# Patient Record
Sex: MALE | Race: BLACK OR AFRICAN AMERICAN | NOT HISPANIC OR LATINO | Employment: OTHER | URBAN - METROPOLITAN AREA
[De-identification: names, ages, dates, MRNs, and addresses within clinical notes are randomized per-mention and may not be internally consistent; named-entity substitution may affect disease eponyms.]

---

## 2017-01-04 ENCOUNTER — HOSPITAL ENCOUNTER (EMERGENCY)
Facility: OTHER | Age: 67
Discharge: HOME | End: 2017-01-04
Attending: EMERGENCY MEDICINE
Payer: MEDICARE

## 2017-01-04 VITALS
SYSTOLIC BLOOD PRESSURE: 127 MMHG | HEART RATE: 72 BPM | WEIGHT: 245 LBS | RESPIRATION RATE: 16 BRPM | HEIGHT: 66 IN | TEMPERATURE: 98 F | OXYGEN SATURATION: 96 % | DIASTOLIC BLOOD PRESSURE: 76 MMHG | BODY MASS INDEX: 39.37 KG/M2

## 2017-01-04 DIAGNOSIS — R42 VERTIGO: Primary | ICD-10-CM

## 2017-01-04 PROCEDURE — 99284 EMERGENCY DEPT VISIT MOD MDM: CPT

## 2017-01-04 RX ORDER — MECLIZINE HYDROCHLORIDE 25 MG/1
25 TABLET ORAL 3 TIMES DAILY PRN
Qty: 20 TABLET | Refills: 0 | Status: SHIPPED | OUTPATIENT
Start: 2017-01-04 | End: 2017-03-10

## 2017-01-04 NOTE — ED AVS SNAPSHOT
[facility]  [address]  [facility] [address]               Hoang J Tom Weaver   2017 10:10 PM   ED    Description:  Male : 1950   Department:  [facility]           Your Care was Coordinated By:     Provider Role From To    Macarena Laughlin MD Attending Provider 17 [phone] --      Reason for Visit     Dizziness           Diagnoses this Visit        Comments    Vertigo    -  Primary       ED Disposition     None           To Do List           Follow-up Information     Schedule an appointment as soon as possible for a visit with [facility].    Specialty:  Neurology    Why:  As needed if symptoms continue despite medication    Contact information:    [address]  [facility] [address] [phone]    Additional information:    [facility], [address]   Please park in [facility].       These Medications        Disp Refills Start End    meclizine (ANTIVERT) 25 mg tablet 20 tablet 0 2017     Take 1 tablet (25 mg total) by mouth 3 (three) times daily as needed for Dizziness. - Oral    Pharmacy: [facility] [address] [place] [address] AT [facility] Ph #: [phone]         [facility]     [facility] Care [facility] -  Assistance  Registered nurses in the [facility] provide clinical advisement, health education, appointment booking, and other advisory services.  Call for this free service at [phone].             Medications           Message regarding Medications     Verify the changes and/or additions to your medication regime listed below are the same as discussed with your clinician today.  If any of these changes or additions are incorrect, please notify your healthcare provider.        START taking these NEW medications        Refills    meclizine (ANTIVERT) 25 mg tablet 0    Sig: Take 1 tablet (25 mg  "total) by mouth 3 (three) times daily as needed for Dizziness.    Class: Print    Route: Oral           Verify that the below list of medications is an accurate representation of the medications you are currently taking.  If none reported, the list may be blank. If incorrect, please contact your healthcare provider. Carry this list with you in case of emergency.           Current Medications     atorvastatin (LIPITOR) 40 MG tablet Take 1 tablet (40 mg total) by mouth once daily.    esomeprazole (NEXIUM) 20 mg GrPS Take 20 mg by mouth before breakfast.    meclizine (ANTIVERT) 25 mg tablet Take 1 tablet (25 mg total) by mouth 3 (three) times daily as needed for Dizziness.    sildenafil (VIAGRA) 100 MG tablet Take 1 tablet (100 mg total) by mouth once daily.           Clinical Reference Information           Your Vitals Were     BP Pulse Temp Resp Height Weight    129/72 (BP Location: Left arm, Patient Position: Sitting) 85 98.7 °F (37.1 °C) (Oral) 17 5' 6" (1.676 m) 111.1 kg (245 lb)    SpO2 BMI             97% 39.54 kg/m2         Allergies as of 1/4/2017     No Known Allergies      Immunizations Administered on Date of Encounter - 1/4/2017     None      ED Micro, Lab, POCT     None      ED Imaging Orders     Start Ordered       Status Ordering Provider    01/04/17 2219 01/04/17 2218  CT Head Without Contrast  1 time imaging      Final result         Discharge Instructions         Managing Dizziness (Vertigo) with Medications    Although medications can't cure your problem, they can help control symptoms. Your doctor may prescribe medications for a few weeks and then taper them off.  If you have side effects from your medications, contact your healthcare provider right away.   How medications can help  · Treat infection or inflammation. If you have a bacterial infection, your doctor can prescribe antibiotics.  · Limit conflicting balance signals. These medications are often in pill form.  · Ease nausea. Suppositories, " pills, or shots may be used to reduce vomiting.  · Reduce pressure in the canals. Diuretics can be used to treat Meniere's disease. These medications help rid the body of excess fluid.  · Ease other symptoms. Other medications can help ease depression and anxiety caused by living with dizziness or fainting.  © 2686-5771 [employer]. [address] [place] [address], [place] [address]. All rights reserved. This information is not intended as a substitute for professional medical care. Always follow your healthcare professional's instructions.          Vertigo (Unknown Cause)    In addition to helping with hearing, the inner ear is part of the balance center of your body. Problems with the inner ear can a false feeling of motion. This is called vertigo. Often, it feels as if you or the room is spinning. A vertigo attack may cause sudden nausea, vomiting and heavy sweating. Severe vertigo causes a loss of balance and can cause you to fall. During vertigo, small head movements and changes in body position will often make the symptoms worse. You may also have ringing in the ears called tinnitus.  An episode of vertigo may last seconds, minutes or hours. Once you are over the first episode, it may never come back. However, symptoms may return off and on.  The cause of your vertigo is not yet known. Possible causes of vertigo include:  · Inflammation of the inner ear  · Disease of the nerves to the inner ear  · Movement of calcium particles in the inner ear  · Poor blood flow to the balance centers of the brain  · Migraine headaches  Home care  · If symptoms are severe, rest quietly in bed. Change positions very slowly. There is usually one position that will feel best, such as lying on one side or lying on your back with your head slightly raised on pillows.  · Do not drive a car or work with dangerous machinery until symptoms have been gone for at least one week.  · Take medicine as prescribed to relieve your  symptoms. Unless another medicine was prescribed for symptoms of nausea, vomiting, and dizziness, you may use over-the-counter motion sickness pills. Ask your pharmacist for suggestions.  Follow-up care  Follow up with your healthcare provider or as directed. If you are referred to a specialist or for testing, make the appointment promptly.  When to seek medical advice  Call your healthcare provider if any of the following occur:  · Fever of 100.4°F (38ºC) or higher, or as directed by your healthcare provider  · Vertigo worsens or is not controlled by prescribed medicine   · Repeated vomiting not relieved by prescribed medicine   · Severe headache  · Confusion  · Weakness of an arm or leg or one side of the face  · Difficulty with speech or vision  · Loss of consciousness   · Seizure  © 5561-7465 [employer]. [address] [place] [address], [place] [address]. All rights reserved. This information is not intended as a substitute for professional medical care. Always follow your healthcare professional's instructions.          Your Scheduled Appointments     Feb 07, 2017  9:00 AM CST   EKG with EKG, APPT   [facility] ([facility] )    [address]  [place] [address]   [phone]            Feb 07, 2017  9:30 AM CST   Non-Fasting Lab with LAB, APPOINTMENT [place] [facility] ([facility])    [address] [facility] [address]   [phone]            Feb 20, 2017  9:45 AM CST   Post OP with [place] JAN Araujo III, MD   [facility] - Otorhinolaryngology ([facility]    [address]  [place] [address]   [phone]              Your Future Surgeries/Procedures     Feb 14, 2017   Surgery with [place] JAN Araujo III, MD   [facility] ([facility])    [address] [facility] [address]   [phone]               [facility] complies with applicable Federal civil rights laws and  does not discriminate on the basis of race, color, national origin, age, disability, or sex.        Language Assistance Services     ATTENTION: Language assistance services are available, free of charge. Please call [phone].      ATENCIÓN: Si clark castro, tiene a graham disposición servicios gratuitos de asistencia lingüística. Llame al [phone].     CHÚ Ý: N?u b?n nói Ti?ng Vi?t, có các d?ch v? h? tr? ngôn ng? mi?n phí dành cho b?n. G?i s? [phone].

## 2017-01-05 NOTE — ED PROVIDER NOTES
"Encounter Date: 1/4/2017    SCRIBE #1 NOTE: I, Karissa Paniagua , am scribing for, and in the presence of, Dr. Laughlin.       History     Chief Complaint   Patient presents with    Dizziness     pt c/o episode of dizziness for the past 2 days. states they last about 3 minutes, "the room starts spinning, then it goes away".      Review of patient's allergies indicates:  No Known Allergies  HPI Comments: Time seen by provider: 10:12 PM    This is a 66 y.o. male, with history of HLD, who presents with complaint of dizziness. Symptoms began two days ago. Abrupt onset of symptoms occurred at rest without head movement. Dizziness is described as intermittent and "spinning", each episode lasting minutes in duration. He has no other complaints, and denies fever, chills, congestion, ear pain, nausea, vomiting, speech difficulty, numbness, or weakness. Pt is compliant with Lipitor, notes symptoms resolve without intervention, and denies head trauma or previously experiencing symptoms.      The history is provided by the patient.     Past Medical History   Diagnosis Date    Hyperlipidemia     Sleep apnea      Past Medical History Pertinent Negatives   Diagnosis Date Noted    Elevated PSA 12/4/2015    Kidney stone 12/4/2015    STD (sexually transmitted disease) 12/4/2015    Urinary tract infection 12/4/2015     Past Surgical History   Procedure Laterality Date    Tonsillectomy       Family History   Problem Relation Age of Onset    Heart disease Mother     Diabetes Mother     Stroke Father     Cancer Sister      pancreatitic    COPD Sister     Prostate cancer Neg Hx     Kidney disease Neg Hx      Social History   Substance Use Topics    Smoking status: Never Smoker    Smokeless tobacco: None    Alcohol use No     Review of Systems   Constitutional: Negative for chills and fever.   HENT: Negative for congestion, ear pain and sore throat.    Eyes: Negative for redness and visual disturbance.   Respiratory: Negative " for cough and shortness of breath.    Cardiovascular: Negative for chest pain and palpitations.   Gastrointestinal: Negative for abdominal pain, diarrhea, nausea and vomiting.   Genitourinary: Negative for dysuria.   Musculoskeletal: Negative for back pain.   Skin: Negative for rash.   Neurological: Positive for dizziness. Negative for speech difficulty, weakness, numbness and headaches.   Psychiatric/Behavioral: Negative for confusion.       Physical Exam   Initial Vitals   BP Pulse Resp Temp SpO2   01/04/17 2044 01/04/17 2044 01/04/17 2044 01/04/17 2044 01/04/17 2044   129/72 85 17 98.7 °F (37.1 °C) 97 %     Physical Exam    Nursing note and vitals reviewed.  Constitutional: He appears well-developed and well-nourished. He is not diaphoretic. No distress.   HENT:   Head: Normocephalic and atraumatic.   Right Ear: External ear normal.   Left Ear: External ear normal.   Eyes: EOM are normal. Pupils are equal, round, and reactive to light. Right eye exhibits no discharge. Left eye exhibits no discharge.   Neck: Normal range of motion.   Cardiovascular: Normal rate, regular rhythm and normal heart sounds. Exam reveals no gallop and no friction rub.    No murmur heard.  Pulmonary/Chest: Breath sounds normal. No respiratory distress. He has no wheezes. He has no rhonchi. He has no rales.   Abdominal: Soft. There is no tenderness. There is no rebound and no guarding.   Musculoskeletal: Normal range of motion. He exhibits no edema or tenderness.   Lymphadenopathy:     He has no cervical adenopathy.   Neurological: He is alert and oriented to person, place, and time.   Cranial nerves II through XII grossly intact.  5/5 motor strength all 4 extremities.  Sensation is normal.  Finger to nose normal. Heel to shin normal. Gait normal.  Speech and cognition is normal.  No focal neurologic deficit.     Skin: Skin is warm and dry. No rash and no abscess noted. No erythema. No pallor.   Psychiatric: He has a normal mood and  affect. His behavior is normal. Judgment and thought content normal.         ED Course   Procedures  Labs Reviewed - No data to display      Imaging Results         CT Head Without Contrast (Final result) Result time:  01/04/17 22:38:48    Final result by Donnell Hamm MD (01/04/17 22:38:48)    Impression:        No acute intracranial abnormalities.     .      Electronically signed by: DONNELL HAMM MD  Date:     01/04/17  Time:    22:38     Narrative:    History: vertigo    Comparison: None    Technique:    Axial images of the brain were obtained at 5-mm intervals from the skull base to the vertex without the administration of contrast.    Findings:    The brain parenchyma appears normal for age with good corticomedullary differentiation.  There is no evidence of acute infarct, hemorrhage, or mass.  The ventricular system is normal in size.  No mass-effect or midline shift.  There are no abnormal extra-axial fluid collections.      The paranasal sinuses and mastoid air cells are clear.      The calvarium appears intact.  .                   Medical Decision Making:   History:   Old Medical Records: I decided to obtain old medical records.  Initial Assessment:   66-year-old healthy male presents complaining of intermittent room spinning which lasts only minutes at a time and was not present during my exam.  I can also not elicited during the exam.  Vital signs were within normal limits and his neuro exam was nonfocal.  Clinical Tests:   Radiological Study: Reviewed and Ordered  ED Management:  CT of the head was obtained to evaluate for central cause for his vertigo.  CT was unremarkable.  Patient was given a prescription for Antivert as well as information to follow-up with neurology if the vertigo persists.  Patient voiced understanding and agreement with this plan and was discharged home in stable condition.            Scribe Attestation:   Scribe #1: I performed the above scribed service and the  documentation accurately describes the services I performed. I attest to the accuracy of the note.    Attending Attestation:           Physician Attestation for Scribe:  Physician Attestation Statement for Scribe #1: I, Dr. Laughlin, reviewed documentation, as scribed by Karissa Paniagua  in my presence, and it is both accurate and complete.                 ED Course     Clinical Impression:     1. Vertigo                Macarena Laughlin MD  01/05/17 0140

## 2017-01-05 NOTE — ED NOTES
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 Denies N/V/D  LOC: Pt is awake alert and aware of environment, oriented X3 and speaking appropriately  Appearance: Pt is in no acute distress, Pt is well groomed and clean  Skin: skin is warm and dry with normal turgor, mucus membranes are moist and pink, skin is intact with no bruising or breakdown  Muskuloskeletal: Pt moves all extremities well, there is no obvious swelling or deformities noted, pulses are intact.  Respiratory: Airway is open and patent, respirations are spontaneous and even.  Cardiac: no edema and cap refill is <3sec  Abdomen: soft, non-tender and non-distended  Neuro: Pt follows commands easily and has no obvious deficits

## 2017-01-05 NOTE — DISCHARGE INSTRUCTIONS
Managing Dizziness (Vertigo) with Medications    Although medications can't cure your problem, they can help control symptoms. Your doctor may prescribe medications for a few weeks and then taper them off.  If you have side effects from your medications, contact your healthcare provider right away.   How medications can help  · Treat infection or inflammation. If you have a bacterial infection, your doctor can prescribe antibiotics.  · Limit conflicting balance signals. These medications are often in pill form.  · Ease nausea. Suppositories, pills, or shots may be used to reduce vomiting.  · Reduce pressure in the canals. Diuretics can be used to treat Meniere's disease. These medications help rid the body of excess fluid.  · Ease other symptoms. Other medications can help ease depression and anxiety caused by living with dizziness or fainting.  © 7320-4768 [employer]. [address] [place] [address], [place] [address]. All rights reserved. This information is not intended as a substitute for professional medical care. Always follow your healthcare professional's instructions.          Vertigo (Unknown Cause)    In addition to helping with hearing, the inner ear is part of the balance center of your body. Problems with the inner ear can a false feeling of motion. This is called vertigo. Often, it feels as if you or the room is spinning. A vertigo attack may cause sudden nausea, vomiting and heavy sweating. Severe vertigo causes a loss of balance and can cause you to fall. During vertigo, small head movements and changes in body position will often make the symptoms worse. You may also have ringing in the ears called tinnitus.  An episode of vertigo may last seconds, minutes or hours. Once you are over the first episode, it may never come back. However, symptoms may return off and on.  The cause of your vertigo is not yet known. Possible causes of vertigo include:  · Inflammation of the inner ear  · Disease  of the nerves to the inner ear  · Movement of calcium particles in the inner ear  · Poor blood flow to the balance centers of the brain  · Migraine headaches  Home care  · If symptoms are severe, rest quietly in bed. Change positions very slowly. There is usually one position that will feel best, such as lying on one side or lying on your back with your head slightly raised on pillows.  · Do not drive a car or work with dangerous machinery until symptoms have been gone for at least one week.  · Take medicine as prescribed to relieve your symptoms. Unless another medicine was prescribed for symptoms of nausea, vomiting, and dizziness, you may use over-the-counter motion sickness pills. Ask your pharmacist for suggestions.  Follow-up care  Follow up with your healthcare provider or as directed. If you are referred to a specialist or for testing, make the appointment promptly.  When to seek medical advice  Call your healthcare provider if any of the following occur:  · Fever of 100.4°F (38ºC) or higher, or as directed by your healthcare provider  · Vertigo worsens or is not controlled by prescribed medicine   · Repeated vomiting not relieved by prescribed medicine   · Severe headache  · Confusion  · Weakness of an arm or leg or one side of the face  · Difficulty with speech or vision  · Loss of consciousness   · Seizure  © 2253-4400 [employer]. [address] [place] [address], [place] [address]. All rights reserved. This information is not intended as a substitute for professional medical care. Always follow your healthcare professional's instructions.

## 2017-02-07 ENCOUNTER — HOSPITAL ENCOUNTER (OUTPATIENT)
Dept: CARDIOLOGY | Facility: CLINIC | Age: 67
Discharge: HOME | End: 2017-02-07
Payer: MEDICARE

## 2017-02-07 DIAGNOSIS — G47.33 OBSTRUCTIVE SLEEP APNEA: ICD-10-CM

## 2017-02-07 DIAGNOSIS — Z01.818 PREOP TESTING: ICD-10-CM

## 2017-02-07 PROCEDURE — 93005 ELECTROCARDIOGRAM TRACING: CPT | Mod: PBBFAC | Performed by: INTERNAL MEDICINE

## 2017-02-07 PROCEDURE — 93010 ELECTROCARDIOGRAM REPORT: CPT | Mod: S$PBB,,, | Performed by: INTERNAL MEDICINE

## 2017-02-13 ENCOUNTER — TELEPHONE (OUTPATIENT)
Dept: OTOLARYNGOLOGY | Facility: CLINIC | Age: 67
End: 2017-02-13

## 2017-02-14 ENCOUNTER — SURGERY (OUTPATIENT)
Age: 67
End: 2017-02-14

## 2017-02-14 ENCOUNTER — ANESTHESIA EVENT (OUTPATIENT)
Dept: SURGERY | Facility: HOSPITAL | Age: 67
End: 2017-02-14
Payer: MEDICARE

## 2017-02-14 ENCOUNTER — ANESTHESIA (OUTPATIENT)
Dept: SURGERY | Facility: HOSPITAL | Age: 67
End: 2017-02-14
Payer: MEDICARE

## 2017-02-14 PROBLEM — G47.33 OSA (OBSTRUCTIVE SLEEP APNEA): Status: ACTIVE | Noted: 2017-02-14

## 2017-02-14 PROCEDURE — 63600175 PHARM REV CODE 636 W HCPCS: Performed by: NURSE ANESTHETIST, CERTIFIED REGISTERED

## 2017-02-14 PROCEDURE — 25000003 PHARM REV CODE 250: Performed by: ANESTHESIOLOGY

## 2017-02-14 PROCEDURE — 25000003 PHARM REV CODE 250: Performed by: NURSE ANESTHETIST, CERTIFIED REGISTERED

## 2017-02-14 PROCEDURE — D9220A PRA ANESTHESIA: Mod: CRNA,,, | Performed by: NURSE ANESTHETIST, CERTIFIED REGISTERED

## 2017-02-14 PROCEDURE — D9220A PRA ANESTHESIA: Mod: ANES,,, | Performed by: ANESTHESIOLOGY

## 2017-02-14 RX ORDER — DEXAMETHASONE SODIUM PHOSPHATE 4 MG/ML
INJECTION, SOLUTION INTRA-ARTICULAR; INTRALESIONAL; INTRAMUSCULAR; INTRAVENOUS; SOFT TISSUE
Status: DISCONTINUED | OUTPATIENT
Start: 2017-02-14 | End: 2017-02-14

## 2017-02-14 RX ORDER — PHENYLEPHRINE HYDROCHLORIDE 10 MG/ML
INJECTION INTRAVENOUS
Status: DISCONTINUED | OUTPATIENT
Start: 2017-02-14 | End: 2017-02-14

## 2017-02-14 RX ORDER — PROPOFOL 10 MG/ML
VIAL (ML) INTRAVENOUS
Status: DISCONTINUED | OUTPATIENT
Start: 2017-02-14 | End: 2017-02-14

## 2017-02-14 RX ORDER — DEXMEDETOMIDINE HYDROCHLORIDE 100 UG/ML
INJECTION, SOLUTION INTRAVENOUS
Status: DISCONTINUED | OUTPATIENT
Start: 2017-02-14 | End: 2017-02-14

## 2017-02-14 RX ORDER — CEFAZOLIN SODIUM 1 G/3ML
INJECTION, POWDER, FOR SOLUTION INTRAMUSCULAR; INTRAVENOUS
Status: DISCONTINUED | OUTPATIENT
Start: 2017-02-14 | End: 2017-02-14

## 2017-02-14 RX ORDER — MIDAZOLAM HYDROCHLORIDE 1 MG/ML
INJECTION, SOLUTION INTRAMUSCULAR; INTRAVENOUS
Status: DISCONTINUED | OUTPATIENT
Start: 2017-02-14 | End: 2017-02-14

## 2017-02-14 RX ORDER — LIDOCAINE HYDROCHLORIDE 20 MG/ML
SOLUTION OROPHARYNGEAL
Status: DISCONTINUED | OUTPATIENT
Start: 2017-02-14 | End: 2017-02-14

## 2017-02-14 RX ORDER — NEOSTIGMINE METHYLSULFATE 1 MG/ML
INJECTION, SOLUTION INTRAVENOUS
Status: DISCONTINUED | OUTPATIENT
Start: 2017-02-14 | End: 2017-02-14

## 2017-02-14 RX ORDER — ONDANSETRON 2 MG/ML
INJECTION INTRAMUSCULAR; INTRAVENOUS
Status: DISCONTINUED | OUTPATIENT
Start: 2017-02-14 | End: 2017-02-14

## 2017-02-14 RX ORDER — ROCURONIUM BROMIDE 10 MG/ML
INJECTION, SOLUTION INTRAVENOUS
Status: DISCONTINUED | OUTPATIENT
Start: 2017-02-14 | End: 2017-02-14

## 2017-02-14 RX ADMIN — PHENYLEPHRINE HYDROCHLORIDE 200 MCG: 10 INJECTION INTRAVENOUS at 08:02

## 2017-02-14 RX ADMIN — SODIUM CHLORIDE, SODIUM GLUCONATE, SODIUM ACETATE, POTASSIUM CHLORIDE, MAGNESIUM CHLORIDE, SODIUM PHOSPHATE, DIBASIC, AND POTASSIUM PHOSPHATE: .53; .5; .37; .037; .03; .012; .00082 INJECTION, SOLUTION INTRAVENOUS at 08:02

## 2017-02-14 RX ADMIN — MIDAZOLAM HYDROCHLORIDE 1 MG: 1 INJECTION, SOLUTION INTRAMUSCULAR; INTRAVENOUS at 07:02

## 2017-02-14 RX ADMIN — GLYCOPYRROLATE 0.2 MG: 0.2 INJECTION, SOLUTION INTRAMUSCULAR; INTRAVENOUS at 07:02

## 2017-02-14 RX ADMIN — ONDANSETRON 4 MG: 2 INJECTION INTRAMUSCULAR; INTRAVENOUS at 08:02

## 2017-02-14 RX ADMIN — LIDOCAINE HYDROCHLORIDE 5 ML: 20 SOLUTION OROPHARYNGEAL at 07:02

## 2017-02-14 RX ADMIN — SODIUM CHLORIDE, SODIUM GLUCONATE, SODIUM ACETATE, POTASSIUM CHLORIDE, MAGNESIUM CHLORIDE, SODIUM PHOSPHATE, DIBASIC, AND POTASSIUM PHOSPHATE: .53; .5; .37; .037; .03; .012; .00082 INJECTION, SOLUTION INTRAVENOUS at 09:02

## 2017-02-14 RX ADMIN — BUPIVACAINE HYDROCHLORIDE 4 ML: 2.5 INJECTION, SOLUTION EPIDURAL; INFILTRATION; INTRACAUDAL; PERINEURAL at 08:02

## 2017-02-14 RX ADMIN — TOPICAL ANESTHETIC 1 EACH: 200 SPRAY DENTAL; PERIODONTAL at 07:02

## 2017-02-14 RX ADMIN — DEXMEDETOMIDINE HYDROCHLORIDE 1 MCG/KG/HR: 100 INJECTION, SOLUTION, CONCENTRATE INTRAVENOUS at 07:02

## 2017-02-14 RX ADMIN — GLYCOPYRROLATE 0.6 MG: 0.2 INJECTION, SOLUTION INTRAMUSCULAR; INTRAVENOUS at 08:02

## 2017-02-14 RX ADMIN — PROPOFOL 80 MG: 10 INJECTION, EMULSION INTRAVENOUS at 08:02

## 2017-02-14 RX ADMIN — ROCURONIUM BROMIDE 10 MG: 10 INJECTION, SOLUTION INTRAVENOUS at 08:02

## 2017-02-14 RX ADMIN — LIDOCAINE 0.2 G: 50 OINTMENT TOPICAL at 07:02

## 2017-02-14 RX ADMIN — CEFAZOLIN 2 G: 1 INJECTION, POWDER, FOR SOLUTION INTRAVENOUS at 08:02

## 2017-02-14 RX ADMIN — DEXMEDETOMIDINE HYDROCHLORIDE 53 MCG: 100 INJECTION, SOLUTION, CONCENTRATE INTRAVENOUS at 07:02

## 2017-02-14 RX ADMIN — DEXAMETHASONE SODIUM PHOSPHATE 8 MG: 4 INJECTION, SOLUTION INTRAMUSCULAR; INTRAVENOUS at 08:02

## 2017-02-14 RX ADMIN — ROCURONIUM BROMIDE 20 MG: 10 INJECTION, SOLUTION INTRAVENOUS at 08:02

## 2017-02-14 RX ADMIN — NEOSTIGMINE METHYLSULFATE 5 MG: 1 INJECTION INTRAVENOUS at 08:02

## 2017-02-14 NOTE — ANESTHESIA PREPROCEDURE EVALUATION
02/14/2017  Hoang Santos Jr. is a 66 y.o., male.    [facility] Anesthesia Evaluation         Review of Systems  Anesthesia Hx:  No problems with previous Anesthesia   Social:  Non-Smoker    Cardiovascular:   Exercise tolerance: good Denies Hypertension.  Denies CAD.     Denies Angina.  Functional Capacity Can you climb two flights of stairs? ==> Yes    Pulmonary:   Denies Asthma.  Denies Recent URI. Sleep Apnea, CPAP    Renal/:  Renal/ Normal     Hepatic/GI:   Denies PUD. Denies Hiatal Hernia.  Denies GERD. Denies Liver Disease.  Denies Hepatitis.    Neurological:   Denies CVA. Denies Seizures.    Endocrine:   Denies Diabetes. Denies Hypothyroidism.        Physical Exam  General:  Well nourished, Obesity    Airway/Jaw/Neck:  Airway Findings: Mouth Opening: Small, but > 3cm Tongue: Normal, Large  General Airway Assessment: Adult  Mallampati: IV  Improves to III with phonation.  TM Distance: 4 - 6 cm  Jaw/Neck Findings:  Neck ROM: Normal ROM  Neck Findings:  Girth Increased     Eyes/Ears/Nose:  EYES/EARS/NOSE FINDINGS: Normal   Dental:  Dental Findings: In tact, upper front caps, upper partial dentures   Chest/Lungs:  Chest/Lungs Findings: Clear to auscultation     Heart/Vascular:  Heart Findings: Rate: Normal  Rhythm: Regular Rhythm  Sounds: Normal        Mental Status:  Mental Status Findings:  Alert and Oriented         Anesthesia Plan  Type of Anesthesia, risks & benefits discussed:  Anesthesia Type:  general  Patient's Preference: Proceed with anesthesia understanding that the risks are very small but could be serious or life threatening.  Intra-op Monitoring Plan: standard ASA monitors  Intra-op Monitoring Plan Comments:   Post Op Pain Control Plan:   Post Op Pain Control Plan Comments:   Induction:   IV  Beta Blocker:  Patient is not currently on a Beta-Blocker (No further documentation required).    "    Informed Consent: Patient understands risks and agrees with Anesthesia plan.  Questions answered. Anesthesia consent signed with patient.  ASA Score: 2     Day of Surgery Review of History & Physical: I have interviewed and examined the patient. I have reviewed the patient's H&P dated:        Anesthesia Plan Notes: Neck girth increased, decreased TMD, Malampatti 4-3 ===> Patient looks to be a potentially very difficult airway. His voice is slightly altered from obstructive pharyngeal tissue. He has not had an anesthetic since age 6. And he gives a family history of father dying "from anesthesia at [facility] in 1950's" from unknown cause. This whole picture leads me to advise him to have an awake FOB intubation. We will use a little midazolam and Dexmedetomidine as well as pharyngeal blocks with hurricane spray, lidocaine 5% ointment. LMA's and glidescope as standby equipment.        Ready For Surgery From Anesthesia Perspective.       "

## 2017-02-14 NOTE — ANESTHESIA RELEASE NOTE
Anesthesia Release from PACU Note    Patient: Hoang Santos JrScott    Procedure(s) Performed: Procedure(s) (LRB):  PALATOPLASTY-(UPPP) (N/A)    Anesthesia type: General    Post pain: Adequate analgesia    Post assessment: no apparent anesthetic complications    Last Vitals:   Vitals:    02/14/17 1030   BP: (!) 150/94   Pulse: 66   Resp: 14   Temp:    SpO2: (!) 93%       Post vital signs: stable    Level of consciousness: awake    Complications: none    Airway Patency: patent    Respiratory: spontaneous    Cardiovascular: stable    Hydration: euvolemic

## 2017-02-14 NOTE — ANESTHESIA POSTPROCEDURE EVALUATION
"Anesthesia Post Evaluation    Patient: Hoang Santos Jr.    Procedure(s) Performed: Procedure(s) (LRB):  PALATOPLASTY-(UPPP) (N/A)    Final Anesthesia Type: general  Patient location during evaluation: PACU  Patient participation: Yes- Able to Participate  Level of consciousness: awake and alert  Post-procedure vital signs: reviewed and stable  Pain management: adequate  Airway patency: patent  PONV status at discharge: No PONV  Anesthetic complications: no      Cardiovascular status: blood pressure returned to baseline  Respiratory status: unassisted  Hydration status: euvolemic  Follow-up not needed.        Visit Vitals    BP (!) 150/94    Pulse 66    Temp 36.7 °C (98.1 °F) (Skin)    Resp 14    Ht 5' 6" (1.676 m)    Wt 106.6 kg (235 lb)    SpO2 (!) 93%    BMI 37.93 kg/m2       Pain/Perla Score: Pain Assessment Performed: Yes (2/14/2017 10:23 AM)  Presence of Pain: denies (2/14/2017 10:23 AM)  Pain Rating Prior to Med Admin: 0 (2/14/2017  9:41 AM)  Pain Rating Post Med Admin: 0 (2/14/2017 10:23 AM)  Perla Score: 10 (2/14/2017 10:23 AM)      "

## 2017-02-14 NOTE — TRANSFER OF CARE
"Anesthesia Transfer of Care Note    Patient: Hoang Santos Jr.    Procedure(s) Performed: Procedure(s) (LRB):  PALATOPLASTY-(UPPP) (N/A)    Patient location: PACU    Anesthesia Type: general    Transport from OR: Transported from OR on 6-10 L/min O2 by face mask with adequate spontaneous ventilation    Post pain: adequate analgesia    Post assessment: no apparent anesthetic complications and tolerated procedure well    Post vital signs: stable    Level of consciousness: awake    Nausea/Vomiting: no nausea/vomiting    Complications: none          Last vitals:   Visit Vitals    /77 (BP Location: Left arm, Patient Position: Lying, BP Method: Automatic)    Pulse 71    Temp 36.5 °C (97.7 °F) (Axillary)    Resp 20    Ht 5' 6" (1.676 m)    Wt 106.6 kg (235 lb)    SpO2 100%    BMI 37.93 kg/m2     "

## 2017-02-15 RX ORDER — CEPHALEXIN 250 MG/5ML
250 POWDER, FOR SUSPENSION ORAL 4 TIMES DAILY
Qty: 200 ML | Refills: 0 | Status: SHIPPED | OUTPATIENT
Start: 2017-02-15 | End: 2017-02-25

## 2017-02-20 ENCOUNTER — OFFICE VISIT (OUTPATIENT)
Dept: OTOLARYNGOLOGY | Facility: CLINIC | Age: 67
End: 2017-02-20
Payer: MEDICARE

## 2017-02-20 VITALS — SYSTOLIC BLOOD PRESSURE: 125 MMHG | HEART RATE: 90 BPM | DIASTOLIC BLOOD PRESSURE: 83 MMHG

## 2017-02-20 DIAGNOSIS — Z98.890 S/P UPPP (UVULOPALATOPHARYNGOPLASTY): Primary | ICD-10-CM

## 2017-02-20 PROCEDURE — 99212 OFFICE O/P EST SF 10 MIN: CPT | Mod: PBBFAC | Performed by: OTOLARYNGOLOGY

## 2017-02-20 PROCEDURE — 99024 POSTOP FOLLOW-UP VISIT: CPT | Mod: ,,, | Performed by: OTOLARYNGOLOGY

## 2017-02-20 PROCEDURE — 99999 PR PBB SHADOW E&M-EST. PATIENT-LVL II: CPT | Mod: PBBFAC,,, | Performed by: OTOLARYNGOLOGY

## 2017-02-20 NOTE — PROGRESS NOTES
One week S/P UPPP.  C/O pain in throat and ears.  Exam: Palate healing well.  Plan: Cont soft diet  Finish Ab's  RTC 3 weeks

## 2017-03-10 ENCOUNTER — HOSPITAL ENCOUNTER (EMERGENCY)
Facility: OTHER | Age: 67
Discharge: HOME | End: 2017-03-10
Attending: EMERGENCY MEDICINE
Payer: MEDICARE

## 2017-03-10 VITALS
TEMPERATURE: 100 F | DIASTOLIC BLOOD PRESSURE: 80 MMHG | RESPIRATION RATE: 21 BRPM | SYSTOLIC BLOOD PRESSURE: 161 MMHG | HEART RATE: 98 BPM | WEIGHT: 245 LBS | OXYGEN SATURATION: 95 % | HEIGHT: 67 IN | BODY MASS INDEX: 38.45 KG/M2

## 2017-03-10 DIAGNOSIS — R05.9 COUGH: ICD-10-CM

## 2017-03-10 DIAGNOSIS — D72.829 LEUKOCYTOSIS, UNSPECIFIED TYPE: ICD-10-CM

## 2017-03-10 DIAGNOSIS — A41.9 SEPSIS, DUE TO UNSPECIFIED ORGANISM: ICD-10-CM

## 2017-03-10 DIAGNOSIS — N12 PYELONEPHRITIS: Primary | ICD-10-CM

## 2017-03-10 DIAGNOSIS — R50.9 FEVER, UNSPECIFIED FEVER CAUSE: ICD-10-CM

## 2017-03-10 LAB
ALBUMIN SERPL BCP-MCNC: 3.4 G/DL
ALP SERPL-CCNC: 92 U/L
ALT SERPL W/O P-5'-P-CCNC: 48 U/L
ANION GAP SERPL CALC-SCNC: 11 MMOL/L
AST SERPL-CCNC: 43 U/L
BACTERIA #/AREA URNS HPF: ABNORMAL /HPF
BASOPHILS # BLD AUTO: 0.01 K/UL
BASOPHILS NFR BLD: 0.1 %
BILIRUB SERPL-MCNC: 1.1 MG/DL
BILIRUB UR QL STRIP: NEGATIVE
BNP SERPL-MCNC: <10 PG/ML
BUN SERPL-MCNC: 9 MG/DL
CALCIUM SERPL-MCNC: 9.7 MG/DL
CHLORIDE SERPL-SCNC: 105 MMOL/L
CLARITY UR: CLEAR
CO2 SERPL-SCNC: 23 MMOL/L
COLOR UR: YELLOW
CREAT SERPL-MCNC: 1.2 MG/DL
DIFFERENTIAL METHOD: ABNORMAL
EOSINOPHIL # BLD AUTO: 0 K/UL
EOSINOPHIL NFR BLD: 0.1 %
ERYTHROCYTE [DISTWIDTH] IN BLOOD BY AUTOMATED COUNT: 13.7 %
EST. GFR  (AFRICAN AMERICAN): >60 ML/MIN/1.73 M^2
EST. GFR  (NON AFRICAN AMERICAN): >60 ML/MIN/1.73 M^2
FLUAV AG SPEC QL IA: NEGATIVE
FLUBV AG SPEC QL IA: NEGATIVE
GLUCOSE SERPL-MCNC: 112 MG/DL
GLUCOSE UR QL STRIP: NEGATIVE
GRAN CASTS #/AREA URNS LPF: 1 /LPF
HCT VFR BLD AUTO: 46.5 %
HGB BLD-MCNC: 15.3 G/DL
HGB UR QL STRIP: ABNORMAL
HYALINE CASTS #/AREA URNS LPF: 8 /LPF
KETONES UR QL STRIP: NEGATIVE
LACTATE SERPL-SCNC: 1 MMOL/L
LACTATE SERPL-SCNC: 2.9 MMOL/L
LEUKOCYTE ESTERASE UR QL STRIP: ABNORMAL
LYMPHOCYTES # BLD AUTO: 1.1 K/UL
LYMPHOCYTES NFR BLD: 8.2 %
MCH RBC QN AUTO: 29.1 PG
MCHC RBC AUTO-ENTMCNC: 32.9 %
MCV RBC AUTO: 89 FL
MICROSCOPIC COMMENT: ABNORMAL
MONOCYTES # BLD AUTO: 0.2 K/UL
MONOCYTES NFR BLD: 1.2 %
NEUTROPHILS # BLD AUTO: 12.4 K/UL
NEUTROPHILS NFR BLD: 90.1 %
NITRITE UR QL STRIP: NEGATIVE
PH UR STRIP: 6 [PH] (ref 5–8)
PLATELET # BLD AUTO: 240 K/UL
PMV BLD AUTO: 9.9 FL
POCT GLUCOSE: 104 MG/DL (ref 70–110)
POTASSIUM SERPL-SCNC: 4 MMOL/L
PROT SERPL-MCNC: 8.2 G/DL
PROT UR QL STRIP: ABNORMAL
RBC # BLD AUTO: 5.25 M/UL
RBC #/AREA URNS HPF: 25 /HPF (ref 0–4)
SODIUM SERPL-SCNC: 139 MMOL/L
SP GR UR STRIP: 1.02 (ref 1–1.03)
SPECIMEN SOURCE: NORMAL
SQUAMOUS #/AREA URNS HPF: 20 /HPF
TSH SERPL DL<=0.005 MIU/L-ACNC: 0.59 UIU/ML
URN SPEC COLLECT METH UR: ABNORMAL
UROBILINOGEN UR STRIP-ACNC: NEGATIVE EU/DL
WBC # BLD AUTO: 13.79 K/UL
WBC #/AREA URNS HPF: 36 /HPF (ref 0–5)
WBC CLUMPS URNS QL MICRO: ABNORMAL

## 2017-03-10 PROCEDURE — 25000003 PHARM REV CODE 250: Performed by: EMERGENCY MEDICINE

## 2017-03-10 PROCEDURE — 96365 THER/PROPH/DIAG IV INF INIT: CPT

## 2017-03-10 PROCEDURE — 80053 COMPREHEN METABOLIC PANEL: CPT

## 2017-03-10 PROCEDURE — 87400 INFLUENZA A/B EACH AG IA: CPT

## 2017-03-10 PROCEDURE — 99284 EMERGENCY DEPT VISIT MOD MDM: CPT | Mod: 25

## 2017-03-10 PROCEDURE — 87086 URINE CULTURE/COLONY COUNT: CPT

## 2017-03-10 PROCEDURE — 81000 URINALYSIS NONAUTO W/SCOPE: CPT

## 2017-03-10 PROCEDURE — 87040 BLOOD CULTURE FOR BACTERIA: CPT | Mod: 59

## 2017-03-10 PROCEDURE — 85025 COMPLETE CBC W/AUTO DIFF WBC: CPT

## 2017-03-10 PROCEDURE — 84443 ASSAY THYROID STIM HORMONE: CPT

## 2017-03-10 PROCEDURE — 83605 ASSAY OF LACTIC ACID: CPT

## 2017-03-10 PROCEDURE — 96361 HYDRATE IV INFUSION ADD-ON: CPT

## 2017-03-10 PROCEDURE — 87088 URINE BACTERIA CULTURE: CPT

## 2017-03-10 PROCEDURE — 93005 ELECTROCARDIOGRAM TRACING: CPT

## 2017-03-10 PROCEDURE — 87186 SC STD MICRODIL/AGAR DIL: CPT | Mod: 59

## 2017-03-10 PROCEDURE — 82962 GLUCOSE BLOOD TEST: CPT

## 2017-03-10 PROCEDURE — 93010 ELECTROCARDIOGRAM REPORT: CPT | Mod: ,,, | Performed by: INTERNAL MEDICINE

## 2017-03-10 PROCEDURE — 87077 CULTURE AEROBIC IDENTIFY: CPT

## 2017-03-10 PROCEDURE — 63600175 PHARM REV CODE 636 W HCPCS: Performed by: EMERGENCY MEDICINE

## 2017-03-10 PROCEDURE — 83880 ASSAY OF NATRIURETIC PEPTIDE: CPT

## 2017-03-10 RX ORDER — ACETAMINOPHEN 325 MG/1
650 TABLET ORAL
Status: COMPLETED | OUTPATIENT
Start: 2017-03-10 | End: 2017-03-10

## 2017-03-10 RX ORDER — CIPROFLOXACIN 500 MG/1
500 TABLET ORAL 2 TIMES DAILY
Qty: 28 TABLET | Refills: 0 | Status: SHIPPED | OUTPATIENT
Start: 2017-03-10 | End: 2017-03-24

## 2017-03-10 RX ADMIN — CEFTRIAXONE 1 G: 1 INJECTION, SOLUTION INTRAVENOUS at 01:03

## 2017-03-10 RX ADMIN — ACETAMINOPHEN 650 MG: 325 TABLET ORAL at 12:03

## 2017-03-10 RX ADMIN — SODIUM CHLORIDE 3333 ML: 0.9 INJECTION, SOLUTION INTRAVENOUS at 12:03

## 2017-03-10 NOTE — ED PROVIDER NOTES
Encounter Date: 3/10/2017    SCRIBE #1 NOTE: I, Nieves Nava, am scribing for, and in the presence of,  Dr. Redman. I have scribed the entire note.       History     Chief Complaint   Patient presents with    Fever     pt with chills, fever and frequent urination x 2 days.     Review of patient's allergies indicates:  No Known Allergies  HPI Comments: Time seen by provider: 12:04 PM    This is a 66 y.o. male who presents with complaint of fever. He reports onset of symptoms was 1 day ago. The patient notes he has been feeling hot and cold. The patient denies checking his temperature. He notes associated cough and urinary frequency but denies any sore throat, headache, body ache, abdominal pain, nausea or vomiting. The patient states he has been eating and drinking as normal. He states he has been using antibiotics he had from recent Palatoplasty surgery about 3 weeks ago.     The history is provided by the patient.     Past Medical History:   Diagnosis Date    Hyperlipidemia     Sleep apnea      Past Surgical History:   Procedure Laterality Date    TONSILLECTOMY      UVULOPALATOPHARYNGOPLASTY       Family History   Problem Relation Age of Onset    Heart disease Mother     Diabetes Mother     Stroke Father     Cancer Sister      pancreatitic    COPD Sister     Prostate cancer Neg Hx     Kidney disease Neg Hx      Social History   Substance Use Topics    Smoking status: Never Smoker    Smokeless tobacco: None    Alcohol use No     Review of Systems   Constitutional: Positive for chills and fever.   HENT: Negative for congestion and sore throat.    Eyes: Negative for redness and visual disturbance.   Respiratory: Positive for cough. Negative for shortness of breath.    Cardiovascular: Negative for chest pain and palpitations.   Gastrointestinal: Negative for abdominal pain, diarrhea, nausea and vomiting.   Genitourinary: Positive for frequency. Negative for dysuria.   Musculoskeletal: Negative  for back pain.   Skin: Negative for rash.   Neurological: Negative for weakness and headaches.   Psychiatric/Behavioral: Negative for confusion.       Physical Exam   Initial Vitals   BP Pulse Resp Temp SpO2   03/10/17 1140 03/10/17 1140 03/10/17 1140 03/10/17 1140 03/10/17 1140   123/56 140 18 98 °F (36.7 °C) 94 %     Physical Exam    Nursing note and vitals reviewed.  Constitutional: He appears well-developed and well-nourished. He is diaphoretic. He is Obese . He has a sickly appearance. No distress.   HENT:   Head: Normocephalic and atraumatic.   Right Ear: External ear normal.   Eyes: Conjunctivae and EOM are normal.   Neck: Normal range of motion. Neck supple. No JVD present.   Cardiovascular: Regular rhythm and normal heart sounds. Tachycardia present.  Exam reveals no gallop and no friction rub.    No murmur heard.  Tachycardia present   Pulmonary/Chest: Breath sounds normal. He has no wheezes. He has no rhonchi. He has no rales.   Abdominal: Soft. There is no tenderness. There is no guarding.   Musculoskeletal: Normal range of motion. He exhibits no edema or tenderness.   Lymphadenopathy:     He has no cervical adenopathy.   Neurological: He is alert and oriented to person, place, and time. He has normal strength.   Skin: Skin is warm. No rash and no abscess noted. There is pallor.   Mild pallor   Psychiatric: He has a normal mood and affect. His behavior is normal. Judgment and thought content normal.         ED Course   Critical Care  Date/Time: 3/10/2017 11:17 AM  Performed by: RACHELL MACEDO  Authorized by: RACHELL MACEDO   Direct patient critical care time: 40 minutes  Ordering / reviewing critical care time: 5 minutes  Documentation critical care time: 5 minutes  Total critical care time (exclusive of procedural time) : 50 minutes  Critical care was necessary to treat or prevent imminent or life-threatening deterioration of the following conditions: sepsis.  Critical care was time spent personally  by me on the following activities: evaluation of patient's response to treatment, examination of patient, obtaining history from patient or surrogate, ordering and review of laboratory studies, pulse oximetry and re-evaluation of patient's condition.        Labs Reviewed   CBC W/ AUTO DIFFERENTIAL - Abnormal; Notable for the following:        Result Value    WBC 13.79 (*)     Gran # 12.4 (*)     Mono # 0.2 (*)     Gran% 90.1 (*)     Lymph% 8.2 (*)     Mono% 1.2 (*)     All other components within normal limits   COMPREHENSIVE METABOLIC PANEL - Abnormal; Notable for the following:     Glucose 112 (*)     Albumin 3.4 (*)     Total Bilirubin 1.1 (*)     AST 43 (*)     ALT 48 (*)     All other components within normal limits   URINALYSIS - Abnormal; Notable for the following:     Protein, UA 1+ (*)     Occult Blood UA 1+ (*)     Leukocytes, UA Trace (*)     All other components within normal limits   LACTIC ACID, PLASMA - Abnormal; Notable for the following:     Lactate (Lactic Acid) 2.9 (*)     All other components within normal limits   URINALYSIS MICROSCOPIC - Abnormal; Notable for the following:     RBC, UA 25 (*)     WBC, UA 36 (*)     WBC Clumps, UA Occasional (*)     Bacteria, UA Moderate (*)     Hyaline Casts, UA 8 (*)     Granular Casts, UA 1 (*)     All other components within normal limits   CULTURE, BLOOD    Narrative:     Aerobic and anaerobic   CULTURE, BLOOD    Narrative:     Aerobic and anaerobic   CULTURE, URINE   CULTURE, URINE   INFLUENZA A AND B ANTIGEN   B-TYPE NATRIURETIC PEPTIDE   TSH   LACTIC ACID, PLASMA   POCT GLUCOSE     EKG Readings: (Independently Interpreted)   EKG Reading (12:09 PM): Sinus tachycardia with a rate of 139. Normal axis. No STEMI     Imaging Results         X-Ray Chest 1 View (Final result) Result time:  03/10/17 12:19:23    Final result by Jose Martines MD (03/10/17 12:19:23)    Impression:        No acute cardiothoracic disease.      Electronically signed by: JOSE  JB JARQUIN  Date:     03/10/17  Time:    12:19     Narrative:    PORTABLE AP CHEST:      Comparison: 9/13/2010    Findings:     The lungs are clear. There is no pleural effusion or pneumothorax. The cardiac silhouette isnormal in size.  There are no acute bony abnormalities.              X-Rays:   Independently Interpreted Readings:   Chest X-Ray: Portable Chest X-ray Reading (12:26 PM): No cardiomegaly. No infiltrate. No pneumothorax.      Medical Decision Making:   Initial Assessment:   Urgent evaluation of 66-year-old gentleman with history of hyperlipidemia presenting with second day of generalized malaise, subjective fevers, chills, dry cough, frequent urination.  Patient denies sick contacts, did not receive the influenza vaccine this year, denies sputum production, no dysuria, no polydipsia and no recent travel.  On exam patient is warm to touch, though appears better than vital signs which are notable for tachycardia 140s normotension, 02 sat 94%, no respiratory distress, no JVD, no rhonchi, abdominal tenderness.  Suspect patient is febrile, meeting Sirs criteria- we'll explore source including pneumonia, UTI, influenza, bacteremia, hyperthyroidism, metabolic disturbances, dehydration.  Patient is currently finishing course of Keflex status post Palatoplasty 2/14/17.  Independently Interpreted Test(s):   I have ordered and independently interpreted X-rays - see prior notes.  I have ordered and independently interpreted EKG Reading(s) - see prior notes  Clinical Tests:   Lab Tests: Reviewed and Ordered  Radiological Study: Ordered and Reviewed  Medical Tests: Ordered and Reviewed  ED Management:  Initial labs notable for negative influenza, urinalysis positive for trace leukocytes, 36 WBCs, 25 RBCs, moderate bacteria, urine culture sent, and patient started on antibiotics for UTI/pyelonephritis.  Lactate 2.9, normal TSH, leukocytosis 13.8, and chest x-ray without obvious pneumonia.    1:24 PM Discussed  diagnosis of UTI with the patient. Patient seated upright and comfortably in bed, expressing strong desire to go home instead of admission. HR 83 in bed.    Patient fully resuscitated with 4 L normal saline, >30cc/kg, with resolution and tachycardia and lactate cleared.  I did recommend admission, but given that patient displayed appropriate insight, and understanding of this infection, and the need for strict antibiotic compliance with quick return to the health care setting if unimproved, worsening discomfort, dizziness, vomiting or any other medical concerns, I do think the patient is capable for trial of outpatient management for urosepsis.   Pt had noWe'll discharge the patient home with ciprofloxacin, after initial treatment with Rocephin in the ED. Blood and Urine cx pending. Pt able to ambulate without orthostasis, no hypotension, and improvement in fever prior to dc home.         Additional MDM:   EKG: I have independently interpreted EKG(s) - see notes.   X-Rays: I have independently interpreted X-Ray(s) - see notes.          Scribe Attestation:   Scribe #1: I performed the above scribed service and the documentation accurately describes the services I performed. I attest to the accuracy of the note.    Attending Attestation:           Physician Attestation for Scribe:  Physician Attestation Statement for Scribe #1: I, Dr. Redman, reviewed documentation, as scribed by Nieves Nava in my presence, and it is both accurate and complete.                 ED Course     Clinical Impression:     1. Pyelonephritis    2. Cough    3. Leukocytosis, unspecified type    4. Fever, unspecified fever cause    5. Sepsis, due to unspecified organism          Disposition:   Disposition: Discharged  Condition: Mar Redman MD  03/11/17 1122

## 2017-03-10 NOTE — ED AVS SNAPSHOT
[facility]  [address] [facility] [address]               Hoang Santos    3/10/2017 11:49 AM   ED    Description:  Male : 1950   Department:  [facility]           Your Care was Coordinated By:     Provider Role From To    Marie Redman MD Attending Provider 03/10/17 1155 --      Reason for Visit     Fever           Diagnoses this Visit        Comments    Pyelonephritis    -  Primary     Cough         Leukocytosis, unspecified type         Fever, unspecified fever cause           ED Disposition     ED Disposition Condition Comment    Discharge             To Do List           Follow-up Information     Follow up with Meliton Alegre MD. Schedule an appointment as soon as possible for a visit in 2 days.    Specialty:  Internal Medicine    Contact information:    [address]  [facility] [id number] [phone]          Go to EMERGENCY [facility].    Why:  If symptoms worsen- fever not improved, dizziness, weakness, vomitting     Contact information:    [address] [facility] [address]         These Medications        Disp Refills Start End    ciprofloxacin HCl (CIPRO) 500 MG tablet 28 tablet 0 3/10/2017 3/24/2017    Take 1 tablet (500 mg total) by mouth 2 (two) times daily. - Oral    Pharmacy: [facility] - [address] AT [facility] Ph #: [phone]         [facility]     [facility] Nurse Care Line -  Assistance  Registered nurses in the [facility] Center provide clinical advisement, health education, appointment booking, and other advisory services.  Call for this free service at [phone].             Medications           Message regarding Medications     Verify the changes and/or additions to your medication regime listed below are the same as discussed with your clinician today.  If any of these changes or additions  are incorrect, please notify your healthcare provider.        START taking these NEW medications        Refills    ciprofloxacin HCl (CIPRO) 500 MG tablet 0    Sig: Take 1 tablet (500 mg total) by mouth 2 (two) times daily.    Class: Print    Route: Oral      These medications were administered today        Dose Freq    sodium chloride 0.9% bolus 3,333 mL 30 mL/kg × 111.1 kg ED 1 Time    Sig: Inject 3,333 mLs into the vein ED 1 Time.    Class: Normal    Route: Intravenous    acetaminophen tablet 650 mg 650 mg ED 1 Time    Sig: Take 2 tablets (650 mg total) by mouth ED 1 Time.    Class: Normal    Route: Oral    cefTRIAXone (ROCEPHIN) 1 g in dextrose 5 % 50 mL IVPB 1 g ED 1 Time    Sig: Inject 50 mLs (1 g total) into the vein ED 1 Time.    Class: Normal    Route: Intravenous      STOP taking these medications     esomeprazole (NEXIUM) 20 mg GrPS Take 20 mg by mouth before breakfast.    meclizine (ANTIVERT) 25 mg tablet Take 1 tablet (25 mg total) by mouth 3 (three) times daily as needed for Dizziness.           Verify that the below list of medications is an accurate representation of the medications you are currently taking.  If none reported, the list may be blank. If incorrect, please contact your healthcare provider. Carry this list with you in case of emergency.           Current Medications     atorvastatin (LIPITOR) 40 MG tablet Take 1 tablet (40 mg total) by mouth once daily.    esomeprazole magnesium (NEXIUM 24HR) 22.3 mg CpDR Take 1 capsule by mouth daily as needed (for acid reflux).    sildenafil (VIAGRA) 100 MG tablet Take 1 tablet (100 mg total) by mouth once daily.    ciprofloxacin HCl (CIPRO) 500 MG tablet Take 1 tablet (500 mg total) by mouth 2 (two) times daily.    hydrocodone-acetaminophen (HYCET) solution 7.5-325 mg/15mL Take 15 mLs by mouth every 6 (six) hours as needed.    ondansetron (ZOFRAN-ODT) 8 MG TbDL Take 1 tablet (8 mg total) by mouth every 12 (twelve) hours as needed (nausea/vomiting).     "       Clinical Reference Information           Your Vitals Were     BP Pulse Temp Resp Height Weight    111/55 104 99.9 °F (37.7 °C) (Oral) 21 5' 6.5" (1.689 m) 111.1 kg (245 lb)    SpO2 BMI             93% 38.95 kg/m2         Allergies as of 3/10/2017     No Known Allergies      Immunizations Administered on Date of Encounter - 3/10/2017     None      ED Micro, Lab, POCT     Start Ordered       Status Ordering Provider    03/10/17 1600 03/10/17 1206    Every 4 hours,   Status:  Canceled     Start Status   03/10/17 1600 Final result       Canceled     03/10/17 1600 03/10/17 1326  Lactic acid, plasma  Every 4 hours     Start Status   03/10/17 1600 Final result   03/10/17 2000 Scheduled       Acknowledged     03/10/17 1600 03/10/17 1356  Lactic acid, plasma  STAT      Acknowledged     03/10/17 1301 03/10/17 1300  Urine culture **CANNOT BE ORDERED STAT**  Add-on      Completed     03/10/17 1213 03/10/17 1213  POCT glucose  Once      Final result     03/10/17 1206 03/10/17 1206  Blood culture x two cultures. Draw prior to antibiotics.  Every 15 min     Comments:  Aerobic and anaerobic   Start Status   03/10/17 1206 In process   03/10/17 1221 In process       Acknowledged     03/10/17 1206 03/10/17 1206  Brain natriuretic peptide  STAT      Final result     03/10/17 1206 03/10/17 1206  TSH  STAT      Final result     03/10/17 1157 03/10/17 1156  CBC auto differential  STAT      Final result     03/10/17 1157 03/10/17 1156  Comprehensive metabolic panel  STAT      Final result     03/10/17 1157 03/10/17 1156    STAT,   Status:  Canceled      Canceled     03/10/17 1157 03/10/17 1156  Urinalysis  STAT      Final result     03/10/17 1156 03/10/17 1155  POCT glucose  Once      Acknowledged     03/10/17 1156 03/10/17 1155  Influenza antigen Nasopharyngeal Swab  Once      Final result     03/10/17 1156 03/10/17 1156  Urinalysis Microscopic  Once      Final result     03/10/17 1156 03/10/17 1156  Urine culture  Once      In " process       ED Imaging Orders     Start Ordered       Status Ordering Provider    03/10/17 1156 03/10/17 1155  X-Ray Chest 1 View  1 time imaging      Final result       Discharge References/Attachments     PYELONEPHRITIS, MALE (ADULT) (ENGLISH)      Your Scheduled Appointments     Mar 20, 2017  3:15 PM CDT   Post OP with [place] MD [facility] - Otorhinolaryngology ([address] )    [address]  [facility] [address]   [phone]               [facility] complies with applicable Federal civil rights laws and does not discriminate on the basis of race, color, national origin, age, disability, or sex.        Language Assistance Services     ATTENTION: Language assistance services are available, free of charge. Please call [phone].      ATENCIÓN: Si habla español, tiene a graham disposición servicios gratuitos de asistencia lingüística. Llame al [phone].     CHÚ Ý: N?u b?n nói Ti?ng Vi?t, có các d?ch v? h? tr? ngôn ng? mi?n phí dành cho b?n. G?i s? [phone].

## 2017-03-10 NOTE — ED NOTES
LOC: The patient is awake and alert; oriented x 3 and speaking appropriately.   APPEARANCE: Patient resting comfortably, patient is clean and well groomed   SKIN: Patients skin is hot and diaphoretic.   MUSCULOSKELETAL: Patient moving all extremities well, no obvious swelling or deformities noted.   RESPIRATORY: Airway is open and patent, breath sounds clear throughout all lung fields; respirations are spontaneous, normal effort and rate   CARDIAC: Patient is tachycardic, no peripheral edema noted, capillary refill < 3 seconds; No complaints of chest pain   ABDOMEN: Soft and non tender to palpation, no distention noted. Bowel sounds present x 4.  Patient is obsese.

## 2017-03-10 NOTE — ED NOTES
Discharge papers and prescriptions reviewed with patient.  Patient verbalized understanding.  Patient is ambulatory and independent from the room to triage.  Patient directed to discharge window.  Paperwork given to registration.

## 2017-03-11 NOTE — PROVIDER PROGRESS NOTES - EMERGENCY DEPT.
Encounter Date: 3/10/2017    ED Physician Progress Notes        Physician Note:   Pt seen yesterday for fever and diagnosed with UTI. Blood cultures (+) for gram (-) rods. Pt given Ceftriaxone in ED and discharged on cipro. I called him at 920am and he states he feels much better, had a mild fever last night but none since, is tolerating PO, and reports improvement in urinary frequency. He does not feel he needs to return to the ED today, but I advised him to do so if he develops any recurrent fevers or other concerns. Will await culture ID and susceptibilities to cipro.

## 2017-03-13 LAB
BACTERIA BLD CULT: NORMAL
BACTERIA UR CULT: NORMAL

## 2017-03-16 ENCOUNTER — OFFICE VISIT (OUTPATIENT)
Dept: INTERNAL MEDICINE | Facility: CLINIC | Age: 67
End: 2017-03-16
Attending: INTERNAL MEDICINE
Payer: MEDICARE

## 2017-03-16 VITALS
HEIGHT: 66 IN | BODY MASS INDEX: 36.74 KG/M2 | DIASTOLIC BLOOD PRESSURE: 78 MMHG | SYSTOLIC BLOOD PRESSURE: 134 MMHG | HEART RATE: 102 BPM | OXYGEN SATURATION: 98 % | WEIGHT: 228.63 LBS

## 2017-03-16 DIAGNOSIS — N40.0 ENLARGED PROSTATE ON RECTAL EXAMINATION: ICD-10-CM

## 2017-03-16 DIAGNOSIS — G47.33 OSA (OBSTRUCTIVE SLEEP APNEA): Primary | ICD-10-CM

## 2017-03-16 DIAGNOSIS — R42 VERTIGO: ICD-10-CM

## 2017-03-16 DIAGNOSIS — E78.5 HYPERLIPIDEMIA, UNSPECIFIED HYPERLIPIDEMIA TYPE: ICD-10-CM

## 2017-03-16 DIAGNOSIS — R05.3 CHRONIC COUGH: ICD-10-CM

## 2017-03-16 DIAGNOSIS — N52.9 ERECTILE DYSFUNCTION, UNSPECIFIED ERECTILE DYSFUNCTION TYPE: ICD-10-CM

## 2017-03-16 PROCEDURE — 99214 OFFICE O/P EST MOD 30 MIN: CPT | Mod: S$PBB,,, | Performed by: INTERNAL MEDICINE

## 2017-03-16 PROCEDURE — 99213 OFFICE O/P EST LOW 20 MIN: CPT | Mod: PBBFAC | Performed by: INTERNAL MEDICINE

## 2017-03-16 PROCEDURE — 99999 PR PBB SHADOW E&M-EST. PATIENT-LVL III: CPT | Mod: PBBFAC,,, | Performed by: INTERNAL MEDICINE

## 2017-03-16 RX ORDER — MECLIZINE HYDROCHLORIDE 25 MG/1
25 TABLET ORAL 3 TIMES DAILY PRN
COMMUNITY
End: 2018-10-15

## 2017-03-16 RX ORDER — TAMSULOSIN HYDROCHLORIDE 0.4 MG/1
0.4 CAPSULE ORAL DAILY
Qty: 90 CAPSULE | Refills: 1 | Status: SHIPPED | OUTPATIENT
Start: 2017-03-16 | End: 2017-06-20

## 2017-03-16 RX ORDER — SILDENAFIL 100 MG/1
100 TABLET, FILM COATED ORAL DAILY
Qty: 10 TABLET | Refills: 11 | Status: SHIPPED | OUTPATIENT
Start: 2017-03-16 | End: 2018-01-16 | Stop reason: SDUPTHER

## 2017-03-16 RX ORDER — ESOMEPRAZOLE MAGNESIUM 40 MG/1
40 CAPSULE, DELAYED RELEASE ORAL
Qty: 90 CAPSULE | Refills: 1 | Status: SHIPPED | OUTPATIENT
Start: 2017-03-16 | End: 2018-01-16

## 2017-03-16 NOTE — MR AVS SNAPSHOT
[facility]  [address]  [facility] [address]  Phone: [phone]  Fax: [phone]                  Hoang Santos Jr.   3/16/2017 11:20 AM   Office Visit    Description:  Male : 1950   Provider:  Lee Kay MD   Department:  [facility]           Reason for Visit     Establish Care           Diagnoses this Visit        Comments    Erectile dysfunction, unspecified erectile dysfunction type                To Do List           Future Appointments        Provider Department Dept Phone    3/20/2017 3:15 PM [place] MD fJ [facility] - Otorhinolaryngology [phone]      Goals (5 Years of Data)     None       These Medications        Disp Refills Start End    sildenafil (VIAGRA) 100 MG tablet 10 tablet 11 3/16/2017     Take 1 tablet (100 mg total) by mouth once daily. - Oral    Pharmacy: [facility] [address] AT [facility] Ph #: [phone]       esomeprazole (NEXIUM) 40 MG capsule 90 capsule 1 3/16/2017 4/15/2017    Take 1 capsule (40 mg total) by mouth before breakfast. - Oral    Pharmacy: [facility] [address] AT [facility] Ph #: [phone]       tamsulosin (FLOMAX) 0.4 mg Cp24 90 capsule 1 3/16/2017     Take 1 capsule (0.4 mg total) by mouth once daily. - Oral    Pharmacy: [facility] [address] AT [facility] Ph #: [phone]         [facility]     [facility] On Call Nurse Care Line -  Assistance  Registered nurses in the [facility] Center provide clinical advisement, health education, appointment booking, and other advisory services.  Call for this free service at [phone].             Medications           Message regarding Medications     Verify the changes and/or additions to your medication regime listed below are the same as discussed  "with your clinician today.  If any of these changes or additions are incorrect, please notify your healthcare provider.        START taking these NEW medications        Refills    esomeprazole (NEXIUM) 40 MG capsule 1    Sig: Take 1 capsule (40 mg total) by mouth before breakfast.    Class: Normal    Route: Oral    tamsulosin (FLOMAX) 0.4 mg Cp24 1    Sig: Take 1 capsule (0.4 mg total) by mouth once daily.    Class: Normal    Route: Oral      STOP taking these medications     ondansetron (ZOFRAN-ODT) 8 MG TbDL Take 1 tablet (8 mg total) by mouth every 12 (twelve) hours as needed (nausea/vomiting).    hydrocodone-acetaminophen (HYCET) solution 7.5-325 mg/15mL Take 15 mLs by mouth every 6 (six) hours as needed.           Verify that the below list of medications is an accurate representation of the medications you are currently taking.  If none reported, the list may be blank. If incorrect, please contact your healthcare provider. Carry this list with you in case of emergency.           Current Medications     atorvastatin (LIPITOR) 40 MG tablet Take 1 tablet (40 mg total) by mouth once daily.    ciprofloxacin HCl (CIPRO) 500 MG tablet Take 1 tablet (500 mg total) by mouth 2 (two) times daily.    meclizine (ANTIVERT) 25 mg tablet Take 25 mg by mouth 3 (three) times daily as needed.    sildenafil (VIAGRA) 100 MG tablet Take 1 tablet (100 mg total) by mouth once daily.    esomeprazole (NEXIUM) 40 MG capsule Take 1 capsule (40 mg total) by mouth before breakfast.    tamsulosin (FLOMAX) 0.4 mg Cp24 Take 1 capsule (0.4 mg total) by mouth once daily.           Clinical Reference Information           Your Vitals Were     BP Pulse Height Weight SpO2 BMI    134/78 102 5' 6" (1.676 m) 103.7 kg (228 lb 9.9 oz) 98% 36.9 kg/m2      Blood Pressure          Most Recent Value    BP  134/78      Allergies as of 3/16/2017     No Known Allergies      Immunizations Administered on Date of Encounter - 3/16/2017     None      Instructions "    Probiotics- yogurt with live cultures or in pill form with Culturelle or Align       Language Assistance Services     ATTENTION: Language assistance services are available, free of charge. Please call [phone].      ATENCIÓN: Si clark castro, tiene a graham disposición servicios gratuitos de asistencia lingüística. Llame al [phone].     CHÚ Ý: N?u b?n nói Ti?ng Vi?t, có các d?ch v? h? tr? ngôn ng? mi?n phí dành cho b?n. G?i s? [phone].         [facility] complies with applicable Federal civil rights laws and does not discriminate on the basis of race, color, national origin, age, disability, or sex.

## 2017-03-16 NOTE — PROGRESS NOTES
Subjective:       Patient ID: Hoang Santos Jr. is a 66 y.o. male.    Chief Complaint: Establish Care    HPI Comments: Here to establish care    Recently in ED for fatigue and chills and found to have urosepsis. Patient did not want to be admitted and was discharged on 2 week course of cipro. Cx grew pan-sensitive citrobacter. He reports feeling much better with resolution of fatigue and urinary symptoms. He denies continued abd pain or fevers.      Vertigo  -occurred one month ago. Has occasional mild symptoms that last about 15 min and takes meclizine and symptoms jared. He denies allergies or regular nasal congestion or facial pressure.     Dry cough  -present for several years. Worsens when he weast something salty. He has tried ppi but only prn. He denies chest tightness, wheezing, SOB, Hx of smoking, night sweats, weight loss, hemoptysis.      GEMMA  -had palatoplasty done 02/2017 due to inability to tolerate CPAP despite trying several different masks and settings. He reports improvement in sleep and fatigue.    He reports nocturia x2. He does hydrate throughout the day. He denies urinary hesitancy, decreased force of stream, incomplete emptying, urgency, or incontinence.           Review of Systems   Constitutional: Negative for appetite change, chills, fever and unexpected weight change.   HENT: Negative for hearing loss, sore throat and trouble swallowing.    Eyes: Negative for visual disturbance.   Respiratory: Positive for cough. Negative for chest tightness, shortness of breath and wheezing.    Cardiovascular: Negative for chest pain, palpitations and leg swelling.   Gastrointestinal: Negative for abdominal pain, blood in stool, constipation, diarrhea, nausea and vomiting.   Endocrine: Negative for polydipsia and polyuria.   Genitourinary: Negative for decreased urine volume, difficulty urinating, dysuria, frequency and urgency.   Musculoskeletal: Negative for gait problem.   Skin: Negative for rash.  "  Neurological: Positive for dizziness. Negative for numbness.   Psychiatric/Behavioral: The patient is not nervous/anxious.        Past Medical History:   Diagnosis Date    Hyperlipidemia     Sleep apnea      Past Surgical History:   Procedure Laterality Date    TONSILLECTOMY      UVULOPALATOPHARYNGOPLASTY       Family History   Problem Relation Age of Onset    Heart disease Mother     Diabetes Mother     Stroke Father     Cancer Sister      pancreatitic    COPD Sister     Prostate cancer Neg Hx     Kidney disease Neg Hx      Social History     Social History    Marital status:      Spouse name: N/A    Number of children: 3    Years of education: N/A     Occupational History    Not on file.     Social History Main Topics    Smoking status: Never Smoker    Smokeless tobacco: Not on file    Alcohol use No    Drug use: No    Sexual activity: Not Currently     Other Topics Concern    Not on file     Social History Narrative         Objective:      Vitals:    03/16/17 1122   BP: 134/78   Pulse: 102   SpO2: 98%   Weight: 103.7 kg (228 lb 9.9 oz)   Height: 5' 6" (1.676 m)      Physical Exam   Constitutional: He is oriented to person, place, and time. He appears well-developed and well-nourished. No distress.   HENT:   Head: Normocephalic and atraumatic.   Mouth/Throat: Oropharynx is clear and moist. No oropharyngeal exudate.   Eyes: Conjunctivae and EOM are normal. Pupils are equal, round, and reactive to light. No scleral icterus.   Neck: No thyromegaly present.   Cardiovascular: Normal rate, regular rhythm and normal heart sounds.    No murmur heard.  Pulmonary/Chest: Effort normal and breath sounds normal. He has no wheezes. He has no rales.   Abdominal: Soft. He exhibits no distension. There is no tenderness.   Genitourinary: Rectal exam shows no external hemorrhoid and no mass. Prostate is enlarged. Prostate is not tender.   Musculoskeletal: He exhibits no edema or tenderness. "   Lymphadenopathy:     He has no cervical adenopathy.   Neurological: He is alert and oriented to person, place, and time.   Skin: Skin is warm and dry.   Psychiatric: He has a normal mood and affect. His behavior is normal.       Assessment:       1. GEMMA (obstructive sleep apnea)    2. Erectile dysfunction, unspecified erectile dysfunction type    3. Hyperlipidemia, unspecified hyperlipidemia type    4. Vertigo    5. Enlarged prostate on rectal examination    6. Chronic cough        Plan:       Hoang was seen today for establish care.    Diagnoses and all orders for this visit:    GEMMA (obstructive sleep apnea)  -f/u with ENT and then sleep for reevaluation    Erectile dysfunction, unspecified erectile dysfunction type  -     sildenafil (VIAGRA) 100 MG tablet; Take 1 tablet (100 mg total) by mouth once daily.    Hyperlipidemia, unspecified hyperlipidemia type  -continue statin    Vertigo    Enlarged prostate on rectal examination  -trial of alpha blocker. Will get PSA at next lab draw at our f/u in near future.  -     tamsulosin (FLOMAX) 0.4 mg Cp24; Take 1 capsule (0.4 mg total) by mouth once daily.    Chronic cough  -no red flags at this time and has been present for several years. Trial of ppi daily till f/u  -     esomeprazole (NEXIUM) 40 MG capsule; Take 1 capsule (40 mg total) by mouth before breakfast.           Health Maintenance and Vaccinations:  Will get vaccines at next visit to due to recent infection.  Colonoscopy: Up to date. Due in 1 year       Notification of Lab Results: [facility]  Side effects of medication(s) were discussed in detail and patient voiced understanding.  Patient will call back for any issues or complications.

## 2017-03-20 ENCOUNTER — OFFICE VISIT (OUTPATIENT)
Dept: OTOLARYNGOLOGY | Facility: CLINIC | Age: 67
End: 2017-03-20
Payer: MEDICARE

## 2017-03-20 DIAGNOSIS — Z98.890 S/P UPPP (UVULOPALATOPHARYNGOPLASTY): Primary | ICD-10-CM

## 2017-03-20 PROCEDURE — 99212 OFFICE O/P EST SF 10 MIN: CPT | Mod: PBBFAC | Performed by: OTOLARYNGOLOGY

## 2017-03-20 PROCEDURE — 99999 PR PBB SHADOW E&M-EST. PATIENT-LVL II: CPT | Mod: PBBFAC,,, | Performed by: OTOLARYNGOLOGY

## 2017-03-20 PROCEDURE — 99024 POSTOP FOLLOW-UP VISIT: CPT | Mod: ,,, | Performed by: OTOLARYNGOLOGY

## 2017-03-20 NOTE — PROGRESS NOTES
One month S/P UPPP.  Doing well.  Swallowing good, sleeping very well.  Wife says no further snoring.  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  Exam: Palate healing well.  Plan: Consult with Dr.Marino VIZCAINO 2 months

## 2017-05-22 ENCOUNTER — OFFICE VISIT (OUTPATIENT)
Dept: OTOLARYNGOLOGY | Facility: CLINIC | Age: 67
End: 2017-05-22
Payer: MEDICARE

## 2017-05-22 VITALS
TEMPERATURE: 97 F | DIASTOLIC BLOOD PRESSURE: 76 MMHG | SYSTOLIC BLOOD PRESSURE: 111 MMHG | HEART RATE: 81 BPM | BODY MASS INDEX: 37.35 KG/M2 | WEIGHT: 232.38 LBS | HEIGHT: 66 IN

## 2017-05-22 DIAGNOSIS — G47.33 OSA (OBSTRUCTIVE SLEEP APNEA): Primary | ICD-10-CM

## 2017-05-22 PROCEDURE — 99999 PR PBB SHADOW E&M-EST. PATIENT-LVL III: CPT | Mod: PBBFAC,,, | Performed by: OTOLARYNGOLOGY

## 2017-05-22 PROCEDURE — 99213 OFFICE O/P EST LOW 20 MIN: CPT | Mod: PBBFAC | Performed by: OTOLARYNGOLOGY

## 2017-05-22 PROCEDURE — 99024 POSTOP FOLLOW-UP VISIT: CPT | Mod: ,,, | Performed by: OTOLARYNGOLOGY

## 2017-05-22 NOTE — PROGRESS NOTES
3 months S/P UPPP.  Patient doing well, denies snoring, daytime somnolence  . Endorses restful sleep  Swallowing good, sleeping very well.    Exam: Palate healing well.  Plan:   Patient with appointment with Dr. Casas next week    RTC prn

## 2017-05-24 ENCOUNTER — OFFICE VISIT (OUTPATIENT)
Dept: OPTOMETRY | Facility: CLINIC | Age: 67
End: 2017-05-24
Payer: MEDICARE

## 2017-05-24 DIAGNOSIS — H52.4 PRESBYOPIA: ICD-10-CM

## 2017-05-24 DIAGNOSIS — H43.811 PVD (POSTERIOR VITREOUS DETACHMENT), RIGHT EYE: Primary | ICD-10-CM

## 2017-05-24 DIAGNOSIS — H26.9 CORTICAL CATARACT OF BOTH EYES: ICD-10-CM

## 2017-05-24 PROCEDURE — 99211 OFF/OP EST MAY X REQ PHY/QHP: CPT | Mod: PBBFAC | Performed by: OPTOMETRIST

## 2017-05-24 PROCEDURE — 92015 DETERMINE REFRACTIVE STATE: CPT | Mod: ,,, | Performed by: OPTOMETRIST

## 2017-05-24 PROCEDURE — 92004 COMPRE OPH EXAM NEW PT 1/>: CPT | Mod: S$PBB,,, | Performed by: OPTOMETRIST

## 2017-05-24 PROCEDURE — 99999 PR PBB SHADOW E&M-EST. PATIENT-LVL I: CPT | Mod: PBBFAC,,, | Performed by: OPTOMETRIST

## 2017-05-24 NOTE — PROGRESS NOTES
HPI     Hoang Santos is a/an 66 y.o. Male who comes in  to establish eye care He   comes in because last Saturday he started to notice a floater in his right   eye and also at night he noticed short light-flashes which occurred about   the same time and also only in his right eye. One floater is moving around   and is very little but the other on is bigger and moves with his visionSo   far he was wearing no correction for his distance vision and OTC readers   for near vision which was working well for him      (--)blurred vision  (--)Headaches  (--)diplopia  (+)flashes  (+)floaters  (--)pain  (--)Itching  (--)tearing  (--)burning  (--)Dryness  (--) OTC Drops  (--)Photophobia    Last edited by Josiane Temple on 5/24/2017  3:03 PM.   (History)            Assessment /Plan     For exam results, see Encounter Report.    PVD (posterior vitreous detachment), right eye   No holes, tears or RD today   Discussed RD precautions   RTC 1 month for DFE, sooner if experience changes in vision    Cortical cataract of both eyes   Pt happy with current uncorrected distance vision     Presbyopia   Rx specs

## 2017-06-02 ENCOUNTER — PATIENT OUTREACH (OUTPATIENT)
Dept: ADMINISTRATIVE | Facility: HOSPITAL | Age: 67
End: 2017-06-02

## 2017-06-16 ENCOUNTER — OFFICE VISIT (OUTPATIENT)
Dept: INTERNAL MEDICINE | Facility: CLINIC | Age: 67
End: 2017-06-16
Attending: INTERNAL MEDICINE
Payer: MEDICARE

## 2017-06-16 VITALS
HEART RATE: 102 BPM | DIASTOLIC BLOOD PRESSURE: 76 MMHG | OXYGEN SATURATION: 97 % | TEMPERATURE: 98 F | HEIGHT: 66 IN | BODY MASS INDEX: 36.95 KG/M2 | WEIGHT: 229.94 LBS | SYSTOLIC BLOOD PRESSURE: 118 MMHG

## 2017-06-16 DIAGNOSIS — R74.01 TRANSAMINITIS: ICD-10-CM

## 2017-06-16 DIAGNOSIS — E55.9 VITAMIN D DEFICIENCY: Primary | ICD-10-CM

## 2017-06-16 DIAGNOSIS — F52.32 ANORGASMIA OF MALE: ICD-10-CM

## 2017-06-16 DIAGNOSIS — Z12.5 ENCOUNTER FOR SCREENING FOR MALIGNANT NEOPLASM OF PROSTATE: ICD-10-CM

## 2017-06-16 DIAGNOSIS — E78.5 HYPERLIPIDEMIA, UNSPECIFIED HYPERLIPIDEMIA TYPE: ICD-10-CM

## 2017-06-16 DIAGNOSIS — R73.01 IMPAIRED FASTING GLUCOSE: ICD-10-CM

## 2017-06-16 PROCEDURE — 99213 OFFICE O/P EST LOW 20 MIN: CPT | Mod: PBBFAC | Performed by: INTERNAL MEDICINE

## 2017-06-16 PROCEDURE — 99214 OFFICE O/P EST MOD 30 MIN: CPT | Mod: S$PBB,,, | Performed by: INTERNAL MEDICINE

## 2017-06-16 PROCEDURE — 90732 PPSV23 VACC 2 YRS+ SUBQ/IM: CPT | Mod: PBBFAC

## 2017-06-16 PROCEDURE — 90471 IMMUNIZATION ADMIN: CPT | Mod: PBBFAC

## 2017-06-16 PROCEDURE — 90715 TDAP VACCINE 7 YRS/> IM: CPT | Mod: PBBFAC

## 2017-06-16 PROCEDURE — 99999 PR PBB SHADOW E&M-EST. PATIENT-LVL III: CPT | Mod: PBBFAC,,, | Performed by: INTERNAL MEDICINE

## 2017-06-16 NOTE — PROGRESS NOTES
"Subjective:       Patient ID: Hoang Santos Jr. is a 66 y.o. male.    Chief Complaint: Cough    Here for f/u    He is experiencing anorgasmia since starting flomax. He reports no change in his nocturia symptoms which have remained at twice a night.         Review of Systems    Objective:      Vitals:    06/16/17 1338   BP: 118/76   Pulse: 102   Temp: 97.9 °F (36.6 °C)   TempSrc: Oral   SpO2: 97%   Weight: 104.3 kg (229 lb 15 oz)   Height: 5' 6" (1.676 m)      Physical Exam   Constitutional: He is oriented to person, place, and time. He appears well-developed and well-nourished. No distress.   HENT:   Head: Normocephalic and atraumatic.   Mouth/Throat: Oropharynx is clear and moist. No oropharyngeal exudate.   Eyes: Conjunctivae and EOM are normal. Pupils are equal, round, and reactive to light. No scleral icterus.   Neck: No thyromegaly present.   Cardiovascular: Normal rate, regular rhythm and normal heart sounds.    No murmur heard.  Pulmonary/Chest: Effort normal and breath sounds normal. He has no wheezes. He has no rales.   Abdominal: Soft. He exhibits no distension. There is no tenderness.   Musculoskeletal: He exhibits no edema or tenderness.   Lymphadenopathy:     He has no cervical adenopathy.   Neurological: He is alert and oriented to person, place, and time.   Skin: Skin is warm and dry.   Psychiatric: He has a normal mood and affect. His behavior is normal.       Assessment:       1. Vitamin D deficiency    2. Anorgasmia of male    3. Hyperlipidemia, unspecified hyperlipidemia type    4. Transaminitis    5. Encounter for screening for malignant neoplasm of prostate    6. Impaired fasting glucose        Plan:       Hoang was seen today for cough.    Diagnoses and all orders for this visit:    Vitamin D deficiency  -     Vitamin D; Future  -     Vitamin D; Future    Anorgasmia of male  -trial of switch from flomax to cardura    Hyperlipidemia, unspecified hyperlipidemia type  -     Lipid panel; " Future  -     Lipid panel; Future    Transaminitis  -     Comprehensive metabolic panel; Future  -     Comprehensive metabolic panel; Future    Encounter for screening for malignant neoplasm of prostate  -     PSA, Screening; Future  -     PSA, Screening; Future    Impaired fasting glucose  -     Hemoglobin A1c; Future    Other orders  -     (In Office Administered) Pneumococcal Polysaccharide Vaccine (23 Valent) (SQ/IM)  -     (In Office Administered) Tdap Vaccine               Side effects of medication(s) were discussed in detail and patient voiced understanding.  Patient will call back for any issues or complications.

## 2017-06-16 NOTE — PROGRESS NOTES
Patient given TDAP IM in the LD. Patient tolerated well and Band-Aid was applied. Lot#[id number] Exp:04/05/2019. Patient advised to wait in the lobby for 15 min to make sure no adverse reactions occur. Patient given VIS information sheet.Patient states verbal understanding and has no further questions.Patient given Pnuemovax 23 Im in the RD. Patient tolerated well and Band-Aid was applied. Lot#[id number] Exp:10/22/2018. Patient advised to wait in the lobby for 15 min to make sure no adverse reactions occur. Patient states verbal understanding and has no further questions.

## 2017-06-19 ENCOUNTER — LAB VISIT (OUTPATIENT)
Dept: LAB | Facility: OTHER | Age: 67
End: 2017-06-19
Attending: INTERNAL MEDICINE
Payer: MEDICARE

## 2017-06-19 DIAGNOSIS — R73.01 IMPAIRED FASTING GLUCOSE: ICD-10-CM

## 2017-06-19 DIAGNOSIS — E55.9 VITAMIN D DEFICIENCY: ICD-10-CM

## 2017-06-19 DIAGNOSIS — Z12.5 ENCOUNTER FOR SCREENING FOR MALIGNANT NEOPLASM OF PROSTATE: ICD-10-CM

## 2017-06-19 DIAGNOSIS — E78.5 HYPERLIPIDEMIA, UNSPECIFIED HYPERLIPIDEMIA TYPE: ICD-10-CM

## 2017-06-19 DIAGNOSIS — R74.01 TRANSAMINITIS: ICD-10-CM

## 2017-06-19 LAB
25(OH)D3+25(OH)D2 SERPL-MCNC: 14 NG/ML
ALBUMIN SERPL BCP-MCNC: 3.4 G/DL
ALP SERPL-CCNC: 63 U/L
ALT SERPL W/O P-5'-P-CCNC: 34 U/L
ANION GAP SERPL CALC-SCNC: 7 MMOL/L
AST SERPL-CCNC: 36 U/L
BILIRUB SERPL-MCNC: 0.6 MG/DL
BUN SERPL-MCNC: 14 MG/DL
CALCIUM SERPL-MCNC: 9.2 MG/DL
CHLORIDE SERPL-SCNC: 106 MMOL/L
CHOLEST/HDLC SERPL: 4.1 {RATIO}
CO2 SERPL-SCNC: 28 MMOL/L
COMPLEXED PSA SERPL-MCNC: 1.2 NG/ML
CREAT SERPL-MCNC: 0.9 MG/DL
EST. GFR  (AFRICAN AMERICAN): >60 ML/MIN/1.73 M^2
EST. GFR  (NON AFRICAN AMERICAN): >60 ML/MIN/1.73 M^2
ESTIMATED AVG GLUCOSE: 111 MG/DL
GLUCOSE SERPL-MCNC: 98 MG/DL
HBA1C MFR BLD HPLC: 5.5 %
HDL/CHOLESTEROL RATIO: 24.7 %
HDLC SERPL-MCNC: 235 MG/DL
HDLC SERPL-MCNC: 58 MG/DL
LDLC SERPL CALC-MCNC: 156.6 MG/DL
NONHDLC SERPL-MCNC: 177 MG/DL
POTASSIUM SERPL-SCNC: 4.2 MMOL/L
PROT SERPL-MCNC: 7.7 G/DL
SODIUM SERPL-SCNC: 141 MMOL/L
TRIGL SERPL-MCNC: 102 MG/DL

## 2017-06-19 PROCEDURE — 84153 ASSAY OF PSA TOTAL: CPT

## 2017-06-19 PROCEDURE — 80061 LIPID PANEL: CPT

## 2017-06-19 PROCEDURE — 83036 HEMOGLOBIN GLYCOSYLATED A1C: CPT

## 2017-06-19 PROCEDURE — 36415 COLL VENOUS BLD VENIPUNCTURE: CPT

## 2017-06-19 PROCEDURE — 80053 COMPREHEN METABOLIC PANEL: CPT

## 2017-06-19 PROCEDURE — 82306 VITAMIN D 25 HYDROXY: CPT

## 2017-06-20 RX ORDER — DOXAZOSIN 2 MG/1
2 TABLET ORAL NIGHTLY
Qty: 90 TABLET | Refills: 1 | Status: SHIPPED | OUTPATIENT
Start: 2017-06-20 | End: 2018-01-16

## 2017-06-21 ENCOUNTER — PATIENT MESSAGE (OUTPATIENT)
Dept: INTERNAL MEDICINE | Facility: CLINIC | Age: 67
End: 2017-06-21

## 2017-06-21 RX ORDER — ASPIRIN 325 MG
50000 TABLET, DELAYED RELEASE (ENTERIC COATED) ORAL WEEKLY
Qty: 12 CAPSULE | Refills: 3 | Status: SHIPPED | OUTPATIENT
Start: 2017-06-21 | End: 2019-07-30

## 2017-06-23 ENCOUNTER — HOSPITAL ENCOUNTER (OUTPATIENT)
Dept: RADIOLOGY | Facility: HOSPITAL | Age: 67
Discharge: HOME | End: 2017-06-23
Attending: OTOLARYNGOLOGY
Payer: MEDICARE

## 2017-06-23 ENCOUNTER — OFFICE VISIT (OUTPATIENT)
Dept: OTOLARYNGOLOGY | Facility: CLINIC | Age: 67
End: 2017-06-23
Payer: MEDICARE

## 2017-06-23 VITALS
HEART RATE: 78 BPM | BODY MASS INDEX: 37.36 KG/M2 | WEIGHT: 231.5 LBS | SYSTOLIC BLOOD PRESSURE: 122 MMHG | TEMPERATURE: 98 F | DIASTOLIC BLOOD PRESSURE: 80 MMHG

## 2017-06-23 DIAGNOSIS — J38.3 VOCAL FOLD ATROPHY: ICD-10-CM

## 2017-06-23 DIAGNOSIS — F44.4 HYPERFUNCTIONAL DYSPHONIA: ICD-10-CM

## 2017-06-23 DIAGNOSIS — R49.9 VOICE DISTURBANCE: Primary | ICD-10-CM

## 2017-06-23 DIAGNOSIS — J39.2 PHARYNGEAL MASS: ICD-10-CM

## 2017-06-23 DIAGNOSIS — R05.9 COUGH: ICD-10-CM

## 2017-06-23 DIAGNOSIS — J38.3 VOCAL FOLD SCAR: ICD-10-CM

## 2017-06-23 PROCEDURE — 72040 X-RAY EXAM NECK SPINE 2-3 VW: CPT | Mod: 26,,, | Performed by: RADIOLOGY

## 2017-06-23 PROCEDURE — 99204 OFFICE O/P NEW MOD 45 MIN: CPT | Mod: 25,S$PBB,, | Performed by: OTOLARYNGOLOGY

## 2017-06-23 PROCEDURE — 31579 LARYNGOSCOPY TELESCOPIC: CPT | Mod: S$PBB,,, | Performed by: OTOLARYNGOLOGY

## 2017-06-23 PROCEDURE — 72040 X-RAY EXAM NECK SPINE 2-3 VW: CPT | Mod: TC

## 2017-06-23 PROCEDURE — 99999 PR PBB SHADOW E&M-EST. PATIENT-LVL III: CPT | Mod: PBBFAC,,, | Performed by: OTOLARYNGOLOGY

## 2017-06-23 NOTE — PATIENT INSTRUCTIONS
WHAT TO EXPECT FROM VOICE THERAPY    Purpose  The purpose of voice therapy is to help you find a better, more efficient way to use your voice or to reduce symptoms such as coughing, throat clearing, or difficulty breathing.  Depending on your symptoms, you may learn how to produce clearer voice quality, how to reduce fatigue or pain associated with speaking, how to take care of your voice, and how to eliminate chronic coughing or throat clearing.      Process: Evaluation  First, you may go through some initial testing.  In most cases, a videostroboscopy will be performed in order to allow your speech pathologist and your physician to look at your vocal cords to aid in deciding if you would benefit from voice therapy.  Next, you may work with the speech pathologist to assess the current capabilities of your voice.  Following evaluation, your speech pathologist will design a therapeutic plan to improve your voice as well as other symptoms that may bother you.  At the time of evaluation, your speech pathologist may provide you with exercises to try at home.      Process: Therapy  Most patients benefit from 2-8 sessions over 1-3 months.  Voice therapy involves changing the behavior of your vocal cords and speaking habits, so it is very important that you attend your appointments and do home practices as instructed by your speech pathologist.  Home practice and participation in therapy are critical to meeting your desired voice goals, so of course, we are able to work with you to schedule appointments that are convenient for you.          ACID REFLUX   What is acid reflux?    When we eat, food passes from the throat and into the stomach through a tube called the esophagus. At the bottom of the esophagus is a ring of muscles that acts as a valve between the esophagus and stomach, called the lower esophageal sphincter. Smoking, alcohol, and certain types of food may weaken the sphincter, so it may stop  closing properly. The contents in the stomach then may leak back, or reflux, into the esophagus. This problem is called gastroesophageal reflux disease (GERD). Symptoms of GERD include heartburn, belching, regurgitation of stomach contents, and swallowing difficulties.    Sometimes, the stomach acid travels up through the esophagus and spills into the larynx or pharynx (voice box). This is called laryngopharyngeal reflux (LPR) and is irritating to the vocal folds and surrounding tissues. Often, patients with LPR do not experience heartburn as a symptom. More commonly, symptoms of LPR include hoarseness, excessive mucous resulting in frequent throat clearing, post-nasal drip, coughing, throat soreness or burning, choking episodes, difficulty swallowing, and sensation of a lump in the throat.     How is acid reflux treated?   Treatment for acid reflux can involve any combination of medication, lifestyle modifications, and surgery.   · Medications. Your doctor may prescribe a proton pump inhibitor (PPI) or an H2 blocker. If you are prescribed a PPI, take in on an empty stomach in the morning 30 minutes prior to eating breakfast. Keep in mind that it may take 4-6 weeks before symptoms begin to resolve, so do not stop medications without consulting your doctor.   · Lifestyle and dietary modifications. Eat smaller meals at a slower pace. Avoid over-eating. If you are overweight, try to lose weight. Do not lie down or exercise directly after eating; eat your last meal of the day at least 2-3 hours prior to going to sleep. Avoid tight-fitting clothes. If you are a smoker, reduce or quit smoking. Elevate your head of bed 4-6 inches by putting phone books under the legs at the head of your bed or buy a wedge pillow, but do not use more than two regular pillows as this causes the body to curl and compresses your stomach.     Food group Foods to avoid to reduce reflux   Beverages  Whole milk, 2% milk, chocolate milk/hot  chocolate, alcohol, coffee (regular and decaf), caffeinated tea, mint tea, carbonated beverages, citrus juice    Breads/grains Commercial sweet rolls, doughnuts, croissants, and other high-fat pastries    Fruits and vegetables Fried or cream-style vegetables, tomatoes, tomato-based products, citrus fruits, hot peppers    Soups and seasonings Cream, cheese, tomato-based soups, vinegar    Meats and proteins Fatty or fried meat/fish, cody, sausage, pepperoni, lunch meat, fried eggs    Fats and oil Lard, cody drippings, salt pork, meat drippings, gravies, highly seasoned salad dressings, nuts    Sweets/desserts Anything made with or from chocolate, peppermint, spearmint, whole milk, or cream; high-fat pastries, gum, hard candy

## 2017-06-23 NOTE — PROGRESS NOTES
Reviewed report and images. Consistent with pharyngeal bulge noted on office laryngoscopy. Plan as in office note. Left voicemail with patient.

## 2017-06-23 NOTE — PROGRESS NOTES
"[facility] [place]  Department of Otorhinolaryngology and Communication Sciences    Hoang Santos Jr. is a 66 y.o. male who [facility] for consultation at the kind request of Dr. Bob Araujo III for further management of hoarseness.     He complains of getting hoarse with preaching. He reports a rough and strained quality to his voice. Onset was sudden. Duration is about 1 year. Time course is intermittent. He is symptomatic only with preaching and denies dysphonia in day-to-day life. Symptoms are worsening. Exacerbating factors include preaching. He denies any alleviating factors. 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 He has a cough and has some mild obstructive dysphagia. He also coughs if he eats something salty. He takes Nexium once a day, at Dr. Kay's recommendations. He denies "typical" GE reflux symptoms.    He underwent UPPP recently with Dr. Araujo and has done well afterwards.    CXR 3/10/2017: normal     Voice Handicap Index-10 (VHI-10) score is 3.   Reflux Symptom Index (RSI) score is 21.   Eating Assessment Tool-10 (EAT-10) score is 8.   Dyspnea Index (DI) score is 0.  Cough Severity Index (CSI) score is 5.    Past Medical History  He has a past medical history of Hyperlipidemia and Sleep apnea.    Past Surgical History  He has a past surgical history that includes Tonsillectomy and Uvulopalatopharyngoplasty.    Family History  His family history includes COPD in his sister; Cancer in his sister; Diabetes in his mother; Heart disease in his mother; Stroke in his father.    Social History  He reports that he has never smoked. He does not have any smokeless tobacco history on file. He reports that he does not drink alcohol or use drugs.    Allergies  He has No Known Allergies.    Medications  He has a current medication list which includes the following prescription(s): atorvastatin, cholecalciferol (vitamin d3), doxazosin, meclizine, " sildenafil, zostavax (pf), and esomeprazole.    Review of Systems   Constitutional: Negative for fever.   HENT: Negative for sore throat.    Eyes: Negative for visual disturbance.   Respiratory: Negative for wheezing.    Cardiovascular: Negative for chest pain.   Gastrointestinal: Negative for nausea.   Musculoskeletal: Negative for arthralgias.   Skin: Negative for rash.   Neurological: Negative for tremors.   Hematological: Does not bruise/bleed easily.   Psychiatric/Behavioral: The patient is not nervous/anxious.           Objective:     /80   Pulse 78   Temp 98.1 °F (36.7 °C)   Wt 105 kg (231 lb 7.7 oz)   BMI 37.36 kg/m²    Physical Exam    Constitutional: comfortable, well dressed  Psychiatric: appropriate affect  Respiratory: comfortably breathing, symmetric chest rise, no stridor  Voice: mild, inconsistent roughness  Cardiovascular: upper extremities non-edematous  Lymphatic: no cervical lymphadenopathy  Neurologic: alert and oriented to time, place, person, and situation; cranial nerves 3-12 grossly intact  Head: normocephalic  Eyes: conjunctivae and sclerae clear  Ears: normal pinnae, normal external auditory canals, tympanic membranes intact  Nose: mucosa pink and noncongested, no masses, no mucopurulence, no polyps  Oral cavity / oropharynx: no mucosal lesions  Neck: soft, full range of motion, laryngotracheal complex palpable with appropriate landmarks, larynx elevates on swallowing  Indirect laryngoscopy: limited due to gag    Procedure  Flexible Laryngeal Videostroboscopy (85519): Laryngeal videostroboscopy is indicated to assess laryngeal vibratory biomechanics and vocal fold oscillation, which cannot be assessed with a plain light examination. This was carried out transnasally with a distal chip videoendoscope. After verbal consent was obtained, the patient was positioned and the nose was topically decongested with 1% phenylephrine and topically anesthetized with 4% lidocaine. The endoscope  was passed through the most patent nasal cavity and positioned to image the nasopharynx, larynx, and hypopharynx in detail. The following featured were examined: nasopharyngeal, laryngeal, hypopharyngeal masses; velopharyngeal strength, closure, and symmetry of motion; vocal fold range and symmetry of motion; laryngeal mucosal edema, erythema, inflammation, and hydration; salivary pooling; and gross laryngeal sensation. During phonation, the vocal folds were assesses for glottal closure; mucosal wave; vocal fold lesions; vibratory periodicity, amplitude, and phase symmetry; and vertical height match. The equipment was removed. The patient tolerated the procedure well without complication. 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 is a 66 y.o. 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 In addition, he has other upper aerodigestive symptoms which may be related to laryngopharyngeal reflux. There may be an impact from the contact between his epiglottis and his pharynx, which has a submucosal fullness due to what I suspect is a cervical osteophyte.       Plan:        I had a discussion with the patient regarding his condition and the further workup and management options.      I educated the patient on the impact of reflux on laryngopharyngeal disorders and provided suggestions on diet and lifestyle modifications that may help improve control of ongoing reflux. For the time being, I recommended that he increase his acid suppression by adding ranitidine 300 mg QHS.    SLP voice evaluation and subsequent therapy sessions are medically  necessary for restoration of laryngeal function. We will arrange for this to occur here at the [facility] in the coming weeks.    I recommended obtaining plain films of the neck to confirm the presence of suspected anterior cervical osteophytes.    He will follow up with me in 3 month(s), or sooner if needed.    All questions were answered, and the patient is in agreement with the above.     Juaquin Casas M.D.  [facility]  Department of Otorhinolaryngology and Communication Sciences

## 2017-06-27 ENCOUNTER — TELEPHONE (OUTPATIENT)
Dept: SPEECH THERAPY | Facility: HOSPITAL | Age: 67
End: 2017-06-27

## 2017-06-30 ENCOUNTER — PATIENT MESSAGE (OUTPATIENT)
Dept: OTOLARYNGOLOGY | Facility: CLINIC | Age: 67
End: 2017-06-30

## 2017-06-30 ENCOUNTER — CLINICAL SUPPORT (OUTPATIENT)
Dept: SPEECH THERAPY | Facility: HOSPITAL | Age: 67
End: 2017-06-30
Attending: OTOLARYNGOLOGY
Payer: MEDICARE

## 2017-06-30 DIAGNOSIS — R49.9 VOICE DISTURBANCE: ICD-10-CM

## 2017-06-30 DIAGNOSIS — J38.3 VOCAL FOLD SCAR: ICD-10-CM

## 2017-06-30 DIAGNOSIS — J38.3 VOCAL FOLD ATROPHY: ICD-10-CM

## 2017-06-30 DIAGNOSIS — F44.4 HYPERFUNCTIONAL DYSPHONIA: ICD-10-CM

## 2017-06-30 PROCEDURE — G9172 VOICE GOAL STATUS: HCPCS | Mod: GN,CI | Performed by: SPEECH-LANGUAGE PATHOLOGIST

## 2017-06-30 PROCEDURE — 92524 BEHAVRAL QUALIT ANALYS VOICE: CPT | Mod: GN | Performed by: SPEECH-LANGUAGE PATHOLOGIST

## 2017-06-30 PROCEDURE — G9171 VOICE CURRENT STATUS: HCPCS | Mod: GN,CK | Performed by: SPEECH-LANGUAGE PATHOLOGIST

## 2017-06-30 NOTE — PROGRESS NOTES
"Referring provider: Dr. Juaquin Casas  Reason for visit:  Behavioral and qualitative analysis of voice and resonance (CPT 15704)    Subjective / History    Hoang Santos Jr. is a 66 y.o. male referred for voice evaluation (CPT 81569) by Dr. Casas.  He presents with complaints of hoarseness which began 1 year ago.  The patient also endorses: voice worse in afternoon/evening, fatigue with use and reduced volume.  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  He is taking nexium.  He cut down on preaching and did find this improved.  Finds that cold air makes his hoarseness worse.  He does not endorse any difficulty in his normal conversational voice, but does continue to be hoarse after preaching.     Swallowing: no c/o   Breathing: throat clearing    Smokin packs/day   Caffeine: 1 cups/day   Reflux: yes  Water: "not enough"    Stroboscopy findings (per Dr. Casas on 17)  - posterior pharyngeal wall with submucosal fullness  - omega-shaped epiglottis which rests against posterior pharyngeal wall during respiration  - mild bilateral symmetric vocal fold atrophy with decreased pliability  - trace glottal insufficiency at uppermost register  - supraglottic laryngeal hyperfunction      Past Medical History:   Diagnosis Date    Hyperlipidemia     Sleep apnea      Current Outpatient Prescriptions on File Prior to Visit   Medication Sig Dispense Refill    atorvastatin (LIPITOR) 40 MG tablet Take 1 tablet (40 mg total) by mouth once daily. 90 tablet 3    cholecalciferol, vitamin D3, 50,000 unit capsule Take 1 capsule (50,000 Units total) by mouth once a week. 12 capsule 3    doxazosin (CARDURA) 2 MG tablet Take 1 tablet (2 mg total) by mouth every evening. 90 tablet 1    esomeprazole (NEXIUM) 40 MG capsule Take 1 capsule (40 mg total) by mouth before breakfast. 90 capsule 1    meclizine (ANTIVERT) 25 mg tablet Take 25 mg by mouth 3 " "(three) times daily as needed.      ranitidine (ZANTAC) 300 MG tablet Take 1 tablet (300 mg total) by mouth every evening. 90 tablet 0    sildenafil (VIAGRA) 100 MG tablet Take 1 tablet (100 mg total) by mouth once daily. 10 tablet 11    ZOSTAVAX, PF, 19,400 unit/0.65 mL injection        No current facility-administered medications on file prior to visit.        Objective    Perceptual/behavioral assessment  -CAPE-V Overall Score: 54  -Quality: rough, intermittently cabrera  -Volume: decreased projection - significant improvement with prompts to project  -Pitch: appropriate for age and gender  -Flexibility: appropriate for age and gender  -Habitual respiratory pattern: diaphragmatic    Acoustic assessment  Multi-Dimensional Voice Program (MDVP) Analysis (sustained "ah")   Baseline Post-trial tx Gender-matched norms (M)   Average fundamental frequency (Fo) 134.907 Hz 140.415 Hz Norm/SD: 145.2 / 23.41     Relative average perturbation 0.228% 0.228% Norm/SD: 0.345 / 0.33     Shimmer percent 3.534% 4.057% Norm/SD: 2.52 / 0.997     Noise to harmonic ratio 0.138 0.15 Norm/SD: 0.12 / 0.014     Voice turbulence index 0.07 0.048 Norm/SD: 0.05 / 0.016       Patient self-perception scales  -Voice Handicap Index-10 on 6/23 (completed to assess self-perceived handicap associated with dysphonia; >11 considered abnormal): 3  -Reflux Symptom Index on 6/23 (completed to assess the possible presence and/or severity of LPR symptoms and any relationship between this condition and the pt's dysphagia; score of ~15 may indicate LPR): 21    Education / Stimulability Trials   Discussed importance of vocal hygiene including: hydration. Patient was stimulable for improved voice using SOVT exercises, as well as PhoRTE based exercises.  He was instructed on straw phonation as well as projected "ah"s.  Overall, patient had a normal voice during session, particularly when encouraged to project. Straw phonation exercises were noted to reduce back " "resonance.  He was encouraged to use straw phonation as well as a loud "ah" to practice more efficient voice several times a day as well as to address vocal fold atrophy.  He was encouraged to focus on these exercises especially prior to sermons.  Encouraged continued use of amplification.      Functional goals  Length Status Goal   Long term Initiated Patient will implement and adhere to vocal hygiene protocols on a daily basis, including the elimination of phonotraumatic behaviors.    Long term Initiated Patient will re-establish phonation with adequate balance of airflow and resonance with decreased muscle tension.    Long term Initiated Patient will improve coordination of respiration and phonation for efficient vocal production at a conversational level.    Short term Initiated Patient will coordinate vocal subsystems in hierarchical speech tasks by producing sound in an efficient manner yielding improved or normal voice quality and vocal endurance in the presence of existing laryngeal disorder with 90% accuracy independently.   Short term Initiated Patient will complete SOVT exercises and/or resonant-focused exercises 3-5x daily to strengthen and balance the intrinsic laryngeal musculature and maximize glottic closure without medial hyperfunction.   Short term Initiated Patient will improve the quality, efficiency, and ease of phonation through resonant and/or airflow exercises from the syllable to conversation level with 80% accuracy.   Short term Initiated Patient will discriminate between easy and strained phonation with 80% accuracy.        Assessment     Hoang Santos JrScott presents with mild-moderate dysphonia secondary to vocal fold atrophy as diagnosed by Dr. Casas.  Prognosis for continued improvement is good.     G-Codes for Voice  Current status:   - CK   Projected status:  - CI     Recommendations / POC    Recommend 2-4 sessions of voice/speech therapy over 4-6 weeks with a speech-language " pathologist to optimize glottal postures for improved vocal function, vocal efficiency, and ease of phonation.  He should continue the exercises as discussed in session and should contact me with any further questions.

## 2017-07-06 ENCOUNTER — PATIENT MESSAGE (OUTPATIENT)
Dept: INTERNAL MEDICINE | Facility: CLINIC | Age: 67
End: 2017-07-06

## 2017-07-06 ENCOUNTER — OFFICE VISIT (OUTPATIENT)
Dept: OPTOMETRY | Facility: CLINIC | Age: 67
End: 2017-07-06
Payer: MEDICARE

## 2017-07-06 DIAGNOSIS — H43.811 PVD (POSTERIOR VITREOUS DETACHMENT), RIGHT EYE: Primary | ICD-10-CM

## 2017-07-06 PROCEDURE — 99999 PR PBB SHADOW E&M-EST. PATIENT-LVL II: CPT | Mod: PBBFAC,,, | Performed by: OPTOMETRIST

## 2017-07-06 PROCEDURE — 92014 COMPRE OPH EXAM EST PT 1/>: CPT | Mod: S$PBB,,, | Performed by: OPTOMETRIST

## 2017-07-06 PROCEDURE — 99212 OFFICE O/P EST SF 10 MIN: CPT | Mod: PBBFAC | Performed by: OPTOMETRIST

## 2017-07-06 NOTE — PROGRESS NOTES
HPI     Patient's age: 66 y.o.    Approximate date of last eye examination:  5/24/17  Name of last eye doctor seen: Dr. Hicks    Pt states that he is here for follow up exam, still have the floaters    Wears glasses? yes      Wears CLs?:  no            Headaches?  no  Eye pain/discomfort?  no                                                                                     Flashes?  no  Floaters?  yes  Diplopia/Double vision?  no    Patient's Ocular History:         Any eye surgeries? no        Family history of eye disease?  no    Significant patient medical history:         1. Diabetes?  no       If yes, IDDM or NIDDM? no   2. HBP?  no                 ! OTC eyedrops currently using:  none   ! Prescription eye meds currently using:  none        Last edited by Dari Leach MA on 7/6/2017 10:31 AM. (History)            Assessment /Plan     For exam results, see Encounter Report.    PVD (posterior vitreous detachment), right eye      No holes, tears or detachments  Pt notices floater in OD is smaller than before  Reviewed RD precautions, monitor 1 year

## 2017-07-18 ENCOUNTER — CLINICAL SUPPORT (OUTPATIENT)
Dept: SPEECH THERAPY | Facility: HOSPITAL | Age: 67
End: 2017-07-18
Attending: OTOLARYNGOLOGY
Payer: MEDICARE

## 2017-07-18 DIAGNOSIS — F44.4 HYPERFUNCTIONAL DYSPHONIA: ICD-10-CM

## 2017-07-18 DIAGNOSIS — J38.3 VOCAL FOLD ATROPHY: ICD-10-CM

## 2017-07-18 DIAGNOSIS — R49.9 VOICE DISTURBANCE: ICD-10-CM

## 2017-07-18 DIAGNOSIS — J38.3 VOCAL FOLD SCAR: ICD-10-CM

## 2017-07-18 PROCEDURE — G9171 VOICE CURRENT STATUS: HCPCS | Mod: GN,CJ | Performed by: SPEECH-LANGUAGE PATHOLOGIST

## 2017-07-18 PROCEDURE — 92507 TX SP LANG VOICE COMM INDIV: CPT | Mod: GN,52 | Performed by: SPEECH-LANGUAGE PATHOLOGIST

## 2017-07-18 PROCEDURE — G9172 VOICE GOAL STATUS: HCPCS | Mod: GN,CH | Performed by: SPEECH-LANGUAGE PATHOLOGIST

## 2017-07-18 NOTE — PROGRESS NOTES
"Referring provider: Dr. Juaquin Casas  Reason for visit:  Voice treatment (CPT 53878-62)  Session #1    History / Subjective   I had the pleasure of seeing Mr. Santos for his first treatment session following complete voice evaluation on 6/30/17.  During that time, improvements were noted on SOVT and phorte based voice exercises.  Since evaluation, he denies any vocal complaints.  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  Abbreviated session as pt is w/o complaints, questions, or concerns.      Objective   The primary goal of todays session was to practice HEP.  This was targeted using SOVT/PhoRTE treatment modalities.    Perceptual/behavioral assessment  -CAPE-V Overall Score: 16  -Quality: appropriate for age and gender; intermittent back resonance  -Volume: appropriate for age and gender  -Pitch: appropriate for age and gender  -Flexibility: appropriate for age and gender  -Habitual respiratory pattern: chest/clavicular    Data  Patient completed the following voice exercises.     Straw phonation with 80% accuracy for improved frontal resonance; needed cues to improve airflow   PhoRTE based exercises with 90% accuracy for " "   Patient also provided further education re: home exercises, vocal rest, hydration.  For home practice, clinician reviewed home exercises as practiced during the session with the patient.  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  He is in agreement.     Functional goals  Length Status Goal   Long term Ongoing Patient will implement and adhere to vocal hygiene protocols on a daily basis, including the elimination of phonotraumatic behaviors.    Long term Ongoing Patient will re-establish phonation with adequate balance of airflow and resonance " with decreased muscle tension.    Long term Ongoing Patient will improve coordination of respiration and phonation for efficient vocal production at a conversational level.    Short term Ongoing Patient will coordinate vocal subsystems in hierarchical speech tasks by producing sound in an efficient manner yielding improved or normal voice quality and vocal endurance in the presence of existing laryngeal disorder with 90% accuracy independently.   Short term Ongoing Patient will complete SOVT exercises and/or resonant-focused exercises 3-5x daily to strengthen and balance the intrinsic laryngeal musculature and maximize glottic closure without medial hyperfunction.   Short term Ongoing Patient will improve the quality, efficiency, and ease of phonation through resonant and/or airflow exercises from the syllable to conversation level with 80% accuracy.   Short term Ongoing Patient will discriminate between easy and strained phonation with 80% accuracy.        Assessment     Mr. Hoang Santos Jr. presents with mild dysphonia secondary to vocal fold atrophy as diagnosed by Dr. Casas.  Prognosis for continued improvement is good.     G-Codes for Voice  Current status:   - CJ   Projected status:  - CH     Recommendations / POC    Recommend 2-3 sessions of voice/speech therapy with a speech-language pathologist to optimize glottal postures for improved vocal function, vocal efficiency, and ease of phonation.  He should continue the exercises as discussed in session and should contact me with any further questions.  He will f/u in 3 mo at the same time as his appt with Dr. Casas in order to more directly target complaints associated w/ increased voice use.

## 2017-09-17 NOTE — TELEPHONE ENCOUNTER
We had previously switched pt from tamsulosin to doxazosin. Received request for tamsulosin. Please check with pt to make sure he is taking doxazosin. Thanks.

## 2017-09-19 NOTE — TELEPHONE ENCOUNTER
Sent pt a message on [facility] on 9/8 clarifying medication. Called pt today and left a very detailed message of what medication the Pt IS suppose to take so that there is no confusion

## 2017-09-26 RX ORDER — TAMSULOSIN HYDROCHLORIDE 0.4 MG/1
CAPSULE ORAL
Qty: 90 CAPSULE | Refills: 0 | OUTPATIENT
Start: 2017-09-26

## 2017-10-11 ENCOUNTER — OFFICE VISIT (OUTPATIENT)
Dept: OTOLARYNGOLOGY | Facility: CLINIC | Age: 67
End: 2017-10-11
Payer: MEDICARE

## 2017-10-11 VITALS
WEIGHT: 234.38 LBS | BODY MASS INDEX: 37.82 KG/M2 | DIASTOLIC BLOOD PRESSURE: 75 MMHG | TEMPERATURE: 98 F | HEART RATE: 77 BPM | SYSTOLIC BLOOD PRESSURE: 125 MMHG

## 2017-10-11 DIAGNOSIS — R49.9 VOICE DISTURBANCE: ICD-10-CM

## 2017-10-11 DIAGNOSIS — J38.3 VOCAL FOLD SCAR: Primary | ICD-10-CM

## 2017-10-11 DIAGNOSIS — F44.4 HYPERFUNCTIONAL DYSPHONIA: ICD-10-CM

## 2017-10-11 DIAGNOSIS — J38.3 VOCAL FOLD ATROPHY: ICD-10-CM

## 2017-10-11 PROCEDURE — 99213 OFFICE O/P EST LOW 20 MIN: CPT | Mod: PBBFAC | Performed by: OTOLARYNGOLOGY

## 2017-10-11 PROCEDURE — 99999 PR PBB SHADOW E&M-EST. PATIENT-LVL III: CPT | Mod: PBBFAC,,, | Performed by: OTOLARYNGOLOGY

## 2017-10-11 PROCEDURE — 99213 OFFICE O/P EST LOW 20 MIN: CPT | Mod: S$PBB,,, | Performed by: OTOLARYNGOLOGY

## 2017-10-11 NOTE — PROGRESS NOTES
[facility]  Department of Otorhinolaryngology and Communication Sciences    Subjective:      Hoang Santos Jr. is a 67 y.o. male who presents for follow-up. He has vocal fold atrophy/scar and hyperfunctional dysphonia. In addition, he has other upper aerodigestive symptoms which may be related to laryngopharyngeal reflux. Further, there may be an impact from the contact between his epiglottis and his pharynx, which has a submucosal fullness due to a cervical osteophyte.    He has undergone voice therapy. He remains adherent to his home exercises. He reports no dysphonia in day-to-day life. 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 He is pleased with ihs progress.    He is on ranitidine QHS plus daily PPI. It is hard for him to determine if the ranitidine has provided him with benefit.    Due to findings on endoscopy at his last visit, I obtained a c-spine xray 6/2017: anterior osteophytes throughout cervical spine particularly at C2-3 and C4-5.    Voice Handicap Index - 10 (VHI-10) score is 2 (prior = 3)  Reflux symptom Index (RSI) score is 17 (prior = 21)    The patient's medications, allergies, past medical, surgical, social and family histories were reviewed and updated as appropriate.    A detailed review of systems was obtained with pertinent positives as per the above HPI, and otherwise negative.      Objective:     /75   Pulse 77   Temp 98.2 °F (36.8 °C)   Wt 106.3 kg (234 lb 5.6 oz)   BMI 37.82 kg/m²      Constitutional: comfortable, well dressed, obese  Psychiatric: appropriate affect  Respiratory: comfortably breathing, symmetric chest rise, no stridor  Voice: mild, inconsistent roughness  Head: normocephalic  Eyes: conjunctivae and sclerae clear  Indirect laryngoscopy is limited due to gag    Data Reviewed    See HPI      Assessment:     Hoang Santos Jr. is a 67 y.o. male with vocal fold atrophy/scar and hyperfunctional dysphonia. In addition, he has other  upper aerodigestive symptoms which may be related to laryngopharyngeal reflux. Further, there may be an impact from the contact between his epiglottis and his pharynx, which has a submucosal fullness from a cervical osteophyte.    He has made progress with voice therapy.    Plan:     Reassurance was provided. I recommended adherence to his home exercises and voice therapy strategies. Further treatment to consider would include a vocal fold injection augmentation.    I suggested a gradual taper off the ranitidine over the next 2 weeks. Should his symptoms worsen, he may wish to consider comprehensive GI assessment regarding his reflux issue. Finally, depending on his progress, he also may wish to undergo spine surgery consultation regarding the cervical osteophytes.    He defers on additional treatment or workup at this time. He will follow up with me in the future on an as-needed basis.    All questions were answered, and the patient is in agreement with the plan.    Juaquin Casas M.D.  [facility]  Department of Otorhinolaryngology and Communication Sciences

## 2018-01-16 ENCOUNTER — LAB VISIT (OUTPATIENT)
Dept: LAB | Facility: OTHER | Age: 68
End: 2018-01-16
Attending: INTERNAL MEDICINE
Payer: MEDICARE

## 2018-01-16 ENCOUNTER — OFFICE VISIT (OUTPATIENT)
Dept: INTERNAL MEDICINE | Facility: CLINIC | Age: 68
End: 2018-01-16
Attending: INTERNAL MEDICINE
Payer: MEDICARE

## 2018-01-16 VITALS
HEART RATE: 83 BPM | HEIGHT: 66 IN | DIASTOLIC BLOOD PRESSURE: 84 MMHG | BODY MASS INDEX: 38.79 KG/M2 | WEIGHT: 241.38 LBS | SYSTOLIC BLOOD PRESSURE: 138 MMHG

## 2018-01-16 DIAGNOSIS — Z91.89 AT RISK FOR SIDE EFFECT OF MEDICATION: ICD-10-CM

## 2018-01-16 DIAGNOSIS — E78.5 HYPERLIPIDEMIA, UNSPECIFIED HYPERLIPIDEMIA TYPE: ICD-10-CM

## 2018-01-16 DIAGNOSIS — N52.9 ERECTILE DYSFUNCTION, UNSPECIFIED ERECTILE DYSFUNCTION TYPE: ICD-10-CM

## 2018-01-16 DIAGNOSIS — G47.33 OSA (OBSTRUCTIVE SLEEP APNEA): Primary | ICD-10-CM

## 2018-01-16 DIAGNOSIS — R74.01 TRANSAMINITIS: ICD-10-CM

## 2018-01-16 DIAGNOSIS — R35.1 NOCTURIA: ICD-10-CM

## 2018-01-16 DIAGNOSIS — E55.9 VITAMIN D DEFICIENCY: ICD-10-CM

## 2018-01-16 LAB
ALBUMIN SERPL BCP-MCNC: 3.9 G/DL
ALBUMIN SERPL BCP-MCNC: 3.9 G/DL
ALP SERPL-CCNC: 52 U/L
ALP SERPL-CCNC: 52 U/L
ALT SERPL W/O P-5'-P-CCNC: 26 U/L
ALT SERPL W/O P-5'-P-CCNC: 26 U/L
ANION GAP SERPL CALC-SCNC: 8 MMOL/L
ANION GAP SERPL CALC-SCNC: 8 MMOL/L
AST SERPL-CCNC: 33 U/L
AST SERPL-CCNC: 33 U/L
BILIRUB SERPL-MCNC: 0.5 MG/DL
BILIRUB SERPL-MCNC: 0.5 MG/DL
BUN SERPL-MCNC: 12 MG/DL
BUN SERPL-MCNC: 12 MG/DL
CALCIUM SERPL-MCNC: 9.7 MG/DL
CALCIUM SERPL-MCNC: 9.7 MG/DL
CHLORIDE SERPL-SCNC: 103 MMOL/L
CHLORIDE SERPL-SCNC: 103 MMOL/L
CHOLEST SERPL-MCNC: 273 MG/DL
CHOLEST SERPL-MCNC: 273 MG/DL
CHOLEST/HDLC SERPL: 4.3 {RATIO}
CHOLEST/HDLC SERPL: 4.3 {RATIO}
CO2 SERPL-SCNC: 29 MMOL/L
CO2 SERPL-SCNC: 29 MMOL/L
CREAT SERPL-MCNC: 1 MG/DL
CREAT SERPL-MCNC: 1 MG/DL
EST. GFR  (AFRICAN AMERICAN): >60 ML/MIN/1.73 M^2
EST. GFR  (AFRICAN AMERICAN): >60 ML/MIN/1.73 M^2
EST. GFR  (NON AFRICAN AMERICAN): >60 ML/MIN/1.73 M^2
EST. GFR  (NON AFRICAN AMERICAN): >60 ML/MIN/1.73 M^2
GLUCOSE SERPL-MCNC: 85 MG/DL
GLUCOSE SERPL-MCNC: 85 MG/DL
HDLC SERPL-MCNC: 64 MG/DL
HDLC SERPL-MCNC: 64 MG/DL
HDLC SERPL: 23.4 %
HDLC SERPL: 23.4 %
LDLC SERPL CALC-MCNC: 184.8 MG/DL
LDLC SERPL CALC-MCNC: 184.8 MG/DL
NONHDLC SERPL-MCNC: 209 MG/DL
NONHDLC SERPL-MCNC: 209 MG/DL
POTASSIUM SERPL-SCNC: 4.5 MMOL/L
POTASSIUM SERPL-SCNC: 4.5 MMOL/L
PROT SERPL-MCNC: 8.1 G/DL
PROT SERPL-MCNC: 8.1 G/DL
SODIUM SERPL-SCNC: 140 MMOL/L
SODIUM SERPL-SCNC: 140 MMOL/L
TRIGL SERPL-MCNC: 121 MG/DL
TRIGL SERPL-MCNC: 121 MG/DL

## 2018-01-16 PROCEDURE — 99999 PR PBB SHADOW E&M-EST. PATIENT-LVL III: CPT | Mod: PBBFAC,,, | Performed by: INTERNAL MEDICINE

## 2018-01-16 PROCEDURE — 80053 COMPREHEN METABOLIC PANEL: CPT

## 2018-01-16 PROCEDURE — 36415 COLL VENOUS BLD VENIPUNCTURE: CPT

## 2018-01-16 PROCEDURE — 99214 OFFICE O/P EST MOD 30 MIN: CPT | Mod: S$PBB,,, | Performed by: INTERNAL MEDICINE

## 2018-01-16 PROCEDURE — 99213 OFFICE O/P EST LOW 20 MIN: CPT | Mod: PBBFAC | Performed by: INTERNAL MEDICINE

## 2018-01-16 PROCEDURE — 80061 LIPID PANEL: CPT

## 2018-01-16 RX ORDER — SILDENAFIL 100 MG/1
100 TABLET, FILM COATED ORAL DAILY
Qty: 10 TABLET | Refills: 11 | Status: SHIPPED | OUTPATIENT
Start: 2018-01-16 | End: 2018-04-16 | Stop reason: SDUPTHER

## 2018-01-16 RX ORDER — OXYBUTYNIN CHLORIDE 5 MG/1
5 TABLET, EXTENDED RELEASE ORAL DAILY
Qty: 90 TABLET | Refills: 1 | Status: SHIPPED | OUTPATIENT
Start: 2018-01-16 | End: 2018-01-22

## 2018-01-16 RX ORDER — ATORVASTATIN CALCIUM 40 MG/1
40 TABLET, FILM COATED ORAL DAILY
Qty: 90 TABLET | Refills: 3 | Status: ON HOLD | OUTPATIENT
Start: 2018-01-16 | End: 2019-01-16

## 2018-01-16 NOTE — PROGRESS NOTES
"Subjective:       Patient ID: Hoang Santos Jr. is a 67 y.o. male.    Chief Complaint: Follow-up    Here for f/u    Just flew in from [place] over the weekend and after getting off the plane he developed an acutre onset of vertigo. Hx of BPH. He taking meclizine with reduction in frequency and duration of episodes.      Did not have improvement in urgency or nocturia with alpha blocker and only had SE of anorgasmia on both cardura and flomax. He denies hesitancy, decreased force of stream. He drinks OJ and cranberry juice daily.             Review of Systems   Constitutional: Negative for appetite change, chills, fever and unexpected weight change.   HENT: Negative for hearing loss, sore throat and trouble swallowing.    Eyes: Negative for visual disturbance.   Respiratory: Negative for cough, chest tightness and shortness of breath.    Cardiovascular: Negative for chest pain and leg swelling.   Gastrointestinal: Negative for abdominal pain, blood in stool, constipation, diarrhea, nausea and vomiting.   Endocrine: Negative for polydipsia and polyuria.   Genitourinary: Positive for frequency and urgency. Negative for decreased urine volume, difficulty urinating, dysuria and hematuria.   Musculoskeletal: Negative for gait problem.   Skin: Negative for rash.   Neurological: Positive for dizziness. Negative for numbness.   Psychiatric/Behavioral: The patient is not nervous/anxious.        Objective:      Vitals:    01/16/18 1328   BP: 138/84   Pulse: 83   Weight: 109.5 kg (241 lb 6.5 oz)   Height: 5' 6" (1.676 m)      Physical Exam   Constitutional: He is oriented to person, place, and time. He appears well-developed and well-nourished. He does not have a sickly appearance. No distress.   HENT:   Head: Normocephalic and atraumatic.   Eyes: Conjunctivae and EOM are normal. Right eye exhibits no discharge. Left eye exhibits no discharge. No scleral icterus.   Pulmonary/Chest: Effort normal. No respiratory distress. "   Abdominal: Normal appearance. He exhibits no distension.   Neurological: He is alert and oriented to person, place, and time.   Skin: Skin is warm and dry. He is not diaphoretic.   Psychiatric: He has a normal mood and affect. His speech is normal.       Assessment:       1. GEMMA (obstructive sleep apnea)    2. Vitamin D deficiency    3. Nocturia    4. Hyperlipidemia, unspecified hyperlipidemia type    5. Erectile dysfunction, unspecified erectile dysfunction type    6. At risk for side effect of medication        Plan:       Hoang was seen today for follow-up.    Diagnoses and all orders for this visit:    GEMMA (obstructive sleep apnea)    Vitamin D deficiency    Nocturia  -     Ambulatory referral to Urology    Hyperlipidemia, unspecified hyperlipidemia type  -     atorvastatin (LIPITOR) 40 MG tablet; Take 1 tablet (40 mg total) by mouth once daily.  -     Lipid panel; Future    Erectile dysfunction, unspecified erectile dysfunction type  -lack and response in conjunction with symptoms suggestive of OAB. Discussed reduction of citrus. He consumes minimal spicy food or EtOH. Trial of anticholinergic. F/U with urology   -     oxybutynin (DITROPAN-XL) 5 MG TR24; Take 1 tablet (5 mg total) by mouth once daily.  -     sildenafil (VIAGRA) 100 MG tablet; Take 1 tablet (100 mg total) by mouth once daily.  -     Ambulatory referral to Urology    At risk for side effect of medication  -     Comprehensive metabolic panel; Future      -     ranitidine (ZANTAC) 300 MG tablet; Take 1 tablet (300 mg total) by mouth every evening.       RTC in 6 months or sooner prn         Side effects of medication(s) were discussed in detail and patient voiced understanding.  Patient will call back for any issues or complications.

## 2018-01-17 NOTE — PROGRESS NOTES
Patient, Hoang Santos Jr. (MRN #[id number]), presented with a recorded BMI of 38.96 kg/m^2 and a documented comorbidity(s):  - Obstructive Sleep Apnea  - Hyperlipidemia  to which the severe obesity is a contributing factor. This is consistent with the definition of severe obesity (BMI 35.0-35.9) with comorbidity (ICD-10 E66.01, Z68.35). The patient's severe obesity was monitored, evaluated, addressed and/or treated. This addendum to the medical record is made on 01/17/2018.

## 2018-01-22 ENCOUNTER — OFFICE VISIT (OUTPATIENT)
Dept: UROLOGY | Facility: CLINIC | Age: 68
End: 2018-01-22
Attending: INTERNAL MEDICINE
Payer: MEDICARE

## 2018-01-22 VITALS
HEIGHT: 66 IN | BODY MASS INDEX: 37.54 KG/M2 | SYSTOLIC BLOOD PRESSURE: 121 MMHG | HEART RATE: 76 BPM | WEIGHT: 233.56 LBS | DIASTOLIC BLOOD PRESSURE: 80 MMHG

## 2018-01-22 DIAGNOSIS — R35.1 NOCTURIA: ICD-10-CM

## 2018-01-22 DIAGNOSIS — N40.1 BENIGN NON-NODULAR PROSTATIC HYPERPLASIA WITH LOWER URINARY TRACT SYMPTOMS: Primary | ICD-10-CM

## 2018-01-22 LAB
BILIRUB SERPL-MCNC: NORMAL MG/DL
BLOOD URINE, POC: NORMAL
COLOR, POC UA: YELLOW
GLUCOSE UR QL STRIP: NORMAL
KETONES UR QL STRIP: NORMAL
LEUKOCYTE ESTERASE URINE, POC: NORMAL
NITRITE, POC UA: NORMAL
PH, POC UA: 5
POC RESIDUAL URINE VOLUME: 32 ML (ref 0–100)
PROTEIN, POC: NORMAL
SPECIFIC GRAVITY, POC UA: 1.02
UROBILINOGEN, POC UA: NORMAL

## 2018-01-22 PROCEDURE — 51798 US URINE CAPACITY MEASURE: CPT | Mod: S$GLB,,, | Performed by: UROLOGY

## 2018-01-22 PROCEDURE — 81002 URINALYSIS NONAUTO W/O SCOPE: CPT | Mod: S$GLB,,, | Performed by: UROLOGY

## 2018-01-22 PROCEDURE — 99214 OFFICE O/P EST MOD 30 MIN: CPT | Mod: 25,S$GLB,, | Performed by: UROLOGY

## 2018-01-22 NOTE — PROGRESS NOTES
"Subjective:      Hoang Snatos Jr. is a 67 y.o. male who was referred by Lee Kay MD for evaluation of BPH.      Benign Prostatic Hypertrophy  Patient complains of lower urinary tract symptoms. He reports intermittency, nocturia two times a night, urgency and weak stream. He denies frequency, incomplete emptying and straining. Nocturia is most bothersome. Patient states symptoms are of moderate severity. Onset of symptoms was a few years ago and was gradual in onset. His AUA Symptom Score is 5/3. He has no personal history of prostate cancer. He reports a history of no complicating symptoms. He denies flank pain, gross hematuria, kidney stones and recurrent UTI.    He has tried multiple alpha-blockers in the past with reduction in nocturia, however he stopped due to retrograde ejaculation, which was more bothersome. He has recent rx from Dr. Kay for ditropan xl 5mg but has not started.    The following portions of the patient's history were reviewed and updated as appropriate: allergies, current medications, past family history, past medical history, past social history, past surgical history and problem list.    Review of Systems  Constitutional: no fever or chills  ENT: no nasal congestion or sore throat  Respiratory: no cough or shortness of breath  Cardiovascular: no chest pain or palpitations  Gastrointestinal: no nausea or vomiting, tolerating diet  Genitourinary: as per HPI  Hematologic/Lymphatic: no easy bruising or lymphadenopathy  Musculoskeletal: no arthralgias or myalgias  Neurological: no seizures or tremors  Behavioral/Psych: no auditory or visual hallucinations     Objective:   Vitals: /80 (BP Location: Left arm, Patient Position: Sitting, BP Method: Large (Automatic))   Pulse 76   Ht 5' 6" (1.676 m)   Wt 106 kg (233 lb 9.2 oz)   BMI 37.70 kg/m²     Physical Exam   General: alert and oriented, no acute distress  Head: normocephalic, atraumatic  Neck: supple, no " lymphadenopathy, normal ROM, no masses  Respiratory: Symmetric expansion, non-labored breathing  Cardiovascular: regular rate and rhythm, nomal pulses, no peripheral edema  Abdomen: soft, non tender, non distended, no palpable masses, no hernias, no hepatomegaly or splenomegaly  Genitourinary:   Prostate: exam declined by patient (most recent exam per PCP reviewed)  Skin: normal coloration and turgor, no rashes, no suspicious skin lesions noted  Neuro: alert and oriented x3, no gross deficits  Psych: normal judgment and insight, normal mood/affect and non-anxious    Bladder Scan PVR: 32cc      Lab Review   Urinalysis demonstrates negative for all components  Lab Results   Component Value Date    WBC 13.79 (H) 03/10/2017    HGB 15.3 03/10/2017    HCT 46.5 03/10/2017    MCV 89 03/10/2017     03/10/2017     Lab Results   Component Value Date    CREATININE 1.0 01/16/2018    CREATININE 1.0 01/16/2018    BUN 12 01/16/2018    BUN 12 01/16/2018     Lab Results   Component Value Date    PSA 1.2 06/19/2017       Assessment:     1. Benign non-nodular prostatic hyperplasia with lower urinary tract symptoms    2. Nocturia        Plan:   1. Discussed additional treatment options for his BPH, including procedures such as Urolift and trial of ACH  2. He will start ditropan, which may reduce nocturia some  3. FU 6 weeks

## 2018-03-05 ENCOUNTER — PATIENT MESSAGE (OUTPATIENT)
Dept: INTERNAL MEDICINE | Facility: CLINIC | Age: 68
End: 2018-03-05

## 2018-04-16 DIAGNOSIS — N52.9 ERECTILE DYSFUNCTION, UNSPECIFIED ERECTILE DYSFUNCTION TYPE: ICD-10-CM

## 2018-04-17 RX ORDER — SILDENAFIL CITRATE 100 MG/1
100 TABLET, FILM COATED ORAL DAILY
Qty: 10 TABLET | Refills: 11 | Status: SHIPPED | OUTPATIENT
Start: 2018-04-17 | End: 2019-07-30

## 2018-05-08 PROBLEM — F44.4 HYPERFUNCTIONAL DYSPHONIA: Status: RESOLVED | Noted: 2017-06-23 | Resolved: 2018-05-08

## 2018-05-08 NOTE — PROGRESS NOTES
[facility]  Department of Otorhinolaryngology and Communication Sciences    Subjective:      Hoang Santos Jr. is a 67 y.o. male who presents for follow-up.  We were following him primarily for  vocal fold atrophy/scar and hyperfunctional dysphonia. This responded well to voice therapy.    His voice remains good since his last visit. He is pleased wth his progress.     Today he reports evolution of dysphagia symptoms. He reports obstructive dysphagia to items such as pills, lettuce, popcorn, large piece of meat, bread. Onset of symptoms was about 6 months ago. Onset was gradual. Symptoms are stable. No heimlich maneuvers. No pneumonias recently. No coughing when swallowing liquids. He is now off all acid reflux medication. He denies any typical GE reflux symptoms. However, he does report a progressive supine nocturnal cough.    A contributing factor may be contact between his epiglottis and his pharynx, which has a submucosal fullness due to a cervical osteophyte, identified on prior office endoscopy and xray c-spine 6/23/2017. He had UPPP with Dr. Araujo in 2/2017.    VHI-10 = 0   RSI = 18   EAT-10 = 17    The patient's medications, allergies, past medical, surgical, social and family histories were reviewed and updated as appropriate.    A detailed review of systems was obtained with pertinent positives as per the above HPI, and otherwise negative.      Objective:     /79 (Patient Position: Sitting)   Pulse 88   Temp 98.1 °F (36.7 °C) (Tympanic)   Wt 106 kg (233 lb 11 oz)   BMI 37.72 kg/m²      Constitutional: comfortable, well dressed, obese  Psychiatric: appropriate affect  Respiratory: comfortably breathing, symmetric chest rise, no stridor  Voice: mild, inconsistent roughness  Head: normocephalic  Eyes: conjunctivae and sclerae clear  OC/OP: post-surgical UPPP changes; no masses  Indirect laryngoscopy is limited due to gag    Data Reviewed    See HPI      Assessment:     Hoang Santos   is a 67 y.o. male with pharyngoesophageal dysphagia. Symptoms may be related to his relatively recent history of a UPPP, as well as the dynamic contact between his epiglottis and pharynx, which has a submucosal fullness from a cervical osteophyte.    He also has a supine nocturnal cough, which may be indicative of ongoing reflux.    Plan:     Modified barium swallow study and barium esophagram are necessary for evaluation of oral, pharyngeal, and esophageal swallowing function. We will arrange these studies in tandem in the coming weeks and will contact the patient with the results.     The patient requested definitive gastroenterology evaluation for reflux assessment/management.     All questions were answered, and the patient is in agreement with the plan.    Juaquin Casas M.D.  [facility]  Department of Otorhinolaryngology and Communication Sciences

## 2018-05-09 ENCOUNTER — OFFICE VISIT (OUTPATIENT)
Dept: OTOLARYNGOLOGY | Facility: CLINIC | Age: 68
End: 2018-05-09
Payer: MEDICARE

## 2018-05-09 VITALS
TEMPERATURE: 98 F | SYSTOLIC BLOOD PRESSURE: 115 MMHG | BODY MASS INDEX: 37.72 KG/M2 | WEIGHT: 233.69 LBS | HEART RATE: 88 BPM | DIASTOLIC BLOOD PRESSURE: 79 MMHG

## 2018-05-09 DIAGNOSIS — R05.9 COUGH: ICD-10-CM

## 2018-05-09 DIAGNOSIS — M25.78 CERVICAL OSTEOPHYTE: ICD-10-CM

## 2018-05-09 DIAGNOSIS — Z98.890 S/P UPPP (UVULOPALATOPHARYNGOPLASTY): ICD-10-CM

## 2018-05-09 DIAGNOSIS — R13.14 DYSPHAGIA, PHARYNGOESOPHAGEAL: Primary | ICD-10-CM

## 2018-05-09 PROCEDURE — 99999 PR PBB SHADOW E&M-EST. PATIENT-LVL IV: CPT | Mod: PBBFAC,,, | Performed by: OTOLARYNGOLOGY

## 2018-05-09 PROCEDURE — 99213 OFFICE O/P EST LOW 20 MIN: CPT | Mod: S$PBB,,, | Performed by: OTOLARYNGOLOGY

## 2018-05-09 PROCEDURE — 99214 OFFICE O/P EST MOD 30 MIN: CPT | Mod: PBBFAC | Performed by: OTOLARYNGOLOGY

## 2018-05-09 NOTE — PATIENT INSTRUCTIONS
Follow up for modifed barium swallow study and esophagram; I will contact you afterwards with the results  Follow up with gastroenterology after the studies are done to talk about possible reflux  Call with questions

## 2018-05-16 ENCOUNTER — OFFICE VISIT (OUTPATIENT)
Dept: UROLOGY | Facility: CLINIC | Age: 68
End: 2018-05-16
Attending: UROLOGY
Payer: MEDICARE

## 2018-05-16 VITALS
HEIGHT: 66 IN | WEIGHT: 233 LBS | SYSTOLIC BLOOD PRESSURE: 124 MMHG | HEART RATE: 86 BPM | BODY MASS INDEX: 37.45 KG/M2 | DIASTOLIC BLOOD PRESSURE: 76 MMHG

## 2018-05-16 DIAGNOSIS — N40.1 BENIGN NON-NODULAR PROSTATIC HYPERPLASIA WITH LOWER URINARY TRACT SYMPTOMS: Primary | ICD-10-CM

## 2018-05-16 DIAGNOSIS — R35.1 NOCTURIA: ICD-10-CM

## 2018-05-16 PROCEDURE — 81002 URINALYSIS NONAUTO W/O SCOPE: CPT | Mod: S$GLB,,, | Performed by: UROLOGY

## 2018-05-16 PROCEDURE — 99213 OFFICE O/P EST LOW 20 MIN: CPT | Mod: 25,S$GLB,, | Performed by: UROLOGY

## 2018-05-16 NOTE — PROGRESS NOTES
"Subjective:      Hoang Santos Jr. is a 67 y.o. male who returns today regarding his LUTS and nocturia.    He has been taking ditropan XL 5 since last visit and reports mild improvement in symptoms. His AUASS is 5, which is unchanged from last visit. Previously stopped flomax due to RG ejaculation.    Uses viagra for ED with good results.    The following portions of the patient's history were reviewed and updated as appropriate: allergies, current medications, past family history, past medical history, past social history, past surgical history and problem list.    Review of Systems  A comprehensive multipoint review of systems was negative except as otherwise stated in the HPI.     Objective:   Vitals: /76 (BP Location: Left arm, Patient Position: Sitting, BP Method: Large (Automatic))   Pulse 86   Ht 5' 6" (1.676 m)   Wt 105.7 kg (233 lb)   BMI 37.61 kg/m²     Physical Exam   General: alert and oriented, no acute distress  Respiratory: Symmetric expansion, non-labored breathing  Neuro: no gross deficits  Psych: normal judgment and insight, normal mood/affect and non-anxious    Lab Review   Urinalysis demonstrates negative for all components  Lab Results   Component Value Date    WBC 13.79 (H) 03/10/2017    HGB 15.3 03/10/2017    HCT 46.5 03/10/2017    MCV 89 03/10/2017     03/10/2017     Lab Results   Component Value Date    CREATININE 1.0 01/16/2018    CREATININE 1.0 01/16/2018    BUN 12 01/16/2018    BUN 12 01/16/2018     Lab Results   Component Value Date    PSA 1.2 06/19/2017       Assessment and Plan:   1. Benign non-nodular prostatic hyperplasia with lower urinary tract symptoms  2. Nocturia  -- Discussed options, including continued ditropan, additional trial of alpha blocker, or consideration of procedure to treat BPH such as Urolift  -- Though his symptoms are mild (per AUASS), his bother is quite high, therefore additional treatment is appropriate  -- Will schedule cysto NA to eval " for Urolift

## 2018-05-22 LAB
BILIRUB SERPL-MCNC: NORMAL MG/DL
BLOOD URINE, POC: NORMAL
COLOR, POC UA: YELLOW
GLUCOSE UR QL STRIP: NORMAL
KETONES UR QL STRIP: NORMAL
LEUKOCYTE ESTERASE URINE, POC: NORMAL
NITRITE, POC UA: NORMAL
PH, POC UA: 5
POC RESIDUAL URINE VOLUME: 42 ML (ref 0–100)
PROTEIN, POC: NORMAL
SPECIFIC GRAVITY, POC UA: 1.02
UROBILINOGEN, POC UA: NORMAL

## 2018-05-31 ENCOUNTER — PROCEDURE VISIT (OUTPATIENT)
Dept: UROLOGY | Facility: CLINIC | Age: 68
End: 2018-05-31
Attending: UROLOGY
Payer: MEDICARE

## 2018-05-31 VITALS
HEART RATE: 86 BPM | HEIGHT: 66 IN | DIASTOLIC BLOOD PRESSURE: 77 MMHG | BODY MASS INDEX: 37.45 KG/M2 | SYSTOLIC BLOOD PRESSURE: 124 MMHG | WEIGHT: 233 LBS

## 2018-05-31 DIAGNOSIS — N40.1 BENIGN NON-NODULAR PROSTATIC HYPERPLASIA WITH LOWER URINARY TRACT SYMPTOMS: Primary | ICD-10-CM

## 2018-05-31 LAB
BILIRUB SERPL-MCNC: ABNORMAL MG/DL
BLOOD URINE, POC: ABNORMAL
COLOR, POC UA: ABNORMAL
GLUCOSE UR QL STRIP: NORMAL
KETONES UR QL STRIP: ABNORMAL
LEUKOCYTE ESTERASE URINE, POC: ABNORMAL
NITRITE, POC UA: ABNORMAL
PH, POC UA: 6
PROTEIN, POC: ABNORMAL
SPECIFIC GRAVITY, POC UA: 1.01
UROBILINOGEN, POC UA: NORMAL

## 2018-05-31 PROCEDURE — 87086 URINE CULTURE/COLONY COUNT: CPT

## 2018-05-31 PROCEDURE — 81002 URINALYSIS NONAUTO W/O SCOPE: CPT | Mod: S$GLB,,, | Performed by: UROLOGY

## 2018-05-31 PROCEDURE — 52000 CYSTOURETHROSCOPY: CPT | Mod: S$GLB,,, | Performed by: UROLOGY

## 2018-05-31 RX ORDER — CIPROFLOXACIN 500 MG/1
500 TABLET ORAL
Status: COMPLETED | OUTPATIENT
Start: 2018-05-31 | End: 2018-05-31

## 2018-05-31 RX ORDER — CIPROFLOXACIN 2 MG/ML
400 INJECTION, SOLUTION INTRAVENOUS
Status: CANCELLED | OUTPATIENT
Start: 2018-05-31

## 2018-05-31 RX ORDER — LIDOCAINE HYDROCHLORIDE 20 MG/ML
JELLY TOPICAL
Status: COMPLETED | OUTPATIENT
Start: 2018-05-31 | End: 2018-05-31

## 2018-05-31 RX ADMIN — CIPROFLOXACIN 500 MG: 500 TABLET ORAL at 10:05

## 2018-05-31 RX ADMIN — LIDOCAINE HYDROCHLORIDE 5 ML: 20 JELLY TOPICAL at 10:05

## 2018-05-31 NOTE — PROCEDURES
Cystoscopy  Date/Time: 5/31/2018 10:04 AM  Performed by: TATO SANDS  Authorized by: TATO SANDS     Consent Done?:  Yes (Written)  Indications: BPH    Position:  Supine  Anesthesia:  Lidocaine jelly  Preparation: Patient was prepped and draped in usual sterile fashion      Scope type:  Flexible cystoscope  External exam normal: Yes    Urethra normal: Yes    Prostate normal: No (No obstructing median lobe)          Hyperplasia (Bilobar)(Length (cm):  4)Bladder neck normal: Bladder neck normal   Bladder normal: Yes (No tumors, lesions, or stones noted.  Normal bilateral UOs.)      Patient tolerance:  Patient tolerated the procedure well with no immediate complications    Impression: BARLOW 2/2 BPH    Plan:  -- Proceed w/ Urolift

## 2018-05-31 NOTE — H&P
"Subjective:      Hoang Santos Jr. is a 67 y.o. male with LUTS d/t BPH.    He continues to have only minimal improvement with medications, currently only ditropan XL 5. Previously failed flomax d/t RG ejaculation.    Cysto confirms BPH with BARLOW.     Uses viagra for ED with good results.     The following portions of the patient's history were reviewed and updated as appropriate: allergies, current medications, past family history, past medical history, past social history, past surgical history and problem list.     Review of Systems  A comprehensive multipoint review of systems was negative except as otherwise stated in the HPI.     Objective:   Vitals: /76 (BP Location: Left arm, Patient Position: Sitting, BP Method: Large (Automatic))   Pulse 86   Ht 5' 6" (1.676 m)   Wt 105.7 kg (233 lb)   BMI 37.61 kg/m²      Physical Exam   General: alert and oriented, no acute distress  Respiratory: Symmetric expansion, non-labored breathing  Neuro: no gross deficits  Psych: normal judgment and insight, normal mood/affect and non-anxious     Lab Review   Urinalysis demonstrates negative for all components        Lab Results   Component Value Date     WBC 13.79 (H) 03/10/2017     HGB 15.3 03/10/2017     HCT 46.5 03/10/2017     MCV 89 03/10/2017      03/10/2017            Lab Results   Component Value Date     CREATININE 1.0 01/16/2018     CREATININE 1.0 01/16/2018     BUN 12 01/16/2018     BUN 12 01/16/2018            Lab Results   Component Value Date     PSA 1.2 06/19/2017         Assessment and Plan:   1. Benign non-nodular prostatic hyperplasia with lower urinary tract symptoms  2. Nocturia  -- Persistent despite medical treatment  -- Wishes to proceed with Urolift  "

## 2018-06-01 LAB — BACTERIA UR CULT: NO GROWTH

## 2018-06-04 ENCOUNTER — TELEPHONE (OUTPATIENT)
Dept: RADIOLOGY | Facility: HOSPITAL | Age: 68
End: 2018-06-04

## 2018-06-07 ENCOUNTER — TELEPHONE (OUTPATIENT)
Dept: UROLOGY | Facility: CLINIC | Age: 68
End: 2018-06-07

## 2018-06-07 NOTE — TELEPHONE ENCOUNTER
----- Message from Yani Moreno sent at 6/7/2018  2:54 PM CDT -----  Name of Who is Calling: SOTO CHA JR. [[id number]]    What is the request in detail: Pt would like to reschedule his procedure.      Can the clinic reply by [facility]:  Yes      What Number to Call Back if not in [facility]:[phone]

## 2018-06-25 ENCOUNTER — HOSPITAL ENCOUNTER (OUTPATIENT)
Dept: PREADMISSION TESTING | Facility: OTHER | Age: 68
Discharge: HOME | End: 2018-06-25
Attending: UROLOGY
Payer: MEDICARE

## 2018-06-25 ENCOUNTER — ANESTHESIA EVENT (OUTPATIENT)
Dept: SURGERY | Facility: OTHER | Age: 68
End: 2018-06-25
Payer: MEDICARE

## 2018-06-25 VITALS
HEIGHT: 66 IN | HEART RATE: 87 BPM | DIASTOLIC BLOOD PRESSURE: 81 MMHG | WEIGHT: 233 LBS | TEMPERATURE: 98 F | OXYGEN SATURATION: 96 % | BODY MASS INDEX: 37.45 KG/M2 | SYSTOLIC BLOOD PRESSURE: 120 MMHG

## 2018-06-25 RX ORDER — LIDOCAINE HYDROCHLORIDE 10 MG/ML
0.5 INJECTION, SOLUTION EPIDURAL; INFILTRATION; INTRACAUDAL; PERINEURAL ONCE
Status: CANCELLED | OUTPATIENT
Start: 2018-06-25 | End: 2018-06-25

## 2018-06-25 RX ORDER — OXYCODONE HYDROCHLORIDE 5 MG/1
5 TABLET ORAL ONCE AS NEEDED
Status: CANCELLED | OUTPATIENT
Start: 2018-06-25 | End: 2018-06-25

## 2018-06-25 RX ORDER — FAMOTIDINE 20 MG/1
20 TABLET, FILM COATED ORAL
Status: CANCELLED | OUTPATIENT
Start: 2018-06-25 | End: 2018-06-25

## 2018-06-25 RX ORDER — SODIUM CHLORIDE, SODIUM LACTATE, POTASSIUM CHLORIDE, CALCIUM CHLORIDE 600; 310; 30; 20 MG/100ML; MG/100ML; MG/100ML; MG/100ML
INJECTION, SOLUTION INTRAVENOUS CONTINUOUS
Status: CANCELLED | OUTPATIENT
Start: 2018-06-25

## 2018-06-25 NOTE — DISCHARGE INSTRUCTIONS
PRE-ADMIT TESTING -  [phone]    [address]  [place] [facility]          Your surgery has been scheduled at [facility]. We are pleased to have the opportunity to serve you. For Further Information please call [phone].    On the day of surgery please report to the Information Desk on the 1st floor.    · CONTACT YOUR PHYSICIAN'S OFFICE THE DAY PRIOR TO YOUR SURGERY TO OBTAIN YOUR ARRIVAL TIME.     · The evening before surgery do not eat anything after 9 p.m. ( this includes hard candy, chewing gum and mints).  You may only have GATORADE, POWERADE AND WATER  from 9 p.m. until you leave your home.   DO NOT DRINK ANY LIQUIDS ON THE WAY TO THE HOSPITAL.      SPECIAL MEDICATION INSTRUCTIONS: TAKE medications checked off by the Anesthesiologist on your Medication List.    Angiogram Patients: Take medications as instructed by your physician, including aspirin.     Surgery Patients:    If you take ASPIRIN - Your PHYSICIAN/SURGEON will need to inform you IF/OR when you need to stop taking aspirin prior to your surgery.     Do Not take any medications containing IBUPROFEN.  Do Not Wear any make-up or dark nail polish   (especially eye make-up) to surgery. If you come to surgery with makeup on you will be required to remove the makeup or nail polish.    Do not shave your surgical area at least 5 days prior to your surgery. The surgical prep will be performed at the hospital according to Infection Control regulations.    Leave all valuables at home.   Do Not wear any jewelry or watches, including any metal in body piercings.  Contact Lens must be removed before surgery. Either do not wear the contact lens or bring a case and solution for storage.  Please bring a container for eyeglasses or dentures as required.  Bring any paperwork your physician has provided, such as consent forms,  history and physicals, doctor's orders, etc.   Bring comfortable clothes that are loose fitting to wear upon  discharge. Take into consideration the type of surgery being performed.  Maintain your diet as advised per your physician the day prior to surgery.      Adequate rest the night before surgery is advised.   Park in the Parking lot behind the hospital or in the [place] [facility] across the street from the parking lot. Parking is complimentary.  If you will be discharged the same day as your procedure, please arrange for a responsible adult to drive you home or to accompany you if traveling by taxi.   YOU WILL NOT BE PERMITTED TO DRIVE OR TO LEAVE THE HOSPITAL ALONE AFTER SURGERY.   It is strongly recommended that you arrange for someone to remain with you for the first 24 hrs following your surgery.       Thank you for your cooperation.  The Staff of [facility].        Bathing Instructions                                                                 Please shower the evening before and morning of your procedure with    ANTIBACTERIAL SOAP. ( DIAL, etc )  Concentrate on the surgical area   for at least 3 minutes and rinse completely. Dry off as usual.   Do not use any deodorant, powder, body lotions, perfume, after shave or    cologne.

## 2018-06-28 ENCOUNTER — SURGERY (OUTPATIENT)
Age: 68
End: 2018-06-28

## 2018-06-28 ENCOUNTER — ANESTHESIA (OUTPATIENT)
Dept: SURGERY | Facility: OTHER | Age: 68
End: 2018-06-28
Payer: MEDICARE

## 2018-06-28 ENCOUNTER — HOSPITAL ENCOUNTER (OUTPATIENT)
Facility: OTHER | Age: 68
Discharge: HOME | End: 2018-06-28
Attending: UROLOGY | Admitting: UROLOGY
Payer: MEDICARE

## 2018-06-28 VITALS
SYSTOLIC BLOOD PRESSURE: 125 MMHG | DIASTOLIC BLOOD PRESSURE: 61 MMHG | HEART RATE: 75 BPM | TEMPERATURE: 98 F | WEIGHT: 233 LBS | BODY MASS INDEX: 37.45 KG/M2 | OXYGEN SATURATION: 99 % | HEIGHT: 66 IN | RESPIRATION RATE: 16 BRPM

## 2018-06-28 DIAGNOSIS — N40.1 BENIGN NON-NODULAR PROSTATIC HYPERPLASIA WITH LOWER URINARY TRACT SYMPTOMS: ICD-10-CM

## 2018-06-28 PROCEDURE — 36000706: Performed by: UROLOGY

## 2018-06-28 PROCEDURE — L8699 PROSTHETIC IMPLANT NOS: HCPCS | Performed by: UROLOGY

## 2018-06-28 PROCEDURE — 52441 CYSTO INSJ TRNSPRSTC 1 IMPLT: CPT | Mod: ,,, | Performed by: UROLOGY

## 2018-06-28 PROCEDURE — 63600175 PHARM REV CODE 636 W HCPCS: Performed by: UROLOGY

## 2018-06-28 PROCEDURE — 37000009 HC ANESTHESIA EA ADD 15 MINS: Performed by: UROLOGY

## 2018-06-28 PROCEDURE — 37000008 HC ANESTHESIA 1ST 15 MINUTES: Performed by: UROLOGY

## 2018-06-28 PROCEDURE — 63600175 PHARM REV CODE 636 W HCPCS: Performed by: NURSE ANESTHETIST, CERTIFIED REGISTERED

## 2018-06-28 PROCEDURE — 36000707: Performed by: UROLOGY

## 2018-06-28 PROCEDURE — 25000003 PHARM REV CODE 250: Performed by: SPECIALIST

## 2018-06-28 PROCEDURE — 52442 CYSTO INS TRNSPRSTC IMPLT EA: CPT | Mod: ,,, | Performed by: UROLOGY

## 2018-06-28 PROCEDURE — 71000016 HC POSTOP RECOV ADDL HR: Performed by: UROLOGY

## 2018-06-28 PROCEDURE — 71000015 HC POSTOP RECOV 1ST HR: Performed by: UROLOGY

## 2018-06-28 DEVICE — SYS UROLIFT: Type: IMPLANTABLE DEVICE | Site: PROSTATE | Status: FUNCTIONAL

## 2018-06-28 RX ORDER — PHENAZOPYRIDINE HYDROCHLORIDE 200 MG/1
200 TABLET, FILM COATED ORAL 3 TIMES DAILY PRN
Status: DISCONTINUED | OUTPATIENT
Start: 2018-06-28 | End: 2018-06-28 | Stop reason: HOSPADM

## 2018-06-28 RX ORDER — MIDAZOLAM HYDROCHLORIDE 1 MG/ML
INJECTION INTRAMUSCULAR; INTRAVENOUS
Status: DISCONTINUED | OUTPATIENT
Start: 2018-06-28 | End: 2018-06-28

## 2018-06-28 RX ORDER — HYDROMORPHONE HYDROCHLORIDE 2 MG/ML
0.2 INJECTION, SOLUTION INTRAMUSCULAR; INTRAVENOUS; SUBCUTANEOUS EVERY 5 MIN PRN
Status: DISCONTINUED | OUTPATIENT
Start: 2018-06-28 | End: 2018-06-28 | Stop reason: HOSPADM

## 2018-06-28 RX ORDER — CIPROFLOXACIN 500 MG/1
500 TABLET ORAL 2 TIMES DAILY
Qty: 6 TABLET | Refills: 0 | Status: SHIPPED | OUTPATIENT
Start: 2018-06-28 | End: 2018-06-28

## 2018-06-28 RX ORDER — MORPHINE SULFATE 10 MG/ML
3 INJECTION INTRAMUSCULAR; INTRAVENOUS; SUBCUTANEOUS
Status: DISCONTINUED | OUTPATIENT
Start: 2018-06-28 | End: 2018-06-28 | Stop reason: HOSPADM

## 2018-06-28 RX ORDER — PROPOFOL 10 MG/ML
VIAL (ML) INTRAVENOUS CONTINUOUS PRN
Status: DISCONTINUED | OUTPATIENT
Start: 2018-06-28 | End: 2018-06-28

## 2018-06-28 RX ORDER — OXYCODONE HYDROCHLORIDE 5 MG/1
5 TABLET ORAL
Status: DISCONTINUED | OUTPATIENT
Start: 2018-06-28 | End: 2018-06-28 | Stop reason: HOSPADM

## 2018-06-28 RX ORDER — CIPROFLOXACIN 2 MG/ML
400 INJECTION, SOLUTION INTRAVENOUS
Status: COMPLETED | OUTPATIENT
Start: 2018-06-28 | End: 2018-06-28

## 2018-06-28 RX ORDER — FAMOTIDINE 20 MG/1
20 TABLET, FILM COATED ORAL
Status: COMPLETED | OUTPATIENT
Start: 2018-06-28 | End: 2018-06-28

## 2018-06-28 RX ORDER — OXYCODONE AND ACETAMINOPHEN 5; 325 MG/1; MG/1
1 TABLET ORAL EVERY 4 HOURS PRN
Qty: 10 TABLET | Refills: 0 | Status: SHIPPED | OUTPATIENT
Start: 2018-06-28 | End: 2018-06-28

## 2018-06-28 RX ORDER — FENTANYL CITRATE 50 UG/ML
INJECTION, SOLUTION INTRAMUSCULAR; INTRAVENOUS
Status: DISCONTINUED | OUTPATIENT
Start: 2018-06-28 | End: 2018-06-28

## 2018-06-28 RX ORDER — PHENAZOPYRIDINE HYDROCHLORIDE 100 MG/1
100 TABLET, FILM COATED ORAL 3 TIMES DAILY PRN
Qty: 20 TABLET | Refills: 1 | Status: SHIPPED | OUTPATIENT
Start: 2018-06-28 | End: 2018-07-19

## 2018-06-28 RX ORDER — OXYCODONE AND ACETAMINOPHEN 5; 325 MG/1; MG/1
1 TABLET ORAL EVERY 4 HOURS PRN
Qty: 10 TABLET | Refills: 0 | Status: SHIPPED | OUTPATIENT
Start: 2018-06-28 | End: 2018-07-19

## 2018-06-28 RX ORDER — SODIUM CHLORIDE 0.9 % (FLUSH) 0.9 %
3 SYRINGE (ML) INJECTION
Status: DISCONTINUED | OUTPATIENT
Start: 2018-06-28 | End: 2018-06-28 | Stop reason: HOSPADM

## 2018-06-28 RX ORDER — PHENAZOPYRIDINE HYDROCHLORIDE 100 MG/1
100 TABLET, FILM COATED ORAL 3 TIMES DAILY PRN
Qty: 20 TABLET | Refills: 1 | Status: SHIPPED | OUTPATIENT
Start: 2018-06-28 | End: 2018-06-28

## 2018-06-28 RX ORDER — CIPROFLOXACIN 500 MG/1
500 TABLET ORAL 2 TIMES DAILY
Qty: 6 TABLET | Refills: 0 | Status: SHIPPED | OUTPATIENT
Start: 2018-06-28 | End: 2018-07-01

## 2018-06-28 RX ORDER — HYDROCODONE BITARTRATE AND ACETAMINOPHEN 5; 325 MG/1; MG/1
1 TABLET ORAL EVERY 4 HOURS PRN
Status: DISCONTINUED | OUTPATIENT
Start: 2018-06-28 | End: 2018-06-28 | Stop reason: HOSPADM

## 2018-06-28 RX ORDER — SODIUM CHLORIDE, SODIUM LACTATE, POTASSIUM CHLORIDE, CALCIUM CHLORIDE 600; 310; 30; 20 MG/100ML; MG/100ML; MG/100ML; MG/100ML
INJECTION, SOLUTION INTRAVENOUS CONTINUOUS
Status: DISCONTINUED | OUTPATIENT
Start: 2018-06-28 | End: 2018-06-28 | Stop reason: HOSPADM

## 2018-06-28 RX ORDER — LIDOCAINE HYDROCHLORIDE 10 MG/ML
0.5 INJECTION, SOLUTION EPIDURAL; INFILTRATION; INTRACAUDAL; PERINEURAL ONCE
Status: DISCONTINUED | OUTPATIENT
Start: 2018-06-28 | End: 2018-06-28 | Stop reason: HOSPADM

## 2018-06-28 RX ORDER — ONDANSETRON 2 MG/ML
4 INJECTION INTRAMUSCULAR; INTRAVENOUS EVERY 12 HOURS PRN
Status: DISCONTINUED | OUTPATIENT
Start: 2018-06-28 | End: 2018-06-28 | Stop reason: HOSPADM

## 2018-06-28 RX ORDER — OXYCODONE HYDROCHLORIDE 5 MG/1
5 TABLET ORAL ONCE AS NEEDED
Status: DISCONTINUED | OUTPATIENT
Start: 2018-06-28 | End: 2018-06-28 | Stop reason: SDUPTHER

## 2018-06-28 RX ADMIN — MIDAZOLAM HYDROCHLORIDE 2 MG: 1 INJECTION, SOLUTION INTRAMUSCULAR; INTRAVENOUS at 12:06

## 2018-06-28 RX ADMIN — CIPROFLOXACIN 400 MG: 2 INJECTION, SOLUTION INTRAVENOUS at 12:06

## 2018-06-28 RX ADMIN — FAMOTIDINE 20 MG: 20 TABLET ORAL at 11:06

## 2018-06-28 RX ADMIN — SODIUM CHLORIDE, SODIUM LACTATE, POTASSIUM CHLORIDE, AND CALCIUM CHLORIDE: 600; 310; 30; 20 INJECTION, SOLUTION INTRAVENOUS at 12:06

## 2018-06-28 RX ADMIN — PROPOFOL 50 MCG/KG/MIN: 10 INJECTION, EMULSION INTRAVENOUS at 12:06

## 2018-06-28 RX ADMIN — FENTANYL CITRATE 50 MCG: 50 INJECTION, SOLUTION INTRAMUSCULAR; INTRAVENOUS at 12:06

## 2018-06-28 NOTE — DISCHARGE INSTRUCTIONS
GENERAL EXPECTATIONS    Some men may experience discomfort after the procedure.  You may have soreness in the lower abdomen , and it may   be uncomfortable to sit. You may experience the need to urinate  frequently and with greater urgency.     You may have some blood in your   urine, including passing an occasional blood clot. These are all normal  reactions to the procedure. Many of these symptoms will resolve in a week  or two, but some may last up to four weeks- this is normal.    The following are some suggestions:    1. Have someone drive you home after your procedure.    2. Drink plenty of water.    3. Take your medication as prescribed.    4. Avoid strenuous activity for one week.    5. If you have a catheter placed into your bladder , do not take a bath until it is     removed., though you can take a shower.    6. You may have sex in 3-5 days if you are not having any bleeding or pain.    Contact your physician if you experience any of the following    1. Temperature above 101.5 F ( taken by mouth)  2. Excessive urinary bleeding or bleeding from the penis  3. Continuous bladder spasms  4. Painful, swollen and/or inflated testicle(s) or scrotum  5. Unable to void spontaneously or the indwelling catheter is not draining urine or is      blocked.     IF YOU NEED IMMMEDIATE ATTENTION, GO TO THE HOSPITAL EMERGENCY ROOM FOR TREATMENT- be sure to tell the facility personnel to use a Coude tipped catheter.  Anesthesia: Monitored Anesthesia Care (MAC)    Anesthesia Safety  · Have an adult family member or friend drive you home after the procedure.  · For the first 24 hours after your surgery:  ¨ Do not drive or use heavy equipment.  ¨ Do not make important decisions or sign documents.  ¨ Avoid alcohol.  ¨ Have someone stay with you, if possible. They can watch for problems and help keep you safe.

## 2018-06-28 NOTE — ANESTHESIA POSTPROCEDURE EVALUATION
"Anesthesia Post Evaluation    Patient: Hoang Santos Jr.    Procedure(s) Performed: Procedure(s) (LRB):  TRANSPROSTATIC TISSUE RETRACTION (N/A)    Final Anesthesia Type: general  Patient location during evaluation: [facility]  Patient participation: Yes- Able to Participate  Level of consciousness: awake and alert  Post-procedure vital signs: reviewed and stable  Pain management: adequate  Airway patency: patent  PONV status at discharge: No PONV  Anesthetic complications: no      Cardiovascular status: blood pressure returned to baseline  Respiratory status: unassisted and spontaneous ventilation  Hydration status: euvolemic  Follow-up not needed.        Visit Vitals  /85 (BP Location: Left arm, Patient Position: Sitting)   Pulse 97   Temp 36.5 °C (97.7 °F) (Oral)   Resp 16   Ht 5' 6" (1.676 m)   Wt 105.7 kg (233 lb)   SpO2 97%   BMI 37.61 kg/m²       Pain/Perla Score: Pain Assessment Performed: Yes (6/28/2018 10:56 AM)  Presence of Pain: denies (6/28/2018 10:56 AM)      "

## 2018-06-28 NOTE — BRIEF OP NOTE
[facility]  Brief Operative Note     SUMMARY     Surgery Date: 6/28/2018     Surgeon(s) and Role:     * Kory Jack MD - Primary    Assisting Surgeon: None    Pre-op Diagnosis:  Benign non-nodular prostatic hyperplasia with lower urinary tract symptoms [N40.1]    Post-op Diagnosis:  Post-Op Diagnosis Codes:     * Benign non-nodular prostatic hyperplasia with lower urinary tract symptoms [N40.1]    Procedure(s) (LRB):  TRANSPROSTATIC TISSUE RETRACTION (N/A)    Anesthesia: Choice    Description of the findings of the procedure: Urolift w/ 2 implants. No beach.    Estimated Blood Loss: 0cc         Specimens:   Specimen (12h ago through future)    None          Discharge Note    SUMMARY     Admit Date: 6/28/2018    Discharge Date and Time: 6/28/2018    Hospital Course: Patient was admitted for elective outpatient procedure, which was uncomplicated and well tolerated.      Final Diagnosis: Post-Op Diagnosis Codes:     * Benign non-nodular prostatic hyperplasia with lower urinary tract symptoms [N40.1]    Disposition: Home or Self Care    Follow Up/Patient Instructions:     Medications:  Reconciled Home Medications: Current Discharge Medication List      START taking these medications    Details   ciprofloxacin HCl (CIPRO) 500 MG tablet Take 1 tablet (500 mg total) by mouth 2 (two) times daily. for 3 days  Qty: 6 tablet, Refills: 0      oxyCODONE-acetaminophen (PERCOCET) 5-325 mg per tablet Take 1 tablet by mouth every 4 (four) hours as needed for Pain.  Qty: 10 tablet, Refills: 0      phenazopyridine (PYRIDIUM) 100 MG tablet Take 1 tablet (100 mg total) by mouth 3 (three) times daily as needed for Pain.  Qty: 20 tablet, Refills: 1         CONTINUE these medications which have NOT CHANGED    Details   atorvastatin (LIPITOR) 40 MG tablet Take 1 tablet (40 mg total) by mouth once daily.  Qty: 90 tablet, Refills: 3    Associated Diagnoses: Hyperlipidemia, unspecified hyperlipidemia type       cholecalciferol, vitamin D3, 50,000 unit capsule Take 1 capsule (50,000 Units total) by mouth once a week.  Qty: 12 capsule, Refills: 3      meclizine (ANTIVERT) 25 mg tablet Take 25 mg by mouth 3 (three) times daily as needed.      VIAGRA 100 mg tablet Take 1 tablet (100 mg total) by mouth once daily. Brand name only medication.  Qty: 10 tablet, Refills: 11    Associated Diagnoses: Erectile dysfunction, unspecified erectile dysfunction type             Discharge Procedure Orders  Diet general       Follow-up Information     Kory Jack MD. Schedule an appointment as soon as possible for a visit in 3 weeks.    Specialty:  Urology  Why:  For post-operative follow-up  Contact information:  [address] [id number] [phone]

## 2018-06-28 NOTE — OP NOTE
Operative Note       Surgery Date: 6/28/2018     Surgeon: Pepe Jack MD    Pre-op Diagnosis:  Benign non-nodular prostatic hyperplasia with lower urinary tract symptoms [N40.1]    Post-op Diagnosis: Post-Op Diagnosis Codes:     * Benign non-nodular prostatic hyperplasia with lower urinary tract symptoms [N40.1]    Procedures:  Prostatic Urethral Lift (Urolift)    Anesthesia: General    Indications for Procedure:  The patient is a 67 y.o. year old male with BPH with BARLOW and LUTS.  He presents today for surgical treatment with prostatic urethral lift (Urolift).  The full risks and benefits of the procedure have been explained and detail and he wishes to proceed.    Procedure Description:  The patient was brought to the operative suite and placed under Choice anesthesia. He was placed in lithotomy position and prepped and draped in usual sterile fashion.  Time out was performed prior to starting the procedure.    A 20F cystoscope was inserted into the bladder. The cystoscopy bridge was replaced with a UroLift delivery device. The first treatment site was the patient's left side approximately 1.5cm distal to the bladder neck. The distal tip of the delivery device was then angled laterally approximately 20 degrees at this position to compress the lateral lobe. The trigger was pulled, thereby deploying a needle containing the implant through the prostate. The needle was then retracted, allowing one end of the implant to be delivered to the capsular surface of the prostate. The implant was then tensioned to assure capsular seating and removal of slack monofilament. The device was then angled back toward midline and slowly advanced proximally (typically 3 to 4 mm) until cystoscopic verification of the monofilament being centered in the delivery bay. The urethral end piece was then affixed to the monofilament thereby tailoring the size of the implant. Excess filament was then severed. The delivery device was then  re-advanced into the bladder. The delivery device was then replaced and the same procedure was then repeated on the right side.    A final cystoscopy was conducted first to inspect the location and state of each implant and second, to confirm the presence of a continuous anterior channel was present through the prostatic urethra with irrigation flow turned off. The bladder was then filled with approximately 150 cc irrigation fluid to assist the patient in void trial after the procedure, and all instruments were removed. The patient did not receive a post-operative urinary catheter.     Complications: No    Estimated Blood Loss: 0cc         Specimens Removed:   Specimen (12h ago through future)    None          Implants:   Implant Name Type Inv. Item Serial No.  Lot No. LRB No. Used                                         Disposition: PACU - hemodynamically stable.           Condition: Good

## 2018-06-29 ENCOUNTER — TELEPHONE (OUTPATIENT)
Dept: RADIOLOGY | Facility: HOSPITAL | Age: 68
End: 2018-06-29

## 2018-06-29 ENCOUNTER — TELEPHONE (OUTPATIENT)
Dept: UROLOGY | Facility: CLINIC | Age: 68
End: 2018-06-29

## 2018-06-29 NOTE — TELEPHONE ENCOUNTER
----- Message from Arcelia Tse sent at 6/29/2018  8:02 AM CDT -----            Name of Who is Calling: SOTO CHA JR. [[id number]]    What is the request in detail: please call pt concerning his after effect from his procedure yesterday    Can the clinic reply by [facility]: n    What Number to Call Back if not in [facility]: [phone]

## 2018-06-29 NOTE — TELEPHONE ENCOUNTER
Spoke with pt, informed him that urinary freq is normal after having the urolift procedure. Pt states he will also stop taking the rx pyridium bc he isn't having any burning or pain and will continue to take the rx abx as prescribed. Pt adds he will call if he has any other concerns.

## 2018-07-02 ENCOUNTER — CLINICAL SUPPORT (OUTPATIENT)
Dept: SPEECH THERAPY | Facility: HOSPITAL | Age: 68
End: 2018-07-02
Attending: OTOLARYNGOLOGY
Payer: MEDICARE

## 2018-07-02 ENCOUNTER — HOSPITAL ENCOUNTER (OUTPATIENT)
Dept: RADIOLOGY | Facility: HOSPITAL | Age: 68
Discharge: HOME | End: 2018-07-02
Attending: OTOLARYNGOLOGY
Payer: MEDICARE

## 2018-07-02 DIAGNOSIS — M25.78 CERVICAL OSTEOPHYTE: ICD-10-CM

## 2018-07-02 DIAGNOSIS — R13.14 DYSPHAGIA, PHARYNGOESOPHAGEAL: ICD-10-CM

## 2018-07-02 DIAGNOSIS — R05.9 COUGH: ICD-10-CM

## 2018-07-02 DIAGNOSIS — Z98.890 S/P UPPP (UVULOPALATOPHARYNGOPLASTY): ICD-10-CM

## 2018-07-02 DIAGNOSIS — R13.14 DYSPHAGIA, PHARYNGOESOPHAGEAL: Primary | ICD-10-CM

## 2018-07-02 PROCEDURE — 74230 X-RAY XM SWLNG FUNCJ C+: CPT | Mod: TC

## 2018-07-02 PROCEDURE — 74230 X-RAY XM SWLNG FUNCJ C+: CPT | Mod: 26,,, | Performed by: RADIOLOGY

## 2018-07-02 PROCEDURE — G8997 SWALLOW GOAL STATUS: HCPCS | Mod: GN,CJ | Performed by: SPEECH-LANGUAGE PATHOLOGIST

## 2018-07-02 PROCEDURE — 92611 MOTION FLUOROSCOPY/SWALLOW: CPT | Mod: GN | Performed by: SPEECH-LANGUAGE PATHOLOGIST

## 2018-07-02 PROCEDURE — G8996 SWALLOW CURRENT STATUS: HCPCS | Mod: GN,CJ | Performed by: SPEECH-LANGUAGE PATHOLOGIST

## 2018-07-02 PROCEDURE — 74220 X-RAY XM ESOPHAGUS 1CNTRST: CPT | Mod: TC

## 2018-07-02 PROCEDURE — G8998 SWALLOW D/C STATUS: HCPCS | Mod: GN,CJ | Performed by: SPEECH-LANGUAGE PATHOLOGIST

## 2018-07-02 NOTE — PATIENT INSTRUCTIONS
RECOMMENDATIONS/PLAN OF CARE:   It is felt that Mr. Santos would benefit from  1.  Continuation of his current regular consistency diet with thin liquids using the following strategies and common aspiration precautions, including, but not limited to   A.  Appropriate seating for all eating and drinking.   B.  Small bites, well chewed.  Cut denser or woodier foods into small pieces.   C.  Small sips.   D.  Multiple swallows to clear; alternate liquids with solids.   E.  Cut or crush pill medications (unless contraindicated) and combine with liquid or puree to facilitate swallowing.   F.  Monitoring for any signs/symptoms of aspiration (such as wet/gurgly voice that does not clear with coughing, inability to make any voice sounds, any persistent coughing with oral intake, otherwise unexplained fever, unexplained increased or new difficulty or discomfort breathing, unexplained increase in sleepiness/lethargy/significant fatigue, unexplained increase or new onset confusion or change in cognitive functioning, or any other unexplained change in health or well-being that could be related to swallowing).  2.  Follow up with Dr. Casas as directed.  3.  Repeat MBSS as needed.

## 2018-07-02 NOTE — PROGRESS NOTES
MODIFIED BARIUM SWALLOW STUDY    REASON FOR REFERRAL:  Mr. Hoang Santos Jr., age 67, was referred by Dr. Juaquin Casas, laryngologist, for a Modified Barium Swallow Study to rule out aspiration, assess his overall swallowing function, and determine safest consistencies for oral intake.  He was unaccompanied.    Mr. Santos reported onset of globus sensation and sometimes coughing/choking corresponding to his UPPP in February 2017.  He stated that he has difficulty swallowing pills, lettuce, popcorn, large piece of meat, bread.  He cuts things up into small portions to facilitate swallowing.    Known osteophytes at C2-3 and especially C4-5 per previous c-spine imaging.    MEDICAL HISTORY:  Past Medical History:   Diagnosis Date    Hyperlipidemia     Prostatic hyperplasia     Sleep apnea         SURGICAL HISTORY:  Past Surgical History:   Procedure Laterality Date    CYSTOSCOPY WITH INSERTION OF MINIMALLY INVASIVE IMPLANT TO ENLARGE PROSTATIC URETHRA N/A 6/28/2018    Procedure: TRANSPROSTATIC TISSUE RETRACTION;  Surgeon: Kory Jack MD;  Location: [facility];  Service: Urology;  Laterality: N/A;    TONSILLECTOMY      UVULOPALATOPHARYNGOPLASTY         SWALLOWING HISTORY:  As above.  Takes regular consistency diet as tolerated and pills with liquid.    FAMILY HISTORY:  Family History   Problem Relation Age of Onset    Heart disease Mother     Diabetes Mother     Stroke Father     Cancer Sister         pancreatitic    COPD Sister     Prostate cancer Neg Hx     Kidney disease Neg Hx     Thyroid disease Neg Hx     Retinal detachment Neg Hx     Macular degeneration Neg Hx     Hypertension Neg Hx     Glaucoma Neg Hx         SOCIAL HISTORY:  Mr. Santos is  and lives in [place].    Tobacco:  Never smoker per EMR.  ETOH:  No per EMR.    BEHAVIOR:  Mr. Santos was a very pleasant man who had normal affect and social interaction.  He was able to fully cooperate during the study.  Results of  today's assessment were considered indicative of his current levels of swallowing functioning.      HEARING:  Subjectively, within normal limits.     ORAL PERIPHERAL:   Informal examination of the oral mechanism revealed structures and functioning within normal limits for swallowing and speech purposes.  Intraoral inspection of the velum revealed resected uvula and velum moved symmetrically.    Voice quality was within normal limits with no wet quality before, during, or after.    TEST FINDINGS:   Mr. Santos was seen in Radiology with the Radiologist for a Modified Barium Swallow Study.  He was seated on a stool for a left lateral videofluoroscopic view.      Consistencies assessed using radiopaque barium contrast:  thin (single swallows via open cup),   thin puree (1-teaspoon bolus) via spoon,   thick puree (1-teaspoon bolus) via spoon,   solid (1/4 and 1/2 cracker boluses) by Mr. Santos's hand, and   12.5 mm arium tablet with water.    Strategies:  Chin tuck -- not protective; in fact, facilitative of pooling and aspiration  Supraglottic swallow -- not any more facilitative or protective than not using a strategy  Small bites/sips -- limited residue  Multiple swallows -- Dry swallows were of limited use; liquid swallows had moderate benefit depending on viscosity of residue being cleared    Phases:  Oral:  Mr. Santos was able to obtain liquid and strip utensils well with no loss of material from the oral cavity.  He moved boluses through the oral cavity with appropriate transit time.  There was no pooling of liquids in the mouth.  Swallow reflex was triggered within normal limits.    Pharyngeal:  Boluses moved through the pharyngeal phase with trace laryngeal penetration with thin liquids only, no aspiration (except when transitioning out of chin tuck with thin liquid contrast still in pharynx), and no nasal regurgitation.     - Timely initiation  - Adequate soft palate elevation; no gap observable in lateral  view.  - Adequate laryngeal elevation and anterior hyoid excursion  - Adequate tongue base retraction  - Incomplete epiglottic inversion; abutted PPW.  - Incomplete laryngeal vestibular closure  - Adequate pharyngeal stripping wave  - Reduced PE segment opening.  -  Minimum thin liquid pharyngeal residue and moderate solid residue in valleculae .  The barium tablet had significant stasis in a pyriform; was eventually cleared with swallows of warm water.    Cervical Esophageal:  Boluses entered the upper esophagus within normal limits once beyond the point of constriction related to osteophytes.      Rosenbeck 8-point Penetration-Aspiration Scale:   Best: 1 - Material does not enter airway. -- solids  Worst:    6 - Material enters the ariway, passes below the vocal folds, and is ejected into the larynx or out of the airway. -- thin liquid while transitioning from a chin tuck to neutral position with contrast pooled in pharynx      IMPRESSIONS:   This 67 y.o. man appears to present with  1.  Pharyngoesophageal dysphagia characterized by patterns that may be related to one or both of two contributing factors:  Osteophytes at C2-3 and 4-5 and possible reduction in pressure generated during closure of velopharyngeal port based on history of onset of symptoms.  While no deficit in closure pattern of the V-P port could be appreciated in today's study, cannot yet rule out that a weakened closure pattern may be contributing; however, his patterns could likely exist with the effects of the osteophytes alone.  Swallowing dysfunction observed today included incomplete epiglottic inversion (abutted PPW) and laryngeal vestibule closure with resultant reduced ability to clear boluses completely (vallecular residue of liquids and primarily solids, pyriform stasis of barium tablet).  2.  Osteophytes at C2-3 and 4-5 per previous imaging.  3.  History GEMMA; s/p UPPP 2/2017.      Swallowing  Current status:  FCM:  LEVEL 5 (20-39%  impaired)   -   Projected status:  FCM:   LEVEL 5 (20-39% impaired)   - CJ  Discharge status:  FCM:   LEVEL 5 (20-39% impaired) -         RECOMMENDATIONS/PLAN OF CARE:   It is felt that Mr. Santos would benefit from  1.  Continuation of his current regular consistency diet with thin liquids using the following strategies and common aspiration precautions, including, but not limited to   A.  Appropriate seating for all eating and drinking.   B.  Small bites, well chewed.  Cut denser or woodier foods into small pieces.   C.  Small sips.   D.  Multiple swallows to clear; alternate liquids with solids.   E.  Cut or crush pill medications (unless contraindicated) and combine with liquid or puree to facilitate swallowing.   F.  Monitoring for any signs/symptoms of aspiration (such as wet/gurgly voice that does not clear with coughing, inability to make any voice sounds, any persistent coughing with oral intake, otherwise unexplained fever, unexplained increased or new difficulty or discomfort breathing, unexplained increase in sleepiness/lethargy/significant fatigue, unexplained increase or new onset confusion or change in cognitive functioning, or any other unexplained change in health or well-being that could be related to swallowing).  2.  Follow up with Dr. Casas as directed.  3.  Repeat MBSS as needed.

## 2018-07-02 NOTE — PLAN OF CARE
IMPRESSIONS:   This 67 y.o. man appears to present with  1.  Pharyngoesophageal dysphagia characterized by patterns that may be related to one or both of two contributing factors:  Osteophytes at C2-3 and 4-5 and possible reduction in pressure generated during closure of velopharyngeal port based on history of onset of symptoms.  While no deficit in closure pattern of the V-P port could be appreciated in today's study, cannot yet rule out that a weakened closure pattern may be contributing; however, his patterns could likely exist with the effects of the osteophytes alone.  Swallowing dysfunction observed today included incomplete epiglottic inversion (abutted PPW) and laryngeal vestibule closure with resultant reduced ability to clear boluses completely (vallecular residue of liquids and primarily solids, pyriform stasis of barium tablet).  2.  Osteophytes at C2-3 and 4-5 per previous imaging.  3.  History GEMMA; s/p UPPP 2/2017.      Swallowing  Current status:  FCM:  LEVEL 5 (20-39% impaired)   - CJ  Projected status:  FCM:   LEVEL 5 (20-39% impaired)   - CJ  Discharge status:  FCM:   LEVEL 5 (20-39% impaired) -  CJ       RECOMMENDATIONS/PLAN OF CARE:   It is felt that Mr. Santos would benefit from  1.  Continuation of his current regular consistency diet with thin liquids using the following strategies and common aspiration precautions, including, but not limited to   A.  Appropriate seating for all eating and drinking.   B.  Small bites, well chewed.  Cut denser or woodier foods into small pieces.   C.  Small sips.   D.  Multiple swallows to clear; alternate liquids with solids.   E.  Cut or crush pill medications (unless contraindicated) and combine with liquid or puree to facilitate swallowing.   F.  Monitoring for any signs/symptoms of aspiration (such as wet/gurgly voice that does not clear with coughing, inability to make any voice sounds, any persistent coughing with oral intake,  otherwise unexplained fever, unexplained increased or new difficulty or discomfort breathing, unexplained increase in sleepiness/lethargy/significant fatigue, unexplained increase or new onset confusion or change in cognitive functioning, or any other unexplained change in health or well-being that could be related to swallowing).  2.  Follow up with Dr. Casas as directed.  3.  Repeat MBSS as needed.

## 2018-07-19 ENCOUNTER — OFFICE VISIT (OUTPATIENT)
Dept: UROLOGY | Facility: CLINIC | Age: 68
End: 2018-07-19
Attending: UROLOGY
Payer: MEDICARE

## 2018-07-19 VITALS
BODY MASS INDEX: 36.96 KG/M2 | DIASTOLIC BLOOD PRESSURE: 89 MMHG | HEIGHT: 66 IN | SYSTOLIC BLOOD PRESSURE: 131 MMHG | WEIGHT: 230 LBS | HEART RATE: 81 BPM

## 2018-07-19 DIAGNOSIS — N40.1 BENIGN NON-NODULAR PROSTATIC HYPERPLASIA WITH LOWER URINARY TRACT SYMPTOMS: Primary | ICD-10-CM

## 2018-07-19 PROCEDURE — 99213 OFFICE O/P EST LOW 20 MIN: CPT | Mod: S$GLB,,, | Performed by: UROLOGY

## 2018-07-19 NOTE — PROGRESS NOTES
"Subjective:      Hoang Santos Jr. is a 67 y.o. male who returns today regarding his BPH.    He is s/p Urolift 6/28/2017. No problems post-op.    His preop AUASS was 5/3 but he was bothered by sexual side effects of his medications.    His AUASS today is 4.    He has stopped all BPH medications.    He has no c/o today.    The following portions of the patient's history were reviewed and updated as appropriate: allergies, current medications, past family history, past medical history, past social history, past surgical history and problem list.    Review of Systems  A comprehensive multipoint review of systems was negative except as otherwise stated in the HPI.     Objective:   Vitals: /85 (BP Location: Right arm, Patient Position: Sitting, BP Method: Large (Automatic))   Pulse 61   Ht 5' 11" (1.803 m)   Wt 109.6 kg (241 lb 10 oz)   BMI 33.70 kg/m²     Physical Exam   General: alert and oriented, no acute distress  Respiratory: Symmetric expansion, non-labored breathing  Neuro: no gross deficits  Psych: normal judgment and insight, normal mood/affect and non-anxious    Bladder Scan PVR: 0cc      Lab Review   Urinalysis demonstrates negative for all components  Lab Results   Component Value Date    WBC 13.79 (H) 03/10/2017    HGB 15.3 03/10/2017    HCT 46.5 03/10/2017    MCV 89 03/10/2017     03/10/2017     Lab Results   Component Value Date    CREATININE 1.0 01/16/2018    CREATININE 1.0 01/16/2018    BUN 12 01/16/2018    BUN 12 01/16/2018     Lab Results   Component Value Date    PSA 1.2 06/19/2017         Assessment and Plan:   1. Benign non-nodular prostatic hyperplasia with lower urinary tract symptoms  -- Doing well s/p Urolift  -- FU 6 mos  "

## 2018-07-27 ENCOUNTER — OFFICE VISIT (OUTPATIENT)
Dept: GASTROENTEROLOGY | Facility: CLINIC | Age: 68
End: 2018-07-27
Payer: MEDICARE

## 2018-07-27 ENCOUNTER — TELEPHONE (OUTPATIENT)
Dept: ENDOSCOPY | Facility: HOSPITAL | Age: 68
End: 2018-07-27

## 2018-07-27 VITALS
WEIGHT: 233.94 LBS | HEART RATE: 89 BPM | BODY MASS INDEX: 37.6 KG/M2 | HEIGHT: 66 IN | DIASTOLIC BLOOD PRESSURE: 70 MMHG | SYSTOLIC BLOOD PRESSURE: 113 MMHG

## 2018-07-27 DIAGNOSIS — R05.3 CHRONIC COUGH: ICD-10-CM

## 2018-07-27 DIAGNOSIS — R13.10 DYSPHAGIA, UNSPECIFIED TYPE: Primary | ICD-10-CM

## 2018-07-27 PROCEDURE — 99999 PR PBB SHADOW E&M-EST. PATIENT-LVL III: CPT | Mod: PBBFAC,,, | Performed by: INTERNAL MEDICINE

## 2018-07-27 PROCEDURE — 99213 OFFICE O/P EST LOW 20 MIN: CPT | Mod: PBBFAC | Performed by: INTERNAL MEDICINE

## 2018-07-27 PROCEDURE — 99203 OFFICE O/P NEW LOW 30 MIN: CPT | Mod: S$PBB,,, | Performed by: INTERNAL MEDICINE

## 2018-07-27 NOTE — LETTER
August 4, 2018      Juaquin Cassa MD  [address]  [facility] [address]           [facility]  [address]  [facility] [address]  Phone: [phone]  Fax: [phone]          Patient: Hoang Santos Jr.   MR Number: [id number]   YOB: 1950   Date of Visit: 7/27/2018       Dear Dr. Juaquin Casas:    Thank you for referring Hoang Santos to me for evaluation. Attached you will find relevant portions of my assessment and plan of care.    If you have questions, please do not hesitate to call me. I look forward to following Hoang Santos along with you.    Sincerely,    Salo Nuñez MD    Enclosure  CC:  No Recipients    If you would like to receive this communication electronically, please contact [email or web address] or [phone] to request more information on [employer] access.    For providers and/or their staff who would like to refer a patient to [facility], please contact us through our one-stop-shop provider referral line, [facility], at [phone].    If you feel you have received this communication in error or would no longer like to receive these types of communications, please e-mail [email or web address]
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
## 2018-08-21 ENCOUNTER — PATIENT MESSAGE (OUTPATIENT)
Dept: ENDOSCOPY | Facility: HOSPITAL | Age: 68
End: 2018-08-21

## 2018-08-22 ENCOUNTER — PATIENT MESSAGE (OUTPATIENT)
Dept: ENDOSCOPY | Facility: HOSPITAL | Age: 68
End: 2018-08-22

## 2018-09-21 ENCOUNTER — TELEPHONE (OUTPATIENT)
Dept: ENDOSCOPY | Facility: HOSPITAL | Age: 68
End: 2018-09-21

## 2018-09-21 ENCOUNTER — PATIENT MESSAGE (OUTPATIENT)
Dept: ENDOSCOPY | Facility: HOSPITAL | Age: 68
End: 2018-09-21

## 2018-09-23 ENCOUNTER — ANESTHESIA EVENT (OUTPATIENT)
Dept: ENDOSCOPY | Facility: HOSPITAL | Age: 68
End: 2018-09-23
Payer: MEDICARE

## 2018-09-24 ENCOUNTER — HOSPITAL ENCOUNTER (OUTPATIENT)
Facility: HOSPITAL | Age: 68
Discharge: HOME | End: 2018-09-24
Attending: INTERNAL MEDICINE | Admitting: INTERNAL MEDICINE
Payer: MEDICARE

## 2018-09-24 ENCOUNTER — ANESTHESIA (OUTPATIENT)
Dept: ENDOSCOPY | Facility: HOSPITAL | Age: 68
End: 2018-09-24
Payer: MEDICARE

## 2018-09-24 VITALS
RESPIRATION RATE: 14 BRPM | HEIGHT: 66 IN | OXYGEN SATURATION: 97 % | DIASTOLIC BLOOD PRESSURE: 87 MMHG | TEMPERATURE: 98 F | BODY MASS INDEX: 36.96 KG/M2 | WEIGHT: 230 LBS | SYSTOLIC BLOOD PRESSURE: 137 MMHG | HEART RATE: 100 BPM

## 2018-09-24 DIAGNOSIS — R13.10 DYSPHAGIA: ICD-10-CM

## 2018-09-24 DIAGNOSIS — R05.3 CHRONIC COUGH: ICD-10-CM

## 2018-09-24 DIAGNOSIS — R13.19 ESOPHAGEAL DYSPHAGIA: Primary | ICD-10-CM

## 2018-09-24 PROCEDURE — 25000003 PHARM REV CODE 250: Performed by: INTERNAL MEDICINE

## 2018-09-24 PROCEDURE — 43239 EGD BIOPSY SINGLE/MULTIPLE: CPT | Performed by: INTERNAL MEDICINE

## 2018-09-24 PROCEDURE — 88305 TISSUE EXAM BY PATHOLOGIST: CPT | Mod: 59 | Performed by: PATHOLOGY

## 2018-09-24 PROCEDURE — 37000008 HC ANESTHESIA 1ST 15 MINUTES: Performed by: INTERNAL MEDICINE

## 2018-09-24 PROCEDURE — 91035 G-ESOPH REFLX TST W/ELECTROD: CPT | Mod: 26,GC,, | Performed by: INTERNAL MEDICINE

## 2018-09-24 PROCEDURE — 88342 IMHCHEM/IMCYTCHM 1ST ANTB: CPT | Mod: 26,59,, | Performed by: PATHOLOGY

## 2018-09-24 PROCEDURE — 63600175 PHARM REV CODE 636 W HCPCS: Performed by: NURSE ANESTHETIST, CERTIFIED REGISTERED

## 2018-09-24 PROCEDURE — 43239 EGD BIOPSY SINGLE/MULTIPLE: CPT | Mod: ,,, | Performed by: INTERNAL MEDICINE

## 2018-09-24 PROCEDURE — E9220 PRA ENDO ANESTHESIA: HCPCS | Mod: ,,, | Performed by: NURSE ANESTHETIST, CERTIFIED REGISTERED

## 2018-09-24 PROCEDURE — 37000009 HC ANESTHESIA EA ADD 15 MINS: Performed by: INTERNAL MEDICINE

## 2018-09-24 PROCEDURE — 91035 G-ESOPH REFLX TST W/ELECTROD: CPT | Performed by: INTERNAL MEDICINE

## 2018-09-24 PROCEDURE — 27200942: Performed by: INTERNAL MEDICINE

## 2018-09-24 PROCEDURE — 88360 TUMOR IMMUNOHISTOCHEM/MANUAL: CPT | Mod: 26,,, | Performed by: PATHOLOGY

## 2018-09-24 PROCEDURE — 27201012 HC FORCEPS, HOT/COLD, DISP: Performed by: INTERNAL MEDICINE

## 2018-09-24 PROCEDURE — 25000003 PHARM REV CODE 250: Performed by: NURSE ANESTHETIST, CERTIFIED REGISTERED

## 2018-09-24 RX ORDER — LIDOCAINE HCL/PF 100 MG/5ML
SYRINGE (ML) INTRAVENOUS
Status: DISCONTINUED | OUTPATIENT
Start: 2018-09-24 | End: 2018-09-24

## 2018-09-24 RX ORDER — PROPOFOL 10 MG/ML
VIAL (ML) INTRAVENOUS
Status: DISCONTINUED | OUTPATIENT
Start: 2018-09-24 | End: 2018-09-24

## 2018-09-24 RX ORDER — GLYCOPYRROLATE 0.2 MG/ML
INJECTION INTRAMUSCULAR; INTRAVENOUS
Status: DISCONTINUED | OUTPATIENT
Start: 2018-09-24 | End: 2018-09-24

## 2018-09-24 RX ORDER — MIDAZOLAM HYDROCHLORIDE 1 MG/ML
INJECTION, SOLUTION INTRAMUSCULAR; INTRAVENOUS
Status: DISCONTINUED | OUTPATIENT
Start: 2018-09-24 | End: 2018-09-24

## 2018-09-24 RX ORDER — SODIUM CHLORIDE 9 MG/ML
INJECTION, SOLUTION INTRAVENOUS CONTINUOUS
Status: DISCONTINUED | OUTPATIENT
Start: 2018-09-24 | End: 2018-09-24 | Stop reason: HOSPADM

## 2018-09-24 RX ORDER — SODIUM CHLORIDE 0.9 % (FLUSH) 0.9 %
3 SYRINGE (ML) INJECTION
Status: DISCONTINUED | OUTPATIENT
Start: 2018-09-24 | End: 2018-09-24 | Stop reason: HOSPADM

## 2018-09-24 RX ORDER — PROPOFOL 10 MG/ML
VIAL (ML) INTRAVENOUS CONTINUOUS PRN
Status: DISCONTINUED | OUTPATIENT
Start: 2018-09-24 | End: 2018-09-24

## 2018-09-24 RX ADMIN — LIDOCAINE HYDROCHLORIDE 100 MG: 20 INJECTION, SOLUTION INTRAVENOUS at 09:09

## 2018-09-24 RX ADMIN — PROPOFOL 10 MG: 10 INJECTION, EMULSION INTRAVENOUS at 09:09

## 2018-09-24 RX ADMIN — PROPOFOL 30 MG: 10 INJECTION, EMULSION INTRAVENOUS at 09:09

## 2018-09-24 RX ADMIN — SODIUM CHLORIDE: 0.9 INJECTION, SOLUTION INTRAVENOUS at 08:09

## 2018-09-24 RX ADMIN — PROPOFOL 100 MCG/KG/MIN: 10 INJECTION, EMULSION INTRAVENOUS at 09:09

## 2018-09-24 RX ADMIN — MIDAZOLAM HYDROCHLORIDE 2 MG: 1 INJECTION, SOLUTION INTRAMUSCULAR; INTRAVENOUS at 09:09

## 2018-09-24 RX ADMIN — GLYCOPYRROLATE 0.2 MG: 0.2 INJECTION, SOLUTION INTRAMUSCULAR; INTRAVENOUS at 09:09

## 2018-09-24 NOTE — ANESTHESIA PREPROCEDURE EVALUATION
09/24/2018  Hoang Santos Jr. is a 68 y.o., male.    Pre-op Assessment    I have reviewed the Patient Summary Reports.     I have reviewed the Nursing Notes.   I have reviewed the Medications.     Review of Systems  Anesthesia Hx:  No problems with previous Anesthesia    Social:  Non-Smoker    Cardiovascular:   Exercise tolerance: good Denies Hypertension.  Denies CAD.     Denies Angina.  Functional Capacity Can you climb two flights of stairs? ==> Yes    Pulmonary:   Denies Asthma.  Denies Recent URI. Sleep Apnea, CPAP    Renal/:  Renal/ Normal     Hepatic/GI:   Denies PUD. Denies Hiatal Hernia.  Denies GERD. Denies Liver Disease.  Denies Hepatitis.    Neurological:   Denies CVA. Denies Seizures.    Endocrine:   Denies Diabetes. Denies Hypothyroidism.        Physical Exam  General:  Well nourished, Obesity    Airway/Jaw/Neck:  Airway Findings: Mouth Opening: Small, but > 3cm Tongue: Normal, Large  General Airway Assessment: Adult  Mallampati: IV  Improves to III with phonation.  TM Distance: 4 - 6 cm  Jaw/Neck Findings:  Neck ROM: Normal ROM  Neck Findings:  Girth Increased     Eyes/Ears/Nose:  EYES/EARS/NOSE FINDINGS: Normal   Dental:  Dental Findings: In tact, upper front caps, upper partial dentures   Chest/Lungs:  Chest/Lungs Findings: Clear to auscultation     Heart/Vascular:  Heart Findings: Rate: Normal  Rhythm: Regular Rhythm  Sounds: Normal        Mental Status:  Mental Status Findings:  Alert and Oriented       Patient Active Problem List   Diagnosis    Hyperlipidemia    GEMMA (obstructive sleep apnea)    Vertigo    Erectile dysfunction    Chronic cough    Voice disturbance    Vocal fold atrophy    Vocal fold scar    Benign non-nodular prostatic hyperplasia with lower urinary tract symptoms         Anesthesia Plan  Type of Anesthesia, risks & benefits discussed:  Anesthesia Type:   general, MAC  Patient's Preference:   Intra-op Monitoring Plan: standard ASA monitors  Intra-op Monitoring Plan Comments:   Post Op Pain Control Plan:   Post Op Pain Control Plan Comments:   Induction:   IV  Beta Blocker:  Patient is not currently on a Beta-Blocker (No further documentation required).       Informed Consent: Patient understands risks and agrees with Anesthesia plan.  Questions answered. Anesthesia consent signed with patient.  ASA Score: 3     Day of Surgery Review of History & Physical:    H&P update referred to the surgeon.     Anesthesia Plan Notes: Post note- difficulty maintaining O2 sats during EGD.  Would recommend patient be done on 2nd floor in the future        Ready For Surgery From Anesthesia Perspective.

## 2018-09-24 NOTE — [employer] PATIENT INSTRUCTIONS
Discharge Summary/Instructions after an Endoscopic Procedure  Patient Name: Hoang Santos  Patient MRN: [id number]  Patient YOB: 1950 Monday, September 24, 2018  Salo Nuñez MD  RESTRICTIONS:  During your procedure today, you received medications for sedation.  These   medications may affect your judgment, balance and coordination.  Therefore,   for 24 hours, you have the following restrictions:   - DO NOT drive a car, operate machinery, make legal/financial decisions,   sign important papers or drink alcohol.    ACTIVITY:  Today: no heavy lifting, straining or running due to procedural   sedation/anesthesia.  The following day: return to full activity including work.  DIET:  Eat and drink normally unless instructed otherwise.     TREATMENT FOR COMMON SIDE EFFECTS:  - Mild abdominal pain, nausea, belching, bloating or excessive gas:  rest,   eat lightly and use a heating pad.  - Sore Throat: treat with throat lozenges and/or gargle with warm salt   water.  - Because air was used during the procedure, expelling large amounts of air   from your rectum or belching is normal.  - If a bowel prep was taken, you may not have a bowel movement for 1-3 days.    This is normal.  SYMPTOMS TO WATCH FOR AND REPORT TO YOUR PHYSICIAN:  1. Abdominal pain or bloating, other than gas cramps.  2. Chest pain.  3. Back pain.  4. Signs of infection such as: chills or fever occurring within 24 hours   after the procedure.  5. Rectal bleeding, which would show as bright red, maroon, or black stools.   (A tablespoon of blood from the rectum is not serious, especially if   hemorrhoids are present.)  6. Vomiting.  7. Weakness or dizziness.  GO DIRECTLY TO THE NEAREST EMERGENCY ROOM IF YOU HAVE ANY OF THE FOLLOWING:      Difficulty breathing              Chills and/or fever over 101 F   Persistent vomiting and/or vomiting blood   Severe abdominal pain   Severe chest pain   Black, tarry stools   Bleeding- more than one  tablespoon   Any other symptom or condition that you feel may need urgent attention  Your doctor recommends these additional instructions:  If any biopsies were taken, your doctors clinic will contact you in 1 to 2   weeks with any results.  - Discharge patient to home.   - Patient has a contact number available for emergencies.  The signs and   symptoms of potential delayed complications were discussed with the   patient.  Return to normal activities tomorrow.  Written discharge   instructions were provided to the patient.   - Resume previous diet.   - Continue present medications.   - Await pathology results.   - Telephone GI clinic for pathology results in 1 week.   - Recommend further procedures be performed in the hospital unit due to   airway obstruction and difficulty with oxygenation.    For questions, problems or results please call your physician - Salo Nuñez MD at Work:  [phone].  [facility], EMERGENCY ROOM PHONE NUMBER: [phone]  IF A COMPLICATION OR EMERGENCY SITUATION ARISES AND YOU ARE UNABLE TO REACH   YOUR PHYSICIAN - GO DIRECTLY TO THE EMERGENCY ROOM.  Salo Nuñez MD  9/24/2018 10:09:41 AM  This report has been verified and signed electronically.  [employer]

## 2018-09-24 NOTE — PLAN OF CARE
Pt aaox3, wife at bedside, d/c instructions reviewed and bravo teaching done, questions answered, pt verbalizes understanding, no distress noted.

## 2018-09-24 NOTE — ANESTHESIA POSTPROCEDURE EVALUATION
"Anesthesia Post Evaluation    Patient: Hoang Santos Jr.    Procedure(s) Performed: Procedure(s) (LRB):  EGD (ESOPHAGOGASTRODUODENOSCOPY) (N/A)  PH MONITORING, ESOPHAGUS, WIRELESS, (OFF REFLUX MEDS) (N/A)    Final Anesthesia Type: general  Patient location during evaluation: PACU  Patient participation: Yes- Able to Participate  Level of consciousness: awake and alert and oriented  Post-procedure vital signs: reviewed and stable  Pain management: adequate  Airway patency: patent  PONV status at discharge: No PONV  Anesthetic complications: no      Cardiovascular status: stable  Respiratory status: unassisted  Hydration status: euvolemic  Follow-up not needed.        Visit Vitals  /87   Pulse 100   Temp 36.6 °C (97.9 °F)   Resp 14   Ht 5' 6" (1.676 m)   Wt 104.3 kg (230 lb)   SpO2 97%   BMI 37.12 kg/m²       Pain/Perla Score: Pain Assessment Performed: Yes (9/24/2018  8:27 AM)  Presence of Pain: complains of pain/discomfort (9/24/2018 10:20 AM)  Pain Rating Prior to Med Admin: 7 (9/24/2018 10:20 AM)  Perla Score: 10 (9/24/2018 10:56 AM)        "

## 2018-09-24 NOTE — H&P
Short Stay Endoscopy History and Physical    PCP - Lee Kay MD    Procedure - EGD with Bravo pH probe placement  ASA - per anesthesia  Mallampati - per anesthesia  History of Anesthesia problems - no  Family history Anesthesia problems -  no   Plan of anesthesia - MAC    HPI:  This is a 68 y.o. male here for evaluation of suspected GERD.  He has chronic cough and intermittent dysphagia as well.          ROS:  Constitutional: No fevers, chills, No weight loss  CV: No chest pain  Pulm: No cough, No shortness of breath  Ophtho: No vision changes  GI: see HPI    Medical History:  has a past medical history of Hyperlipidemia, Prostatic hyperplasia, and Sleep apnea.    Surgical History:  has a past surgical history that includes Tonsillectomy; Uvulopalatopharyngoplasty; TRANSPROSTATIC TISSUE RETRACTION (N/A, 6/28/2018); and PALATOPLASTY-(UPPP) (N/A, 2/14/2017).    Family History: family history includes COPD in his sister; Cancer in his sister; Diabetes in his mother; Heart disease in his mother; Stroke in his father.. Otherwise no colon cancer, inflammatory bowel disease, or GI malignancies.    Social History:  reports that  has never smoked. he has never used smokeless tobacco. He reports that he does not drink alcohol or use drugs.    Review of patient's allergies indicates:  No Known Allergies    Medications:   Medications Prior to Admission   Medication Sig Dispense Refill Last Dose    atorvastatin (LIPITOR) 40 MG tablet Take 1 tablet (40 mg total) by mouth once daily. 90 tablet 3 Past Week at Unknown time    cholecalciferol, vitamin D3, 50,000 unit capsule Take 1 capsule (50,000 Units total) by mouth once a week. 12 capsule 3 Past Week at Unknown time    meclizine (ANTIVERT) 25 mg tablet Take 25 mg by mouth 3 (three) times daily as needed.   More than a month at Unknown time    VIAGRA 100 mg tablet Take 1 tablet (100 mg total) by mouth once daily. Brand name only medication. 10 tablet 11 Not  Taking       Physical Exam:    Vital Signs:   Vitals:    09/24/18 0824   BP: (!) 156/86   Pulse: 91   Resp: 16   Temp: 97.7 °F (36.5 °C)       General Appearance: Well appearing in no acute distress  Eyes:    No scleral icterus  ENT: Neck supple, Lips, mucosa, and tongue normal; teeth and gums normal  Lungs: CTA anteriorly  Heart:  Regular rate, S1, S2 normal, no murmurs heard.  Abdomen: Soft, non tender, non distended with normal bowel sounds. No hepatosplenomegaly, ascites, or mass.      Labs:  Lab Results   Component Value Date    WBC 13.79 (H) 03/10/2017    HGB 15.3 03/10/2017    HCT 46.5 03/10/2017     03/10/2017    CHOL 273 (H) 01/16/2018    CHOL 273 (H) 01/16/2018    TRIG 121 01/16/2018    TRIG 121 01/16/2018    HDL 64 01/16/2018    HDL 64 01/16/2018    ALT 26 01/16/2018    ALT 26 01/16/2018    AST 33 01/16/2018    AST 33 01/16/2018     01/16/2018     01/16/2018    K 4.5 01/16/2018    K 4.5 01/16/2018     01/16/2018     01/16/2018    CREATININE 1.0 01/16/2018    CREATININE 1.0 01/16/2018    BUN 12 01/16/2018    BUN 12 01/16/2018    CO2 29 01/16/2018    CO2 29 01/16/2018    TSH 0.591 03/10/2017    PSA 1.2 06/19/2017    HGBA1C 5.5 06/19/2017         Assessment:  68 y.o. male with chronic cough, suspected GERD and intermittent dysphagia.     Plan:  Proceed with EGD with Bravo pH probe placement today.  I have explained the risks and benefits of endoscopy procedures to the patient including but not limited to bleeding, perforation, infection, and death.  All questions answered.      Salo Nuñez MD

## 2018-09-24 NOTE — TRANSFER OF CARE
"Anesthesia Transfer of Care Note    Patient: Hoang Santos Jr.    Procedure(s) Performed: Procedure(s) (LRB):  EGD (ESOPHAGOGASTRODUODENOSCOPY) (N/A)  PH MONITORING, ESOPHAGUS, WIRELESS, (OFF REFLUX MEDS) (N/A)    Patient location: PACU    Anesthesia Type: general    Transport from OR: Transported from OR on 6-10 L/min O2 by face mask with adequate spontaneous ventilation    Post pain: adequate analgesia    Post assessment: no apparent anesthetic complications    Post vital signs: stable    Level of consciousness: awake    Nausea/Vomiting: no nausea/vomiting    Complications: none    Transfer of care protocol was followed      Last vitals:   Visit Vitals  BP (!) 161/87   Pulse 101   Temp 36.6 °C (97.9 °F)   Resp 13   Ht 5' 6" (1.676 m)   Wt 104.3 kg (230 lb)   SpO2 100%   BMI 37.12 kg/m²     "

## 2018-10-01 ENCOUNTER — TELEPHONE (OUTPATIENT)
Dept: ENDOSCOPY | Facility: HOSPITAL | Age: 68
End: 2018-10-01

## 2018-10-05 ENCOUNTER — TELEPHONE (OUTPATIENT)
Dept: GASTROENTEROLOGY | Facility: CLINIC | Age: 68
End: 2018-10-05

## 2018-10-05 ENCOUNTER — PATIENT MESSAGE (OUTPATIENT)
Dept: GASTROENTEROLOGY | Facility: CLINIC | Age: 68
End: 2018-10-05

## 2018-10-05 NOTE — TELEPHONE ENCOUNTER
Tried to call patient to discuss biopsy results from the upper endoscopy.  There are significant findings that need follow-up and evaluation.  I will send him a [employer] message and try him again later.

## 2018-10-10 ENCOUNTER — PATIENT MESSAGE (OUTPATIENT)
Dept: GASTROENTEROLOGY | Facility: CLINIC | Age: 68
End: 2018-10-10

## 2018-10-15 ENCOUNTER — OFFICE VISIT (OUTPATIENT)
Dept: GASTROENTEROLOGY | Facility: CLINIC | Age: 68
End: 2018-10-15
Payer: MEDICARE

## 2018-10-15 ENCOUNTER — IMMUNIZATION (OUTPATIENT)
Dept: PHARMACY | Facility: CLINIC | Age: 68
End: 2018-10-15
Payer: MEDICARE

## 2018-10-15 VITALS
DIASTOLIC BLOOD PRESSURE: 82 MMHG | BODY MASS INDEX: 38.16 KG/M2 | HEART RATE: 77 BPM | HEIGHT: 66 IN | SYSTOLIC BLOOD PRESSURE: 130 MMHG | WEIGHT: 237.44 LBS

## 2018-10-15 DIAGNOSIS — K21.9 GASTROESOPHAGEAL REFLUX DISEASE WITHOUT ESOPHAGITIS: Primary | ICD-10-CM

## 2018-10-15 DIAGNOSIS — K44.9 HIATAL HERNIA: ICD-10-CM

## 2018-10-15 DIAGNOSIS — K22.2 SCHATZKI'S RING: ICD-10-CM

## 2018-10-15 DIAGNOSIS — D3A.019 CARCINOID TUMOR OF SMALL INTESTINE: ICD-10-CM

## 2018-10-15 PROCEDURE — 99213 OFFICE O/P EST LOW 20 MIN: CPT | Mod: PBBFAC | Performed by: INTERNAL MEDICINE

## 2018-10-15 PROCEDURE — 99213 OFFICE O/P EST LOW 20 MIN: CPT | Mod: S$PBB,,, | Performed by: INTERNAL MEDICINE

## 2018-10-15 PROCEDURE — 99999 PR PBB SHADOW E&M-EST. PATIENT-LVL III: CPT | Mod: PBBFAC,,, | Performed by: INTERNAL MEDICINE

## 2018-10-15 RX ORDER — OMEPRAZOLE 40 MG/1
40 CAPSULE, DELAYED RELEASE ORAL
Qty: 180 CAPSULE | Refills: 1 | Status: SHIPPED | OUTPATIENT
Start: 2018-10-15 | End: 2019-01-03 | Stop reason: CLARIF

## 2018-10-15 NOTE — PHYSICIAN QUERY
PT Name: Hoang Santos Jr.  MR #: [id number]     Physician Query Form - Documentation Clarification      CDS/: Sarah Haider               Contact information:    This form is a permanent document in the medical record.     Query Date: October 15, 2018    By submitting this query, we are merely seeking further clarification of documentation. Please utilize your independent clinical judgment when addressing the question(s) below.    The Medical record reflects the following:    Supporting Clinical Findings Location in Medical Record     2. DUODENAL BULB MASS, BIOPSY:  Neuroendocrine neoplasm, low-grade, well-differentiated  Longest continuous tumor focus is 1.2mm  Immunostains synaptophysin and chromogranin are positive  Ki67 - less than 1%     Lab Pathology Diagnosis                                                                                      Dr. Nuñez ,     Please clarify diagnosis associated with above clinical findings.    Provider Use Only    [  ] Benign Neuroendocrine neoplasm    [  ] Malignant Neuroendocrine neoplasm         [ x ] Clinically undetermined        See clinic note from clinic encounter with me 10/15/18 - referring to neuroendocrine clinic for further evaluation

## 2018-10-22 ENCOUNTER — TELEPHONE (OUTPATIENT)
Dept: NEUROLOGY | Facility: HOSPITAL | Age: 68
End: 2018-10-22

## 2018-10-22 ENCOUNTER — PATIENT MESSAGE (OUTPATIENT)
Dept: GASTROENTEROLOGY | Facility: CLINIC | Age: 68
End: 2018-10-22

## 2018-10-22 DIAGNOSIS — C7A.8 PRIMARY MALIGNANT NEUROENDOCRINE NEOPLASM OF DUODENUM: Primary | ICD-10-CM

## 2018-10-22 NOTE — TELEPHONE ENCOUNTER
----- Message from Xuan Laughlin MA sent at 10/22/2018  1:50 PM CDT -----  Good Afternoon,     Mr. Santos is being referred to the neuroendocrine by Dr. Nuñez. Please assist patient with scheduling an appointment.     Thanks,  Xuan MONTOYA  Ext [phone]

## 2018-10-24 ENCOUNTER — TELEPHONE (OUTPATIENT)
Dept: NEUROLOGY | Facility: HOSPITAL | Age: 68
End: 2018-10-24

## 2018-10-24 NOTE — TELEPHONE ENCOUNTER
----- Message from Ermelinda Barrios sent at 10/24/2018  3:04 PM CDT -----  Regarding: NEW  Contact: [phone]  NEW- -Patient is returning a missed call.  Call back number is [phone]

## 2018-10-24 NOTE — LETTER
October 24, 2018        Salo Nuñez MD  [address] [place] [address]             [facility]  [address] [place] [address]  Phone: [phone]  Fax: [phone]   Patient: Hoang Santos Jr.   MR Number: [id number]   YOB: 1950   Date of Visit: 10/24/2018     Dear Dr. Nuñez,     We contacted   regarding setting up an appointment for an evaluation at our center.  We scheduled a CT scan, MRI, Ga 68 PET/CT, echocardiogram, blood tumor markers and a pathology re read over the next couple of weeks.  We also scheduled an appointment with Dr. Tolliver     for recommendations.  We will forward you a copy of the clinic note after the visit.      Thank you for considering our program and for referring this patient.  If you have any questions, please do not hesitate to contact us.        Sincerely,        Aida Ca, JUDITHN, RN, ONN-CG  Nurse Navigator Neuroendocrine Tumor Program

## 2018-10-24 NOTE — TELEPHONE ENCOUNTER
New NET ref from Dr. Nuñez.  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  Ordered ga 68 PET/CT, CT, MRI, echocardiogram, tumor markers and path re read per protocol. Sent msg to [facility] to sched EUS.  Appointment made with MD. Patient to view on Pt portal. Instructed to get tumor markers drawn at [facility]-- fasting.  Orders faxed.

## 2018-10-25 ENCOUNTER — TELEPHONE (OUTPATIENT)
Dept: GASTROENTEROLOGY | Facility: CLINIC | Age: 68
End: 2018-10-25

## 2018-10-25 ENCOUNTER — PATIENT MESSAGE (OUTPATIENT)
Dept: NEUROLOGY | Facility: HOSPITAL | Age: 68
End: 2018-10-25

## 2018-10-25 DIAGNOSIS — C7A.010 MALIGNANT CARCINOID TUMOR OF DUODENUM: Primary | ICD-10-CM

## 2018-10-25 NOTE — TELEPHONE ENCOUNTER
----- Message from Aida Ca RN sent at 10/24/2018  3:45 PM CDT -----  Please contact the patient and schedule a EUS of duod for new dx of carcinoid. Please sched prior to Dr. Ndiaye juliette.   thanks

## 2018-10-25 NOTE — TELEPHONE ENCOUNTER
EUS scheduled on 11/05/2018 with Dr. Reyes.  Location and instructions explained to patient. NPO after midnight.  Clear liquids after 700pm. Lab will call two days before with arrival time. Must have transportation due to sedation. Verbalized understanding.

## 2018-10-29 ENCOUNTER — TELEPHONE (OUTPATIENT)
Dept: GASTROENTEROLOGY | Facility: CLINIC | Age: 68
End: 2018-10-29

## 2018-10-29 NOTE — TELEPHONE ENCOUNTER
----- Message from Xuan Laughlin MA sent at 10/15/2018  2:21 PM CDT -----  Needs 8-12 f/u appt with Dr. Nuñez. 12/17 or later

## 2018-11-01 ENCOUNTER — HOSPITAL ENCOUNTER (OUTPATIENT)
Dept: RADIOLOGY | Facility: HOSPITAL | Age: 68
Discharge: HOME | End: 2018-11-01
Attending: SURGERY
Payer: MEDICARE

## 2018-11-01 ENCOUNTER — TELEPHONE (OUTPATIENT)
Dept: ENDOSCOPY | Facility: HOSPITAL | Age: 68
End: 2018-11-01

## 2018-11-01 DIAGNOSIS — C7A.8 PRIMARY MALIGNANT NEUROENDOCRINE NEOPLASM OF DUODENUM: ICD-10-CM

## 2018-11-01 PROCEDURE — A9587 GALLIUM GA-68: HCPCS | Mod: TB

## 2018-11-01 PROCEDURE — 78815 PET IMAGE W/CT SKULL-THIGH: CPT | Mod: 26,PI,, | Performed by: RADIOLOGY

## 2018-11-01 PROCEDURE — 78815 PET IMAGE W/CT SKULL-THIGH: CPT | Mod: TC

## 2018-11-01 NOTE — TELEPHONE ENCOUNTER
Spoke with patient about arrival time @ 0715 monday     NPO status reviewed: Patient must have nothing to eat after midnight.  Pt may have CLEAR liquids ONLY until completely NPO 3 hrs prior to arrival time.     Medications: Do not take Insulin or oral diabetic medications the day of the procedure.  Take as prescribed: heart, seizure and blood pressure medication in the morning with a sip of water (less than an ounce).  Take any breathing medications and bring inhalers to hospital with you Leave all valuables and jewelry at home.     Wear comfortable clothes to procedure to change into hospital gown You cannot drive for 24 hours after your procedure because you will receive sedation for your procedure to make you comfortable.  A ride must be provided at discharge.

## 2018-11-01 NOTE — TELEPHONE ENCOUNTER
Left message instructing patient to call dept @ [phone] between 8am-4pm.    Arrival time 0715 monday

## 2018-11-02 ENCOUNTER — TELEPHONE (OUTPATIENT)
Dept: ENDOSCOPY | Facility: HOSPITAL | Age: 68
End: 2018-11-02

## 2018-11-02 ENCOUNTER — OFFICE VISIT (OUTPATIENT)
Dept: OTOLARYNGOLOGY | Facility: CLINIC | Age: 68
End: 2018-11-02
Payer: MEDICARE

## 2018-11-02 VITALS
SYSTOLIC BLOOD PRESSURE: 128 MMHG | TEMPERATURE: 98 F | BODY MASS INDEX: 38.07 KG/M2 | DIASTOLIC BLOOD PRESSURE: 76 MMHG | WEIGHT: 235.88 LBS | HEART RATE: 72 BPM

## 2018-11-02 DIAGNOSIS — J38.3 VOCAL FOLD SCAR: ICD-10-CM

## 2018-11-02 DIAGNOSIS — R13.10 DYSPHAGIA, UNSPECIFIED TYPE: ICD-10-CM

## 2018-11-02 DIAGNOSIS — R49.9 VOICE DISTURBANCE: Primary | ICD-10-CM

## 2018-11-02 DIAGNOSIS — J38.3 VOCAL FOLD ATROPHY: ICD-10-CM

## 2018-11-02 DIAGNOSIS — M25.78 CERVICAL OSTEOPHYTE: ICD-10-CM

## 2018-11-02 PROCEDURE — 99999 PR PBB SHADOW E&M-EST. PATIENT-LVL III: CPT | Mod: PBBFAC,,, | Performed by: OTOLARYNGOLOGY

## 2018-11-02 PROCEDURE — 99213 OFFICE O/P EST LOW 20 MIN: CPT | Mod: PBBFAC | Performed by: OTOLARYNGOLOGY

## 2018-11-02 PROCEDURE — 99213 OFFICE O/P EST LOW 20 MIN: CPT | Mod: S$PBB,,, | Performed by: OTOLARYNGOLOGY

## 2018-11-02 NOTE — PROGRESS NOTES
[facility]  Department of Otorhinolaryngology and Communication Sciences    Subjective:      Hoang Santos Jr. is a 68 y.o. male who presents for follow-up.  We were following him primarily for  vocal fold atrophy/scar and hyperfunctional dysphonia. This responded well to voice therapy.    He has been less adherent to his voice exercises. 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     Regarding obstructive dysphagia symptoms, we obtained an MBSS and esophagram. I reviewed these images with the pt. Specifically, we discussed the cervical osteophytes which impair pharyngoesophageal transit and epiglottic inversion.    He is presently undergoing workup for an carcinoid neoplasm.    He had UPPP with Dr. Araujo in 2/2017.    The patient's medications, allergies, past medical, surgical, social and family histories were reviewed and updated as appropriate.    A detailed review of systems was obtained with pertinent positives as per the above HPI, and otherwise negative.      Objective:     /76 (Patient Position: Sitting)   Pulse 72   Temp 97.9 °F (36.6 °C) (Tympanic)   Wt 107 kg (235 lb 14.3 oz)   BMI 38.07 kg/m²      Constitutional: comfortable, well dressed, obese  Psychiatric: appropriate affect  Respiratory: comfortably breathing, symmetric chest rise, no stridor  Voice: mild, inconsistent roughness  Head: normocephalic  Eyes: conjunctivae and sclerae clear  OC/OP: post-surgical UPPP changes; no masses  Indirect laryngoscopy is limited due to gag    Data Reviewed    See HPI      Assessment:     Hoang Santos Jr. is a 68 y.o. male with pharyngoesophageal dysphagia. Symptoms are related to his relatively recent history of a UPPP, as well as the dynamic contact between his epiglottis and pharynx, which has a submucosal fullness from a cervical osteophyte. This correlated to findings on fluoroscopic swallow studies. He also has a supine nocturnal cough, which may be indicative  of ongoing reflux.    Plan:     I educated the patient on the relevant findings. I have no treatment or intervention to address this. I offered to set him up with a spine surgeon to discuss this in more detail. Nevertheless, I made it clear that, even despite any decision to pursue spine surgery, he may experience persistent, or even worse, dysphagia. He defers on meeting with a spine surgeon at this time.    He will complete his workup under the direction of the GI/carcinoid team. He will follow up with me in the future as needed.    All questions were answered, and the patient is in agreement with the plan.    Juaquin Casas M.D.  [facility]  Department of Otorhinolaryngology and Communication Sciences

## 2018-11-02 NOTE — TELEPHONE ENCOUNTER
Spoke with patient about 0715 arrival time. Clear liquids past 7pm the day before the procedure. NPO past midnight. Please take blood pressure, heart seizure medication the morning of the procedure. Hold all diabetic medication the morning of the procedure. Leave all valuable at home.  Please have a ride available the day of the procedure.

## 2018-11-05 ENCOUNTER — ANESTHESIA EVENT (OUTPATIENT)
Dept: ENDOSCOPY | Facility: HOSPITAL | Age: 68
End: 2018-11-05
Payer: MEDICARE

## 2018-11-05 ENCOUNTER — ANESTHESIA (OUTPATIENT)
Dept: ENDOSCOPY | Facility: HOSPITAL | Age: 68
End: 2018-11-05
Payer: MEDICARE

## 2018-11-05 ENCOUNTER — HOSPITAL ENCOUNTER (OUTPATIENT)
Facility: HOSPITAL | Age: 68
Discharge: HOME | End: 2018-11-05
Attending: INTERNAL MEDICINE | Admitting: INTERNAL MEDICINE
Payer: MEDICARE

## 2018-11-05 VITALS
HEIGHT: 66 IN | WEIGHT: 230 LBS | RESPIRATION RATE: 12 BRPM | DIASTOLIC BLOOD PRESSURE: 68 MMHG | BODY MASS INDEX: 36.96 KG/M2 | OXYGEN SATURATION: 95 % | HEART RATE: 81 BPM | SYSTOLIC BLOOD PRESSURE: 117 MMHG | TEMPERATURE: 97 F

## 2018-11-05 DIAGNOSIS — D3A.00 CARCINOID TUMOR: Primary | ICD-10-CM

## 2018-11-05 PROCEDURE — 43259 EGD US EXAM DUODENUM/JEJUNUM: CPT | Performed by: INTERNAL MEDICINE

## 2018-11-05 PROCEDURE — 25000003 PHARM REV CODE 250: Performed by: NURSE ANESTHETIST, CERTIFIED REGISTERED

## 2018-11-05 PROCEDURE — 37000009 HC ANESTHESIA EA ADD 15 MINS: Performed by: INTERNAL MEDICINE

## 2018-11-05 PROCEDURE — 63600175 PHARM REV CODE 636 W HCPCS: Performed by: NURSE ANESTHETIST, CERTIFIED REGISTERED

## 2018-11-05 PROCEDURE — 63600175 PHARM REV CODE 636 W HCPCS: Performed by: INTERNAL MEDICINE

## 2018-11-05 PROCEDURE — 37000008 HC ANESTHESIA 1ST 15 MINUTES: Performed by: INTERNAL MEDICINE

## 2018-11-05 PROCEDURE — 43259 EGD US EXAM DUODENUM/JEJUNUM: CPT | Mod: ,,, | Performed by: INTERNAL MEDICINE

## 2018-11-05 PROCEDURE — 25000003 PHARM REV CODE 250: Performed by: INTERNAL MEDICINE

## 2018-11-05 RX ORDER — PROPOFOL 10 MG/ML
VIAL (ML) INTRAVENOUS CONTINUOUS PRN
Status: DISCONTINUED | OUTPATIENT
Start: 2018-11-05 | End: 2018-11-05

## 2018-11-05 RX ORDER — ONDANSETRON HCL IN 0.9 % NACL 8 MG/50 ML
8 INTRAVENOUS SOLUTION, PIGGYBACK (ML) INTRAVENOUS
Status: CANCELLED | OUTPATIENT
Start: 2018-11-05

## 2018-11-05 RX ORDER — SODIUM CHLORIDE 0.9 % (FLUSH) 0.9 %
3 SYRINGE (ML) INJECTION EVERY 8 HOURS
Status: CANCELLED | OUTPATIENT
Start: 2018-11-05

## 2018-11-05 RX ORDER — SODIUM CHLORIDE 9 MG/ML
INJECTION, SOLUTION INTRAVENOUS CONTINUOUS
Status: DISCONTINUED | OUTPATIENT
Start: 2018-11-05 | End: 2018-11-05 | Stop reason: HOSPADM

## 2018-11-05 RX ORDER — LIDOCAINE HCL/PF 100 MG/5ML
SYRINGE (ML) INTRAVENOUS
Status: DISCONTINUED | OUTPATIENT
Start: 2018-11-05 | End: 2018-11-05

## 2018-11-05 RX ORDER — FENTANYL CITRATE 50 UG/ML
INJECTION, SOLUTION INTRAMUSCULAR; INTRAVENOUS
Status: DISCONTINUED | OUTPATIENT
Start: 2018-11-05 | End: 2018-11-05

## 2018-11-05 RX ORDER — SODIUM CHLORIDE 0.9 % (FLUSH) 0.9 %
3 SYRINGE (ML) INJECTION
Status: DISCONTINUED | OUTPATIENT
Start: 2018-11-05 | End: 2018-11-05 | Stop reason: HOSPADM

## 2018-11-05 RX ORDER — ONDANSETRON 2 MG/ML
INJECTION INTRAMUSCULAR; INTRAVENOUS
Status: DISCONTINUED | OUTPATIENT
Start: 2018-11-05 | End: 2018-11-05

## 2018-11-05 RX ORDER — ROCURONIUM BROMIDE 10 MG/ML
INJECTION, SOLUTION INTRAVENOUS
Status: DISCONTINUED | OUTPATIENT
Start: 2018-11-05 | End: 2018-11-05

## 2018-11-05 RX ORDER — ACETAMINOPHEN 10 MG/ML
1000 INJECTION, SOLUTION INTRAVENOUS ONCE
Status: CANCELLED | OUTPATIENT
Start: 2018-11-05 | End: 2018-11-05

## 2018-11-05 RX ORDER — GLYCOPYRROLATE 0.2 MG/ML
INJECTION INTRAMUSCULAR; INTRAVENOUS
Status: DISCONTINUED | OUTPATIENT
Start: 2018-11-05 | End: 2018-11-05

## 2018-11-05 RX ORDER — ONDANSETRON 2 MG/ML
8 INJECTION INTRAMUSCULAR; INTRAVENOUS ONCE
Status: COMPLETED | OUTPATIENT
Start: 2018-11-05 | End: 2018-11-05

## 2018-11-05 RX ORDER — SODIUM CHLORIDE 0.9 % (FLUSH) 0.9 %
3 SYRINGE (ML) INJECTION
Status: CANCELLED | OUTPATIENT
Start: 2018-11-05

## 2018-11-05 RX ORDER — SUCCINYLCHOLINE CHLORIDE 20 MG/ML
INJECTION INTRAMUSCULAR; INTRAVENOUS
Status: DISCONTINUED | OUTPATIENT
Start: 2018-11-05 | End: 2018-11-05

## 2018-11-05 RX ORDER — PROPOFOL 10 MG/ML
VIAL (ML) INTRAVENOUS
Status: DISCONTINUED | OUTPATIENT
Start: 2018-11-05 | End: 2018-11-05

## 2018-11-05 RX ADMIN — PROPOFOL 100 MG: 10 INJECTION, EMULSION INTRAVENOUS at 09:11

## 2018-11-05 RX ADMIN — LIDOCAINE HYDROCHLORIDE 100 MG: 20 INJECTION, SOLUTION INTRAVENOUS at 08:11

## 2018-11-05 RX ADMIN — ONDANSETRON 8 MG: 2 INJECTION, SOLUTION INTRAMUSCULAR; INTRAVENOUS at 09:11

## 2018-11-05 RX ADMIN — SUCCINYLCHOLINE CHLORIDE 120 MG: 20 INJECTION, SOLUTION INTRAMUSCULAR; INTRAVENOUS at 09:11

## 2018-11-05 RX ADMIN — PROPOFOL 200 MCG/KG/MIN: 10 INJECTION, EMULSION INTRAVENOUS at 08:11

## 2018-11-05 RX ADMIN — SODIUM CHLORIDE: 0.9 INJECTION, SOLUTION INTRAVENOUS at 08:11

## 2018-11-05 RX ADMIN — TOPICAL ANESTHETIC 1 EACH: 200 SPRAY DENTAL; PERIODONTAL at 08:11

## 2018-11-05 RX ADMIN — ONDANSETRON HYDROCHLORIDE 8 MG: 2 INJECTION, SOLUTION INTRAMUSCULAR; INTRAVENOUS at 08:11

## 2018-11-05 RX ADMIN — OCTREOTIDE ACETATE: 500 INJECTION, SOLUTION INTRAVENOUS; SUBCUTANEOUS at 08:11

## 2018-11-05 RX ADMIN — PROPOFOL 50 MG: 10 INJECTION, EMULSION INTRAVENOUS at 08:11

## 2018-11-05 RX ADMIN — ROCURONIUM BROMIDE 5 MG: 10 INJECTION, SOLUTION INTRAVENOUS at 09:11

## 2018-11-05 RX ADMIN — FENTANYL CITRATE 100 MCG: 50 INJECTION, SOLUTION INTRAMUSCULAR; INTRAVENOUS at 09:11

## 2018-11-05 RX ADMIN — GLYCOPYRROLATE 0.2 MG: 0.2 INJECTION, SOLUTION INTRAMUSCULAR; INTRAVENOUS at 08:11

## 2018-11-05 NOTE — DISCHARGE SUMMARY
Discharge Summary/Instructions after an Endoscopic Procedure    Patient Name: Hoang Santos  Patient MRN: [id number]  Patient YOB: 1950 Monday, November 05, 2018  Gorge Reyes MD    RESTRICTIONS:  During your procedure today, you received medications for sedation.  These medications may affect your judgment, balance and coordination.  Therefore, for 24 hours, you have the following restrictions:     - DO NOT drive a car, operate machinery, make legal/financial decisions, sign important papers or drink alcohol.      ACTIVITY:  Today: no heavy lifting, straining or running due to procedural sedation/anesthesia.  The following day: return to full activity including work.    DIET:  Eat and drink normally unless instructed otherwise.     TREATMENT FOR COMMON SIDE EFFECTS:  - Mild abdominal pain, nausea, belching, bloating or excessive gas:  rest, eat lightly and use a heating pad.  - Sore Throat: treat with throat lozenges and/or gargle with warm salt water.  - Because air was used during the procedure, expelling large amounts of air from your rectum or belching is normal.  - If a bowel prep was taken, you may not have a bowel movement for 1-3 days.  This is normal.      SYMPTOMS TO WATCH FOR AND REPORT TO YOUR PHYSICIAN:  1. Abdominal pain or bloating, other than gas cramps.  2. Chest pain.  3. Back pain.  4. Signs of infection such as: chills or fever occurring within 24 hours after the procedure.  5. Rectal bleeding, which would show as bright red, maroon, or black stools. (A tablespoon of blood from the rectum is not serious, especially if hemorrhoids are present.)  6. Vomiting.  7. Weakness or dizziness.      GO DIRECTLY TO THE NEAREST EMERGENCY ROOM IF YOU HAVE ANY OF THE FOLLOWING:     Difficulty breathing              Chills and/or fever over 101 F   Persistent vomiting and/or vomiting blood   Severe abdominal pain   Severe chest pain   Black, tarry stools   Bleeding- more than one tablespoon   Any  other symptom or condition that you feel may need urgent attention    Your doctor recommends these additional instructions:  If any biopsies were taken, your doctors clinic will contact you in 1 to 2 weeks with any results.    - Discharge patient to home (ambulatory).   - Return to referring physician.    For questions, problems or results please call your physician - Gorge Reyes MD at Work:  [phone].    EMERGENCY PHONE NUMBER: [phone],  LAB RESULTS: [phone]    IF A COMPLICATION OR EMERGENCY SITUATION ARISES AND YOU ARE UNABLE TO REACH YOUR PHYSICIAN - GO DIRECTLY TO THE EMERGENCY ROOM.

## 2018-11-05 NOTE — ANESTHESIA PREPROCEDURE EVALUATION
11/05/2018  Hoang Santos Jr. is a 68 y.o., male.    Pre-op Assessment    I have reviewed the Patient Summary Reports.     I have reviewed the Nursing Notes.   I have reviewed the Medications.     Review of Systems  Anesthesia Hx:  No problems with previous Anesthesia    Social:  Non-Smoker    Cardiovascular:   Exercise tolerance: good Denies Hypertension.  Denies CAD.     Denies Angina.  Functional Capacity Can you climb two flights of stairs? ==> Yes    Pulmonary:   Denies Asthma.  Denies Recent URI. Sleep Apnea, CPAP    Renal/:  Renal/ Normal     Hepatic/GI:   Denies PUD. Denies Hiatal Hernia.  Denies GERD. Denies Liver Disease.  Denies Hepatitis.    Neurological:   Denies CVA. Denies Seizures.    Endocrine:   Denies Diabetes. Denies Hypothyroidism.        Physical Exam  General:  Well nourished, Obesity    Airway/Jaw/Neck:  Airway Findings: Mouth Opening: Small, but > 3cm Tongue: Normal, Large  General Airway Assessment: Adult  Mallampati: IV  Improves to III with phonation.  TM Distance: 4 - 6 cm  Jaw/Neck Findings:  Neck ROM: Normal ROM  Neck Findings:  Girth Increased     Eyes/Ears/Nose:  EYES/EARS/NOSE FINDINGS: Normal   Dental:  Dental Findings: In tact, upper front caps, upper partial dentures   Chest/Lungs:  Chest/Lungs Findings: Clear to auscultation     Heart/Vascular:  Heart Findings: Rate: Normal  Rhythm: Regular Rhythm  Sounds: Normal        Mental Status:  Mental Status Findings:  Alert and Oriented       Patient Active Problem List   Diagnosis    Hyperlipidemia    GEMMA (obstructive sleep apnea)    Vertigo    Erectile dysfunction    Chronic cough    Voice disturbance    Vocal fold atrophy    Vocal fold scar    Benign non-nodular prostatic hyperplasia with lower urinary tract symptoms    Dysphagia    Cervical osteophyte    Carcinoid tumor         Anesthesia Plan  Type of  Anesthesia, risks & benefits discussed:  Anesthesia Type:  general, MAC  Patient's Preference:   Intra-op Monitoring Plan: standard ASA monitors  Intra-op Monitoring Plan Comments:   Post Op Pain Control Plan:   Post Op Pain Control Plan Comments:   Induction:   IV  Beta Blocker:  Patient is not currently on a Beta-Blocker (No further documentation required).       Informed Consent: Patient understands risks and agrees with Anesthesia plan.  Questions answered. Anesthesia consent signed with patient.  ASA Score: 3     Day of Surgery Review of History & Physical:    H&P update referred to the surgeon.     Anesthesia Plan Notes: Post note- difficulty maintaining O2 sats during EGD.  Would recommend patient be done on 2nd floor in the future        Ready For Surgery From Anesthesia Perspective.

## 2018-11-05 NOTE — PLAN OF CARE
"VSS. Denies pain or discomfort @ this time. "ok to release to endoscopy @ this time", per Dr He. Report given to MARA Leo, with time allotted for questions.   "

## 2018-11-05 NOTE — ANESTHESIA RELEASE NOTE
"Anesthesia Release from PACU Note    Patient: Hoang Santos Jr.    Procedure(s) Performed: Procedure(s) (LRB):  ULTRASOUND-ENDOSCOPIC-UPPER (N/A)    Anesthesia type: general    Post pain: Adequate analgesia    Post assessment: no apparent anesthetic complications    Last Vitals:   Visit Vitals  /64   Pulse 86   Temp 36.4 °C (97.5 °F) (Temporal)   Resp 15   Ht 5' 6" (1.676 m)   Wt 104.3 kg (230 lb)   SpO2 97%   BMI 37.12 kg/m²       Post vital signs: stable    Level of consciousness: awake    Nausea/Vomiting: no nausea/no vomiting    Complications: none    Airway Patency: patent    Respiratory: unassisted, spontaneous ventilation    Cardiovascular: stable and blood pressure at baseline    Hydration: euvolemic  "

## 2018-11-05 NOTE — ANESTHESIA POSTPROCEDURE EVALUATION
"Anesthesia Post Evaluation    Patient: Hoang Santos Jr.    Procedure(s) Performed: Procedure(s) (LRB):  ULTRASOUND-ENDOSCOPIC-UPPER (N/A)    Final Anesthesia Type: general  Patient location during evaluation: PACU  Patient participation: Yes- Able to Participate  Level of consciousness: awake and alert  Post-procedure vital signs: reviewed and stable  Pain management: adequate  Airway patency: patent  PONV status at discharge: No PONV  Anesthetic complications: no      Cardiovascular status: hemodynamically stable  Respiratory status: unassisted and spontaneous ventilation  Hydration status: euvolemic  Follow-up not needed.        Visit Vitals  /64   Pulse 86   Temp 36.4 °C (97.5 °F) (Temporal)   Resp 15   Ht 5' 6" (1.676 m)   Wt 104.3 kg (230 lb)   SpO2 97%   BMI 37.12 kg/m²       Pain/Perla Score: Pain Assessment Performed: Yes (11/5/2018  9:55 AM)  Presence of Pain: denies (11/5/2018  9:55 AM)  Perla Score: 8 (11/5/2018  9:55 AM)        "

## 2018-11-05 NOTE — PLAN OF CARE
Admission process complete. Patient ready for procedure after sandostatin infusing for 30 min ( 0831). Plan of care reviewed with patient. NET education given to patient and daughter. Preoperative fasting appropriate for procedure and sedation. Call light in reach and bed in lowest position.

## 2018-11-05 NOTE — PLAN OF CARE
Dr. Reyes at bedside, speaking to patient and patient's wife. MD Notified of sore throat. Cold water given to patient and instructed on salt water gargles and throat lozenzes.

## 2018-11-05 NOTE — TRANSFER OF CARE
"Anesthesia Transfer of Care Note    Patient: Hoang Santos Jr.    Procedure(s) Performed: Procedure(s) (LRB):  ULTRASOUND-ENDOSCOPIC-UPPER (N/A)    Patient location: PACU    Anesthesia Type: general    Transport from OR: Transported from OR on 6-10 L/min O2 by face mask with adequate spontaneous ventilation    Post pain: adequate analgesia    Post assessment: no apparent anesthetic complications    Post vital signs: stable    Level of consciousness: awake and alert    Nausea/Vomiting: no nausea/vomiting    Complications: none    Transfer of care protocol was followed      Last vitals:   Visit Vitals  /82   Pulse 85   Temp 36.6 °C (97.8 °F) (Oral)   Resp 18   Ht 5' 6" (1.676 m)   Wt 104.3 kg (230 lb)   SpO2 100%   BMI 37.12 kg/m²     "

## 2018-11-05 NOTE — H&P
History & Physical - Short Stay  Gastroenterology      SUBJECTIVE:     Procedure: EUS    Chief Complaint/Indication for Procedure: Carcinoid tumor    History of Present Illness:  Patient is a 68 y.o. 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     PTA Medications   Medication Sig    atorvastatin (LIPITOR) 40 MG tablet Take 1 tablet (40 mg total) by mouth once daily.    cholecalciferol, vitamin D3, 50,000 unit capsule Take 1 capsule (50,000 Units total) by mouth once a week.    influenza (FLUZONE HIGH-DOSE) 180 mcg/0.5 mL vaccine Inject 0.5 mLs into the muscle.    omeprazole (PRILOSEC) 40 MG capsule Take 1 capsule (40 mg total) by mouth 2 (two) times daily before meals.    VIAGRA 100 mg tablet Take 1 tablet (100 mg total) by mouth once daily. Brand name only medication.       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    Prostatic hyperplasia     Sleep apnea      Past Surgical History:   Procedure Laterality Date    CYSTOSCOPY WITH INSERTION OF MINIMALLY INVASIVE IMPLANT TO ENLARGE PROSTATIC URETHRA N/A 6/28/2018    Procedure: TRANSPROSTATIC TISSUE RETRACTION;  Surgeon: Kory Jack MD;  Location[facility];  Service: Urology;  Laterality: N/A;    EGD (ESOPHAGOGASTRODUODENOSCOPY) N/A 9/24/2018    Performed by Salo Nuñez MD at [facility] ENDO (4TH FLR)    ESOPHAGOGASTRODUODENOSCOPY N/A 9/24/2018    Procedure: EGD (ESOPHAGOGASTRODUODENOSCOPY);  Surgeon: Slao Nuñez MD;  Location: [facility] (4TH FLR);  Service: Endoscopy;  Laterality: N/A;    PALATOPLASTY-(UPPP) N/A 2/14/2017    Performed by Bob Araujo III, MD at [facility] OR 2ND FLR    PH MONITORING, ESOPHAGUS, WIRELESS, (OFF REFLUX MEDS) N/A 9/24/2018    Performed by Salo Nuñez MD at [facility] ENDO (4TH FLR)     TONSILLECTOMY      TRANSPROSTATIC TISSUE RETRACTION N/A 6/28/2018    [facility]    UVULOPALATOPHARYNGOPLASTY       Family History   Problem Relation Age of Onset    Heart disease Mother     Diabetes Mother     Stroke Father     Cancer Sister         pancreatitic    COPD Sister     Prostate cancer Neg Hx     Kidney disease Neg Hx     Thyroid disease Neg Hx     Retinal detachment Neg Hx     Macular degeneration Neg Hx     Hypertension Neg Hx     Glaucoma Neg Hx      Social History     Tobacco Use    Smoking status: Never Smoker    Smokeless tobacco: Never Used   Substance Use Topics    Alcohol use: Yes     Alcohol/week: 1.2 oz     Types: 2 Cans of beer per week    Drug use: No       Review of Systems:  Constitutional: no fever or chills  Respiratory: no cough or shortness of breath  Cardiovascular: no chest pain or palpitations  Gastrointestinal: no nausea or vomiting, no abdominal pain or change in bowel habits    OBJECTIVE:     Vital Signs (Most Recent)  Temp: 97.8 °F (36.6 °C) (11/05/18 0736)  Pulse: 85 (11/05/18 0736)  Resp: 18 (11/05/18 0736)  BP: 136/82 (11/05/18 0736)  SpO2: 100 % (11/05/18 0736)    Physical Exam:  General: well developed, well nourished  Lungs:  clear to auscultation bilaterally and normal respiratory effort  Heart: regular rate, S1, S2 normal  Abdomen: soft, non-tender non-distented; bowel sounds normal; no masses,  no organomegaly    Laboratory  CBC: No results for input(s): WBC, RBC, HGB, HCT, PLT, MCV, MCH, MCHC in the last 168 hours.  CMP: No results for input(s): GLU, CALCIUM, ALBUMIN, PROT, NA, K, CO2, CL, BUN, CREATININE, ALKPHOS, ALT, AST, BILITOT in the last 168 hours.  Coagulation: No results for input(s): LABPROT, INR, APTT in the last 168 hours.      Diagnostic Results:      ASSESSMENT/PLAN:     Duodenal carcinoid    Plan: EUS    Anesthesia Plan: MAC    ASA Grade: ASA 2 - Patient with mild systemic disease with no functional  limitations     The impression and plan was discussed in detail with the patient and family. All questions have been answered and the patient voices understanding of our plan at this point. The risk of the procedure was discussed in detail which includes but not limited to bleeding, infection, perforation in some cases requiring surgery with its spectrum of complications.

## 2018-11-05 NOTE — [employer] PATIENT INSTRUCTIONS
Discharge Summary/Instructions after an Endoscopic Procedure  Patient Name: Hoang Santos  Patient MRN: [id number]  Patient YOB: 1950 Monday, November 05, 2018  Gorge Reyes MD  RESTRICTIONS:  During your procedure today, you received medications for sedation.  These   medications may affect your judgment, balance and coordination.  Therefore,   for 24 hours, you have the following restrictions:   - DO NOT drive a car, operate machinery, make legal/financial decisions,   sign important papers or drink alcohol.    ACTIVITY:  Today: no heavy lifting, straining or running due to procedural   sedation/anesthesia.  The following day: return to full activity including work.  DIET:  Eat and drink normally unless instructed otherwise.     TREATMENT FOR COMMON SIDE EFFECTS:  - Mild abdominal pain, nausea, belching, bloating or excessive gas:  rest,   eat lightly and use a heating pad.  - Sore Throat: treat with throat lozenges and/or gargle with warm salt   water.  - Because air was used during the procedure, expelling large amounts of air   from your rectum or belching is normal.  - If a bowel prep was taken, you may not have a bowel movement for 1-3 days.    This is normal.  SYMPTOMS TO WATCH FOR AND REPORT TO YOUR PHYSICIAN:  1. Abdominal pain or bloating, other than gas cramps.  2. Chest pain.  3. Back pain.  4. Signs of infection such as: chills or fever occurring within 24 hours   after the procedure.  5. Rectal bleeding, which would show as bright red, maroon, or black stools.   (A tablespoon of blood from the rectum is not serious, especially if   hemorrhoids are present.)  6. Vomiting.  7. Weakness or dizziness.  GO DIRECTLY TO THE NEAREST EMERGENCY ROOM IF YOU HAVE ANY OF THE FOLLOWING:      Difficulty breathing              Chills and/or fever over 101 F   Persistent vomiting and/or vomiting blood   Severe abdominal pain   Severe chest pain   Black, tarry stools   Bleeding- more than one  tablespoon   Any other symptom or condition that you feel may need urgent attention  Your doctor recommends these additional instructions:  If any biopsies were taken, your doctors clinic will contact you in 1 to 2   weeks with any results.  - Discharge patient to home (ambulatory).   - Return to referring physician.  For questions, problems or results please call your physician - Gorge Reyes MD at Work:  [phone].  EMERGENCY PHONE NUMBER: [phone],  LAB RESULTS: [phone]  IF A COMPLICATION OR EMERGENCY SITUATION ARISES AND YOU ARE UNABLE TO REACH   YOUR PHYSICIAN - GO DIRECTLY TO THE EMERGENCY ROOM.  Gorge Reyes MD  11/5/2018 9:49:22 AM  This report has been verified and signed electronically.  [employer]

## 2018-11-09 ENCOUNTER — HOSPITAL ENCOUNTER (OUTPATIENT)
Dept: RADIOLOGY | Facility: HOSPITAL | Age: 68
Discharge: HOME | End: 2018-11-09
Attending: SURGERY
Payer: MEDICARE

## 2018-11-09 ENCOUNTER — HOSPITAL ENCOUNTER (OUTPATIENT)
Dept: CARDIOLOGY | Facility: HOSPITAL | Age: 68
Discharge: HOME | End: 2018-11-09
Attending: SURGERY
Payer: MEDICARE

## 2018-11-09 DIAGNOSIS — C7A.8 PRIMARY MALIGNANT NEUROENDOCRINE NEOPLASM OF DUODENUM: ICD-10-CM

## 2018-11-09 LAB
AORTIC ROOT ANNULUS: 3.35 CM
AORTIC VALVE CUSP SEPERATION: 2.15 CM
AV MEAN GRADIENT: 2.16 MMHG
AV PEAK GRADIENT: 4.24 MMHG
AV VALVE AREA: 2.02 CM2
CV ECHO LV RWT: 0.54 CM
DOP CALC AO PEAK VEL: 1.03 M/S
DOP CALC AO VTI: 20.42 CM
DOP CALC LVOT AREA: 3.23 CM2
DOP CALC LVOT DIAMETER: 2.03 CM
DOP CALC LVOT STROKE VOLUME: 41.28 CM3
DOP CALCLVOT PEAK VEL VTI: 12.76 CM
E WAVE DECELERATION TIME: 141.29 MSEC
E/A RATIO: 0.59
ECHO LV POSTERIOR WALL: 1 CM (ref 0.6–1.1)
FRACTIONAL SHORTENING: 17 % (ref 28–44)
INTERVENTRICULAR SEPTUM: 1.1 CM (ref 0.6–1.1)
LA MAJOR: 4.09 CM
LA WIDTH: 3.08 CM
LEFT ATRIUM SIZE: 3.16 CM
LEFT INTERNAL DIMENSION IN SYSTOLE: 3.04 CM (ref 2.1–4)
LEFT VENTRICLE DIASTOLIC VOLUME: 57.08 ML
LEFT VENTRICLE SYSTOLIC VOLUME: 36.16 ML
LEFT VENTRICULAR INTERNAL DIMENSION IN DIASTOLE: 3.67 CM (ref 3.5–6)
LEFT VENTRICULAR MASS: 119.3 G
MV PEAK A VEL: 0.73 M/S
MV PEAK E VEL: 0.43 M/S
MV STENOSIS PRESSURE HALF TIME: 40.97 MS
MV VALVE AREA P 1/2 METHOD: 5.37 CM2
PV PEAK VELOCITY: 1.11 CM/S
TRICUSPID ANNULAR PLANE SYSTOLIC EXCURSION: 1.7 CM

## 2018-11-09 PROCEDURE — 93306 TTE W/DOPPLER COMPLETE: CPT | Mod: 26,,, | Performed by: INTERNAL MEDICINE

## 2018-11-09 PROCEDURE — 74183 MRI ABD W/O CNTR FLWD CNTR: CPT | Mod: TC

## 2018-11-09 PROCEDURE — 25500020 PHARM REV CODE 255: Performed by: SURGERY

## 2018-11-09 PROCEDURE — 74178 CT ABD&PLV WO CNTR FLWD CNTR: CPT | Mod: 26,,, | Performed by: RADIOLOGY

## 2018-11-09 PROCEDURE — 74178 CT ABD&PLV WO CNTR FLWD CNTR: CPT | Mod: TC

## 2018-11-09 PROCEDURE — A9585 GADOBUTROL INJECTION: HCPCS | Performed by: SURGERY

## 2018-11-09 PROCEDURE — 74183 MRI ABD W/O CNTR FLWD CNTR: CPT | Mod: 26,,, | Performed by: RADIOLOGY

## 2018-11-09 PROCEDURE — 93306 TTE W/DOPPLER COMPLETE: CPT

## 2018-11-09 RX ORDER — GADOBUTROL 604.72 MG/ML
10 INJECTION INTRAVENOUS
Status: COMPLETED | OUTPATIENT
Start: 2018-11-09 | End: 2018-11-09

## 2018-11-09 RX ADMIN — IOHEXOL 100 ML: 350 INJECTION, SOLUTION INTRAVENOUS at 12:11

## 2018-11-09 RX ADMIN — GADOBUTROL 10 ML: 604.72 INJECTION INTRAVENOUS at 11:11

## 2018-11-09 RX ADMIN — IOHEXOL 30 ML: 350 INJECTION, SOLUTION INTRAVENOUS at 12:11

## 2018-11-16 ENCOUNTER — TELEPHONE (OUTPATIENT)
Dept: NEUROLOGY | Facility: HOSPITAL | Age: 68
End: 2018-11-16

## 2018-11-16 NOTE — TELEPHONE ENCOUNTER
Spoke to patient, states he did not have his blood work drawn at [facility], will send patient to have it done after his appointment with Dr. Ndiaye. Patient verbalized his understanding and has no further questions at this time.

## 2018-11-19 ENCOUNTER — CLINICAL SUPPORT (OUTPATIENT)
Dept: NEUROLOGY | Facility: HOSPITAL | Age: 68
End: 2018-11-19
Payer: MEDICARE

## 2018-11-19 ENCOUNTER — OFFICE VISIT (OUTPATIENT)
Dept: NEUROLOGY | Facility: HOSPITAL | Age: 68
End: 2018-11-19
Attending: SURGERY
Payer: MEDICARE

## 2018-11-19 VITALS
SYSTOLIC BLOOD PRESSURE: 144 MMHG | HEART RATE: 74 BPM | HEIGHT: 66 IN | HEART RATE: 74 BPM | WEIGHT: 233.44 LBS | DIASTOLIC BLOOD PRESSURE: 92 MMHG | BODY MASS INDEX: 37.52 KG/M2 | TEMPERATURE: 97 F | BODY MASS INDEX: 37.52 KG/M2 | HEIGHT: 66 IN | TEMPERATURE: 97 F | DIASTOLIC BLOOD PRESSURE: 92 MMHG | SYSTOLIC BLOOD PRESSURE: 144 MMHG | WEIGHT: 233.44 LBS

## 2018-11-19 DIAGNOSIS — C7A.010 MALIGNANT CARCINOID TUMOR OF DUODENUM: Primary | ICD-10-CM

## 2018-11-19 DIAGNOSIS — R06.9 RESPIRATORY ABNORMALITIES: ICD-10-CM

## 2018-11-19 DIAGNOSIS — C7A.010 MALIGNANT CARCINOID TUMOR OF THE DUODENUM: Primary | ICD-10-CM

## 2018-11-19 DIAGNOSIS — R13.10 DYSPHAGIA, UNSPECIFIED TYPE: ICD-10-CM

## 2018-11-19 DIAGNOSIS — D49.89 NEOPLASM OF ABDOMEN: ICD-10-CM

## 2018-11-19 LAB
EXT 24 HR UR METANEPHRINE: ABNORMAL
EXT 24 HR UR NORMETANEPHRINE: ABNORMAL
EXT 24 HR UR NORMETANEPHRINE: ABNORMAL
EXT 25 HYDROXY VIT D2: ABNORMAL
EXT 25 HYDROXY VIT D3: ABNORMAL
EXT 5 HIAA 24 HR URINE: ABNORMAL
EXT 5 HIAA BLOOD: 7 NG/M (ref 0–22)
EXT ACTH: ABNORMAL
EXT AFP: ABNORMAL
EXT ALBUMIN: 4.5 G/DL (ref 3.6–5.1)
EXT ALKALINE PHOSPHATASE: 51 U/L (ref 40–115)
EXT ALT: 24 U/L (ref 9–46)
EXT AMYLASE: ABNORMAL
EXT ANTI ISLET CELL AB: ABNORMAL
EXT ANTI PARIETAL CELL AB: ABNORMAL
EXT ANTI THYROID AB: ABNORMAL
EXT AST: 29 U/L (ref 10–35)
EXT BILIRUBIN DIRECT: ABNORMAL MG/DL
EXT BILIRUBIN TOTAL: 0.7 MG/DL (ref 0.2–1.2)
EXT BK VIRUS DNA QN PCR: ABNORMAL
EXT BUN: 10 MG/DL (ref 7–25)
EXT C PEPTIDE: ABNORMAL
EXT CA 125: ABNORMAL
EXT CA 19-9: ABNORMAL
EXT CA 27-29: ABNORMAL
EXT CALCITONIN: ABNORMAL
EXT CALCIUM: 9.7 MG/DL (ref 8.6–10.3)
EXT CEA: ABNORMAL
EXT CHLORIDE: 101 MMOL/L (ref 98–110)
EXT CHOLESTEROL: ABNORMAL
EXT CHROMOGRANIN A: 57 NG/ML (ref 25–140)
EXT CO2: 31 MMOL/L (ref 20–32)
EXT CREATININE UA: ABNORMAL
EXT CREATININE: 0.96 MG/DL (ref 0.7–1.25)
EXT CYCLOSPORONE LEVEL: ABNORMAL
EXT DOPAMINE: ABNORMAL
EXT EBV DNA BY PCR: ABNORMAL
EXT EPINEPHRINE: ABNORMAL
EXT FOLATE: ABNORMAL
EXT FREE T3: ABNORMAL
EXT FREE T4: ABNORMAL
EXT FSH: ABNORMAL
EXT GASTRIN RELEASING PEPTIDE: ABNORMAL
EXT GASTRIN RELEASING PEPTIDE: ABNORMAL
EXT GASTRIN: ABNORMAL
EXT GGT: ABNORMAL
EXT GHRELIN: ABNORMAL
EXT GLUCAGON: ABNORMAL
EXT GLUCOSE: 84 MG/DL (ref 65–99)
EXT GROWTH HORMONE: ABNORMAL
EXT HCV RNA QUANT PCR: ABNORMAL
EXT HDL: ABNORMAL
EXT HEMATOCRIT: 48 % (ref 38.5–50)
EXT HEMOGLOBIN A1C: ABNORMAL
EXT HEMOGLOBIN: 15.8 G/DL (ref 13.2–17.1)
EXT HISTAMINE 24 HR URINE: ABNORMAL
EXT HISTAMINE: ABNORMAL
EXT IGF-1: ABNORMAL
EXT IMMUNKNOW (NON-STIMULATED): ABNORMAL
EXT IMMUNKNOW (STIMULATED): ABNORMAL
EXT INR: ABNORMAL
EXT INSULIN: ABNORMAL
EXT LANREOTIDE LEVEL: ABNORMAL
EXT LDH, TOTAL: ABNORMAL
EXT LDL CHOLESTEROL: ABNORMAL
EXT LIPASE: ABNORMAL
EXT MAGNESIUM: ABNORMAL
EXT METANEPHRINE FREE PLASMA: ABNORMAL
EXT MOTILIN: ABNORMAL
EXT NEUROKININ A CAMB: ABNORMAL
EXT NEUROKININ A ISI: ABNORMAL
EXT NEUROTENSIN: ABNORMAL
EXT NOREPINEPHRINE: ABNORMAL
EXT NORMETANEPHRINE: ABNORMAL
EXT NSE: ABNORMAL
EXT OCTREOTIDE LEVEL: ABNORMAL
EXT PANCREASTATIN CAMB: ABNORMAL
EXT PANCREASTATIN ISI: 48 PG/ML (ref 10–135)
EXT PANCREATIC POLYPEPTIDE: ABNORMAL
EXT PHOSPHORUS: ABNORMAL
EXT PLATELETS: 207 1000/UL (ref 140–400)
EXT POTASSIUM: 4.4 MMOL/L (ref 3.5–5.3)
EXT PROGRAF LEVEL: ABNORMAL
EXT PROLACTIN: ABNORMAL
EXT PROTEIN TOTAL: 8 G/DL (ref 6.1–8.1)
EXT PROTEIN UA: ABNORMAL
EXT PT: ABNORMAL
EXT PTH, INTACT: ABNORMAL
EXT PTT: ABNORMAL
EXT RAPAMUNE LEVEL: ABNORMAL
EXT SEROTONIN: 54 NG/ML (ref 56–244)
EXT SODIUM: 139 MMOL/L (ref 135–146)
EXT SOMATOSTATIN: ABNORMAL
EXT SUBSTANCE P: ABNORMAL
EXT TRIGLYCERIDES: ABNORMAL
EXT TRYPTASE: ABNORMAL
EXT TSH: ABNORMAL
EXT URIC ACID: ABNORMAL
EXT URINE AMYLASE U/HR: ABNORMAL
EXT URINE AMYLASE U/L: ABNORMAL
EXT VASOACTIVE INTESTINAL POLYPEPTIDE: ABNORMAL
EXT VITAMIN B12: ABNORMAL
EXT VMA 24 HR URINE: ABNORMAL
EXT WBC: 4 1000/UL (ref 3.8–10.8)
NEURON SPECIFIC ENOLASE: ABNORMAL

## 2018-11-19 PROCEDURE — 99215 OFFICE O/P EST HI 40 MIN: CPT | Performed by: SURGERY

## 2018-11-19 PROCEDURE — 99215 OFFICE O/P EST HI 40 MIN: CPT | Mod: 27,25 | Performed by: SURGERY

## 2018-11-19 PROCEDURE — 99212 OFFICE O/P EST SF 10 MIN: CPT | Mod: 27

## 2018-11-19 RX ORDER — FAMOTIDINE 10 MG/ML
20 INJECTION INTRAVENOUS
Status: CANCELLED | OUTPATIENT
Start: 2018-11-19

## 2018-11-19 RX ORDER — KETOROLAC TROMETHAMINE 15 MG/ML
15 INJECTION, SOLUTION INTRAMUSCULAR; INTRAVENOUS
Status: CANCELLED | OUTPATIENT
Start: 2018-11-19 | End: 2018-11-22

## 2018-11-19 RX ORDER — ENOXAPARIN SODIUM 100 MG/ML
30 INJECTION SUBCUTANEOUS
Status: CANCELLED | OUTPATIENT
Start: 2018-11-19

## 2018-11-19 RX ORDER — ONDANSETRON 2 MG/ML
8 INJECTION INTRAMUSCULAR; INTRAVENOUS
Status: CANCELLED | OUTPATIENT
Start: 2018-11-19

## 2018-11-19 RX ORDER — ACETAMINOPHEN 10 MG/ML
1000 INJECTION, SOLUTION INTRAVENOUS ONCE
Status: CANCELLED | OUTPATIENT
Start: 2018-11-19 | End: 2018-11-20

## 2018-11-19 RX ORDER — PREGABALIN 75 MG/1
75 CAPSULE ORAL
Status: CANCELLED | OUTPATIENT
Start: 2018-11-19

## 2018-11-19 NOTE — PROGRESS NOTES
Referring Physician:    MD Madeleine To MD    Reason for Visit: Nutrition Consult for NET of Duodenum. Duodenum Sleeve Procedure    Pertinent Social History: Patient is independent with activites of daily living. He lives with his wife who is available to assist with his care if needed.     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    Prostatic hyperplasia     Sleep apnea        Previous Surgical History:  Past Surgical History:   Procedure Laterality Date    CYSTOSCOPY WITH INSERTION OF MINIMALLY INVASIVE IMPLANT TO ENLARGE PROSTATIC URETHRA N/A 6/28/2018    Procedure: TRANSPROSTATIC TISSUE RETRACTION;  Surgeon: Kory Jack MD;  Location: [facility];  Service: Urology;  Laterality: N/A;    EGD (ESOPHAGOGASTRODUODENOSCOPY) N/A 9/24/2018    Performed by Salo Nuñez MD at [facility] (4TH FLR)    ENDOSCOPIC ULTRASOUND OF UPPER GASTROINTESTINAL TRACT N/A 11/5/2018    Procedure: ULTRASOUND-ENDOSCOPIC-UPPER;  Surgeon: Gorge Reyes MD;  Location: [facility];  Service: Endoscopy;  Laterality: N/A;  Carcinoid diagnosis    ESOPHAGOGASTRODUODENOSCOPY N/A 9/24/2018    Procedure: EGD (ESOPHAGOGASTRODUODENOSCOPY);  Surgeon: Salo Nuñez MD;  Location: [facility] (4TH FLR);  Service: Endoscopy;  Laterality: N/A;    PALATOPLASTY-(UPPP) N/A 2/14/2017    Performed by Bob Araujo III, MD at [facility] OR 2ND FLR    PH MONITORING, ESOPHAGUS, WIRELESS, (OFF REFLUX MEDS) N/A 9/24/2018    Performed by Salo Nuñez MD at [facility] ENDO (4TH FLR)    TONSILLECTOMY      TRANSPROSTATIC TISSUE RETRACTION N/A 6/28/2018    [facility]    ULTRASOUND-ENDOSCOPIC-UPPER N/A 11/5/2018    Performed by Gorge [facility]yes MD at [facility]    UVULOPALATOPHARYNGOPLASTY         Medication:  Current Outpatient Medications:     atorvastatin (LIPITOR) 40 MG  "tablet, Take 1 tablet (40 mg total) by mouth once daily., Disp: 90 tablet, Rfl: 3    influenza (FLUZONE HIGH-DOSE) 180 mcg/0.5 mL vaccine, Inject 0.5 mLs into the muscle., Disp: 0.5 mL, Rfl: 0    omeprazole (PRILOSEC) 40 MG capsule, Take 1 capsule (40 mg total) by mouth 2 (two) times daily before meals., Disp: 180 capsule, Rfl: 1    VIAGRA 100 mg tablet, Take 1 tablet (100 mg total) by mouth once daily. Brand name only medication., Disp: 10 tablet, Rfl: 11      Vitamin/Supplements/Herbs:     cholecalciferol, vitamin D3, 50,000 unit capsule, Take 1 capsule (50,000 Units total) by mouth once a week., Disp: 12 capsule, Rfl: 3       Allergies:  Review of patient's allergies indicates:   Allergen Reactions    Epinephrine      Neuroendocrine Tumor patient         Labs: reviewed    : 1950  Age: 68 y.o.    Anthropometrics  Weight: 105.9 kg (233 lb 7.5 oz)  Height: 5' 6" (1.676 m)    Estimated body mass index is 37.68 kg/m²       BMI Weight Status   Below 18.5 Underweight   18.6-24.9 Normal/Healthy   25.0-29.9 Overweight   30.0-34.9 Obesity Class I   35.0-39.9 Obesity Class II   >40 Extreme Obesity   Class III     Ideal Weight Range for Your Height: 5'6" = 118 - 154 lbs    Weight History:  No significant weight changes in past 6 months    Wt Readings from Last 12 Encounters:   18 105.9 kg (233 lb 7.5 oz)   18 105.9 kg (233 lb 7.5 oz)   18 104.3 kg (230 lb)   18 107 kg (235 lb 14.3 oz)   10/15/18 107.7 kg (237 lb 7 oz)   18 104.3 kg (230 lb)   18 106.1 kg (233 lb 14.5 oz)   18 104.3 kg (230 lb)   18 105.7 kg (233 lb)   18 105.7 kg (233 lb)   18 105.7 kg (233 lb)   18 105.7 kg (233 lb)       Estimated Nutrition Needs:   Energy Needs:  2300 (1775 MSJ x 1.3 PAL)  Protein Needs: 106 (1.0 gm Protein/kg)  Fluid Needs:  2300 (1 mL/calorie)    Nutrition Focused Physical Findings: Well Nourished. No Malnutrition Focus Physical findings present on " exam.    Gastrointestinal Habits: Patient reports normal bowel movements. He denies diarrhea.      Nutrition History    Meal Pattern: 2 meals per day  Breakfast: x2 eggs, cody, toast, juice, coffee  Lunch: sandwich  Dinner: Meat, starch veggie  Snacks: Cookies, candy, Ice Cream  Beverage Intake: Juice, Water (about 20 oz a day)    Food Intolerance: none    Meal Preparation: both patient and wife cook most meals  Dining Out: rarely    Dentition: Difficulty chewing or swallowing? Yes, patient was diagnosed with dysphagia. He is in therapy with Speech. He avoids Nuts, popcorn, chops fresh vegetable such as spinach.  Exercise: Activity related    Motivation to Change: Expect good compliance. Patient verbalizes need to comply with dietary needs.     Nutrition Diagnosis  Nutrition Problem  Altered GI function    Related to (etiology):   Malignant Carcinoid tumor of Duodenum    Signs and Symptoms (as evidenced by):   Need for surgical intervention, ie duodenum sleeve    Interventions:  General Healthful diet  Mechanically altered diet      Nutrition Diagnosis Status:   New        Recommendations:  1. Written information provided and explained on Short Bowel Syndrome Nutrition therapy for Patients with a colon. Meal plan provided with 50% CHO, 30% Pro, 20% Fat. 6 small meals a day.  2. Discussed role of soluble and insoluble fiber in digestion.   3. RD contact information provided for questions    Routed to n/a. Discussed with Dr Steiner in clinic      Consultation Time:15 minutes.      Follow Up:3 months.

## 2018-11-19 NOTE — LETTER
November 19, 2018        Lee Kay MD  [address]  [address]  [facility] [address]       [place]OLANETS: [facility]  A collaboration between [facility] and [facility]                        [address]  [address]  [place] [address]  Phone: [phone]  Fax: [phone]        YOHANNES Shelley M.D., FACS  Amor Hess M.D.  Harpreet Dixon M.D.   Sadia Salvador M.D., FACS  DO Sánchez Mayer M.D., FACS   Patient: Hoang Santos Jr.   MR Number: [id number]   YOB: 1950   Date of Visit: 11/19/2018     Dear Dr. Kay    Thank you for the kind referral of Mrs. Hoang Santos Jr.. We saw this patient on 11/19/2018, and a copy of our clinic note is enclosed. We certainly appreciate the opportunity to participate in your patient's care.     If you have any questions or wish to discuss your patient further, please do not hesitate to contact us at [phone].       Kindest personal regards,          Sánchez Ndiaye MD, FACS  The [employer] Professor of Surgery & Neurosciences  [facility], Dept. Of Surgery  [address], [address]  [place] [id number] [phone]

## 2018-11-19 NOTE — PATIENT INSTRUCTIONS
Someone will contact you to schedule appointment with Cardiology  Call [phone] to schedule swallow study      Patient Instructions       Take a Hibiclens shower twice a day for 3 days prior to surgery, including the morning of surgery.   Gargle with Listerine twice a day for 2 days prior to surgery, including the morning of surgery.      To be determined    Appointment with Anesthesia   Pre [place] for Hospital Admission - Go to 1st floor of the hospital-admitting desk. You will do paper work, get blood drawn,  get x-rays and see Anesthesia at this time.     1/3/2018   CLEAR LIQUIDS after breakfast    Do not eat or drink anything after midnight.                 1/4/2018   Hospital Admission for surgery.  Report to 2nd floor, Same Day Surgery desk at 5:30am   Surgery is scheduled for 7:00am        [facility] [address] [place]  [address]  [phone]      INFORMATION REGARDING YOUR PROCEDURE      We look forward to serving you and your family and appreciate that you have chosen [facility] for your healthcare needs. Before, during, and after your surgery, you will be cared for by skilled medical professionals. Our surgeons, anesthesiologists, nurses,  and other healthcare professionals will work with you and your family to ensure a safe, smooth and comfortable surgery and recovery.    In order to best meet your pre-admission needs, your surgeon or ordering physicians office will contact our Scheduling Office at [phone] and schedule a Pre-Procedure Appointment.  This should preferably be done 72 hours or greater before your scheduled procedure date.     During your pre-procedure visit your insurance will be verified for your procedure. You will meet with a Registered Nurse and an Anesthesiologist or Nurse Anesthetist. If tests are required, they will be performed during your visit. Please allow about one and a half hours for this visit.      You will  need to bring the following information or items with you to your Pre-Procedure Appointment:    1.  Picture Identification  2.  Insurance Card  3.  Current list of medications to include name and dosage  4.  List of allergies  5.  Orders and any other forms your doctor has given to you  6.  Copies of lab results performed at other facilities. This may include blood work, EKGs or chest xrays.   These results can be faxed to [phone].    The Pre-Op Center Registration Desk is located on the first floor of the hospital ([address] [place] [address]) in the [facility].  The Pre-Operative Center is located on the second floor of the hospital. Someone will walk you from the registration area to the Pre-Operative Center.    Free parking is located in the front of the hospital and medical office building and is easily accessed from [address] and [address]. When you arrive, please check in at the main information desk of the hospital.    Please call Outpatient Surgery at [phone] after 12:00pm on the day before your procedure for your arrival time and updated procedure time.      Pre-Op Bathing Instructions    Before surgery, you can play an important role in your own health.    Because skin is not sterile, we need to be sure that your skin is as free of germs as possible. By following the instructions below, you can reduce the number of germs on your skin before surgery.    IMPORTANT: You will need to shower with a special soap called Hibiclens*, available at any pharmacy.  If you are allergic to Chlorhexidine (the antiseptic in Hibiclens), use an antibacterial soap such as Dial Soap for your preoperative shower.  You will shower with Hibiclens both the night before your surgery and the morning of your surgery.  Do not use Hibiclens on the head, face or genitals to avoid injury to those areas.    STEP #1: THE NIGHT BEFORE YOUR SURGERY     1. Do not shave the area of your body where your surgery  will be performed.  2. Shower and wash your hair and body as usual with your normal soap and shampoo.  3. Rinse your hair and body thoroughly after you shower to remove all soap residue.  4. With your hand, apply one packet of Hibiclens soap to the surgical site.   5. Wash the site gently for five (5) minutes. Do not scrub your skin too hard.   6. Do not wash with your regular soap after Hibiclens is used.  7. Rinse your body thoroughly.  8. Pat yourself dry with a clean, soft towel.  9. Do not use lotion, cream, or powder.  10. Wear clean clothes.    STEP #2: THE MORNING OF YOUR SURGERY     1. Repeat Step #1.    * Not to be used by people allergic to Chlorhexidine.

## 2018-11-19 NOTE — PROGRESS NOTES
"NOLANETS:  [facility]  A collaboration between [facility] and [facility]    PATIENT: Hoang Santos Jr.  MRN: [id number]  DATE: 11/19/2018    Vitals:    11/19/18 0905   BP: (!) 144/92   BP Location: Right arm   Pulse: 74   Temp: 97.4 °F (36.3 °C)   TempSrc: Oral   Weight: 105.9 kg (233 lb 7.5 oz)   Height: 5' 6" (1.676 m)      Last 2 Weight Measurements:   Wt Readings from Last 2 Encounters:   11/19/18 105.9 kg (233 lb 7.5 oz)   11/05/18 104.3 kg (230 lb)     BMI: Body mass index is 37.68 kg/m².    Karnofsky Score    Karnofsky Score:  100% - Normal, No Complaints, No Evidence of Disease       Diagnosis:   1. Malignant carcinoid tumor of the duodenum    2. Neoplasm of abdomen      Interval History: Mr. 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    Prostatic hyperplasia     Sleep apnea        Past Surgical History:   Procedure Laterality Date    CYSTOSCOPY WITH INSERTION OF MINIMALLY INVASIVE IMPLANT TO ENLARGE PROSTATIC URETHRA N/A 6/28/2018    Procedure: TRANSPROSTATIC TISSUE RETRACTION;  Surgeon: Kory Jack MD;  Location: [facility];  Service: Urology;  Laterality: N/A;    EGD (ESOPHAGOGASTRODUODENOSCOPY) N/A 9/24/2018    Performed by Salo Nuñez MD at [facility] (4TH FLR)    ENDOSCOPIC ULTRASOUND OF UPPER GASTROINTESTINAL TRACT N/A 11/5/2018    Procedure: ULTRASOUND-ENDOSCOPIC-UPPER;  Surgeon: Gorge Reyes MD;  Location: [facility];  Service: Endoscopy;  Laterality: N/A;  Carcinoid diagnosis    ESOPHAGOGASTRODUODENOSCOPY N/A 9/24/2018    Procedure: EGD (ESOPHAGOGASTRODUODENOSCOPY);  Surgeon: Salo Nuñez MD;  Location: [facility] (4TH FLR);  Service: Endoscopy;  " Laterality: N/A;    PALATOPLASTY-(UPPP) N/A 2/14/2017    Performed by Viola JAN Araujo III, MD at [facility] OR 2ND FLR    PH MONITORING, ESOPHAGUS, WIRELESS, (OFF REFLUX MEDS) N/A 9/24/2018    Performed by Sal[facility]ez [facility] at [facility]O (4TH FLR)    TONSILLECTOMY      TRANSPROSTATIC TISSUE RETRACTION N/A 6/28/2018    Performed by [facility] at [facility]    ULTRASOUND-ENDOSCOPIC-UPPER N/A 11/5/2018    Performed by Gorge [facility]yes [facility] at [facility]    UVULOPALATOPHARYNGOPLASTY         Review of patient's allergies indicates:   Allergen Reactions    Epinephrine      Neuroendocrine Tumor patient         Current Outpatient Medications   Medication Sig Dispense Refill    atorvastatin (LIPITOR) 40 MG tablet Take 1 tablet (40 mg total) by mouth once daily. 90 tablet 3    cholecalciferol, vitamin D3, 50,000 unit capsule Take 1 capsule (50,000 Units total) by mouth once a week. 12 capsule 3    influenza (FLUZONE HIGH-DOSE) 180 mcg/0.5 mL vaccine Inject 0.5 mLs into the muscle. 0.5 mL 0    omeprazole (PRILOSEC) 40 MG capsule Take 1 capsule (40 mg total) by mouth 2 (two) times daily before meals. 180 capsule 1    VIAGRA 100 mg tablet Take 1 tablet (100 mg total) by mouth once daily. Brand name only medication. 10 tablet 11     No current facility-administered medications for this visit.        Review of Systems     Number of Days per Week Number per Day   DIARRHEA 0    BRISTOL STOOL SCALE RATING     FLUSHING 0    WHEEZING occasional      WEIGHT GAIN/LOSS Stable    ENERGY RATING (0-10) 10        Physical Exam: deferred.   Findings:       A widely patent and non-obstructing Schatzki ring (acquired) was        found at the gastroesophageal junction.       The exam of the esophagus was otherwise normal.       A 3 cm sliding hiatal hernia was found. The proximal extent of the        gastric folds (end of tubular esophagus) was 40 cm from the        incisors. The hiatal narrowing was 43 cm from the incisors. The         Z-line was at the gastroesophageal junction.       The cardia and gastric fundus were normal on retroflexion.       The gastric body and gastric antrum were normal.       A 2 cm polypoid mass with no bleeding was found in the duodenal        bulb. Biopsies were taken with a cold forceps for histology.       A single 8 mm pedunculated polyp was found in the second portion of        the duodenum. Biopsies were taken with a cold forceps for histology.       The BRAVO capsule with delivery system was introduced through the        mouth and advanced into the esophagus, such that the BRAVO pH        capsule was positioned 34 cm from the incisors, which was 6 cm        proximal to the GE junction. The BRAVO pH capsule was then deployed        and attached to the esophageal mucosa. The delivery system was then        withdrawn. Endoscopy was utilized for probe placement and diagnostic        evaluation. The scope was reinserted to evaluate placement of the        BRAVO capsule. Visualization showed the BRAVO capsule to be in an        appropriate position.  Impression:           - Widely patent and non-obstructing Schatzki ring.                        - 3 cm sliding hiatal hernia.                        - Normal gastric body and antrum.                        - Duodenal mass. Biopsied.                        - A single duodenal polyp. Biopsied.                        - The BRAVO pH capsule was deployed.                        - Patient had frequent airway obstruction with                         oxygen desaturation requiring bag-mask ventilation                         several times during the procedure.  The patient tolerated the procedure                         well.  Findings:       ENDOSONOGRAPHIC FINDING: :       The esophagus, stomach and duodenum and adjacent structures were        visualized endosonographically.       The Aorta was identified and is followed to the Celiac and        Mesenteric Artery  takeoff. They appeared normal without evidence of        lymphadenopathy in the region. The Celiac Artery is then followed        until the pancreas is identified. The pancreatic body and tail are        surveyed from this region.       The pancreatic parenchyma has the normal salt and pepper appearance.        The pancreatic duct measured 1.7 mm in diameter. The pancreatic duct        was normal in its course without areas of dilation, stricture or        irregularity. No abnormal side branches were identified.       The pancreas was then surveyed from the duodenal stations. The        pancreatic parenchyma appeared normal. The pancreatic duct measured        1.9 mm in this region and appeared normal in its course. The common        bile duct was identified and measured 4.6 mm. The CBD appeared        normal on its course without evidence of defects, thickening or        irregularity.       The portal vein, confluence, superior mesenteric vein and superior        mesenteric artery appeared endosonographically normal. There were no        abnormal lymph nodes in the region.       There was diffuse abnormal echotexture in the visualized portion of        the liver. This was characterized by a homogenous appearance and a        hyperechoic appearance.       No lymphadenopathy seen.       A pedunculated intramural (subepithelial) lesion was found in the        apex of the duodenal bulb. The lesion was hypoechoic. The lesion        also measured 7 mm by 7 mm in diameter. The outer margins were well        defined.       A hypoechoic lobulated mass was identified endosonographically in        the duodenal bulb. The mass measured 16 mm by 7 mm in maximal        cross-sectional diameter. The endosonographic borders were        well-defined. There was sonographic evidence suggesting invasion        into the submucosa (Layer 3).  Impression:           - There was diffuse abnormal echotexture in the                          visualized portion of the liver. This was                         characterized by a homogenous appearance and a                         hyperechoic appearance.                        - A pedunculated intramural (subepithelial) lesion                         was found in the apex of the duodenal bulb. It                         measured 7 mm by 7 mm. Previous biopsies showed NET.                        - A mass was found in the duodenal bulb. The lesion                         appear to involved the submucosal. It is unclear if                         the MP is involved. The lesion measured                         approximately 1.6 cm by 7 mm. The previous                         diagnosis was a NET. This was stage T2 N0 M0.                        - No specimens collected.            Pathology Data:  SPECIMEN  1) Duodenal polyp.  2) Duodenal bulb mass.  FINAL PATHOLOGIC DIAGNOSIS  1. DUODENUM, POLYPECTOMY:  Rare cluster of cells suspicious for neuroendocrine neoplasm  (the focus is depleted in the deeper levels for confirmation)  Duodenal mucosa with reactive changes  Villous architecture is maintained  2. DUODENAL BULB MASS, BIOPSY:  Neuroendocrine neoplasm, low-grade, well-differentiated  Longest continuous tumor focus is 1.2mm  Immunostains synaptophysin and chromogranin are positive  Ki67 - less than 1%          Laboratory Data:   gets labs today      Radiology Data:      FINDINGS:  Patient was administered 5.49 millicuries of gallium 68 octreotide intravenously.    There is physiologic intracranial, head, and neck activity.  Heart mediastinum are normal.  There is physiologic liver, spleen, GI and  activity.  There is an isolated peritoneal implant right upper quadrant SUV max 31.02.  Kidneys and adrenal glands are unremarkable.  No bone or lung lesions are seen.      Impression       See above    There is a isolated peritoneal implant suspected right upper quadrant SUV max 31.02.     FINDINGS:  No pleural  effusion.  No liver lesions.  The biliary ducts are not dilated.  The gallbladder is present.  The stomach, pancreas, spleen, adrenal glands and kidneys appear normal.  Small bilateral kidney cysts.  The aorta tapers normally, no para-aortic lymphadenopathy.  The mesentery appears normal.  No definite abnormality seen to correspond to the area of abnormality seen on PET 68 GA Dotatate 11/01/2018.  No destructive osseous lesions seen.      Impression       No metastatic disease seen.    There are no measurable lesions per RECIST criteria.     FINDINGS:  Mild atelectasis in the lung bases.  Visualized portions of the heart and mediastinum are unremarkable.    The liver has an unusual contour.  Diffusely decreased attenuation throughout the liver relative to the spleen..  No focal hepatic lesions are identified.  Gallbladder, spleen, pancreas, and adrenal glands appear within normal limits.    Small cysts in both kidneys.  The ureters are decompressed. Urinary bladder unremarkable.  Postsurgical change of the prostate.    The stomach, small bowel and colon demonstrate no evidence of obstruction or focal inflammation.  Appendix is unremarkable.    No free air or free fluid identified within the abdomen or pelvis.    Aorta tapers normally throughout its visualized course.  Trace atherosclerotic calcification.    Small fat containing umbilical hernia.    Osseous structures exhibit moderate degenerative changes. No fracture or focal osseous destructive lesion.      Impression       No soft tissue mass or pathologic lymph node enlargement identified.  No measurable lesions per RECIST.    Hepatic steatosis.    Small fat containing umbilical hernia.    Moderate degenerative change in the thoracic and lumbar spine.    Simple appearing renal cysts bilaterally.    Postsurgical change of the prostate.           Left Ventricle Normal ejection fraction . Concentric hypertrophy observed. Grade I (mild) left ventricular diastolic  dysfunction consistent with impaired relaxation.   Right Ventricle Right ventricle not well visualized. Normal ejection fraction. Wall motion abnormal. There is segmental dysfunction of the right ventricle.   Left Atrium Normal cavity size.   Right Atrium Normal cavity size.   Aortic Valve The valve is trileaflet. Mobility is normal   Mitral Valve Normal valve structure. Normal leaflet mobility.   Tricuspid Valve The tricuspid valve is normal. Mobility is normal   Pulmonic Valve Normal valve structure. Mobility is normal.   IVC/SVC Inferior vena cava is not well visualized.         Impression:  1.  well differentiated NET of duodenal bulb ( ki 67 is <3%)--multifocal in polyp and bulb  2.  hypokinetic segment of ventricle  3.  respiratory espsodes during eus get Lyla Staples to see him       Plan: refer to Dr Salvador for duodenal resection  Refer to Lyla Staples   refer to cardiology  Refer to nutrition in anticipation of surgery        Labs: Markers: 3 month intervals             Scans: 6 month intervals    Scopes: 6 month intervals    Echocardiogram: 12 month intervals    Return to Clinic:12 month intervals    Orders placed this visit:   Orders Placed This Encounter   Procedures    NM PET Ga68 Dotatate Whole Body    CT Abdomen Pelvis With Contrast    MRI Abdomen W WO Contrast    5-HIAA Plasma (Neuoendocrine)    Serotonin serum    Pancreastatin    Neurokinin A    Substance P    Gastrin    Comprehensive metabolic panel    CBC auto differential        60 eaw plus shelly Minutes Face-to-Face; Counseling/Coordination of Care > 50 percent of Visit     Sánchez Ndiaye MD, FACS  The Hermilo Stover Professor of Surgery, [facility]  [address], [address]  [place]  [address]  Cell [phone]  ph. [phone]; [phone]  fax. [phone]  [email or web address]

## 2018-11-19 NOTE — PROGRESS NOTES
"NOLANETS:  [facility]  A collaboration between [facility] and [facility]      PATIENT: Hoang Santos Jr.  MRN: [id number]  DATE: 11/19/2018    Subjective:      Chief Complaint: Consult (New Patient Consult)  used to have acid reflux problem for 2-3 years  Had EGD 9/2018    Frequent cough, paroxysms, at night time when lying down  No other complaints    Vitals:   Vitals:    11/19/18 0945   BP: (!) 144/92   BP Location: Right arm   Pulse: 74   Temp: 97.4 °F (36.3 °C)   TempSrc: Oral   Weight: 105.9 kg (233 lb 7.5 oz)   Height: 5' 6" (1.676 m)        Karnofsky Score:   Karnofsky Score    Karnofsky Score:  100% - Normal, No Complaints, No Evidence of Disease         Diagnosis: No diagnosis found.     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    Prostatic hyperplasia     Sleep apnea        Past Surgical History:  Past Surgical History:   Procedure Laterality Date    CYSTOSCOPY WITH INSERTION OF MINIMALLY INVASIVE IMPLANT TO ENLARGE PROSTATIC URETHRA N/A 6/28/2018    Procedure: TRANSPROSTATIC TISSUE RETRACTION;  Surgeon: Kory Jack MD;  Location: [facility];  Service: Urology;  Laterality: N/A;    EGD (ESOPHAGOGASTRODUODENOSCOPY) N/A 9/24/2018    Performed by Salo Nuñez MD at [facility] ([address] [place] [address])    ENDOSCOPIC ULTRASOUND OF UPPER GASTROINTESTINAL TRACT N/A 11/5/2018    Procedure: ULTRASOUND-ENDOSCOPIC-UPPER;  Surgeon: Gorge Reyes MD;  Location: [facility];  Service: Endoscopy;  Laterality: N/A;  Carcinoid diagnosis    ESOPHAGOGASTRODUODENOSCOPY N/A 9/24/2018    Procedure: EGD (ESOPHAGOGASTRODUODENOSCOPY);  Surgeon: Salo Nuñez MD;  [facility]  Service: Endoscopy;  Laterality: N/A;    PALATOPLASTY-(UPPP) N/A 2/14/2017    Performed by " Bethesda JAN Araujo III, MD at [facility] OR 2ND FLR    PH MONITORING, ESOPHAGUS, WIRELESS, (OFF REFLUX MEDS) N/A 9/24/2018    Performed by Salo Nuñez MD at [facility] ENDO (4TH FLR)    TONSILLECTOMY      TRANSPROSTATIC TISSUE RETRACTION N/A 6/28/2018    Performed by [facility] Jack [facility] at [facility] OR    ULTRASOUND-ENDOSCOPIC-UPPER N/A 11/5/2018    Performed by Gorge Reyes MD at [facility] ENDO    UVULOPALATOPHARYNGOPLASTY         Family History:  Family History   Problem Relation Age of Onset    Heart disease Mother     Diabetes Mother     Stroke Father     Cancer Sister         pancreatitic    COPD Sister     Prostate cancer Neg Hx     Kidney disease Neg Hx     Thyroid disease Neg Hx     Retinal detachment Neg Hx     Macular degeneration Neg Hx     Hypertension Neg Hx     Glaucoma Neg Hx        Allergies:  Review of patient's allergies indicates:   Allergen Reactions    Epinephrine      Neuroendocrine Tumor patient         Medications:  Current Outpatient Medications   Medication Sig Dispense Refill    atorvastatin (LIPITOR) 40 MG tablet Take 1 tablet (40 mg total) by mouth once daily. 90 tablet 3    cholecalciferol, vitamin D3, 50,000 unit capsule Take 1 capsule (50,000 Units total) by mouth once a week. 12 capsule 3    influenza (FLUZONE HIGH-DOSE) 180 mcg/0.5 mL vaccine Inject 0.5 mLs into the muscle. 0.5 mL 0    omeprazole (PRILOSEC) 40 MG capsule Take 1 capsule (40 mg total) by mouth 2 (two) times daily before meals. 180 capsule 1    VIAGRA 100 mg tablet Take 1 tablet (100 mg total) by mouth once daily. Brand name only medication. 10 tablet 11     No current facility-administered medications for this visit.        Review of Systems   Constitutional: Negative for activity change, appetite change, chills, diaphoresis, fatigue, fever and unexpected weight change.   HENT: Negative for congestion, dental problem, drooling, ear discharge, ear pain, facial swelling, hearing loss, postnasal drip,  rhinorrhea, sinus pressure, sinus pain, sneezing and sore throat.    Eyes: Negative for photophobia, pain, itching and visual disturbance.   Respiratory: Positive for cough, choking, shortness of breath and wheezing. Negative for apnea, chest tightness and stridor.    Cardiovascular: Negative for chest pain, palpitations and leg swelling.   Gastrointestinal: Negative for abdominal distention, abdominal pain, anal bleeding, blood in stool, constipation, diarrhea and vomiting.   Genitourinary: Negative.    Allergic/Immunologic: Negative.    Neurological: Negative.    Hematological: Negative.    Psychiatric/Behavioral: Negative.       Objective:      Physical Exam   Constitutional: He appears well-developed and well-nourished.   HENT:   Head: Normocephalic.   Right Ear: External ear normal.   Left Ear: External ear normal.   Eyes: EOM are normal. Pupils are equal, round, and reactive to light.   Neck: Normal range of motion.   Cardiovascular: Normal rate and regular rhythm.   Pulmonary/Chest: Effort normal and breath sounds normal.   Abdominal: Soft. Bowel sounds are normal.   Musculoskeletal: Normal range of motion.   Neurological: He is alert.      Assessment:       No diagnosis found.    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 Large tumour in duodenem  Plan:       Plan for Duodenectomy with lymphadenectomy with hiatal hernia repair    Discussed about surgery, risk and benefits, hospitalization, recovery time      Will obtain swallow study                  FALLON Salvador MD, FACS   Associate Professor of Surgery, [facility]   Neuroendocrine Surgery, Hepatic/Pancreatic & General Surgery   [address], [address]   [place] [address]   ph. [phone]; [phone]   fax. [phone]

## 2018-11-20 DIAGNOSIS — N52.9 ERECTILE DYSFUNCTION, UNSPECIFIED ERECTILE DYSFUNCTION TYPE: ICD-10-CM

## 2018-11-20 RX ORDER — SILDENAFIL CITRATE 100 MG/1
100 TABLET, FILM COATED ORAL DAILY
Qty: 10 TABLET | Refills: 5 | Status: CANCELLED | OUTPATIENT
Start: 2018-11-20

## 2018-11-20 RX ORDER — SILDENAFIL 25 MG/1
25 TABLET, FILM COATED ORAL DAILY PRN
Qty: 30 TABLET | Refills: 3 | Status: CANCELLED | OUTPATIENT
Start: 2018-11-20 | End: 2019-11-20

## 2018-11-20 NOTE — TELEPHONE ENCOUNTER
Spoke to Scott Tom to discuss which medication he would like to try and patient stated Sildenafil.  Medication pend and routed to Dr. Kay.

## 2018-11-21 ENCOUNTER — TELEPHONE (OUTPATIENT)
Dept: CARDIOLOGY | Facility: CLINIC | Age: 68
End: 2018-11-21

## 2018-11-21 RX ORDER — SILDENAFIL 100 MG/1
100 TABLET, FILM COATED ORAL DAILY PRN
Qty: 30 TABLET | Refills: 3 | Status: SHIPPED | OUTPATIENT
Start: 2018-11-21 | End: 2019-07-30

## 2018-11-21 NOTE — [employer] PATIENT INSTRUCTIONS
Discharge Summary/Instructions after an Endoscopic Procedure  Patient Name: Hoang Santos  Patient MRN: [id number]  Patient YOB: 1950 Monday, September 24, 2018  Salo Nuñez MD  RESTRICTIONS:  During your procedure today, you received medications for sedation.  These   medications may affect your judgment, balance and coordination.  Therefore,   for 24 hours, you have the following restrictions:   - DO NOT drive a car, operate machinery, make legal/financial decisions,   sign important papers or drink alcohol.    ACTIVITY:  Today: no heavy lifting, straining or running due to procedural   sedation/anesthesia.  The following day: return to full activity including work.  DIET:  Eat and drink normally unless instructed otherwise.     TREATMENT FOR COMMON SIDE EFFECTS:  - Mild abdominal pain, nausea, belching, bloating or excessive gas:  rest,   eat lightly and use a heating pad.  - Sore Throat: treat with throat lozenges and/or gargle with warm salt   water.  - Because air was used during the procedure, expelling large amounts of air   from your rectum or belching is normal.  - If a bowel prep was taken, you may not have a bowel movement for 1-3 days.    This is normal.  SYMPTOMS TO WATCH FOR AND REPORT TO YOUR PHYSICIAN:  1. Abdominal pain or bloating, other than gas cramps.  2. Chest pain.  3. Back pain.  4. Signs of infection such as: chills or fever occurring within 24 hours   after the procedure.  5. Rectal bleeding, which would show as bright red, maroon, or black stools.   (A tablespoon of blood from the rectum is not serious, especially if   hemorrhoids are present.)  6. Vomiting.  7. Weakness or dizziness.  GO DIRECTLY TO THE NEAREST EMERGENCY ROOM IF YOU HAVE ANY OF THE FOLLOWING:      Difficulty breathing              Chills and/or fever over 101 F   Persistent vomiting and/or vomiting blood   Severe abdominal pain   Severe chest pain   Black, tarry stools   Bleeding- more than one  tablespoon   Any other symptom or condition that you feel may need urgent attention  Your doctor recommends these additional instructions:  If any biopsies were taken, your doctors clinic will contact you in 1 to 2   weeks with any results.  - Return to referring physician as previously scheduled.   For questions, problems or results please call your physician - Salo Nuñez MD at Work:  [phone].  [facility], EMERGENCY ROOM PHONE NUMBER: [phone]  IF A COMPLICATION OR EMERGENCY SITUATION ARISES AND YOU ARE UNABLE TO REACH   YOUR PHYSICIAN - GO DIRECTLY TO THE EMERGENCY ROOM.  Salo Nuñez MD  11/21/2018 11:03:19 AM  This report has been verified and signed electronically.  [employer]

## 2018-11-21 NOTE — TELEPHONE ENCOUNTER
----- Message from Deepa Minor MA sent at 11/21/2018  9:12 AM CST -----  Regarding: New Patient Consult  Hi All-    Please call this patient to schedule for a new patient clinical appointment, as per Dr. Ndiaye's clinic note on 11/19/18. Patient needs to be seen for hypokinetic segment of ventricle, that was found on a Echo done on 11/9/18.    If you have any questions, please let me know.    Thanks,  Deepa-

## 2018-11-26 ENCOUNTER — OFFICE VISIT (OUTPATIENT)
Dept: CARDIOLOGY | Facility: CLINIC | Age: 68
End: 2018-11-26
Payer: MEDICARE

## 2018-11-26 VITALS
HEIGHT: 66 IN | DIASTOLIC BLOOD PRESSURE: 72 MMHG | SYSTOLIC BLOOD PRESSURE: 110 MMHG | BODY MASS INDEX: 37.51 KG/M2 | WEIGHT: 233.38 LBS | HEART RATE: 94 BPM

## 2018-11-26 DIAGNOSIS — C7A.010 MALIGNANT CARCINOID TUMOR OF THE DUODENUM: ICD-10-CM

## 2018-11-26 DIAGNOSIS — R13.10 DYSPHAGIA, UNSPECIFIED TYPE: Primary | ICD-10-CM

## 2018-11-26 DIAGNOSIS — E78.2 MIXED HYPERLIPIDEMIA: ICD-10-CM

## 2018-11-26 DIAGNOSIS — N52.9 ERECTILE DYSFUNCTION, UNSPECIFIED ERECTILE DYSFUNCTION TYPE: ICD-10-CM

## 2018-11-26 DIAGNOSIS — Z01.810 PRE-OPERATIVE CARDIOVASCULAR EXAMINATION: Primary | ICD-10-CM

## 2018-11-26 PROCEDURE — 93005 ELECTROCARDIOGRAM TRACING: CPT | Mod: PBBFAC,PO | Performed by: INTERNAL MEDICINE

## 2018-11-26 PROCEDURE — 99999 PR PBB SHADOW E&M-EST. PATIENT-LVL III: CPT | Mod: PBBFAC,,, | Performed by: STUDENT IN AN ORGANIZED HEALTH CARE EDUCATION/TRAINING PROGRAM

## 2018-11-26 PROCEDURE — 99204 OFFICE O/P NEW MOD 45 MIN: CPT | Mod: S$PBB,,, | Performed by: STUDENT IN AN ORGANIZED HEALTH CARE EDUCATION/TRAINING PROGRAM

## 2018-11-26 PROCEDURE — 93010 ELECTROCARDIOGRAM REPORT: CPT | Mod: S$PBB,,, | Performed by: INTERNAL MEDICINE

## 2018-11-26 PROCEDURE — 99213 OFFICE O/P EST LOW 20 MIN: CPT | Mod: PBBFAC,PO | Performed by: STUDENT IN AN ORGANIZED HEALTH CARE EDUCATION/TRAINING PROGRAM

## 2018-11-26 NOTE — PROGRESS NOTES
"   Cardiology Clinic note    Subjective:   Patient ID:  Hoang Santos Jr. is a 68 y.o. male who presents for evaluation of pre-op cardiac clearance    HPI:   Hoang Santos Jr.  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  He presents for a pre-op cardiac clearance examination. Pt has a hx of carcinoid  Tumor of the duodenum and planned for a Duodenectomy with lymphadenectomy with hiatal hernia repair.  Pt notes he has no prior cardiac history. No hx of congestive heart failure. No malignant arrhythmia. Pt does not exercise on a regular basis but denies any limitations with ADOL. Pt is a  - low physical activity job. Denies any acute chest pain.  Hx of HLD. No hx of DM, CKD , no tobacco use, no hx of HTN.      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 If you have any questions, ask your nurse or doctor.               CONTINUE taking these medications    atorvastatin 40 MG tablet  Commonly known as:  LIPITOR  Take 1 tablet (40 mg total) by mouth once daily.     cholecalciferol (vitamin D3) 50,000 unit capsule  Take 1 capsule (50,000 Units total) by mouth once a " week.     FLUZONE HIGH-DOSE 2018-19 (PF) 180 mcg/0.5 mL vaccine  Generic drug:  influenza  Inject 0.5 mLs into the muscle.     omeprazole 40 MG capsule  Commonly known as:  PRILOSEC  Take 1 capsule (40 mg total) by mouth 2 (two) times daily before meals.     * VIAGRA 100 MG tablet  Generic drug:  sildenafil  Take 1 tablet (100 mg total) by mouth once daily. Brand name only medication.     * sildenafil 100 MG tablet  Commonly known as:  VIAGRA  Take 1 tablet (100 mg total) by mouth daily as needed for Erectile Dysfunction.         * This list has 2 medication(s) that are the same as other medications prescribed for you. Read the directions carefully, and ask your doctor or other care provider to review them with you.                  Review of Systems   Constitution: Negative for chills, decreased appetite, weakness, malaise/fatigue and weight gain.   HENT: Negative for congestion and ear discharge.    Eyes: Negative for blurred vision and double vision.   Cardiovascular: Negative for chest pain, cyanosis, dyspnea on exertion, irregular heartbeat, leg swelling, near-syncope, orthopnea, palpitations and syncope.   Respiratory: Negative for cough, shortness of breath and sleep disturbances due to breathing.    Skin: Negative for color change and dry skin.   Musculoskeletal: Negative for joint pain, joint swelling and muscle cramps.   Gastrointestinal: Positive for bloating, abdominal pain and diarrhea. Negative for heartburn, hematemesis and hematochezia.   Genitourinary: Negative for bladder incontinence and dysuria.   Neurological: Negative for aphonia, excessive daytime sleepiness, dizziness, focal weakness, headaches, light-headedness and loss of balance.   Psychiatric/Behavioral: Negative for altered mental status, depression and memory loss. The patient does not have insomnia and is not nervous/anxious.          Objective:   Physical Exam   Constitutional: He is oriented to person, place, and time. He appears  well-developed and well-nourished.   HENT:   Head: Normocephalic and atraumatic.   Eyes: Conjunctivae and EOM are normal.   Neck: Normal range of motion. Neck supple. No JVD present.   Cardiovascular: Normal rate, regular rhythm and normal heart sounds.   No murmur heard.  Pulmonary/Chest: Effort normal and breath sounds normal. No respiratory distress. He has no wheezes. He has no rales.   Abdominal: Soft. Bowel sounds are normal. He exhibits no distension.   Musculoskeletal: Normal range of motion. He exhibits no edema.   Neurological: He is alert and oriented to person, place, and time.   Skin: Skin is warm and dry. No erythema.   Psychiatric: He has a normal mood and affect. His behavior is normal. Judgment and thought content normal.   Nursing note and vitals reviewed.      Lab Results    Lab Results   Component Value Date     01/16/2018     01/16/2018    K 4.5 01/16/2018    K 4.5 01/16/2018     01/16/2018     01/16/2018    CO2 29 01/16/2018    CO2 29 01/16/2018    BUN 12 01/16/2018    BUN 12 01/16/2018    CREATININE 0.9 11/09/2018    GLU 85 01/16/2018    GLU 85 01/16/2018    HGBA1C 5.5 06/19/2017    AST 33 01/16/2018    AST 33 01/16/2018    ALT 26 01/16/2018    ALT 26 01/16/2018    ALBUMIN 3.9 01/16/2018    ALBUMIN 3.9 01/16/2018    ALBUMIN 4.2 12/07/2015    PROT 8.1 01/16/2018    PROT 8.1 01/16/2018    BILITOT 0.5 01/16/2018    BILITOT 0.5 01/16/2018    WBC 13.79 (H) 03/10/2017    HGB 15.3 03/10/2017    HCT 46.5 03/10/2017    MCV 89 03/10/2017     03/10/2017    PSA 1.2 06/19/2017    TSH 0.591 03/10/2017    CHOL 273 (H) 01/16/2018    CHOL 273 (H) 01/16/2018    HDL 64 01/16/2018    HDL 64 01/16/2018    LDLCALC 184.8 (H) 01/16/2018    LDLCALC 184.8 (H) 01/16/2018    TRIG 121 01/16/2018    TRIG 121 01/16/2018    BNP <10 03/10/2017       Lipid panel  Lab Results   Component Value Date    CHOL 273 (H) 01/16/2018    CHOL 273 (H) 01/16/2018     Lab Results   Component Value Date    HDL  64 01/16/2018    HDL 64 01/16/2018     Lab Results   Component Value Date    LDLCALC 184.8 (H) 01/16/2018    LDLCALC 184.8 (H) 01/16/2018     Lab Results   Component Value Date    TRIG 121 01/16/2018    TRIG 121 01/16/2018       Cardiac Studies  Significant Imaging: Echocardiogram: nov 2018  · Challenging study  · Left ventricle ejection fraction is normal at 65%  · Grade I (mild) left ventricular diastolic dysfunction consistent with impaired relaxation.  · Left ventricle shows concentric hypertrophy.  · RV difficul to assess due to patient body habitus. RV systolic function appears normal, however, it appears the apex is hypokinetic       ECG:  normal EKG, normal sinus rhythm.    Cath study    Assessment:     1. Pre-operative cardiovascular examination    2. Hyperlipidemia    3. Malignant carcinoid tumor of the duodenum    4. Erectile dysfunction, unspecified erectile dysfunction type        Plan:     1. Pre-op cardiovascular exam  - no hx of acute chest pain, no decompensated HF, no malignant arrhythmia  - low-avg functional capacity by hx  - normal ecg and normal LVEF by echo this month    - Pt is at low cardiovascular risk for a intermediate risk surgery. No further cardiac workup is needed at this time. Proceed with surgery as planned    2. HLD  - c/w statin,  I spent 5-10 minutes asking, assessing, assisting, arranging and advising heart healthy diet improvements. This included low-salt meals, portion control and health food alternatives. I also encourage 30 minutes of moderate exercise 3-4x a week.     4. Malignant carcinoid tumor  - mx per surgery    RTC PRN    Continue with current medical plan and lifestyle changes.  Return sooner for concerns or questions. If symptoms persist go to the ED    Orders Placed This Encounter   Procedures    IN OFFICE EKG 12-LEAD (to [place])     Standing Status:   Future     Standing Expiration Date:   11/26/2019     Order Specific Question:   Diagnosis     Answer:    Hyperlipidemia [456949]       He expressed verbal understanding and agreed with the plan    Thank you for the opportunity to care for this patient. Will be available for questions if needed.     Obey Braga MD [facility]  Interventional Cardiology  [facility]  Pager: [phone]

## 2018-11-28 ENCOUNTER — HOSPITAL ENCOUNTER (OUTPATIENT)
Dept: RADIOLOGY | Facility: HOSPITAL | Age: 68
Discharge: HOME | End: 2018-11-28
Attending: SURGERY
Payer: MEDICARE

## 2018-11-28 ENCOUNTER — CLINICAL SUPPORT (OUTPATIENT)
Dept: SPEECH THERAPY | Facility: HOSPITAL | Age: 68
End: 2018-11-28
Attending: SURGERY
Payer: MEDICARE

## 2018-11-28 DIAGNOSIS — R13.10 DYSPHAGIA, UNSPECIFIED TYPE: ICD-10-CM

## 2018-11-28 DIAGNOSIS — C7A.010 MALIGNANT CARCINOID TUMOR OF DUODENUM: ICD-10-CM

## 2018-11-28 PROCEDURE — 74230 X-RAY XM SWLNG FUNCJ C+: CPT | Mod: TC

## 2018-11-28 PROCEDURE — G8988 SELF CARE GOAL STATUS: HCPCS | Mod: CJ

## 2018-11-28 PROCEDURE — 74220 X-RAY XM ESOPHAGUS 1CNTRST: CPT | Mod: TC

## 2018-11-28 PROCEDURE — G8989 SELF CARE D/C STATUS: HCPCS | Mod: CJ

## 2018-11-28 PROCEDURE — 92611 MOTION FLUOROSCOPY/SWALLOW: CPT

## 2018-11-28 PROCEDURE — 74230 X-RAY XM SWLNG FUNCJ C+: CPT | Mod: 26,,, | Performed by: RADIOLOGY

## 2018-11-28 PROCEDURE — 97535 SELF CARE MNGMENT TRAINING: CPT

## 2018-11-28 PROCEDURE — G8987 SELF CARE CURRENT STATUS: HCPCS | Mod: CJ

## 2018-11-28 NOTE — PROGRESS NOTES
TIME RECORD    Date: 11/28/2018      Start Time:  1010  Stop Time:  1030    PROCEDURES:      UNTIMED  Procedure Min.   Modified Barium Swallow Study 10 mins   Self-Care/Education 10 mins     Total Timed Minutes:  20 mins  Total Timed Units:  0  Total Untimed Units:  2  Charges Billed/# of units:  2    REHAB SERVICE VIDEO SWALLOW STUDY    MRN:  [id number]  Referring Provider: Madeleine Alvarado MD  [address]  [address]  [place] [address]  Onset Date:  11/28/2018  SOC Date:  11/28/2018  Certification Period:  11/28/2018 to 02/28/2019  Primary Diagnosis:  Dysphagia   Treatment Diagnosis:  Dysphagia   Secondary Diagnosis:  N/A  Pertinent Medical History:    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    Prostatic hyperplasia     Sleep apnea      Past Surgical History:   Procedure Laterality Date    CYSTOSCOPY WITH INSERTION OF MINIMALLY INVASIVE IMPLANT TO ENLARGE PROSTATIC URETHRA N/A 6/28/2018    Procedure: TRANSPROSTATIC TISSUE RETRACTION;  Surgeon: Kory Jack MD;  Location: [facility];  Service: Urology;  Laterality: N/A;    EGD (ESOPHAGOGASTRODUODENOSCOPY) N/A 9/24/2018    Performed by Salo Nuñez MD at [facility] (4TH FLR)    ENDOSCOPIC ULTRASOUND OF UPPER GASTROINTESTINAL TRACT N/A 11/5/2018    Procedure: ULTRASOUND-ENDOSCOPIC-UPPER;  Surgeon: Gorge Reyes MD;  Location: [facility];  Service: Endoscopy;  Laterality: N/A;  Carcinoid diagnosis    ESOPHAGOGASTRODUODENOSCOPY N/A 9/24/2018    Procedure: EGD (ESOPHAGOGASTRODUODENOSCOPY);  Surgeon: Salo Nuñez MD;  Location: [facility] (4TH FLR);  Service: Endoscopy;  Laterality: N/A;    PALATOPLASTY-(UPPP) N/A 2/14/2017    Performed by Bob [facility]ujo III, [facility] at [facility] OR 2ND FLR    PH MONITORING, ESOPHAGUS, WIRELESS, (OFF REFLUX MEDS) N/A 9/24/2018    Performed by Salo Nuñez MD at [facility] (4TH FLR)    TONSILLECTOMY      TRANSPROSTATIC TISSUE  "RETRACTION N/A 6/28/2018    Performed by [facility] at [facility] OR    ULTRASOUND-ENDOSCOPIC-UPPER N/A 11/5/2018    [facility]O    UVULOPALATOPHARYNGOPLASTY         Prior Therapy Dates/Results (same condition):  Pt previously participated in MBSS 7/2/2018, and per SLP's note "Mr. Santos reported onset of globus sensation and sometimes coughing/choking corresponding to his UPPP in February 2017.  He stated that he has difficulty swallowing pills, lettuce, popcorn, large piece of meat, bread.  He cuts things up into small portions to facilitate swallowing.   Known osteophytes at C2-3 and especially C4-5 per previous c-spine imaging."  7/2018 MBSS results are as follows:   IMPRESSIONS:   This 67 y.o. man appears to present with  1.  Pharyngoesophageal dysphagia characterized by patterns that may be related to one or both of two contributing factors:  Osteophytes at C2-3 and 4-5 and possible reduction in pressure generated during closure of velopharyngeal port based on history of onset of symptoms.  While no deficit in closure pattern of the V-P port could be appreciated in today's study, cannot yet rule out that a weakened closure pattern may be contributing; however, his patterns could likely exist with the effects of the osteophytes alone.  Swallowing dysfunction observed today included incomplete epiglottic inversion (abutted PPW) and laryngeal vestibule closure with resultant reduced ability to clear boluses completely (vallecular residue of liquids and primarily solids, pyriform stasis of barium tablet).  2.  Osteophytes at C2-3 and 4-5 per previous imaging.  3.  History GEMMA; s/p UPPP 2/2017.      Prior Level of Function:  Pt able to ambulate independently and is independent with all ADLs    Functional Deficits Leading to Referral:  Pt under the care of MD Salvador. Per MD's recent progress note, pt being seen to address "multifocal Duodenal NET with severe GERD with " "hiatal hernia. Large tumour in duodenem"  -MD referred pt to participate in Modified Barium Swallow study to radiographically assess swallow function, rule out aspiration and determine safest/least restrictive diet prior to pt's planned procedure, per MD note, "Duodenectomy with lymphadenectomy with hiatal hernia repair"  - Pt reported to SLP, prior to MBSS today, he continues with difficulty swallowing foods like "popcorn, lettuce, and peanuts" pt reported to SLP no difficulty or coughing with liquids.       Nutrition:  Per pt, regular/thin liquids, but avoids items mentioned above.  Signs of Abuse:  No  Medication:    Current Outpatient Medications on File Prior to Visit   Medication Sig Dispense Refill    atorvastatin (LIPITOR) 40 MG tablet Take 1 tablet (40 mg total) by mouth once daily. 90 tablet 3    cholecalciferol, vitamin D3, 50,000 unit capsule Take 1 capsule (50,000 Units total) by mouth once a week. 12 capsule 3    influenza (FLUZONE HIGH-DOSE) 180 mcg/0.5 mL vaccine Inject 0.5 mLs into the muscle. 0.5 mL 0    omeprazole (PRILOSEC) 40 MG capsule Take 1 capsule (40 mg total) by mouth 2 (two) times daily before meals. 180 capsule 1    sildenafil (VIAGRA) 100 MG tablet Take 1 tablet (100 mg total) by mouth daily as needed for Erectile Dysfunction. 30 tablet 3    VIAGRA 100 mg tablet Take 1 tablet (100 mg total) by mouth once daily. Brand name only medication. 10 tablet 11     Current Facility-Administered Medications on File Prior to Visit   Medication Dose Route Frequency Provider Last Rate Last Dose    ez connor ba sulfate 355 mL  355 mL Oral ONCE PRN MD Sahara Fay nectar Ba Sulfate 15 mL  15 mL Oral ONCE PRN MD Sahara Fay pudding Ba Sulfate 10 mL  10 mL Oral ONCE PRN MD Sahara Fay thin liquid ba sulfate 35 mL  35 mL Oral ONCE PRN Madeleine Alvarado MD         Alertness/Attention:  Pt able to sustain and maintain " attn throughout MBSS  Oral Motor:  WFL    Patient Position:  Sitting  View:  Lateral    Consistencies Assessed  THIN: 5cc x2 (once with chin tuck technique), 10cc x2 of thin liquids barium via cup  NECTAR: 5cc x1, 10cc x1 of nectar thick liquid barium via cup  PUREE: tsp amount of barium pudding x1  DENTAL SOFT: tsp amount of diced peaches (coated in barium powder) x1  HARD SOLIDS: 1/2 corner of nena cracker coated in barium paste x1    Oral Phase:  .Oral Phase:  Pt presents with oral phase of the swallow judged to be WFL. Pt demonstrated good oral motor coordination, adequate mastication skills, good oral clearance/sensation, good tongue strength, and timely AP transfer across all consistencies assesed.      Pharyngeal Phase:   -Pt presents with mild-moderate pharyngeal dysphagia c/b dcr'd laryngeal elevation/excursion for thin and nectar thick liquids but with a fairly timely trigger of swallow across all consistencies assessed.     -However, pt demonstrated moderately reduced epiglottic inversion and reduced airway entrance (laryngeal vestibule) protection d/t cervical osteophytes located at level C4-C5, which limited pt's epiglottic inversion. Pt demonstrated consistent episodes of deep laryngeal penetration (at level of vocal cords) during the swallow of thin liquids (5cc with chin tuck, 10cc x2 ) and nectar thick liquids (5cc and 10cc).  ? Pt observed with trace residuals present in laryngeal vestibule s/p swallow larger amounts of thin liquids. However, pt demonstrated adequate sensation as pt observed tp perform spontaneous throat clear and swallow technique. Pt required further instruction from SLP to perform multiple throat clear and swallows to adequately clear lresiduals present.   ? During nectar thick liquid trials, pt demonstrated incr'd amount of residuals s/p swallow in laryngeal vestibule, which pt demonstrated difficulty clearing more viscous textures from laryngeal vestibule. Pt observed to  perform multiple spontaneous coughs, and throat clears and swallows to clear residuals. Pt required incr'd time to clear residuals during nectar thick liquid trials.      - Pt also demonstrated reduced tongue base retraction and reduced pharyngeal clearance, resulting in mild-moderate residuals in vallecula, pyriform sinuses and along pharyngeal wall after the swallow for thin and nectar thick liquids-- pt observed with incr'd residuals s/p swallow more viscous liquids (i.e. Nectar thick). Pt required cuing from SLP to perform multiple swallows to clear residuals within pharyngeal cavity. Pt required multiple swallows to adequately clear residuals.   Pt demonstrated consistent episodes of deep laryngeal penetration under fluoro for larger amounts of thin liquids and for nectar thick liquids.     Cervical Esophageal Phase:   UES appeared to accommodate all bolus types without evidence of stasis or retrograde movement  No evidence of backflow from upper cervical esophagus into the pharynx     Strategies/Compensatory Techniques Utilized: SLP instructed pt to perform chin tuck technique to reduce risk of penetration and/or aspiration. However, pt not successful with given technique, as pt continued to demonstrated deep laryngeal penetration during the swallow for thin liquids.  -SLP cued pt to perform throat clear and swallow technique to clear residuals remaining in laryngeal vestibule s/p swallow thin liquids and nectar thick liquids. Pt was successful in clearing residuals for thin liquids but demonstrated difficulty in clearing residuals for nectar thick liquids.   NOTE: Pt did demonstrate slightly wet vocal quality upon completion of MBSS-- SLP instructed pt to perform throat clear and swallow-- pt demonstrated success, as pt observed with clear vocal quality, per subjective observation.      Impressions:  Pt presents with oral phase of the swallow judged to be WFL. However, pt presents with mild-moderate  pharyngeal dysphagia c/b dcr'd laryngeal elevation/excursion and reduced epiglottic inversion and laryngeal vestibule protection d/t pt's cervical osteophytes observed at level C4-C5, which limit pt's epiglottic inversion, resulting in episodes of laryngeal penetration (at the level of the vocal cords) for thin and nectar thick liquid trials-- pt able to adequately clear residuals from laryngeal vestibule with given cues, however, pt is still at risk for aspiration if residuals are not cleared from airway entrance. Pt also presents with reduced tongue base retraction and reduced pharyngeal clearance, resulting in mild-moderate residuals in vallecula, pyriform sinuses and along pharyngeal wall after the swallow for thin and nectar thick liquids. (Please see above for full detail of deficits).    Recommendations/Precautions/Education:  SLP recs: con't Regular/thin liquids po diet with SMALL, CUP sips only, with multiple swallows per sip of thin liquids, and all other universal swallow precautions (upright at 90 degrees, alternate sips/bites, 1 bite/sip at a time, remain upright for 30 mins after meals, whole meds 1 by 1, etc.).    -SLP educated pt on purpose of MBSS, MBSS results, diet recs/swallow precautions and POC. SLP educated pt, via visual aid on radiographic screen, on anatomy and physiology of normal swallowing mechanism vs. Pt's swallowing mechanism and deficits observed, as well as risks associated (aspiration PNA). SLP answered all questions and concerns presented by pt regarding deficits, specifically concern for osteophytes, within SLP's scope of practice. Pt acknowledged and confirmed understanding of all education provided.     Plan:    SLP will send MBSS report to referring Madeleine JARQUIN, via [facility]  SLP will send MBSS report to [facility], via [facility]  Pt should follow up with MD Salvador to discuss results of MBSS    MARY Ramírez., CCC-SLP  Speech-Language Pathologist   11/28/2018        I  CERTIFY THE NEED FOR THESE SERVICES FURNISHED UNDER THIS PLAN OF TREATMENT AND WHILE UNDER MY CARE    Physician's comments: ________________________________________________________________________________________________________________________________________________      Physician's Name: ___________________________________

## 2018-12-04 DIAGNOSIS — R06.02 SOB (SHORTNESS OF BREATH): Primary | ICD-10-CM

## 2018-12-19 ENCOUNTER — HOSPITAL ENCOUNTER (OUTPATIENT)
Dept: PULMONOLOGY | Facility: CLINIC | Age: 68
Discharge: HOME | End: 2018-12-19
Payer: MEDICARE

## 2018-12-19 ENCOUNTER — OFFICE VISIT (OUTPATIENT)
Dept: PULMONOLOGY | Facility: CLINIC | Age: 68
End: 2018-12-19
Payer: MEDICARE

## 2018-12-19 VITALS
OXYGEN SATURATION: 98 % | SYSTOLIC BLOOD PRESSURE: 128 MMHG | HEART RATE: 81 BPM | BODY MASS INDEX: 37.56 KG/M2 | DIASTOLIC BLOOD PRESSURE: 84 MMHG | HEIGHT: 66 IN | WEIGHT: 233.69 LBS

## 2018-12-19 DIAGNOSIS — Z01.811 PREOP PULMONARY/RESPIRATORY EXAM: ICD-10-CM

## 2018-12-19 DIAGNOSIS — J95.2 ACUTE PULMONARY INSUFFICIENCY FOLLOWING NONTHORACIC SURGERY: ICD-10-CM

## 2018-12-19 DIAGNOSIS — R06.02 SOB (SHORTNESS OF BREATH): ICD-10-CM

## 2018-12-19 DIAGNOSIS — G47.33 OSA (OBSTRUCTIVE SLEEP APNEA): ICD-10-CM

## 2018-12-19 DIAGNOSIS — J38.3 VOCAL FOLD SCAR: ICD-10-CM

## 2018-12-19 DIAGNOSIS — Z01.811 PREOPERATIVE RESPIRATORY EXAMINATION: Primary | ICD-10-CM

## 2018-12-19 LAB
PRE FEV1 FVC: 81
PRE FEV1: 1.97
PRE FVC: 2.44
PREDICTED FEV1 FVC: 80
PREDICTED FEV1: 2.69
PREDICTED FVC: 3.36

## 2018-12-19 PROCEDURE — 94729 DIFFUSING CAPACITY: CPT | Mod: PBBFAC | Performed by: INTERNAL MEDICINE

## 2018-12-19 PROCEDURE — 94010 BREATHING CAPACITY TEST: CPT | Mod: PBBFAC | Performed by: INTERNAL MEDICINE

## 2018-12-19 PROCEDURE — 94729 DIFFUSING CAPACITY: CPT | Mod: 26,S$PBB,, | Performed by: INTERNAL MEDICINE

## 2018-12-19 PROCEDURE — 99213 OFFICE O/P EST LOW 20 MIN: CPT | Mod: PBBFAC,25 | Performed by: INTERNAL MEDICINE

## 2018-12-19 PROCEDURE — 99204 OFFICE O/P NEW MOD 45 MIN: CPT | Mod: 25,S$PBB,GC, | Performed by: INTERNAL MEDICINE

## 2018-12-19 PROCEDURE — 94010 BREATHING CAPACITY TEST: CPT | Mod: 26,S$PBB,, | Performed by: INTERNAL MEDICINE

## 2018-12-19 PROCEDURE — 99999 PR PBB SHADOW E&M-EST. PATIENT-LVL III: CPT | Mod: PBBFAC,,, | Performed by: INTERNAL MEDICINE

## 2018-12-19 NOTE — LETTER
December 19, 2018      Sánchez Ndiaye MD  [address]  [place] [address]           [facility]  [address]  [place] [address]  Phone: [phone]          Patient: Hoang Santos Jr.   MR Number: [id number]   YOB: 1950   Date of Visit: 12/19/2018       Dear Dr. Sánchez Ndiaye:    Thank you for referring Hoang aSntos to me for evaluation. Attached you will find relevant portions of my assessment and plan of care.    If you have questions, please do not hesitate to call me. I look forward to following Hoang Santos along with you.    Sincerely,    Behram Khan, MD    Enclosure  CC:  No Recipients    If you would like to receive this communication electronically, please contact [email or web address] or [phone] to request more information on [employer] access.    For providers and/or their staff who would like to refer a patient to [facility], please contact us through our one-stop-shop provider referral line, [facility], at [phone].    If you feel you have received this communication in error or would no longer like to receive these types of communications, please e-mail [email or web address]

## 2018-12-19 NOTE — PROGRESS NOTES
PULMONOLOGY RESIDENT CLINIC                                                             CLINIC NOTE    Patient Name: Hoang Santos Jr.  YOB: 1950    PRESENTING HISTORY     Respiratory issues during EUS      History of Present Illness:  Mr. Hoang Santos Jr. is a 68 y.o. male w/ significant PMHx of obstructive sleep apnea s/p uvulopalatopharyngoplasty (UPPP) and cervical osteophytes which impair pharyngoesophageal transit and epiglotic inversion.     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 Pulmonology has been asked to perform a perioperative respiratory risk evaluation. He was sedated and administered a laryngeal mask airway. Procedure began at 0830hr; at 0904hr anesthesia determined that the patient's airway had become obstructed and required urgent intubation.     Following the procedure, he only felt some irritation in his throat and otherwise felt back to his baseline health.    Review of Systems   Constitutional: Negative for chills, fever, malaise/fatigue and weight loss.   HENT: Negative for congestion, ear discharge and sore throat.    Eyes: Negative for blurred vision and redness.   Respiratory: Negative for cough, shortness of breath and wheezing.    Cardiovascular: Negative for chest pain, palpitations and leg swelling.   Gastrointestinal: Negative for abdominal pain, constipation, diarrhea, nausea and vomiting.   Genitourinary: Negative for dysuria and frequency.   Musculoskeletal: Negative for joint pain and myalgias.   Skin: Negative for itching and rash.   Neurological: Negative for dizziness, seizures, loss of consciousness and headaches.   Endo/Heme/Allergies: Negative for environmental allergies. Does not bruise/bleed easily.   Psychiatric/Behavioral: Negative for depression. The patient is not nervous/anxious and does not have insomnia.          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 OR;  Service: Urology;  Laterality: N/A;    EGD (ESOPHAGOGASTRODUODENOSCOPY) N/A 9/24/2018    Performed by Salo Nuñez MD at [facility] ENDO (4TH FLR)    ENDOSCOPIC ULTRASOUND OF UPPER GASTROINTESTINAL TRACT N/A 11/5/2018    Procedure: ULTRASOUND-ENDOSCOPIC-UPPER;  Surgeon: Gorge Reyes MD;  Location: [facility] ENDO;  Service: Endoscopy;  Laterality: N/A;  Carcinoid diagnosis    ESOPHAGOGASTRODUODENOSCOPY N/A 9/24/2018    Procedure: EGD (ESOPHAGOGASTRODUODENOSCOPY);  Surgeon: Salo Nuñez MD;  Location: [facility] ENDO (4TH FLR);  Service: Endoscopy;  Laterality: N/A;    PALATOPLASTY-(UPPP) N/A 2/14/2017    Performed by Bob Araujo III, MD at [facility] OR 2ND FLR    PH MONITORING, ESOPHAGUS, WIRELESS, (OFF REFLUX MEDS) N/A 9/24/2018    Performed by Salo [facility]ez MD at [facility] ENDO (4TH FLR)    TONSILLECTOMY      TRANSPROSTATIC TISSUE RETRACTION N/A 6/28/2018    Performed by Kory Jack MD at [facility] OR    ULTRASOUND-ENDOSCOPIC-UPPER N/A 11/5/2018    Performed by Gorge Reyes MD at [facility] ENDO    UVULOPALATOPHARYNGOPLASTY         Family History   Problem Relation Age of Onset    Heart disease Mother     Diabetes Mother     Stroke Father     Cancer Sister         pancreatitic    COPD Sister     Prostate cancer Neg Hx     Kidney disease Neg Hx     Thyroid disease Neg Hx     Retinal detachment Neg Hx     Macular degeneration Neg Hx     Hypertension Neg Hx     Glaucoma Neg Hx        Social History     Socioeconomic History    Marital status:      Spouse  name: None    Number of children: 3    Years of education: None    Highest education level: None   Social Needs    Financial resource strain: None    Food insecurity - worry: None    Food insecurity - inability: None    Transportation needs - medical: None    Transportation needs - non-medical: None   Occupational History    None   Tobacco Use    Smoking status: Never Smoker    Smokeless tobacco: Never Used   Substance and Sexual Activity    Alcohol use: Yes     Alcohol/week: 1.2 oz     Types: 2 Cans of beer per week    Drug use: No    Sexual activity: Not Currently   Other Topics Concern    None   Social History Narrative    None       MEDICATIONS & ALLERGIES:                  Medication List           Accurate as of 12/19/18  3:46 PM. If you have any questions, ask your nurse or doctor.               CONTINUE taking these medications    atorvastatin 40 MG tablet  Commonly known as:  LIPITOR  Take 1 tablet (40 mg total) by mouth once daily.     cholecalciferol (vitamin D3) 50,000 unit capsule  Take 1 capsule (50,000 Units total) by mouth once a week.     FLUZONE HIGH-DOSE 2018-19 (PF) 180 mcg/0.5 mL vaccine  Generic drug:  influenza  Inject 0.5 mLs into the muscle.     omeprazole 40 MG capsule  Commonly known as:  PRILOSEC  Take 1 capsule (40 mg total) by mouth 2 (two) times daily before meals.     * VIAGRA 100 MG tablet  Generic drug:  sildenafil  Take 1 tablet (100 mg total) by mouth once daily. Brand name only medication.     * sildenafil 100 MG tablet  Commonly known as:  VIAGRA  Take 1 tablet (100 mg total) by mouth daily as needed for Erectile Dysfunction.         * This list has 2 medication(s) that are the same as other medications prescribed for you. Read the directions carefully, and ask your doctor or other care provider to review them with you.                Review of patient's allergies indicates:   Allergen Reactions    Epinephrine      Neuroendocrine Tumor patient         OBJECTIVE:  "    Vital Signs:  Vitals:    12/19/18 1453   BP: 128/84   Pulse: 81   SpO2: 98%   Weight: 106 kg (233 lb 11 oz)   Height: 5' 6" (1.676 m)     Wt Readings from Last 5 Encounters:   12/19/18 106 kg (233 lb 11 oz)   11/26/18 105.9 kg (233 lb 6.4 oz)   11/19/18 105.9 kg (233 lb 7.5 oz)   11/19/18 105.9 kg (233 lb 7.5 oz)   11/05/18 104.3 kg (230 lb)       No results found for this or any previous visit (from the past 24 hour(s)).      Physical Exam   Constitutional: He is oriented to person, place, and time and well-developed, well-nourished, and in no distress.   HENT:   Head: Normocephalic and atraumatic.   Eyes: EOM are normal. Pupils are equal, round, and reactive to light. No scleral icterus.   Neck: Normal range of motion. Neck supple. No thyromegaly present.   Cardiovascular: Normal rate, regular rhythm and normal heart sounds.   No murmur heard.  Pulmonary/Chest: Effort normal and breath sounds normal. No respiratory distress. He has no wheezes.   Abdominal: Soft. Bowel sounds are normal. He exhibits no distension. There is no tenderness.   Lymphadenopathy:     He has no cervical adenopathy.   Neurological: He is alert and oriented to person, place, and time.   Skin: Skin is warm and dry. No erythema.   Psychiatric: Affect normal.   Nursing note and vitals reviewed.        ASSESSMENT & PLAN:     Diagnoses and all orders for this visit:      Acute pulmonary insufficiency following nonthoracic surgery   Given history of obstructive sleep apnea and LMA requiring conversion to intubation, will evaluate the patient for possible upper airway obstruction with PFT. Also recommend additional perioperative considerations  -     Complete PFT w/ bronchodilator; Future  - Recommend minimization of opiate medication for sedation  - Recommend extubation to home CPAP settings  - Recommend incentive spirometry and early mobilization following surgery.    Preop pulmonary/respiratory exam  -     Complete PFT w/ bronchodilator; " Future  -     X-Ray Chest PA And Lateral; Future      I have discussed the plan with Dr Staples.    Behram Khan, MD  Internal Medicine PGY-2  M: [phone]   #[phone]

## 2018-12-24 ENCOUNTER — HOSPITAL ENCOUNTER (OUTPATIENT)
Dept: RADIOLOGY | Facility: HOSPITAL | Age: 68
Discharge: HOME | End: 2018-12-24
Attending: STUDENT IN AN ORGANIZED HEALTH CARE EDUCATION/TRAINING PROGRAM
Payer: MEDICARE

## 2018-12-24 DIAGNOSIS — Z01.811 PREOP PULMONARY/RESPIRATORY EXAM: ICD-10-CM

## 2018-12-24 PROCEDURE — 71046 X-RAY EXAM CHEST 2 VIEWS: CPT | Mod: 26,,, | Performed by: RADIOLOGY

## 2018-12-24 PROCEDURE — 71046 X-RAY EXAM CHEST 2 VIEWS: CPT | Mod: TC

## 2019-01-03 ENCOUNTER — OFFICE VISIT (OUTPATIENT)
Dept: NEUROLOGY | Facility: HOSPITAL | Age: 69
DRG: 326 | End: 2019-01-03
Attending: SURGERY
Payer: MEDICARE

## 2019-01-03 ENCOUNTER — TELEPHONE (OUTPATIENT)
Dept: GASTROENTEROLOGY | Facility: CLINIC | Age: 69
End: 2019-01-03

## 2019-01-03 ENCOUNTER — ANESTHESIA EVENT (OUTPATIENT)
Dept: SURGERY | Facility: HOSPITAL | Age: 69
DRG: 326 | End: 2019-01-03
Payer: MEDICARE

## 2019-01-03 ENCOUNTER — HOSPITAL ENCOUNTER (OUTPATIENT)
Dept: PREADMISSION TESTING | Facility: HOSPITAL | Age: 69
Discharge: HOME | DRG: 326 | End: 2019-01-03
Attending: SURGERY
Payer: MEDICARE

## 2019-01-03 VITALS
RESPIRATION RATE: 16 BRPM | DIASTOLIC BLOOD PRESSURE: 81 MMHG | HEART RATE: 78 BPM | BODY MASS INDEX: 36.96 KG/M2 | HEIGHT: 66 IN | OXYGEN SATURATION: 96 % | SYSTOLIC BLOOD PRESSURE: 149 MMHG | WEIGHT: 230 LBS

## 2019-01-03 VITALS
TEMPERATURE: 97 F | BODY MASS INDEX: 36.12 KG/M2 | HEIGHT: 66 IN | WEIGHT: 224.75 LBS | HEART RATE: 73 BPM | DIASTOLIC BLOOD PRESSURE: 79 MMHG | SYSTOLIC BLOOD PRESSURE: 129 MMHG

## 2019-01-03 DIAGNOSIS — C7A.010 MALIGNANT CARCINOID TUMOR OF DUODENUM: Primary | ICD-10-CM

## 2019-01-03 DIAGNOSIS — Z01.810 PRE-OPERATIVE CARDIOVASCULAR EXAMINATION: Primary | ICD-10-CM

## 2019-01-03 PROCEDURE — 99214 OFFICE O/P EST MOD 30 MIN: CPT | Mod: 25 | Performed by: SURGERY

## 2019-01-03 RX ORDER — LIDOCAINE HYDROCHLORIDE 10 MG/ML
1 INJECTION, SOLUTION EPIDURAL; INFILTRATION; INTRACAUDAL; PERINEURAL ONCE
Status: CANCELLED | OUTPATIENT
Start: 2019-01-03 | End: 2019-01-03

## 2019-01-03 RX ORDER — SODIUM CHLORIDE, SODIUM LACTATE, POTASSIUM CHLORIDE, CALCIUM CHLORIDE 600; 310; 30; 20 MG/100ML; MG/100ML; MG/100ML; MG/100ML
INJECTION, SOLUTION INTRAVENOUS CONTINUOUS
Status: CANCELLED | OUTPATIENT
Start: 2019-01-03

## 2019-01-03 NOTE — ANESTHESIA PREPROCEDURE EVALUATION
"                                                                                                             01/03/2019  Hoang Santos Jr. is a 68 y.o., male scheduled for EGD, duodenectomy, lymphadenectomy, gastrojejunostomy, and fundoplication under GETA on 1/4/19.    Per cardiology, "Pt is at low cardiovascular risk for a intermediate risk surgery. No further cardiac workup is needed at this time.  Proceed with surgery as planned."    History of perioperative obstruction related to anesthesia, GEMMA, vocal atrophy scar, and cervical osteophyte.  Per pulmonary services, "Patient should be extubated to NIPPV (CPAP of at least 10) to overcome both GEMMA and mechanical upper airway obstruction and keep patent.  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    Prostatic hyperplasia     Sleep apnea      Past Surgical History:   Procedure Laterality Date    EGD (ESOPHAGOGASTRODUODENOSCOPY) N/A 9/24/2018    Performed by Salo [facility]ez MD at [facility] ENDO (4TH FLR)    PALATOPLASTY-(UPPP) N/A 2/14/2017    Performed by Terrell JAN Araujo III, MD at [facility] OR 2ND FLR    PH MONITORING, ESOPHAGUS, WIRELESS, (OFF REFLUX MEDS) N/A 9/24/2018    Performed by Salo Nuñez MD at [facility] ENDO (4TH FLR)    TONSILLECTOMY      TRANSPROSTATIC TISSUE RETRACTION N/A 6/28/2018    [facility]    ULTRASOUND-ENDOSCOPIC-UPPER N/A 11/5/2018    [facility]    UVULOPALATOPHARYNGOPLASTY       Wt Readings from Last 3 Encounters:   01/04/19 101.6 kg (224 lb)   01/03/19 104.3 kg (230 lb)   01/03/19 101.9 kg (224 lb 12.1 oz)     Temp " Readings from Last 3 Encounters:   01/04/19 36.7 °C (98.1 °F) (Skin)   01/03/19 36.2 °C (97.1 °F) (Oral)   11/19/18 36.3 °C (97.4 °F) (Oral)     BP Readings from Last 3 Encounters:   01/04/19 (!) 142/70   01/03/19 (!) 149/81   01/03/19 129/79     Pulse Readings from Last 3 Encounters:   01/04/19 78   01/03/19 78   01/03/19 73         Anesthesia Evaluation    I have reviewed the Patient Summary Reports.    I have reviewed the Nursing Notes.   I have reviewed the Medications.     Review of Systems  Anesthesia Hx:  Hx of Anesthetic complications (perioperative airway obstruction following EGD requiring intubation)    Social:  Non-Smoker, Social Alcohol Use    Hematology/Oncology:  Hematology Normal        Cardiovascular:   Exercise tolerance: good Denies Hypertension.  Denies CAD.    Denies Dysrhythmias.   Denies Angina. hyperlipidemia  Functional Capacity Can you climb two flights of stairs? ==> Yes    Pulmonary:   Denies Asthma.  Denies Recent URI. Sleep Apnea, CPAP    Renal/:  Renal/ Normal     Hepatic/GI:   Hiatal Hernia, GERD, well controlled Denies Liver Disease. 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  Denies CVA. Denies Seizures.    Endocrine:  Endocrine Normal      Lab Results   Component Value Date    WBC 4.57 01/04/2019    HGB 14.4 01/04/2019    HCT 45.0 01/04/2019    MCV 90 01/04/2019     01/04/2019       Chemistry        Component Value Date/Time     01/03/2019 1351     01/03/2019 1351    K 4.1 01/03/2019 1351    K 4.1 01/03/2019 1351     01/03/2019 1351     01/03/2019 1351    CO2 28 01/03/2019 1351    CO2 28 01/03/2019 1351    BUN 11 01/03/2019 1351    BUN 11 01/03/2019 1351    CREATININE 0.9 01/03/2019 1351    CREATININE 0.9 01/03/2019 1351    GLU 83 01/03/2019 1351    GLU 83 01/03/2019 1351        Component Value Date/Time    CALCIUM 10.1 01/03/2019 1351    CALCIUM 10.1 01/03/2019 1351    ALKPHOS 51 (L) 01/03/2019  1351    ALKPHOS 51 (L) 01/03/2019 1351    AST 31 01/03/2019 1351    AST 31 01/03/2019 1351    ALT 26 01/03/2019 1351    ALT 26 01/03/2019 1351    BILITOT 1.0 01/03/2019 1351    BILITOT 1.0 01/03/2019 1351    ESTGFRAFRICA >60 01/03/2019 1351    ESTGFRAFRICA >60 01/03/2019 1351    EGFRNONAA >60 01/03/2019 1351    EGFRNONAA >60 01/03/2019 1351            Physical Exam  General:  Well nourished, Obesity    Airway/Jaw/Neck:  Airway Findings: Mouth Opening: Small, but > 3cm Tongue: Normal, Large  General Airway Assessment: Adult  Mallampati: IV  Improves to III with phonation.  TM Distance: 4 - 6 cm  Jaw/Neck Findings:  Neck ROM: Normal ROM  Neck Findings:  Girth Increased     Eyes/Ears/Nose:  EYES/EARS/NOSE FINDINGS: Normal   Dental:  Dental Findings: In tact, upper front caps, upper partial dentures   Chest/Lungs:  Chest/Lungs Findings: Clear to auscultation     Heart/Vascular:  Heart Findings: Rate: Normal  Rhythm: Regular Rhythm  Sounds: Normal        Mental Status:  Mental Status Findings:  Alert and Oriented       EKG 11/26/18:  Normal sinus rhythm  Normal ECG  When compared with ECG of 10-MAR-2017 12:07, No significant change was found  Confirmed by Tyler JARQUIN [facility]      Anesthesia Plan  Type of Anesthesia, risks & benefits discussed:  Anesthesia Type:  general  Patient's Preference:   Intra-op Monitoring Plan:   Intra-op Monitoring Plan Comments:   Post Op Pain Control Plan:   Post Op Pain Control Plan Comments:   Induction:   IV  Beta Blocker:  Patient is not currently on a Beta-Blocker (No further documentation required).       Informed Consent: Patient understands risks and agrees with Anesthesia plan.  Questions answered.   ASA Score: 3     Day of Surgery Review of History & Physical: I have interviewed and examined the patient. I have reviewed the patient's H&P dated:  There are no significant changes.  H&P update referred to the provider.  H&P completed by Anesthesiologist.   Anesthesia Plan Notes:  Anesthesia consent to be signed prior to procedure on 1/4/19.  Patient should be extubated to NIPPV (CPAP of at least 10) to overcome both GEMMA and mechanical upper airway obstruction and keep patent.              Ready For Surgery From Anesthesia Perspective.

## 2019-01-03 NOTE — DISCHARGE INSTRUCTIONS
Your surgery is scheduled for 1/4/18.    Please report to Outpatient Surgery Intake Office on the 2nd FLOOR at 6:00 a.m.          INSTRUCTIONS IMPORTANT!!!  ¨ Do not eat or drink after 12 midnight-including water. OK to brush teeth, no   gum, candy or mints!      ____  Proceed to [facility] on 1/3/18 for additional blood test.        ____  No powder, lotions or creams to surgical area.  ____  Please remove all jewelry, including piercings and leave at home.  ____  No money or valuables needed. Please leave at home.  ____  Please bring any documents given by your doctor.  ____  If going home the same day, arrange for a ride home. You will not be able to             drive if Anesthesia was used.  ____  Wear loose fitting clothing. Allow for dressings, bandages.  ____  Stop Aspirin, Ibuprofen, Motrin and Aleve at least 3-5 days before surgery, unless otherwise instructed by your doctor, or the nurse.   You MAY use Tylenol/acetaminophen until day of surgery.  ____  Wash the surgical area with Hibiclens the night before surgery, and again the             morning of surgery.  Be sure to rinse hibiclens off completely (if instructed by   nurse).  ____  If you take diabetic medication, do not take am of surgery unless instructed by Doctor.  ____  Call MD for temperature above 101 degrees or any other signs of infection such as Urinary (bladder) infection, Upper respiratory infection, skin boils, etc.  ____ Stop taking any Fish Oil supplement or any Vitamins that contain Vitamin E at least 5 days prior to surgery.  ____ Do Not wear your contact lenses the day of your procedure.  You may wear your glasses.      ____Do not shave for 3 days prior to surgery.  ____ Practice Good hand washing before, during, and after procedure.      I have read or had read and explained to me, and understand the above information.  Additional comments or instructions:  For additional questions call [phone]      Pre-Op Bathing  Instructions    Before surgery, you can play an important role in your own health.    Because skin is not sterile, we need to be sure that your skin is as free of germs as possible. By following the instructions below, you can reduce the number of germs on your skin before surgery.    IMPORTANT: You will need to shower with a special soap called Hibiclens*, available at any pharmacy.  If you are allergic to Chlorhexidine (the antiseptic in Hibiclens), use an antibacterial soap such as Dial Soap for your preoperative shower.  You will shower with Hibiclens both the night before your surgery and the morning of your surgery.  Do not use Hibiclens on the head, face or genitals to avoid injury to those areas.    STEP #1: THE NIGHT BEFORE YOUR SURGERY     1. Do not shave the area of your body where your surgery will be performed.  2. Shower and wash your hair and body as usual with your normal soap and shampoo.  3. Rinse your hair and body thoroughly after you shower to remove all soap residue.  4. With your hand, apply one packet of Hibiclens soap to the surgical site.   5. Wash the site gently for five (5) minutes. Do not scrub your skin too hard.   6. Do not wash with your regular soap after Hibiclens is used.  7. Rinse your body thoroughly.  8. Pat yourself dry with a clean, soft towel.  9. Do not use lotion, cream, or powder.  10. Wear clean clothes.    STEP #2: THE MORNING OF YOUR SURGERY     1. Repeat Step #1.    * Not to be used by people allergic to Chlorhexidine.

## 2019-01-03 NOTE — TELEPHONE ENCOUNTER
Spoke with patient, he is requesting to reschedule his appointment with Dr. Nuñez due to surgery.     GI appointment scheduled and mailed to address on file.

## 2019-01-03 NOTE — PROGRESS NOTES
"NOLANETS:  [facility]  A collaboration between [facility] and [facility]      PATIENT: Hoang Santos Jr.  MRN: [id number]  DATE: 1/3/2019    Subjective:      Chief Complaint: Follow-up (Pre-Op Visit for Surgery)  lost 5 lbs since last visit    Feeling ok overall    GI functions overall normal  Significant reflux problem 8/10 , all the time, coughing in the middle of night, cough when lying down    Vitals:   Vitals:    01/03/19 1211   BP: 129/79   BP Location: Right arm   Pulse: 73   Temp: 97.1 °F (36.2 °C)   TempSrc: Oral   Weight: 101.9 kg (224 lb 12.1 oz)   Height: 5' 6" (1.676 m)        Karnofsky Score:   Karnofsky Score    Karnofsky Score:  100% - Normal, No Complaints, No Evidence of Disease         Diagnosis: No diagnosis found.     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    Prostatic hyperplasia     Sleep apnea        Past Surgical History:  Past Surgical History:   Procedure Laterality Date    EGD (ESOPHAGOGASTRODUODENOSCOPY) N/A 9/24/2018    Performed by Salo Nu[facility]ez MD at [facility] END[facility] (4TH FLR)    PALATOPLASTY-(UPPP) N/A 2/14/2017    Performed by Bob Araujo III, MD at [facility] OR 2ND FLR    PH MONITORING, ESOPHAGUS, WIRELESS, (OFF REFLUX MEDS) N/A 9/24/2018    Performed by Salo Nuñez MD at [facility] (4TH FLR)    TONSILLECTOMY      TRANSPROSTATIC TISSUE RETRACTION N/A 6/28/2018    [facility]    ULTRASOUND-ENDOSCOPIC-UPPER N/A 11/5/2018    Performed by Gorge Reyes [facility] at [facility]    UVULOPALATOPHARYNGOPLASTY         Family History:  Family History   Problem Relation Age of Onset    Heart disease Mother     Diabetes Mother     Stroke Father     Cancer Sister         pancreatitic    COPD Sister     " Prostate cancer Neg Hx     Kidney disease Neg Hx     Thyroid disease Neg Hx     Retinal detachment Neg Hx     Macular degeneration Neg Hx     Hypertension Neg Hx     Glaucoma Neg Hx        Allergies:  Review of patient's allergies indicates:   Allergen Reactions    Epinephrine      Neuroendocrine Tumor patient         Medications:  Current Outpatient Medications   Medication Sig Dispense Refill    atorvastatin (LIPITOR) 40 MG tablet Take 1 tablet (40 mg total) by mouth once daily. 90 tablet 3    cholecalciferol, vitamin D3, 50,000 unit capsule Take 1 capsule (50,000 Units total) by mouth once a week. 12 capsule 3    influenza (FLUZONE HIGH-DOSE) 180 mcg/0.5 mL vaccine Inject 0.5 mLs into the muscle. 0.5 mL 0    sildenafil (VIAGRA) 100 MG tablet Take 1 tablet (100 mg total) by mouth daily as needed for Erectile Dysfunction. 30 tablet 3    VIAGRA 100 mg tablet Take 1 tablet (100 mg total) by mouth once daily. Brand name only medication. 10 tablet 11     No current facility-administered medications for this visit.        Review of Systems   Constitutional: Negative for activity change, appetite change, chills, diaphoresis, fatigue, fever and unexpected weight change.   HENT: Negative for congestion, dental problem, drooling, ear discharge, ear pain, facial swelling, hearing loss, mouth sores, postnasal drip, rhinorrhea, sinus pressure and sinus pain.    Eyes: Negative for pain, discharge, redness and visual disturbance.   Respiratory: Positive for wheezing. Negative for apnea, cough, choking, chest tightness, shortness of breath and stridor.    Cardiovascular: Negative for chest pain, palpitations and leg swelling.   Gastrointestinal: Negative for abdominal distention, abdominal pain, anal bleeding, blood in stool, constipation, diarrhea, nausea, rectal pain and vomiting.   Endocrine: Negative.    Genitourinary: Negative.    Musculoskeletal: Negative for arthralgias, back pain, gait problem, joint swelling,  myalgias and neck pain.   Skin: Negative.    Allergic/Immunologic: Negative.    Neurological: Negative for tremors, syncope, speech difficulty, weakness and numbness.   Hematological: Negative.    Psychiatric/Behavioral: Negative.       Objective:      Physical Exam   Constitutional: He is oriented to person, place, and time. He appears well-developed and well-nourished. No distress.   HENT:   Head: Normocephalic and atraumatic.   Right Ear: External ear normal.   Left Ear: External ear normal.   Eyes: Conjunctivae and EOM are normal. Pupils are equal, round, and reactive to light.   Neck: Normal range of motion. Neck supple.   Cardiovascular: Normal rate and regular rhythm.   Pulmonary/Chest: Effort normal and breath sounds normal.   Abdominal: Soft. Bowel sounds are normal. He exhibits no distension and no mass. There is no rebound and no guarding. No hernia.   Musculoskeletal: Normal range of motion.   Neurological: He is alert and oriented to person, place, and time.   Skin: Skin is warm and dry. He is not diaphoretic.   Psychiatric: He has a normal mood and affect. His behavior is normal. Thought content normal.      Assessment:       No diagnosis found.    Laboratory Data:      Number of Days per Week Number per Day   DIARRHEA 0     BRISTOL STOOL SCALE RATING       FLUSHING 0     WHEEZING occasional        WEIGHT GAIN/LOSS Stable    ENERGY RATING (0-10) 10      Physical Exam: deferred.   Findings:       A widely patent and non-obstructing Schatzki ring (acquired) was        found at the gastroesophageal junction.       The exam of the esophagus was otherwise normal.       A 3 cm sliding hiatal hernia was found. The proximal extent of the        gastric folds (end of tubular esophagus) was 40 cm from the        incisors. The hiatal narrowing was 43 cm from the incisors. The        Z-line was at the gastroesophageal junction.       The cardia and gastric fundus were normal on retroflexion.       The gastric body  and gastric antrum were normal.       A 2 cm polypoid mass with no bleeding was found in the duodenal        bulb. Biopsies were taken with a cold forceps for histology.       A single 8 mm pedunculated polyp was found in the second portion of        the duodenum. Biopsies were taken with a cold forceps for histology.       The BRAVO capsule with delivery system was introduced through the        mouth and advanced into the esophagus, such that the BRAVO pH        capsule was positioned 34 cm from the incisors, which was 6 cm        proximal to the GE junction. The BRAVO pH capsule was then deployed        and attached to the esophageal mucosa. The delivery system was then        withdrawn. Endoscopy was utilized for probe placement and diagnostic        evaluation. The scope was reinserted to evaluate placement of the        BRAVO capsule. Visualization showed the BRAVO capsule to be in an        appropriate position.  Impression:           - Widely patent and non-obstructing Schatzki ring.                        - 3 cm sliding hiatal hernia.                        - Normal gastric body and antrum.                        - Duodenal mass. Biopsied.                        - A single duodenal polyp. Biopsied.                        - The BRAVO pH capsule was deployed.                        - Patient had frequent airway obstruction with                         oxygen desaturation requiring bag-mask ventilation                         several times during the procedure.  The patient tolerated the procedure                         well.  Findings:       ENDOSONOGRAPHIC FINDING: :       The esophagus, stomach and duodenum and adjacent structures were        visualized endosonographically.       The Aorta was identified and is followed to the Celiac and        Mesenteric Artery takeoff. They appeared normal without evidence of        lymphadenopathy in the region. The Celiac Artery is then followed        until the  pancreas is identified. The pancreatic body and tail are        surveyed from this region.       The pancreatic parenchyma has the normal salt and pepper appearance.        The pancreatic duct measured 1.7 mm in diameter. The pancreatic duct        was normal in its course without areas of dilation, stricture or        irregularity. No abnormal side branches were identified.       The pancreas was then surveyed from the duodenal stations. The        pancreatic parenchyma appeared normal. The pancreatic duct measured        1.9 mm in this region and appeared normal in its course. The common        bile duct was identified and measured 4.6 mm. The CBD appeared        normal on its course without evidence of defects, thickening or        irregularity.       The portal vein, confluence, superior mesenteric vein and superior        mesenteric artery appeared endosonographically normal. There were no        abnormal lymph nodes in the region.       There was diffuse abnormal echotexture in the visualized portion of        the liver. This was characterized by a homogenous appearance and a        hyperechoic appearance.       No lymphadenopathy seen.       A pedunculated intramural (subepithelial) lesion was found in the        apex of the duodenal bulb. The lesion was hypoechoic. The lesion        also measured 7 mm by 7 mm in diameter. The outer margins were well        defined.       A hypoechoic lobulated mass was identified endosonographically in        the duodenal bulb. The mass measured 16 mm by 7 mm in maximal        cross-sectional diameter. The endosonographic borders were        well-defined. There was sonographic evidence suggesting invasion        into the submucosa (Layer 3).  Impression:           - There was diffuse abnormal echotexture in the                         visualized portion of the liver. This was                         characterized by a homogenous appearance and a                          hyperechoic appearance.                        - A pedunculated intramural (subepithelial) lesion                         was found in the apex of the duodenal bulb. It                         measured 7 mm by 7 mm. Previous biopsies showed NET.                        - A mass was found in the duodenal bulb. The lesion                         appear to involved the submucosal. It is unclear if                         the MP is involved. The lesion measured                         approximately 1.6 cm by 7 mm. The previous                         diagnosis was a NET. This was stage T2 N0 M0.                        - No specimens collected.                 Pathology Data:  SPECIMEN  1) Duodenal polyp.  2) Duodenal bulb mass.  FINAL PATHOLOGIC DIAGNOSIS  1. DUODENUM, POLYPECTOMY:  Rare cluster of cells suspicious for neuroendocrine neoplasm  (the focus is depleted in the deeper levels for confirmation)  Duodenal mucosa with reactive changes  Villous architecture is maintained  2. DUODENAL BULB MASS, BIOPSY:  Neuroendocrine neoplasm, low-grade, well-differentiated  Longest continuous tumor focus is 1.2mm  Immunostains synaptophysin and chromogranin are positive  Ki67 - less than 1%              Laboratory Data:   gets labs today        Radiology Data:            FINDINGS:  Patient was administered 5.49 millicuries of gallium 68 octreotide intravenously.    There is physiologic intracranial, head, and neck activity.  Heart mediastinum are normal.  There is physiologic liver, spleen, GI and  activity.  There is an isolated peritoneal implant right upper quadrant SUV max 31.02.  Kidneys and adrenal glands are unremarkable.  No bone or lung lesions are seen.       Impression         See above    There is a isolated peritoneal implant suspected right upper quadrant SUV max 31.02.           FINDINGS:  No pleural effusion.  No liver lesions.  The biliary ducts are not dilated.  The gallbladder is present.  The stomach,  pancreas, spleen, adrenal glands and kidneys appear normal.  Small bilateral kidney cysts.  The aorta tapers normally, no para-aortic lymphadenopathy.  The mesentery appears normal.  No definite abnormality seen to correspond to the area of abnormality seen on PET 68 GA Dotatate 11/01/2018.  No destructive osseous lesions seen.       Impression         No metastatic disease seen.    There are no measurable lesions per RECIST criteria.           FINDINGS:  Mild atelectasis in the lung bases.  Visualized portions of the heart and mediastinum are unremarkable.    The liver has an unusual contour.  Diffusely decreased attenuation throughout the liver relative to the spleen..  No focal hepatic lesions are identified.  Gallbladder, spleen, pancreas, and adrenal glands appear within normal limits.    Small cysts in both kidneys.  The ureters are decompressed. Urinary bladder unremarkable.  Postsurgical change of the prostate.    The stomach, small bowel and colon demonstrate no evidence of obstruction or focal inflammation.  Appendix is unremarkable.    No free air or free fluid identified within the abdomen or pelvis.    Aorta tapers normally throughout its visualized course.  Trace atherosclerotic calcification.    Small fat containing umbilical hernia.    Osseous structures exhibit moderate degenerative changes. No fracture or focal osseous destructive lesion.       Impression         No soft tissue mass or pathologic lymph node enlargement identified.  No measurable lesions per RECIST.    Hepatic steatosis.    Small fat containing umbilical hernia.    Moderate degenerative change in the thoracic and lumbar spine.     Simple appearing renal cysts bilaterally.    Postsurgical change of the prostate.               Left Ventricle Normal ejection fraction . Concentric hypertrophy observed. Grade I (mild) left ventricular diastolic dysfunction consistent with impaired relaxation.   Right Ventricle Right ventricle not well  visualized. Normal ejection fraction. Wall motion abnormal. There is segmental dysfunction of the right ventricle.   Left Atrium Normal cavity size.   Right Atrium Normal cavity size.   Aortic Valve The valve is trileaflet. Mobility is normal   Mitral Valve Normal valve structure. Normal leaflet mobility.   Tricuspid Valve The tricuspid valve is normal. Mobility is normal   Pulmonic Valve Normal valve structure. Mobility is normal.   IVC/SVC Inferior vena cava is not well visualized.            Impression:  1.  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  The gallium scan shows uptake in the right upper quadrant which may be his primary tumor are his lymph node.  I discussed it with him and his wife the plan.  The plan will be to proceed with duodenectomy and lymph node dissection, with cholecystectomy.  I spent a lot of time in talking to them about the reconstruction of the duodenum either Billroth-I or Billroth II    I elaborated the risk of surgery such as bleeding infection DVT PE MI stroke delayed gastric emptying anastomotic leaks requiring more surgeries.  I also talked to him about the natural history of carcinoid disease and the need for surgery and his options.    I then talked about the hospital stay the recovery time and the need for long-term follow-up.  I emphasized the the chance of recurrence of the disease.  I showed them on the monitor about the reconstruction mid thirds and the risks and benefits of each way.    I answered all the questions.  Spent more than 40 min in talking to them about the surgery risks and benefits and obtaining a consent.  Plan:         FOR DUODENECTOMY  VIDYA  LIVER us  POSSIBLE GASTOJEJ with or without hiatal hernia repair/ fundoplication    Preop orders placed  Preop investigations reviewed               FALLON Salvador MD, FACS   Associate Professor of Surgery, [facility]   Neuroendocrine Surgery, Hepatic/Pancreatic & General Surgery   [address], [address]   [place] [address]   ph. [phone]; [phone]   fax. [phone]

## 2019-01-03 NOTE — H&P (VIEW-ONLY)
"NOLANETS:  [facility]  A collaboration between [facility] and [facility]      PATIENT: Hoang Santos Jr.  MRN: [id number]  DATE: 1/3/2019    Subjective:      Chief Complaint: Follow-up (Pre-Op Visit for Surgery)  lost 5 lbs since last visit    Feeling ok overall    GI functions overall normal  Significant reflux problem 8/10 , all the time, coughing in the middle of night, cough when lying down    Vitals:   Vitals:    01/03/19 1211   BP: 129/79   BP Location: Right arm   Pulse: 73   Temp: 97.1 °F (36.2 °C)   TempSrc: Oral   Weight: 101.9 kg (224 lb 12.1 oz)   Height: 5' 6" (1.676 m)        Karnofsky Score:   Karnofsky Score    Karnofsky Score:  100% - Normal, No Complaints, No Evidence of Disease         Diagnosis: No diagnosis found.     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    Prostatic hyperplasia     Sleep apnea        Past Surgical History:  Past Surgical History:   Procedure Laterality Date    EGD (ESOPHAGOGASTRODUODENOSCOPY) N/A 9/24/2018    Performed by Salo Nuñez MD at [facility] ENDO (4TH FLR)    PALATOPLASTY-(UPPP) N/A 2/14/2017    Performed by Bob Araujo III, MD at [facility] OR 2ND FLR    PH MONITORING, ESOPHAGUS, WIRELESS, (OFF REFLUX MEDS) N/A 9/24/2018    Performed by Salo Nuñez MD at [facility] ENDO (4TH FLR)    TONSILLECTOMY      TRANSPROSTATIC TISSUE RETRACTION N/A 6/28/2018    Performed by Kory Jack MD at [facility] [place] OR    ULTRASOUND-ENDOSCOPIC-UPPER N/A 11/5/2018    Performed by Gorge Reyes MD at [facility] ENDO    UVULOPALATOPHARYNGOPLASTY         Family History:  Family History   Problem Relation Age of Onset    Heart disease Mother     Diabetes Mother     Stroke Father     Cancer Sister         pancreatitic    COPD Sister     " Prostate cancer Neg Hx     Kidney disease Neg Hx     Thyroid disease Neg Hx     Retinal detachment Neg Hx     Macular degeneration Neg Hx     Hypertension Neg Hx     Glaucoma Neg Hx        Allergies:  Review of patient's allergies indicates:   Allergen Reactions    Epinephrine      Neuroendocrine Tumor patient         Medications:  Current Outpatient Medications   Medication Sig Dispense Refill    atorvastatin (LIPITOR) 40 MG tablet Take 1 tablet (40 mg total) by mouth once daily. 90 tablet 3    cholecalciferol, vitamin D3, 50,000 unit capsule Take 1 capsule (50,000 Units total) by mouth once a week. 12 capsule 3    influenza (FLUZONE HIGH-DOSE) 180 mcg/0.5 mL vaccine Inject 0.5 mLs into the muscle. 0.5 mL 0    sildenafil (VIAGRA) 100 MG tablet Take 1 tablet (100 mg total) by mouth daily as needed for Erectile Dysfunction. 30 tablet 3    VIAGRA 100 mg tablet Take 1 tablet (100 mg total) by mouth once daily. Brand name only medication. 10 tablet 11     No current facility-administered medications for this visit.        Review of Systems   Constitutional: Negative for activity change, appetite change, chills, diaphoresis, fatigue, fever and unexpected weight change.   HENT: Negative for congestion, dental problem, drooling, ear discharge, ear pain, facial swelling, hearing loss, mouth sores, postnasal drip, rhinorrhea, sinus pressure and sinus pain.    Eyes: Negative for pain, discharge, redness and visual disturbance.   Respiratory: Positive for wheezing. Negative for apnea, cough, choking, chest tightness, shortness of breath and stridor.    Cardiovascular: Negative for chest pain, palpitations and leg swelling.   Gastrointestinal: Negative for abdominal distention, abdominal pain, anal bleeding, blood in stool, constipation, diarrhea, nausea, rectal pain and vomiting.   Endocrine: Negative.    Genitourinary: Negative.    Musculoskeletal: Negative for arthralgias, back pain, gait problem, joint swelling,  myalgias and neck pain.   Skin: Negative.    Allergic/Immunologic: Negative.    Neurological: Negative for tremors, syncope, speech difficulty, weakness and numbness.   Hematological: Negative.    Psychiatric/Behavioral: Negative.       Objective:      Physical Exam   Constitutional: He is oriented to person, place, and time. He appears well-developed and well-nourished. No distress.   HENT:   Head: Normocephalic and atraumatic.   Right Ear: External ear normal.   Left Ear: External ear normal.   Eyes: Conjunctivae and EOM are normal. Pupils are equal, round, and reactive to light.   Neck: Normal range of motion. Neck supple.   Cardiovascular: Normal rate and regular rhythm.   Pulmonary/Chest: Effort normal and breath sounds normal.   Abdominal: Soft. Bowel sounds are normal. He exhibits no distension and no mass. There is no rebound and no guarding. No hernia.   Musculoskeletal: Normal range of motion.   Neurological: He is alert and oriented to person, place, and time.   Skin: Skin is warm and dry. He is not diaphoretic.   Psychiatric: He has a normal mood and affect. His behavior is normal. Thought content normal.      Assessment:       No diagnosis found.    Laboratory Data:      Number of Days per Week Number per Day   DIARRHEA 0     BRISTOL STOOL SCALE RATING       FLUSHING 0     WHEEZING occasional        WEIGHT GAIN/LOSS Stable    ENERGY RATING (0-10) 10      Physical Exam: deferred.   Findings:       A widely patent and non-obstructing Schatzki ring (acquired) was        found at the gastroesophageal junction.       The exam of the esophagus was otherwise normal.       A 3 cm sliding hiatal hernia was found. The proximal extent of the        gastric folds (end of tubular esophagus) was 40 cm from the        incisors. The hiatal narrowing was 43 cm from the incisors. The        Z-line was at the gastroesophageal junction.       The cardia and gastric fundus were normal on retroflexion.       The gastric body  and gastric antrum were normal.       A 2 cm polypoid mass with no bleeding was found in the duodenal        bulb. Biopsies were taken with a cold forceps for histology.       A single 8 mm pedunculated polyp was found in the second portion of        the duodenum. Biopsies were taken with a cold forceps for histology.       The BRAVO capsule with delivery system was introduced through the        mouth and advanced into the esophagus, such that the BRAVO pH        capsule was positioned 34 cm from the incisors, which was 6 cm        proximal to the GE junction. The BRAVO pH capsule was then deployed        and attached to the esophageal mucosa. The delivery system was then        withdrawn. Endoscopy was utilized for probe placement and diagnostic        evaluation. The scope was reinserted to evaluate placement of the        BRAVO capsule. Visualization showed the BRAVO capsule to be in an        appropriate position.  Impression:           - Widely patent and non-obstructing Schatzki ring.                        - 3 cm sliding hiatal hernia.                        - Normal gastric body and antrum.                        - Duodenal mass. Biopsied.                        - A single duodenal polyp. Biopsied.                        - The BRAVO pH capsule was deployed.                        - Patient had frequent airway obstruction with                         oxygen desaturation requiring bag-mask ventilation                         several times during the procedure.  The patient tolerated the procedure                         well.  Findings:       ENDOSONOGRAPHIC FINDING: :       The esophagus, stomach and duodenum and adjacent structures were        visualized endosonographically.       The Aorta was identified and is followed to the Celiac and        Mesenteric Artery takeoff. They appeared normal without evidence of        lymphadenopathy in the region. The Celiac Artery is then followed        until the  pancreas is identified. The pancreatic body and tail are        surveyed from this region.       The pancreatic parenchyma has the normal salt and pepper appearance.        The pancreatic duct measured 1.7 mm in diameter. The pancreatic duct        was normal in its course without areas of dilation, stricture or        irregularity. No abnormal side branches were identified.       The pancreas was then surveyed from the duodenal stations. The        pancreatic parenchyma appeared normal. The pancreatic duct measured        1.9 mm in this region and appeared normal in its course. The common        bile duct was identified and measured 4.6 mm. The CBD appeared        normal on its course without evidence of defects, thickening or        irregularity.       The portal vein, confluence, superior mesenteric vein and superior        mesenteric artery appeared endosonographically normal. There were no        abnormal lymph nodes in the region.       There was diffuse abnormal echotexture in the visualized portion of        the liver. This was characterized by a homogenous appearance and a        hyperechoic appearance.       No lymphadenopathy seen.       A pedunculated intramural (subepithelial) lesion was found in the        apex of the duodenal bulb. The lesion was hypoechoic. The lesion        also measured 7 mm by 7 mm in diameter. The outer margins were well        defined.       A hypoechoic lobulated mass was identified endosonographically in        the duodenal bulb. The mass measured 16 mm by 7 mm in maximal        cross-sectional diameter. The endosonographic borders were        well-defined. There was sonographic evidence suggesting invasion        into the submucosa (Layer 3).  Impression:           - There was diffuse abnormal echotexture in the                         visualized portion of the liver. This was                         characterized by a homogenous appearance and a                          hyperechoic appearance.                        - A pedunculated intramural (subepithelial) lesion                         was found in the apex of the duodenal bulb. It                         measured 7 mm by 7 mm. Previous biopsies showed NET.                        - A mass was found in the duodenal bulb. The lesion                         appear to involved the submucosal. It is unclear if                         the MP is involved. The lesion measured                         approximately 1.6 cm by 7 mm. The previous                         diagnosis was a NET. This was stage T2 N0 M0.                        - No specimens collected.                 Pathology Data:  SPECIMEN  1) Duodenal polyp.  2) Duodenal bulb mass.  FINAL PATHOLOGIC DIAGNOSIS  1. DUODENUM, POLYPECTOMY:  Rare cluster of cells suspicious for neuroendocrine neoplasm  (the focus is depleted in the deeper levels for confirmation)  Duodenal mucosa with reactive changes  Villous architecture is maintained  2. DUODENAL BULB MASS, BIOPSY:  Neuroendocrine neoplasm, low-grade, well-differentiated  Longest continuous tumor focus is 1.2mm  Immunostains synaptophysin and chromogranin are positive  Ki67 - less than 1%              Laboratory Data:   gets labs today        Radiology Data:            FINDINGS:  Patient was administered 5.49 millicuries of gallium 68 octreotide intravenously.    There is physiologic intracranial, head, and neck activity.  Heart mediastinum are normal.  There is physiologic liver, spleen, GI and  activity.  There is an isolated peritoneal implant right upper quadrant SUV max 31.02.  Kidneys and adrenal glands are unremarkable.  No bone or lung lesions are seen.       Impression         See above    There is a isolated peritoneal implant suspected right upper quadrant SUV max 31.02.           FINDINGS:  No pleural effusion.  No liver lesions.  The biliary ducts are not dilated.  The gallbladder is present.  The stomach,  pancreas, spleen, adrenal glands and kidneys appear normal.  Small bilateral kidney cysts.  The aorta tapers normally, no para-aortic lymphadenopathy.  The mesentery appears normal.  No definite abnormality seen to correspond to the area of abnormality seen on PET 68 GA Dotatate 11/01/2018.  No destructive osseous lesions seen.       Impression         No metastatic disease seen.    There are no measurable lesions per RECIST criteria.           FINDINGS:  Mild atelectasis in the lung bases.  Visualized portions of the heart and mediastinum are unremarkable.    The liver has an unusual contour.  Diffusely decreased attenuation throughout the liver relative to the spleen..  No focal hepatic lesions are identified.  Gallbladder, spleen, pancreas, and adrenal glands appear within normal limits.    Small cysts in both kidneys.  The ureters are decompressed. Urinary bladder unremarkable.  Postsurgical change of the prostate.    The stomach, small bowel and colon demonstrate no evidence of obstruction or focal inflammation.  Appendix is unremarkable.    No free air or free fluid identified within the abdomen or pelvis.    Aorta tapers normally throughout its visualized course.  Trace atherosclerotic calcification.    Small fat containing umbilical hernia.    Osseous structures exhibit moderate degenerative changes. No fracture or focal osseous destructive lesion.       Impression         No soft tissue mass or pathologic lymph node enlargement identified.  No measurable lesions per RECIST.    Hepatic steatosis.    Small fat containing umbilical hernia.    Moderate degenerative change in the thoracic and lumbar spine.     Simple appearing renal cysts bilaterally.    Postsurgical change of the prostate.               Left Ventricle Normal ejection fraction . Concentric hypertrophy observed. Grade I (mild) left ventricular diastolic dysfunction consistent with impaired relaxation.   Right Ventricle Right ventricle not well  visualized. Normal ejection fraction. Wall motion abnormal. There is segmental dysfunction of the right ventricle.   Left Atrium Normal cavity size.   Right Atrium Normal cavity size.   Aortic Valve The valve is trileaflet. Mobility is normal   Mitral Valve Normal valve structure. Normal leaflet mobility.   Tricuspid Valve The tricuspid valve is normal. Mobility is normal   Pulmonic Valve Normal valve structure. Mobility is normal.   IVC/SVC Inferior vena cava is not well visualized.            Impression:  1.  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  The gallium scan shows uptake in the right upper quadrant which may be his primary tumor are his lymph node.  I discussed it with him and his wife the plan.  The plan will be to proceed with duodenectomy and lymph node dissection, with cholecystectomy.  I spent a lot of time in talking to them about the reconstruction of the duodenum either Billroth-I or Billroth II    I elaborated the risk of surgery such as bleeding infection DVT PE MI stroke delayed gastric emptying anastomotic leaks requiring more surgeries.  I also talked to him about the natural history of carcinoid disease and the need for surgery and his options.    I then talked about the hospital stay the recovery time and the need for long-term follow-up.  I emphasized the the chance of recurrence of the disease.  I showed them on the monitor about the reconstruction mid thirds and the risks and benefits of each way.    I answered all the questions.  Spent more than 40 min in talking to them about the surgery risks and benefits and obtaining a consent.  Plan:         FOR DUODENECTOMY  VIDYA  LIVER us  POSSIBLE GASTOJEJ with or without hiatal hernia repair/ fundoplication    Preop orders placed  Preop investigations reviewed               FALLON Salvador MD, FACS   Associate Professor of Surgery, [facility]   Neuroendocrine Surgery, Hepatic/Pancreatic & General Surgery   [address], [address]   [place] [address]   ph. [phone]; [phone]   fax. [phone]

## 2019-01-03 NOTE — PRE-PROCEDURE INSTRUCTIONS
S[place]ha - [phone] - Wife    Allergies, medical, surgical, family and psychosocial histories reviewed with patient. Periop plan of care reviewed. Preop instructions given, including medications to take and to hold. Hibiclens soap and instructions on use given. Time allotted for questions to be addressed.  Patient verbalized understanding.

## 2019-01-03 NOTE — PATIENT INSTRUCTIONS
Patient Instructions       Take a Hibiclens shower twice a day for 3 days prior to surgery, including the morning of surgery.   Gargle with Listerine twice a day for 2 days prior to surgery, including the morning of surgery.      Completed    Appointment with Dr. Salvador   Pre Mena for Hospital Admission - Go to 1st floor of the hospital-admitting desk. You will do paper work, get blood drawn,  get x-rays and see Anesthesia at this time.     Today   CLEAR LIQUIDS all day                 Tomorrow   Hospital Admission for surgery.  Report to 2nd floor, Same Day Surgery desk at 5:30am   Surgery is scheduled for 7:00am        [facility] [address]  [place]  [address]  [phone]      INFORMATION REGARDING YOUR PROCEDURE      We look forward to serving you and your family and appreciate that you have chosen [facility] for your healthcare needs. Before, during, and after your surgery, you will be cared for by skilled medical professionals. Our surgeons, anesthesiologists, nurses,  and other healthcare professionals will work with you and your family to ensure a safe, smooth and comfortable surgery and recovery.    In order to best meet your pre-admission needs, your surgeon or ordering physicians office will contact our Scheduling Office at [phone] and schedule a Pre-Procedure Appointment.  This should preferably be done 72 hours or greater before your scheduled procedure date.     During your pre-procedure visit your insurance will be verified for your procedure. You will meet with a Registered Nurse and an Anesthesiologist or Nurse Anesthetist. If tests are required, they will be performed during your visit. Please allow about one and a half hours for this visit.      You will need to bring the following information or items with you to your Pre-Procedure Appointment:    1.  Picture Identification  2.  Insurance Card  3.  Current list of medications to  include name and dosage  4.  List of allergies  5.  Orders and any other forms your doctor has given to you  6.  Copies of lab results performed at other facilities. This may include blood work, EKGs or chest xrays.   These results can be faxed to [phone].    The Pre-Op Center Registration Desk is located on the first floor of the [facility] ([address] [place] [address]) in the [facility].  The Pre-Operative Center is located on the second floor of the hospital. Someone will walk you from the registration area to the Pre-Operative Center.    Free parking is located in the front of the hospital and medical office building and is easily accessed from [employer] and [employer]. When you arrive, please check in at the main information desk of the hospital.    Please call Outpatient Surgery at [phone] after 12:00pm on the day before your procedure for your arrival time and updated procedure time.      Pre-Op Bathing Instructions    Before surgery, you can play an important role in your own health.    Because skin is not sterile, we need to be sure that your skin is as free of germs as possible. By following the instructions below, you can reduce the number of germs on your skin before surgery.    IMPORTANT: You will need to shower with a special soap called Hibiclens*, available at any pharmacy.  If you are allergic to Chlorhexidine (the antiseptic in Hibiclens), use an antibacterial soap such as Dial Soap for your preoperative shower.  You will shower with Hibiclens both the night before your surgery and the morning of your surgery.  Do not use Hibiclens on the head, face or genitals to avoid injury to those areas.    STEP #1: THE NIGHT BEFORE YOUR SURGERY     1. Do not shave the area of your body where your surgery will be performed.  2. Shower and wash your hair and body as usual with your normal soap and shampoo.  3. Rinse your hair and body thoroughly after you shower to remove all soap  residue.  4. With your hand, apply one packet of Hibiclens soap to the surgical site.   5. Wash the site gently for five (5) minutes. Do not scrub your skin too hard.   6. Do not wash with your regular soap after Hibiclens is used.  7. Rinse your body thoroughly.  8. Pat yourself dry with a clean, soft towel.  9. Do not use lotion, cream, or powder.  10. Wear clean clothes.    STEP #2: THE MORNING OF YOUR SURGERY     1. Repeat Step #1.    * Not to be used by people allergic to Chlorhexidine.

## 2019-01-04 ENCOUNTER — ANESTHESIA (OUTPATIENT)
Dept: SURGERY | Facility: HOSPITAL | Age: 69
DRG: 326 | End: 2019-01-04
Payer: MEDICARE

## 2019-01-04 ENCOUNTER — HOSPITAL ENCOUNTER (INPATIENT)
Facility: HOSPITAL | Age: 69
LOS: 12 days | Discharge: HOME HEALTH | DRG: 326 | End: 2019-01-16
Attending: SURGERY | Admitting: SURGERY
Payer: MEDICARE

## 2019-01-04 DIAGNOSIS — R13.12 OROPHARYNGEAL DYSPHAGIA: ICD-10-CM

## 2019-01-04 DIAGNOSIS — Z01.810 PRE-OPERATIVE CARDIOVASCULAR EXAMINATION: ICD-10-CM

## 2019-01-04 DIAGNOSIS — E66.01 SEVERE OBESITY (BMI >= 40): ICD-10-CM

## 2019-01-04 DIAGNOSIS — R10.84 GENERALIZED ABDOMINAL PAIN: ICD-10-CM

## 2019-01-04 DIAGNOSIS — E87.5 HYPERKALEMIA: ICD-10-CM

## 2019-01-04 DIAGNOSIS — C7A.010 MALIGNANT CARCINOID TUMOR OF DUODENUM: ICD-10-CM

## 2019-01-04 DIAGNOSIS — K44.9 HIATAL HERNIA WITH GASTROESOPHAGEAL REFLUX: ICD-10-CM

## 2019-01-04 DIAGNOSIS — K21.9 HIATAL HERNIA WITH GASTROESOPHAGEAL REFLUX: ICD-10-CM

## 2019-01-04 DIAGNOSIS — J18.9 HOSPITAL-ACQUIRED PNEUMONIA: ICD-10-CM
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
## 2019-01-04 DIAGNOSIS — C7A.010 MALIGNANT CARCINOID TUMOR OF THE DUODENUM: Primary | ICD-10-CM

## 2019-01-04 LAB
ANION GAP SERPL CALC-SCNC: 11 MMOL/L
BASOPHILS # BLD AUTO: 0.01 K/UL
BASOPHILS # BLD AUTO: 0.03 K/UL
BASOPHILS NFR BLD: 0.1 %
BASOPHILS NFR BLD: 0.7 %
BUN SERPL-MCNC: 13 MG/DL
CALCIUM SERPL-MCNC: 8.6 MG/DL
CHLORIDE SERPL-SCNC: 106 MMOL/L
CO2 SERPL-SCNC: 22 MMOL/L
CREAT SERPL-MCNC: 1.1 MG/DL
DIFFERENTIAL METHOD: ABNORMAL
DIFFERENTIAL METHOD: NORMAL
EOSINOPHIL # BLD AUTO: 0 K/UL
EOSINOPHIL # BLD AUTO: 0.1 K/UL
EOSINOPHIL NFR BLD: 0 %
EOSINOPHIL NFR BLD: 1.8 %
ERYTHROCYTE [DISTWIDTH] IN BLOOD BY AUTOMATED COUNT: 13.7 %
ERYTHROCYTE [DISTWIDTH] IN BLOOD BY AUTOMATED COUNT: 13.8 %
EST. GFR  (AFRICAN AMERICAN): >60 ML/MIN/1.73 M^2
EST. GFR  (NON AFRICAN AMERICAN): >60 ML/MIN/1.73 M^2
GLUCOSE SERPL-MCNC: 180 MG/DL
HCT VFR BLD AUTO: 39.1 %
HCT VFR BLD AUTO: 45 %
HGB BLD-MCNC: 12.4 G/DL
HGB BLD-MCNC: 14.4 G/DL
LYMPHOCYTES # BLD AUTO: 1.3 K/UL
LYMPHOCYTES # BLD AUTO: 1.5 K/UL
LYMPHOCYTES NFR BLD: 33.7 %
LYMPHOCYTES NFR BLD: 9.7 %
MAGNESIUM SERPL-MCNC: 1.6 MG/DL
MCH RBC QN AUTO: 28.8 PG
MCH RBC QN AUTO: 28.9 PG
MCHC RBC AUTO-ENTMCNC: 31.7 G/DL
MCHC RBC AUTO-ENTMCNC: 32 G/DL
MCV RBC AUTO: 90 FL
MCV RBC AUTO: 91 FL
MONOCYTES # BLD AUTO: 0.5 K/UL
MONOCYTES # BLD AUTO: 0.7 K/UL
MONOCYTES NFR BLD: 11.2 %
MONOCYTES NFR BLD: 5.2 %
NEUTROPHILS # BLD AUTO: 11 K/UL
NEUTROPHILS # BLD AUTO: 2.4 K/UL
NEUTROPHILS NFR BLD: 52.6 %
NEUTROPHILS NFR BLD: 84.8 %
PHOSPHATE SERPL-MCNC: 6 MG/DL
PLATELET # BLD AUTO: 173 K/UL
PLATELET # BLD AUTO: 179 K/UL
PMV BLD AUTO: 9.7 FL
PMV BLD AUTO: 9.9 FL
POTASSIUM SERPL-SCNC: 4.5 MMOL/L
RBC # BLD AUTO: 4.3 M/UL
RBC # BLD AUTO: 4.98 M/UL
SODIUM SERPL-SCNC: 139 MMOL/L
WBC # BLD AUTO: 12.98 K/UL
WBC # BLD AUTO: 4.57 K/UL

## 2019-01-04 PROCEDURE — 36415 COLL VENOUS BLD VENIPUNCTURE: CPT

## 2019-01-04 PROCEDURE — C2617 STENT, NON-COR, TEM W/O DEL: HCPCS | Performed by: SURGERY

## 2019-01-04 PROCEDURE — 25000003 PHARM REV CODE 250: Performed by: NURSE PRACTITIONER

## 2019-01-04 PROCEDURE — S0028 INJECTION, FAMOTIDINE, 20 MG: HCPCS | Performed by: SURGERY

## 2019-01-04 PROCEDURE — 88309 TISSUE SPECIMEN TO PATHOLOGY - SURGERY: ICD-10-PCS | Mod: 26,,, | Performed by: PATHOLOGY

## 2019-01-04 PROCEDURE — 25000003 PHARM REV CODE 250: Performed by: ANESTHESIOLOGY

## 2019-01-04 PROCEDURE — 25000003 PHARM REV CODE 250: Performed by: NURSE ANESTHETIST, CERTIFIED REGISTERED

## 2019-01-04 PROCEDURE — 88307 TISSUE EXAM BY PATHOLOGIST: CPT | Performed by: PATHOLOGY

## 2019-01-04 PROCEDURE — 84100 ASSAY OF PHOSPHORUS: CPT

## 2019-01-04 PROCEDURE — 63600175 PHARM REV CODE 636 W HCPCS: Performed by: NURSE ANESTHETIST, CERTIFIED REGISTERED

## 2019-01-04 PROCEDURE — 25000003 PHARM REV CODE 250: Performed by: SURGERY

## 2019-01-04 PROCEDURE — 80048 BASIC METABOLIC PNL TOTAL CA: CPT

## 2019-01-04 PROCEDURE — 94761 N-INVAS EAR/PLS OXIMETRY MLT: CPT

## 2019-01-04 PROCEDURE — 36000709 HC OR TIME LEV III EA ADD 15 MIN: Performed by: SURGERY

## 2019-01-04 PROCEDURE — 63600175 PHARM REV CODE 636 W HCPCS: Performed by: ANESTHESIOLOGY

## 2019-01-04 PROCEDURE — 99900035 HC TECH TIME PER 15 MIN (STAT)

## 2019-01-04 PROCEDURE — 63600175 PHARM REV CODE 636 W HCPCS: Mod: JG

## 2019-01-04 PROCEDURE — P9045 ALBUMIN (HUMAN), 5%, 250 ML: HCPCS | Mod: JG

## 2019-01-04 PROCEDURE — 36000708 HC OR TIME LEV III 1ST 15 MIN: Performed by: SURGERY

## 2019-01-04 PROCEDURE — 88304 TISSUE EXAM BY PATHOLOGIST: CPT | Mod: 26,,, | Performed by: PATHOLOGY

## 2019-01-04 PROCEDURE — 85025 COMPLETE CBC W/AUTO DIFF WBC: CPT | Mod: 91

## 2019-01-04 PROCEDURE — A4216 STERILE WATER/SALINE, 10 ML: HCPCS | Performed by: ANESTHESIOLOGY

## 2019-01-04 PROCEDURE — 63600175 PHARM REV CODE 636 W HCPCS: Performed by: SURGERY

## 2019-01-04 PROCEDURE — 37000008 HC ANESTHESIA 1ST 15 MINUTES: Performed by: SURGERY

## 2019-01-04 PROCEDURE — S0171 BUMETANIDE 0.5 MG: HCPCS | Performed by: NURSE ANESTHETIST, CERTIFIED REGISTERED

## 2019-01-04 PROCEDURE — 83735 ASSAY OF MAGNESIUM: CPT

## 2019-01-04 PROCEDURE — 88307 TISSUE SPECIMEN TO PATHOLOGY - SURGERY: ICD-10-PCS | Mod: 26,,, | Performed by: PATHOLOGY

## 2019-01-04 PROCEDURE — 88307 TISSUE EXAM BY PATHOLOGIST: CPT | Mod: 26,,, | Performed by: PATHOLOGY

## 2019-01-04 PROCEDURE — 27000221 HC OXYGEN, UP TO 24 HOURS

## 2019-01-04 PROCEDURE — 88304 TISSUE SPECIMEN TO PATHOLOGY - SURGERY: ICD-10-PCS | Mod: 26,,, | Performed by: PATHOLOGY

## 2019-01-04 PROCEDURE — 27201423 OPTIME MED/SURG SUP & DEVICES STERILE SUPPLY: Performed by: SURGERY

## 2019-01-04 PROCEDURE — 94799 UNLISTED PULMONARY SVC/PX: CPT

## 2019-01-04 PROCEDURE — 20000000 HC ICU ROOM

## 2019-01-04 PROCEDURE — 37000009 HC ANESTHESIA EA ADD 15 MINS: Performed by: SURGERY

## 2019-01-04 PROCEDURE — 88309 TISSUE EXAM BY PATHOLOGIST: CPT | Mod: 26,,, | Performed by: PATHOLOGY

## 2019-01-04 PROCEDURE — 27000190 HC CPAP FULL FACE MASK W/VALVE

## 2019-01-04 PROCEDURE — V2790 AMNIOTIC MEMBRANE: HCPCS | Performed by: SURGERY

## 2019-01-04 PROCEDURE — C1769 GUIDE WIRE: HCPCS | Performed by: SURGERY

## 2019-01-04 PROCEDURE — 88360 TUMOR IMMUNOHISTOCHEM/MANUAL: CPT | Mod: 26,,, | Performed by: PATHOLOGY

## 2019-01-04 PROCEDURE — C1887 CATHETER, GUIDING: HCPCS | Performed by: SURGERY

## 2019-01-04 PROCEDURE — P9045 ALBUMIN (HUMAN), 5%, 250 ML: HCPCS | Mod: JG | Performed by: NURSE ANESTHETIST, CERTIFIED REGISTERED

## 2019-01-04 PROCEDURE — 88360 TISSUE SPECIMEN TO PATHOLOGY - SURGERY: ICD-10-PCS | Mod: 26,,, | Performed by: PATHOLOGY

## 2019-01-04 DEVICE — MEMBRANE AMNIOFIX 2X12CM: Type: IMPLANTABLE DEVICE | Site: BILE DUCT | Status: FUNCTIONAL

## 2019-01-04 DEVICE — IMPLANTABLE DEVICE: Type: IMPLANTABLE DEVICE | Site: BILE DUCT | Status: FUNCTIONAL

## 2019-01-04 RX ORDER — ACETAMINOPHEN 325 MG/1
650 TABLET ORAL EVERY 4 HOURS PRN
Status: DISCONTINUED | OUTPATIENT
Start: 2019-01-04 | End: 2019-01-12

## 2019-01-04 RX ORDER — IBUPROFEN 200 MG
24 TABLET ORAL
Status: DISCONTINUED | OUTPATIENT
Start: 2019-01-04 | End: 2019-01-16 | Stop reason: HOSPADM

## 2019-01-04 RX ORDER — ALBUMIN HUMAN 50 G/1000ML
SOLUTION INTRAVENOUS
Status: DISPENSED
Start: 2019-01-04 | End: 2019-01-05

## 2019-01-04 RX ORDER — ALBUMIN HUMAN 50 G/1000ML
12.5 SOLUTION INTRAVENOUS ONCE
Status: COMPLETED | OUTPATIENT
Start: 2019-01-04 | End: 2019-01-04

## 2019-01-04 RX ORDER — NEOSTIGMINE METHYLSULFATE 1 MG/ML
INJECTION, SOLUTION INTRAVENOUS
Status: DISCONTINUED | OUTPATIENT
Start: 2019-01-04 | End: 2019-01-04

## 2019-01-04 RX ORDER — PHENYLEPHRINE HYDROCHLORIDE 10 MG/ML
INJECTION INTRAVENOUS
Status: DISCONTINUED | OUTPATIENT
Start: 2019-01-04 | End: 2019-01-04

## 2019-01-04 RX ORDER — BUMETANIDE 0.25 MG/ML
INJECTION INTRAMUSCULAR; INTRAVENOUS
Status: DISCONTINUED | OUTPATIENT
Start: 2019-01-04 | End: 2019-01-04

## 2019-01-04 RX ORDER — DEXTROSE MONOHYDRATE, SODIUM CHLORIDE, AND POTASSIUM CHLORIDE 50; 1.49; 4.5 G/1000ML; G/1000ML; G/1000ML
INJECTION, SOLUTION INTRAVENOUS CONTINUOUS
Status: DISCONTINUED | OUTPATIENT
Start: 2019-01-04 | End: 2019-01-05

## 2019-01-04 RX ORDER — KETOROLAC TROMETHAMINE 30 MG/ML
15 INJECTION, SOLUTION INTRAMUSCULAR; INTRAVENOUS
Status: DISCONTINUED | OUTPATIENT
Start: 2019-01-04 | End: 2019-01-04

## 2019-01-04 RX ORDER — IBUPROFEN 200 MG
16 TABLET ORAL
Status: DISCONTINUED | OUTPATIENT
Start: 2019-01-04 | End: 2019-01-16 | Stop reason: HOSPADM

## 2019-01-04 RX ORDER — PREGABALIN 75 MG/1
75 CAPSULE ORAL
Status: DISCONTINUED | OUTPATIENT
Start: 2019-01-04 | End: 2019-01-04

## 2019-01-04 RX ORDER — LIDOCAINE HCL/PF 100 MG/5ML
SYRINGE (ML) INTRAVENOUS
Status: DISCONTINUED | OUTPATIENT
Start: 2019-01-04 | End: 2019-01-04

## 2019-01-04 RX ORDER — ALBUMIN HUMAN 50 G/1000ML
SOLUTION INTRAVENOUS
Status: COMPLETED
Start: 2019-01-04 | End: 2019-01-04

## 2019-01-04 RX ORDER — AMOXICILLIN 250 MG
1 CAPSULE ORAL 2 TIMES DAILY
Status: DISCONTINUED | OUTPATIENT
Start: 2019-01-04 | End: 2019-01-05

## 2019-01-04 RX ORDER — ACETAMINOPHEN 10 MG/ML
1000 INJECTION, SOLUTION INTRAVENOUS ONCE
Status: COMPLETED | OUTPATIENT
Start: 2019-01-04 | End: 2019-01-04

## 2019-01-04 RX ORDER — DEXAMETHASONE SODIUM PHOSPHATE 4 MG/ML
INJECTION, SOLUTION INTRA-ARTICULAR; INTRALESIONAL; INTRAMUSCULAR; INTRAVENOUS; SOFT TISSUE
Status: DISCONTINUED | OUTPATIENT
Start: 2019-01-04 | End: 2019-01-04

## 2019-01-04 RX ORDER — LIDOCAINE HYDROCHLORIDE 10 MG/ML
1 INJECTION, SOLUTION EPIDURAL; INFILTRATION; INTRACAUDAL; PERINEURAL ONCE
Status: DISCONTINUED | OUTPATIENT
Start: 2019-01-04 | End: 2019-01-04

## 2019-01-04 RX ORDER — PROPOFOL 10 MG/ML
VIAL (ML) INTRAVENOUS
Status: DISCONTINUED | OUTPATIENT
Start: 2019-01-04 | End: 2019-01-04

## 2019-01-04 RX ORDER — ACETAMINOPHEN 10 MG/ML
INJECTION, SOLUTION INTRAVENOUS
Status: DISCONTINUED | OUTPATIENT
Start: 2019-01-04 | End: 2019-01-04

## 2019-01-04 RX ORDER — ATORVASTATIN CALCIUM 40 MG/1
40 TABLET, FILM COATED ORAL DAILY
Status: DISCONTINUED | OUTPATIENT
Start: 2019-01-05 | End: 2019-01-05

## 2019-01-04 RX ORDER — HYDROMORPHONE HYDROCHLORIDE 2 MG/ML
0.5 INJECTION, SOLUTION INTRAMUSCULAR; INTRAVENOUS; SUBCUTANEOUS ONCE
Status: COMPLETED | OUTPATIENT
Start: 2019-01-04 | End: 2019-01-04

## 2019-01-04 RX ORDER — SODIUM CHLORIDE 0.9 % (FLUSH) 0.9 %
3 SYRINGE (ML) INJECTION
Status: DISCONTINUED | OUTPATIENT
Start: 2019-01-04 | End: 2019-01-16 | Stop reason: HOSPADM

## 2019-01-04 RX ORDER — SUCCINYLCHOLINE CHLORIDE 20 MG/ML
INJECTION INTRAMUSCULAR; INTRAVENOUS
Status: DISCONTINUED | OUTPATIENT
Start: 2019-01-04 | End: 2019-01-04

## 2019-01-04 RX ORDER — ALBUMIN HUMAN 50 G/1000ML
SOLUTION INTRAVENOUS CONTINUOUS PRN
Status: DISCONTINUED | OUTPATIENT
Start: 2019-01-04 | End: 2019-01-04

## 2019-01-04 RX ORDER — SODIUM CHLORIDE, SODIUM LACTATE, POTASSIUM CHLORIDE, CALCIUM CHLORIDE 600; 310; 30; 20 MG/100ML; MG/100ML; MG/100ML; MG/100ML
INJECTION, SOLUTION INTRAVENOUS CONTINUOUS
Status: DISCONTINUED | OUTPATIENT
Start: 2019-01-04 | End: 2019-01-04

## 2019-01-04 RX ORDER — SODIUM CHLORIDE 0.9 % (FLUSH) 0.9 %
5 SYRINGE (ML) INJECTION
Status: DISCONTINUED | OUTPATIENT
Start: 2019-01-04 | End: 2019-01-16 | Stop reason: HOSPADM

## 2019-01-04 RX ORDER — HYDRALAZINE HYDROCHLORIDE 20 MG/ML
5 INJECTION INTRAMUSCULAR; INTRAVENOUS
Status: DISCONTINUED | OUTPATIENT
Start: 2019-01-04 | End: 2019-01-16 | Stop reason: HOSPADM

## 2019-01-04 RX ORDER — KETOROLAC TROMETHAMINE 30 MG/ML
10 INJECTION, SOLUTION INTRAMUSCULAR; INTRAVENOUS EVERY 6 HOURS
Status: DISCONTINUED | OUTPATIENT
Start: 2019-01-04 | End: 2019-01-05

## 2019-01-04 RX ORDER — FENTANYL CITRATE 50 UG/ML
INJECTION, SOLUTION INTRAMUSCULAR; INTRAVENOUS
Status: DISCONTINUED | OUTPATIENT
Start: 2019-01-04 | End: 2019-01-04

## 2019-01-04 RX ORDER — NALOXONE HCL 0.4 MG/ML
VIAL (ML) INJECTION
Status: DISCONTINUED | OUTPATIENT
Start: 2019-01-04 | End: 2019-01-04

## 2019-01-04 RX ORDER — ENOXAPARIN SODIUM 100 MG/ML
30 INJECTION SUBCUTANEOUS
Status: COMPLETED | OUTPATIENT
Start: 2019-01-04 | End: 2019-01-04

## 2019-01-04 RX ORDER — ONDANSETRON 8 MG/1
8 TABLET, ORALLY DISINTEGRATING ORAL EVERY 6 HOURS PRN
Status: DISCONTINUED | OUTPATIENT
Start: 2019-01-04 | End: 2019-01-16 | Stop reason: HOSPADM

## 2019-01-04 RX ORDER — METOCLOPRAMIDE HYDROCHLORIDE 5 MG/ML
10 INJECTION INTRAMUSCULAR; INTRAVENOUS EVERY 6 HOURS
Status: DISCONTINUED | OUTPATIENT
Start: 2019-01-04 | End: 2019-01-05

## 2019-01-04 RX ORDER — DIPHENHYDRAMINE HYDROCHLORIDE 50 MG/ML
12.5 INJECTION INTRAMUSCULAR; INTRAVENOUS EVERY 4 HOURS PRN
Status: DISCONTINUED | OUTPATIENT
Start: 2019-01-04 | End: 2019-01-16 | Stop reason: HOSPADM

## 2019-01-04 RX ORDER — ALBUTEROL SULFATE 2.5 MG/.5ML
2.5 SOLUTION RESPIRATORY (INHALATION) EVERY 4 HOURS PRN
Status: DISCONTINUED | OUTPATIENT
Start: 2019-01-04 | End: 2019-01-16 | Stop reason: HOSPADM

## 2019-01-04 RX ORDER — LABETALOL HYDROCHLORIDE 5 MG/ML
5 INJECTION, SOLUTION INTRAVENOUS
Status: DISCONTINUED | OUTPATIENT
Start: 2019-01-04 | End: 2019-01-16 | Stop reason: HOSPADM

## 2019-01-04 RX ORDER — ONDANSETRON 2 MG/ML
4 INJECTION INTRAMUSCULAR; INTRAVENOUS EVERY 6 HOURS PRN
Status: DISCONTINUED | OUTPATIENT
Start: 2019-01-04 | End: 2019-01-16 | Stop reason: HOSPADM

## 2019-01-04 RX ORDER — MIDAZOLAM HYDROCHLORIDE 1 MG/ML
INJECTION, SOLUTION INTRAMUSCULAR; INTRAVENOUS
Status: DISCONTINUED | OUTPATIENT
Start: 2019-01-04 | End: 2019-01-04

## 2019-01-04 RX ORDER — FAMOTIDINE 10 MG/ML
20 INJECTION INTRAVENOUS
Status: COMPLETED | OUTPATIENT
Start: 2019-01-04 | End: 2019-01-04

## 2019-01-04 RX ORDER — PREGABALIN 75 MG/1
75 CAPSULE ORAL 2 TIMES DAILY
Status: DISCONTINUED | OUTPATIENT
Start: 2019-01-04 | End: 2019-01-07

## 2019-01-04 RX ORDER — ONDANSETRON 2 MG/ML
8 INJECTION INTRAMUSCULAR; INTRAVENOUS
Status: COMPLETED | OUTPATIENT
Start: 2019-01-04 | End: 2019-01-04

## 2019-01-04 RX ORDER — NALOXONE HCL 0.4 MG/ML
0.02 VIAL (ML) INJECTION
Status: DISCONTINUED | OUTPATIENT
Start: 2019-01-04 | End: 2019-01-16 | Stop reason: HOSPADM

## 2019-01-04 RX ORDER — HYDROMORPHONE HCL IN 0.9% NACL 6 MG/30 ML
PATIENT CONTROLLED ANALGESIA SYRINGE INTRAVENOUS CONTINUOUS
Status: DISCONTINUED | OUTPATIENT
Start: 2019-01-04 | End: 2019-01-06

## 2019-01-04 RX ORDER — ONDANSETRON 2 MG/ML
INJECTION INTRAMUSCULAR; INTRAVENOUS
Status: DISCONTINUED | OUTPATIENT
Start: 2019-01-04 | End: 2019-01-04

## 2019-01-04 RX ORDER — SODIUM CHLORIDE 0.9 % (FLUSH) 0.9 %
3 SYRINGE (ML) INJECTION EVERY 8 HOURS
Status: DISCONTINUED | OUTPATIENT
Start: 2019-01-04 | End: 2019-01-10

## 2019-01-04 RX ORDER — ACETAMINOPHEN 10 MG/ML
1000 INJECTION, SOLUTION INTRAVENOUS EVERY 8 HOURS
Status: DISPENSED | OUTPATIENT
Start: 2019-01-04 | End: 2019-01-05

## 2019-01-04 RX ORDER — MAGNESIUM SULFATE HEPTAHYDRATE 40 MG/ML
2 INJECTION, SOLUTION INTRAVENOUS ONCE
Status: COMPLETED | OUTPATIENT
Start: 2019-01-04 | End: 2019-01-04

## 2019-01-04 RX ORDER — ROCURONIUM BROMIDE 10 MG/ML
INJECTION, SOLUTION INTRAVENOUS
Status: DISCONTINUED | OUTPATIENT
Start: 2019-01-04 | End: 2019-01-04

## 2019-01-04 RX ORDER — GLYCOPYRROLATE 0.2 MG/ML
INJECTION INTRAMUSCULAR; INTRAVENOUS
Status: DISCONTINUED | OUTPATIENT
Start: 2019-01-04 | End: 2019-01-04

## 2019-01-04 RX ADMIN — KETOROLAC TROMETHAMINE 10 MG: 30 INJECTION, SOLUTION INTRAMUSCULAR at 05:01

## 2019-01-04 RX ADMIN — ALBUMIN HUMAN 12.5 G: 50 SOLUTION INTRAVENOUS at 04:01

## 2019-01-04 RX ADMIN — ACETAMINOPHEN 1000 MG: 10 INJECTION, SOLUTION INTRAVENOUS at 02:01

## 2019-01-04 RX ADMIN — CALCIUM CHLORIDE 0.5 G: 100 INJECTION, SOLUTION INTRAVENOUS at 12:01

## 2019-01-04 RX ADMIN — ROCURONIUM BROMIDE 5 MG: 10 INJECTION, SOLUTION INTRAVENOUS at 07:01

## 2019-01-04 RX ADMIN — FENTANYL CITRATE 100 MCG: 50 INJECTION, SOLUTION INTRAMUSCULAR; INTRAVENOUS at 07:01

## 2019-01-04 RX ADMIN — PHENYLEPHRINE HYDROCHLORIDE 100 MCG: 10 INJECTION INTRAVENOUS at 01:01

## 2019-01-04 RX ADMIN — GLYCOPYRROLATE 0.6 MG: 0.2 INJECTION, SOLUTION INTRAMUSCULAR; INTRAVENOUS at 03:01

## 2019-01-04 RX ADMIN — AMPICILLIN SODIUM AND SULBACTAM SODIUM 3 G: 2; 1 INJECTION, POWDER, FOR SOLUTION INTRAMUSCULAR; INTRAVENOUS at 07:01

## 2019-01-04 RX ADMIN — PHENYLEPHRINE HYDROCHLORIDE 200 MCG: 10 INJECTION INTRAVENOUS at 09:01

## 2019-01-04 RX ADMIN — DEXAMETHASONE SODIUM PHOSPHATE 8 MG: 4 INJECTION, SOLUTION INTRAMUSCULAR; INTRAVENOUS at 08:01

## 2019-01-04 RX ADMIN — LIDOCAINE HYDROCHLORIDE 100 MG: 20 INJECTION, SOLUTION INTRAVENOUS at 07:01

## 2019-01-04 RX ADMIN — BUMETANIDE 1 MG: 0.25 INJECTION, SOLUTION INTRAMUSCULAR; INTRAVENOUS at 02:01

## 2019-01-04 RX ADMIN — OCTREOTIDE ACETATE 50 MCG/HR: 1000 INJECTION, SOLUTION INTRAVENOUS; SUBCUTANEOUS at 07:01

## 2019-01-04 RX ADMIN — PREGABALIN 75 MG: 75 CAPSULE ORAL at 06:01

## 2019-01-04 RX ADMIN — SODIUM CHLORIDE, SODIUM LACTATE, POTASSIUM CHLORIDE, AND CALCIUM CHLORIDE: .6; .31; .03; .02 INJECTION, SOLUTION INTRAVENOUS at 08:01

## 2019-01-04 RX ADMIN — ROCURONIUM BROMIDE 10 MG: 10 INJECTION, SOLUTION INTRAVENOUS at 10:01

## 2019-01-04 RX ADMIN — ALBUMIN (HUMAN): 2.5 SOLUTION INTRAVENOUS at 07:01

## 2019-01-04 RX ADMIN — FAMOTIDINE 20 MG: 10 INJECTION INTRAVENOUS at 06:01

## 2019-01-04 RX ADMIN — ROCURONIUM BROMIDE 20 MG: 10 INJECTION, SOLUTION INTRAVENOUS at 11:01

## 2019-01-04 RX ADMIN — AMPICILLIN SODIUM AND SULBACTAM SODIUM 3 G: 2; 1 INJECTION, POWDER, FOR SOLUTION INTRAMUSCULAR; INTRAVENOUS at 10:01

## 2019-01-04 RX ADMIN — ROCURONIUM BROMIDE 45 MG: 10 INJECTION, SOLUTION INTRAVENOUS at 07:01

## 2019-01-04 RX ADMIN — PHENYLEPHRINE HYDROCHLORIDE 100 MCG: 10 INJECTION INTRAVENOUS at 07:01

## 2019-01-04 RX ADMIN — NEOSTIGMINE METHYLSULFATE 4 MG: 1 INJECTION INTRAVENOUS at 03:01

## 2019-01-04 RX ADMIN — METOCLOPRAMIDE 10 MG: 5 INJECTION, SOLUTION INTRAMUSCULAR; INTRAVENOUS at 05:01

## 2019-01-04 RX ADMIN — CALCIUM GLUCONATE 1 G: 98 INJECTION, SOLUTION INTRAVENOUS at 05:01

## 2019-01-04 RX ADMIN — ONDANSETRON 8 MG: 2 INJECTION, SOLUTION INTRAMUSCULAR; INTRAVENOUS at 02:01

## 2019-01-04 RX ADMIN — OCTREOTIDE ACETATE: 500 INJECTION, SOLUTION INTRAVENOUS; SUBCUTANEOUS at 06:01

## 2019-01-04 RX ADMIN — SODIUM CHLORIDE, SODIUM LACTATE, POTASSIUM CHLORIDE, AND CALCIUM CHLORIDE: .6; .31; .03; .02 INJECTION, SOLUTION INTRAVENOUS at 06:01

## 2019-01-04 RX ADMIN — PROPOFOL 150 MG: 10 INJECTION, EMULSION INTRAVENOUS at 07:01

## 2019-01-04 RX ADMIN — NALOXONE HYDROCHLORIDE 80 MCG: 0.4 INJECTION, SOLUTION INTRAMUSCULAR; INTRAVENOUS; SUBCUTANEOUS at 03:01

## 2019-01-04 RX ADMIN — CALCIUM CHLORIDE 0.2 G: 100 INJECTION, SOLUTION INTRAVENOUS at 01:01

## 2019-01-04 RX ADMIN — ALBUMIN (HUMAN): 2.5 SOLUTION INTRAVENOUS at 11:01

## 2019-01-04 RX ADMIN — OCTREOTIDE ACETATE 250 MCG/HR: 1000 INJECTION, SOLUTION INTRAVENOUS; SUBCUTANEOUS at 07:01

## 2019-01-04 RX ADMIN — HYDROCORTISONE SODIUM SUCCINATE 50 MG: 100 INJECTION, POWDER, FOR SOLUTION INTRAMUSCULAR; INTRAVENOUS at 06:01

## 2019-01-04 RX ADMIN — HYDROMORPHONE HYDROCHLORIDE 0.5 MG: 2 INJECTION INTRAMUSCULAR; INTRAVENOUS; SUBCUTANEOUS at 05:01

## 2019-01-04 RX ADMIN — ACETAMINOPHEN 1000 MG: 10 INJECTION, SOLUTION INTRAVENOUS at 08:01

## 2019-01-04 RX ADMIN — CALCIUM GLUCONATE 1 G: 98 INJECTION, SOLUTION INTRAVENOUS at 04:01

## 2019-01-04 RX ADMIN — OCTREOTIDE ACETATE 500 MCG/HR: 1000 INJECTION, SOLUTION INTRAVENOUS; SUBCUTANEOUS at 09:01

## 2019-01-04 RX ADMIN — OCTREOTIDE ACETATE 500 MCG/HR: 1000 INJECTION, SOLUTION INTRAVENOUS; SUBCUTANEOUS at 04:01

## 2019-01-04 RX ADMIN — Medication 3 ML: at 09:01

## 2019-01-04 RX ADMIN — ENOXAPARIN SODIUM 30 MG: 100 INJECTION SUBCUTANEOUS at 06:01

## 2019-01-04 RX ADMIN — ROCURONIUM BROMIDE 20 MG: 10 INJECTION, SOLUTION INTRAVENOUS at 01:01

## 2019-01-04 RX ADMIN — PHENYLEPHRINE HYDROCHLORIDE 100 MCG: 10 INJECTION INTRAVENOUS at 02:01

## 2019-01-04 RX ADMIN — ROCURONIUM BROMIDE 10 MG: 10 INJECTION, SOLUTION INTRAVENOUS at 12:01

## 2019-01-04 RX ADMIN — ROCURONIUM BROMIDE 20 MG: 10 INJECTION, SOLUTION INTRAVENOUS at 08:01

## 2019-01-04 RX ADMIN — PHENYLEPHRINE HYDROCHLORIDE 200 MCG: 10 INJECTION INTRAVENOUS at 02:01

## 2019-01-04 RX ADMIN — ACETAMINOPHEN 1000 MG: 10 INJECTION, SOLUTION INTRAVENOUS at 06:01

## 2019-01-04 RX ADMIN — PHENYLEPHRINE HYDROCHLORIDE 200 MCG: 10 INJECTION INTRAVENOUS at 12:01

## 2019-01-04 RX ADMIN — SUCCINYLCHOLINE CHLORIDE 140 MG: 20 INJECTION, SOLUTION INTRAMUSCULAR; INTRAVENOUS at 07:01

## 2019-01-04 RX ADMIN — PHENYLEPHRINE HYDROCHLORIDE 100 MCG: 10 INJECTION INTRAVENOUS at 11:01

## 2019-01-04 RX ADMIN — CALCIUM CHLORIDE 0.3 G: 100 INJECTION, SOLUTION INTRAVENOUS at 02:01

## 2019-01-04 RX ADMIN — AMPICILLIN SODIUM AND SULBACTAM SODIUM 3 G: 2; 1 INJECTION, POWDER, FOR SOLUTION INTRAMUSCULAR; INTRAVENOUS at 01:01

## 2019-01-04 RX ADMIN — MIDAZOLAM 2 MG: 1 INJECTION INTRAMUSCULAR; INTRAVENOUS at 06:01

## 2019-01-04 RX ADMIN — DEXTROSE, SODIUM CHLORIDE, AND POTASSIUM CHLORIDE: 5; .45; .15 INJECTION INTRAVENOUS at 05:01

## 2019-01-04 RX ADMIN — FENTANYL CITRATE 100 MCG: 50 INJECTION, SOLUTION INTRAMUSCULAR; INTRAVENOUS at 08:01

## 2019-01-04 RX ADMIN — ROCURONIUM BROMIDE 20 MG: 10 INJECTION, SOLUTION INTRAVENOUS at 09:01

## 2019-01-04 RX ADMIN — SODIUM CHLORIDE, SODIUM LACTATE, POTASSIUM CHLORIDE, AND CALCIUM CHLORIDE: .6; .31; .03; .02 INJECTION, SOLUTION INTRAVENOUS at 12:01

## 2019-01-04 RX ADMIN — KETOROLAC TROMETHAMINE 15 MG: 30 INJECTION, SOLUTION INTRAMUSCULAR at 06:01

## 2019-01-04 RX ADMIN — NALOXONE HYDROCHLORIDE 0.4 MG: 0.4 INJECTION, SOLUTION INTRAMUSCULAR; INTRAVENOUS; SUBCUTANEOUS at 04:01

## 2019-01-04 RX ADMIN — FENTANYL CITRATE 50 MCG: 50 INJECTION, SOLUTION INTRAMUSCULAR; INTRAVENOUS at 11:01

## 2019-01-04 RX ADMIN — ALBUMIN HUMAN 12.5 G: 0.05 INJECTION, SOLUTION INTRAVENOUS at 04:01

## 2019-01-04 RX ADMIN — Medication: at 05:01

## 2019-01-04 RX ADMIN — ONDANSETRON 8 MG: 2 INJECTION INTRAMUSCULAR; INTRAVENOUS at 06:01

## 2019-01-04 RX ADMIN — SODIUM CHLORIDE, SODIUM LACTATE, POTASSIUM CHLORIDE, AND CALCIUM CHLORIDE: .6; .31; .03; .02 INJECTION, SOLUTION INTRAVENOUS at 11:01

## 2019-01-04 RX ADMIN — FENTANYL CITRATE 50 MCG: 50 INJECTION, SOLUTION INTRAMUSCULAR; INTRAVENOUS at 12:01

## 2019-01-04 RX ADMIN — PHENYLEPHRINE HYDROCHLORIDE 200 MCG: 10 INJECTION INTRAVENOUS at 07:01

## 2019-01-04 RX ADMIN — MAGNESIUM SULFATE IN WATER 2 G: 40 INJECTION, SOLUTION INTRAVENOUS at 04:01

## 2019-01-04 RX ADMIN — FENTANYL CITRATE 50 MCG: 50 INJECTION, SOLUTION INTRAMUSCULAR; INTRAVENOUS at 10:01

## 2019-01-04 NOTE — OP NOTE
[facility]  Surgery Department  Operative Note    SUMMARY     Date of Procedure: 1/4/2019     Procedure:   1. Ex lap  2. Hiatal hernia repair  3. toupet fundoplication  4. Liver biopsy  5. Liver US  6. [place] lymphnode mapping  6.cholecystectomy  7. Common bile duct exploration   8. Sleeve duodenectomy with end to end anastamosis  9. Portal lymphadenectomy  9. Intralesional chemo with 5 FU  10. 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 Carcinoid duodenem with enlarged lymph nodes  5. Chronic cholecystitis      Anesthesia: General        Complications: no    Estimated Blood Loss (EBL): 350 mL           Implants:   Implant Name Type Inv. Item Serial No.  Lot No. LRB No. Used   STENT URETERAL UNIV 6FR 24CM - SREF [id number]  STENT URETERAL UNIV 6FR 24CM REF [id number] [employer] [id number] N/A 1   STENT URETERAL UNIV 5FR 28CM - [id number]  STENT URETERAL UNIV 5FR 28CM  [employer] 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  retropancreatic lymph node (PERM)2.  liver biopsy (PERM)3.  gallbladder (PERM)4.  proximal portal lymph node (PERM)5.  anterior portal lymph node (PERM)6.  proximal duodenum (green  stitch); distal duodenum (blue stitch) (PERM)     Start Status   01/04/19 1431 Collected (01/04/19 1431)       01/04/19 1431                  Condition: Stable    Disposition: PACU - hemodynamically stable.    Attestation: I was present and scrubbed for the entire procedure.     Operation Form for [facility] [employer]    Name __ _Hoang Santos JrScott                    Date of Birth __1950 _____    Patient Admission Date to hospital:   1/4/2019    Surgeon(s):  Madeleine Alvarado MD                     Other ___________       Length of Operation: 7 hours 14 mts    Type of Operation (check all that apply)     Procedure(s):  DUODENECTOMY  LYMPHADENECTOMY  GASTROJEJUNOSTOMY  FUNDOPLICATION    CHOLECYSTECTOMY     Gastric Resection                    Small bowel resection                  Colon resection    Hepatic resection                     Pancreatic resection                    Whipple                   Lymph node resection            x Cholecystectomy                          Adrenalectomy       Lung resection                          Mesenteric dissection                   Nephrectomy         Thyroidectomy                         RFA                                             Peritoneal stripping    Explorative                               Lysis of Adhesions              Diaphramatic Resection   Other  _____________________________________    Duodenectomy    Type of vascular encasements (check all that apply)      SMA        Renal        Aorta/Cava     Iliac       Orlin  Orlin Hepatis    Celiac      Was tumor collected?           Yes                 No          If yes, tumor form must be completed by Data staff.                   Neoprobe Used          Yes           No    Was it helpful in finding the tumor?      Yes        No     Operative Findings   Vascular Encasement                                       Mesenteric Extension     Bowel Obstruction - complete or partial    Bowel Ischemia                                                  Carcinomatosis     Location of Primary Tumor    duodenem 1st part                                 Primary Resected     xYes      No   How many primary sites?multicenteric in 1st and 2nd part duodenem       % of Tumor Debulked 100%                   %of Mesenteric Tumor Debulked___none______  Was sentinel lymph node done?      Yes       No    Number of Spiro LN Sent ______         Number of Spiro LN Positive   __        Evidence of lymph obstruction:    Yes      xNo    Mets to other organs:       Yes         x No                                            If yes, Nodes and Metastases form must be completed  Number of tumors found: __>2 in duodenem ____________   Was tumor left behind? __no____________   How much small bowel removed (cm)?  8 cms sleeve of duodenem_________    Seprafilm used:    Yes      x No  Visible ovarian masses:             Yes       x No   Visible retroperitoneal nodes:   Yes        x No    Preop Sandostatin used:          x Yes        No    Intraop chemotherapy used:     x Yes         No      Gallstones present :  x Yes          No     N/A  Visible liver mets:       Yes        x No      Blood transfusion needed:    Yes        x No  Complications______none___________________________________    Nodes & Metastases Form    Nodes    Resected   Mets  Tissue Resected?      Solid organ/soft     Cervical   Yes No                      Liver   Yes No     Supraclavicular  Yes No   Bone   Yes No     Hilar           Yes No Brain   Yes No        Mediastinal  Yes No  Lung    Yes No     Auxiliary     Yes No Colon   Yes No     Mesenteric Yes No Pancreas  Yes No      Periportal Yes No Appendix  Yes No     Retroperitoneal Yes No  Thyroid     Yes No     Celiac Yes No Chest wall  Yes No     Inguinal Yes No  Kidney       Yes   No     Iliac Yes No Breast       Yes   No     Other Yes No Ovary        Yes No   Hiatal hernia  With Toupet fundopication_________________ Pelvic Floor   Yes    No   Uterus         Yes No   Ureter         Yes   No   Seminal Vesicle YesNo      Spleen     Yes No   L Diaphram   Yes No   R Diaphram   Yes No    Other             Yes No   ____________________    Radiofrequency Ablation Form      Mode: Site:      Open    Liver- R Lobe                        Percutaneous   Liver- L Lobe     Laproscopic    Kidney        Bone        Pelvis         Other  ____________________                                      #of Lesions      Tumor Sizes  ________________ _______________  ________________ _______________  ________________ _______________  ________________ _______________    Complications?   YES  NO  UNKNOWN    Complications____________________________________________________________________________________________________________________________________      Ana Maria Knife Form      Indication: ________________________________________________________________________________________________________________   LIVER   R lobe size   L lobe size          Hepatic alejandrina tumor sizes  _______       _______      _______   _______      Left tumor size_______      _______       _______      _______      _______      Right tumor size_______       _______       _______      _______  _______      Middle tumor size ______     Single probe                  2 Probe           3 Probes            4 Probes   Overlaping ablation       1        2         3        4     HILUM   Central duct-- tumor sizes ________     ________     ________   R duct--     tumor sizes _______       ________      ________   L duct--  tumor sizes _______      ________       ________       MESENTERIC ROOT NODES--tumor sizes _____________      URETER-- tumor sizes _____________     PERIAORTIC-- tumor sizes _______     ________    ________     PELVIC TUMOR  Obturator node         tumor sizes ________     ________     ________   Pres  tumor sizes ________     ________     _______   Other  tumor size _____________      PANCREAS--  tumor  size ______                              Head     tumor size _______                            Body      tumor size _______                                                         Tail        tumor size _______     KIDNEY-- tumor size ________   Right        Left       OTHER SITES   _________________        size_____      _________________         size_____     _____ ____________        size_____    Ablation time___________        Complications?        Yes      No   Unknown    Complications/Comments:__________________________________________________________________________________________________________________________

## 2019-01-04 NOTE — INTERVAL H&P NOTE
The patient has been examined and the H&P has been reviewed:    I concur with the findings and no changes have occurred since H&P was written.    Anesthesia/Surgery risks, benefits and alternative options discussed and understood by patient/family.          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

## 2019-01-04 NOTE — H&P
[facility] Neuroendocrine Surgery/General Surgery  History & Physical    SUBJECTIVE:     Chief Complaint:    weight loss     History of Present Illness:  A 69 yo F  has a past medical history of Hyperlipidemia, Primary malignant neuroendocrine neoplasm of duodenum (09/2018), Prostatic hyperplasia, and Sleep apnea presents with weight loss and a primary malignant neuroendocrine neoplasm of duodenum here for duodenectomy, cholecystectomy and liver US with possible Gastrojej today. Imaging showed       Allergies:  Review of patient's allergies indicates:   Allergen Reactions    Epinephrine      Neuroendocrine Tumor patient         Home Medications:  No current facility-administered medications on file prior to encounter.      Current Outpatient Medications on File Prior to Encounter   Medication Sig    atorvastatin (LIPITOR) 40 MG tablet Take 1 tablet (40 mg total) by mouth once daily.    cholecalciferol, vitamin D3, 50,000 unit capsule Take 1 capsule (50,000 Units total) by mouth once a week.    influenza (FLUZONE HIGH-DOSE) 180 mcg/0.5 mL vaccine Inject 0.5 mLs into the muscle.    VIAGRA 100 mg tablet Take 1 tablet (100 mg total) by mouth once daily. Brand name only medication.       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    Prostatic hyperplasia     Sleep apnea      Past Surgical History:   Procedure Laterality Date    EGD (ESOPHAGOGASTRODUODENOSCOPY) N/A 9/24/2018    Performed by [facility]ez [facility] at [facility] ENDO (4TH FLR)    PALATOPLASTY-(UPPP) N/A 2/14/2017    Performed by Niles JNA Araujo III, MD at [facility] OR 2ND FLR    PH MONITORING, ESOPHAGUS, WIRELESS, (OFF REFLUX MEDS) N/A 9/24/2018    Performed by Salo [facility]ez [facility] at [facility] ENDO (4TH FLR)    TONSILLECTOMY      TRANSPROSTATIC TISSUE RETRACTION N/A 6/28/2018    [facility]    ULTRASOUND-ENDOSCOPIC-UPPER N/A 11/5/2018    Performed by  [facility]orge Reyes [facility] at [facility] ENDO    UVULOPALATOPHARYNGOPLASTY       Family History   Problem Relation Age of Onset    Heart disease Mother     Diabetes Mother     Stroke Father     Cancer Sister         pancreatitic    COPD Sister     Prostate cancer Neg Hx     Kidney disease Neg Hx     Thyroid disease Neg Hx     Retinal detachment Neg Hx     Macular degeneration Neg Hx     Hypertension Neg Hx     Glaucoma Neg Hx      Social History     Tobacco Use    Smoking status: Never Smoker    Smokeless tobacco: Never Used   Substance Use Topics    Alcohol use: Yes     Alcohol/week: 1.2 oz     Types: 2 Cans of beer per week    Drug use: No        Review of Systems   Constitutional: Positive for weight loss. Negative for chills, diaphoresis, fever and malaise/fatigue.   HENT: Negative for congestion, nosebleeds and sore throat.    Eyes: Negative for blurred vision, double vision and photophobia.   Respiratory: Negative for cough, hemoptysis, sputum production, shortness of breath and wheezing.    Cardiovascular: Negative for chest pain, palpitations, orthopnea, leg swelling and PND.   Gastrointestinal: Negative for abdominal pain, blood in stool, constipation, diarrhea, melena, nausea and vomiting.   Genitourinary: Negative for dysuria, flank pain and frequency.   Musculoskeletal: Negative for back pain, joint pain, myalgias and neck pain.   Skin: Negative for rash.   Neurological: Negative for dizziness, sensory change, speech change, focal weakness, seizures, loss of consciousness, weakness and headaches.   Endo/Heme/Allergies: Negative for polydipsia.   Psychiatric/Behavioral: Negative.        OBJECTIVE:     Vital Signs:  Temp: 98.1 °F (36.7 °C) (01/04/19 0600)  Pulse: 78 (01/04/19 0600)  Resp: 20 (01/04/19 0600)  BP: (!) 142/70 (01/04/19 0600)  SpO2: 98 % (01/04/19 0600)    Physical Exam:  General: well developed, well nourished, no distress  HEENT: normocephalic, atraumatic, hearing grossly normal  bilaterally, mucous membranes moist, EOM intact, no scleral icterus  Neck: supple, symmetrical, trachea midline, no JVD  Lungs:  clear to auscultation bilaterally and normal respiratory effort  Cardiovascular: regular rate and rhythm.  Extremities: no cyanosis or edema, or clubbing, distal pulses palpable and symmetric  Abdomen: soft, non-tender to palpation, no distention, no masses/hernias  Skin: Skin color, texture, turgor normal. No rashes or lesions  Musculoskeletal:no clubbing, cyanosis, no deformities  Neurologic: No focal numbness or weakness  Psych/Behavioral:  Alert and oriented, appropriate affect.    Recent Labs   Lab 01/04/19  0559   WBC 4.57   HGB 14.4   HCT 45.0        Recent Labs   Lab 01/03/19  1351     140   K 4.1  4.1     102   CO2 28  28   BUN 11  11   CREATININE 0.9  0.9   CALCIUM 10.1  10.1   PROT 8.4  8.4   BILITOT 1.0  1.0   ALKPHOS 51*  51*   ALT 26  26   AST 31  31     No results for input(s): POCTGLUCOSE in the last 168 hours.  No results for input(s): TROPONINI in the last 168 hours.  Lab Results   Component Value Date    TSH 0.591 03/10/2017    TSH 1.516 12/07/2015    TSH 1.755 02/07/2014     @BNP@    X-ray Chest 1 View    Result Date: 1/4/2019  EXAMINATION: XR CHEST 1 VIEW CLINICAL HISTORY: confirmation of central line placement; TECHNIQUE: Single frontal view of the chest was performed. COMPARISON: Radiograph 12/24/2018. FINDINGS: Interval right-sided IJ central venous catheter placement is noted with the distal catheter tip overlying the expected location of the junction of the SVC and right atrium.  Interval endotracheal tube placement also noted with the distal tip at the inferior aspect of the jessie.  Consider retracting the endotracheal tube approximately 3 cm. The lungs are symmetrically expanded.  Mediastinal structures are midline.  The mediastinum appears slightly widened, which may relate to technique.  The heart is not enlarged.  Obscuration  of the left hemidiaphragm is noted, which may relate to consolidation, atelectasis, and/or pleural effusion.     Interval right IJ central venous catheter placement with catheter tip overlying the expected location of the junction of the SVC and right atrium. Interval endotracheal tube placement with the distal tip at the inferior aspect of the jessie.  Consider retracting the endotracheal tube approximately 3 cm. Obscuration of the left hemidiaphragm, possibly relating to consolidation, atelectasis, and/or pleural effusion. Electronically signed by: Cornelius Dunbar Date:    01/04/2019 Time:    08:17    X-ray Chest Pa And Lateral    Result Date: 12/24/2018  EXAMINATION: XR CHEST PA AND LATERAL CLINICAL HISTORY: Encounter for preprocedural respiratory examination TECHNIQUE: PA and lateral views of the chest were performed. COMPARISON: Chest radiograph 03/10/2017. FINDINGS: Lungs are clear.  Mediastinal structures are midline.  The thoracic aorta is tortuous.  Cardiac silhouette and pulmonary vascular distribution are within normal limits.  The osseous structures show age-appropriate degenerative changes.  No pleural effusion or pneumothorax.  The right hemidiaphragm is elevated, stable when compared to prior examination dated 03/10/2017.     No acute abnormality. Electronically signed by resident: Asaf Leos Date:    12/24/2018 Time:    10:22 Electronically signed by: Adolfo Moreno MD Date:    12/24/2018 Time:    12:01      ASSESSMENT:     A 67 yo F  has a past medical history of primary malignant neuroendocrine neoplasm of duodenum (09/2018)presents differentiated NET of duodenal bulb ( ki 67 is <3%)--multifocal in polyp and bulb here for duodenectomy, cholecystectomy and liver US with possible Gastrojej    PLAN:     Primary malignant neuroendocrine neoplasm of duodenum  FOR DUODENECTOMY  VIDYA  LIVER us  POSSIBLE GASTOJEJ with or without hiatal hernia repair/ fundoplication  IV fluids   F/u AM labs      HLD  Statin    Sleep apnea  BIPAP nightly     Jose Lance MD  Feel free to secure chat me (Ctrl+Alt+5) for any questions   01/04/2019

## 2019-01-04 NOTE — TRANSFER OF CARE
"Anesthesia Transfer of Care Note    Patient: Hoang Santos Jr.    Procedure(s) Performed: Procedure(s) (LRB):  DUODENECTOMY (N/A)  LYMPHADENECTOMY (N/A)  GASTROJEJUNOSTOMY (N/A)  FUNDOPLICATION (N/A)  EGD (ESOPHAGOGASTRODUODENOSCOPY) (N/A)  CHOLECYSTECTOMY  BIOPSY, LIVER    Patient location: ICU    Anesthesia Type: general    Transport from OR: Transported from OR on 6-10 L/min O2 by face mask with adequate spontaneous ventilation. Continuous ECG monitoring in transport. Continuous SpO2 monitoring in transport. Continuos invasive BP monitoring in transport    Post pain: adequate analgesia    Post assessment: no apparent anesthetic complications    Post vital signs: stable    Level of consciousness: awake, alert and oriented          Last vitals:   Visit Vitals  BP (!) 142/70 (BP Location: Left arm, Patient Position: Lying)   Pulse 78   Temp 36.7 °C (98.1 °F) (Skin)   Resp 20   Ht 5' 6" (1.676 m)   Wt 101.6 kg (224 lb)   SpO2 98%   BMI 36.15 kg/m²     "

## 2019-01-04 NOTE — ANESTHESIA PROCEDURE NOTES
Central Line    Diagnosis: Carcinoid syndrome  Doctor requesting consult: Madeleine  Patient location during procedure: done in OR  Procedure start time: 1/4/2019 7:19 AM  Timeout: 1/4/2019 7:19 AM  Procedure end time: 1/4/2019 7:40 AM  Staffing  Anesthesiologist: Amor He MD  Performed: anesthesiologist   Anesthesiologist was present at the time of the procedure.  Preanesthetic Checklist  Completed: patient identified, site marked, surgical consent, pre-op evaluation, timeout performed, IV checked, risks and benefits discussed, monitors and equipment checked and anesthesia consent given  Indication  Indication: hemodynamic monitoring, vascular access, med administration     Anesthesia   general anesthesia    Central Line  Skin Prep: skin prepped with ChloraPrep, skin prep agent completely dried prior to procedure  maximum sterile barriers used during central venous catheter insertion  hand hygiene performed prior to central venous catheter insertion  Location: right, internal jugular.   Catheter type: triple lumen  Catheter Size: 7 Fr  Inserted Catheter Length: 16 cm  Ultrasound: vascular probe with ultrasound   Vessel Caliber: large, patent  Vascular Doppler:  not done, compressibility normal  Needle advanced into vessel with real time Ultrasound guidance.  Guidewire confirmed in vessel.  Image recorded and saved.  Manometry: none  Insertion Attempts: 2   Securement:line sutured, chlorhexidine patch, sterile dressing applied and blood return through all ports     Post-Procedure  X-Ray: no pneumothorax on x-ray, placement verified by x-ray, tip termination and successful placement   Adverse Events:none

## 2019-01-05 LAB
ALBUMIN SERPL BCP-MCNC: 3.6 G/DL
ALP SERPL-CCNC: 30 U/L
ALT SERPL W/O P-5'-P-CCNC: 313 U/L
ANION GAP SERPL CALC-SCNC: 8 MMOL/L
ANION GAP SERPL CALC-SCNC: 8 MMOL/L
AST SERPL-CCNC: 440 U/L
BASOPHILS # BLD AUTO: 0 K/UL
BASOPHILS # BLD AUTO: 0 K/UL
BASOPHILS NFR BLD: 0 %
BASOPHILS NFR BLD: 0 %
BILIRUB SERPL-MCNC: 1.4 MG/DL
BUN SERPL-MCNC: 22 MG/DL
BUN SERPL-MCNC: 27 MG/DL
CALCIUM SERPL-MCNC: 8.5 MG/DL
CALCIUM SERPL-MCNC: 8.5 MG/DL
CHLORIDE SERPL-SCNC: 104 MMOL/L
CHLORIDE SERPL-SCNC: 106 MMOL/L
CO2 SERPL-SCNC: 22 MMOL/L
CO2 SERPL-SCNC: 23 MMOL/L
CREAT SERPL-MCNC: 2 MG/DL
CREAT SERPL-MCNC: 2.4 MG/DL
DIFFERENTIAL METHOD: ABNORMAL
DIFFERENTIAL METHOD: ABNORMAL
EOSINOPHIL # BLD AUTO: 0 K/UL
EOSINOPHIL # BLD AUTO: 0 K/UL
EOSINOPHIL NFR BLD: 0 %
EOSINOPHIL NFR BLD: 0 %
ERYTHROCYTE [DISTWIDTH] IN BLOOD BY AUTOMATED COUNT: 13.4 %
ERYTHROCYTE [DISTWIDTH] IN BLOOD BY AUTOMATED COUNT: 13.6 %
EST. GFR  (AFRICAN AMERICAN): 31 ML/MIN/1.73 M^2
EST. GFR  (AFRICAN AMERICAN): 39 ML/MIN/1.73 M^2
EST. GFR  (NON AFRICAN AMERICAN): 27 ML/MIN/1.73 M^2
EST. GFR  (NON AFRICAN AMERICAN): 34 ML/MIN/1.73 M^2
GLUCOSE SERPL-MCNC: 172 MG/DL
GLUCOSE SERPL-MCNC: 186 MG/DL
HCT VFR BLD AUTO: 29.2 %
HCT VFR BLD AUTO: 32 %
HGB BLD-MCNC: 10.2 G/DL
HGB BLD-MCNC: 9.4 G/DL
HYPOCHROMIA BLD QL SMEAR: ABNORMAL
LYMPHOCYTES # BLD AUTO: 0.6 K/UL
LYMPHOCYTES # BLD AUTO: 1 K/UL
LYMPHOCYTES NFR BLD: 6.5 %
LYMPHOCYTES NFR BLD: 8.4 %
MAGNESIUM SERPL-MCNC: 2.4 MG/DL
MCH RBC QN AUTO: 28.7 PG
MCH RBC QN AUTO: 28.8 PG
MCHC RBC AUTO-ENTMCNC: 31.9 G/DL
MCHC RBC AUTO-ENTMCNC: 32.2 G/DL
MCV RBC AUTO: 90 FL
MCV RBC AUTO: 90 FL
MONOCYTES # BLD AUTO: 1 K/UL
MONOCYTES # BLD AUTO: 1.1 K/UL
MONOCYTES NFR BLD: 10.7 %
MONOCYTES NFR BLD: 9.5 %
NEUTROPHILS # BLD AUTO: 7.8 K/UL
NEUTROPHILS # BLD AUTO: 9.7 K/UL
NEUTROPHILS NFR BLD: 82.1 %
NEUTROPHILS NFR BLD: 82.7 %
PHOSPHATE SERPL-MCNC: 3.7 MG/DL
PLATELET # BLD AUTO: 129 K/UL
PLATELET # BLD AUTO: 150 K/UL
PLATELET BLD QL SMEAR: ABNORMAL
PMV BLD AUTO: 9.2 FL
PMV BLD AUTO: 9.2 FL
POTASSIUM SERPL-SCNC: 4.7 MMOL/L
POTASSIUM SERPL-SCNC: 5.6 MMOL/L
PROT SERPL-MCNC: 6.2 G/DL
RBC # BLD AUTO: 3.26 M/UL
RBC # BLD AUTO: 3.55 M/UL
SODIUM SERPL-SCNC: 135 MMOL/L
SODIUM SERPL-SCNC: 136 MMOL/L
WBC # BLD AUTO: 11.84 K/UL
WBC # BLD AUTO: 9.4 K/UL

## 2019-01-05 PROCEDURE — 97530 THERAPEUTIC ACTIVITIES: CPT

## 2019-01-05 PROCEDURE — 99900035 HC TECH TIME PER 15 MIN (STAT)

## 2019-01-05 PROCEDURE — P9047 ALBUMIN (HUMAN), 25%, 50ML: HCPCS | Mod: JG | Performed by: SURGERY

## 2019-01-05 PROCEDURE — 97802 MEDICAL NUTRITION INDIV IN: CPT

## 2019-01-05 PROCEDURE — 63600175 PHARM REV CODE 636 W HCPCS: Performed by: SURGERY

## 2019-01-05 PROCEDURE — S0171 BUMETANIDE 0.5 MG: HCPCS | Performed by: SURGERY

## 2019-01-05 PROCEDURE — 97161 PT EVAL LOW COMPLEX 20 MIN: CPT

## 2019-01-05 PROCEDURE — 25000003 PHARM REV CODE 250: Performed by: STUDENT IN AN ORGANIZED HEALTH CARE EDUCATION/TRAINING PROGRAM

## 2019-01-05 PROCEDURE — 63600175 PHARM REV CODE 636 W HCPCS: Performed by: STUDENT IN AN ORGANIZED HEALTH CARE EDUCATION/TRAINING PROGRAM

## 2019-01-05 PROCEDURE — S5010 5% DEXTROSE AND 0.45% SALINE: HCPCS | Performed by: SURGERY

## 2019-01-05 PROCEDURE — 97110 THERAPEUTIC EXERCISES: CPT

## 2019-01-05 PROCEDURE — 80048 BASIC METABOLIC PNL TOTAL CA: CPT

## 2019-01-05 PROCEDURE — 83735 ASSAY OF MAGNESIUM: CPT

## 2019-01-05 PROCEDURE — A4216 STERILE WATER/SALINE, 10 ML: HCPCS | Performed by: ANESTHESIOLOGY

## 2019-01-05 PROCEDURE — 94770 HC EXHALED C02 TEST: CPT

## 2019-01-05 PROCEDURE — 80053 COMPREHEN METABOLIC PANEL: CPT

## 2019-01-05 PROCEDURE — 85025 COMPLETE CBC W/AUTO DIFF WBC: CPT | Mod: 91

## 2019-01-05 PROCEDURE — 27000221 HC OXYGEN, UP TO 24 HOURS

## 2019-01-05 PROCEDURE — 20000000 HC ICU ROOM

## 2019-01-05 PROCEDURE — 25000003 PHARM REV CODE 250: Performed by: SURGERY

## 2019-01-05 PROCEDURE — 25000003 PHARM REV CODE 250: Performed by: ANESTHESIOLOGY

## 2019-01-05 PROCEDURE — 94640 AIRWAY INHALATION TREATMENT: CPT

## 2019-01-05 PROCEDURE — 25000242 PHARM REV CODE 250 ALT 637 W/ HCPCS: Performed by: STUDENT IN AN ORGANIZED HEALTH CARE EDUCATION/TRAINING PROGRAM

## 2019-01-05 PROCEDURE — 94761 N-INVAS EAR/PLS OXIMETRY MLT: CPT

## 2019-01-05 PROCEDURE — 84100 ASSAY OF PHOSPHORUS: CPT

## 2019-01-05 PROCEDURE — C9113 INJ PANTOPRAZOLE SODIUM, VIA: HCPCS | Performed by: SURGERY

## 2019-01-05 PROCEDURE — 97165 OT EVAL LOW COMPLEX 30 MIN: CPT

## 2019-01-05 RX ORDER — ALBUTEROL SULFATE 2.5 MG/.5ML
10 SOLUTION RESPIRATORY (INHALATION) ONCE
Status: COMPLETED | OUTPATIENT
Start: 2019-01-05 | End: 2019-01-05

## 2019-01-05 RX ORDER — BUMETANIDE 0.25 MG/ML
1 INJECTION INTRAMUSCULAR; INTRAVENOUS ONCE
Status: COMPLETED | OUTPATIENT
Start: 2019-01-05 | End: 2019-01-05

## 2019-01-05 RX ORDER — METOLAZONE 5 MG/1
5 TABLET ORAL 2 TIMES DAILY
Status: DISCONTINUED | OUTPATIENT
Start: 2019-01-05 | End: 2019-01-07

## 2019-01-05 RX ORDER — PANTOPRAZOLE SODIUM 40 MG/10ML
40 INJECTION, POWDER, LYOPHILIZED, FOR SOLUTION INTRAVENOUS DAILY
Status: DISCONTINUED | OUTPATIENT
Start: 2019-01-05 | End: 2019-01-12

## 2019-01-05 RX ORDER — DEXTROSE MONOHYDRATE AND SODIUM CHLORIDE 5; .45 G/100ML; G/100ML
INJECTION, SOLUTION INTRAVENOUS CONTINUOUS
Status: DISCONTINUED | OUTPATIENT
Start: 2019-01-05 | End: 2019-01-06

## 2019-01-05 RX ORDER — DOPAMINE HCL IN DEXTROSE 5 % 400MG/.25L
2 INFUSION BOTTLE (ML) INTRAVENOUS CONTINUOUS
Status: DISCONTINUED | OUTPATIENT
Start: 2019-01-05 | End: 2019-01-07

## 2019-01-05 RX ORDER — ALBUMIN HUMAN 250 G/1000ML
12.5 SOLUTION INTRAVENOUS CONTINUOUS
Status: DISCONTINUED | OUTPATIENT
Start: 2019-01-05 | End: 2019-01-07

## 2019-01-05 RX ADMIN — METOCLOPRAMIDE 10 MG: 5 INJECTION, SOLUTION INTRAMUSCULAR; INTRAVENOUS at 12:01

## 2019-01-05 RX ADMIN — INSULIN HUMAN 10 UNITS: 100 INJECTION, SOLUTION PARENTERAL at 08:01

## 2019-01-05 RX ADMIN — METOCLOPRAMIDE 10 MG: 5 INJECTION, SOLUTION INTRAMUSCULAR; INTRAVENOUS at 11:01

## 2019-01-05 RX ADMIN — ACETAMINOPHEN 1000 MG: 10 INJECTION, SOLUTION INTRAVENOUS at 03:01

## 2019-01-05 RX ADMIN — METOCLOPRAMIDE 10 MG: 5 INJECTION, SOLUTION INTRAMUSCULAR; INTRAVENOUS at 05:01

## 2019-01-05 RX ADMIN — CALCIUM GLUCONATE 1 G: 94 INJECTION, SOLUTION INTRAVENOUS at 08:01

## 2019-01-05 RX ADMIN — MANNITOL: 250 INJECTION, SOLUTION INTRAVENOUS at 11:01

## 2019-01-05 RX ADMIN — Medication 3 ML: at 03:01

## 2019-01-05 RX ADMIN — Medication 3 ML: at 05:01

## 2019-01-05 RX ADMIN — PANTOPRAZOLE SODIUM 40 MG: 40 INJECTION, POWDER, FOR SOLUTION INTRAVENOUS at 10:01

## 2019-01-05 RX ADMIN — DEXTROSE, SODIUM CHLORIDE, AND POTASSIUM CHLORIDE: 5; .45; .15 INJECTION INTRAVENOUS at 04:01

## 2019-01-05 RX ADMIN — KETOROLAC TROMETHAMINE 10 MG: 30 INJECTION, SOLUTION INTRAMUSCULAR at 12:01

## 2019-01-05 RX ADMIN — ALBUMIN HUMAN 12.5 G: 0.25 SOLUTION INTRAVENOUS at 04:01

## 2019-01-05 RX ADMIN — ALBUMIN HUMAN 12.5 G: 0.25 SOLUTION INTRAVENOUS at 08:01

## 2019-01-05 RX ADMIN — PREGABALIN 75 MG: 75 CAPSULE ORAL at 08:01

## 2019-01-05 RX ADMIN — DEXTROSE MONOHYDRATE 25 G: 500 INJECTION PARENTERAL at 07:01

## 2019-01-05 RX ADMIN — ALBUMIN HUMAN 12.5 G: 0.25 SOLUTION INTRAVENOUS at 11:01

## 2019-01-05 RX ADMIN — DOPAMINE HYDROCHLORIDE IN DEXTROSE 2 MCG/KG/MIN: 1.6 INJECTION, SOLUTION INTRAVENOUS at 07:01

## 2019-01-05 RX ADMIN — DEXTROSE AND SODIUM CHLORIDE: 5; .45 INJECTION, SOLUTION INTRAVENOUS at 06:01

## 2019-01-05 RX ADMIN — ALBUTEROL SULFATE 10 MG: 2.5 SOLUTION RESPIRATORY (INHALATION) at 07:01

## 2019-01-05 RX ADMIN — KETOROLAC TROMETHAMINE 10 MG: 30 INJECTION, SOLUTION INTRAMUSCULAR at 05:01

## 2019-01-05 RX ADMIN — ASCORBIC ACID, VITAMIN A PALMITATE, CHOLECALCIFEROL, THIAMINE HYDROCHLORIDE, RIBOFLAVIN-5 PHOSPHATE SODIUM, PYRIDOXINE HYDROCHLORIDE, NIACINAMIDE, DEXPANTHENOL, ALPHA-TOCOPHEROL ACETATE, VITAMIN K1, FOLIC ACID, BIOTIN, CYANOCOBALAMIN: 200; 3300; 200; 6; 3.6; 6; 40; 15; 10; 150; 600; 60; 5 INJECTION, SOLUTION INTRAVENOUS at 08:01

## 2019-01-05 RX ADMIN — IRON SUCROSE 100 MG: 20 INJECTION, SOLUTION INTRAVENOUS at 10:01

## 2019-01-05 RX ADMIN — DEXTROSE AND SODIUM CHLORIDE: 5; .45 INJECTION, SOLUTION INTRAVENOUS at 04:01

## 2019-01-05 RX ADMIN — ACETAMINOPHEN 1000 MG: 10 INJECTION, SOLUTION INTRAVENOUS at 06:01

## 2019-01-05 RX ADMIN — BUMETANIDE 1 MG: 0.25 INJECTION INTRAMUSCULAR; INTRAVENOUS at 07:01

## 2019-01-05 RX ADMIN — METOLAZONE 5 MG: 5 TABLET ORAL at 08:01

## 2019-01-05 RX ADMIN — STANDARDIZED SENNA CONCENTRATE AND DOCUSATE SODIUM 1 TABLET: 8.6; 5 TABLET, FILM COATED ORAL at 08:01

## 2019-01-05 NOTE — PLAN OF CARE
Problem: Occupational Therapy Goal  Goal: Occupational Therapy Goal  Goals to be met by: 1/25/2019    Patient will increase functional independence with ADLs by performing:    UE Dressing with Modified [place].  LE Dressing with Modified [place].  Grooming while standing with Modified [place].  Toileting from toilet with Modified [place] for hygiene and clothing management.   Rolling to Bilateral with Modified [place].   Supine to sit with Modified [place].  Step transfer with Modified [place]  Toilet transfer to toilet with Modified [place].  Upper extremity exercise program x10 reps per handout, with independence.    Outcome: Ongoing (interventions implemented as appropriate)  OT initial eval completed and treatment initiated.  Pt. exhibited low BP, drowsiness and dizziness and unable to safely remain up in BS chair; tolerated 20 min in BS chair. Nurse aware and nurse notified regarding BP.  Continue with OT.  Discharge recommendations;  home with HH.  DME needs to be determine. Continue with OT POC.

## 2019-01-05 NOTE — OP NOTE
DATE OF PROCEDURE:  01/04/2019.    PREOPERATIVE DIAGNOSES:  1.  Malignant carcinoid tumor of the duodenum.  2.  Hiatal hernia with gastroesophageal reflux.  3.  Obesity.    POSTOPERATIVE DIAGNOSES:  1.  Malignant carcinoid tumor of the duodenum.  2.  Hiatal hernia.  3.  Fatty liver.  4.  Chronic cholecystitis.    PROCEDURES DONE:  1.  Exploratory laparotomy.  2.  [place] lymph node mapping.  3.  Hiatal hernia repair.  4.  Toupet fundoplication, posterior 270 degree fundoplication.  5.  Liver biopsy.  6.  Liver ultrasound.  7.  Cholecystectomy.  8.  Common bile duct exploration.  9.  Sleeve duodenectomy, resection of about 8 cm of the first and the second   part of duodenum with end-to-end anastomosis in a 2-layer technique.  10.  Portal lymphadenectomy.  11.  Intralesional chemo with 5-FU.    SURGEON:  Romina Salvador M.D.    SURGERY RESIDENT:  Dr. Jamie Vines.    ESTIMATED BLOOD LOSS:  About 300 mL.    The patient is stable, transferred to ICU in a stable condition with no   complication.    HISTORY AND INDICATION:  This is a 68-year-old gentleman who has significant   gastroesophageal reflux disease, underwent an endoscopy.  He was found to have a   hiatal hernia and was found to have lesions in the first part of the duodenum,   the biopsy of which was consistent with a carcinoid tumor of the duodenum.  He   underwent resection of nodules.  The resection was incomplete and had a large   mass in the first part of the duodenum and he was referred to our center for   further management.  He underwent complete workup.  His gallium scan showed a   possible uptake in the right upper quadrant that is consistent with the tumor.    He had an MRI and CT scan, which did not show any other metastatic disease.    I had a long discussion with him.  I talked to him about the surgery.  The   patient had a BMI of more than 35 with a weight of 101.6.  I talked to him about   the surgery, the risk involved with the surgery  such as bleeding, infection,   DVT, PE, MI, stroke, anastomotic leaks requiring revision, reoperation, delayed   gastric emptying, gastroparesis, etc.  I strongly emphasized the need for   reoperation for anastomotic leaks.  I also talked to him about the possibility   of reanastomosis with either Billroth I or II type of anastomosis and went over   extensively the benefits and risk of each process.  After going over all the   risk and benefits, he was scheduled for surgery today.  I again saw him   yesterday.  I went over the risks and complications, I explained to him fully   and consent was obtained.    FINDINGS:  The patient had a fatty liver consistent with steatohepatitis.    Biopsy was done.  He had a palpable lesion in the first part of the duodenum not   far from the pylorus.  The lymph nodes were not pathologically enlarged, they   are not hard in consistency.  There were no other nodules further down in the   second part of the duodenum.  Cholangiogram catheter was advanced to the cystic   duct and beyond the ampulla.  Finally after the common bile exploration, a   5-Spanish double-J ureteral stent was placed.    PROCEDURE IN DETAIL:  The patient was brought to the Operating Room with IV   Sandostatin on board.  He was placed in supine position.  He was then sedated   and intubated.  A central line and arterial line was placed by Anesthesia staff.    A Schrader catheter was placed in the bladder.  The abdomen was prepped and   draped in the usual sterile manner.  A timeout was done to verify the ID of the   patient and the procedure and everyone concurred.    A midline incision was made starting from the xiphisternum to just few inches   below the umbilicus.  Fascia was incised.  The falciform ligament was carefully   taken down using the LigaSure.  A wound protector was placed.  Abdominal wall   was exposed and retracted using Redman retractor.  On cursory examination,   there was no evidence of any  intraabdominal lymph node metastatic disease.    There was no nodule.  There was no suspicious lesion at all else or in the   abdomen.  I then did an extended Kocher maneuver.  The duodenum was completely   mobilized all the way to the third portion of the duodenum.  The duodenum was   completely mobilized.  Following this, about 1 mL of Lymphazurin blue was   injected subserosally over the area of lesion at the first part of the duodenum   and the second part of the duodenum.  Following this, pressure was applied to   the area.  Using a LigaSure, the gastrocolic omentum was taken down.  The lesser   sac was entered.  The gastrocolic omentum was completely mobilized all the way   to the fundus.  The fundus was mobilized and the phrenogastric ligament was   taken down.  The gastrohepatic omentum was taken down.  There was no any branch   of replaced right hepatic artery from left gastric artery.  Following completely   mobilization of the gastrohepatic omentum, the phrenoesophageal ligament was   taken down.  The esophagus was mobilized all around. Posterior mediastinum was   entered and mobilization was done.  There was no shortening of the esophagus.    Following this, the esophagus was retracted using umbilical tape, which was   placed around the esophagus.  The esophagus was retracted to the left side.  The   hiatus was then approximated using 2-0 Ethibond stitch with pledgets in a   U-shaped stitches, 2 of them was placed at the end of the repair.  The esophagus   was comfortable, it was not tight.  Following this, the fundus was wrapped 270   degrees posteriorly.  The fundus was secured to the esophagus on each side using   a 2-0 Ethibond stitch and on the right side, the fundus was tacked to the right   rc.  Following this, meticulous hemostasis was accomplished.  The NG tube was   able to be inserted without any problem.  There was no tightening at the   hiatus.  Meticulous hemostasis was then accomplished  all around.    Attention was then paid to the right upper quadrant.  I then examined the liver.    The liver was found to be fatty consistent with steatohepatitis.  Therefore, a   wedge biopsy was done and the biopsy edge was secured and cauterized.    Ultrasound of the liver was done.  The ultrasound of the liver did not show any   space-occupying lesion.  Following this, the gallbladder was then mobilized.    Dissection was done in antegrade fashion.  The gallbladder was completely   mobilized from all the way up to the cystic duct area.  The cystic artery was   double ligated.  The cystic duct was identified and it was transected as distal   as possible.  The gallbladder was taken out as a specimen.  Through the cystic   duct opening, I inserted the cholangiogram catheter which has a balloon in the   distal end.  This was advanced all the way down and I was able to feel the   balloon in the duodenum.  The cholangiogram catheter was left like it is.    Attention was then paid to the duodenum and the portal lymph nodes.  There was   no evidence of any sentinel lymph node.  I then mobilized the duodenum   completely, dissected the lymph nodes.  I resected proximal and distal chain of   lymph nodes around the bile duct and anterior to the bile duct.  There was no   pathologically enlarged lymph node.  The lymph nodes were carefully dissected   out and resected and sent as a specimen.  Meticulous hemostasis was   accomplished.  There were few minor bleeding points that we secured using 5-0   Prolene stitch.  Following this, I then examined the duodenum.  Stay sutures   were taken on either side of the second portion of the duodenum.  The duodenum   was opened.  I inserted my finger and examined the duodenum.  I was able to feel   a large lesion about 8 x 8 mm just beyond the pylorus.  Otherwise the duodenum   felt nodular.  Distally, there was no evidence of any nodular lesion.  I was   able to feel the ampulla where  the cholangiogram catheter was coming out.  Also,   I was able to feel the minor papilla.  I looked at all the options.  I then   decided to proceed with sleeve duodenectomy.  I transected the duodenum at the   first part just beyond the pylorus muscle and then in the mid part of the second   part of the duodenum, way proximal to the minor ampulla, the duodenum was   transected.  Carefully, the duodenum was mobilized from the pancreatic head.    Meticulous hemostasis was accomplished.  The area where the pancreas was   dissected was taken down using 4-0 PDS stitch.  Meticulous hemostasis was   accomplished then and there.  The sleeve of duodenum was completely resected and   since I had mobilized the duodenum and stomach very well, end-to-end   anastomosis could be accomplished easily.  End-to-end anastomosis was   accomplished in two layers using outer layer of 4-0 Prolene and inner layer of   4-0 PDS.    Prior to completing the anastomosis, I removed the cholangiogram catheter and I   placed the 5-Anguillan double-J ureteral stent into the bile duct and I secured the   cystic duct using 3 clips.  I then advanced the NG tube just beyond the   anastomosis.  Following the completion of anastomosis, I irrigated the whole   area and injected air through the NG tube and did not find any leak at all.  The   NG tube was secured to the nose.  Meticulous hemostasis was then accomplished   all around.  I then placed the ____ AmnioFix anterior and posterior to the   anastomosis and this area was wrapped with omentum.  A 19-Anguillan Adam drain was   placed in the right upper quadrant and it was brought out through the right   side of the abdominal wall and was secured using 2-0 nylon stitch.  A strip of   Gelfoam soaked with 5-FU was placed around the lymph node area.  I sprayed the   duodenum and the right retroperitoneum using Surgiflo and with some Surgicel.    Meticulous hemostasis was accomplished all around.  The instrument  and sponge   counts were correct.  The fascia was infiltrated with a mixture of Marcaine,   Exparel and normal saline.  The fascia was approximated with #1 Prolene stitch   in a continuous and interrupted fashion.  KUB did not show any evidence of   foreign body and skin was closed using 4-0 Monocryl.  Blood loss was about 300   mL.  The patient was stable, was extubated, taken to the ICU in a stable   condition.      TR/IN  dd: 01/04/2019 15:56:26 (CST)  td: 01/04/2019 18:58:36 (CST)  Doc ID   #[id number]  Job ID #[id number]    CC:

## 2019-01-05 NOTE — NURSING
Notified provider of potassium of 5.6. Orders to DC D5 1/2NS with 20kcl and order D5 1/2NS. Orders to be implemented. Notified MD of decreasing UO to 15ml/hr x3. No new orders given.

## 2019-01-05 NOTE — PLAN OF CARE
Problem: Adult Inpatient Plan of Care  Goal: Plan of Care Review  Outcome: Ongoing (interventions implemented as appropriate)  Recommendation:   1. If pt to remain NPO, consider initiation of TPN: Clinimix 5/15 at 100ml/hr with daily IVFE to provide 2204 kcal & 120g protein, & 2400ml fluid with GIR of 2.4.      Goals:   TPN or diet will be started within 24 hours  Nutrition Goal Status: new  Communication of RD Recs: reviewed with RNSherif)

## 2019-01-05 NOTE — NURSING
Left message with surgery tech to tell Dr. Vines to call me back in regards to the pt output being below 50mL/hr. Left call back number. Pt is on bumex/mannitol drip @7mL/hr. Will continue to monitor urine output.

## 2019-01-05 NOTE — PLAN OF CARE
Problem: Fall Injury Risk  Goal: Absence of Fall and Fall-Related Injury  Outcome: Ongoing (interventions implemented as appropriate)  Intervention: Identify and Manage Contributors to Fall Injury Risk   01/04/19 2330 01/05/19 0305   Identify and Manage Contributors to Fall Injury Risk   Self-Care Promotion --  independence encouraged;BADL personal objects within reach   Manage Acute Allergic Reaction   Medication Review/Management medications reviewed --      Intervention: Promote Injury-Free Environment   01/04/19 1930 01/05/19 0505   Optimize Balance and Safe Activity   Safety Promotion/Fall Prevention --  assistive device/personal item within reach;lighting adjusted;medications reviewed;side rails raised x 3   Optimize [place] and Functional Mobility   Environmental Safety Modification assistive device/personal items within reach;clutter free environment maintained;lighting adjusted;room near unit station;room organization consistent --          Problem: Bleeding (Surgery Nonspecified)  Goal: Absence of Bleeding    Intervention: Monitor and Manage Bleeding   01/05/19 0627   Prevent and Manage Risk of Hemorrhage   Bleeding Management dressing monitored

## 2019-01-05 NOTE — PLAN OF CARE
"Problem: Adult Inpatient Plan of Care  Goal: Plan of Care Review  Outcome: Ongoing (interventions implemented as appropriate)  Pt arrived to the unit and was having slow blood pressures in the 50"s systolic. Was given narcan and albumin bolus and was able to reach 105-110 systolic. Pt was given medications as ored and was placed on bipap. Pt is awake and complaining about the bipap mask. Pt was educated about why he has to have it on. Mouth was moisturized to relieve dryness. Pain pump in use. Incision is clean dry and intact with no s/s of redness or infection. JAY drain in place with output of 130 ml.  Safety maintained. Report given to MARA Hyde.        "

## 2019-01-05 NOTE — PROGRESS NOTES
[facility] General Surgery  Neuroendocrine Tumor Service  Progress Note     Admission Date: 1/4/2019  Hospital Length of Stay: 1  Principal Problem: Malignant carcinoid tumor of duodenum     Subjective:      Interval History:   NAEO  Patient rates pain 7/10, PCA helps  Has not ambulated  No bowel function  Denies nausea/vomiting  Decreased UOP over last 3 hrs      Scheduled Meds:   acetaminophen  1,000 mg Intravenous Q8H    calcium gluconate IVPB  1 g Intravenous Once    insulin regular  10 Units Intravenous Once    iron sucrose (VENOFER) IVPB  100 mg Intravenous Daily    metoclopramide HCl  10 mg Intravenous Q6H    pregabalin  75 mg Oral BID    senna-docusate 8.6-50 mg  1 tablet Oral BID    sodium chloride 0.9%  3 mL Intravenous Q8H     Continuous Infusions:   dextrose 5 % and 0.45 % NaCl 100 mL/hr at 01/05/19 0618    DOPamine 2 mcg/kg/min (01/05/19 0739)    hydromorphone in 0.9 % NaCl 6 mg/30 ml       PRN Meds:.acetaminophen, albuterol sulfate, dextrose 50%, dextrose 50%, diphenhydrAMINE, glucose, glucose, hydrALAZINE, labetalol, naloxone, ondansetron, ondansetron, promethazine (PHENERGAN) IVPB, sodium chloride 0.9%, sodium chloride 0.9%    Objective:      Temp:  [97.4 °F (36.3 °C)-98.1 °F (36.7 °C)] 98.1 °F (36.7 °C)  Pulse:  [] 107  Resp:  [10-27] 17  SpO2:  [96 %-100 %] 98 %  BP: ()/(57-99) 104/80  Arterial Line BP: ()/(62-81) 112/70  Weight change:     Intake/Output Summary (Last 24 hours) at 1/5/2019 0744  Last data filed at 1/5/2019 0637  Gross per 24 hour   Intake 4219.25 ml   Output 1891 ml   Net 2328.25 ml       Physical Exam:  GEN: awake, alert, NAD  CV: tachycardic  PULM: CTAB, unlabored  ABD: S/ appropriately TTP/ ND, incision c/d/i, JAY with sanguinous output (325cc)  EXT: Peripheral pulses present and equal bilaterally    Recent Labs   Lab 01/05/19  0326      K 5.6*   CO2 22*      BUN 22   CREATININE 2.0*   CALCIUM 8.5*   PROT 6.2   *    *   ALKPHOS 30*   BILITOT 1.4*     Recent Labs   Lab 01/05/19  0326   WBC 9.40   HGB 10.2*   HCT 32.0*          Significant Imaging: I have reviewed all pertinent imaging results/findings within the past 24 hours.     Assessment and Plan:      69 yo M s/p ex-lap, toupet fundoplication, hiatal hernia repair, cholecystectomy, sleeve duodenectomy with end-end anastomosis, liver biopsy, biliary stenting, and intralesional chemo for malignant carcinoid of the duodenum POD#1    D/c KCL in IV fluids  K shift with ca, glucose and insulin  Cont PCA  Cont beach  1 mg bumex  Monitor UOP  Strict Is/Os  Cont NGT  Ambulation/IS    Jamie Vines MD  Neuroendocrine Surgery

## 2019-01-05 NOTE — PLAN OF CARE
"Problem: Physical Therapy Goal  Goal: Physical Therapy Goal  Goals to be met by: 19     Patient will increase functional independence with mobility by performin. Supine to sit with supervision.   2. Sit to supine with supervision.   3. Sit<>stand transfer with supervision .   4. OOB to chair for > 1 hour  5.  Gait > 150 feet with SBA with /without AD   6.  Ascend/descend 4" curb step with CGA .    Outcome: Ongoing (interventions implemented as appropriate)  PT eval with OT.  Pt assist to EOB and up to chair.  BP was hypotensive and after up in chair ~15-20 min where BP remained low and pt very drowsy pt returned to bed with assist of 2 for safety  REC:  Home with   DME: [facility]      "

## 2019-01-05 NOTE — PT/OT/SLP EVAL
"Occupational Therapy   Evaluation/Treatment    Name: Hoang Santos Jr.  MRN: [id number]  Admitting Diagnosis:  Malignant carcinoid tumor of duodenum 1 Day Post-Op Pre-op s/p Procedure(s):  DUODENECTOMY  LYMPHADENECTOMY  GASTROJEJUNOSTOMY  FUNDOPLICATION  EGD (ESOPHAGOGASTRODUODENOSCOPY)  CHOLECYSTECTOMY  BIOPSY, LIVER     Recommendations:     Discharge Recommendations: (home with HH)  Discharge Equipment Recommendations:  (TBD)  Barriers to discharge:  None    History:     Occupational Profile:  Living Environment: Pt. Lives with spouse in 2SH with TH entry; bed and bathroom on first floor; has shower stall with GR.  Previous level of function: Performed ADLS indep; ambulates indep without a device; drives and shares alI ADLS with wife; works as a .  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    Prostatic hyperplasia     Sleep apnea        Past Surgical History:   Procedure Laterality Date    EGD (ESOPHAGOGASTRODUODENOSCOPY) N/A 9/24/2018    Performed by Sal[facility]ez MD at [facility] ENDO (4TH FLR)    PALATOPLASTY-(UPPP) N/A 2/14/2017    Performed by Vernon JAN Araujo III, MD at [facility] OR 2ND FLR    PH MONITORING, ESOPHAGUS, WIRELESS, (OFF REFLUX MEDS) N/A 9/24/2018    Performed by [facility] at [facility] ENDO (4TH FLR)    TONSILLECTOMY      TRANSPROSTATIC TISSUE RETRACTION N/A 6/28/2018    [facility]    ULTRASOUND-ENDOSCOPIC-UPPER N/A 11/5/2018    Performed by [facility]yes [facility] at [facility] ENDO    UVULOPALATOPHARYNGOPLASTY         Subjective     Chief Complaint: " I am drowsy"  Patient/Family Comments/goals: " I want to move around."    Pain/Comfort:  · Pain Rating 1: 7/10  · Location - Side 1: Bilateral  · Location - Orientation 1: generalized  · Location 1: abdomen  · Pain Addressed 1: " Pre-medicate for activity, Reposition, Distraction, Cessation of Activity, Nurse notified  · Pain Rating Post-Intervention 1: 7/10    Patients cultural, spiritual, Druze conflicts given the current situation: no    Objective:     Communicated with: nurse prior to session.  Patient found with: all lines intact, call button in reach, bed alarm on and nurse notified and arterial line, bed alarm, blood pressure cuff, central line, beach catheter, JAY drain, NG tube, pulse ox (continuous), SCD, telemetry, PCA, oxygen upon OT entry to room.    General Precautions: Standard, fall, NPO, respiratory   Orthopedic Precautions:N/A   Braces: N/A     Occupational Performance:    Bed Mobility:    · Patient completed Rolling/Turning to Right with minimum assistance and with side rail  · Patient completed Scooting/Bridging with minimum assistance and with side rail  · Patient completed Supine to Sit with minimum assistance and with side rail  · Patient completed Sit to Supine with moderate assistance    Functional Mobility/Transfers:  · Patient completed Sit <> Stand Transfer with minimum assistance  with  rolling walker   · Patient completed Bed <> Chair Transfer using Step Transfer technique with minimum assistance and of 2 for safety 2/2 dizziness, drowiness, hypotensive and multiple lines persons with no assistive device  · Functional Mobility: NT    Activities of Daily Living:  · Feeding:  NPO; ice chips setup HOB elevated  · Grooming: supervision cleaned mouth with oral swab  · Lower Body Dressing: total assistance socks  · Toileting: beach; total assist .    Cognitive/Visual Perceptual:  Cognitive/Psychosocial Skills:     -       Oriented to: Person, Place, Time and Situation   -       Follows Commands/attention:Follows one-step commands  -       Communication: clear/fluent  -       Memory: No Deficits noted  -       Safety awareness/insight to disability: impaired   -       Mood/Affect/Coping skills/emotional control:  "Cooperative and drowsiness  Visual/Perceptual:      -Intact .    Physical Exam:  Balance:    -       sitting:  fair plus  dynamic;  fair  standing:  dynamic;  poor   Postural examination/scapula alignment:    -       No postural abnormalities identified  Skin integrity: Wound abdominal; drainage from naval area-nurse bandaged during session and .  Dominant hand:    -       left  Upper Extremity Range of Motion:     -       Right Upper Extremity: WFL  -       Left Upper Extremity: WFL  Upper Extremity Strength:    -       Right Upper Extremity: WFL  -       Left Upper Extremity: WFL   Strength:    -       Right Upper Extremity: WFL  -       Left Upper Extremity: WFL    AMPAC 6 Click ADL:  AMPAC Total Score: 15    Treatment & Education:  Role of OT and POC    Vitals monitored with progressive mobilization:  Supine in bed: 126/82; ; O2 sats 95% on supplemental O2 with c/o feeling drowsy  Sitting EOB: 77/52; ; O2 sats 93% with Supplemental O2 with c/o "little dizziness"   Up in BS chair:  84/51; ; O2 93% with Supplemental O2 with c/o "little dizziness"   Up in BS chair w/ legs elevated,SCD's & ankle pumps:  97/56:O2 91% with drowsiness and dizziness  Sat 15-20 min back to bed, BP in supine: 113/51; O2 sats 95%  and pt drowsy.  Education:    Patient left HOB elevated with all lines intact, call button in reach, bed alarm on and nurse notified    Assessment:     Hoang Santos Jr. is a 68 y.o. male with a medical diagnosis of Malignant carcinoid tumor of duodenum. Pt. exhibited low BP, drowsiness and dizziness and unable to safely remain up in BS chair; tolerated 20 min in BS chair.Discharge recommendations;  home with HH.  DME needs to be determine. Continue with OT POC.   He presents with the following performance deficits affecting function: weakness, impaired endurance, impaired self care skills, impaired functional mobilty, gait instability, impaired balance, decreased safety awareness, pain, " "impaired cardiopulmonary response to activity, impaired skin.      Rehab Prognosis: Good; patient would benefit from acute skilled OT services to address these deficits and reach maximum level of function.         Clinical Decision Makin.  OT Low:  "Pt evaluation falls under low complexity for evaluation coding due to performance deficits noted in 1-3 areas as stated above and 0 co-morbities affecting current functional status. Data obtained from problem focused assessments. No modifications or assistance was required for completion of evaluation. Only brief occupational profile and history review completed."     Plan:     Patient to be seen 5 x/week to address the above listed problems via self-care/home management, therapeutic exercises, therapeutic activities  · Plan of Care Expires: 19  · Plan of Care Reviewed with: patient    This Plan of care has been discussed with the patient who was involved in its development and understands and is in agreement with the identified goals and treatment plan    GOALS:   Multidisciplinary Problems     Occupational Therapy Goals        Problem: Occupational Therapy Goal    Goal Priority Disciplines Outcome Interventions   Occupational Therapy Goal     OT, PT/OT Ongoing (interventions implemented as appropriate)    Description:  Goals to be met by: 2019    Patient will increase functional independence with ADLs by performing:    UE Dressing with Modified [place].  LE Dressing with Modified [place].  Grooming while standing with Modified [place].  Toileting from toilet with Modified [place] for hygiene and clothing management.   Rolling to Bilateral with Modified [place].   Supine to sit with Modified [place].  Step transfer with Modified [place]  Toilet transfer to toilet with Modified [place].  Upper extremity exercise program x10 reps per handout, with independence.                      Time Tracking:     OT Date of " Treatment: 01/05/19  OT Start Time: 1328  OT Stop Time: 1412  OT Total Time (min): 44 min COTX with PT    Billable Minutes:Evaluation 15  Therapeutic Activity 10  Total Time 25    Edith Darling OT  1/5/2019

## 2019-01-05 NOTE — PT/OT/SLP EVAL
Physical Therapy Evaluation and treatment    Patient Name:  Hoang Santos Jr.   MRN:  [id number]    Recommendations:     Discharge Recommendations:  (home with HH)   Discharge Equipment Recommendations: (TBD)   Barriers to discharge: None    Assessment:     Hoang Santos Jr. is a 68 y.o. male admitted with a medical diagnosis of Malignant carcinoid tumor of duodenum.  He presents with the following impairments/functional limitations:  impaired endurance, impaired cardiopulmonary response to activity, gait instability, impaired balance, impaired functional mobilty, impaired self care skills, pain, decreased safety awareness .  Pt orthostatic hypotensive.  BP remained low while in chair despite ex, SCD-pt returned back to bed.      Rehab Prognosis: Good; patient would benefit from acute skilled PT services to address these deficits and reach maximum level of function.    Recent Surgery: Procedure(s) (LRB):  DUODENECTOMY (N/A)  LYMPHADENECTOMY (N/A)  GASTROJEJUNOSTOMY (N/A)  FUNDOPLICATION (N/A)  EGD (ESOPHAGOGASTRODUODENOSCOPY) (N/A)  CHOLECYSTECTOMY  BIOPSY, LIVER 1 Day Post-Op    Plan:     During this hospitalization, patient to be seen 6 x/week to address the identified rehab impairments via gait training, therapeutic activities, therapeutic exercises and progress toward the following goals:    · Plan of Care Expires:  02/04/19    Subjective     Chief Complaint: pain in stomach, feels sleepy/drowsy.   Patient/Family Comments/goals: didn't sleep well last night.   Hard to stay awake.  Goal is to return to PLOF  Pain/Comfort:  · Pain Rating 1: 7/10  · Location - Orientation 1: generalized  · Location 1: abdomen  · Pain Addressed 1: Pre-medicate for activity, Reposition, Distraction, Cessation of Activity, Nurse notified(pt pushed PCA button at beginning of session)  · Pain Rating Post-Intervention 1: 7/10    Patients cultural, spiritual, Presybeterian conflicts given the current situation: no    Living Environment:  Pt  lives with spouse in 2 story home with 1 NICKO/curb.  Bed and bath on 1st floor.  Shower stall with grab bar, standard toilet  Prior to admission, patients level of function was I for amb without AD and for ADL.  Pt drives and works as . .  Equipment used at home: none.  DME owned (not currently used): none.  Upon discharge, patient will have assistance from spouse.    Objective:     Communicated with nurse prior to session.  Patient found lying with bed eyes closed but opens to command with  arterial line, bed alarm, blood pressure cuff, central line, beach catheter, JAY drain, NG tube, oxygen, PCA, pulse ox (continuous), SCD, telemetry  upon PT entry to room.    General Precautions: Standard, fall, respiratory   Orthopedic Precautions:N/A   Braces: N/A     Exams:  · Cognitive Exam:  Patient is oriented to Person, Place, Time and Situation  · Gross Motor Coordination:  WFL  · Postural Exam:  Patient presented with the following abnormalities:    · -       Rounded shoulders  · -       Forward head  · -       Posterior pelvic tilt  · Sensation:    · -       Intact  light/touch BLE  · Skin Integrity/Edema:      · -       Skin integrity: pt with bandage RLQ of abdomen with JAY drain, midline incision from chest down below umbilicus-no bandage/oozing noted from umbilicus.  Large amount of bloody drainage in JAY drain  · -       Edema: Mild BLE  · RLE ROM: WFL  · RLE Strength: WFL  · LLE ROM: WFL  · LLE Strength: WFL    Functional Mobility:  · Bed Mobility:     · Supine to Sit: minimum assistance  · Sit to Supine: moderate assistance  · Transfers:     · Sit to Stand:  minimum assistance and of 2 persons with hand-held assist  · Gait: pt performed SPT  from bed to bs chair-2-3 sidesteps only with B HHA -assist of 2 for safety/pt drowsy and with numerous lines/NG/beach/O2  · Balance: fair sitting, F-standing      Therapeutic Activities and Exercises:   PT eval with OT.  BP in supine 126/82.  Down to 77/52 at  "EOB-initially with c/o lightheaded that lessened and pt SPT to bs chair.  BP 84/51.  Instructed in and performed AP in sitting at EOB and repeatedly while in bs chair.  SCD applied .  Pt very drowsy and BP remain low.  Was in chair ~ 15-20 min.  Felt not safe to remain in chair-stood and returned to supine.  BP in supine 113/58.  .      AM-PAC 6 CLICK MOBILITY  Total Score:12     Patient left HOB elevated with all lines intact, call button in reach, bed alarm on and nurse present.    GOALS:   Multidisciplinary Problems     Physical Therapy Goals        Problem: Physical Therapy Goal    Goal Priority Disciplines Outcome Goal Variances Interventions   Physical Therapy Goal     PT, PT/OT Ongoing (interventions implemented as appropriate)     Description:  Goals to be met by: 19     Patient will increase functional independence with mobility by performin. Supine to sit with supervision.   2. Sit to supine with supervision.   3. Sit<>stand transfer with supervision .   4. OOB to chair for > 1 hour  5.  Gait > 150 feet with SBA with /without AD   6.  Ascend/descend 4" curb step with CGA .                      History:     Past Medical History:   Diagnosis Date    Hyperlipidemia     Primary malignant neuroendocrine neoplasm of duodenum 2018    Prostatic hyperplasia     Sleep apnea        Past Surgical History:   Procedure Laterality Date    EGD (ESOPHAGOGASTRODUODENOSCOPY) N/A 2018    Performed by [facility] at [facility] ENDO (4TH FLR)    PALATOPLASTY-(UPPP) N/A 2017    Performed by [place] JAN Araujo III, MD at [facility] OR 2ND FLR    PH MONITORING, ESOPHAGUS, WIRELESS, (OFF REFLUX MEDS) N/A 2018    Performed by Sal[facility] at [facility] ENDO (4TH FLR)    TONSILLECTOMY      TRANSPROSTATIC TISSUE RETRACTION N/A 2018    [facility]    ULTRASOUND-ENDOSCOPIC-UPPER N/A 2018    Performed by Gorge Reyes MD at [facility] ENDO    " UVULOPALATOPHARYNGOPLASTY         Clinical Decision Making:     History  Co-morbidities and personal factors that may impact the plan of care Examination  Body Structures and Functions, activity limitations and participation restrictions that may impact the plan of care Clinical Presentation   Decision Making/ Complexity Score   Co-morbidities:   [] Time since onset of injury / illness / exacerbation  [] Status of current condition  []Patient's cognitive status and safety concerns    [] Multiple Medical Problems (see med hx)  Personal Factors:   [] Patient's age  [] Prior Level of function   [] Patient's home situation (environment and family support)  [] Patient's level of motivation  [] Expected progression of patient      HISTORY:(criteria)    [] 27407 - no personal factors/history    [] 69493 - has 1-2 personal factor/comorbidity     [] 73910 - has >3 personal factor/comorbidity     Body Regions:  [] Objective examination findings  [] Head     []  Neck  [] Trunk   [] Upper Extremity  [] Lower Extremity    Body Systems:  [] For communication ability, affect, cognition, language, and learning style: the assessment of the ability to make needs known, consciousness, orientation (person, place, and time), expected emotional /behavioral responses, and learning preferences (eg, learning barriers, education  needs)  [] For the neuromuscular system: a general assessment of gross coordinated movement (eg, balance, gait, locomotion, transfers, and transitions) and motor function  (motor control and motor learning)  [] For the musculoskeletal system: the assessment of gross symmetry, gross range of motion, gross strength, height, and weight  [] For the integumentary system: the assessment of pliability(texture), presence of scar formation, skin color, and skin integrity  [] For cardiovascular/pulmonary system: the assessment of heart rate, respiratory rate, blood pressure, and edema     Activity limitations:    [] Patient's  cognitive status and saf ety concerns          [] Status of current condition      [] Weight bearing restriction  [] Cardiopulmunary Restriction    Participation Restrictions:   [] Goals and goal agreement with the patient     [] Rehab potential (prognosis) and probable outcome      Examination of Body System: (criteria)    [] 78544 - addressing 1-2 elements    [] 82451 - addressing a total of 3 or more elements     [] 67306 -  Addressing a total of 4 or more elements         Clinical Presentation: (criteria)  Choose one     On examination of body system using standardized tests and measures patient presents with (CHOOSE ONE) elements from any of the following: body structures and functions, activity limitations, and/or participation restrictions.  Leading to a clinical presentation that is considered (CHOOSE ONE)                              Clinical Decision Making  (Eval Complexity):  Choose One     Time Tracking:     PT Received On: 01/05/19  PT Start Time: 1326     PT Stop Time: 1413  PT Total Time (min): 47 min     Billable Minutes: Evaluation 20 and Therapeutic Exercise 10      Saskia Delvalle, PT  01/05/2019

## 2019-01-05 NOTE — ASSESSMENT & PLAN NOTE
Contributing Nutrition Diagnosis  Altered GI Function    Related to (etiology):   Cancer s/p surgery    Signs and Symptoms (as evidenced by):   NPO/NG tube    Interventions:  Collaboration with other providers    Nutrition Diagnosis Status:   Continues

## 2019-01-05 NOTE — CONSULTS
"  [facility]  Adult Nutrition  Consult Note    SUMMARY     Recommendations    Recommendation:   1. If pt to remain NPO, consider initiation of TPN: Clinimix 5/15 at 100ml/hr with daily IVFE to provide 2204 kcal & 120g protein, & 2400ml fluid with GIR of 2.4.      Goals:   TPN or diet will be started within 24 hours  Nutrition Goal Status: new  Communication of RD Recs: reviewed with RN(Nicole)    Reason for Assessment  Reason For Assessment: consult(improper eating)  Diagnosis: cancer diagnosis/related complications  Relevant Medical History: HLD, sleep apnea, primary neuroendocrine neoplasm of duodenum  General Information Comments: Pt NPO. NFPE completed today 1/5. Pt appears weel nourished. s/p duodenectomy, lymphadenectomy, gastrojejunostomy, fundoplication, EGD, cholecystectomy, liver biopsy  Nutrition Discharge Planning: d/c needs to be determined    Nutrition Risk Screen  Nutrition Risk Screen: no indicators present    Nutrition/Diet History  Food Preferences: no Denominational or cultural food prefs identified  Spiritual, Cultural Beliefs, Presybeterian Practices, Values that Affect Care: no  Factors Affecting Nutritional Intake: NPO    Anthropometrics  Temp: 97.5 °F (36.4 °C)  Height Method: Stated  Height: 5' 6" (167.6 cm)  Height (inches): 66 in  Weight Method: Standard Scale  Weight: 101.6 kg (223 lb 15.8 oz)  Weight (lb): 223.99 lb  Ideal Body Weight (IBW), Male: 142 lb  % Ideal Body Weight, Male (lb): 157.74 lb  BMI (Calculated): 36.2  BMI Grade: 35 - 39.9 - obesity - grade II     Lab/Procedures/Meds  Pertinent Labs Reviewed: reviewed  Pertinent Labs Comments: Na 135L, BUN 27H, Crea 2.4H, Glu 172H, Ca 8.5L  Pertinent Medications Reviewed: reviewed  Pertinent Medications Comments: tylenol, Reglan, albumin, dopamine, 5% dex at 100    Estimated/Assessed Needs  Weight Used For Calorie Calculations: 101.6 kg (223 lb 15.8 oz)  Energy Calorie Requirements (kcal): 2253(x1.3)  Energy Need Method: " [facility]  Protein Requirements: 84g (1.3g/kg)  Weight Used For Protein Calculations: 64.5 kg (142 lb 3.2 oz)(IBW)  Estimated Fluid Requirement Method: RDA Method  RDA Method (mL): 2253     Nutrition Prescription Ordered  Current Diet Order: NPO    Evaluation of Received Nutrient/Fluid Intake  Other Calories (kcal): 408(5% dex at 100)  % Kcal Needs: 18  I/O: 4319/1911  Energy Calories Required: not meeting needs  Protein Required: not meeting needs  Fluid Required: not meeting needs  Comments: LBM 1/3  % Intake of Estimated Energy Needs: 0 - 25 %  % Meal Intake: NPO    Nutrition Risk  Level of Risk/Frequency of Follow-up: (2xweekly)     Assessment and Plan    Malignant carcinoid tumor of the duodenum    Contributing Nutrition Diagnosis  Altered GI Function    Related to (etiology):   Cancer s/p surgery    Signs and Symptoms (as evidenced by):   NPO/NG tube    Interventions:  Collaboration with other providers    Nutrition Diagnosis Status:   New          Monitor and Evaluation  Food and Nutrient Intake: energy intake  Food and Nutrient Adminstration: diet order, enteral and parenteral nutrition administration  Physical Activity and Function: nutrition-related ADLs and IADLs  Anthropometric Measurements: weight  Biochemical Data, Medical Tests and Procedures: electrolyte and renal panel  Nutrition-Focused Physical Findings: overall appearance     Malnutrition Assessment  NFPE completed today 1/5. Pt well nourished        Nutrition Follow-Up  RD Follow-up?: Yes

## 2019-01-06 LAB
ALBUMIN SERPL BCP-MCNC: 4.1 G/DL
ALP SERPL-CCNC: 40 U/L
ALT SERPL W/O P-5'-P-CCNC: 217 U/L
ANION GAP SERPL CALC-SCNC: 9 MMOL/L
AST SERPL-CCNC: 215 U/L
BASOPHILS # BLD AUTO: 0.01 K/UL
BASOPHILS NFR BLD: 0.1 %
BILIRUB SERPL-MCNC: 1.4 MG/DL
BUN SERPL-MCNC: 35 MG/DL
CALCIUM SERPL-MCNC: 8.8 MG/DL
CHLORIDE SERPL-SCNC: 97 MMOL/L
CO2 SERPL-SCNC: 27 MMOL/L
CREAT SERPL-MCNC: 2.5 MG/DL
DIFFERENTIAL METHOD: ABNORMAL
EOSINOPHIL # BLD AUTO: 0 K/UL
EOSINOPHIL NFR BLD: 0 %
ERYTHROCYTE [DISTWIDTH] IN BLOOD BY AUTOMATED COUNT: 13.7 %
EST. GFR  (AFRICAN AMERICAN): 30 ML/MIN/1.73 M^2
EST. GFR  (NON AFRICAN AMERICAN): 26 ML/MIN/1.73 M^2
GLUCOSE SERPL-MCNC: 149 MG/DL
HCT VFR BLD AUTO: 27.7 %
HGB BLD-MCNC: 9 G/DL
LYMPHOCYTES # BLD AUTO: 1.1 K/UL
LYMPHOCYTES NFR BLD: 8.2 %
MAGNESIUM SERPL-MCNC: 2.5 MG/DL
MCH RBC QN AUTO: 29 PG
MCHC RBC AUTO-ENTMCNC: 32.5 G/DL
MCV RBC AUTO: 89 FL
MONOCYTES # BLD AUTO: 1.2 K/UL
MONOCYTES NFR BLD: 9 %
NEUTROPHILS # BLD AUTO: 10.9 K/UL
NEUTROPHILS NFR BLD: 82.2 %
PHOSPHATE SERPL-MCNC: 3.1 MG/DL
PLATELET # BLD AUTO: 152 K/UL
PMV BLD AUTO: 10.5 FL
POTASSIUM SERPL-SCNC: 4.4 MMOL/L
PROT SERPL-MCNC: 7 G/DL
RBC # BLD AUTO: 3.1 M/UL
SODIUM SERPL-SCNC: 133 MMOL/L
WBC # BLD AUTO: 13.24 K/UL

## 2019-01-06 PROCEDURE — S5010 5% DEXTROSE AND 0.45% SALINE: HCPCS | Performed by: STUDENT IN AN ORGANIZED HEALTH CARE EDUCATION/TRAINING PROGRAM

## 2019-01-06 PROCEDURE — 84100 ASSAY OF PHOSPHORUS: CPT

## 2019-01-06 PROCEDURE — 99900035 HC TECH TIME PER 15 MIN (STAT)

## 2019-01-06 PROCEDURE — 83735 ASSAY OF MAGNESIUM: CPT

## 2019-01-06 PROCEDURE — 27000221 HC OXYGEN, UP TO 24 HOURS

## 2019-01-06 PROCEDURE — S0171 BUMETANIDE 0.5 MG: HCPCS | Performed by: SURGERY

## 2019-01-06 PROCEDURE — 94761 N-INVAS EAR/PLS OXIMETRY MLT: CPT

## 2019-01-06 PROCEDURE — 20000000 HC ICU ROOM

## 2019-01-06 PROCEDURE — 85025 COMPLETE CBC W/AUTO DIFF WBC: CPT

## 2019-01-06 PROCEDURE — C9113 INJ PANTOPRAZOLE SODIUM, VIA: HCPCS | Performed by: SURGERY

## 2019-01-06 PROCEDURE — 25000003 PHARM REV CODE 250: Performed by: SURGERY

## 2019-01-06 PROCEDURE — 25000003 PHARM REV CODE 250: Performed by: STUDENT IN AN ORGANIZED HEALTH CARE EDUCATION/TRAINING PROGRAM

## 2019-01-06 PROCEDURE — 63600175 PHARM REV CODE 636 W HCPCS: Mod: JG | Performed by: SURGERY

## 2019-01-06 PROCEDURE — 94770 HC EXHALED C02 TEST: CPT

## 2019-01-06 PROCEDURE — P9047 ALBUMIN (HUMAN), 25%, 50ML: HCPCS | Mod: JG | Performed by: SURGERY

## 2019-01-06 PROCEDURE — 80053 COMPREHEN METABOLIC PANEL: CPT

## 2019-01-06 RX ORDER — MECLIZINE HYDROCHLORIDE 25 MG/1
25 TABLET ORAL 3 TIMES DAILY PRN
Status: DISCONTINUED | OUTPATIENT
Start: 2019-01-06 | End: 2019-01-16 | Stop reason: HOSPADM

## 2019-01-06 RX ORDER — SODIUM CHLORIDE 9 MG/ML
INJECTION, SOLUTION INTRAVENOUS CONTINUOUS PRN
Status: DISCONTINUED | OUTPATIENT
Start: 2019-01-06 | End: 2019-01-08

## 2019-01-06 RX ORDER — HYDROCODONE BITARTRATE AND ACETAMINOPHEN 7.5; 325 MG/15ML; MG/15ML
15 SOLUTION ORAL EVERY 4 HOURS PRN
Status: DISCONTINUED | OUTPATIENT
Start: 2019-01-06 | End: 2019-01-16 | Stop reason: HOSPADM

## 2019-01-06 RX ADMIN — METOLAZONE 5 MG: 5 TABLET ORAL at 08:01

## 2019-01-06 RX ADMIN — MANNITOL: 250 INJECTION, SOLUTION INTRAVENOUS at 08:01

## 2019-01-06 RX ADMIN — ALBUMIN HUMAN 12.5 G: 0.25 SOLUTION INTRAVENOUS at 08:01

## 2019-01-06 RX ADMIN — MANNITOL: 250 INJECTION, SOLUTION INTRAVENOUS at 04:01

## 2019-01-06 RX ADMIN — HYDROCODONE BITARTRATE AND ACETAMINOPHEN 15 ML: 7.5; 325 SOLUTION ORAL at 11:01

## 2019-01-06 RX ADMIN — ALBUMIN HUMAN 12.5 G: 0.25 SOLUTION INTRAVENOUS at 04:01

## 2019-01-06 RX ADMIN — PANTOPRAZOLE SODIUM 40 MG: 40 INJECTION, POWDER, FOR SOLUTION INTRAVENOUS at 08:01

## 2019-01-06 RX ADMIN — DEXTROSE AND SODIUM CHLORIDE: 5; .45 INJECTION, SOLUTION INTRAVENOUS at 10:01

## 2019-01-06 RX ADMIN — RETINOL, ERGOCALCIFEROL, .ALPHA.-TOCOPHEROL ACETATE, DL-, PHYTONADIONE, ASCORBIC ACID, NIACINAMIDE, RIBOFLAVIN 5-PHOSPHATE SODIUM, THIAMINE HYDROCHLORIDE, PYRIDOXINE HYDROCHLORIDE, DEXPANTHENOL, BIOTIN, FOLIC ACID, AND CYANOCOBALAMIN: KIT at 08:01

## 2019-01-06 RX ADMIN — PREGABALIN 75 MG: 75 CAPSULE ORAL at 08:01

## 2019-01-06 RX ADMIN — SODIUM CHLORIDE: 0.9 INJECTION, SOLUTION INTRAVENOUS at 11:01

## 2019-01-06 RX ADMIN — DOPAMINE HYDROCHLORIDE IN DEXTROSE 2 MCG/KG/MIN: 1.6 INJECTION, SOLUTION INTRAVENOUS at 12:01

## 2019-01-06 NOTE — PLAN OF CARE
Problem: Adult Inpatient Plan of Care  Goal: Plan of Care Review  Pt is easily arousable.Follows commands.Pain is controlled by Dilaudid PCA.BP remains WDL. Diuresing well with Bumex gtt.JAY put out 100 cc bloody drainage this shift.NG to suction and put out about 100 cc brownish drainage.  Will cont monitoring pt arsalan.

## 2019-01-06 NOTE — PLAN OF CARE
Problem: Adult Inpatient Plan of Care  Goal: Plan of Care Review  Outcome: Ongoing (interventions implemented as appropriate)  Pt. On oxygen in no apparent distress..

## 2019-01-06 NOTE — PROGRESS NOTES
[facility] General Surgery  Neuroendocrine Tumor Service  Progress Note     Admission Date: 1/4/2019  Hospital Length of Stay: 2  Principal Problem: Malignant carcinoid tumor of duodenum     Subjective:      Interval History:   NAEO  Improved UOP on bumex/mannitol  Continues to be tachycardic  Pain 6/10, PCA tends to knock pt out so doesn't like hitting it too often  Some oozing from above belly button  No bowel function    Scheduled Meds:   metOLazone  5 mg Per NG tube BID    pantoprazole  40 mg Intravenous Daily    pregabalin  75 mg Oral BID    sodium chloride 0.9%  3 mL Intravenous Q8H     Continuous Infusions:   albumin human 25% 12.5 g (01/06/19 0458)    Amino acid 5% - dextrose 15% (CLINIMIX-E) solution with additives (1L  provides 510 kcal/L dextrose, with 50 gm AA, 150 gm CHO, Na 35, K 30, Mg 5, Ca 4.5, Acetate 80, Cl 39, Phos 15) 50 mL/hr at 01/05/19 2004    bumex/mannitol infusion 15 mL/hr at 01/06/19 0459    dextrose 5 % and 0.45 % NaCl 20 mL/hr at 01/05/19 2100    DOPamine 2 mcg/kg/min (01/05/19 0739)    hydromorphone in 0.9 % NaCl 6 mg/30 ml       PRN Meds:.acetaminophen, albuterol sulfate, dextrose 50%, dextrose 50%, diphenhydrAMINE, glucose, glucose, hydrALAZINE, labetalol, naloxone, ondansetron, ondansetron, promethazine (PHENERGAN) IVPB, sodium chloride 0.9%, sodium chloride 0.9%    Objective:      Temp:  [97.5 °F (36.4 °C)-98.4 °F (36.9 °C)] 97.7 °F (36.5 °C)  Pulse:  [109-135] 123  Resp:  [9-24] 10  SpO2:  [92 %-97 %] 96 %  BP: ()/() 124/67  Arterial Line BP: ()/(59-88) 116/65  Weight change:     Intake/Output Summary (Last 24 hours) at 1/6/2019 0810  Last data filed at 1/6/2019 0705  Gross per 24 hour   Intake 2388.02 ml   Output 3845 ml   Net -1456.98 ml       Physical Exam:  GEN: awake, alert, NAD  CV: tachycardic  PULM: CTAB, unlabored  ABD: S/ appropriately TTP/ ND, incision c/d/i, JAY with sanguinous output (385cc)  EXT: Peripheral pulses  present and equal bilaterally        Recent Labs   Lab 01/06/19  0500   *   K 4.4   CO2 27   CL 97   BUN 35*   CREATININE 2.5*   CALCIUM 8.8   PROT 7.0   *   *   ALKPHOS 40*   BILITOT 1.4*     Recent Labs   Lab 01/06/19  0500   WBC 13.24*   HGB 9.0*   HCT 27.7*          Significant Imaging: I have reviewed all pertinent imaging results/findings within the past 24 hours.     Assessment and Plan:      67 yo M s/p ex-lap, toupet fundoplication, hiatal hernia repair, cholecystectomy, sleeve duodenectomy with end-end anastomosis, liver biopsy, biliary stenting, and intralesional chemo for malignant carcinoid of the duodenum POD#2    Continue PCA  Will discuss initiation of pain control via NGT  Cont beach  Bumex/mannitol drip  Monitor UOP  Continue NGT  Ambulation/PT/OT/ IS  Continue ICU care    Jamie Vines MD  Neuroendocrine Surgery

## 2019-01-06 NOTE — PROGRESS NOTES
"Administered hydrocodone-apap 7.5-325mg/15 elixir per order for pain. Also applied bipap @ same time for low O2 sats. Patient became disoriented and pulled off Bipap mask shortly after. Stated he was dizzy and felt like he was "standing while lying". VSS stable. In no apparent signs of distress. Reoriented as needed. Wife @ bedside stated he has suffered from vertigo in the past. Dr Vines made aware. New orders for Antivert 25mg. States he does not want to take pain elixir again. Rates pain 4/10. Will continue to monitor.  "

## 2019-01-06 NOTE — PLAN OF CARE
"Problem: Adult Inpatient Plan of Care  Goal: Plan of Care Review  Patient drowsy most of day. Attempted to get patient up to chair, per patient he was "too weak" to move. Despite max encouragement, refused to get OOB. Max encouragement required for IS as well. Unable to get IS above 500ml. Confused @ times, reoriented PRN. Rates pain as tolerable most of day. JAY drain w/ 240ml output per shift. Wife @ bedside. Plan of care reviewed. All questions answered. Remained free from falls, call bell w/in reach.      "

## 2019-01-06 NOTE — PLAN OF CARE
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 Lives with wife, Sneha Santos. Pt drives, family can provide transportation on discharge    Pt denies any needs or concerns at this time. Discharge planning brochure and business card provided. CM numbers written on white board. Pt encouraged to call with any questions or needs. CM will continue to follow patient throughout the transitions of care, and assist with any discharge needs.    Future Appointments   Date Time Provider Department Center   1/17/2019  9:30 AM Kory Jack MD [facility] UROLOGY [facility]   2/11/2019  1:00 PM Salo Nuñez MD [facility] GASTR[facility] [address]          01/06/19 0956   Discharge Assessment   Assessment Type Discharge Planning Assessment   Confirmed/corrected address and phone number on facesheet? Yes   Assessment information obtained from? Patient   Expected Length of Stay (days) 3   Communicated expected length of stay with patient/caregiver yes   Prior to hospitilization cognitive status: Alert/Oriented   Prior to hospitalization functional status: Independent   Current cognitive status: Alert/Oriented   Current Functional Status: Needs Assistance   Facility Arrived From: (home)   Lives With spouse  (Wife: Sneha Santos [phone])   Able to Return to Prior Arrangements yes   Is patient able to care for self after discharge? Yes   Who are your caregiver(s) and their phone number(s)? Wife: Sneha Santos [phone]   Patient's perception of discharge disposition home or selfcare   Readmission Within the Last 30 Days no previous admission in last 30 days   Patient currently being followed by outpatient case management? No   Patient currently receives any other outside agency services? No   Equipment Currently Used at Home grab bar   Do you have any problems affording any of your prescribed medications? No   Is the patient taking  medications as prescribed? yes   Does the patient have transportation home? Yes   Transportation Anticipated family or friend will provide   Dialysis Name and Scheduled days N/A   Does the patient receive services at the [facility]? No   Discharge Plan A Home   Discharge Plan B Home with family   Patient/Family in Agreement with Plan yes     Erika Knox RN Transitional Navigator  [phone]

## 2019-01-06 NOTE — PLAN OF CARE
"Problem: Adult Inpatient Plan of Care  Goal: Plan of Care Review  Outcome: Ongoing (interventions implemented as appropriate)  Pt is afebrile. -120's. 120' when he is being moved or is in pain. Pt has been drowsy throughout the shift. Dr. Steiner aware of concerns in regards to PCA pump. Pt stated, " Im not use to taking medications for pain that make me sleepy." Pt urine output was fluctuating and he was placed on bumex mannitol drip @7mL/hr. Pt incision was bleeding while working with physical therapy. Dr. Vines was informed and instructed me to placed a pressure dressing. The incision is no longer bleeding since he was placed back into bed. KUB was done at the bedside. H/h was drawn (see labs). Pt's wife has been at the bedside and has been educated in regards to her 's treatment. Safety maintained. Call light in reach. Report given to MARA Dennis      "

## 2019-01-07 LAB
ALBUMIN SERPL BCP-MCNC: 4.3 G/DL
ALP SERPL-CCNC: 39 U/L
ALT SERPL W/O P-5'-P-CCNC: 128 U/L
ANION GAP SERPL CALC-SCNC: 11 MMOL/L
AST SERPL-CCNC: 98 U/L
BASOPHILS # BLD AUTO: 0.01 K/UL
BASOPHILS NFR BLD: 0.1 %
BILIRUB SERPL-MCNC: 1 MG/DL
BUN SERPL-MCNC: 30 MG/DL
CALCIUM SERPL-MCNC: 8.7 MG/DL
CHLORIDE SERPL-SCNC: 94 MMOL/L
CO2 SERPL-SCNC: 34 MMOL/L
CREAT SERPL-MCNC: 1.4 MG/DL
DIFFERENTIAL METHOD: ABNORMAL
EOSINOPHIL # BLD AUTO: 0 K/UL
EOSINOPHIL NFR BLD: 0.2 %
ERYTHROCYTE [DISTWIDTH] IN BLOOD BY AUTOMATED COUNT: 13.8 %
EST. GFR  (AFRICAN AMERICAN): 60 ML/MIN/1.73 M^2
EST. GFR  (NON AFRICAN AMERICAN): 52 ML/MIN/1.73 M^2
GLUCOSE SERPL-MCNC: 169 MG/DL
HCT VFR BLD AUTO: 24.7 %
HGB BLD-MCNC: 7.9 G/DL
LYMPHOCYTES # BLD AUTO: 1 K/UL
LYMPHOCYTES NFR BLD: 8.7 %
MAGNESIUM SERPL-MCNC: 2.3 MG/DL
MCH RBC QN AUTO: 28.9 PG
MCHC RBC AUTO-ENTMCNC: 32 G/DL
MCV RBC AUTO: 91 FL
MONOCYTES # BLD AUTO: 1.1 K/UL
MONOCYTES NFR BLD: 9.7 %
NEUTROPHILS # BLD AUTO: 9.5 K/UL
NEUTROPHILS NFR BLD: 81.3 %
PHOSPHATE SERPL-MCNC: 1.9 MG/DL
PLATELET # BLD AUTO: 136 K/UL
PLATELET BLD QL SMEAR: ABNORMAL
PMV BLD AUTO: 9.9 FL
POTASSIUM SERPL-SCNC: 3.5 MMOL/L
PROT SERPL-MCNC: 7 G/DL
RBC # BLD AUTO: 2.73 M/UL
SODIUM SERPL-SCNC: 139 MMOL/L
WBC # BLD AUTO: 11.78 K/UL

## 2019-01-07 PROCEDURE — 27000221 HC OXYGEN, UP TO 24 HOURS

## 2019-01-07 PROCEDURE — 97116 GAIT TRAINING THERAPY: CPT

## 2019-01-07 PROCEDURE — 97530 THERAPEUTIC ACTIVITIES: CPT

## 2019-01-07 PROCEDURE — A4216 STERILE WATER/SALINE, 10 ML: HCPCS | Performed by: ANESTHESIOLOGY

## 2019-01-07 PROCEDURE — 25000003 PHARM REV CODE 250: Performed by: STUDENT IN AN ORGANIZED HEALTH CARE EDUCATION/TRAINING PROGRAM

## 2019-01-07 PROCEDURE — 63600175 PHARM REV CODE 636 W HCPCS: Performed by: SURGERY

## 2019-01-07 PROCEDURE — 20000000 HC ICU ROOM

## 2019-01-07 PROCEDURE — 25000003 PHARM REV CODE 250: Performed by: ANESTHESIOLOGY

## 2019-01-07 PROCEDURE — 84100 ASSAY OF PHOSPHORUS: CPT

## 2019-01-07 PROCEDURE — P9047 ALBUMIN (HUMAN), 25%, 50ML: HCPCS | Mod: JG | Performed by: SURGERY

## 2019-01-07 PROCEDURE — 83735 ASSAY OF MAGNESIUM: CPT

## 2019-01-07 PROCEDURE — 25000003 PHARM REV CODE 250: Performed by: SURGERY

## 2019-01-07 PROCEDURE — B4185 PARENTERAL SOL 10 GM LIPIDS: HCPCS | Performed by: SURGERY

## 2019-01-07 PROCEDURE — 85025 COMPLETE CBC W/AUTO DIFF WBC: CPT

## 2019-01-07 PROCEDURE — 80053 COMPREHEN METABOLIC PANEL: CPT

## 2019-01-07 PROCEDURE — 94761 N-INVAS EAR/PLS OXIMETRY MLT: CPT

## 2019-01-07 PROCEDURE — C9113 INJ PANTOPRAZOLE SODIUM, VIA: HCPCS | Performed by: SURGERY

## 2019-01-07 PROCEDURE — 97803 MED NUTRITION INDIV SUBSEQ: CPT

## 2019-01-07 RX ORDER — TAMSULOSIN HYDROCHLORIDE 0.4 MG/1
0.4 CAPSULE ORAL DAILY
Status: DISCONTINUED | OUTPATIENT
Start: 2019-01-07 | End: 2019-01-12

## 2019-01-07 RX ORDER — PREGABALIN 50 MG/1
50 CAPSULE ORAL 2 TIMES DAILY
Status: DISCONTINUED | OUTPATIENT
Start: 2019-01-07 | End: 2019-01-12

## 2019-01-07 RX ORDER — CYANOCOBALAMIN 1000 UG/ML
1000 INJECTION, SOLUTION INTRAMUSCULAR; SUBCUTANEOUS DAILY
Status: DISCONTINUED | OUTPATIENT
Start: 2019-01-08 | End: 2019-01-16 | Stop reason: HOSPADM

## 2019-01-07 RX ORDER — SCOLOPAMINE TRANSDERMAL SYSTEM 1 MG/1
1 PATCH, EXTENDED RELEASE TRANSDERMAL ONCE
Status: COMPLETED | OUTPATIENT
Start: 2019-01-07 | End: 2019-01-10

## 2019-01-07 RX ADMIN — ALBUMIN HUMAN 12.5 G: 0.25 SOLUTION INTRAVENOUS at 08:01

## 2019-01-07 RX ADMIN — MECLIZINE HYDROCHLORIDE 25 MG: 25 TABLET ORAL at 01:01

## 2019-01-07 RX ADMIN — METOLAZONE 5 MG: 5 TABLET ORAL at 09:01

## 2019-01-07 RX ADMIN — SOYBEAN OIL 250 ML: 20 INJECTION, SOLUTION INTRAVENOUS at 09:01

## 2019-01-07 RX ADMIN — ASCORBIC ACID, VITAMIN A PALMITATE, CHOLECALCIFEROL, THIAMINE HYDROCHLORIDE, RIBOFLAVIN-5 PHOSPHATE SODIUM, PYRIDOXINE HYDROCHLORIDE, NIACINAMIDE, DEXPANTHENOL, ALPHA-TOCOPHEROL ACETATE, VITAMIN K1, FOLIC ACID, BIOTIN, CYANOCOBALAMIN: 200; 3300; 200; 6; 3.6; 6; 40; 15; 10; 150; 600; 60; 5 INJECTION, SOLUTION INTRAVENOUS at 08:01

## 2019-01-07 RX ADMIN — PREGABALIN 75 MG: 75 CAPSULE ORAL at 09:01

## 2019-01-07 RX ADMIN — PANTOPRAZOLE SODIUM 40 MG: 40 INJECTION, POWDER, FOR SOLUTION INTRAVENOUS at 09:01

## 2019-01-07 RX ADMIN — Medication 3 ML: at 09:01

## 2019-01-07 RX ADMIN — SCOPALAMINE 1 PATCH: 1 PATCH, EXTENDED RELEASE TRANSDERMAL at 03:01

## 2019-01-07 RX ADMIN — PREGABALIN 50 MG: 50 CAPSULE ORAL at 08:01

## 2019-01-07 NOTE — NURSING
"Pt complains of dizziness and another "vertigo" episode. States "I see you upside down. It feels like I'm standing, but I'm laying down. Please help me, I am so dizzy". PCA pump has been off since 2100. No pain medication given during the night. Pt denies pain. Pt refused BiPAP. 3L NC. VSS. PRN Meclizine given. No relief of symptoms. MD Vines notified. Orders for scopolamine patch. Will continue to monitor.   "

## 2019-01-07 NOTE — PROGRESS NOTES
"[facility]  Adult Nutrition  Progress Note    SUMMARY       Recommendations    Recommendation:   1. Consider increasing TPN to 100ml/hr to better meet pt needs.   2. Continue IVFE.    Goals:   Pt will tolerate TPN  Nutrition Goal Status: new  Communication of RD Recs: reviewed with RN    Reason for Assessment  Reason For Assessment: RD follow-up  Diagnosis: cancer diagnosis/related complications  Relevant Medical History: HLD, sleep apnea, primary neuroendocrine neoplasm of duodenum  General Information Comments: Pt NPO. s/p surgery 1/4. NG tube in place. Receiving Clinimix 5/15 at 50ml/hr with IVFE 3x/week. NFPE completed 1/5.  Nutrition Discharge Planning: d/c needs to be determined    Nutrition Risk Screen  Nutrition Risk Screen: no indicators present    Nutrition/Diet History  Food Preferences: no Hindu or cultural food prefs identified  Spiritual, Cultural Beliefs, Scientologist Practices, Values that Affect Care: no  Factors Affecting Nutritional Intake: NPO    Anthropometrics  Temp: 98.5 °F (36.9 °C)  Height Method: Stated  Height: 5' 6" (167.6 cm)  Height (inches): 66 in  Weight Method: Bed Scale  Weight: 103.9 kg (229 lb 0.9 oz)  Weight (lb): 229.06 lb  Ideal Body Weight (IBW), Male: 142 lb  % Ideal Body Weight, Male (lb): 157.74 lb  BMI (Calculated): 36.2  BMI Grade: 35 - 39.9 - obesity - grade II     Lab/Procedures/Meds  Pertinent Labs Reviewed: reviewed  Pertinent Labs Comments: BUN 30H, Glu 169H, Phos 1.9L  Pertinent Medications Reviewed: reviewed  Pertinent Medications Comments: albumin, dopamine    Estimated/Assessed Needs  Weight Used For Calorie Calculations: 103.9 kg (229 lb 0.9 oz)  Energy Calorie Requirements (kcal): 2283  Energy Need Method: Shasta-St Jeor(x1.3)  Protein Requirements: 84g (1.3g/kg)  Weight Used For Protein Calculations: 64.5 kg (142 lb 3.2 oz)(IBW)  Estimated Fluid Requirement Method: RDA Method  RDA Method (mL): 2283     Nutrition Prescription " Ordered  Current Diet Order: NPO  Current Nutrition Support Formula Ordered: Clinimix E 5/15  Current Nutrition Support Rate Ordered: 50 (ml)  Current Nutrition Support Frequency Ordered: ml/hr  Oral Nutrition Supplement: IVFE 3x/week    Evaluation of Received Nutrient/Fluid Intake  Parenteral Calories (kcal): 852  Parenteral Protein (gm): 60  Parenteral Fluid (mL): 1200  Lipid Calories (kcals): 214 kcals  GIR (Glucose Infusion Rate) (mg/kg/min): 1.2 mg/kg/min  Other Calories (kcal): 408(5% dex at 100)  Total Calories (kcal): 1066  % Kcal Needs: 47  I/O: 28056/85033  Energy Calories Required: not meeting needs  Protein Required: not meeting needs  Fluid Required: not meeting needs  Comments: LBM 1/3  % Intake of Estimated Energy Needs: 25 - 50 %  % Meal Intake: NPO    Nutrition Risk  Level of Risk/Frequency of Follow-up: (2xweekly)     Assessment and Plan    Malignant carcinoid tumor of the duodenum    Contributing Nutrition Diagnosis  Altered GI Function    Related to (etiology):   Cancer s/p surgery    Signs and Symptoms (as evidenced by):   NPO/NG tube    Interventions:  Collaboration with other providers    Nutrition Diagnosis Status:   Continues          Monitor and Evaluation  Food and Nutrient Intake: energy intake  Food and Nutrient Adminstration: diet order, enteral and parenteral nutrition administration  Physical Activity and Function: nutrition-related ADLs and IADLs  Anthropometric Measurements: weight  Biochemical Data, Medical Tests and Procedures: electrolyte and renal panel  Nutrition-Focused Physical Findings: overall appearance     Malnutrition Assessment  NFPE completed 1/5          Nutrition Follow-Up  RD Follow-up?: Yes

## 2019-01-07 NOTE — NURSING
Last couple of hours, pt urine output topping 350cc every 30 minutes. About 600cc/hr. Replacing urine output per order with NS 1cc/1cc. Notified Dr. Vines. Orders to stop Bumex/Mannitol gtt for now. Will continue to monitor.

## 2019-01-07 NOTE — PLAN OF CARE
Problem: Adult Inpatient Plan of Care  Goal: Plan of Care Review  Outcome: Ongoing (interventions implemented as appropriate)  Afebrile throughout the night. VSS on 3 L NC. Remains on albumin, TPN, Dopamine. PCA pump and Bumex gtt stopped and discontinued. Denies any pain. Complains of dizziness and vertigo-like symptoms. See previous notes for details. PRN medication given. Scopolamine patch in place behind left ear. Pt still complains of these symptoms with no relief. Awake, alert, oriented. NG tube output 50cc. RLQ drain 110cc output. Urine output over 4 L. Replacing with NS fluids Q1hour. Abdomen soft/nontender. Midline abdominal incision dressing clean, dry and intact - abd pad. Safety precautions in place.

## 2019-01-07 NOTE — PROGRESS NOTES
[facility] General Surgery  Neuroendocrine Tumor Service  Progress Note     Admission Date: 1/4/2019  Hospital Length of Stay: 3  Principal Problem: Malignant carcinoid tumor of duodenum     Subjective:      Interval History:   Episode of vertigo overnight, unrelated to pain medication, patient maintained orientation, unrelieved by anti-vert or scopolamine  UOP over 9L overnight, replaced cc per cc  Pain well controlled  Ambulating, currently up in chair  Denies nausea/vomiting  -flatus/-BM    Scheduled Meds:   metOLazone  5 mg Per NG tube BID    pantoprazole  40 mg Intravenous Daily    pregabalin  75 mg Oral BID    scopolamine  1 patch Transdermal Once    sodium chloride 0.9%  3 mL Intravenous Q8H     Continuous Infusions:   sodium chloride 0.9% 350 mL/hr at 01/07/19 0600    albumin human 25% 12.5 g (01/07/19 0600)    Amino acid 5% - dextrose 15% (CLINIMIX-E) solution with additives (1L  provides 510 kcal/L dextrose, with 50 gm AA, 150 gm CHO, Na 35, K 30, Mg 5, Ca 4.5, Acetate 80, Cl 39, Phos 15) 50 mL/hr at 01/07/19 0600    bumex/mannitol infusion Stopped (01/07/19 0041)    DOPamine 2 mcg/kg/min (01/07/19 0600)     PRN Meds:.sodium chloride 0.9%, acetaminophen, albuterol sulfate, dextrose 50%, dextrose 50%, diphenhydrAMINE, glucose, glucose, hydrALAZINE, hydrocodone-apap 7.5-325 MG/15 ML, labetalol, meclizine, naloxone, ondansetron, ondansetron, promethazine (PHENERGAN) IVPB, sodium chloride 0.9%, sodium chloride 0.9%    Objective:      Temp:  [97.7 °F (36.5 °C)-101.1 °F (38.4 °C)] 98.7 °F (37.1 °C)  Pulse:  [] 100  Resp:  [9-25] 15  SpO2:  [87 %-100 %] 98 %  BP: (109-154)/(56-81) 109/58  Arterial Line BP: ()/(58-85) 163/68  Weight change: 2.3 kg (5 lb 1.1 oz)    Intake/Output Summary (Last 24 hours) at 1/7/2019 0704  Last data filed at 1/7/2019 0600  Gross per 24 hour   Intake 9804.14 ml   Output 75620 ml   Net -265.86 ml       Physical Exam:  GEN: awake, alert,  NAD  CV: tachycardic  PULM: CTAB, unlabored  ABD: S/ appropriately TTP/ ND, incision c/d/i, JAY with sanguinous output (375cc)  EXT: Peripheral pulses present and equal bilaterally    Recent Labs   Lab 01/07/19  0514      K 3.5   CO2 34*   CL 94*   BUN 30*   CREATININE 1.4   CALCIUM 8.7   PROT 7.0   AST 98*   *   ALKPHOS 39*   BILITOT 1.0     Recent Labs   Lab 01/07/19  0514   WBC 11.78   HGB 7.9*   HCT 24.7*   *       Significant Imaging: I have reviewed all pertinent imaging results/findings within the past 24 hours.     Assessment and Plan:      69 yo M s/p ex-lap, toupet fundoplication, hiatal hernia repair, cholecystectomy, sleeve duodenectomy with end-end anastomosis, liver biopsy, biliary stenting, and intralesional chemo for malignant carcinoid of the duodenum POD#3    Bumex/mannitol stopped overnight  PCA stopped overnight  Continue hycet via NGT PRN  Cont beach  NGT to remain in place  Ambulation/IS/PT/OT  Continue ICU care  TPN/ NPO    Jamie Vines MD  Neuroendocrine Surgery

## 2019-01-07 NOTE — NURSING
"2015: Pt starts complaining of dizziness, "feels like the floor is moving and I'm spinning", disoriented, oxygen sats drop 85%, HR increases to 140's. Pt talked through situation. Calm and deep breathe. Eventually pt stablizes. This episode happened earlier in the day - vertigo spell. Pt was on NC 3L not on BiPAP during this episode.     2028: Dr. Salvador called for pt update. Updated on vertigo episode. Orders to D/C PCA pump. Also discussed pt urine output per hour 500cc and greater. Orders to cut Bumex gtt to 3cc/hr. Still replace urine output with NS. Will continue to monitor.   "

## 2019-01-07 NOTE — PLAN OF CARE
Problem: Occupational Therapy Goal  Goal: Occupational Therapy Goal  Goals to be met by: 1/25/2019    Patient will increase functional independence with ADLs by performing:    UE Dressing with Modified [place].  LE Dressing with Modified [place].  Grooming while standing with Modified [place].  Toileting from toilet with Modified [place] for hygiene and clothing management.   Rolling to Bilateral with Modified [place].   Supine to sit with Modified [place].  Step transfer with Modified [place]  Toilet transfer to toilet with Modified [place].  Upper extremity exercise program x10 reps per handout, with independence.     Outcome: Ongoing (interventions implemented as appropriate)  Limited activity tolerance    Cont POC

## 2019-01-07 NOTE — PT/OT/SLP PROGRESS
Occupational Therapy   Treatment    Name: Hoang Santos Jr.  MRN: [id number]  Admitting Diagnosis:  Malignant carcinoid tumor of duodenum  3 Days Post-Op    Recommendations:     Discharge Recommendations: other (see comments)(HH PT/OT)  Discharge Equipment Recommendations:  other (see comments)(TBD)  Barriers to discharge:  None    Subjective     Pain/Comfort:  · Pain Rating 1: 3/10  · Location - Side 1: Bilateral  · Location - Orientation 1: generalized  · Location 1: abdomen  · Pain Addressed 1: Pre-medicate for activity, Reposition, Distraction, Cessation of Activity, Nurse notified  · Pain Rating Post-Intervention 1: 2/10    Objective:     Communicated with: nurse prior to session.  Patient found with all lines intact, call button in reach, nurse notified and wife present and bed alarm, blood pressure cuff, beach catheter, NG tube, oxygen, peripheral IV, telemetry, SCD, pulse ox (continuous) upon OT entry to room.    General Precautions: Standard, fall, NPO   Orthopedic Precautions:N/A   Braces:       Occupational Performance:    Bed Mobility:    · Patient completed Rolling/Turning to Right with stand by assistance  · Patient completed Scooting/Bridging with stand by assistance  · Patient completed Supine to Sit with stand by assistance and contact guard assistance     Functional Mobility/Transfers:  · Patient completed Sit <> Stand Transfer with minimum assistance and assist of 1 for line mgmt  with  hand-held assist   · Patient completed Bed <> Chair Transfer using Step Transfer technique with minimum assistance and assist of 1 for line mgmt with hand-held assist  · Functional Mobility: min A HHA ~5' forward/back    Activities of Daily Living:  ·       AMPAC 6 Click ADL: 15    Treatment & Education:  Pt performed BUE AROM, given copy of written HEP, educated on use of pillow for coughing    Patient left up in chair with all lines intact, call button in reach, nurse notified and wife  present  Education:    Assessment:     Hoang Santos Jr. is a 68 y.o. male with a medical diagnosis of Malignant carcinoid tumor of duodenum.  He presents with the following performance deficits affecting function are weakness, impaired endurance, impaired self care skills, impaired balance, gait instability, impaired functional mobilty, decreased ROM, impaired skin, pain, impaired cardiopulmonary response to activity.     Rehab Prognosis:  Good; patient would benefit from acute skilled OT services to address these deficits and reach maximum level of function.       Plan:     Patient to be seen 5 x/week to address the above listed problems via self-care/home management, therapeutic activities, therapeutic exercises  · Plan of Care Expires: 02/05/19  · Plan of Care Reviewed with: patient, spouse    This Plan of care has been discussed with the patient who was involved in its development and understands and is in agreement with the identified goals and treatment plan    GOALS:   Multidisciplinary Problems     Occupational Therapy Goals        Problem: Occupational Therapy Goal    Goal Priority Disciplines Outcome Interventions   Occupational Therapy Goal     OT, PT/OT Ongoing (interventions implemented as appropriate)    Description:  Goals to be met by: 1/25/2019    Patient will increase functional independence with ADLs by performing:    UE Dressing with Modified [place].  LE Dressing with Modified [place].  Grooming while standing with Modified [place].  Toileting from toilet with Modified [place] for hygiene and clothing management.   Rolling to Bilateral with Modified [place].   Supine to sit with Modified [place].  Step transfer with Modified [place]  Toilet transfer to toilet with Modified [place].  Upper extremity exercise program x10 reps per handout, with independence.                      Time Tracking:     OT Date of Treatment: 01/07/19  OT Start Time: 1005  OT  Stop Time: 1041  OT Total Time (min): 36 min    Billable Minutes:Therapeutic Activity 10    Antonio Echeverria OT  1/7/2019

## 2019-01-07 NOTE — ANESTHESIA RELEASE NOTE
"Anesthesia Release from PACU Note    Patient: Hoang Santos Jr.    Procedure(s) Performed: Procedure(s) (LRB):  DUODENECTOMY (N/A)  LYMPHADENECTOMY (N/A)  GASTROJEJUNOSTOMY (N/A)  FUNDOPLICATION (N/A)  EGD (ESOPHAGOGASTRODUODENOSCOPY) (N/A)  CHOLECYSTECTOMY  BIOPSY, LIVER    Anesthesia type: general    Post pain: Adequate analgesia    Post assessment: no apparent anesthetic complications    Last Vitals:   Visit Vitals  /60   Pulse 91   Temp 37.3 °C (99.1 °F) (Oral)   Resp 12   Ht 5' 6" (1.676 m)   Wt 103.9 kg (229 lb 0.9 oz)   SpO2 97%   BMI 36.97 kg/m²       Post vital signs: stable    Level of consciousness: awake    Nausea/Vomiting: no nausea/no vomiting    Complications: none    Airway Patency: patent    Respiratory: unassisted, spontaneous ventilation    Cardiovascular: stable and blood pressure at baseline    Hydration: euvolemic  "

## 2019-01-07 NOTE — PLAN OF CARE
Problem: Parenteral Nutrition  Goal: Effective Intravenous Nutrition Therapy Delivery  Outcome: Ongoing (interventions implemented as appropriate)  Recommendation:   1. Consider increasing TPN to 100ml/hr to better meet pt needs.   2. Continue IVFE.    Goals:   Pt will tolerate TPN  Nutrition Goal Status: new  Communication of NORMA Recs: reviewed with RN

## 2019-01-07 NOTE — PROGRESS NOTES
Clinimix/TPN orders renewed for today. Same formula and at same rate.  M-V-I and trace elements are added to Clinimix daily.    Lipids emulsion to be given every Monday,Wednesday and Friday at 2200.  (per P[employer] approved pharmacy protocol)

## 2019-01-07 NOTE — ANESTHESIA POSTPROCEDURE EVALUATION
"Anesthesia Post Evaluation    Patient: Hoang Santos Jr.    Procedure(s) Performed: Procedure(s) (LRB):  DUODENECTOMY (N/A)  LYMPHADENECTOMY (N/A)  GASTROJEJUNOSTOMY (N/A)  FUNDOPLICATION (N/A)  EGD (ESOPHAGOGASTRODUODENOSCOPY) (N/A)  CHOLECYSTECTOMY  BIOPSY, LIVER    Final Anesthesia Type: general  Patient location during evaluation: ICU  Patient participation: Yes- Able to Participate  Level of consciousness: awake  Post-procedure vital signs: reviewed and stable  Pain management: adequate  Airway patency: patent  PONV status at discharge: No PONV  Anesthetic complications: no      Cardiovascular status: stable  Respiratory status: unassisted  Hydration status: euvolemic  Follow-up not needed.        Visit Vitals  /60   Pulse 91   Temp 37.3 °C (99.1 °F) (Oral)   Resp 12   Ht 5' 6" (1.676 m)   Wt 103.9 kg (229 lb 0.9 oz)   SpO2 97%   BMI 36.97 kg/m²       Pain/Perla Score: Pain Rating Prior to Med Admin: 4 (1/6/2019 11:48 AM)  Pain Rating Post Med Admin: 0 (1/6/2019  8:49 PM)        "

## 2019-01-07 NOTE — PT/OT/SLP PROGRESS
Physical Therapy Treatment    Patient Name:  Hoang Santos Jr.   MRN:  [id number]    Recommendations:     Discharge Recommendations:  (HH PT/OT)   Discharge Equipment Recommendations: (TBD)   Barriers to discharge: None    Assessment:     Hoang Santos Jr. is a 68 y.o. male admitted with a medical diagnosis of Malignant carcinoid tumor of duodenum.  He presents with the following impairments/functional limitations:  weakness, impaired endurance, impaired self care skills, impaired functional mobilty, gait instability, impaired balance, pain, impaired skin, decreased safety awareness, impaired cardiopulmonary response to activity. Pt with improved activity tolerance this date as he was able to ambulate 5 ft forward/backward and complete SPT from bed to chair with B HHA and min A. Likely HH PT/OT upon d/c and DME needs TBD.    Rehab Prognosis: Good; patient would benefit from acute skilled PT services to address these deficits and reach maximum level of function.    Recent Surgery: Procedure(s) (LRB):  DUODENECTOMY (N/A)  LYMPHADENECTOMY (N/A)  GASTROJEJUNOSTOMY (N/A)  FUNDOPLICATION (N/A)  EGD (ESOPHAGOGASTRODUODENOSCOPY) (N/A)  CHOLECYSTECTOMY  BIOPSY, LIVER 3 Days Post-Op    Plan:     During this hospitalization, patient to be seen 6 x/week to address the identified rehab impairments via gait training, therapeutic activities, therapeutic exercises and progress toward the following goals:    · Plan of Care Expires:  02/04/19    Subjective     Chief Complaint: abdominal pain  Patient/Family Comments/goals: pt is lethargic during session but agreeable to participate in therapy session  Pain/Comfort:  · Pain Rating 1: 3/10  · Location - Orientation 1: generalized  · Location 1: abdomen  · Pain Addressed 1: Pre-medicate for activity, Reposition, Distraction, Cessation of Activity, Nurse notified  · Pain Rating Post-Intervention 1: 2/10      Objective:     Communicated with MARA Nam prior to session.  Patient found  all lines intact bed alarm, blood pressure cuff, arterial line, peripheral IV, oxygen, pulse ox (continuous), NG tube, central line, beach catheter(ICU monitoring)  upon PT entry to room.     General Precautions: Standard, fall, NPO   Orthopedic Precautions:N/A   Braces: N/A     Functional Mobility:  · Bed Mobility:     · Rolling Right: stand by assistance and bed rail  · Scooting: stand by assistance  · Supine to Sit: stand by assistance and bed rail with cues for log roll method  · Transfers:     · Sit to Stand:  minimum assistance with hand-held assist  · Bed to Chair: minimum assistance with  hand-held assist  using  Stand Pivot  · Gait: 5 ft forward/backward with B HHA and min A with additional assist for line management      AM-PAC 6 CLICK MOBILITY  Turning over in bed (including adjusting bedclothes, sheets and blankets)?: 3  Sitting down on and standing up from a chair with arms (e.g., wheelchair, bedside commode, etc.): 3  Moving from lying on back to sitting on the side of the bed?: 3  Moving to and from a bed to a chair (including a wheelchair)?: 3  Need to walk in hospital room?: 3  Climbing 3-5 steps with a railing?: 1  Basic Mobility Total Score: 16       Therapeutic Activities and Exercises:  BP supine: 116/85 mmHg  BP upon sittin/53 mmHg-- initially reports of light headedness but improved following seated exercises of APs and LAQs.  Completed transfer to chair.  Completed 1 bout of gait forward/backward with B HHA as reported above.  Pt left sitting up in chair and educated to complete BUE/BLE exercises during the days.    Pt with eyes closed and is lethargic throughout session but agreeable to participate in exercises and mobility.    Patient left up in chair with all lines intact, call button in reach and RN notified..    GOALS:   Multidisciplinary Problems     Physical Therapy Goals        Problem: Physical Therapy Goal    Goal Priority Disciplines Outcome Goal Variances Interventions  "  Physical Therapy Goal     PT, PT/OT Ongoing (interventions implemented as appropriate)     Description:  Goals to be met by: 19     Patient will increase functional independence with mobility by performin. Supine to sit with supervision.   2. Sit to supine with supervision.   3. Sit<>stand transfer with supervision .   4. OOB to chair for > 1 hour MET 2019  5.  Gait > 150 feet with SBA with /without AD   6.  Ascend/descend 4" curb step with CGA .                       Time Tracking:     PT Received On: 19  PT Start Time: 1005     PT Stop Time: 1047  PT Total Time (min): 42 min with OT    Billable Minutes: Gait Training 10 and Therapeutic Activity 15    Treatment Type: Treatment  PT/PTA: PT     PTA Visit Number: 0     Jeanine Zapata, PT  2019  "

## 2019-01-07 NOTE — PLAN OF CARE
"Problem: Physical Therapy Goal  Goal: Physical Therapy Goal  Goals to be met by: 19     Patient will increase functional independence with mobility by performin. Supine to sit with supervision.   2. Sit to supine with supervision.   3. Sit<>stand transfer with supervision .   4. OOB to chair for > 1 hour MET 2019  5.  Gait > 150 feet with SBA with /without AD   6.  Ascend/descend 4" curb step with CGA .     Outcome: Ongoing (interventions implemented as appropriate)  Pt with improved activity tolerance this date as he was able to ambulate 5 ft forward/backward and complete SPT from bed to chair with B HHA and min A. Likely HH PT/OT upon d/c and DME needs TBD.      "

## 2019-01-07 NOTE — PLAN OF CARE
Problem: Adult Inpatient Plan of Care  Goal: Plan of Care Review  Outcome: Ongoing (interventions implemented as appropriate)  Patient on oxygen with documented flow.  Will attempt to wean per O2 order protocol.\  Will continue to monitor.

## 2019-01-08 LAB
ALBUMIN SERPL BCP-MCNC: 3.5 G/DL
ALP SERPL-CCNC: 52 U/L
ALT SERPL W/O P-5'-P-CCNC: 80 U/L
ANION GAP SERPL CALC-SCNC: 9 MMOL/L
AST SERPL-CCNC: 58 U/L
BASOPHILS # BLD AUTO: 0.02 K/UL
BASOPHILS NFR BLD: 0.1 %
BILIRUB SERPL-MCNC: 0.9 MG/DL
BUN SERPL-MCNC: 19 MG/DL
CALCIUM SERPL-MCNC: 8.5 MG/DL
CHLORIDE SERPL-SCNC: 99 MMOL/L
CO2 SERPL-SCNC: 33 MMOL/L
CREAT SERPL-MCNC: 0.8 MG/DL
DIFFERENTIAL METHOD: ABNORMAL
EOSINOPHIL # BLD AUTO: 0.1 K/UL
EOSINOPHIL NFR BLD: 0.3 %
ERYTHROCYTE [DISTWIDTH] IN BLOOD BY AUTOMATED COUNT: 14 %
EST. GFR  (AFRICAN AMERICAN): >60 ML/MIN/1.73 M^2
EST. GFR  (NON AFRICAN AMERICAN): >60 ML/MIN/1.73 M^2
GLUCOSE SERPL-MCNC: 132 MG/DL
HCT VFR BLD AUTO: 24.5 %
HGB BLD-MCNC: 7.8 G/DL
LYMPHOCYTES # BLD AUTO: 1 K/UL
LYMPHOCYTES NFR BLD: 6.3 %
MAGNESIUM SERPL-MCNC: 2.4 MG/DL
MCH RBC QN AUTO: 29.3 PG
MCHC RBC AUTO-ENTMCNC: 31.8 G/DL
MCV RBC AUTO: 92 FL
MONOCYTES # BLD AUTO: 2.1 K/UL
MONOCYTES NFR BLD: 13.3 %
NEUTROPHILS # BLD AUTO: 12.1 K/UL
NEUTROPHILS NFR BLD: 78.6 %
PHOSPHATE SERPL-MCNC: 1.1 MG/DL
PLATELET # BLD AUTO: 171 K/UL
PMV BLD AUTO: 9.8 FL
POTASSIUM SERPL-SCNC: 3.3 MMOL/L
PROT SERPL-MCNC: 6.5 G/DL
RBC # BLD AUTO: 2.66 M/UL
SODIUM SERPL-SCNC: 141 MMOL/L
WBC # BLD AUTO: 15.4 K/UL

## 2019-01-08 PROCEDURE — 25000003 PHARM REV CODE 250: Performed by: STUDENT IN AN ORGANIZED HEALTH CARE EDUCATION/TRAINING PROGRAM

## 2019-01-08 PROCEDURE — 97530 THERAPEUTIC ACTIVITIES: CPT

## 2019-01-08 PROCEDURE — 99900035 HC TECH TIME PER 15 MIN (STAT)

## 2019-01-08 PROCEDURE — 97110 THERAPEUTIC EXERCISES: CPT

## 2019-01-08 PROCEDURE — 94660 CPAP INITIATION&MGMT: CPT

## 2019-01-08 PROCEDURE — A4216 STERILE WATER/SALINE, 10 ML: HCPCS | Performed by: ANESTHESIOLOGY

## 2019-01-08 PROCEDURE — 84100 ASSAY OF PHOSPHORUS: CPT

## 2019-01-08 PROCEDURE — 83735 ASSAY OF MAGNESIUM: CPT

## 2019-01-08 PROCEDURE — 85025 COMPLETE CBC W/AUTO DIFF WBC: CPT

## 2019-01-08 PROCEDURE — 25000003 PHARM REV CODE 250: Performed by: ANESTHESIOLOGY

## 2019-01-08 PROCEDURE — 27000221 HC OXYGEN, UP TO 24 HOURS

## 2019-01-08 PROCEDURE — 94761 N-INVAS EAR/PLS OXIMETRY MLT: CPT

## 2019-01-08 PROCEDURE — 80053 COMPREHEN METABOLIC PANEL: CPT

## 2019-01-08 PROCEDURE — C9113 INJ PANTOPRAZOLE SODIUM, VIA: HCPCS | Performed by: SURGERY

## 2019-01-08 PROCEDURE — 20000000 HC ICU ROOM

## 2019-01-08 PROCEDURE — 63600175 PHARM REV CODE 636 W HCPCS: Performed by: SURGERY

## 2019-01-08 PROCEDURE — 25000003 PHARM REV CODE 250: Performed by: SURGERY

## 2019-01-08 RX ORDER — SODIUM,POTASSIUM PHOSPHATES 280-250MG
2 POWDER IN PACKET (EA) ORAL ONCE
Status: COMPLETED | OUTPATIENT
Start: 2019-01-08 | End: 2019-01-08

## 2019-01-08 RX ADMIN — PANTOPRAZOLE SODIUM 40 MG: 40 INJECTION, POWDER, FOR SOLUTION INTRAVENOUS at 08:01

## 2019-01-08 RX ADMIN — IRON SUCROSE 100 MG: 20 INJECTION, SOLUTION INTRAVENOUS at 08:01

## 2019-01-08 RX ADMIN — ASCORBIC ACID, VITAMIN A PALMITATE, CHOLECALCIFEROL, THIAMINE HYDROCHLORIDE, RIBOFLAVIN-5 PHOSPHATE SODIUM, PYRIDOXINE HYDROCHLORIDE, NIACINAMIDE, DEXPANTHENOL, ALPHA-TOCOPHEROL ACETATE, VITAMIN K1, FOLIC ACID, BIOTIN, CYANOCOBALAMIN: 200; 3300; 200; 6; 3.6; 6; 40; 15; 10; 150; 600; 60; 5 INJECTION, SOLUTION INTRAVENOUS at 08:01

## 2019-01-08 RX ADMIN — PREGABALIN 50 MG: 50 CAPSULE ORAL at 09:01

## 2019-01-08 RX ADMIN — TAMSULOSIN HYDROCHLORIDE 0.4 MG: 0.4 CAPSULE ORAL at 08:01

## 2019-01-08 RX ADMIN — Medication 3 ML: at 09:01

## 2019-01-08 RX ADMIN — CYANOCOBALAMIN 1000 MCG: 1000 INJECTION, SOLUTION INTRAMUSCULAR; SUBCUTANEOUS at 08:01

## 2019-01-08 RX ADMIN — POTASSIUM & SODIUM PHOSPHATES POWDER PACK 280-160-250 MG 2 PACKET: 280-160-250 PACK at 08:01

## 2019-01-08 RX ADMIN — PREGABALIN 50 MG: 50 CAPSULE ORAL at 08:01

## 2019-01-08 NOTE — CONSULTS
[facility] Consult    Reason for Consult: vertigo      Information obtained from: patient    Chief Complaint: vertigo    HPI: 68M with past medical history of neuroendocrine tumor of the duodenum, HLD and sleep apnea who is s/p duodenectomy, on intralesional chemotherapy. Neurology was consulted for vertigo. Pt reports that episodes last for 15-30 minutes and when the occur he sees things upside down. This occurred 5 times since his surgery. No N/V. Not worse with specific head movements. In the past he had a similar occurrence and went to the ER. At that time he was given meclizine which relieved his symptoms. He had his PCA pump held and is now on a scopolamine patch and reports resolution of his symptoms since then.     PMH:   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    Prostatic hyperplasia     Sleep apnea        PSH:   Past Surgical History:   Procedure Laterality Date    BIOPSY, LIVER  1/4/2019    [facility]    CHOLECYSTECTOMY  1/4/2019    [facility]    DUODENECTOMY N/A 1/4/2019    [facility]    EGD (ESOPHAGOGASTRODUODENOSCOPY) N/A 1/4/2019    [facility]    EGD (ESOPHAGOGASTRODUODENOSCOPY) N/A 9/24/2018    Per[facility] by [facility] at [facility] (4TH FLR)    FUNDOPLICATION N/A 1/4/2019    [facility]    GASTROJEJUNOSTOMY N/A 1/4/2019    [facility]    LYMPHADENECTOMY N/A 1/4/2019    [facility]    PALATOPLASTY-(UPPP) N/A 2/14/2017    Performed by Bob Araujo III, MD at [facility] OR 2ND FLR    PH MONITORING, ESOPHAGUS, WIRELESS, (OFF REFLUX MEDS) N/A 9/24/2018    Per[facility] by [facility] at [facility] ENDO (4TH FLR)    TONSILLECTOMY       TRANSPROSTATIC TISSUE RETRACTION N/A 6/28/2018    Performed by [facility] at [facility]    ULTRASOUND-ENDOSCOPIC-UPPER N/A 11/5/2018    [facility] at [facility]    UVULOPALATOPHARYNGOPLASTY         SH:   Social History     Socioeconomic History    Marital status:      Spouse name: Not on file    Number of children: 3    Years of education: Not on file    Highest education level: Not on file   Social Needs    Financial resource strain: Not on file    Food insecurity - worry: Not on file    Food insecurity - inability: Not on file    Transportation needs - medical: Not on file    Transportation needs - non-medical: Not on file   Occupational History    Not on file   Tobacco Use    Smoking status: Never Smoker    Smokeless tobacco: Never Used   Substance and Sexual Activity    Alcohol use: Yes     Alcohol/week: 1.2 oz     Types: 2 Cans of beer per week    Drug use: No    Sexual activity: Not Currently   Other Topics Concern    Not on file   Social History Narrative    Not on file       Home meds:    cyanocobalamin  1,000 mcg Subcutaneous Daily    iron sucrose (VENOFER) IVPB  100 mg Intravenous Daily    pantoprazole  40 mg Intravenous Daily    pregabalin  50 mg Oral BID    scopolamine  1 patch Transdermal Once    sodium chloride 0.9%  3 mL Intravenous Q8H    tamsulosin  0.4 mg Per NG tube Daily       ________________________  Vitals:    01/08/19 1200   BP: (!) 160/67   Pulse: 109   Resp: (!) 29   Temp:        NEUROLOGICAL EXAM  Mental Status  Orientation: alert and oriented to person, place, time and situation, memory intact  Language: good fluency, no aphasia or dysarthria    Cranial Nerves  Visual acuity grossly intact, PERRLA, EOMI, V1-V3 subjectively intact, smile symmetric, pt closes eyes symmetrically, hearing grossly intact, uvula midline, SCM and trapezius 5/5  Bilaterally, tongue protrudes midline  No nystagmus noted, HINTS  negative    Motor  Normal tone and bulk. No fasciculations.  Globally 4/5  DTRs 2+  Throughout    Sensory  Light touch intact throughout    Cerebellar  Finger to nose intact bilaterally  Heel to shin intact bilaterally    Gait  deferred    IMAGING  CT head 1/2017: unremarkable    ___________________________________________  ASSESSMENT    68M with past medical history of neuroendocrine tumor of the duodenum, HLD and sleep apnea who is s/p duodenectomy, on intralesional chemotherapy with post-surgical vertigo that has resolved.    RECOMMENDATIONS:  - CT head done in 2017 after his first complaint of vertigo was negative for stroke or other pathology  - given symptom resolution after stopping opioids this was likely contributing to his symptoms  - can continue scopolamine patch for now  - may write him a prescription for meclizine at discharge as this worked well for him in the past    Will sign off. Thank you for the consult.       Cari Levin MD  [facility], PGY-3

## 2019-01-08 NOTE — PLAN OF CARE
"Problem: Physical Therapy Goal  Goal: Physical Therapy Goal  Goals to be met by: 19     Patient will increase functional independence with mobility by performin. Supine to sit with supervision.   2. Sit to supine with supervision.   3. Sit<>stand transfer with supervision .   4. OOB to chair for > 1 hour MET 2019  5.  Gait > 150 feet with SBA with /without AD   6.  Ascend/descend 4" curb step with CGA .      Outcome: Ongoing (interventions implemented as appropriate)  Patient tolerated sitting OOB; tired now and 2/10 abdominal pain; transferred back to bed and repositioned for comfort; cont with POC.      "

## 2019-01-08 NOTE — PROGRESS NOTES
[facility]  Neuroendocrine Tumor Service  Progress Note     Admission Date: 1/4/2019  Hospital Length of Stay: 4  Principal Problem: Malignant carcinoid tumor of duodenum     Subjective:      Interval History:   NAEO  UOP down from prior levels but still appropriate  Schrader removed this morning  Continues to have vertigo episodes  Denies nausea/vomiting  Pain controlled  ambulating    Scheduled Meds:   cyanocobalamin  1,000 mcg Subcutaneous Daily    fat emulsion 20%  250 mL Intravenous Every Mon, Wed, Fri    iron sucrose (VENOFER) IVPB  100 mg Intravenous Daily    pantoprazole  40 mg Intravenous Daily    pregabalin  50 mg Oral BID    scopolamine  1 patch Transdermal Once    sodium chloride 0.9%  3 mL Intravenous Q8H    tamsulosin  0.4 mg Per NG tube Daily     Continuous Infusions:   Amino acid 5% - dextrose 15% (CLINIMIX-E) solution with additives (1L  provides 510 kcal/L dextrose, with 50 gm AA, 150 gm CHO, Na 35, K 30, Mg 5, Ca 4.5, Acetate 80, Cl 39, Phos 15) 50 mL/hr at 01/07/19 2054     PRN Meds:.acetaminophen, albuterol sulfate, dextrose 50%, dextrose 50%, diphenhydrAMINE, glucose, glucose, hydrALAZINE, hydrocodone-apap 7.5-325 MG/15 ML, labetalol, meclizine, naloxone, ondansetron, ondansetron, promethazine (PHENERGAN) IVPB, sodium chloride 0.9%, sodium chloride 0.9%    Objective:      Temp:  [98.2 °F (36.8 °C)-98.8 °F (37.1 °C)] 98.8 °F (37.1 °C)  Pulse:  [] 104  Resp:  [12-29] 27  SpO2:  [90 %-100 %] 98 %  BP: ()/(53-85) 150/65  Arterial Line BP: (109-163)/(50-99) 159/67  Weight change: 0.3 kg (10.6 oz)    Intake/Output Summary (Last 24 hours) at 1/8/2019 0831  Last data filed at 1/8/2019 0700  Gross per 24 hour   Intake 5415.8 ml   Output 3370 ml   Net 2045.8 ml       Physical Exam:  GEN: awake, alert, NAD  CV: RRR  PULM: CTAB, on 1L NC  ABD: S/ appropriately TTP/ ND, midline c/d/i, JAY with old blood output  EXT: Peripheral pulses present and equal  bilaterally    Recent Labs   Lab 01/08/19  0358      K 3.3*   CO2 33*   CL 99   BUN 19   CREATININE 0.8   CALCIUM 8.5*   PROT 6.5   AST 58*   ALT 80*   ALKPHOS 52*   BILITOT 0.9     Recent Labs   Lab 01/08/19  0358   WBC 15.40*   HGB 7.8*   HCT 24.5*          Significant Imaging: I have reviewed all pertinent imaging results/findings within the past 24 hours.     Assessment and Plan:      69 yo M s/p ex-lap, toupet fundoplication, hiatal hernia repair, cholecystectomy, sleeve duodenectomy with end-end anastomosis, liver biopsy, biliary stenting, and intralesional chemo for malignant carcinoid of the duodenum POD#4    NPO/Clinimex  Add pregabalin  Continue pain control prn  Schrader d/c this morning  NGT to remain in place, will discuss timing of contrast leak study  Ambulation/IS/PT/OT  Neuro consult for vertigo episodes  Possible transfer to floor today    Jamie Vines MD  Neuroendocrine Surgery

## 2019-01-08 NOTE — PLAN OF CARE
Problem: Adult Inpatient Plan of Care  Goal: Plan of Care Review  Outcome: Ongoing (interventions implemented as appropriate)  Patient on oxygen. No Distress Noted. Will continue to monitor.

## 2019-01-08 NOTE — PT/OT/SLP PROGRESS
Occupational Therapy   Treatment    Name: Hoang Santos Jr.  MRN: [id number]  Admitting Diagnosis:  Malignant carcinoid tumor of duodenum  4 Days Post-Op    Recommendations:     Discharge Recommendations: other (see comments)(HH PT/OT)  Discharge Equipment Recommendations:  (TBD)  Barriers to discharge:  None    Subjective     Pain/Comfort:  ·      Objective:     Communicated with: nurse prior to session.  Patient found with all lines intact, call button in reach and wife present and blood pressure cuff, beach catheter, NG tube, oxygen, peripheral IV, pulse ox (continuous), telemetry, SCD upon OT entry to room.    General Precautions: Standard, fall, NPO   Orthopedic Precautions:N/A   Braces:       Occupational Performance:    Bed Mobility:    · Patient completed Rolling/Turning to Right with stand by assistance and contact guard assistance  · Patient completed Scooting/Bridging with stand by assistance  · Patient completed Supine to Sit with stand by assistance and contact guard assistance     Functional Mobility/Transfers:  · Patient completed Sit <> Stand Transfer with contact guard assistance  with  no assistive device   · Patient completed Bed <> Chair Transfer using Step Transfer technique with contact guard assistance with no assistive device  · Functional Mobility:     Activities of Daily Living:  ·       [facility] 6 Click ADL: 13    Treatment & Education:  Pt with improvement in alertness and cognition today.  Pt w/improvement in ability to cough.  Reviewed use of pillow for abdominal bracing.  Pt performed BUE AROM ex while supine and seated.  Pt w/HR of 110 upon OT arrival--elevated to 120's w/minimal mvmt. Attempted relaxed diaphragmatic breathing, pt continues to have difficulty following these steps for deep breathing.Required rest breaks to allow HR to decrease between AROM sets.    Patient left up in chair with all lines intact, call button in reach and nurse notified  Education:    Assessment:     Hoang  KAREY Santos Jr. is a 68 y.o. male with a medical diagnosis of Malignant carcinoid tumor of duodenum.  He presents with the following performance deficits affecting function are weakness, gait instability, decreased upper extremity function, impaired cardiopulmonary response to activity, decreased ROM, impaired endurance, impaired balance, impaired self care skills, impaired functional mobilty, pain, impaired skin.     Rehab Prognosis:  Good; patient would benefit from acute skilled OT services to address these deficits and reach maximum level of function.       Plan:     Patient to be seen 5 x/week to address the above listed problems via self-care/home management, therapeutic activities, therapeutic exercises  · Plan of Care Expires: 02/05/19  · Plan of Care Reviewed with: patient    This Plan of care has been discussed with the patient who was involved in its development and understands and is in agreement with the identified goals and treatment plan    GOALS:   Multidisciplinary Problems     Occupational Therapy Goals        Problem: Occupational Therapy Goal    Goal Priority Disciplines Outcome Interventions   Occupational Therapy Goal     OT, PT/OT Ongoing (interventions implemented as appropriate)    Description:  Goals to be met by: 1/25/2019    Patient will increase functional independence with ADLs by performing:    UE Dressing with Modified [place].  LE Dressing with Modified [place].  Grooming while standing with Modified [place].  Toileting from toilet with Modified [place] for hygiene and clothing management.   Rolling to Bilateral with Modified [place].   Supine to sit with Modified [place].  Step transfer with Modified [place]  Toilet transfer to toilet with Modified [place].  Upper extremity exercise program x10 reps per handout, with independence.                      Time Tracking:     OT Date of Treatment: 01/08/19  OT Start Time: 1453  OT Stop Time: 1523  OT  Total Time (min): 30 min    Billable Minutes:Therapeutic Activity 15  Therapeutic Exercise 15    Antonio Echeverria OT  1/8/2019

## 2019-01-08 NOTE — PLAN OF CARE
Problem: Adult Inpatient Plan of Care  Goal: Plan of Care Review  Outcome: Ongoing (interventions implemented as appropriate)  Patient on oxygen with documented flow.  Will attempt to wean per O2 order protocol. Will continue to monitor.

## 2019-01-08 NOTE — PT/OT/SLP PROGRESS
Physical Therapy Treatment    Patient Name:  Hoang Santos Jr.   MRN:  [id number]    Recommendations:     Discharge Recommendations:  ( PT/OT)   Discharge Equipment Recommendations: (TBD)   Barriers to discharge: None    Assessment:     Hoang Santos Jr. is a 68 y.o. male admitted with a medical diagnosis of Malignant carcinoid tumor of duodenum.  He presents with the following impairments/functional limitations:  weakness, impaired endurance, impaired self care skills, impaired functional mobilty, impaired balance, decreased upper extremity function, decreased lower extremity function, pain, impaired skin, gait instability; fatigued this PM.    Rehab Prognosis: Good; patient would benefit from acute skilled PT services to address these deficits and reach maximum level of function.    Recent Surgery: Procedure(s) (LRB):  DUODENECTOMY (N/A)  LYMPHADENECTOMY (N/A)  GASTROJEJUNOSTOMY (N/A)  FUNDOPLICATION (N/A)  EGD (ESOPHAGOGASTRODUODENOSCOPY) (N/A)  CHOLECYSTECTOMY  BIOPSY, LIVER 4 Days Post-Op    Plan:     During this hospitalization, patient to be seen 6 x/week to address the identified rehab impairments via gait training, therapeutic exercises, therapeutic activities and progress toward the following goals:    · Plan of Care Expires:  02/04/19    Subjective     Pain/Comfort:  · Pain Rating 1: 2/10  · Location - Side 1: Bilateral  · Location - Orientation 1: generalized  · Location 1: abdomen  · Pain Addressed 1: Reposition, Distraction, Cessation of Activity  · Pain Rating Post-Intervention 1: 2/10      Objective:     Communicated with nurse prior to session.  Patient found blood pressure cuff, beach catheter, NG tube, oxygen, peripheral IV, telemetry, SCD, pulse ox (continuous)(ICU monitoring)  upon PT entry to room.     General Precautions: Standard, fall, NPO   Orthopedic Precautions:N/A   Braces: N/A     Functional Mobility:  · Bed Mobility:     · Scooting: stand by assistance  · Sit to Supine: moderate  "assistance  · Transfers:     · Sit to Stand:  contact guard assistance with no AD  · Bed to Chair: contact guard assistance and minimum assistance with  no AD  using  Step Transfer  · Gait: Patient took 8 steps to bed from chair with intermittent posterior lean requiring CG/Harley; short step length, slow fan      AM-PAC 6 CLICK MOBILITY  Turning over in bed (including adjusting bedclothes, sheets and blankets)?: 3  Sitting down on and standing up from a chair with arms (e.g., wheelchair, bedside commode, etc.): 3  Moving from lying on back to sitting on the side of the bed?: 3  Moving to and from a bed to a chair (including a wheelchair)?: 3  Need to walk in hospital room?: 3  Climbing 3-5 steps with a railing?: 1  Basic Mobility Total Score: 16       Therapeutic Activities and Exercises:   Patient sitting in bedside chair and reports that he is tired; pt scooted to edge of chair with SBA; sit to stand with CGA and took 8 steps toward bed with CG/Har[place] 2/2 intermittent posterior lean; pt sat and scooted back into the bed; sit to sup with modA with LEs; pt positioned on R side with pillow between LEs and SCDs re- applied.    Patient left right sidelying with all lines intact, call button in reach, nurse notified and wife present..    GOALS:   Multidisciplinary Problems     Physical Therapy Goals        Problem: Physical Therapy Goal    Goal Priority Disciplines Outcome Goal Variances Interventions   Physical Therapy Goal     PT, PT/OT Ongoing (interventions implemented as appropriate)     Description:  Goals to be met by: 19     Patient will increase functional independence with mobility by performin. Supine to sit with supervision.   2. Sit to supine with supervision.   3. Sit<>stand transfer with supervision .   4. OOB to chair for > 1 hour MET 2019  5.  Gait > 150 feet with SBA with /without AD   6.  Ascend/descend 4" curb step with CGA .                       Time Tracking:     PT Received On: " 01/08/19  PT Start Time: 1610     PT Stop Time: 1622  PT Total Time (min): 12 min     Billable Minutes: Therapeutic Activity 12 minutes    Treatment Type: Treatment  PT/PTA: PT     PTA Visit Number: 0     Myesha Renner, PT  01/08/2019

## 2019-01-08 NOTE — PLAN OF CARE
67 yo M s/p ex-lap, toupet fundoplication, hiatal hernia repair, cholecystectomy, sleeve duodenectomy with end-end anastomosis, liver biopsy, biliary stenting, and intralesional chemo for malignant carcinoid of the duodenum POD#4    Tn visited with pt an updated on plan of care.    Tn to continue to follow for post op needs.       01/08/19 0951   Discharge Reassessment   Assessment Type Discharge Planning Reassessment   Provided patient/caregiver education on the expected discharge date and the discharge plan Yes   Do you have any problems affording any of your prescribed medications? No   Discharge Plan A Home   Discharge Plan B Home with family;Home Health   DME Needed Upon Discharge  none   Patient choice form signed by patient/caregiver N/A   Anticipated Discharge Disposition Home   Can the patient answer the patient profile reliably? Yes, cognitively intact   How does the patient rate their overall health at the present time? Fair   Describe the patient's ability to walk at the present time. Major restrictions/daily assistance from another person   How often would a person be available to care for the patient? Often   Number of comorbid conditions (as recorded on the chart) Five or more   During the past month, has the patient often been bothered by feeling down, depressed or hopeless? No   During the past month, has the patient often been bothered by little interest or pleasure in doing things? No

## 2019-01-08 NOTE — PLAN OF CARE
Problem: Infection (Surgery Nonspecified)  Goal: Absence of Infection Signs/Symptoms    Intervention: Prevent or Manage Infection   01/08/19 0418   Prevent or Manage Infection   Infection Management aseptic technique maintained         Problem: Postoperative Nausea and Vomiting (Surgery Nonspecified)  Goal: Nausea and Vomiting Relief    Intervention: Prevent or Manage Postoperative Nausea and Vomiting   01/08/19 0418   Prevent or Manage Postoperative Nausea and Vomiting   Nausea/Vomiting Interventions slow deep breathing encouraged

## 2019-01-08 NOTE — PLAN OF CARE
Problem: Adult Inpatient Plan of Care  Goal: Plan of Care Review  Outcome: Ongoing (interventions implemented as appropriate)  Mr. Santos, will call for assistance  When wanting to sit up in the chair at the bedside, call light within reach, wheels locked, skid resistance socks on, bed alarm on, he will be offered assistance every hour to prevent injury from a fall. He will encouraged and assisted to turn every 2 hours, supported with wedge and pillows, foam dressing to sacrum to prevent pressure areas to the skin. He will continue to work with physical therapy for gait training, safe transfer and muscle strengthening ; occupational therapy will assist with fine motor skills. Urine output has bee consistently 200 mL per hour for the last 5 hours; will continue to replace output with normal saline 1 to 1.

## 2019-01-08 NOTE — PLAN OF CARE
Problem: Pain (Surgery Nonspecified)  Goal: Acceptable Pain Control    Intervention: Prevent or Manage Pain   01/08/19 0417   Prevent or Manage Pain   Pain Management Interventions breathing exercises;care clustered;medication offered but refused;relaxation techniques promoted

## 2019-01-08 NOTE — PLAN OF CARE
Problem: Occupational Therapy Goal  Goal: Occupational Therapy Goal  Goals to be met by: 1/25/2019    Patient will increase functional independence with ADLs by performing:    UE Dressing with Modified [place].  LE Dressing with Modified [place].  Grooming while standing with Modified [place].  Toileting from toilet with Modified [place] for hygiene and clothing management.   Rolling to Bilateral with Modified [place].   Supine to sit with Modified [place].  Step transfer with Modified [place]  Toilet transfer to toilet with Modified [place].  Upper extremity exercise program x10 reps per handout, with independence.     Outcome: Ongoing (interventions implemented as appropriate)  Progressing toward goals.     Cont POC

## 2019-01-09 LAB
ALBUMIN SERPL BCP-MCNC: 3.1 G/DL
ALP SERPL-CCNC: 66 U/L
ALT SERPL W/O P-5'-P-CCNC: 61 U/L
ANION GAP SERPL CALC-SCNC: 10 MMOL/L
ANION GAP SERPL CALC-SCNC: 9 MMOL/L
ANISOCYTOSIS BLD QL SMEAR: SLIGHT
AST SERPL-CCNC: 46 U/L
BASOPHILS NFR BLD: 0 %
BILIRUB SERPL-MCNC: 1.2 MG/DL
BUN SERPL-MCNC: 21 MG/DL
BUN SERPL-MCNC: 23 MG/DL
CALCIUM SERPL-MCNC: 8.9 MG/DL
CALCIUM SERPL-MCNC: 9.2 MG/DL
CHLORIDE SERPL-SCNC: 95 MMOL/L
CHLORIDE SERPL-SCNC: 95 MMOL/L
CO2 SERPL-SCNC: 34 MMOL/L
CO2 SERPL-SCNC: 35 MMOL/L
CREAT SERPL-MCNC: 0.8 MG/DL
CREAT SERPL-MCNC: 0.9 MG/DL
DIFFERENTIAL METHOD: ABNORMAL
EOSINOPHIL NFR BLD: 0 %
ERYTHROCYTE [DISTWIDTH] IN BLOOD BY AUTOMATED COUNT: 14.4 %
EST. GFR  (AFRICAN AMERICAN): >60 ML/MIN/1.73 M^2
EST. GFR  (AFRICAN AMERICAN): >60 ML/MIN/1.73 M^2
EST. GFR  (NON AFRICAN AMERICAN): >60 ML/MIN/1.73 M^2
EST. GFR  (NON AFRICAN AMERICAN): >60 ML/MIN/1.73 M^2
GLUCOSE SERPL-MCNC: 133 MG/DL
GLUCOSE SERPL-MCNC: 135 MG/DL
GLUCOSE SERPL-MCNC: 149 MG/DL (ref 70–110)
GLUCOSE SERPL-MCNC: 153 MG/DL (ref 70–110)
GLUCOSE SERPL-MCNC: 159 MG/DL (ref 70–110)
GLUCOSE SERPL-MCNC: 70 MG/DL (ref 70–110)
HCO3 UR-SCNC: 18.6 MMOL/L (ref 24–28)
HCO3 UR-SCNC: 20.1 MMOL/L (ref 24–28)
HCO3 UR-SCNC: 23.2 MMOL/L (ref 24–28)
HCO3 UR-SCNC: 24.5 MMOL/L (ref 24–28)
HCT VFR BLD AUTO: 26.4 %
HCT VFR BLD CALC: 34 %PCV (ref 36–54)
HCT VFR BLD CALC: 35 %PCV (ref 36–54)
HCT VFR BLD CALC: 40 %PCV (ref 36–54)
HCT VFR BLD CALC: 42 %PCV (ref 36–54)
HGB BLD-MCNC: 12 G/DL
HGB BLD-MCNC: 12 G/DL
HGB BLD-MCNC: 14 G/DL
HGB BLD-MCNC: 14 G/DL
HGB BLD-MCNC: 8.3 G/DL
HYPOCHROMIA BLD QL SMEAR: ABNORMAL
LYMPHOCYTES NFR BLD: 16 %
MAGNESIUM SERPL-MCNC: 2.5 MG/DL
MCH RBC QN AUTO: 29.1 PG
MCHC RBC AUTO-ENTMCNC: 31.4 G/DL
MCV RBC AUTO: 93 FL
MONOCYTES NFR BLD: 8 %
NEUTROPHILS NFR BLD: 76 %
PCO2 BLDA: 31.3 MMHG (ref 35–45)
PCO2 BLDA: 33.7 MMHG (ref 35–45)
PCO2 BLDA: 36.5 MMHG (ref 35–45)
PCO2 BLDA: 36.5 MMHG (ref 35–45)
PH SMN: 7.35 [PH] (ref 7.35–7.45)
PH SMN: 7.41 [PH] (ref 7.35–7.45)
PH SMN: 7.42 [PH] (ref 7.35–7.45)
PH SMN: 7.43 [PH] (ref 7.35–7.45)
PHOSPHATE SERPL-MCNC: 2 MG/DL
PHOSPHATE SERPL-MCNC: 2.4 MG/DL
PLATELET # BLD AUTO: 203 K/UL
PLATELET BLD QL SMEAR: ABNORMAL
PMV BLD AUTO: 9.7 FL
PO2 BLDA: 153 MMHG (ref 80–100)
PO2 BLDA: 160 MMHG (ref 80–100)
PO2 BLDA: 185 MMHG (ref 80–100)
PO2 BLDA: 191 MMHG (ref 80–100)
POC BE: -1 MMOL/L
POC BE: -4 MMOL/L
POC BE: -7 MMOL/L
POC BE: 0 MMOL/L
POC IONIZED CALCIUM: 0.99 MMOL/L (ref 1.06–1.42)
POC IONIZED CALCIUM: 1.06 MMOL/L (ref 1.06–1.42)
POC IONIZED CALCIUM: 1.07 MMOL/L (ref 1.06–1.42)
POC IONIZED CALCIUM: 1.11 MMOL/L (ref 1.06–1.42)
POC SATURATED O2: 100 % (ref 95–100)
POC SATURATED O2: 100 % (ref 95–100)
POC SATURATED O2: 99 % (ref 95–100)
POC SATURATED O2: 99 % (ref 95–100)
POC TCO2: 20 MMOL/L (ref 23–27)
POC TCO2: 21 MMOL/L (ref 23–27)
POC TCO2: 24 MMOL/L (ref 23–27)
POC TCO2: 26 MMOL/L (ref 23–27)
POIKILOCYTOSIS BLD QL SMEAR: SLIGHT
POTASSIUM BLD-SCNC: 4 MMOL/L (ref 3.5–5.1)
POTASSIUM BLD-SCNC: 4.1 MMOL/L (ref 3.5–5.1)
POTASSIUM BLD-SCNC: 4.1 MMOL/L (ref 3.5–5.1)
POTASSIUM BLD-SCNC: 4.2 MMOL/L (ref 3.5–5.1)
POTASSIUM SERPL-SCNC: 3.2 MMOL/L
POTASSIUM SERPL-SCNC: 3.2 MMOL/L
PROT SERPL-MCNC: 6.6 G/DL
RBC # BLD AUTO: 2.85 M/UL
SAMPLE: ABNORMAL
SODIUM BLD-SCNC: 137 MMOL/L (ref 136–145)
SODIUM BLD-SCNC: 138 MMOL/L (ref 136–145)
SODIUM BLD-SCNC: 139 MMOL/L (ref 136–145)
SODIUM BLD-SCNC: 140 MMOL/L (ref 136–145)
SODIUM SERPL-SCNC: 138 MMOL/L
SODIUM SERPL-SCNC: 140 MMOL/L
WBC # BLD AUTO: 16.79 K/UL

## 2019-01-09 PROCEDURE — 20000000 HC ICU ROOM

## 2019-01-09 PROCEDURE — 94761 N-INVAS EAR/PLS OXIMETRY MLT: CPT

## 2019-01-09 PROCEDURE — 25500020 PHARM REV CODE 255: Performed by: SURGERY

## 2019-01-09 PROCEDURE — 25000003 PHARM REV CODE 250: Performed by: SURGERY

## 2019-01-09 PROCEDURE — 97530 THERAPEUTIC ACTIVITIES: CPT

## 2019-01-09 PROCEDURE — 27000221 HC OXYGEN, UP TO 24 HOURS

## 2019-01-09 PROCEDURE — 85027 COMPLETE CBC AUTOMATED: CPT

## 2019-01-09 PROCEDURE — 92610 EVALUATE SWALLOWING FUNCTION: CPT

## 2019-01-09 PROCEDURE — A4216 STERILE WATER/SALINE, 10 ML: HCPCS | Performed by: ANESTHESIOLOGY

## 2019-01-09 PROCEDURE — 97116 GAIT TRAINING THERAPY: CPT

## 2019-01-09 PROCEDURE — 83735 ASSAY OF MAGNESIUM: CPT

## 2019-01-09 PROCEDURE — 85007 BL SMEAR W/DIFF WBC COUNT: CPT

## 2019-01-09 PROCEDURE — C9113 INJ PANTOPRAZOLE SODIUM, VIA: HCPCS | Performed by: SURGERY

## 2019-01-09 PROCEDURE — 97110 THERAPEUTIC EXERCISES: CPT

## 2019-01-09 PROCEDURE — 80048 BASIC METABOLIC PNL TOTAL CA: CPT

## 2019-01-09 PROCEDURE — 80053 COMPREHEN METABOLIC PANEL: CPT

## 2019-01-09 PROCEDURE — 25000003 PHARM REV CODE 250: Performed by: ANESTHESIOLOGY

## 2019-01-09 PROCEDURE — 25000003 PHARM REV CODE 250: Performed by: STUDENT IN AN ORGANIZED HEALTH CARE EDUCATION/TRAINING PROGRAM

## 2019-01-09 PROCEDURE — 84100 ASSAY OF PHOSPHORUS: CPT

## 2019-01-09 PROCEDURE — B4185 PARENTERAL SOL 10 GM LIPIDS: HCPCS | Performed by: SURGERY

## 2019-01-09 PROCEDURE — 63600175 PHARM REV CODE 636 W HCPCS: Performed by: SURGERY

## 2019-01-09 PROCEDURE — 84100 ASSAY OF PHOSPHORUS: CPT | Mod: 91

## 2019-01-09 RX ORDER — SODIUM,POTASSIUM PHOSPHATES 280-250MG
2 POWDER IN PACKET (EA) ORAL ONCE
Status: COMPLETED | OUTPATIENT
Start: 2019-01-09 | End: 2019-01-09

## 2019-01-09 RX ORDER — POTASSIUM CHLORIDE 20 MEQ/15ML
40 SOLUTION ORAL ONCE
Status: COMPLETED | OUTPATIENT
Start: 2019-01-09 | End: 2019-01-09

## 2019-01-09 RX ADMIN — DIATRIZOATE MEGLUMINE AND DIATRIZOATE SODIUM 120 ML: 660; 100 LIQUID ORAL; RECTAL at 10:01

## 2019-01-09 RX ADMIN — POTASSIUM CHLORIDE 40 MEQ: 20 SOLUTION ORAL at 02:01

## 2019-01-09 RX ADMIN — Medication 3 ML: at 10:01

## 2019-01-09 RX ADMIN — Medication 3 ML: at 05:01

## 2019-01-09 RX ADMIN — SOYBEAN OIL 250 ML: 20 INJECTION, SOLUTION INTRAVENOUS at 10:01

## 2019-01-09 RX ADMIN — IRON SUCROSE 100 MG: 20 INJECTION, SOLUTION INTRAVENOUS at 08:01

## 2019-01-09 RX ADMIN — TAMSULOSIN HYDROCHLORIDE 0.4 MG: 0.4 CAPSULE ORAL at 08:01

## 2019-01-09 RX ADMIN — POTASSIUM & SODIUM PHOSPHATES POWDER PACK 280-160-250 MG 2 PACKET: 280-160-250 PACK at 08:01

## 2019-01-09 RX ADMIN — PREGABALIN 50 MG: 50 CAPSULE ORAL at 08:01

## 2019-01-09 RX ADMIN — ASCORBIC ACID, VITAMIN A PALMITATE, CHOLECALCIFEROL, THIAMINE HYDROCHLORIDE, RIBOFLAVIN-5 PHOSPHATE SODIUM, PYRIDOXINE HYDROCHLORIDE, NIACINAMIDE, DEXPANTHENOL, ALPHA-TOCOPHEROL ACETATE, VITAMIN K1, FOLIC ACID, BIOTIN, CYANOCOBALAMIN: 200; 3300; 200; 6; 3.6; 6; 40; 15; 10; 150; 600; 60; 5 INJECTION, SOLUTION INTRAVENOUS at 10:01

## 2019-01-09 RX ADMIN — PREGABALIN 50 MG: 50 CAPSULE ORAL at 10:01

## 2019-01-09 RX ADMIN — CYANOCOBALAMIN 1000 MCG: 1000 INJECTION, SOLUTION INTRAMUSCULAR; SUBCUTANEOUS at 08:01

## 2019-01-09 RX ADMIN — POTASSIUM & SODIUM PHOSPHATES POWDER PACK 280-160-250 MG 2 PACKET: 280-160-250 PACK at 02:01

## 2019-01-09 RX ADMIN — PANTOPRAZOLE SODIUM 40 MG: 40 INJECTION, POWDER, FOR SOLUTION INTRAVENOUS at 08:01

## 2019-01-09 NOTE — PT/OT/SLP PROGRESS
Occupational Therapy  Missed Visit    Patient Name:  Hoang Santos Jr.   MRN:  [id number]    Patient not seen today secondary to  declined at this time as he had just returned from test. Will follow-up .    Antonio Echeverria OT  1/9/2019

## 2019-01-09 NOTE — PLAN OF CARE
Problem: SLP Goal  Goal: SLP Goal  Short Term Goals:  1. Pt will participate in ongoing swallow eval to make appropriate diet recs.   Outcome: Ongoing (interventions implemented as appropriate)  SLP consulted s/p upper GI assessment as Pt was coughing during study. Pt did not tolerate water trials with SLP.

## 2019-01-09 NOTE — PT/OT/SLP EVAL
Speech Language Pathology Evaluation  Bedside Swallow    Patient Name:  Hoang Santos Jr.   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    Prostatic hyperplasia     Sleep apnea        Past Surgical History:   Procedure Laterality Date    BIOPSY, LIVER  1/4/2019    [facility]    CHOLECYSTECTOMY  1/4/2019    [facility]    DUODENECTOMY N/A 1/4/2019    [facility]    EGD (ESOPHAGOGASTRODUODENOSCOPY) N/A 1/4/2019    [facility]    EGD (ESOPHAGOGASTRODUODENOSCOPY) N/A 9/24/2018    Performed by Salo Nuñez MD at [facility] ENDO (4TH FLR)    FUNDOPLICATION N/A 1/4/2019    [facility]    GASTROJEJUNOSTOMY N/A 1/4/2019    [facility]    LYMPHADENECTOMY N/A 1/4/2019    [facility]    PALATOPLASTY-(UPPP) N/A 2/14/2017    Performed by Bob Araujo III, MD at [facility] OR 2ND FLR    PH MONITORING, ESOPHAGUS, WIRELESS, (OFF REFLUX MEDS) N/A 9/24/2018    Per[facility] at [facility] (4TH FLR)    TONSILLECTOMY      TRANSPROSTATIC TISSUE RETRACTION N/A 6/28/2018    [facility]    ULTRASOUND-ENDOSCOPIC-UPPER N/A 11/5/2018    Performed by Gorge Reyes [facility] at [facility] ENDO    UVULOPALATOPHARYNGOPLASTY     History of  "Present Illness:  A 69 yo F  has a past medical history of Hyperlipidemia, Primary malignant neuroendocrine neoplasm of duodenum (09/2018), Prostatic hyperplasia, and Sleep apnea presents with weight loss and a primary malignant neuroendocrine neoplasm of duodenum here for duodenectomy, cholecystectomy and liver US with possible Gastrojej today. Imaging showed      Prior Intubation HX:  Intubated for surgery    Modified Barium Swallow: none on this admit    Chest X-Rays:  No pleural effusion or pneumothorax on 12/24/18    Prior diet: NGT in place    Subjective     Consult received for clinical swallow eval this date s/p upper GI when pt had difficulty swallowing.  SLP did communicate with RN prior to eval/treat.    Patient goals: "They told me no water."    Pain/Comfort:  · Pain Rating 1: 0/10    Objective:   Pt seen in ICU, no family at bedside.  Pt had just returned from GI testing.  Pt voiced concern about swallowing water.   Pt given tsp and cup swallows of water. Pt with overt s/s of dysphagia and immediate desaturation noted.    Oral Musculature Evaluation  · Oral Musculature: unable to assess due to poor participation/comprehension  · Volitional Cough: wet voice  · Volitional Swallow: delayed swallow and reported increased trouble with swallowing saliva  · Voice Prior to PO Intake: wet voice    Bedside Swallow Eval:  Following PO trials of water, pt demonstrated overt pooling of secretions   Consistencies Assessed:  · Thin liquids water only presented     Oral Phase:   · Anterior loss  · Slow oral transit time  · Spitting out of food/liquid    Pharyngeal Phase:   · coughing/choking  · decreased hyolaryngeal excursion to palpation  · delayed swallow initation  · regurgitation of food/liquids   · Pt with s/s of dysphagia on water only    Treatment: re-assess with water and additional PO trials.     Assessment:     Hoang KAREY Santos Jr. is a 68 y.o. male admitted with Malignant carcinoid tumor of duodenum who " presents with s/s of dysphagia.     Goals:   Multidisciplinary Problems     SLP Goals        Problem: SLP Goal    Goal Priority Disciplines Outcome   SLP Goal     SLP Ongoing (interventions implemented as appropriate)   Description:  Short Term Goals:  1. Pt will participate in ongoing swallow eval to make appropriate diet recs.                     Plan:     · Patient to be seen:  3 x/week, 2 x/week   · Plan of Care expires:  02/07/19  · Plan of Care reviewed with:  patient   · SLP Follow-Up:  Yes       Discharge recommendations:  ( PT/OT)     Time Tracking:     SLP Treatment Date:   01/09/19  Speech Start Time:  1145  Speech Stop Time:  1200     Speech Total Time (min):  15 min    Billable Minutes: Eval Swallow and Oral Function 15      AJITH Ley, CCC-SLP  01/09/2019

## 2019-01-09 NOTE — PROGRESS NOTES
[facility]  General Surgery  Neuroendocrine Tumor Service  Progress Note     Admission Date: 1/4/2019  Hospital Length of Stay: 5  Principal Problem: Malignant carcinoid tumor of duodenum     Subjective:      Interval History:   NAEO  Pain controlled  Denies nausea/vomiting  Dizziness/vertigo improved  No issues urinating  No bowel fxn    Scheduled Meds:   cyanocobalamin  1,000 mcg Subcutaneous Daily    iron sucrose (VENOFER) IVPB  100 mg Intravenous Daily    pantoprazole  40 mg Intravenous Daily    potassium, sodium phosphates  2 packet Per NG tube Once    pregabalin  50 mg Oral BID    scopolamine  1 patch Transdermal Once    sodium chloride 0.9%  3 mL Intravenous Q8H    tamsulosin  0.4 mg Per NG tube Daily     Continuous Infusions:   Amino acid 5% - dextrose 15% (CLINIMIX-E) solution with additives (1L  provides 510 kcal/L dextrose, with 50 gm AA, 150 gm CHO, Na 35, K 30, Mg 5, Ca 4.5, Acetate 80, Cl 39, Phos 15) 50 mL/hr at 01/08/19 2050     PRN Meds:.acetaminophen, albuterol sulfate, dextrose 50%, dextrose 50%, diphenhydrAMINE, glucose, glucose, hydrALAZINE, hydrocodone-apap 7.5-325 MG/15 ML, labetalol, meclizine, naloxone, ondansetron, ondansetron, promethazine (PHENERGAN) IVPB, sodium chloride 0.9%, sodium chloride 0.9%    Objective:      Temp:  [98.2 °F (36.8 °C)-100.1 °F (37.8 °C)] 98.5 °F (36.9 °C)  Pulse:  [] 104  Resp:  [15-33] 20  SpO2:  [96 %-100 %] 100 %  BP: (106-174)/(54-94) 137/74  Weight change: 0.4 kg (14.1 oz)    Intake/Output Summary (Last 24 hours) at 1/9/2019 0801  Last data filed at 1/9/2019 0700  Gross per 24 hour   Intake 1300 ml   Output 2075 ml   Net -775 ml       Physical Exam:  GEN: awake, alert, NAD  CV: RRR  PULM: CTAB, on 1L NC  ABD: S/ appropriately TTP/ ND, midline c/d/i, JAY with old blood output  EXT: Peripheral pulses present and equal bilaterally      Recent Labs   Lab 01/09/19  0442      K 3.2*   CO2 34*   CL 95   BUN 21   CREATININE 0.8    CALCIUM 8.9   PROT 6.6   AST 46*   ALT 61*   ALKPHOS 66   BILITOT 1.2*     Recent Labs   Lab 01/09/19  0442   WBC 16.79*   HGB 8.3*   HCT 26.4*          Significant Imaging: I have reviewed all pertinent imaging results/findings within the past 24 hours.     Assessment and Plan:        69 yo M s/p ex-lap, toupet fundoplication, hiatal hernia repair, cholecystectomy, sleeve duodenectomy with end-end anastomosis, liver biopsy, biliary stenting, and intralesional chemo for malignant carcinoid of the duodenum POD#5    Pending floor transfer  Pain control prn  Swallow study this am  If possible, will start CLD  NPO/TPN for now  Ambulation/IS/PT/OT  NGT to remain in place    Jamie Vines MD  Neuroendocrine Surgery

## 2019-01-09 NOTE — PLAN OF CARE
Problem: Occupational Therapy Goal  Goal: Occupational Therapy Goal  Goals to be met by: 1/25/2019    Patient will increase functional independence with ADLs by performing:    UE Dressing with Modified [place].  LE Dressing with Modified [place].  Grooming while standing with Modified [place].  Toileting from toilet with Modified [place] for hygiene and clothing management.   Rolling to Bilateral with Modified [place].   Supine to sit with Modified [place].  Step transfer with Modified [place]  Toilet transfer to toilet with Modified [place].  Upper extremity exercise program x10 reps per handout, with independence.     Pt progressing towards goals on this date. Continue POC.

## 2019-01-10 ENCOUNTER — TELEPHONE (OUTPATIENT)
Dept: OTOLARYNGOLOGY | Facility: CLINIC | Age: 69
End: 2019-01-10

## 2019-01-10 LAB
ALBUMIN SERPL BCP-MCNC: 3 G/DL
ALP SERPL-CCNC: 78 U/L
ALT SERPL W/O P-5'-P-CCNC: 50 U/L
ANION GAP SERPL CALC-SCNC: 8 MMOL/L
ANISOCYTOSIS BLD QL SMEAR: SLIGHT
AST SERPL-CCNC: 43 U/L
BASOPHILS NFR BLD: 0 %
BILIRUB SERPL-MCNC: 1.4 MG/DL
BUN SERPL-MCNC: 25 MG/DL
CALCIUM SERPL-MCNC: 9.1 MG/DL
CHLORIDE SERPL-SCNC: 96 MMOL/L
CO2 SERPL-SCNC: 36 MMOL/L
CREAT SERPL-MCNC: 0.9 MG/DL
DIFFERENTIAL METHOD: ABNORMAL
EOSINOPHIL NFR BLD: 0 %
ERYTHROCYTE [DISTWIDTH] IN BLOOD BY AUTOMATED COUNT: 14.6 %
EST. GFR  (AFRICAN AMERICAN): >60 ML/MIN/1.73 M^2
EST. GFR  (NON AFRICAN AMERICAN): >60 ML/MIN/1.73 M^2
GLUCOSE SERPL-MCNC: 140 MG/DL
HCT VFR BLD AUTO: 27.7 %
HGB BLD-MCNC: 8.5 G/DL
HYPOCHROMIA BLD QL SMEAR: ABNORMAL
LYMPHOCYTES NFR BLD: 9 %
MAGNESIUM SERPL-MCNC: 2.6 MG/DL
MCH RBC QN AUTO: 28.7 PG
MCHC RBC AUTO-ENTMCNC: 30.7 G/DL
MCV RBC AUTO: 94 FL
METAMYELOCYTES NFR BLD MANUAL: 2 %
MONOCYTES NFR BLD: 5 %
MYELOCYTES NFR BLD MANUAL: 3 %
NEUTROPHILS NFR BLD: 77 %
NEUTS BAND NFR BLD MANUAL: 4 %
PHOSPHATE SERPL-MCNC: 2.7 MG/DL
PLATELET # BLD AUTO: 255 K/UL
PLATELET BLD QL SMEAR: ABNORMAL
PMV BLD AUTO: 9.7 FL
POIKILOCYTOSIS BLD QL SMEAR: SLIGHT
POTASSIUM SERPL-SCNC: 3.5 MMOL/L
PROT SERPL-MCNC: 6.8 G/DL
RBC # BLD AUTO: 2.96 M/UL
SODIUM SERPL-SCNC: 140 MMOL/L
WBC # BLD AUTO: 20.59 K/UL

## 2019-01-10 PROCEDURE — 63600175 PHARM REV CODE 636 W HCPCS: Performed by: SURGERY

## 2019-01-10 PROCEDURE — 92526 ORAL FUNCTION THERAPY: CPT

## 2019-01-10 PROCEDURE — 25000003 PHARM REV CODE 250: Performed by: SURGERY

## 2019-01-10 PROCEDURE — 97803 MED NUTRITION INDIV SUBSEQ: CPT | Performed by: DIETITIAN, REGISTERED

## 2019-01-10 PROCEDURE — 94761 N-INVAS EAR/PLS OXIMETRY MLT: CPT

## 2019-01-10 PROCEDURE — 87086 URINE CULTURE/COLONY COUNT: CPT

## 2019-01-10 PROCEDURE — 85007 BL SMEAR W/DIFF WBC COUNT: CPT

## 2019-01-10 PROCEDURE — 97530 THERAPEUTIC ACTIVITIES: CPT

## 2019-01-10 PROCEDURE — 31575 DIAGNOSTIC LARYNGOSCOPY: CPT | Mod: ,,, | Performed by: OTOLARYNGOLOGY

## 2019-01-10 PROCEDURE — 97116 GAIT TRAINING THERAPY: CPT

## 2019-01-10 PROCEDURE — 27000221 HC OXYGEN, UP TO 24 HOURS

## 2019-01-10 PROCEDURE — 80053 COMPREHEN METABOLIC PANEL: CPT

## 2019-01-10 PROCEDURE — 83735 ASSAY OF MAGNESIUM: CPT

## 2019-01-10 PROCEDURE — 87077 CULTURE AEROBIC IDENTIFY: CPT

## 2019-01-10 PROCEDURE — 94799 UNLISTED PULMONARY SVC/PX: CPT

## 2019-01-10 PROCEDURE — C9113 INJ PANTOPRAZOLE SODIUM, VIA: HCPCS | Performed by: SURGERY

## 2019-01-10 PROCEDURE — 87186 SC STD MICRODIL/AGAR DIL: CPT

## 2019-01-10 PROCEDURE — 25000003 PHARM REV CODE 250: Performed by: ANESTHESIOLOGY

## 2019-01-10 PROCEDURE — 81000 URINALYSIS NONAUTO W/SCOPE: CPT

## 2019-01-10 PROCEDURE — 36415 COLL VENOUS BLD VENIPUNCTURE: CPT

## 2019-01-10 PROCEDURE — 85027 COMPLETE CBC AUTOMATED: CPT

## 2019-01-10 PROCEDURE — 84100 ASSAY OF PHOSPHORUS: CPT

## 2019-01-10 PROCEDURE — 87040 BLOOD CULTURE FOR BACTERIA: CPT | Mod: 59

## 2019-01-10 PROCEDURE — 11000001 HC ACUTE MED/SURG PRIVATE ROOM

## 2019-01-10 PROCEDURE — 31575 PR LARYNGOSCOPY, FLEXIBLE; DIAGNOSTIC: ICD-10-PCS | Mod: ,,, | Performed by: OTOLARYNGOLOGY

## 2019-01-10 PROCEDURE — 87088 URINE BACTERIA CULTURE: CPT

## 2019-01-10 PROCEDURE — A4216 STERILE WATER/SALINE, 10 ML: HCPCS | Performed by: ANESTHESIOLOGY

## 2019-01-10 PROCEDURE — 99232 PR SUBSEQUENT HOSPITAL CARE,LEVL II: ICD-10-PCS | Mod: 25,,, | Performed by: OTOLARYNGOLOGY

## 2019-01-10 PROCEDURE — 99232 SBSQ HOSP IP/OBS MODERATE 35: CPT | Mod: 25,,, | Performed by: OTOLARYNGOLOGY

## 2019-01-10 PROCEDURE — 97110 THERAPEUTIC EXERCISES: CPT

## 2019-01-10 RX ADMIN — VANCOMYCIN HYDROCHLORIDE 2500 MG: 10 INJECTION, POWDER, LYOPHILIZED, FOR SOLUTION INTRAVENOUS at 11:01

## 2019-01-10 RX ADMIN — Medication 3 ML: at 05:01

## 2019-01-10 RX ADMIN — CYANOCOBALAMIN 1000 MCG: 1000 INJECTION, SOLUTION INTRAMUSCULAR; SUBCUTANEOUS at 09:01

## 2019-01-10 RX ADMIN — PANTOPRAZOLE SODIUM 40 MG: 40 INJECTION, POWDER, FOR SOLUTION INTRAVENOUS at 09:01

## 2019-01-10 RX ADMIN — ASCORBIC ACID, VITAMIN A PALMITATE, CHOLECALCIFEROL, THIAMINE HYDROCHLORIDE, RIBOFLAVIN-5 PHOSPHATE SODIUM, PYRIDOXINE HYDROCHLORIDE, NIACINAMIDE, DEXPANTHENOL, ALPHA-TOCOPHEROL ACETATE, VITAMIN K1, FOLIC ACID, BIOTIN, CYANOCOBALAMIN: 200; 3300; 200; 6; 3.6; 6; 40; 15; 10; 150; 600; 60; 5 INJECTION, SOLUTION INTRAVENOUS at 09:01

## 2019-01-10 RX ADMIN — IRON SUCROSE 100 MG: 20 INJECTION, SOLUTION INTRAVENOUS at 09:01

## 2019-01-10 NOTE — PT/OT/SLP PROGRESS
"Speech Language Pathology Treatment    Patient Name:  Hoang Santos Jr.   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 Wife arrived at end of session.   Patient goals: "Sometimes I can handle water ok!"     Pain/Comfort:  · Pain Rating 1: 0/10    Objective:     Has the patient been evaluated by SLP for swallowing?   Yes  Keep patient NPO? Yes   Current Respiratory Status: nasal cannula      Pt seen in ICU for ongoing swallow assessment. Pt awake and alert. PT assisted pt into recliner for optimal swallow evaluation.   Pt continues to exhibit difficulty with managing oral secretions, he continues to hold [place] in hand to remove pooling of oral secretions.   Pt trialed with water and applesauce at bedside. Water via tsp, cup trials x6. Pt demonstrated multiple swallows, continues to have to expectorate water and secretions, delayed swallow is in fact palpated, reduced laryngeal lift was noted. Pt also exhibited wet voice and hypo-nasal resonance is noted. Pt noted to have to perform effortful swallows when taking in water. Pt with increased difficulty when taking in pureed (he only agreed to trials by spoon). No further trials at this time, SLP did discuss with MD sewell: ENT consult, CT of abdomen with contrast and MBS as last step.   SLP NEEDS to certain that Pt can tolerate having barium when swallowing for MBS. Surgery team is concerned about possible leak in lower esophagus. Barium is contraindicated if this is the case.     Last MBS completed at [facility] in 7/2018:  IMPRESSIONS:   This 67 y.o. man appears to present " with  1.  Pharyngoesophageal dysphagia characterized by patterns that may be related to one or both of two contributing factors:  Osteophytes at C2-3 and 4-5 and possible reduction in pressure generated during closure of velopharyngeal port based on history of onset of symptoms.  While no deficit in closure pattern of the V-P port could be appreciated in today's study, cannot yet rule out that a weakened closure pattern may be contributing; however, his patterns could likely exist with the effects of the osteophytes alone.  Swallowing dysfunction observed today included incomplete epiglottic inversion (abutted PPW) and laryngeal vestibule closure with resultant reduced ability to clear boluses completely (vallecular residue of liquids and primarily solids, pyriform stasis of barium tablet).  2.  Osteophytes at C2-3 and 4-5 per previous imaging.  3.  History GEMMA; s/p UPPP 2/2017.     RECOMMENDATIONS/PLAN OF CARE:   It is felt that Mr. Santos would benefit from  1.  Continuation of his current regular consistency diet with thin liquids using the following strategies and common aspiration precautions, including, but not limited to              A.  Appropriate seating for all eating and drinking.              B.  Small bites, well chewed.  Cut denser or woodier foods into small pieces.              C.  Small sips.              D.  Multiple swallows to clear; alternate liquids with solids.              E.  Cut or crush pill medications (unless contraindicated) and combine with liquid or puree to facilitate swallowing.              F.  Monitoring for any signs/symptoms of aspiration (such as wet/gurgly voice that does not clear with coughing, inability to make any voice sounds, any persistent coughing with oral intake, otherwise unexplained fever, unexplained increased or new difficulty or discomfort breathing, unexplained increase in sleepiness/lethargy/significant fatigue, unexplained increase or new onset confusion or  change in cognitive functioning, or any other unexplained change in health or well-being that could be related to swallowing).  2.  Follow up with Dr. Casas as directed.  3.  Repeat MBSS as needed.      SLP did discuss POC with wife following session.     Assessment:     Hoang Santos Jr. is a 68 y.o. male with an SLP diagnosis of Dysphagia and Dysphonia.      Goals:   Multidisciplinary Problems     SLP Goals        Problem: SLP Goal    Goal Priority Disciplines Outcome   SLP Goal     SLP Ongoing (interventions implemented as appropriate)   Description:  Short Term Goals:  1. Pt will participate in ongoing swallow eval to make appropriate diet recs.                     Plan:     · Patient to be seen:  3 x/week   · Plan of Care expires:  02/07/19  · Plan of Care reviewed with:  patient, spouse   · SLP Follow-Up:  Yes       Discharge recommendations:  (pending PT/OT recs)     Time Tracking:     SLP Treatment Date:   01/10/19  Speech Start Time:  1043  Speech Stop Time:  1100     Speech Total Time (min):  17 min    Billable Minutes: Treatment Swallowing Dysfunction 17    AJITH Ley, CCC-SLP  01/10/2019

## 2019-01-10 NOTE — PLAN OF CARE
"Problem: Physical Therapy Goal  Goal: Physical Therapy Goal  Goals to be met by: 19     Patient will increase functional independence with mobility by performin. Supine to sit with supervision.   2. Sit to supine with supervision.   3. Sit<>stand transfer with supervision .   4. OOB to chair for > 1 hour MET 2019  5.  Gait > 150 feet with SBA with /without AD   6.  Ascend/descend 4" curb step with CGA .      Outcome: Ongoing (interventions implemented as appropriate)  Patient progressing toward goals; amb in [place] with RW and CGA; cont with POC.      "

## 2019-01-10 NOTE — NURSING
VN cued into pt's room for introduction. Patient's sister at bedside. VN informed pt and sister that VN would be working along side bedside nurse and PCT throughout shift.Assessed patient's level of pain.  At present no distress noted. Patient states not having a bowel movement since 1/7. Will cont to be available to patient and intervene prn.

## 2019-01-10 NOTE — PT/OT/SLP PROGRESS
Occupational Therapy   Treatment    Name: Hoang Santos Jr.  MRN: [id number]  Admitting Diagnosis:  Malignant carcinoid tumor of duodenum  6 Days Post-Op    Recommendations:     Discharge Recommendations: other (see comments)(HH PT/OT)  Discharge Equipment Recommendations:  (TBD)  Barriers to discharge:  None    Assessment:     Hoang Santos Jr. is a 68 y.o. male with a medical diagnosis of Malignant carcinoid tumor of duodenum. Performance deficits affecting function are weakness, impaired self care skills, impaired balance, impaired endurance, gait instability, impaired cardiopulmonary response to activity.     Rehab Prognosis:  Good; patient would benefit from acute skilled OT services to address these deficits and reach maximum level of function.       Plan:     Patient to be seen 5 x/week to address the above listed problems via self-care/home management, therapeutic activities, therapeutic exercises  · Plan of Care Expires: 02/10/19  · Plan of Care Reviewed with: patient, spouse    Subjective     Pain/Comfort:  · Pain Rating 1: 0/10    Objective:     Communicated with: nurse prior to session.  Patient found up in chair with peripheral IV, oxygen, telemetry, blood pressure cuff upon OT entry to room.    General Precautions: Standard, fall, aspiration, NPO   Orthopedic Precautions:N/A   Braces:       Occupational Performance:     Functional Mobility/Transfers:  · Patient completed Sit <> Stand Transfer with stand by assistance and contact guard assistance  with  no assistive device and rolling walker   · Functional Mobility: Pt ambulated with RW and CGA ~120' total in hallway with 1 rest break. Pt ambulated w/o O2 per nurse's rec, but sats and HR monitored via pulseox.     AMPAC 6 Click ADL: 12    Treatment & Education:  Pt's wife present during tx session. Pt appears to have increased arousal level and decreased lethargy as compared to previous days, with more optimistic/positive affect and increased  talkativeness. Pt expressed that he was ready to move floors and was feeling impatient. Pt requesting to return to bed, decreased interest in performing functional tasks, however agreeable to perform functional mobility.  Pt's gait at notably quicker pace than in previous sessions. Pt was t/f to 5th floor at end of this tx.     Patient left up in w/c in preparation for t/f to 5th floor. with all lines intact, nurse notified and wife presentEducation:      GOALS:   Multidisciplinary Problems     Occupational Therapy Goals        Problem: Occupational Therapy Goal    Goal Priority Disciplines Outcome Interventions   Occupational Therapy Goal     OT, PT/OT Ongoing (interventions implemented as appropriate)    Description:  Goals to be met by: 1/25/2019    Patient will increase functional independence with ADLs by performing:    UE Dressing with Modified [place].  LE Dressing with Modified [place].  Grooming while standing with Modified [place].  Toileting from toilet with Modified [place] for hygiene and clothing management.   Rolling to Bilateral with Modified [place].   Supine to sit with Modified [place].  Step transfer with Modified [place]  Toilet transfer to toilet with Modified [place].  Upper extremity exercise program x10 reps per handout, with independence.                       Time Tracking:     OT Date of Treatment: 01/10/19  OT Start Time: 1403  OT Stop Time: 1422  OT Total Time (min): 19 min    Billable Minutes:Therapeutic Activity 19     STEVE Aaron    I certify that I was present in the room directing the student in service delivery and guiding them using my skilled judgment. As the co-signing therapist I have reviewed the students documentation and am responsible for the treatment, assessment, and plan.         Antonio Echeverria, OT  1/10/2019

## 2019-01-10 NOTE — PLAN OF CARE
Problem: Adult Inpatient Plan of Care  Goal: Plan of Care Review  Outcome: Ongoing (interventions implemented as appropriate)  AAOX4, NAD.  Tachycardic upon arrival to unit, VSS otherwise.  MD aware.  Denies pain and N/V.  O2 in place.  TPN infusing per MAR.  NPO status maintained.  RLQ drain in place, sanguinous output, not to bulb suction as ordered.  Up with assistance.  Instructed to call for assistance.  Safety maintained.  Will continue to monitor.

## 2019-01-10 NOTE — PLAN OF CARE
Problem: Occupational Therapy Goal  Goal: Occupational Therapy Goal  Goals to be met by: 1/25/2019    Patient will increase functional independence with ADLs by performing:    UE Dressing with Modified [place].  LE Dressing with Modified [place].  Grooming while standing with Modified [place].  Toileting from toilet with Modified [place] for hygiene and clothing management.   Rolling to Bilateral with Modified [place].   Supine to sit with Modified [place].  Step transfer with Modified [place]  Toilet transfer to toilet with Modified [place].  Upper extremity exercise program x10 reps per handout, with independence.      Outcome: Ongoing (interventions implemented as appropriate)  Pt progressing towards goals; pt demonstrated increased endurance today.    Cont POC    Comments: Pt progressing towards goals on this date, with notable increase in arousal level.    Cont POC

## 2019-01-10 NOTE — PLAN OF CARE
Problem: Parenteral Nutrition  Goal: Effective Intravenous Nutrition Therapy Delivery    Intervention: Optimize Nutrition, Fluid and Electrolyte Intake  Recommendation   1. Diet advance as medically able to goal diet of GI Soft.   2. If pt unable to advance po, consider increasing TPN to 100mL/hr to better meet pt needs with 20% Lipids 250 mL MWF     Goals: Pt will tolerate TPN  Nutrition Goal Status: progressing towards goal  Communication of RD Recs: reviewed with RN    Nutrition Discharge Planning: d/c needs to be determined

## 2019-01-10 NOTE — PLAN OF CARE
Problem: Adult Inpatient Plan of Care  Goal: Plan of Care Review  Resting w/o complaint of pain overnight. VSS. Pt frequently using yanker to suction oral secretions. NG remains clamped with no complaint of N/V thus far. Plan for today: repeat SLP and possibly attempt esophagram again. Bed alarm on for safety and pt 3 liters nc. Voids per urinal per self.

## 2019-01-10 NOTE — NURSING TRANSFER
Nursing Transfer Note      1/10/2019     Transfer To: 505    Transfer via wheelchair    Transfer with cardiac monitoring    Transported by RN and transport    Medicines sent: TPN    Chart send with patient: Yes    Notified: spouse    Patient reassessed at: To be reassessed on floor    Upon arrival to floor: cardiac monitor applied, patient oriented to room, call bell in reach and bed in lowest position

## 2019-01-10 NOTE — PLAN OF CARE
Problem: Adult Inpatient Plan of Care  Goal: Plan of Care Review  Outcome: Ongoing (interventions implemented as appropriate)  Patient found on 2 LPM NC, O2 sats notated. Will continue to monitor.

## 2019-01-10 NOTE — PROGRESS NOTES
[facility]  General Surgery  Neuroendocrine Tumor Service  Progress Note     Admission Date: 1/4/2019  Hospital Length of Stay: 6  Principal Problem: Malignant carcinoid tumor of duodenum     Subjective:      Interval History:   NAEO  Denies n/v  Pain well controlled  Ambulating  +flatus    Scheduled Meds:   cyanocobalamin  1,000 mcg Subcutaneous Daily    fat emulsion 20%  250 mL Intravenous Every Mon, Wed, Fri    iron sucrose (VENOFER) IVPB  100 mg Intravenous Daily    pantoprazole  40 mg Intravenous Daily    pregabalin  50 mg Oral BID    sodium chloride 0.9%  3 mL Intravenous Q8H    tamsulosin  0.4 mg Per NG tube Daily     Continuous Infusions:   Amino acid 5% - dextrose 15% (CLINIMIX-E) solution with additives (1L  provides 510 kcal/L dextrose, with 50 gm AA, 150 gm CHO, Na 35, K 30, Mg 5, Ca 4.5, Acetate 80, Cl 39, Phos 15) 50 mL/hr at 01/09/19 2228     PRN Meds:.acetaminophen, albuterol sulfate, dextrose 50%, dextrose 50%, diphenhydrAMINE, glucose, glucose, hydrALAZINE, hydrocodone-apap 7.5-325 MG/15 ML, labetalol, meclizine, naloxone, ondansetron, ondansetron, promethazine (PHENERGAN) IVPB, sodium chloride 0.9%, sodium chloride 0.9%    Objective:      Temp:  [97.7 °F (36.5 °C)-99.1 °F (37.3 °C)] 99 °F (37.2 °C)  Pulse:  [103-121] 111  Resp:  [16-35] 35  SpO2:  [90 %-100 %] 100 %  BP: (103-136)/(59-78) 120/68  Weight change: -2.5 kg (-8.2 oz)    Intake/Output Summary (Last 24 hours) at 1/10/2019 0808  Last data filed at 1/10/2019 0440  Gross per 24 hour   Intake 2196.48 ml   Output 1120 ml   Net 1076.48 ml       Physical Exam:  GEN: awake, alert, NAD  CV: RRR  PULM: CTAB, on 1L NC  ABD: S/ appropriately TTP/ ND, midline c/d/i, JAY with old blood output  EXT: Peripheral pulses present and equal bilaterally        Recent Labs   Lab 01/10/19  0408      K 3.5   CO2 36*   CL 96   BUN 25*   CREATININE 0.9   CALCIUM 9.1   PROT 6.8   AST 43*   ALT 50*   ALKPHOS 78   BILITOT 1.4*      Recent Labs   Lab 01/10/19  0408   WBC 20.59*   HGB 8.5*   HCT 27.7*          Significant Imaging: I have reviewed all pertinent imaging results/findings within the past 24 hours.     Assessment and Plan:      69 yo M s/p ex-lap, toupet fundoplication, hiatal hernia repair, cholecystectomy, sleeve duodenectomy with end-end anastomosis, liver biopsy, biliary stenting, and intralesional chemo for malignant carcinoid of the duodenum POD#    Pull ND tube  SLP eval this morning  If cleared, CT ABd/pelvis with oral contrast  If no leak, CLD  NPO/TPN for now  Ambulation/IS/PT/OT    Jamie Vines MD  Neuroendocrine Surgery

## 2019-01-10 NOTE — PLAN OF CARE
"Problem: Physical Therapy Goal  Goal: Physical Therapy Goal  Goals to be met by: 19     Patient will increase functional independence with mobility by performin. Supine to sit with supervision.   2. Sit to supine with supervision.   3. Sit<>stand transfer with supervision .   4. OOB to chair for > 1 hour MET 2019  5.  Gait > 150 feet with SBA with /without AD   6.  Ascend/descend 4" curb step with CGA .      Outcome: Ongoing (interventions implemented as appropriate)  Patient progressing toward goals; improved mobility.      "

## 2019-01-10 NOTE — PT/OT/SLP PROGRESS
Physical Therapy Treatment    Patient Name:  Hoang Santos Jr.   MRN:  [id number]    Recommendations:     Discharge Recommendations:  (TBD)   Discharge Equipment Recommendations: (TBD)   Barriers to discharge: None    Assessment:     Hoang Santos Jr. is a 68 y.o. male admitted with a medical diagnosis of Malignant carcinoid tumor of duodenum.  He presents with the following impairments/functional limitations:  impaired endurance, impaired self care skills, impaired functional mobilty, gait instability, impaired balance, weakness, impaired skin Patient progressing toward goals; improved mobility.    Rehab Prognosis: Good; patient would benefit from acute skilled PT services to address these deficits and reach maximum level of function.    Recent Surgery: Procedure(s) (LRB):  DUODENECTOMY (N/A)  LYMPHADENECTOMY (N/A)  GASTROJEJUNOSTOMY (N/A)  FUNDOPLICATION (N/A)  EGD (ESOPHAGOGASTRODUODENOSCOPY) (N/A)  CHOLECYSTECTOMY  BIOPSY, LIVER 6 Days Post-Op    Plan:     During this hospitalization, patient to be seen 6 x/week to address the identified rehab impairments via gait training, therapeutic activities, therapeutic exercises and progress toward the following goals:    · Plan of Care Expires:  02/04/19    Subjective     Pain/Comfort:  · Pain Rating 1: 0/10  · Location - Side 1: Bilateral  · Location - Orientation 1: generalized  · Location 1: abdomen  · Pain Addressed 1: Cessation of Activity, Distraction  · Pain Rating Post-Intervention 1: 1/10      Objective:     Communicated with nurse prior to session.  Patient found all lines intact, call button in reach and bed alarm on NG tube, oxygen, SCD, beach catheter(ICU monitoring)  upon PT entry to room.     General Precautions: Standard, fall, aspiration, NPO   Orthopedic Precautions:N/A   Braces: N/A     Functional Mobility:  · Bed Mobility:     · Rolling Right: contact guard assistance  · Scooting: contact guard assistance and with use of bed rail  · Supine to Sit:  "contact guard assistance and with use of bed rail  · Transfers:     · Sit to Stand:  contact guard assistance with rolling walker  · Gait: Pt amb with RW and CGA x 50ft with slow fan and short step length, erect stance (5ft foward and backward 5x)      AM-PAC 6 CLICK MOBILITY  Turning over in bed (including adjusting bedclothes, sheets and blankets)?: 3  Sitting down on and standing up from a chair with arms (e.g., wheelchair, bedside commode, etc.): 3  Moving from lying on back to sitting on the side of the bed?: 3  Moving to and from a bed to a chair (including a wheelchair)?: 3  Need to walk in hospital room?: 3  Climbing 3-5 steps with a railing?: 2  Basic Mobility Total Score: 17       Therapeutic Activities and Exercises:   Patient performed BLE AROM ex x 15 reps-APs, ankle circles, QS, GS, heel slides, TKEs, hip abd/add, abd sets; 10 reps BUE AROM ex- alternating shld flexion and forward punches; bed mob and amb with RW as described above with VCs for technique.    Patient left up in chair with all lines intact, call button in reach and nurse notified..    GOALS:   Multidisciplinary Problems     Physical Therapy Goals        Problem: Physical Therapy Goal    Goal Priority Disciplines Outcome Goal Variances Interventions   Physical Therapy Goal     PT, PT/OT Ongoing (interventions implemented as appropriate)     Description:  Goals to be met by: 19     Patient will increase functional independence with mobility by performin. Supine to sit with supervision.   2. Sit to supine with supervision.   3. Sit<>stand transfer with supervision .   4. OOB to chair for > 1 hour MET 2019  5.  Gait > 150 feet with SBA with /without AD   6.  Ascend/descend 4" curb step with CGA .                       Time Tracking:     PT Received On: 01/10/19  PT Start Time: 1009     PT Stop Time: 1041  PT Total Time (min): 32 min     Billable Minutes: Gait Training 12 minutes and Therapeutic Exercise 20 " minutes    Treatment Type: Treatment  PT/PTA: PT     PTA Visit Number: 0     Myesha Renner, PT  01/10/2019

## 2019-01-10 NOTE — PT/OT/SLP PROGRESS
Physical Therapy Treatment    Patient Name:  Hoang Santos Jr.   MRN:  [id number]    Recommendations:     Discharge Recommendations:  ( PT/OT)   Discharge Equipment Recommendations: (TBD)   Barriers to discharge: None    Assessment:     Hoang Santos Jr. is a 68 y.o. male admitted with a medical diagnosis of Malignant carcinoid tumor of duodenum.  He presents with the following impairments/functional limitations:  weakness, impaired endurance, gait instability, impaired functional mobilty, impaired self care skills, impaired balance, impaired skin, impaired cardiopulmonary response to activity .    Rehab Prognosis: Good; patient would benefit from acute skilled PT services to address these deficits and reach maximum level of function.    Recent Surgery: Procedure(s) (LRB):  DUODENECTOMY (N/A)  LYMPHADENECTOMY (N/A)  GASTROJEJUNOSTOMY (N/A)  FUNDOPLICATION (N/A)  EGD (ESOPHAGOGASTRODUODENOSCOPY) (N/A)  CHOLECYSTECTOMY  BIOPSY, LIVER 5 Days Post-Op    Plan:     During this hospitalization, patient to be seen 6 x/week to address the identified rehab impairments via gait training, therapeutic activities, therapeutic exercises and progress toward the following goals:    · Plan of Care Expires:  02/04/19    Subjective     Pain/Comfort:  · Pain Rating 1: 1/10  · Location - Side 1: Bilateral  · Location - Orientation 1: generalized  · Location 1: abdomen  · Pain Addressed 1: Cessation of Activity, Distraction  · Pain Rating Post-Intervention 1: 1/10      Objective:     Communicated with nurse prior to session.  Patient found NG tube, oxygen, SCD, beach catheter(ICU monitoring)  upon PT entry to room.     General Precautions: Standard, fall, NPO   Orthopedic Precautions:N/A   Braces: N/A     Functional Mobility:  · Bed Mobility:   Per OT  · Rolling Right: stand by assistance  · Scooting: stand by assistance and contact guard assistance  · Supine to Sit: contact guard assistance  · Transfers:     · Sit to Stand:  contact  "guard assistance and minimum assistance with no AD and rolling walker  · Gait: Patient amb 60ft with RW and CGA with VCs for keeping feet apart, pt with genu varus L>R; pacing self, decreasing wt on RW and just using it for balance; HR primarily 130, 133 at highest during amb      AM-PAC 6 CLICK MOBILITY  Turning over in bed (including adjusting bedclothes, sheets and blankets)?: 3  Sitting down on and standing up from a chair with arms (e.g., wheelchair, bedside commode, etc.): 3  Moving from lying on back to sitting on the side of the bed?: 3  Moving to and from a bed to a chair (including a wheelchair)?: 3  Need to walk in hospital room?: 3  Climbing 3-5 steps with a railing?: 2  Basic Mobility Total Score: 17       Therapeutic Activities and Exercises:   Gait as described above    Patient left up in chair with all lines intact, call button in reach, nurse notified and wife present..    GOALS:   Multidisciplinary Problems     Physical Therapy Goals        Problem: Physical Therapy Goal    Goal Priority Disciplines Outcome Goal Variances Interventions   Physical Therapy Goal     PT, PT/OT Ongoing (interventions implemented as appropriate)     Description:  Goals to be met by: 19     Patient will increase functional independence with mobility by performin. Supine to sit with supervision.   2. Sit to supine with supervision.   3. Sit<>stand transfer with supervision .   4. OOB to chair for > 1 hour MET 2019  5.  Gait > 150 feet with SBA with /without AD   6.  Ascend/descend 4" curb step with CGA .                       Time Tracking:     PT Received On: 19  PT Start Time: 1420     PT Stop Time: 1436  PT Total Time (min): 16 min overlap with OT    Billable Minutes: Gait Training 16 minutes    Treatment Type: Treatment  PT/PTA: PT     PTA Visit Number: 0     Myesha Renner, PT  2019  "

## 2019-01-10 NOTE — PLAN OF CARE
67 yo M s/p ex-lap, toupet fundoplication, hiatal hernia repair, cholecystectomy, sleeve duodenectomy with end-end anastomosis, liver biopsy, biliary stenting, and intralesional chemo for malignant carcinoid of the duodenum POD#6     Tn visited with pt an updated on plan of care.     Tn to continue to follow for post op needs.       01/10/19 1243   Discharge Reassessment   Assessment Type Discharge Planning Reassessment   Provided patient/caregiver education on the expected discharge date and the discharge plan Yes   Do you have any problems affording any of your prescribed medications? No   Discharge Plan A Home with family   Discharge Plan B Home with family;Home Health   DME Needed Upon Discharge  (tbd)   Patient choice form signed by patient/caregiver N/A   Anticipated Discharge Disposition Home   Can the patient answer the patient profile reliably? Yes, cognitively intact   How does the patient rate their overall health at the present time? Good   Describe the patient's ability to walk at the present time. Major restrictions/daily assistance from another person   How often would a person be available to care for the patient? Whenever needed   Number of comorbid conditions (as recorded on the chart) Five or more   During the past month, has the patient often been bothered by feeling down, depressed or hopeless? No   During the past month, has the patient often been bothered by little interest or pleasure in doing things? No

## 2019-01-10 NOTE — PLAN OF CARE
Problem: Adult Inpatient Plan of Care  Goal: Plan of Care Review  Outcome: Ongoing (interventions implemented as appropriate)  Patient on oxygen with documented flow. Will attempt to wean per protocol.  Bipap on sb. Will continue to monitor.

## 2019-01-10 NOTE — PLAN OF CARE
Problem: SLP Goal  Goal: SLP Goal  Short Term Goals:  1. Pt will participate in ongoing swallow eval to make appropriate diet recs.    Outcome: Ongoing (interventions implemented as appropriate)  Rec: ENT eval to assess voice and laryngeal musculature. Pt with s/s of dysphagia at bedside on water trials alone. Pt with hx of voice disorders since 7/2018. Spoke with Dr. Steiner re: placing ENT consult.

## 2019-01-10 NOTE — TELEPHONE ENCOUNTER
----- Message from Jessie Ruiz sent at 1/10/2019 11:05 AM CST -----  Contact: Lashonda [facility] [phone]  CONSULTS:     Patient: Hoang Santos    : 1950    Clinic#: [id number]    Room number: 263    Referring MD:     Diagnosis: carcinoid    Person calling: Lashonda Oakes [facility] [phone]

## 2019-01-11 LAB
ALBUMIN SERPL BCP-MCNC: 2.6 G/DL
ALP SERPL-CCNC: 93 U/L
ALT SERPL W/O P-5'-P-CCNC: 40 U/L
ANION GAP SERPL CALC-SCNC: 10 MMOL/L
ANION GAP SERPL CALC-SCNC: 10 MMOL/L
ANISOCYTOSIS BLD QL SMEAR: SLIGHT
AST SERPL-CCNC: 46 U/L
BACTERIA #/AREA URNS HPF: ABNORMAL /HPF
BASOPHILS NFR BLD: 0 %
BILIRUB SERPL-MCNC: 1.7 MG/DL
BILIRUB UR QL STRIP: ABNORMAL
BUN SERPL-MCNC: 25 MG/DL
BUN SERPL-MCNC: 26 MG/DL
CALCIUM SERPL-MCNC: 8.7 MG/DL
CALCIUM SERPL-MCNC: 8.9 MG/DL
CHLORIDE SERPL-SCNC: 97 MMOL/L
CHLORIDE SERPL-SCNC: 97 MMOL/L
CLARITY UR: CLEAR
CO2 SERPL-SCNC: 32 MMOL/L
CO2 SERPL-SCNC: 34 MMOL/L
COLOR UR: YELLOW
CREAT SERPL-MCNC: 0.9 MG/DL
CREAT SERPL-MCNC: 1 MG/DL
DIFFERENTIAL METHOD: ABNORMAL
EOSINOPHIL NFR BLD: 0 %
ERYTHROCYTE [DISTWIDTH] IN BLOOD BY AUTOMATED COUNT: 14.6 %
EST. GFR  (AFRICAN AMERICAN): >60 ML/MIN/1.73 M^2
EST. GFR  (AFRICAN AMERICAN): >60 ML/MIN/1.73 M^2
EST. GFR  (NON AFRICAN AMERICAN): >60 ML/MIN/1.73 M^2
EST. GFR  (NON AFRICAN AMERICAN): >60 ML/MIN/1.73 M^2
GLUCOSE SERPL-MCNC: 114 MG/DL
GLUCOSE SERPL-MCNC: 130 MG/DL
GLUCOSE UR QL STRIP: NEGATIVE
GRAM STN SPEC: NORMAL
HCT VFR BLD AUTO: 26.3 %
HGB BLD-MCNC: 8.2 G/DL
HGB UR QL STRIP: ABNORMAL
HYALINE CASTS #/AREA URNS LPF: 0 /LPF
HYPOCHROMIA BLD QL SMEAR: ABNORMAL
KETONES UR QL STRIP: NEGATIVE
LEUKOCYTE ESTERASE UR QL STRIP: ABNORMAL
LYMPHOCYTES NFR BLD: 8 %
MAGNESIUM SERPL-MCNC: 2.5 MG/DL
MCH RBC QN AUTO: 29.2 PG
MCHC RBC AUTO-ENTMCNC: 31.2 G/DL
MCV RBC AUTO: 94 FL
MICROSCOPIC COMMENT: ABNORMAL
MONOCYTES NFR BLD: 2 %
NEUTROPHILS NFR BLD: 90 %
NITRITE UR QL STRIP: POSITIVE
PH UR STRIP: 6 [PH] (ref 5–8)
PHOSPHATE SERPL-MCNC: 2.8 MG/DL
PLATELET # BLD AUTO: 283 K/UL
PLATELET BLD QL SMEAR: ABNORMAL
PMV BLD AUTO: 9.8 FL
POIKILOCYTOSIS BLD QL SMEAR: SLIGHT
POTASSIUM SERPL-SCNC: 3.2 MMOL/L
POTASSIUM SERPL-SCNC: 3.3 MMOL/L
PROCALCITONIN SERPL IA-MCNC: 0.63 NG/ML
PROT SERPL-MCNC: 6.6 G/DL
PROT UR QL STRIP: ABNORMAL
RBC # BLD AUTO: 2.81 M/UL
RBC #/AREA URNS HPF: 10 /HPF (ref 0–4)
SODIUM SERPL-SCNC: 139 MMOL/L
SODIUM SERPL-SCNC: 141 MMOL/L
SP GR UR STRIP: 1.01 (ref 1–1.03)
STOMATOCYTES BLD QL SMEAR: PRESENT
URN SPEC COLLECT METH UR: ABNORMAL
UROBILINOGEN UR STRIP-ACNC: ABNORMAL EU/DL
WBC # BLD AUTO: 27.27 K/UL
WBC #/AREA URNS HPF: 75 /HPF (ref 0–5)

## 2019-01-11 PROCEDURE — 94761 N-INVAS EAR/PLS OXIMETRY MLT: CPT

## 2019-01-11 PROCEDURE — 27000646 HC AEROBIKA DEVICE

## 2019-01-11 PROCEDURE — 83735 ASSAY OF MAGNESIUM: CPT

## 2019-01-11 PROCEDURE — 87206 SMEAR FLUORESCENT/ACID STAI: CPT

## 2019-01-11 PROCEDURE — 87205 SMEAR GRAM STAIN: CPT

## 2019-01-11 PROCEDURE — 63600175 PHARM REV CODE 636 W HCPCS: Performed by: STUDENT IN AN ORGANIZED HEALTH CARE EDUCATION/TRAINING PROGRAM

## 2019-01-11 PROCEDURE — 87086 URINE CULTURE/COLONY COUNT: CPT

## 2019-01-11 PROCEDURE — 87075 CULTR BACTERIA EXCEPT BLOOD: CPT | Mod: 59

## 2019-01-11 PROCEDURE — 97530 THERAPEUTIC ACTIVITIES: CPT

## 2019-01-11 PROCEDURE — 25000003 PHARM REV CODE 250: Performed by: STUDENT IN AN ORGANIZED HEALTH CARE EDUCATION/TRAINING PROGRAM

## 2019-01-11 PROCEDURE — 80053 COMPREHEN METABOLIC PANEL: CPT

## 2019-01-11 PROCEDURE — 82150 ASSAY OF AMYLASE: CPT

## 2019-01-11 PROCEDURE — 27000221 HC OXYGEN, UP TO 24 HOURS

## 2019-01-11 PROCEDURE — 87102 FUNGUS ISOLATION CULTURE: CPT

## 2019-01-11 PROCEDURE — 99232 PR SUBSEQUENT HOSPITAL CARE,LEVL II: ICD-10-PCS | Mod: ,,, | Performed by: OTOLARYNGOLOGY

## 2019-01-11 PROCEDURE — 87015 SPECIMEN INFECT AGNT CONCNTJ: CPT

## 2019-01-11 PROCEDURE — B4185 PARENTERAL SOL 10 GM LIPIDS: HCPCS | Performed by: SURGERY

## 2019-01-11 PROCEDURE — 11000001 HC ACUTE MED/SURG PRIVATE ROOM

## 2019-01-11 PROCEDURE — 80048 BASIC METABOLIC PNL TOTAL CA: CPT

## 2019-01-11 PROCEDURE — 99900035 HC TECH TIME PER 15 MIN (STAT)

## 2019-01-11 PROCEDURE — 25000003 PHARM REV CODE 250: Performed by: SURGERY

## 2019-01-11 PROCEDURE — 87116 MYCOBACTERIA CULTURE: CPT

## 2019-01-11 PROCEDURE — 63600175 PHARM REV CODE 636 W HCPCS: Performed by: RADIOLOGY

## 2019-01-11 PROCEDURE — 97110 THERAPEUTIC EXERCISES: CPT

## 2019-01-11 PROCEDURE — 84100 ASSAY OF PHOSPHORUS: CPT

## 2019-01-11 PROCEDURE — 97535 SELF CARE MNGMENT TRAINING: CPT

## 2019-01-11 PROCEDURE — 25000003 PHARM REV CODE 250: Performed by: RADIOLOGY

## 2019-01-11 PROCEDURE — 25500020 PHARM REV CODE 255: Performed by: SURGERY

## 2019-01-11 PROCEDURE — 94664 DEMO&/EVAL PT USE INHALER: CPT

## 2019-01-11 PROCEDURE — 87186 SC STD MICRODIL/AGAR DIL: CPT

## 2019-01-11 PROCEDURE — 63600175 PHARM REV CODE 636 W HCPCS: Performed by: SURGERY

## 2019-01-11 PROCEDURE — 87205 SMEAR GRAM STAIN: CPT | Mod: 59

## 2019-01-11 PROCEDURE — 87070 CULTURE OTHR SPECIMN AEROBIC: CPT | Mod: 59

## 2019-01-11 PROCEDURE — 92611 MOTION FLUOROSCOPY/SWALLOW: CPT

## 2019-01-11 PROCEDURE — 87077 CULTURE AEROBIC IDENTIFY: CPT

## 2019-01-11 PROCEDURE — 85007 BL SMEAR W/DIFF WBC COUNT: CPT

## 2019-01-11 PROCEDURE — 85027 COMPLETE CBC AUTOMATED: CPT

## 2019-01-11 PROCEDURE — 87075 CULTR BACTERIA EXCEPT BLOOD: CPT

## 2019-01-11 PROCEDURE — 87070 CULTURE OTHR SPECIMN AEROBIC: CPT

## 2019-01-11 PROCEDURE — C9113 INJ PANTOPRAZOLE SODIUM, VIA: HCPCS | Performed by: SURGERY

## 2019-01-11 PROCEDURE — 99232 SBSQ HOSP IP/OBS MODERATE 35: CPT | Mod: ,,, | Performed by: OTOLARYNGOLOGY

## 2019-01-11 PROCEDURE — 87088 URINE BACTERIA CULTURE: CPT

## 2019-01-11 PROCEDURE — 84145 PROCALCITONIN (PCT): CPT

## 2019-01-11 RX ORDER — LIDOCAINE HYDROCHLORIDE 5 MG/ML
INJECTION, SOLUTION INFILTRATION; PERINEURAL CODE/TRAUMA/SEDATION MEDICATION
Status: COMPLETED | OUTPATIENT
Start: 2019-01-11 | End: 2019-01-11

## 2019-01-11 RX ORDER — MIDAZOLAM HYDROCHLORIDE 1 MG/ML
INJECTION INTRAMUSCULAR; INTRAVENOUS CODE/TRAUMA/SEDATION MEDICATION
Status: COMPLETED | OUTPATIENT
Start: 2019-01-11 | End: 2019-01-11

## 2019-01-11 RX ORDER — POTASSIUM CHLORIDE 7.45 MG/ML
10 INJECTION INTRAVENOUS
Status: COMPLETED | OUTPATIENT
Start: 2019-01-11 | End: 2019-01-11

## 2019-01-11 RX ORDER — FENTANYL CITRATE 50 UG/ML
INJECTION, SOLUTION INTRAMUSCULAR; INTRAVENOUS CODE/TRAUMA/SEDATION MEDICATION
Status: COMPLETED | OUTPATIENT
Start: 2019-01-11 | End: 2019-01-11

## 2019-01-11 RX ADMIN — ASCORBIC ACID, VITAMIN A PALMITATE, CHOLECALCIFEROL, THIAMINE HYDROCHLORIDE, RIBOFLAVIN-5 PHOSPHATE SODIUM, PYRIDOXINE HYDROCHLORIDE, NIACINAMIDE, DEXPANTHENOL, ALPHA-TOCOPHEROL ACETATE, VITAMIN K1, FOLIC ACID, BIOTIN, CYANOCOBALAMIN: 200; 3300; 200; 6; 3.6; 6; 40; 15; 10; 150; 600; 60; 5 INJECTION, SOLUTION INTRAVENOUS at 08:01

## 2019-01-11 RX ADMIN — PIPERACILLIN AND TAZOBACTAM 4.5 G: 4; .5 INJECTION, POWDER, LYOPHILIZED, FOR SOLUTION INTRAVENOUS; PARENTERAL at 05:01

## 2019-01-11 RX ADMIN — VANCOMYCIN HYDROCHLORIDE 1250 MG: 10 INJECTION, POWDER, LYOPHILIZED, FOR SOLUTION INTRAVENOUS at 11:01

## 2019-01-11 RX ADMIN — POTASSIUM CHLORIDE 10 MEQ: 7.46 INJECTION, SOLUTION INTRAVENOUS at 09:01

## 2019-01-11 RX ADMIN — MIDAZOLAM HYDROCHLORIDE 0.5 MG: 1 INJECTION, SOLUTION INTRAMUSCULAR; INTRAVENOUS at 04:01

## 2019-01-11 RX ADMIN — IOHEXOL 100 ML: 350 INJECTION, SOLUTION INTRAVENOUS at 03:01

## 2019-01-11 RX ADMIN — PANTOPRAZOLE SODIUM 40 MG: 40 INJECTION, POWDER, FOR SOLUTION INTRAVENOUS at 09:01

## 2019-01-11 RX ADMIN — LIDOCAINE HYDROCHLORIDE 5 ML: 5 INJECTION, SOLUTION INFILTRATION; PERINEURAL at 04:01

## 2019-01-11 RX ADMIN — FENTANYL CITRATE 25 MCG: 50 INJECTION, SOLUTION INTRAMUSCULAR; INTRAVENOUS at 04:01

## 2019-01-11 RX ADMIN — POTASSIUM CHLORIDE 10 MEQ: 7.46 INJECTION, SOLUTION INTRAVENOUS at 11:01

## 2019-01-11 RX ADMIN — CYANOCOBALAMIN 1000 MCG: 1000 INJECTION, SOLUTION INTRAMUSCULAR; SUBCUTANEOUS at 09:01

## 2019-01-11 RX ADMIN — SOYBEAN OIL 250 ML: 20 INJECTION, SOLUTION INTRAVENOUS at 10:01

## 2019-01-11 RX ADMIN — IRON SUCROSE 100 MG: 20 INJECTION, SOLUTION INTRAVENOUS at 09:01

## 2019-01-11 RX ADMIN — PIPERACILLIN AND TAZOBACTAM 4.5 G: 4; .5 INJECTION, POWDER, LYOPHILIZED, FOR SOLUTION INTRAVENOUS; PARENTERAL at 09:01

## 2019-01-11 RX ADMIN — PIPERACILLIN AND TAZOBACTAM 4.5 G: 4; .5 INJECTION, POWDER, LYOPHILIZED, FOR SOLUTION INTRAVENOUS; PARENTERAL at 01:01

## 2019-01-11 NOTE — PLAN OF CARE
Problem: Adult Inpatient Plan of Care  Goal: Plan of Care Review  Outcome: Ongoing (interventions implemented as appropriate)  Pt is AAOx3. No complaints of nausea, vomiting, or diarrhea. No complaints of pain. Pt complains of sore throat and congestion. TPN @50 maintained. On 2L NC 02. NPO. ML incision is intact. JAY from the r side and per order not bulb suctioned. Safety precautions maintained. Tolerated all medications well.

## 2019-01-11 NOTE — PLAN OF CARE
Problem: Adult Inpatient Plan of Care  Goal: Plan of Care Review  Outcome: Ongoing (interventions implemented as appropriate)  Cont to monitor resp needs

## 2019-01-11 NOTE — PROCEDURES
Radiology Post-Procedure Note     Pre Op Diagnosis: RIGHTretoperitoneal collection    Post Op Diagnosis: same    Procedure:CT guided drainage    Procedure performed by: Amari Lockwood MD    Written Informed Consent Obtained: Yes    Specimen Removed: YES 25 cc dark bloody fluid    Estimated Blood Loss: Minimal    Findings: CT was used for localization of abnormal fluid collection. A needle was inserted into the fluid collection and dark bloody fluid was aspirated.  A wire was inserted into the collection and the tract was dilated.  A 8.0 Somali all-purpose drainage catheter was inserted and a pigtail loop of the distal end was formed.  The catheter was sutured into place and approximately  25 mL fluid was removed.     A specimen was sent to the lab for further analysis and culture.    The patient tolerated procedure well and there were no complications. Please see procedure report under Imaging for further details.    Amari Lockwood MD  Department of Radiology  Pager: [phone]

## 2019-01-11 NOTE — PROGRESS NOTES
Cued into patient's room.  Permission received per patient's family member to turn camera to view patient.  Introduced as VN for night shift that will be working with floor nurse and nursing assistant.  Patient asleep with headphones on.  Opportunity given for questions and questions answered.  Instructed to call for assistance.  Will cont to monitor.

## 2019-01-11 NOTE — PT/OT/SLP PROGRESS
Physical Therapy      Patient Name:  Hoang Santos JrScott   MRN:  [id number]  1135  Patient not seen st this time secondary to MBSS testing JUANITA; pt has multiple tests today Will follow-up as time permits.    Myesha Renner, PT

## 2019-01-11 NOTE — SUBJECTIVE & OBJECTIVE
Medications:  Continuous Infusions:   Amino acid 5% - dextrose 15% (CLINIMIX-E) solution with additives (1L  provides 510 kcal/L dextrose, with 50 gm AA, 150 gm CHO, Na 35, K 30, Mg 5, Ca 4.5, Acetate 80, Cl 39, Phos 15) 50 mL/hr at 01/09/19 2228    Amino acid 5% - dextrose 15% (CLINIMIX-E) solution with additives (1L  provides 510 kcal/L dextrose, with 50 gm AA, 150 gm CHO, Na 35, K 30, Mg 5, Ca 4.5, Acetate 80, Cl 39, Phos 15)       Scheduled Meds:   cyanocobalamin  1,000 mcg Subcutaneous Daily    iron sucrose (VENOFER) IVPB  100 mg Intravenous Daily    pantoprazole  40 mg Intravenous Daily    pregabalin  50 mg Oral BID    tamsulosin  0.4 mg Per NG tube Daily     PRN Meds:acetaminophen, albuterol sulfate, dextrose 50%, dextrose 50%, diphenhydrAMINE, glucose, glucose, hydrALAZINE, hydrocodone-apap 7.5-325 MG/15 ML, labetalol, meclizine, naloxone, ondansetron, ondansetron, promethazine (PHENERGAN) IVPB, sodium chloride 0.9%, sodium chloride 0.9%     No current facility-administered medications on file prior to encounter.      Current Outpatient Medications on File Prior to Encounter   Medication Sig    atorvastatin (LIPITOR) 40 MG tablet Take 1 tablet (40 mg total) by mouth once daily.    cholecalciferol, vitamin D3, 50,000 unit capsule Take 1 capsule (50,000 Units total) by mouth once a week.    influenza (FLUZONE HIGH-DOSE) 180 mcg/0.5 mL vaccine Inject 0.5 mLs into the muscle.    VIAGRA 100 mg tablet Take 1 tablet (100 mg total) by mouth once daily. Brand name only medication.       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    Prostatic hyperplasia     Sleep apnea      Past Surgical History:   Procedure Laterality Date    BIOPSY, LIVER  1/4/2019    [facility]     CHOLECYSTECTOMY  1/4/2019    [facility]    DUODENECTOMY N/A 1/4/2019    [facility] at [facility]    EGD (ESOPHAGOGASTRODUODENOSCOPY) N/A 1/4/2019    [facility] at [facility] OR    EGD (ESOPHAGOGASTRODUODENOSCOPY) N/A 9/24/2018    Per[facility] at [facility] (4TH FLR)    FUNDOPLICATION N/A 1/4/2019    Per[facility] at [facility] OR    GASTROJEJUNOSTOMY N/A 1/4/2019    [facility] OR    LYMPHADENECTOMY N/A 1/4/2019    Performed by [facility]    PALATOPLASTY-(UPPP) N/A 2/14/2017    Performed by Bob Araujo III, MD at [facility] OR 2ND FLR    PH MONITORING, ESOPHAGUS, WIRELESS, (OFF REFLUX MEDS) N/A 9/24/2018    Per[facility] at [facility] (4TH FLR)    TONSILLECTOMY      TRANSPROSTATIC TISSUE RETRACTION N/A 6/28/2018    [facility]    ULTRASOUND-ENDOSCOPIC-UPPER N/A 11/5/2018    Performed by Gorge Reyes [facility] at [facility] END[facility]    UVULOPALATOPHARYNGOPLASTY       Family History     Problem Relation (Age of Onset)    COPD Sister    Cancer Sister    Diabetes Mother    Heart disease Mother    Stroke Father        Tobacco Use    Smoking status: Never Smoker    Smokeless tobacco: Never Used   Substance and Sexual Activity    Alcohol use: Yes     Alcohol/week: 1.2 oz     Types: 2 Cans of beer per week    Drug use: No    Sexual activity: Not Currently     Review of Systems   Constitutional: Negative for activity change and unexpected weight change.   HENT: Positive for trouble swallowing. Negative for drooling, sore throat and voice change.    Eyes: Negative for pain and visual disturbance.   Respiratory: Negative for shortness of breath and stridor.    Cardiovascular: Negative for chest pain and leg swelling.   Gastrointestinal: Negative for abdominal pain and nausea.    Endocrine: Negative for cold intolerance and heat intolerance.   Musculoskeletal: Negative for gait problem and joint swelling.   Skin: Negative for color change and wound.   Allergic/Immunologic: Negative for immunocompromised state.   Neurological: Negative for seizures and weakness.   Hematological: Negative for adenopathy. Does not bruise/bleed easily.   Psychiatric/Behavioral: Negative for agitation and confusion.     Objective:     Vital Signs (Most Recent):  Temp: 98.7 °F (37.1 °C) (01/10/19 1927)  Pulse: (!) 130 (01/10/19 1927)  Resp: 18 (01/10/19 1927)  BP: 104/60 (01/10/19 1927)  SpO2: (!) 93 % (01/10/19 1922) Vital Signs (24h Range):  Temp:  [97.7 °F (36.5 °C)-99 °F (37.2 °C)] 98.7 °F (37.1 °C)  Pulse:  [103-130] 130  Resp:  [14-35] 18  SpO2:  [87 %-100 %] 93 %  BP: ()/(57-78) 104/60     Weight: 102.1 kg (225 lb 1.4 oz)  Body mass index is 36.33 kg/m².    Date 01/10/19 0700 - 01/11/19 0659   Shift 3891-6834 2702-6770 9628-9785 24 Hour Total   INTAKE   IV Piggyback  100  100   TPN  516.7  516.7   Shift Total(mL/kg)  616.7(6)  616.7(6)   OUTPUT   Urine(mL/kg/hr) 75(0.1) 150  225   Other  25  25   Shift Total(mL/kg) 75(0.7) 175(1.7)  250(2.4)   Weight (kg) 102.1 102.1 102.1 102.1       Physical Exam     Constitutional: Well appearing / communicating.  NAD.  Eyes: EOM I Bilaterally  Head/Face: Normocephalic.  Negative paranasal sinus pressure/tenderness.  Salivary glands WNL.  House Brackmann I Bilaterally.    Right Ear: External Auditory Canal WNL,TM w/o masses/lesions/perforations.  Auricle WNL.  Left Ear: External Auditory Canal WNL,TM w/o masses/lesions/perforations. Auricle WNL.  Nose: No gross nasal septal deviation. Inferior Turbinates 3+ bilaterally. No septal perforation. No masses/lesions. External nasal skin without masses/lesions.  Oral Cavity: Gingiva/lips WNL.  FOM Soft, no masses palpated. Oral Tongue mobile. Hard Palate WNL.   Oropharynx: BOT WNL. No masses/lesions noted. Tonsillar  fossa/pharyngeal wall without lesions. Posterior oropharynx WNL.  Soft palate without masses. Midline uvula.   Neck/Lymphatic: No LAD I-VI bilaterally.  No thyromegaly.  No masses noted on exam.    Mirror laryngoscopy/nasopharyngoscopy: Active gag reflex.  Unable to perform.    Neuro/Psychiatric: AOx3.  Normal mood and affect.   Cardiovascular: Normal carotid pulses bilaterally, no increasing jugular venous distention noted at cervical region bilaterally.    Respiratory: Normal respiratory effort, no stridor, no retractions noted.      Significant Labs:  CBC:   Recent Labs   Lab 01/10/19  0408   WBC 20.59*   RBC 2.96*   HGB 8.5*   HCT 27.7*      MCV 94   MCH 28.7   MCHC 30.7*     CMP:   Recent Labs   Lab 01/10/19  0408   *   CALCIUM 9.1   ALBUMIN 3.0*   PROT 6.8      K 3.5   CO2 36*   CL 96   BUN 25*   CREATININE 0.9   ALKPHOS 78   ALT 50*   AST 43*   BILITOT 1.4*       Significant Diagnostics:  [place] [facility] [place]  IMPRESSIONS:   This 67 y.o. man appears to present with  1.  Pharyngoesophageal dysphagia characterized by patterns that may be related to one or both of two contributing factors:  Osteophytes at C2-3 and 4-5 and possible reduction in pressure generated during closure of velopharyngeal port based on history of onset of symptoms.  While no deficit in closure pattern of the V-P port could be appreciated in today's study, cannot yet rule out that a weakened closure pattern may be contributing; however, his patterns could likely exist with the effects of the osteophytes alone.  Swallowing dysfunction observed today included incomplete epiglottic inversion (abutted PPW) and laryngeal vestibule closure with resultant reduced ability to clear boluses completely (vallecular residue of liquids and primarily solids, pyriform stasis of barium tablet).  2.  Osteophytes at C2-3 and 4-5 per previous imaging.  3.  History GEMMA; s/p UPPP 2/2017.

## 2019-01-11 NOTE — PT/OT/SLP PROGRESS
"Occupational Therapy   Treatment    Name: Hoang Santos Jr.  MRN: [id number]  Admitting Diagnosis:  Malignant carcinoid tumor of duodenum  7 Days Post-Op    Recommendations:     Discharge Recommendations: (HH OT/PT, in addition to post-acute speech needs (as per SLP))  Discharge Equipment Recommendations:  (TBD)  Barriers to discharge:  None    Assessment:   Patient tolerated activity well, but is "frustrated" by MBSS results this date. Patient is NPO and at increased risk for aspiration. Patient will benefit from continued skilled OT to address functional deficits.     Hoang Santos Jr. is a 68 y.o. male with a medical diagnosis of Malignant carcinoid tumor of duodenum. Performance deficits affecting function are weakness, impaired endurance, impaired self care skills, impaired functional mobilty, gait instability, pain, impaired skin, impaired cardiopulmonary response to activity.     Rehab Prognosis:  Good; patient would benefit from acute skilled OT services to address these deficits and reach maximum level of function.       Plan:     Patient to be seen 5 x/week to address the above listed problems via self-care/home management, therapeutic activities, therapeutic exercises  · Plan of Care Expires: 02/10/19  · Plan of Care Reviewed with: patient, spouse    Subjective     Pain/Comfort:  · Pain Rating 1: (reported a sharp pain under R pec area, but subsided as session progressed)    Objective:     Communicated with: nurseRadha prior to session.  Patient found with central line, oxygen, telemetry upon OT entry to room.    General Precautions: Standard, aspiration, fall, NPO, respiratory   Orthopedic Precautions:N/A   Braces: N/A     Bed Mobility:    · Patient completed Scooting/Bridging with contact guard assistance and with side rail  · Patient completed Supine to Sit with contact guard assistance, with side rail and increased time/effort     Functional Mobility/Transfers:  N/A - patient declined to t/f to " bedside chair    Activities of Daily Living:  · Grooming: set-up      [facility] 6 Click ADL: 13    Treatment & Education:  Patient spoke about frustrations regarding NPO status (failed MBSS). Patient given encouragement and responded well. Bed mob as noted above. S/u for G/H tasks and yanker suction use to manage secretions better. Patient instructed in BUE AROM, 1 x 15 reps: bicep curls, sh flexion, horizontal sh abd/add and gentle twists. Patient reported the sharp rib pain has subsided. Declined to t/f to bedside chair, but did request to sit EOB. All items placed in reach and wife reported she would be present with patient.     Patient left seated EOB with all lines intact, call button in reach, nurse notified and spouse and MD presentEducation:      GOALS:   Multidisciplinary Problems     Occupational Therapy Goals        Problem: Occupational Therapy Goal    Goal Priority Disciplines Outcome Interventions   Occupational Therapy Goal     OT, PT/OT Ongoing (interventions implemented as appropriate)    Description:  Goals to be met by: 1/25/2019    Patient will increase functional independence with ADLs by performing:    UE Dressing with Modified Portis.  LE Dressing with Modified Portis.  Grooming while standing with Modified Portis.  Toileting from toilet with Modified Portis for hygiene and clothing management.   Rolling to Bilateral with Modified Portis.   Supine to sit with Modified Portis.  Step transfer with Modified Portis  Toilet transfer to toilet with Modified Portis.  Upper extremity exercise program x10 reps per handout, with independence.                       Time Tracking:     OT Date of Treatment: 01/11/19  OT Start Time: 1400  OT Stop Time: 1438  OT Total Time (min): 38 min    Billable Minutes:Self Care/Home Management 8  Therapeutic Activity 15  Therapeutic Exercise 15    JENNIFER Calero  1/11/2019

## 2019-01-11 NOTE — PROCEDURES
"Hoang Santos Jr. is a 68 y.o. male patient.    Temp: 98.7 °F (37.1 °C) (01/10/19 1927)  Pulse: (!) 130 (01/10/19 1927)  Resp: 18 (01/10/19 1927)  BP: 104/60 (01/10/19 1927)  SpO2: (!) 93 % (01/10/19 1922)  Weight: 102.1 kg (225 lb 1.4 oz) (01/10/19 0900)  Height: 5' 6" (167.6 cm) (01/10/19 0900)       Procedures     Due to indication in patient's history, presentation or risk factors,  a fiber optic exam was performed.    SEPARATE PROCEDURE NOTE:    ANESTHESIA:  Topical xylocaine with natalie-synephrine      PROCEDURE:  After verbal consent was obtained, the flexible scope was passed through the patient's nasal cavity without difficulty.  The nasopharynx (adenoid pad) and eustachian tube orifices were first visualized and were found to be normal, without masses or irregularity.  The posterior pharyngeal wall and base of tongue were then examined and no mass or irregular tissue was seen.  The scope was then advanced to the larynx, and the epiglottis, valleculae, and piriform sinuses were examined.  The false vocal folds and true vocal folds were then examined.  The arytenoids and the interarytenoid area was examined.  All findings were normal except:posterior pharyngeal wall with submucosal fullness,  omega-shaped epiglottis which rests against posterior pharyngeal wall during respiration, mild bilateral symmetric vocal fold atrophy, and moderate post-cricoid edema. There was significant pooling of secretions in the piriform sinus.        The patient tolerated the procedure well without complications.       Parvin Bravo  1/10/2019  "

## 2019-01-11 NOTE — PLAN OF CARE
Problem: SLP Goal  Goal: SLP Goal  Short Term Goals:  1. Pt will participate in ongoing swallow eval to make appropriate diet recs.    2. Pt will continue NPO with strict aspiration precautions in place.   3. Pt will participate in OMEs to improve laryngeal & pharyngeal strength.  4. Pt will consume PO trials w/ ST as tolerated.  Outcome: Ongoing (interventions implemented as appropriate)  1/11: JOYCE completed this AM. Diet rec: NPO with STRICT aspiration precautions. Suction at the bedside to manage secretions. ST to f/u while inpatient.

## 2019-01-11 NOTE — ASSESSMENT & PLAN NOTE
Dysphagia secondary to cervical osteophytesaffecting the mechanics of the swallow.  Recent NGT placement has caused mucosal edema of the post-cricoid tissues exacerbating his underlying known dysphagia. I would expect his dysphagia to improve to his baseline as the transient edema resolves.  Recommend continuing to work with speech therapy and to perform a modified barium swallow study prior to advancing diet.

## 2019-01-11 NOTE — CONSULTS
[facility]  Otorhinolaryngology-Head & Neck Surgery  Consult Note    Patient Name: Hoang Santos Jr.  MRN: [id number]  Code Status: Full Code  Admission Date: 1/4/2019  Hospital Length of Stay: 6 days  Attending Physician: Madeleine Alvarado MD  Primary Care Provider: Lee Kay MD    Patient information was obtained from patient, spouse/SO and past medical records.     Consults  Subjective:     Reason for Consult: dyspahgia/dysphonia    History of Present Illness: Mr. 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 He is currently POD #6.  I have been asked to evaluate patient for dysphagia and concern for vocal cord dysfunction.  He had an NGT in place and was removed this morning. He was sent yesterday for an upper GI series this am and had difficulty swallowing the oral contrast. Speech therapy was consulted and noted he was unable to tolerate small sips of water without coughing.  He does have a history of baseline dysphagia and dysphonia secondary to prominent cervical osteophyte. He has been under the care of Dr. Jason Casas at [facility] for this.  He has participated in speech therapy as well.  He denies change in voice from baseline. He denies sore throat.     Medications:  Continuous Infusions:   Amino acid 5% - dextrose 15% (CLINIMIX-E) solution with additives (1L  provides 510 kcal/L dextrose, with 50 gm AA, 150 gm CHO, Na 35, K 30, Mg 5, Ca 4.5, Acetate 80, Cl 39, Phos 15) 50 mL/hr at 01/09/19 2228    Amino acid 5% - dextrose 15% (CLINIMIX-E) solution with additives (1L  provides 510 kcal/L dextrose, with 50 gm AA, 150 gm CHO, Na 35, K 30, Mg 5, Ca 4.5, Acetate 80, Cl 39, Phos 15)       Scheduled Meds:   cyanocobalamin  1,000 mcg Subcutaneous Daily    iron sucrose (VENOFER) IVPB  100 mg Intravenous Daily    pantoprazole  40 mg Intravenous Daily    pregabalin  50  mg Oral BID    tamsulosin  0.4 mg Per NG tube Daily     PRN Meds:acetaminophen, albuterol sulfate, dextrose 50%, dextrose 50%, diphenhydrAMINE, glucose, glucose, hydrALAZINE, hydrocodone-apap 7.5-325 MG/15 ML, labetalol, meclizine, naloxone, ondansetron, ondansetron, promethazine (PHENERGAN) IVPB, sodium chloride 0.9%, sodium chloride 0.9%     No current facility-administered medications on file prior to encounter.      Current Outpatient Medications on File Prior to Encounter   Medication Sig    atorvastatin (LIPITOR) 40 MG tablet Take 1 tablet (40 mg total) by mouth once daily.    cholecalciferol, vitamin D3, 50,000 unit capsule Take 1 capsule (50,000 Units total) by mouth once a week.    influenza (FLUZONE HIGH-DOSE) 180 mcg/0.5 mL vaccine Inject 0.5 mLs into the muscle.    VIAGRA 100 mg tablet Take 1 tablet (100 mg total) by mouth once daily. Brand name only medication.       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    Prostatic hyperplasia     Sleep apnea      Past Surgical History:   Procedure Laterality Date    BIOPSY, LIVER  1/4/2019    [facility]    CHOLECYSTECTOMY  1/4/2019    [facility] OR    DUODENECTOMY N/A 1/4/2019    [facility]    EGD (ESOPHAGOGASTRODUODENOSCOPY) N/A 1/4/2019    [facility] OR    EGD (ESOPHAGOGASTRODUODENOSCOPY) N/A 9/24/2018    Performed by [facility]ez [facility] at [facility]O (4TH FLR)    FUNDOPLICATION N/A 1/4/2019    [facility] OR    GASTROJEJUNOSTOMY N/A 1/4/2019    [facility] OR    LYMPHADENECTOMY N/A 1/4/2019    Performed by [facility]ado MD  at [facility] OR    PALATOPLASTY-(UPPP) N/A 2/14/2017    Performed by Oark JAN Araujo III, MD at [facility] OR 2ND FLR    PH MONITORING, ESOPHAGUS, WIRELESS, (OFF REFLUX MEDS) N/A 9/24/2018    Performed by [facility]ez [facility] at [facility] ENDO (4TH FLR)    TONSILLECTOMY      TRANSPROSTATIC TISSUE RETRACTION N/A 6/28/2018    [facility]    ULTRASOUND-ENDOSCOPIC-UPPER N/A 11/5/2018    Performed by Gorge Reyes MD at [facility] ENDO    UVULOPALATOPHARYNGOPLASTY       Family History     Problem Relation (Age of Onset)    COPD Sister    Cancer Sister    Diabetes Mother    Heart disease Mother    Stroke Father        Tobacco Use    Smoking status: Never Smoker    Smokeless tobacco: Never Used   Substance and Sexual Activity    Alcohol use: Yes     Alcohol/week: 1.2 oz     Types: 2 Cans of beer per week    Drug use: No    Sexual activity: Not Currently     Review of Systems   Constitutional: Negative for activity change and unexpected weight change.   HENT: Positive for trouble swallowing. Negative for drooling, sore throat and voice change.    Eyes: Negative for pain and visual disturbance.   Respiratory: Negative for shortness of breath and stridor.    Cardiovascular: Negative for chest pain and leg swelling.   Gastrointestinal: Negative for abdominal pain and nausea.   Endocrine: Negative for cold intolerance and heat intolerance.   Musculoskeletal: Negative for gait problem and joint swelling.   Skin: Negative for color change and wound.   Allergic/Immunologic: Negative for immunocompromised state.   Neurological: Negative for seizures and weakness.   Hematological: Negative for adenopathy. Does not bruise/bleed easily.   Psychiatric/Behavioral: Negative for agitation and confusion.     Objective:     Vital Signs (Most Recent):  Temp: 98.7 °F (37.1 °C) (01/10/19 1927)  Pulse: (!) 130 (01/10/19 1927)  Resp: 18 (01/10/19 1927)  BP: 104/60 (01/10/19 1927)  SpO2: (!) 93 % (01/10/19 1922) Vital  Signs (24h Range):  Temp:  [97.7 °F (36.5 °C)-99 °F (37.2 °C)] 98.7 °F (37.1 °C)  Pulse:  [103-130] 130  Resp:  [14-35] 18  SpO2:  [87 %-100 %] 93 %  BP: ()/(57-78) 104/60     Weight: 102.1 kg (225 lb 1.4 oz)  Body mass index is 36.33 kg/m².    Date 01/10/19 0700 - 01/11/19 0659   Shift 0975-4188 8957-3145 0177-1059 24 Hour Total   INTAKE   IV Piggyback  100  100   TPN  516.7  516.7   Shift Total(mL/kg)  616.7(6)  616.7(6)   OUTPUT   Urine(mL/kg/hr) 75(0.1) 150  225   Other  25  25   Shift Total(mL/kg) 75(0.7) 175(1.7)  250(2.4)   Weight (kg) 102.1 102.1 102.1 102.1       Physical Exam     Constitutional: Well appearing / communicating.  NAD.  Eyes: EOM I Bilaterally  Head/Face: Normocephalic.  Negative paranasal sinus pressure/tenderness.  Salivary glands WNL.  House Brackmann I Bilaterally.    Right Ear: External Auditory Canal WNL,TM w/o masses/lesions/perforations.  Auricle WNL.  Left Ear: External Auditory Canal WNL,TM w/o masses/lesions/perforations. Auricle WNL.  Nose: No gross nasal septal deviation. Inferior Turbinates 3+ bilaterally. No septal perforation. No masses/lesions. External nasal skin without masses/lesions.  Oral Cavity: Gingiva/lips WNL.  FOM Soft, no masses palpated. Oral Tongue mobile. Hard Palate WNL.   Oropharynx: BOT WNL. No masses/lesions noted. Tonsillar fossa/pharyngeal wall without lesions. Posterior oropharynx WNL.  Soft palate without masses. Midline uvula.   Neck/Lymphatic: No LAD I-VI bilaterally.  No thyromegaly.  No masses noted on exam.    Mirror laryngoscopy/nasopharyngoscopy: Active gag reflex.  Unable to perform.    Neuro/Psychiatric: AOx3.  Normal mood and affect.   Cardiovascular: Normal carotid pulses bilaterally, no increasing jugular venous distention noted at cervical region bilaterally.    Respiratory: Normal respiratory effort, no stridor, no retractions noted.      Significant Labs:  CBC:   Recent Labs   Lab 01/10/19  0408   WBC 20.59*   RBC 2.96*   HGB 8.5*    HCT 27.7*      MCV 94   MCH 28.7   MCHC 30.7*     CMP:   Recent Labs   Lab 01/10/19  0408   *   CALCIUM 9.1   ALBUMIN 3.0*   PROT 6.8      K 3.5   CO2 36*   CL 96   BUN 25*   CREATININE 0.9   ALKPHOS 78   ALT 50*   AST 43*   BILITOT 1.4*       Significant Diagnostics:  Last MBS completed at [facility] in 7/2018:  IMPRESSIONS:   This 67 y.o. man appears to present with  1.  Pharyngoesophageal dysphagia characterized by patterns that may be related to one or both of two contributing factors:  Osteophytes at C2-3 and 4-5 and possible reduction in pressure generated during closure of velopharyngeal port based on history of onset of symptoms.  While no deficit in closure pattern of the V-P port could be appreciated in today's study, cannot yet rule out that a weakened closure pattern may be contributing; however, his patterns could likely exist with the effects of the osteophytes alone.  Swallowing dysfunction observed today included incomplete epiglottic inversion (abutted PPW) and laryngeal vestibule closure with resultant reduced ability to clear boluses completely (vallecular residue of liquids and primarily solids, pyriform stasis of barium tablet).  2.  Osteophytes at C2-3 and 4-5 per previous imaging.  3.  History GEMMA; s/p UPPP 2/2017.           Assessment/Plan:     Dysphagia    Dysphagia secondary to cervical osteophytesaffecting the mechanics of the swallow.  Recent NGT placement has caused mucosal edema of the post-cricoid tissues exacerbating his underlying known dysphagia. I would expect his dysphagia to improve to his baseline as the transient edema resolves.  Recommend continuing to work with speech therapy and to perform a modified barium swallow study prior to advancing diet.        VTE Risk Mitigation (From admission, onward)        Ordered     IP VTE HIGH RISK PATIENT  Once      01/04/19 1600     Place sequential compression device  Until discontinued      01/04/19 4749          Thank you  for your consult.    Parvin Bravo MD  Otorhinolaryngology-Head & Neck Surgery  [facility]

## 2019-01-11 NOTE — PROCEDURES
Modified Barium Swallow    Patient Name:  Hoang Santos Jr.   MRN:  [id number]      Recommendations:     Recommendations:                General Recommendations:  Dysphagia therapy  Diet recommendations:  NPO, NPO   Aspiration Precautions: Alternate means of nutrition/hydration, Continue alternate means of nutrition, Frequent oral care, HOB to 90 degrees, Monitor for s/s of aspiration and Strict aspiration precautions   General Precautions: Standard, fall, aspiration, NPO  Communication strategies:  Pt able to communicate wants/needs at this time; however, voice is low volume and gurgly, thus eliminate background noise during conversation. Repetitions may be needed.    Referral     Reason for Referral  Patient was referred for a Modified Barium Swallow Study to assess the efficiency of his/her swallow function, rule out aspiration and make recommendations regarding safe dietary consistencies, effective compensatory strategies, and safe eating environment.     Diagnosis: Malignant carcinoid tumor of duodenum       History:    A 69 yo F  has a past medical history of Hyperlipidemia, Primary malignant neuroendocrine neoplasm of duodenum (09/2018), Prostatic hyperplasia, and Sleep apnea presents with weight loss and a primary malignant neuroendocrine neoplasm of duodenum here for duodenectomy, cholecystectomy and liver US with possible Gastrojej today (01/04). Imaging showed     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    Prostatic hyperplasia     Sleep apnea        Objective:     Current Respiratory Status:  Pt on 3L of 02 for MBSS. Currently on 2L in room.    Alert: yes    Cooperative: yes    Follows Directions: yes    Visualization  · Patient was seen in the lateral view  · Cervical hardware/edema observed/osteophyte present    Oral Peripheral Examination  · Oral Musculature: unable to assess due to poor  "participation/comprehension  · Volitional Cough: wet voice  · Volitional Swallow: delayed swallow and reported increased trouble with swallowing saliva  · Voice Prior to PO Intake: wet voice    Consistencies Assessed  · Thin :10 cc thin liquid barium via cup sips  · Nectar thick : 5 cc nectar-thick liquid barium via cup sip  · Honey thick: tsp sip 5 cc honey-thick liquid  · Puree : tsp bite pudding coated in barium paste    Oral Preparation/Oral Phase  · Poor bolus formation and control  · Oral residue present post swallow  · Decreased base of tongue mobility    Pharyngeal Phase   Pharyngeal phase c/b poor pharyngeal constriction, decreased laryngeal elevation/excursion, poor epiglottic inversion, pooling of the bolus, and signifcant pharyngeal regurgitation, resulting in residue throughout pharyngeal cavity including pyriform sinuses and valleculae. Aspiration and penetration noted across all attempts.      Cervical Esophageal Phase  · Retrograde flow significant phayrngeal regurgitation present, resulting in delayed aspiration.    Assessment:     Impressions  Pt presents with mild oral dysphagia and severe pharyngeal dysphagia for all textures. Pt seen seated at 90* for all trials today. O2 in place, IVs hooked up, and Yanker suction present for pt to utilize as necessary. Prior to any PO trials, pt used Yanker suction to remove large amount of mucous like secretions from pharynx. No complaints of pain prior to MBSS; however, pt did state he felt "more swollen since coming down here" in regards to his throat. Pt consumed cup sips thin liquid x2 (5 cc each), which resulted in initial penetration, which led to eventual aspiration. Pt began to cough to clear residue, but required Yanker suction to remove residuals. Nectar-thick liquid trialed x1 (5 cc cup) and Honey-thick liquid trialed x1. Both nectar and honey consistencies resulted in immediate penetration and aspiration of bolus. Pt required 4+ swallows to clear " residue from pharynx. Finally, a tsp bite of pudding in barium paste consumed x1, which led to stasis in the pharyngeal cavity and a surplus of residue. Pt again required multiple swallows and cuing to use effortful swallow to clear bolus. During PO trials, pt often regurgitated bolus from UES, which places him at risk for delayed aspiration. He is to remain NPO with STRICT aspiration precautions in place. CT of abdomen previously scheduled for today; however, pt note deemed appropriate for CT scan as he is unsafe for the thin liquid bolus. ST reviewed recs w/ pt, family, MARA Garcia, and the MD team. ST to continue to follow while inpatient for ongoing dysphagia tx. Upon d/c from hospital, pt will benefit from continued ST services at either outpatient or home health level.      Compared to previous MBSS completed in 07/2018, pt with notable decrease in swallow function and safety. Cervical osteophytes continue to be present which contributes to pt's dysphagia as bolus pathway becomes constricted. Per ENT's note yesterday, pt's laryngeal structures include: posterior pharyngeal wall with submucosal fullness,  omega-shaped epiglottis which rests against posterior pharyngeal wall during respiration, mild bilateral symmetric vocal fold atrophy, and moderate post-cricoid edema. There was significant pooling of secretions in the piriform sinus.     Prognosis: Fair    Barriers:  · Presence of osteophytes & effects of neuroendocrine tumor.    Plan  Pt to be seen 3x wk by ST while inpatient to provide continued education, indirect dysphagia tx, and PO trials when appropriate.     Education  After MBSS completed, extensive education provided to pt and pt's wife in pt room re: results of MBSS, NPO status, dysphagia POC, and need for alternate means of nutrition. Pt and wife verbalized understanding of education; however, decreased insight into effects of osteophytes and neuroendocrine tumor on swallow function. Pt inquired  "about need for oxygen as respiratory therapist entered room. Per respiratory therapist, pt's levels are "ok" to remove 02 intermittently, but should any SOB occur, immediately replace nasal cannula.    Goals:   Multidisciplinary Problems     SLP Goals        Problem: SLP Goal    Goal Priority Disciplines Outcome   SLP Goal     SLP Ongoing (interventions implemented as appropriate)   Description:  Short Term Goals:  1. Pt will participate in ongoing swallow eval to make appropriate diet recs.    2. Pt will continue NPO with strict aspiration precautions in place.   3. Pt will participate in OMEs to improve laryngeal & pharyngeal strength.  4. Pt will consume PO trials w/ ST as tolerated.                    Plan:   · Patient to be seen:  Therapy Frequency: 3 x/week   · Plan of Care expires:  02/07/19  · Plan of Care reviewed with:  patient, spouse        Discharge recommendations:  (pending PT/OT recs)   Barriers to Discharge:  Level of Skilled Assistance Needed pt requires abundance of skilled care at this time.    Time Tracking:   SLP Treatment Date:    1/11/18   Speech Start Time:   10:55; 12:15  Speech Stop Time:      11:15; 12:35  Speech Total Time (min):   40 mins    Yissel Huang CCC-SLP  01/11/2019  "

## 2019-01-11 NOTE — PROGRESS NOTES
Pharmacist Renal Dose Adjustment Note    Hoang Santos Jr. is a 68 y.o. male being treated with the medication Zosyn.  Patient Data:    Vital Signs (Most Recent):  Temp: 98.7 °F (37.1 °C) (01/10/19 1927)  Pulse: (!) 130 (01/10/19 1927)  Resp: 18 (01/10/19 1927)  BP: 104/60 (01/10/19 1927)  SpO2: (!) 93 % (01/10/19 1922)   Vital Signs (72h Range):  Temp:  [97.7 °F (36.5 °C)-100.1 °F (37.8 °C)]   Pulse:  []   Resp:  [14-35]   BP: ()/(54-94)   SpO2:  [87 %-100 %]      Recent Labs   Lab 01/09/19  0442 01/09/19  1134 01/10/19  0408   CREATININE 0.8 0.9 0.9     Serum creatinine: 0.9 mg/dL 01/10/19 0408  Estimated creatinine clearance: 89.1 mL/min    Medication: Zosyn 3.375 gm IV q8h will be changed to Zosyn 4.5 gm IV q8h.    Pharmacist's Name: Danny Taylor  Pharmacist's Extension: [phone]

## 2019-01-11 NOTE — HPI
Mr. 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 He is currently POD #6.  I have been asked to evaluate patient for dysphagia and concern for vocal cord dysfunction.  He had an NGT in place and was removed this morning. He was sent yesterday for an upper GI series this am and had difficulty swallowing the oral contrast. Speech therapy was consulted and noted he was unable to tolerate small sips of water without coughing.  He does have a history of baseline dysphagia and dysphonia secondary to prominent cervical osteophyte. He has been under the care of Dr. Jason Casas at [facility] for this.  He has participated in speech therapy as well.  He denies change in voice from baseline. He denies sore throat.

## 2019-01-11 NOTE — PT/OT/SLP PROGRESS
Occupational Therapy  Visit Attempt    Patient Name:  Hoang Santos Jr.   MRN:  [id number]    Patient not seen today secondary to JUANITA for MBSS. Patient is set to have multiple tests today. Will follow-up as time permits.    JENNIFER Calero  1/11/2019

## 2019-01-11 NOTE — PLAN OF CARE
"Problem: Occupational Therapy Goal  Goal: Occupational Therapy Goal  Goals to be met by: 1/25/2019    Patient will increase functional independence with ADLs by performing:    UE Dressing with Modified [place].  LE Dressing with Modified [place].  Grooming while standing with Modified [place].  Toileting from toilet with Modified [place] for hygiene and clothing management.   Rolling to Bilateral with Modified [place].   Supine to sit with Modified [place].  Step transfer with Modified [place]  Toilet transfer to toilet with Modified [place].  Upper extremity exercise program x10 reps per handout, with independence.      Outcome: Ongoing (interventions implemented as appropriate)  Patient tolerated activity well, but is "frustrated" by MBSS results this date. Patient is NPO and at increased risk for aspiration. Patient will benefit from continued skilled OT to address functional deficits.       "

## 2019-01-11 NOTE — PROGRESS NOTES
"Vancomycin Dosing and Monitoring Pharmacy Protocol    Hoang Santos Jr. is a 68 y.o. male    Height: 5' 6" (1.676 m)   Wt Readings from Last 3 Encounters:   01/11/19 97.5 kg (214 lb 15.2 oz)   01/03/19 104.3 kg (230 lb)   01/03/19 101.9 kg (224 lb 12.1 oz)       Temp Readings from Last 3 Encounters:   01/11/19 98.2 °F (36.8 °C)   01/03/19 97.1 °F (36.2 °C) (Oral)   11/19/18 97.4 °F (36.3 °C) (Oral)      Lab Results   Component Value Date/Time    WBC 27.27 (H) 01/11/2019 04:30 AM    WBC 20.59 (H) 01/10/2019 04:08 AM    WBC 16.79 (H) 01/09/2019 04:42 AM      Lab Results   Component Value Date/Time    CREATININE 1.0 01/11/2019 04:30 AM    CREATININE 0.9 01/10/2019 04:08 AM    CREATININE 0.9 01/09/2019 11:34 AM      Lab Results   Component Value Date/Time    LACTATE 1.0 03/10/2017 02:02 PM    LACTATE 2.9 (H) 03/10/2017 12:10 PM       Serum creatinine: 1 mg/dL 01/11/19 0430  Estimated creatinine clearance: 78.4 mL/min    Antibiotics (From admission, onward)      Start     Stop Route Frequency Ordered    01/11/19 1200  vancomycin (VANCOCIN) 1,250 mg in dextrose 5 % 250 mL IVPB  (Vancomycin IVPB with levels panel)      -- IV Every 12 hours (non-standard times) 01/11/19 1020    01/11/19 0930  piperacillin-tazobactam 4.5 g in dextrose 5 % 100 mL IVPB (ready to mix system)      -- IV Every 8 hours (non-standard times) 01/11/19 0148          Antifungals (From admission, onward)      None            Microbiology Results (last 7 days)       Procedure Component Value Units Date/Time    Aerobic culture [[id number]] Collected:  01/11/19 1013    Order Status:  Sent Specimen:  Body Fluid from Abdomen Updated:  01/11/19 1013    Culture, Anaerobe [[id number]] Collected:  01/11/19 1013    Order Status:  Sent Specimen:  Body Fluid from Abdomen Updated:  01/11/19 1013    Gram stain [[id number]] Collected:  01/11/19 1013    Order Status:  Sent Specimen:  Body Fluid from Abdomen Updated:  01/11/19 1013    Gram stain [[id number]]     Order " Status:  No result Specimen:  Urine, Catheterized     Blood culture [[id number]] Collected:  01/10/19 1737    Order Status:  Completed Specimen:  Blood Updated:  19     Blood Culture, Routine No Growth to date    Blood culture [[id number]] Collected:  01/10/19 1737    Order Status:  Completed Specimen:  Blood Updated:  19     Blood Culture, Routine No Growth to date    Urine culture [[id number]] Collected:  01/10/19 2334    Order Status:  No result Specimen:  Urine Updated:  19            Indication/Target trough:   Pneumonia (Target trough: 15-20mcg/ml) 10-15 mcg/dl since on zosyn    Hemodialysis:   N/A    Dosing Weight:   Wt Readings from Last 1 Encounters:   19 97.5 kg (214 lb 15.2 oz)       Last Vancomycin dose: 2500 mg   Date/Time given: 1/10 9470          Vancomycin level:  No results for input(s): VANCOMYCIN-TROUGH in the last 72 hours.  No results for input(s): VANCOMYCIN, RANDOM in the last 72 hours.    Per Protocol Initial/Adjustments Dosin. Initial/Adjustment Dose: DECREASE Vancomycin will be adjusted from 1500mg q12hr to 1250mg q12hr  2. Vancomycin Trough Level will be drawn on  1130date/time     Pharmacy will continue to follow.    Please contact if you have any further questions. Thank you.    Jesus Esparza, PharmD  [phone]

## 2019-01-11 NOTE — PROGRESS NOTES
After abscess drainage pt c/o chest pain.  Pt brought back to pre/post area and hooked up to monitor.  Pt's /59,  with sinus tach rhythm, and O2 sats 97% on 3L via NC.  Pt then proceeded to spit up mucus and belch.  Pt stated after this his pain was much better and rated it a 2. Dr Lockwood at bedside and pt stated again to MD pain was much better.  Dr Lockwood reinforced to pt if he did begin to experience any chest pain to let RN know.  Pt then transported to floor by RN where bedside report would also be given to floor RN.

## 2019-01-11 NOTE — PROGRESS NOTES
"Vancomycin Dosing and Monitoring Pharmacy Protocol    Hoang Santos Jr. is a 68 y.o. male    Height: 5' 6" (1.676 m)   Wt Readings from Last 3 Encounters:   01/10/19 102.1 kg (225 lb 1.4 oz)   01/03/19 104.3 kg (230 lb)   01/03/19 101.9 kg (224 lb 12.1 oz)       Temp Readings from Last 3 Encounters:   01/10/19 98.7 °F (37.1 °C) (Oral)   01/03/19 97.1 °F (36.2 °C) (Oral)   11/19/18 97.4 °F (36.3 °C) (Oral)      Lab Results   Component Value Date/Time    WBC 20.59 (H) 01/10/2019 04:08 AM    WBC 16.79 (H) 01/09/2019 04:42 AM    WBC 15.40 (H) 01/08/2019 03:58 AM      Lab Results   Component Value Date/Time    CREATININE 0.9 01/10/2019 04:08 AM    CREATININE 0.9 01/09/2019 11:34 AM    CREATININE 0.8 01/09/2019 04:42 AM      Lab Results   Component Value Date/Time    LACTATE 1.0 03/10/2017 02:02 PM    LACTATE 2.9 (H) 03/10/2017 12:10 PM       Serum creatinine: 0.9 mg/dL 01/10/19 0408  Estimated creatinine clearance: 89.1 mL/min    Antibiotics (From admission, onward)      Start     Stop Route Frequency Ordered    01/11/19 1200  vancomycin (VANCOCIN) 1,500 mg in dextrose 5 % 250 mL IVPB  (Vancomycin IVPB with levels panel)      -- IV Every 12 hours (non-standard times) 01/10/19 2255    01/11/19 0800  piperacillin-tazobactam 4.5 g in dextrose 5 % 100 mL IVPB (ready to mix system)      -- IV Every 8 hours (non-standard times) 01/10/19 2304    01/11/19 0000  piperacillin-tazobactam 4.5 g in dextrose 5 % 100 mL IVPB (ready to mix system)      -- IV Once 01/10/19 2304    01/11/19 0000  vancomycin (VANCOCIN) 2,500 mg in dextrose 5 % 500 mL IVPB      -- IV Once 01/10/19 2308          Antifungals (From admission, onward)      None            Microbiology Results (last 7 days)       Procedure Component Value Units Date/Time    Blood culture [[id number]] Collected:  01/10/19 1737    Order Status:  Sent Specimen:  Blood Updated:  01/10/19 1952    Blood culture [[id number]] Collected:  01/10/19 1737    Order Status:  Sent Specimen:  " Blood Updated:  01/10/19 1952            Indication/Target trough:   HAP (target trough level 10-15 mcg/ml)     Hemodialysis:   N/A    Dosing Weight:   Wt Readings from Last 1 Encounters:   01/10/19 102.1 kg (225 lb 1.4 oz)       Last Vancomycin dose: N/A   Date/Time given: N/A         Vancomycin level:  No results for input(s): VANCOMYCIN-TROUGH in the last 72 hours.  No results for input(s): VANCOMYCIN, RANDOM in the last 72 hours.    Per Protocol Initial/Adjustments Dosin. Initial/Adjustment Dose: Vancomycin dosage adjusted from 1500 mg q12h to 2500 mg X 1 dose, then 1500 mg q12h.   2. Vancomycin Trough Level will be drawn on 19 at 1130 date/time     Pharmacy will continue to follow.    Please contact if you have any further questions. Thank you.    Danny Taylor, PharmD  [phone]

## 2019-01-11 NOTE — PROGRESS NOTES
[facility] General Surgery  Neuroendocrine Tumor Service  Progress Note     Admission Date: 1/4/2019  Hospital Length of Stay: 7  Principal Problem: Malignant carcinoid tumor of duodenum     Subjective:      Interval History:   NAEO  Denies n/v  Pain well controlled  Ambulating  +flatus  Patient had elevated wbc and UA pos for UTI and started on abx yesterday with cultures pending. Speech today     Scheduled Meds:   cyanocobalamin  1,000 mcg Subcutaneous Daily    iron sucrose (VENOFER) IVPB  100 mg Intravenous Daily    pantoprazole  40 mg Intravenous Daily    piperacillin-tazobactam (ZOSYN) IVPB  4.5 g Intravenous Q8H    potassium chloride in water  10 mEq Intravenous Q1H    pregabalin  50 mg Oral BID    tamsulosin  0.4 mg Per NG tube Daily    vancomycin (VANCOCIN) IVPB  15 mg/kg Intravenous Q12H     Continuous Infusions:   Amino acid 5% - dextrose 15% (CLINIMIX-E) solution with additives (1L  provides 510 kcal/L dextrose, with 50 gm AA, 150 gm CHO, Na 35, K 30, Mg 5, Ca 4.5, Acetate 80, Cl 39, Phos 15) 50 mL/hr at 01/10/19 2109     PRN Meds:.acetaminophen, albuterol sulfate, dextrose 50%, dextrose 50%, diphenhydrAMINE, glucose, glucose, hydrALAZINE, hydrocodone-apap 7.5-325 MG/15 ML, labetalol, meclizine, naloxone, ondansetron, ondansetron, promethazine (PHENERGAN) IVPB, sodium chloride 0.9%, sodium chloride 0.9%    Objective:      Temp:  [97.6 °F (36.4 °C)-98.7 °F (37.1 °C)] 97.9 °F (36.6 °C)  Pulse:  [106-130] 112  Resp:  [14-29] 20  SpO2:  [87 %-99 %] 94 %  BP: ()/(57-73) 108/63  Weight change: 0 kg (0 lb)    Intake/Output Summary (Last 24 hours) at 1/11/2019 0803  Last data filed at 1/11/2019 0609  Gross per 24 hour   Intake 716.67 ml   Output 655 ml   Net 61.67 ml       Physical Exam:  GEN: awake, alert, NAD  CV: RRR  PULM: CTAB, on 1L NC  ABD: S/ appropriately TTP/ ND, midline c/d/i, JAY with old blood output  EXT: Peripheral pulses present and equal bilaterally        Recent  Labs   Lab 01/11/19  0430      K 3.2*   CO2 32*   CL 97   BUN 26*   CREATININE 1.0   CALCIUM 8.9   PROT 6.6   AST 46*   ALT 40   ALKPHOS 93   BILITOT 1.7*     Recent Labs   Lab 01/11/19  0430   WBC 27.27*   HGB 8.2*   HCT 26.3*          Significant Imaging: I have reviewed all pertinent imaging results/findings within the past 24 hours.     Assessment and Plan:      67 yo M s/p ex-lap, toupet fundoplication, hiatal hernia repair, cholecystectomy, sleeve duodenectomy with end-end anastomosis, liver biopsy, biliary stenting, and intralesional chemo for malignant carcinoid of the duodenum now with UTI and possible PNA     NPO for now   SLP eval this morning  ENT consulted and rec repeating speech today   If cleared by speech then get CT ABd/pelvis with oral contrast  If no leak, CLD  NPO/TPN for now  Ambulation/IS/PT/OT  UTI pos leuk and nitrite  Vanc and zosyn started  F/u cutlures  CXR possible consolidation repeating cxr     Jose Lance MD  Feel free to secure chat me (Ctrl+Alt+5) for any questions   01/11/2019

## 2019-01-11 NOTE — NURSING
VN cued into pt's room for introduction. VN informed pt that VN would be working along side bedside nurse and PCT throughout shift. Level of present pain assessed. At present no distress noted. Discussed with patient High fall risk protocol and interventions that have been initiated and cont be in place for safety. Patient verbalized clear understanding and cooperation using teach back method. Bed alarm presently activated and in use. Will cont to be available to patient and intervene prn.

## 2019-01-11 NOTE — CONSULTS
Inpatient Radiology Pre-procedure Note    History of Present Illness:  Hoang Santos Jr. is a 68 y.o. male who presents for aspiration vs drainage of retroperitoneal collection.  Admission H&P reviewed.  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    Prostatic hyperplasia     Sleep apnea      Past Surgical History:   Procedure Laterality Date    BIOPSY, LIVER  1/4/2019    Performed by [facility]leine Al[facility]ado [facility] at [facility] OR    CHOLECYSTECTOMY  1/4/2019    Performed by [facility]ado [facility] at [facility] OR    DUODENECTOMY N/A 1/4/2019    Performed by [facility]ado [facility] at [facility] OR    EGD (ESOPHAGOGASTRODUODENOSCOPY) N/A 1/4/2019    Performed by [facility]ado [facility] at [facility] OR    EGD (ESOPHAGOGASTRODUODENOSCOPY) N/A 9/24/2018    Performed by Salo Nuñez MD at [facility] ENDO (4TH FLR)    FUNDOPLICATION N/A 1/4/2019    Performed by [facility]leine [facility]ado MD at [facility] OR    GASTROJEJUNOSTOMY N/A 1/4/2019    Performed by [facility]ado [facility] at [facility] OR    LYMPHADENECTOMY N/A 1/4/2019    Performed by [facility]ado [facility] at [facility] OR    PALATOPLASTY-(UPPP) N/A 2/14/2017    Performed by Bob Araujo III, MD at [facility] OR 2ND FLR    PH MONITORING, ESOPHAGUS, WIRELESS, (OFF REFLUX MEDS) N/A 9/24/2018    Performed by Salo Nuñez MD at [facility] ENDO (4TH FLR)    TONSILLECTOMY      TRANSPROSTATIC TISSUE RETRACTION N/A 6/28/2018    Performed by [facility] at [facility] OR    ULTRASOUND-ENDOSCOPIC-UPPER N/A 11/5/2018    Performed by Gorge Reyes MD at [facility] ENDO    UVULOPALATOPHARYNGOPLASTY         Review of Systems:   As documented in primary team H&P    Home Meds:   Prior to Admission medications    Medication Sig Start Date End Date Taking? Authorizing Provider   atorvastatin (LIPITOR) 40 MG tablet Take 1 tablet (40 mg total) by mouth once daily. 1/16/18 1/16/19 Yes Lee Kay MD   cholecalciferol,  vitamin D3, 50,000 unit capsule Take 1 capsule (50,000 Units total) by mouth once a week. 6/21/17  Yes Lee Kay MD   sildenafil (VIAGRA) 100 MG tablet Take 1 tablet (100 mg total) by mouth daily as needed for Erectile Dysfunction. 11/21/18 11/21/19 Yes Darya Hernandez MD   influenza (FLUZONE HIGH-DOSE) 180 mcg/0.5 mL vaccine Inject 0.5 mLs into the muscle. 10/15/18   Natalie Thomas, [place]   VIAGRA 100 mg tablet Take 1 tablet (100 mg total) by mouth once daily. Brand name only medication. 4/17/18   Lee Kay MD     Scheduled Meds:    cyanocobalamin  1,000 mcg Subcutaneous Daily    fat emulsion 20%  250 mL Intravenous Every Mon, Wed, Fri    iron sucrose (VENOFER) IVPB  100 mg Intravenous Daily    pantoprazole  40 mg Intravenous Daily    piperacillin-tazobactam (ZOSYN) IVPB  4.5 g Intravenous Q8H    pregabalin  50 mg Oral BID    tamsulosin  0.4 mg Per NG tube Daily    vancomycin (VANCOCIN) IVPB  1,250 mg Intravenous Q12H     Continuous Infusions:    Amino acid 5% - dextrose 15% (CLINIMIX-E) solution with additives (1L  provides 510 kcal/L dextrose, with 50 gm AA, 150 gm CHO, Na 35, K 30, Mg 5, Ca 4.5, Acetate 80, Cl 39, Phos 15) 50 mL/hr at 01/10/19 2109    Amino acid 5% - dextrose 15% (CLINIMIX-E) solution with additives (1L  provides 510 kcal/L dextrose, with 50 gm AA, 150 gm CHO, Na 35, K 30, Mg 5, Ca 4.5, Acetate 80, Cl 39, Phos 15)       PRN Meds:acetaminophen, albuterol sulfate, dextrose 50%, dextrose 50%, diphenhydrAMINE, glucose, glucose, hydrALAZINE, hydrocodone-apap 7.5-325 MG/15 ML, labetalol, meclizine, naloxone, ondansetron, ondansetron, promethazine (PHENERGAN) IVPB, sodium chloride 0.9%, sodium chloride 0.9%, varibar honey ba sulfate, varibar nectar Ba Sulfate, varibar pudding Ba Sulfate, varibar thin liquid ba sulfate  Anticoagulants/Antiplatelets: no anticoagulation    Allergies:   Review of patient's allergies indicates:   Allergen Reactions    Epinephrine       Neuroendocrine Tumor patient       Sedation Hx: have not been any systemic reactions    Labs:  No results for input(s): INR in the last 168 hours.    Invalid input(s):  PT,  PTT    Recent Labs   Lab 01/11/19  0430   WBC 27.27*   HGB 8.2*   HCT 26.3*   MCV 94         Recent Labs   Lab 01/11/19  0430 01/11/19  1413   * 114*    141   K 3.2* 3.3*   CL 97 97   CO2 32* 34*   BUN 26* 25*   CREATININE 1.0 0.9   CALCIUM 8.9 8.7   MG 2.5  --    ALT 40  --    AST 46*  --    ALBUMIN 2.6*  --    BILITOT 1.7*  --          Vitals:  Temp: 98 °F (36.7 °C) (01/11/19 1146)  Pulse: 104 (01/11/19 1605)  Resp: 18 (01/11/19 1605)  BP: 122/66 (01/11/19 1605)  SpO2: 100 % (01/11/19 1605)     Physical Exam:  ASA: 3  Mallampati: 2    General: no acute distress  Mental Status: alert and oriented to person, place and time  HEENT: normocephalic, atraumatic  Chest: unlabored breathing  Heart: regular heart rate  Abdomen: nondistended  Extremity: moves all extremities    Plan: CT guided aspiration vs drainage of retroperitoneal collection. Informed consent obtained.  Sedation Plan: Moderate.    Amari Lockwood MD  Department of Radiology  Pager: [phone]

## 2019-01-12 LAB
ALBUMIN SERPL BCP-MCNC: 2.5 G/DL
ALP SERPL-CCNC: 106 U/L
ALT SERPL W/O P-5'-P-CCNC: 40 U/L
AMYLASE, BODY FLUID: 187 U/L
ANION GAP SERPL CALC-SCNC: 11 MMOL/L
ANION GAP SERPL CALC-SCNC: 9 MMOL/L
ANISOCYTOSIS BLD QL SMEAR: SLIGHT
AST SERPL-CCNC: 54 U/L
BASOPHILS # BLD AUTO: ABNORMAL K/UL
BASOPHILS NFR BLD: 0 %
BILIRUB SERPL-MCNC: 1.3 MG/DL
BODY FLUID SOURCE AMYLASE: NORMAL
BUN SERPL-MCNC: 20 MG/DL
BUN SERPL-MCNC: 23 MG/DL
CALCIUM SERPL-MCNC: 8.6 MG/DL
CALCIUM SERPL-MCNC: 8.6 MG/DL
CHLORIDE SERPL-SCNC: 98 MMOL/L
CHLORIDE SERPL-SCNC: 98 MMOL/L
CO2 SERPL-SCNC: 29 MMOL/L
CO2 SERPL-SCNC: 31 MMOL/L
CREAT SERPL-MCNC: 0.9 MG/DL
CREAT SERPL-MCNC: 0.9 MG/DL
DIFFERENTIAL METHOD: ABNORMAL
EOSINOPHIL # BLD AUTO: ABNORMAL K/UL
EOSINOPHIL NFR BLD: 2 %
ERYTHROCYTE [DISTWIDTH] IN BLOOD BY AUTOMATED COUNT: 14.9 %
ERYTHROCYTE [DISTWIDTH] IN BLOOD BY AUTOMATED COUNT: 14.9 %
EST. GFR  (AFRICAN AMERICAN): >60 ML/MIN/1.73 M^2
EST. GFR  (AFRICAN AMERICAN): >60 ML/MIN/1.73 M^2
EST. GFR  (NON AFRICAN AMERICAN): >60 ML/MIN/1.73 M^2
EST. GFR  (NON AFRICAN AMERICAN): >60 ML/MIN/1.73 M^2
GLUCOSE SERPL-MCNC: 109 MG/DL
GLUCOSE SERPL-MCNC: 129 MG/DL
GRAM STN SPEC: NORMAL
GRAM STN SPEC: NORMAL
HCT VFR BLD AUTO: 25.9 %
HCT VFR BLD AUTO: 26.2 %
HGB BLD-MCNC: 8 G/DL
HGB BLD-MCNC: 8.1 G/DL
LYMPHOCYTES # BLD AUTO: ABNORMAL K/UL
LYMPHOCYTES NFR BLD: 4 %
MAGNESIUM SERPL-MCNC: 2.3 MG/DL
MCH RBC QN AUTO: 29 PG
MCH RBC QN AUTO: 29.3 PG
MCHC RBC AUTO-ENTMCNC: 30.9 G/DL
MCHC RBC AUTO-ENTMCNC: 30.9 G/DL
MCV RBC AUTO: 94 FL
MCV RBC AUTO: 95 FL
MONOCYTES # BLD AUTO: ABNORMAL K/UL
MONOCYTES NFR BLD: 3 %
MYELOCYTES NFR BLD MANUAL: 1 %
NEUTROPHILS NFR BLD: 86 %
NEUTS BAND NFR BLD MANUAL: 4 %
NRBC BLD-RTO: 2 /100 WBC
PHOSPHATE SERPL-MCNC: 3 MG/DL
PLATELET # BLD AUTO: 295 K/UL
PLATELET # BLD AUTO: 297 K/UL
PLATELET BLD QL SMEAR: ABNORMAL
PMV BLD AUTO: 9.5 FL
PMV BLD AUTO: 9.6 FL
POIKILOCYTOSIS BLD QL SMEAR: SLIGHT
POTASSIUM SERPL-SCNC: 3.4 MMOL/L
POTASSIUM SERPL-SCNC: 3.6 MMOL/L
PROT SERPL-MCNC: 6.8 G/DL
RBC # BLD AUTO: 2.73 M/UL
RBC # BLD AUTO: 2.79 M/UL
SODIUM SERPL-SCNC: 138 MMOL/L
SODIUM SERPL-SCNC: 138 MMOL/L
VANCOMYCIN TROUGH SERPL-MCNC: 6.4 UG/ML
WBC # BLD AUTO: 21.96 K/UL
WBC # BLD AUTO: 25.82 K/UL

## 2019-01-12 PROCEDURE — 63600175 PHARM REV CODE 636 W HCPCS: Performed by: STUDENT IN AN ORGANIZED HEALTH CARE EDUCATION/TRAINING PROGRAM

## 2019-01-12 PROCEDURE — 82150 ASSAY OF AMYLASE: CPT

## 2019-01-12 PROCEDURE — C9113 INJ PANTOPRAZOLE SODIUM, VIA: HCPCS | Performed by: STUDENT IN AN ORGANIZED HEALTH CARE EDUCATION/TRAINING PROGRAM

## 2019-01-12 PROCEDURE — 83735 ASSAY OF MAGNESIUM: CPT

## 2019-01-12 PROCEDURE — 94761 N-INVAS EAR/PLS OXIMETRY MLT: CPT

## 2019-01-12 PROCEDURE — 84100 ASSAY OF PHOSPHORUS: CPT

## 2019-01-12 PROCEDURE — C9113 INJ PANTOPRAZOLE SODIUM, VIA: HCPCS | Performed by: SURGERY

## 2019-01-12 PROCEDURE — 99900035 HC TECH TIME PER 15 MIN (STAT)

## 2019-01-12 PROCEDURE — 63600175 PHARM REV CODE 636 W HCPCS: Performed by: SURGERY

## 2019-01-12 PROCEDURE — 85007 BL SMEAR W/DIFF WBC COUNT: CPT

## 2019-01-12 PROCEDURE — 85027 COMPLETE CBC AUTOMATED: CPT | Mod: 91

## 2019-01-12 PROCEDURE — 94664 DEMO&/EVAL PT USE INHALER: CPT

## 2019-01-12 PROCEDURE — 85027 COMPLETE CBC AUTOMATED: CPT

## 2019-01-12 PROCEDURE — 80202 ASSAY OF VANCOMYCIN: CPT

## 2019-01-12 PROCEDURE — 25000003 PHARM REV CODE 250: Performed by: SURGERY

## 2019-01-12 PROCEDURE — 27000221 HC OXYGEN, UP TO 24 HOURS

## 2019-01-12 PROCEDURE — 80053 COMPREHEN METABOLIC PANEL: CPT

## 2019-01-12 PROCEDURE — 97116 GAIT TRAINING THERAPY: CPT

## 2019-01-12 PROCEDURE — B4185 PARENTERAL SOL 10 GM LIPIDS: HCPCS | Performed by: SURGERY

## 2019-01-12 PROCEDURE — 97530 THERAPEUTIC ACTIVITIES: CPT

## 2019-01-12 PROCEDURE — 80048 BASIC METABOLIC PNL TOTAL CA: CPT

## 2019-01-12 PROCEDURE — 11000001 HC ACUTE MED/SURG PRIVATE ROOM

## 2019-01-12 RX ORDER — POTASSIUM CHLORIDE 29.8 MG/ML
40 INJECTION INTRAVENOUS 2 TIMES DAILY
Status: DISCONTINUED | OUTPATIENT
Start: 2019-01-12 | End: 2019-01-12

## 2019-01-12 RX ORDER — PANTOPRAZOLE SODIUM 40 MG/10ML
40 INJECTION, POWDER, LYOPHILIZED, FOR SOLUTION INTRAVENOUS 2 TIMES DAILY
Status: DISCONTINUED | OUTPATIENT
Start: 2019-01-12 | End: 2019-01-16 | Stop reason: HOSPADM

## 2019-01-12 RX ORDER — POTASSIUM CHLORIDE 14.9 MG/ML
20 INJECTION INTRAVENOUS
Status: COMPLETED | OUTPATIENT
Start: 2019-01-12 | End: 2019-01-12

## 2019-01-12 RX ADMIN — IRON SUCROSE 100 MG: 20 INJECTION, SOLUTION INTRAVENOUS at 08:01

## 2019-01-12 RX ADMIN — PANTOPRAZOLE SODIUM 40 MG: 40 INJECTION, POWDER, FOR SOLUTION INTRAVENOUS at 10:01

## 2019-01-12 RX ADMIN — PANTOPRAZOLE SODIUM 40 MG: 40 INJECTION, POWDER, FOR SOLUTION INTRAVENOUS at 09:01

## 2019-01-12 RX ADMIN — POTASSIUM CHLORIDE 20 MEQ: 200 INJECTION, SOLUTION INTRAVENOUS at 02:01

## 2019-01-12 RX ADMIN — PIPERACILLIN AND TAZOBACTAM 4.5 G: 4; .5 INJECTION, POWDER, LYOPHILIZED, FOR SOLUTION INTRAVENOUS; PARENTERAL at 08:01

## 2019-01-12 RX ADMIN — SOYBEAN OIL 250 ML: 20 INJECTION, SOLUTION INTRAVENOUS at 09:01

## 2019-01-12 RX ADMIN — POTASSIUM CHLORIDE 20 MEQ: 200 INJECTION, SOLUTION INTRAVENOUS at 06:01

## 2019-01-12 RX ADMIN — POTASSIUM CHLORIDE 20 MEQ: 200 INJECTION, SOLUTION INTRAVENOUS at 08:01

## 2019-01-12 RX ADMIN — VANCOMYCIN HYDROCHLORIDE 1250 MG: 100 INJECTION, POWDER, LYOPHILIZED, FOR SOLUTION INTRAVENOUS at 12:01

## 2019-01-12 RX ADMIN — PIPERACILLIN AND TAZOBACTAM 4.5 G: 4; .5 INJECTION, POWDER, LYOPHILIZED, FOR SOLUTION INTRAVENOUS; PARENTERAL at 01:01

## 2019-01-12 RX ADMIN — CYANOCOBALAMIN 1000 MCG: 1000 INJECTION, SOLUTION INTRAMUSCULAR; SUBCUTANEOUS at 08:01

## 2019-01-12 RX ADMIN — PIPERACILLIN AND TAZOBACTAM 4.5 G: 4; .5 INJECTION, POWDER, LYOPHILIZED, FOR SOLUTION INTRAVENOUS; PARENTERAL at 04:01

## 2019-01-12 RX ADMIN — ASCORBIC ACID, VITAMIN A PALMITATE, CHOLECALCIFEROL, THIAMINE HYDROCHLORIDE, RIBOFLAVIN-5 PHOSPHATE SODIUM, PYRIDOXINE HYDROCHLORIDE, NIACINAMIDE, DEXPANTHENOL, ALPHA-TOCOPHEROL ACETATE, VITAMIN K1, FOLIC ACID, BIOTIN, CYANOCOBALAMIN: 200; 3300; 200; 6; 3.6; 6; 40; 15; 10; 150; 600; 60; 5 INJECTION, SOLUTION INTRAVENOUS at 08:01

## 2019-01-12 RX ADMIN — POTASSIUM CHLORIDE 20 MEQ: 200 INJECTION, SOLUTION INTRAVENOUS at 04:01

## 2019-01-12 NOTE — PROGRESS NOTES
[facility]  Otorhinolaryngology-Head & Neck Surgery  Progress Note    Subjective:     Post-Op Info:  Procedure(s) (LRB):  DUODENECTOMY (N/A)  LYMPHADENECTOMY (N/A)  GASTROJEJUNOSTOMY (N/A)  FUNDOPLICATION (N/A)  EGD (ESOPHAGOGASTRODUODENOSCOPY) (N/A)  CHOLECYSTECTOMY  BIOPSY, LIVER   7 Days Post-Op  Hospital Day: 8     Interval History: Patient just returned from IR for drainage of retroperitoneal fluid collection.  Earlier today had MBSS which showed tawny aspiration.  He denies complaints at this time.     Medications:  Continuous Infusions:   Amino acid 5% - dextrose 15% (CLINIMIX-E) solution with additives (1L  provides 510 kcal/L dextrose, with 50 gm AA, 150 gm CHO, Na 35, K 30, Mg 5, Ca 4.5, Acetate 80, Cl 39, Phos 15) 50 mL/hr at 01/11/19 2009     Scheduled Meds:   cyanocobalamin  1,000 mcg Subcutaneous Daily    fat emulsion 20%  250 mL Intravenous Every Mon, Wed, Fri    iron sucrose (VENOFER) IVPB  100 mg Intravenous Daily    pantoprazole  40 mg Intravenous Daily    piperacillin-tazobactam (ZOSYN) IVPB  4.5 g Intravenous Q8H    pregabalin  50 mg Oral BID    tamsulosin  0.4 mg Per NG tube Daily     PRN Meds:acetaminophen, albuterol sulfate, dextrose 50%, dextrose 50%, diphenhydrAMINE, glucose, glucose, hydrALAZINE, hydrocodone-apap 7.5-325 MG/15 ML, labetalol, meclizine, naloxone, ondansetron, ondansetron, promethazine (PHENERGAN) IVPB, sodium chloride 0.9%, sodium chloride 0.9%, varibar honey ba sulfate, varibar nectar Ba Sulfate, varibar pudding Ba Sulfate, varibar thin liquid ba sulfate     Review of patient's allergies indicates:   Allergen Reactions    Epinephrine      Neuroendocrine Tumor patient       Objective:     Vital Signs (24h Range):  Temp:  [97.6 °F (36.4 °C)-98.7 °F (37.1 °C)] 98.7 °F (37.1 °C)  Pulse:  [] 109  Resp:  [13-20] 18  SpO2:  [92 %-98 %] 93 %  BP: (106-134)/(54-85) 107/64     Date 01/11/19 0700 - 01/12/19 0659   Shift [id number] 0502-2025 9252-2533  24 Hour Total   INTAKE   IV Piggyback  550  550   TPN  1055  1055   Shift Total(mL/kg)  1605(16.5)  1605(16.5)   OUTPUT   Urine(mL/kg/hr) 200(0.3)   200   Drains  10  10   Other 5 30  35   Shift Total(mL/kg) 205(2.1) 40(0.4)  245(2.5)   Weight (kg) 97.5 97.5 97.5 97.5     Lines/Drains/Airways     Central Venous Catheter Line                 Percutaneous Central Line Insertion/Assessment - triple lumen  01/04/19 0719 right internal jugular 7 days          Drain                 Drain/Device  01/04/19 1300 Right lower quadrant 7 days         Closed/Suction Drain 01/11/19 1633 Lateral;Right Abdomen Accordion 8 Fr. less than 1 day                Physical Exam     Constitutional: Well appearing / communicating.  NAD.  Eyes: EOM I Bilaterally  Head/Face: Normocephalic.  Negative paranasal sinus pressure/tenderness.  Salivary glands WNL.  House Brackmann I Bilaterally.  Nose: No gross nasal septal deviation. Inferior Turbinates 3+ bilaterally. No septal perforation. No masses/lesions. External nasal skin without masses/lesions.  Oral Cavity: Gingiva/lips WNL.  FOM Soft, no masses palpated. Oral Tongue mobile. Hard Palate WNL.   Oropharynx: BOT WNL. No masses/lesions noted. Tonsillar fossa/pharyngeal wall without lesions. Posterior oropharynx WNL.  Soft palate without masses. Midline uvula.   Neck/Lymphatic: No LAD I-VI bilaterally.  No thyromegaly.  No masses noted on exam.        Significant Labs:  CBC:   Recent Labs   Lab 01/11/19  0430   WBC 27.27*   RBC 2.81*   HGB 8.2*   HCT 26.3*      MCV 94   MCH 29.2   MCHC 31.2*     CMP:   Recent Labs   Lab 01/11/19  0430 01/11/19  1413   * 114*   CALCIUM 8.9 8.7   ALBUMIN 2.6*  --    PROT 6.6  --     141   K 3.2* 3.3*   CO2 32* 34*   CL 97 97   BUN 26* 25*   CREATININE 1.0 0.9   ALKPHOS 93  --    ALT 40  --    AST 46*  --    BILITOT 1.7*  --        Significant Diagnostics:  MBSS 1/11/2019: There was penetration and aspiration through varying consistencies  including thin liquids.  Abnormal oral transit was noted throughout the entire examination.  Partially visualized drainage catheter projects over the prevertebral soft tissues.  Prominent anterior bridging osteophytes identified at C2-C3, C3-C4 and C4-C5.    Assessment/Plan:     Dysphagia    Dysphagia secondary to cervical osteophytesaffecting the mechanics of the swallow.  Recent NGT placement has caused mucosal edema of the post-cricoid tissues exacerbating his underlying known dysphagia. I would expect his dysphagia to improve to his baseline as the transient edema resolves. MBSS today shows tawny aspiration.   Recommend strict NPO status, strict aspiration precautions, alternative nutrition(TPN/PPN), and reassessment of his dysphagia with MBSS in 5-7 days depending on overall clinical improvement.          Parvin Bravo MD  Otorhinolaryngology-Head & Neck Surgery  [facility]

## 2019-01-12 NOTE — PLAN OF CARE
Problem: Adult Inpatient Plan of Care  Goal: Plan of Care Review  Outcome: Ongoing (interventions implemented as appropriate)  Pt is AAOx3. No complaints of nausea, vomiting, or diarrhea. No complaints of pain. TPN maintained. On 2L NC 02. Frequent weight shifts in bed. NPO. Right accordion and bart drain is draining. Safety precautions maintained. Tolerated all medications well.

## 2019-01-12 NOTE — NURSING
During VN morning rounds, the pt was awake and alert in bed. The pt complains of a dry mouth. The pt was reminded of his NPO status and directed to use the mouth swabs at his bedside. IV site intact. Safety measures are in place with the bed low and in lock position. The call light is in reach and the bed alarm is on. Will communicate with the floor nurse about the patient's concerns.

## 2019-01-12 NOTE — ASSESSMENT & PLAN NOTE
Dysphagia secondary to cervical osteophytesaffecting the mechanics of the swallow.  Recent NGT placement has caused mucosal edema of the post-cricoid tissues exacerbating his underlying known dysphagia. I would expect his dysphagia to improve to his baseline as the transient edema resolves. MBSS today shows tawny aspiration.   Recommend strict NPO status, strict aspiration precautions, alternative nutrition(TPN/PPN), and reassessment of his dysphagia with MBSS in 5-7 days depending on overall clinical improvement.

## 2019-01-12 NOTE — PROGRESS NOTES
Cued into patient's room.  Permission received per patient to turn camera to view patient.  Introduced as VN for night shift that will be working with floor nurse and nursing assistant.  Family member at bedside. Educated patient on VN's role in patient care. Plan of care reviewed with patient. Education per flowsheet.  Opportunity given for questions and questions answered.  No complaints.  Instructed to call for assistance.  Will cont to monitor.

## 2019-01-12 NOTE — SUBJECTIVE & OBJECTIVE
Interval History: Patient just returned from IR for drainage of retroperitoneal fluid collection.  Earlier today had MBSS which showed tawny aspiration.  He denies complaints at this time.     Medications:  Continuous Infusions:   Amino acid 5% - dextrose 15% (CLINIMIX-E) solution with additives (1L  provides 510 kcal/L dextrose, with 50 gm AA, 150 gm CHO, Na 35, K 30, Mg 5, Ca 4.5, Acetate 80, Cl 39, Phos 15) 50 mL/hr at 01/11/19 2009     Scheduled Meds:   cyanocobalamin  1,000 mcg Subcutaneous Daily    fat emulsion 20%  250 mL Intravenous Every Mon, Wed, Fri    iron sucrose (VENOFER) IVPB  100 mg Intravenous Daily    pantoprazole  40 mg Intravenous Daily    piperacillin-tazobactam (ZOSYN) IVPB  4.5 g Intravenous Q8H    pregabalin  50 mg Oral BID    tamsulosin  0.4 mg Per NG tube Daily     PRN Meds:acetaminophen, albuterol sulfate, dextrose 50%, dextrose 50%, diphenhydrAMINE, glucose, glucose, hydrALAZINE, hydrocodone-apap 7.5-325 MG/15 ML, labetalol, meclizine, naloxone, ondansetron, ondansetron, promethazine (PHENERGAN) IVPB, sodium chloride 0.9%, sodium chloride 0.9%, varibar honey ba sulfate, varibar nectar Ba Sulfate, varibar pudding Ba Sulfate, varibar thin liquid ba sulfate     Review of patient's allergies indicates:   Allergen Reactions    Epinephrine      Neuroendocrine Tumor patient       Objective:     Vital Signs (24h Range):  Temp:  [97.6 °F (36.4 °C)-98.7 °F (37.1 °C)] 98.7 °F (37.1 °C)  Pulse:  [] 109  Resp:  [13-20] 18  SpO2:  [92 %-98 %] 93 %  BP: (106-134)/(54-85) 107/64     Date 01/11/19 0700 - 01/12/19 0659   Shift [id number] 3992-2457 1061-5641 24 Hour Total   INTAKE   IV Piggyback  550  550   TPN  1055  1055   Shift Total(mL/kg)  1605(16.5)  1605(16.5)   OUTPUT   Urine(mL/kg/hr) 200(0.3)   200   Drains  10  10   Other 5 30  35   Shift Total(mL/kg) 205(2.1) 40(0.4)  245(2.5)   Weight (kg) 97.5 97.5 97.5 97.5     Lines/Drains/Airways     Central Venous Catheter Line                  Percutaneous Central Line Insertion/Assessment - triple lumen  01/04/19 0719 right internal jugular 7 days          Drain                 Drain/Device  01/04/19 1300 Right lower quadrant 7 days         Closed/Suction Drain 01/11/19 1633 Lateral;Right Abdomen Accordion 8 Fr. less than 1 day                Physical Exam     Constitutional: Well appearing / communicating.  NAD.  Eyes: EOM I Bilaterally  Head/Face: Normocephalic.  Negative paranasal sinus pressure/tenderness.  Salivary glands WNL.  House Brackmann I Bilaterally.  Nose: No gross nasal septal deviation. Inferior Turbinates 3+ bilaterally. No septal perforation. No masses/lesions. External nasal skin without masses/lesions.  Oral Cavity: Gingiva/lips WNL.  FOM Soft, no masses palpated. Oral Tongue mobile. Hard Palate WNL.   Oropharynx: BOT WNL. No masses/lesions noted. Tonsillar fossa/pharyngeal wall without lesions. Posterior oropharynx WNL.  Soft palate without masses. Midline uvula.   Neck/Lymphatic: No LAD I-VI bilaterally.  No thyromegaly.  No masses noted on exam.        Significant Labs:  CBC:   Recent Labs   Lab 01/11/19  0430   WBC 27.27*   RBC 2.81*   HGB 8.2*   HCT 26.3*      MCV 94   MCH 29.2   MCHC 31.2*     CMP:   Recent Labs   Lab 01/11/19  0430 01/11/19  1413   * 114*   CALCIUM 8.9 8.7   ALBUMIN 2.6*  --    PROT 6.6  --     141   K 3.2* 3.3*   CO2 32* 34*   CL 97 97   BUN 26* 25*   CREATININE 1.0 0.9   ALKPHOS 93  --    ALT 40  --    AST 46*  --    BILITOT 1.7*  --        Significant Diagnostics:  MBSS 1/11/2019: There was penetration and aspiration through varying consistencies including thin liquids.  Abnormal oral transit was noted throughout the entire examination.  Partially visualized drainage catheter projects over the prevertebral soft tissues.  Prominent anterior bridging osteophytes identified at C2-C3, C3-C4 and C4-C5.

## 2019-01-12 NOTE — PLAN OF CARE
TN met with pt and wife Sneha    TN introduced self --   advised pt that TN will monitor pt's status and work with care team to plan pt's d/c - which may include at stay at a post acute care facility post d/c.      This  put name on white board and explained blue discharge folder to patient. Discharge planning brochure and/or business card given to patient.  Patient verbalized understanding.    dx:   Malignant carcinoid tumor of duodenum  s/p  1/4 s/p ex-lap, toupet fundoplication, hiatal hernia repair, cholecystectomy, sleeve duodenectomy with end-end anastomosis, liver biopsy, biliary stenting, and intralesional chemo for malignant carcinoid of the duodenum now with UTI and possible PNA     PT/OT working with pt           01/11/19 3351   Discharge Reassessment   Assessment Type Discharge Planning Reassessment   Provided patient/caregiver education on the expected discharge date and the discharge plan Yes   Do you have any problems affording any of your prescribed medications? Yes   Discharge Plan A (TBD pending PT/OT eval, pt's progress )

## 2019-01-12 NOTE — PROGRESS NOTES
Neuroendocrine Surgery  Progress Note    No acute events  Strict NPO  Dylan aspiration MBSS  TPN via RIJ central line  S/P IR drain old hematoma yesterday  No more fevers    Vitals:    01/12/19 0021 01/12/19 0343 01/12/19 0736 01/12/19 0803   BP: (!) 107/57 121/69 123/64    BP Location:       Patient Position:  Lying     Pulse: 107 (!) 111 108    Resp:  15 18    Temp:  99.1 °F (37.3 °C) 99.7 °F (37.6 °C)    TempSrc:  Oral     SpO2:    (!) 94%   Weight:  97 kg (213 lb 13.5 oz)     Height:         Intake/Output - Last 3 Shifts       01/10 0700 - 01/11 0659 01/11 0700 - 01/12 0659 01/12 0700 - 01/13 0659    P.O. 0 240     NG/GT       IV Piggyback 200 650     .7 1725     Total Intake(mL/kg) 716.7 (7.4) 2615 (27)     Urine (mL/kg/hr) 625 (0.3) 800 (0.3)     Drains  85     Other 30 40     Stool 0 0     Total Output 655 925     Net +61.7 +1690            Stool Occurrence 0 x 0 x         Gen - NAD, AAO  HEENT - hoarse  CV - RRR  Abd - JAY drain c old blood, not tender        67 yo M s/p ex-lap, toupet fundoplication, hiatal hernia repair, cholecystectomy, sleeve duodenectomy with end-end anastomosis, liver biopsy, biliary stenting, and intralesional chemo for malignant carcinoid of the duodenum now with UTI and possible PNA   Also intraabdominal hematoma, leukocytosis -   Will change meds from PO  Will add vanc to zosyn  Will get PT/OT  Replacing KCl 40 x2 today    JAY

## 2019-01-13 LAB
ALBUMIN SERPL BCP-MCNC: 2.3 G/DL
ALP SERPL-CCNC: 108 U/L
ALT SERPL W/O P-5'-P-CCNC: 48 U/L
AMYLASE FLD-CCNC: 260 U/L
ANION GAP SERPL CALC-SCNC: 8 MMOL/L
ANISOCYTOSIS BLD QL SMEAR: SLIGHT
AST SERPL-CCNC: 72 U/L
BACTERIA UR CULT: NORMAL
BASOPHILS # BLD AUTO: 0.04 K/UL
BASOPHILS NFR BLD: 0.2 %
BILIRUB SERPL-MCNC: 1.3 MG/DL
BUN SERPL-MCNC: 19 MG/DL
CALCIUM SERPL-MCNC: 8.5 MG/DL
CHLORIDE SERPL-SCNC: 99 MMOL/L
CO2 SERPL-SCNC: 31 MMOL/L
CREAT SERPL-MCNC: 1 MG/DL
DIFFERENTIAL METHOD: ABNORMAL
EOSINOPHIL # BLD AUTO: 0.2 K/UL
EOSINOPHIL NFR BLD: 1 %
ERYTHROCYTE [DISTWIDTH] IN BLOOD BY AUTOMATED COUNT: 15.1 %
EST. GFR  (AFRICAN AMERICAN): >60 ML/MIN/1.73 M^2
EST. GFR  (NON AFRICAN AMERICAN): >60 ML/MIN/1.73 M^2
GLUCOSE SERPL-MCNC: 130 MG/DL
HCT VFR BLD AUTO: 25.1 %
HGB BLD-MCNC: 7.7 G/DL
HYPOCHROMIA BLD QL SMEAR: ABNORMAL
LYMPHOCYTES # BLD AUTO: 1.4 K/UL
LYMPHOCYTES NFR BLD: 7 %
MAGNESIUM SERPL-MCNC: 2.5 MG/DL
MCH RBC QN AUTO: 28.9 PG
MCHC RBC AUTO-ENTMCNC: 30.7 G/DL
MCV RBC AUTO: 94 FL
MONOCYTES # BLD AUTO: 1.9 K/UL
MONOCYTES NFR BLD: 9.7 %
NEUTROPHILS # BLD AUTO: 15.8 K/UL
NEUTROPHILS NFR BLD: 82.1 %
PHOSPHATE SERPL-MCNC: 3 MG/DL
PLATELET # BLD AUTO: 296 K/UL
PLATELET BLD QL SMEAR: ABNORMAL
PMV BLD AUTO: 9.2 FL
POIKILOCYTOSIS BLD QL SMEAR: SLIGHT
POLYCHROMASIA BLD QL SMEAR: ABNORMAL
POTASSIUM SERPL-SCNC: 3.8 MMOL/L
PROT SERPL-MCNC: 6.8 G/DL
RBC # BLD AUTO: 2.66 M/UL
SODIUM SERPL-SCNC: 138 MMOL/L
SPECIMEN SOURCE: NORMAL
VANCOMYCIN TROUGH SERPL-MCNC: 9.8 UG/ML
WBC # BLD AUTO: 19.91 K/UL

## 2019-01-13 PROCEDURE — 11000001 HC ACUTE MED/SURG PRIVATE ROOM

## 2019-01-13 PROCEDURE — 92526 ORAL FUNCTION THERAPY: CPT

## 2019-01-13 PROCEDURE — 94664 DEMO&/EVAL PT USE INHALER: CPT

## 2019-01-13 PROCEDURE — 63600175 PHARM REV CODE 636 W HCPCS: Performed by: SURGERY

## 2019-01-13 PROCEDURE — B4185 PARENTERAL SOL 10 GM LIPIDS: HCPCS | Performed by: SURGERY

## 2019-01-13 PROCEDURE — 84100 ASSAY OF PHOSPHORUS: CPT

## 2019-01-13 PROCEDURE — 25000003 PHARM REV CODE 250: Performed by: SURGERY

## 2019-01-13 PROCEDURE — 83735 ASSAY OF MAGNESIUM: CPT

## 2019-01-13 PROCEDURE — 80202 ASSAY OF VANCOMYCIN: CPT

## 2019-01-13 PROCEDURE — 63600175 PHARM REV CODE 636 W HCPCS: Performed by: STUDENT IN AN ORGANIZED HEALTH CARE EDUCATION/TRAINING PROGRAM

## 2019-01-13 PROCEDURE — C9113 INJ PANTOPRAZOLE SODIUM, VIA: HCPCS | Performed by: STUDENT IN AN ORGANIZED HEALTH CARE EDUCATION/TRAINING PROGRAM

## 2019-01-13 PROCEDURE — 85025 COMPLETE CBC W/AUTO DIFF WBC: CPT

## 2019-01-13 PROCEDURE — 80053 COMPREHEN METABOLIC PANEL: CPT

## 2019-01-13 PROCEDURE — 27000221 HC OXYGEN, UP TO 24 HOURS

## 2019-01-13 PROCEDURE — 94761 N-INVAS EAR/PLS OXIMETRY MLT: CPT

## 2019-01-13 PROCEDURE — 97535 SELF CARE MNGMENT TRAINING: CPT

## 2019-01-13 PROCEDURE — 99900035 HC TECH TIME PER 15 MIN (STAT)

## 2019-01-13 RX ORDER — ENOXAPARIN SODIUM 100 MG/ML
40 INJECTION SUBCUTANEOUS EVERY 24 HOURS
Status: DISCONTINUED | OUTPATIENT
Start: 2019-01-13 | End: 2019-01-16 | Stop reason: HOSPADM

## 2019-01-13 RX ORDER — DIAZEPAM 5 MG/1
5 TABLET ORAL ONCE
Status: DISCONTINUED | OUTPATIENT
Start: 2019-01-13 | End: 2019-01-13

## 2019-01-13 RX ADMIN — PIPERACILLIN AND TAZOBACTAM 4.5 G: 4; .5 INJECTION, POWDER, LYOPHILIZED, FOR SOLUTION INTRAVENOUS; PARENTERAL at 05:01

## 2019-01-13 RX ADMIN — PANTOPRAZOLE SODIUM 40 MG: 40 INJECTION, POWDER, FOR SOLUTION INTRAVENOUS at 09:01

## 2019-01-13 RX ADMIN — ONDANSETRON 4 MG: 2 INJECTION INTRAMUSCULAR; INTRAVENOUS at 01:01

## 2019-01-13 RX ADMIN — ASCORBIC ACID, VITAMIN A PALMITATE, CHOLECALCIFEROL, THIAMINE HYDROCHLORIDE, RIBOFLAVIN-5 PHOSPHATE SODIUM, PYRIDOXINE HYDROCHLORIDE, NIACINAMIDE, DEXPANTHENOL, ALPHA-TOCOPHEROL ACETATE, VITAMIN K1, FOLIC ACID, BIOTIN, CYANOCOBALAMIN: 200; 3300; 200; 6; 3.6; 6; 40; 15; 10; 150; 600; 60; 5 INJECTION, SOLUTION INTRAVENOUS at 08:01

## 2019-01-13 RX ADMIN — VANCOMYCIN HYDROCHLORIDE 1250 MG: 100 INJECTION, POWDER, LYOPHILIZED, FOR SOLUTION INTRAVENOUS at 01:01

## 2019-01-13 RX ADMIN — ENOXAPARIN SODIUM 40 MG: 100 INJECTION SUBCUTANEOUS at 05:01

## 2019-01-13 RX ADMIN — CYANOCOBALAMIN 1000 MCG: 1000 INJECTION, SOLUTION INTRAMUSCULAR; SUBCUTANEOUS at 09:01

## 2019-01-13 RX ADMIN — PIPERACILLIN AND TAZOBACTAM 4.5 G: 4; .5 INJECTION, POWDER, LYOPHILIZED, FOR SOLUTION INTRAVENOUS; PARENTERAL at 09:01

## 2019-01-13 RX ADMIN — SOYBEAN OIL 250 ML: 20 INJECTION, SOLUTION INTRAVENOUS at 09:01

## 2019-01-13 RX ADMIN — IRON SUCROSE 100 MG: 20 INJECTION, SOLUTION INTRAVENOUS at 09:01

## 2019-01-13 RX ADMIN — PIPERACILLIN AND TAZOBACTAM 4.5 G: 4; .5 INJECTION, POWDER, LYOPHILIZED, FOR SOLUTION INTRAVENOUS; PARENTERAL at 01:01

## 2019-01-13 RX ADMIN — PANTOPRAZOLE SODIUM 40 MG: 40 INJECTION, POWDER, FOR SOLUTION INTRAVENOUS at 08:01

## 2019-01-13 NOTE — PT/OT/SLP PROGRESS
"Speech Language Pathology Treatment    Patient Name:  Hoang Santos Jr.   MRN:  [id number]  Admitting Diagnosis: Malignant carcinoid tumor of duodenum    Recommendations:                 General Recommendations:  Dysphagia therapy  Diet recommendations:  NPO, Liquid Diet Level: NPO   Aspiration Precautions: Continue alternate means of nutrition, Frequent oral care, HOB to 90 degrees, Monitor for s/s of aspiration and Strict aspiration precautions   General Precautions: Standard, respiratory, NPO, aspiration, fall  Communication strategies:  none    Subjective     Pt seen at the bedside for ongoing dysphagia tx. ST checked w/ RN Renee prior to tx. Upon arrival, pt awake, alert, and pleasant.  Patient goals: Pt stated, "I want a 6 pack and some ribs!"     Pain/Comfort:  · Pain Rating 1: 0/10    Objective:     Has the patient been evaluated by SLP for swallowing?   Yes  Keep patient NPO? No   Current Respiratory Status: nasal cannula      Pt seated at 75* for tx today as he stated any higher would put pressure on his abdominal bandage. Oral motor exercises completed to improve laryngeal elevation/excursion, t.b. Retraction, and pharyngeal strength. Given a resistance ball, pt performed Shaker maneuver in 3 reps of 10. Good effort noted. Pt became fatigued from task, thus rest breaks given. Pitch gliding activity (ah-ee) completed x10 to improve vocal fold adduction. Hard "kuh" and "guh" produced x15 each to improve TB strength. Voice continues to appear wet/gurgly, suspected secretions within pharynx/larynx. Effortful swallow introduced. Pt produced hard swallow x7, while instructed to swallow as if he was swallowing a golf ball. Audible swallow appreciated. During tx, pt coughed on bloody secretions x2 and required Yanker suction x3. Since last night, pt has been coughing up blood. Suction became clogged--ST alerted PCT who will replace it. Oral care performed to remove dried blood on lips and coated blood on tongue. " "Per pt, his wife has been performing oral care daily. Good effort for all tasks this date. Education provided re: affects of osteophytes on swallow, swallowing precautions, and aspiration risks. Session completed as pt stated, "that's all huh?" ST to f/u for continued indirect dysphagia tx.    Assessment:     Hoang Santos Jr. is a 68 y.o. male with an SLP diagnosis of severe Dysphagia.  Continued difficulty managing secretions. Pt to remain STRICT NPO, with suction present at all time. Frequent and aggressive oral care needed. ST to f/u while inpatient.    Goals:   Multidisciplinary Problems     SLP Goals        Problem: SLP Goal    Goal Priority Disciplines Outcome   SLP Goal     SLP Ongoing (interventions implemented as appropriate)   Description:  Short Term Goals:  1. Pt will participate in ongoing swallow eval to make appropriate diet recs.    2. Pt will continue NPO with strict aspiration precautions in place.   3. Pt will participate in OMEs to improve laryngeal & pharyngeal strength.  4. Pt will consume PO trials w/ ST as tolerated.                    Plan:     · Patient to be seen:  3 x/week   · Plan of Care expires:  02/09/19  · Plan of Care reviewed with:  patient   · SLP Follow-Up:  Yes       Discharge recommendations:  other (see comments)(Per POC)   Barriers to Discharge:  Level of Skilled Assistance Needed continued skilled ST needed.    Time Tracking:     SLP Treatment Date:   01/13/19  Speech Start Time:  0945  Speech Stop Time:  1015     Speech Total Time (min):  30 min    Billable Minutes: Treatment Swallowing Dysfunction 15 and Seld Care/Home Management Training 15    Yissel Huang CCC-SLP  01/13/2019  "

## 2019-01-13 NOTE — PLAN OF CARE
Problem: Adult Inpatient Plan of Care  Goal: Plan of Care Review  Outcome: Ongoing (interventions implemented as appropriate)  Patient Mr. Santos is alert, awake and oriented X 4. Vital signs are stable. TPN and lipids are in progress. IV antibiotics given as prescribed. Remains NPO. Brings lots and lots of  thick blood stained phylum. Voided frequently. Slept fairly well. Denies pain at rest.. Will continue to monitor Patient.

## 2019-01-13 NOTE — NURSING
During VN morning rounds, the pt was awake and alert in bed. The pt stated that he had been spitting up blood overnight. Instructed the pt to notify the nurse if there are any new changes in the consistency or amount of blood that he is coughing up. Per pt, his last BM was on 1/7/2019. Will ask MD for a laxative. IV site infusing. Safety measures are in place. Will communicate with the floor nurse about the patient's concerns.

## 2019-01-13 NOTE — PROGRESS NOTES
"Vancomycin Dosing and Monitoring Pharmacy Protocol    Hoang Santos Jr. is a 68 y.o. male    Height: 5' 6" (1.676 m)   Wt Readings from Last 3 Encounters:   01/12/19 97 kg (213 lb 13.5 oz)   01/03/19 104.3 kg (230 lb)   01/03/19 101.9 kg (224 lb 12.1 oz)       Temp Readings from Last 3 Encounters:   01/13/19 (!) 32 °F (0 °C)   01/03/19 97.1 °F (36.2 °C) (Oral)   11/19/18 97.4 °F (36.3 °C) (Oral)      Lab Results   Component Value Date/Time    WBC 21.96 (H) 01/12/2019 04:38 PM    WBC 25.82 (H) 01/12/2019 04:05 AM    WBC 27.27 (H) 01/11/2019 04:30 AM      Lab Results   Component Value Date/Time    CREATININE 0.9 01/12/2019 04:38 PM    CREATININE 0.9 01/12/2019 04:05 AM    CREATININE 0.9 01/11/2019 02:13 PM      Lab Results   Component Value Date/Time    LACTATE 1.0 03/10/2017 02:02 PM    LACTATE 2.9 (H) 03/10/2017 12:10 PM       Serum creatinine: 0.9 mg/dL 01/12/19 1638  Estimated creatinine clearance: 86.9 mL/min    Antibiotics (From admission, onward)      Start     Stop Route Frequency Ordered    01/12/19 1130  vancomycin (VANCOCIN) 1,250 mg in dextrose 5 % 250 mL IVPB      -- IV Every 12 hours (non-standard times) 01/12/19 0816    01/11/19 0930  piperacillin-tazobactam 4.5 g in dextrose 5 % 100 mL IVPB (ready to mix system)      -- IV Every 8 hours (non-standard times) 01/11/19 0148          Antifungals (From admission, onward)      None            Microbiology Results (last 7 days)       Procedure Component Value Units Date/Time    Urine culture [[id number]] Collected:  01/11/19 1029    Order Status:  Completed Specimen:  Urine, Catheterized Updated:  01/13/19 0017     Urine Culture, Routine --     GRAM NEGATIVE KINGS  10,000 - 49,999 cfu/ml  Identification and susceptibility pending      AFB Culture & Smear [[id number]] Collected:  01/11/19 1627    Order Status:  Completed Specimen:  Abscess from Abdomen Updated:  01/12/19 2127     AFB Culture & Smear Culture in progress    Blood culture [[id number]] Collected:  " 01/10/19 1737    Order Status:  Completed Specimen:  Blood Updated:  01/12/19 2012     Blood Culture, Routine No Growth to date     Blood Culture, Routine No Growth to date     Blood Culture, Routine No Growth to date    Blood culture [[id number]] Collected:  01/10/19 1737    Order Status:  Completed Specimen:  Blood Updated:  01/12/19 2012     Blood Culture, Routine No Growth to date     Blood Culture, Routine No Growth to date     Blood Culture, Routine No Growth to date    Urine culture [[id number]] Collected:  01/10/19 2334    Order Status:  Completed Specimen:  Urine Updated:  01/12/19 1252     Urine Culture, Routine --     ESCHERICHIA COLI  Susceptibility pending      Narrative:       Preferred Collection Type->Urine, Clean Catch    Aerobic culture [[id number]] Collected:  01/11/19 1013    Order Status:  Completed Specimen:  Body Fluid from Abdomen Updated:  01/12/19 0752     Aerobic Bacterial Culture No growth    Narrative:       RLQ drain    Gram stain [[id number]] Collected:  01/11/19 1627    Order Status:  Completed Specimen:  Abscess from Abdomen Updated:  01/12/19 0103     Gram Stain Result Rare WBC's      No organisms seen    Culture, Anaerobic [[id number]] Collected:  01/11/19 1627    Order Status:  Sent Specimen:  Abscess from Abdomen Updated:  01/11/19 2005    Aerobic culture [[id number]] Collected:  01/11/19 1627    Order Status:  Sent Specimen:  Abscess from Abdomen Updated:  01/11/19 2005    Fungus culture [[id number]] Collected:  01/11/19 1627    Order Status:  Sent Specimen:  Abscess from Abdomen Updated:  01/11/19 2005    Gram stain [[id number]] Collected:  01/11/19 1013    Order Status:  Completed Specimen:  Body Fluid from Abdomen Updated:  01/11/19 1711     Gram Stain Result Rare WBC's      No organisms seen    Narrative:       RLQ drain    Gram stain [[id number]] Collected:  01/11/19 1025    Order Status:  Completed Specimen:  Urine, Catheterized Updated:  01/11/19 1710     Gram Stain Result Rare  WBC's      No organisms seen    Culture, Anaerobe [[id number]] Collected:  19 1013    Order Status:  Sent Specimen:  Body Fluid from Abdomen Updated:  19 1334            Indication/Target trough:   HAP (target trough level 10-15 mcg/ml)     Hemodialysis:   N/A    Dosing Weight:   Wt Readings from Last 1 Encounters:   19 97 kg (213 lb 13.5 oz)       Last Vancomycin dose: 1250 mg   Date/Time given: 19 at 0148          Vancomycin level:  Recent Labs   Lab Result Units 19  1209 19  0045   Vancomycin-Trough ug/mL 6.4* 9.8*     Recent Labs   Lab Result Units 19  0045   Vancomycin-Trough ug/mL 9.8*       Per Protocol Initial/Adjustments Dosin. Initial/Adjustment Dose: NO CHANGE, continue the same Vancomycin dose of 1250mg q12hr  2. Vancomycin Trough Level will be drawn on 1/15/19 at 0115 date/time     Pharmacy will continue to follow.    Please contact if you have any further questions. Thank you.    Danny Taylor, PharmD  [phone]

## 2019-01-13 NOTE — PLAN OF CARE
Problem: SLP Goal  Goal: SLP Goal  Short Term Goals:  1. Pt will participate in ongoing swallow eval to make appropriate diet recs.    2. Pt will continue NPO with strict aspiration precautions in place.   3. Pt will participate in OMEs to improve laryngeal & pharyngeal strength.  4. Pt will consume PO trials w/ ST as tolerated.   Outcome: Ongoing (interventions implemented as appropriate)  1/13: OMEs and education re: affects of osteophytes on swallow, swallowing precautions, and aspiration risks.

## 2019-01-13 NOTE — NURSING
During VN rounds, the pt was awake and alert in bed with the family at the bedside. The pt denies any pain. Since the pt has been c/o swallowing difficulty, VN instructed the pt to take small bites of his meal and remain seated upright during and after meal. IV site infusing. Safety measures are in place with the bed low and in lock position. The call light is in reach and the bed alarm is on.

## 2019-01-13 NOTE — PLAN OF CARE
"Problem: Physical Therapy Goal  Goal: Physical Therapy Goal  Goals to be met by: 19     Patient will increase functional independence with mobility by performin. Supine to sit with supervision.   2. Sit to supine with supervision.   3. Sit<>stand transfer with supervision .   4. OOB to chair for > 1 hour MET 2019  5.  Gait > 150 feet with SBA with /without AD   6.  Ascend/descend 4" curb step with CGA .      Outcome: Ongoing (interventions implemented as appropriate)    Pt continues to work and progress toward goals      "

## 2019-01-13 NOTE — PLAN OF CARE
Problem: Adult Inpatient Plan of Care  Goal: Plan of Care Review  Outcome: Ongoing (interventions implemented as appropriate)  Patient AAO x 4. VSS. NAD. No complaints of pain or nausea this shift. Patient TPN, IV potassium and iv antibiotics infusing per mar.  NPO diet. JAY drain output 5 ml this shift, accordian drain 75 cc this shift. Safety maintained. Will continue to monitor.

## 2019-01-13 NOTE — PT/OT/SLP PROGRESS
"Physical Therapy Treatment    Patient Name:  Hoang Santos Jr.   MRN:  [id number]    Recommendations:     Discharge Recommendations:  (TBD)   Discharge Equipment Recommendations: (TBD)   Barriers to discharge: None    Assessment:     Hoang Santos Jr. is a 68 y.o. male admitted with a medical diagnosis of Malignant carcinoid tumor of duodenum.  He presents with the following impairments/functional limitations:  weakness, impaired endurance, impaired self care skills, impaired balance, gait instability, impaired functional mobilty, decreased coordination, decreased upper extremity function, decreased lower extremity function, impaired skin, pain, impaired cardiopulmonary response to activity. Pt able to perform ambulation training ~100 ft WITHOUT O2 donned SpO2 level decreased to 86- 88%. O2 donned after session and SpO2 level increased to 94-96%. Slow fan speed and slow with all transfers and movement at this time. Would benefit from continued PT services to address all impairments listed above.    Rehab Prognosis: Good; patient would benefit from acute skilled PT services to address these deficits and reach maximum level of function.    Recent Surgery: Procedure(s) (LRB):  DUODENECTOMY (N/A)  LYMPHADENECTOMY (N/A)  GASTROJEJUNOSTOMY (N/A)  FUNDOPLICATION (N/A)  EGD (ESOPHAGOGASTRODUODENOSCOPY) (N/A)  CHOLECYSTECTOMY  BIOPSY, LIVER 8 Days Post-Op    Plan:     During this hospitalization, patient to be seen 6 x/week to address the identified rehab impairments via gait training, therapeutic activities, therapeutic exercises and progress toward the following goals:    · Plan of Care Expires:  02/04/19    Subjective     Chief Complaint:  None expressed  Patient/Family Comments/goals: "To get better and back to my Yarsani".  Pain/Comfort:  ·        Objective:     Communicated with nurse prior to session.  Patient found all lines intact, call button in reach,  nurse notified and  family and friends present central " line, oxygen, telemetry  upon PT entry to room.     General Precautions: Standard, aspiration, fall, NPO, respiratory   Orthopedic Precautions:N/A   Braces: N/A     Functional Mobility:  · Transfers:     · Sit to Stand:  contact guard assistance with rolling walker  · Gait:  ~100 ft and ~20 ft both WITHOUT O2 donned, RW and CGA      AM-PAC 6 CLICK MOBILITY  Turning over in bed (including adjusting bedclothes, sheets and blankets)?: 3  Sitting down on and standing up from a chair with arms (e.g., wheelchair, bedside commode, etc.): 3  Moving from lying on back to sitting on the side of the bed?: 3  Moving to and from a bed to a chair (including a wheelchair)?: 3  Need to walk in hospital room?: 3  Climbing 3-5 steps with a railing?: 2  Basic Mobility Total Score: 17       Therapeutic Activities and Exercises:   Family and friends included PTA in prayer time prior to beginning session. Pt performed ambulation training by 2 trials both WITHOUT O2 donned, RW, and CGA. SpO2 level decreased to a low of 86-88%. Requested to use in room bathroom and able to perform self hygiene. Unable to have bowel movement despite feelings of being able to after 1st ambulation. 1st ambulation ~100ft and 2nd ambulation ~20 ft from bathroom back to bed. O2 donned post ambulation.    Patient left supine with all lines intact, call button in reach,  nurse notified and  family and friends present..    GOALS:   Multidisciplinary Problems     Physical Therapy Goals        Problem: Physical Therapy Goal    Goal Priority Disciplines Outcome Goal Variances Interventions   Physical Therapy Goal     PT, PT/OT Ongoing (interventions implemented as appropriate)     Description:  Goals to be met by: 19     Patient will increase functional independence with mobility by performin. Supine to sit with supervision.   2. Sit to supine with supervision.   3. Sit<>stand transfer with supervision .   4. OOB to chair for > 1 hour MET 2019  5.   "Gait > 150 feet with SBA with /without AD   6.  Ascend/descend 4" curb step with CGA .                       Time Tracking:     PT Received On: 01/12/19  PT Start Time: 1332     PT Stop Time: 1408  PT Total Time (min): 36 min     Billable Minutes: Gait Training   20 and Therapeutic Activity  16    Treatment Type: Treatment  PT/PTA: PTA     PTA Visit Number: 1     Naa Lewis, PTA  01/12/2019  "

## 2019-01-13 NOTE — PROGRESS NOTES
[facility] General Surgery  Neuroendocrine Tumor Service  Progress Note     Admission Date: 1/4/2019  Hospital Length of Stay: 9  Principal Problem: Malignant carcinoid tumor of duodenum     Subjective:      Interval History:   NAEO  Some spitting up of blood this morning after scraping mouth, improved  Denies nausea/vomiting  Denies pain  ambulating    Scheduled Meds:   cyanocobalamin  1,000 mcg Subcutaneous Daily    fat emulsion 20%  250 mL Intravenous Daily    iron sucrose (VENOFER) IVPB  100 mg Intravenous Daily    pantoprazole  40 mg Intravenous BID    piperacillin-tazobactam (ZOSYN) IVPB  4.5 g Intravenous Q8H    vancomycin (VANCOCIN) IVPB  1,250 mg Intravenous Q12H     Continuous Infusions:   Amino acid 5% - dextrose 15% (CLINIMIX-E) solution with additives (1L  provides 510 kcal/L dextrose, with 50 gm AA, 150 gm CHO, Na 35, K 30, Mg 5, Ca 4.5, Acetate 80, Cl 39, Phos 15) 50 mL/hr at 01/12/19 2025     PRN Meds:.albuterol sulfate, dextrose 50%, dextrose 50%, diphenhydrAMINE, glucose, glucose, hydrALAZINE, hydrocodone-apap 7.5-325 MG/15 ML, labetalol, meclizine, naloxone, ondansetron, ondansetron, promethazine (PHENERGAN) IVPB, sodium chloride 0.9%, sodium chloride 0.9%, varibar honey ba sulfate, varibar nectar Ba Sulfate, varibar pudding Ba Sulfate, varibar thin liquid ba sulfate    Objective:      Temp:  [32 °F (0 °C)-100.5 °F (38.1 °C)] 99.1 °F (37.3 °C)  Pulse:  [100-114] 100  Resp:  [16-18] 18  SpO2:  [94 %-97 %] 97 %  BP: (102-108)/(55-60) 107/59  Weight change: 0 kg (0 lb)    Intake/Output Summary (Last 24 hours) at 1/13/2019 0857  Last data filed at 1/13/2019 0554  Gross per 24 hour   Intake 800 ml   Output 1740 ml   Net -940 ml       Physical Exam:  GEN: awake, alert, NAD  CV: RRR  PULM: CTAB  ABD: S/NT/ND, incision c/d/i, IR drain in place, JAY in place  EXT: Peripheral pulses present and equal bilaterally    Recent Labs   Lab 01/13/19  0500      K 3.8   CO2 31*   CL 99    BUN 19   CREATININE 1.0   CALCIUM 8.5*   PROT 6.8   AST 72*   ALT 48*   ALKPHOS 108   BILITOT 1.3*     Recent Labs   Lab 01/13/19  0500   WBC 19.91*   HGB 7.7*   HCT 25.1*          Significant Imaging: I have reviewed all pertinent imaging results/findings within the past 24 hours.     Assessment and Plan:      67 yo M s/p ex-lap, toupet fundoplication, hiatal hernia repair, cholecystectomy, sleeve duodenectomy with end-end anastomosis, liver biopsy, biliary stenting, and intralesional chemo for malignant carcinoid of the duodenum now with UTI and possible PNA   Also intraabdominal hematoma, leukocytosis     Continue to monitor bloody spit  Cont vanc/zosyn  PT/OT  Ambulation/IS  Cont drains  NPO/TPN    Jamie Vines MD  Neuroendocrine Surgery

## 2019-01-14 PROBLEM — N30.00 ACUTE CYSTITIS WITHOUT HEMATURIA: Status: ACTIVE | Noted: 2019-01-14

## 2019-01-14 LAB
ALBUMIN SERPL BCP-MCNC: 2.2 G/DL
ALP SERPL-CCNC: 107 U/L
ALT SERPL W/O P-5'-P-CCNC: 49 U/L
ANION GAP SERPL CALC-SCNC: 8 MMOL/L
ANISOCYTOSIS BLD QL SMEAR: SLIGHT
AST SERPL-CCNC: 73 U/L
BACTERIA SPEC AEROBE CULT: NO GROWTH
BACTERIA UR CULT: NORMAL
BASOPHILS # BLD AUTO: ABNORMAL K/UL
BASOPHILS NFR BLD: 0 %
BILIRUB SERPL-MCNC: 1.1 MG/DL
BUN SERPL-MCNC: 17 MG/DL
CALCIUM SERPL-MCNC: 8.5 MG/DL
CHLORIDE SERPL-SCNC: 99 MMOL/L
CO2 SERPL-SCNC: 30 MMOL/L
CREAT SERPL-MCNC: 1 MG/DL
DIFFERENTIAL METHOD: ABNORMAL
EOSINOPHIL # BLD AUTO: ABNORMAL K/UL
EOSINOPHIL NFR BLD: 0 %
ERYTHROCYTE [DISTWIDTH] IN BLOOD BY AUTOMATED COUNT: 15.4 %
EST. GFR  (AFRICAN AMERICAN): >60 ML/MIN/1.73 M^2
EST. GFR  (NON AFRICAN AMERICAN): >60 ML/MIN/1.73 M^2
GLUCOSE SERPL-MCNC: 130 MG/DL
HCT VFR BLD AUTO: 24.7 %
HGB BLD-MCNC: 7.7 G/DL
HYPOCHROMIA BLD QL SMEAR: ABNORMAL
LYMPHOCYTES # BLD AUTO: ABNORMAL K/UL
LYMPHOCYTES NFR BLD: 8 %
MAGNESIUM SERPL-MCNC: 2.4 MG/DL
MCH RBC QN AUTO: 29.3 PG
MCHC RBC AUTO-ENTMCNC: 31.2 G/DL
MCV RBC AUTO: 94 FL
MONOCYTES # BLD AUTO: ABNORMAL K/UL
MONOCYTES NFR BLD: 3 %
NEUTROPHILS NFR BLD: 85 %
NEUTS BAND NFR BLD MANUAL: 4 %
NRBC BLD-RTO: ABNORMAL /100 WBC
PHOSPHATE SERPL-MCNC: 3.1 MG/DL
PLATELET # BLD AUTO: 350 K/UL
PLATELET BLD QL SMEAR: ABNORMAL
PMV BLD AUTO: 9.6 FL
POCT GLUCOSE: 117 MG/DL (ref 70–110)
POIKILOCYTOSIS BLD QL SMEAR: SLIGHT
POLYCHROMASIA BLD QL SMEAR: ABNORMAL
POTASSIUM SERPL-SCNC: 3.6 MMOL/L
PROT SERPL-MCNC: 6.9 G/DL
RBC # BLD AUTO: 2.63 M/UL
SODIUM SERPL-SCNC: 137 MMOL/L
STOMATOCYTES BLD QL SMEAR: PRESENT
TARGETS BLD QL SMEAR: ABNORMAL
WBC # BLD AUTO: 17.77 K/UL

## 2019-01-14 PROCEDURE — 97803 MED NUTRITION INDIV SUBSEQ: CPT

## 2019-01-14 PROCEDURE — 63600175 PHARM REV CODE 636 W HCPCS: Performed by: SURGERY

## 2019-01-14 PROCEDURE — 97530 THERAPEUTIC ACTIVITIES: CPT

## 2019-01-14 PROCEDURE — 27000221 HC OXYGEN, UP TO 24 HOURS

## 2019-01-14 PROCEDURE — 94664 DEMO&/EVAL PT USE INHALER: CPT

## 2019-01-14 PROCEDURE — 25000003 PHARM REV CODE 250: Performed by: SURGERY

## 2019-01-14 PROCEDURE — 84100 ASSAY OF PHOSPHORUS: CPT

## 2019-01-14 PROCEDURE — 25000003 PHARM REV CODE 250: Performed by: STUDENT IN AN ORGANIZED HEALTH CARE EDUCATION/TRAINING PROGRAM

## 2019-01-14 PROCEDURE — 85027 COMPLETE CBC AUTOMATED: CPT

## 2019-01-14 PROCEDURE — 92526 ORAL FUNCTION THERAPY: CPT

## 2019-01-14 PROCEDURE — 99900035 HC TECH TIME PER 15 MIN (STAT)

## 2019-01-14 PROCEDURE — C9113 INJ PANTOPRAZOLE SODIUM, VIA: HCPCS | Performed by: STUDENT IN AN ORGANIZED HEALTH CARE EDUCATION/TRAINING PROGRAM

## 2019-01-14 PROCEDURE — 94761 N-INVAS EAR/PLS OXIMETRY MLT: CPT

## 2019-01-14 PROCEDURE — B4185 PARENTERAL SOL 10 GM LIPIDS: HCPCS | Performed by: SURGERY

## 2019-01-14 PROCEDURE — 63600175 PHARM REV CODE 636 W HCPCS: Performed by: STUDENT IN AN ORGANIZED HEALTH CARE EDUCATION/TRAINING PROGRAM

## 2019-01-14 PROCEDURE — 11000001 HC ACUTE MED/SURG PRIVATE ROOM

## 2019-01-14 PROCEDURE — 85007 BL SMEAR W/DIFF WBC COUNT: CPT

## 2019-01-14 PROCEDURE — 97116 GAIT TRAINING THERAPY: CPT

## 2019-01-14 PROCEDURE — 83735 ASSAY OF MAGNESIUM: CPT

## 2019-01-14 PROCEDURE — 80053 COMPREHEN METABOLIC PANEL: CPT

## 2019-01-14 RX ORDER — BISACODYL 10 MG
10 SUPPOSITORY, RECTAL RECTAL DAILY PRN
Status: DISCONTINUED | OUTPATIENT
Start: 2019-01-14 | End: 2019-01-16 | Stop reason: HOSPADM

## 2019-01-14 RX ADMIN — CYANOCOBALAMIN 1000 MCG: 1000 INJECTION, SOLUTION INTRAMUSCULAR; SUBCUTANEOUS at 10:01

## 2019-01-14 RX ADMIN — BISACODYL 10 MG: 10 SUPPOSITORY RECTAL at 10:01

## 2019-01-14 RX ADMIN — ASCORBIC ACID, VITAMIN A PALMITATE, CHOLECALCIFEROL, THIAMINE HYDROCHLORIDE, RIBOFLAVIN-5 PHOSPHATE SODIUM, PYRIDOXINE HYDROCHLORIDE, NIACINAMIDE, DEXPANTHENOL, ALPHA-TOCOPHEROL ACETATE, VITAMIN K1, FOLIC ACID, BIOTIN, CYANOCOBALAMIN: 200; 3300; 200; 6; 3.6; 6; 40; 15; 10; 150; 600; 60; 5 INJECTION, SOLUTION INTRAVENOUS at 09:01

## 2019-01-14 RX ADMIN — PIPERACILLIN AND TAZOBACTAM 4.5 G: 4; .5 INJECTION, POWDER, LYOPHILIZED, FOR SOLUTION INTRAVENOUS; PARENTERAL at 06:01

## 2019-01-14 RX ADMIN — PIPERACILLIN AND TAZOBACTAM 4.5 G: 4; .5 INJECTION, POWDER, LYOPHILIZED, FOR SOLUTION INTRAVENOUS; PARENTERAL at 10:01

## 2019-01-14 RX ADMIN — ENOXAPARIN SODIUM 40 MG: 100 INJECTION SUBCUTANEOUS at 05:01

## 2019-01-14 RX ADMIN — PANTOPRAZOLE SODIUM 40 MG: 40 INJECTION, POWDER, FOR SOLUTION INTRAVENOUS at 09:01

## 2019-01-14 RX ADMIN — IRON SUCROSE 100 MG: 20 INJECTION, SOLUTION INTRAVENOUS at 10:01

## 2019-01-14 RX ADMIN — VANCOMYCIN HYDROCHLORIDE 1250 MG: 100 INJECTION, POWDER, LYOPHILIZED, FOR SOLUTION INTRAVENOUS at 12:01

## 2019-01-14 RX ADMIN — PIPERACILLIN AND TAZOBACTAM 4.5 G: 4; .5 INJECTION, POWDER, LYOPHILIZED, FOR SOLUTION INTRAVENOUS; PARENTERAL at 12:01

## 2019-01-14 RX ADMIN — SOYBEAN OIL 250 ML: 20 INJECTION, SOLUTION INTRAVENOUS at 09:01

## 2019-01-14 RX ADMIN — VANCOMYCIN HYDROCHLORIDE 1250 MG: 100 INJECTION, POWDER, LYOPHILIZED, FOR SOLUTION INTRAVENOUS at 05:01

## 2019-01-14 RX ADMIN — PANTOPRAZOLE SODIUM 40 MG: 40 INJECTION, POWDER, FOR SOLUTION INTRAVENOUS at 10:01

## 2019-01-14 NOTE — PT/OT/SLP PROGRESS
"Speech Language Pathology Treatment    Patient Name:  Hoang Santos Jr.   MRN:  [id number]  Admitting Diagnosis: Malignant carcinoid tumor of duodenum    Recommendations:                 General Recommendations:  Dysphagia therapy  Diet recommendations:  NPO, Liquid Diet Level: NPO   Aspiration Precautions: frequent oral care, PO trials with SLP only   General Precautions: Standard, fall, aspiration, respiratory  Communication strategies:  none    Subjective     Pt seen in room for ongoing direct and indirect dysphagia tx. Pt now on clear liquid diet per surgery team.   Patient goals: "I don't have much of an appetite."     Pain/Comfort:  · Pain Rating 1: 0/10    Objective:     Has the patient been evaluated by SLP for swallowing?   Yes  Keep patient NPO? Yes   Current Respiratory Status: nasal cannula      Pt seen in room, wife at bedside. Pt in good spirits. He is willingly to work on exercises. He stated that he has had some bloody secretions. RN reported he has performed too much oral suctioning which is irritating back of his throat.   Pt participated in dysphagia ex with SLP: lingual retraction x15, BOT x15, laryngeal lift x20, resistance ex with tongue using tongue depressor x10. Pt trialed with jello x2 trials and water by tsp and cup trials x8.   Pt continues to exhibit dysphagia symptoms (throat clear and attempting to expectorate secretions). Pt with delayed laryngeal lift noted on each trial. Pt with intermittent wet voice noted. Discussed aspiration precautions with pt. He feels "I am swallowing better." Discussed post acute ST services at [facility].       Assessment:     Hoang Santos Jr. is a 68 y.o. male admitted with Malignant carcinoid tumor of duodenum who presents with dysphagia per MBS on 1/11/19.   .    Goals:   Multidisciplinary Problems     SLP Goals        Problem: SLP Goal    Goal Priority Disciplines Outcome   SLP Goal     SLP Ongoing (interventions implemented as appropriate)   Description:  " Short Term Goals:  1. Pt will participate in ongoing swallow eval to make appropriate diet recs.    2. Pt will continue NPO with strict aspiration precautions in place.   3. Pt will participate in OMEs to improve laryngeal & pharyngeal strength.  4. Pt will consume PO trials w/ ST as tolerated.                    Plan:     · Patient to be seen:  3 x/week   · Plan of Care expires:  02/09/19  · Plan of Care reviewed with:  patient   · SLP Follow-Up:  Yes       Discharge recommendations:  (ST at DC)   Barriers to Discharge:  Decreased Care Giver Support    Time Tracking:     SLP Treatment Date:   01/14/19  Speech Start Time:  1003  Speech Stop Time:  1018     Speech Total Time (min):  15 min    Billable Minutes: Treatment Swallowing Dysfunction 15    AJITH Ley, CCC-SLP  01/14/2019

## 2019-01-14 NOTE — PLAN OF CARE
"Problem: Occupational Therapy Goal  Goal: Occupational Therapy Goal  Goals to be met by: 1/25/2019    Patient will increase functional independence with ADLs by performing:    UE Dressing with Modified [place].  LE Dressing with Modified [place].  Grooming while standing with Modified [place].  Toileting from toilet with Modified [place] for hygiene and clothing management.   Rolling to Bilateral with Modified [place].   Supine to sit with Modified [place].  Step transfer with Modified [place]  Toilet transfer to toilet with Modified [place].  Upper extremity exercise program x10 reps per handout, with independence.      Outcome: Ongoing (interventions implemented as appropriate)  Patient with increased lethargy this date. Patient and spouse educated on positioning, increasing awareness of vocal quality (attentive to "gurgled speech"), etc. Patient will benefit from continued skilled OT to address functional deficits.       "

## 2019-01-14 NOTE — PROGRESS NOTES
"[facility]  Adult Nutrition  Progress Note    SUMMARY       Recommendations    Recommendation:   1. Enocurage intake of Clear Liquid diet as tolerated.   2. If intake does not improve would consider increasing TPN to 75ml/hr with daily IVFE.    Goals:   Pt will tolerate TPN  Nutrition Goal Status: progressing towards goal  Communication of RD Recs: reviewed with RNMaribel)    Reason for Assessment  Reason For Assessment: RD follow-up  Diagnosis: cancer diagnosis/related complications(Malignant carcinoid tumor of duodenum)  Relevant Medical History: HLD, sleep apnea, primary neuroendocrine neoplasm of duodenum  General Information Comments: Pt started on Clear Liquid diet. Pt with poor po intake per RN due to poor appetite. Denies N/V. Receiving Clinimix 5/15 at 50ml/hr with daily IVFE. 10 days postop. NFPE completed 1/5.   Nutrition Discharge Planning: pt to d/c on low residue diet    Nutrition Risk Screen  Nutrition Risk Screen: dysphagia or difficulty swallowing    Nutrition/Diet History  Patient Reported Diet/Restrictions/Preferences: general  Food Preferences: no Denominational or cultural food prefs identified  Spiritual, Cultural Beliefs, Mandaen Practices, Values that Affect Care: no  Vitamin/Mineral/Herbal Supplements: Iron, B12  Food Allergies: NKFA  Factors Affecting Nutritional Intake: altered gastrointestinal function, NPO    Anthropometrics  Temp: 98.8 °F (37.1 °C)  Height Method: Stated  Height: 5' 6" (167.6 cm)  Height (inches): 66 in  Weight Method: Bed Scale  Weight: 96.1 kg (211 lb 13.8 oz)  Weight (lb): 211.86 lb  Ideal Body Weight (IBW), Male: 142 lb  % Ideal Body Weight, Male (lb): 158.51 lb  BMI (Calculated): 36.4  BMI Grade: 35 - 39.9 - obesity - grade II     Lab/Procedures/Meds  Pertinent Labs Reviewed: reviewed  Pertinent Labs Comments: Glu 130H, Ca 8.5L, Alb 2.3L  Pertinent Medications Reviewed: reviewed  Pertinent Medications Comments: albumin, " dopamine    Estimated/Assessed Needs  Weight Used For Calorie Calculations: 96.1 kg (211 lb 13.8 oz)  Energy Calorie Requirements (kcal): 2181  Energy Need Method: [place]-St Jeor  Protein Requirements: 84g (1.3g/kg)  Weight Used For Protein Calculations: 64.5 kg (142 lb 3.2 oz)(IBW)  Estimated Fluid Requirement Method: RDA Method  RDA Method (mL): 2181  CHO Requirement: 45%    Nutrition Prescription Ordered  Current Diet Order: Clear Liquid  Current Nutrition Support Formula Ordered: Clinimix E 5/15  Current Nutrition Support Rate Ordered: 50 (ml)  Current Nutrition Support Frequency Ordered: ml/hr  Oral Nutrition Supplement: daily IVFE    Evaluation of Received Nutrient/Fluid Intake  Parenteral Calories (kcal): 852  Parenteral Protein (gm): 60  Parenteral Fluid (mL): 1200  Lipid Calories (kcals): 500 kcals  GIR (Glucose Infusion Rate) (mg/kg/min): 1.3 mg/kg/min  Other Calories (kcal): 0  Total Calories (kcal): 1352  % Kcal Needs: 62  % Protein Needs: 71  I/O: reviewed  Energy Calories Required: not meeting needs  Protein Required: not meeting needs  Fluid Required: not meeting needs  Comments: LBM 1/7  Tolerance: not tolerating  % Intake of Estimated Energy Needs: 50 - 75 %  % Meal Intake: 0 - 25 %    Nutrition Risk    Level of Risk/Frequency of Follow-up: (2xweekly)     Assessment and Plan    Malignant carcinoid tumor of the duodenum    Contributing Nutrition Diagnosis  Altered GI Function    Related to (etiology):   Cancer s/p surgery    Signs and Symptoms (as evidenced by):   NPO/NG tube    Interventions:  Collaboration with other providers    Nutrition Diagnosis Status:   Continues            Monitor and Evaluation  Food and Nutrient Intake: energy intake  Food and Nutrient Adminstration: diet order, enteral and parenteral nutrition administration  Physical Activity and Function: nutrition-related ADLs and IADLs  Anthropometric Measurements: weight  Biochemical Data, Medical Tests and Procedures: electrolyte  and renal panel  Nutrition-Focused Physical Findings: overall appearance     Nutrition Follow-Up  RD Follow-up?: Yes

## 2019-01-14 NOTE — PLAN OF CARE
"Problem: Physical Therapy Goal  Goal: Physical Therapy Goal  Goals to be met by: 19     Patient will increase functional independence with mobility by performin. Supine to sit with supervision.   2. Sit to supine with supervision.   3. Sit<>stand transfer with supervision .   4. OOB to chair for > 1 hour MET 2019  5.  Gait > 150 feet with SBA with /without AD   6.  Ascend/descend 4" curb step with CGA .      Outcome: Ongoing (interventions implemented as appropriate)  Goals ongoing      "

## 2019-01-14 NOTE — PT/OT/SLP PROGRESS
Occupational Therapy  Visit Attempt    Patient Name:  Hoang Santos Jr.   MRN:  [id number]    Patient not seen today secondary to RN reporting patient is finally sleeping/resting after a busy morning. Will follow-up in PM, as time permits.     JENNIFER Calero  1/14/2019

## 2019-01-14 NOTE — PROGRESS NOTES
Reorder Clinimix-E 5/15% at 50 ml/hr and Fat Emulsion 20% 250 ml at 20 ml/hr x 12 hrs per pharmacy protocol

## 2019-01-14 NOTE — PLAN OF CARE
Problem: Parenteral Nutrition  Goal: Effective Intravenous Nutrition Therapy Delivery  Outcome: Ongoing (interventions implemented as appropriate)  Recommendation:   1. Enocurage intake of Clear Liquid diet as tolerated.   2. If intake does not improve would consider increasing TPN to 75ml/hr with daily IVFE.    Goals:   Pt will tolerate TPN  Nutrition Goal Status: progressing towards goal  Communication of RD Recs: reviewed with RN(Deirdre)

## 2019-01-14 NOTE — PT/OT/SLP PROGRESS
"Occupational Therapy   Treatment    Name: Hoang Santos Jr.  MRN: [id number]  Admitting Diagnosis:  Malignant carcinoid tumor of duodenum  10 Days Post-Op    Recommendations:     Discharge Recommendations: (HH OT/PT/ST)  Discharge Equipment Recommendations:  (TBD)  Barriers to discharge:  None    Assessment:     Hoang Santos Jr. is a 68 y.o. male with a medical diagnosis of Malignant carcinoid tumor of duodenum. Performance deficits affecting function are weakness, impaired endurance, impaired self care skills, impaired functional mobilty, gait instability, impaired balance, pain, impaired skin, impaired cardiopulmonary response to activity, decreased ROM, decreased safety awareness.     Rehab Prognosis:  Good; patient would benefit from acute skilled OT services to address these deficits and reach maximum level of function.       Plan:     Patient to be seen 5 x/week to address the above listed problems via self-care/home management, therapeutic activities, therapeutic exercises  · Plan of Care Expires: 02/10/19  · Plan of Care Reviewed with: patient, spouse    Subjective     Pain/Comfort:  · Pain Rating 1: 0/10  · Pain Rating Post-Intervention 1: 0/10    Objective:     Communicated with: Deirdre baumann prior to session.     General Precautions: Standard, fall, aspiration, respiratory   Orthopedic Precautions:N/A   Braces: N/A     AMPAC 6 Click ADL: 15    Treatment & Education:  Patient with increased lethargy this date. Patient and spouse educated on positioning, increasing awareness of vocal quality (attentive to "gurgled speech"), and increasing OOB activity throughout the day.    Patient left HOB elevated with all lines intact, call button in reach, bed alarm on, nurse notified and spouse presentEducation:      GOALS:   Multidisciplinary Problems     Occupational Therapy Goals        Problem: Occupational Therapy Goal    Goal Priority Disciplines Outcome Interventions   Occupational Therapy Goal     OT, PT/OT " Ongoing (interventions implemented as appropriate)    Description:  Goals to be met by: 1/25/2019    Patient will increase functional independence with ADLs by performing:    UE Dressing with Modified [place].  LE Dressing with Modified [place].  Grooming while standing with Modified [place].  Toileting from toilet with Modified [place] for hygiene and clothing management.   Rolling to Bilateral with Modified [place].   Supine to sit with Modified [place].  Step transfer with Modified [place]  Toilet transfer to toilet with Modified [place].  Upper extremity exercise program x10 reps per handout, with independence.                       Time Tracking:     OT Date of Treatment: 01/14/19  OT Start Time: 1453  OT Stop Time: 1505  OT Total Time (min): 12 min    Billable Minutes:Therapeutic Activity 12    CONSTANCE Calero/RAMON  1/14/2019

## 2019-01-14 NOTE — PHYSICIAN QUERY
PT Name: Hoang Santos Jr.  MR #: [id number]     PHYSICIAN QUERY -  ELECTROLYTE CLARIFICATION      CDS/: Marlene Smith RN                 Contact information:[email or web address]  This form is a permanent document in the medical record.     Query Date: January 14, 2019    By submitting this query, we are merely seeking further clarification of documentation to reflect the severity of illness of your patient. Please utilize your independent clinical judgment when addressing the question(s) below.    The Medical record reflects the following:     Indicators   Supporting Clinical Findings Location in Medical Record   x Lab Value(s) Lab 3.2-5.6 Lab 1/4-1/14   x Treatment                                 Medication KCl 10 mEq IVPB once  KCl 40 mEq NGT once  KCl 20 mEq IVPB once x3 MAR 1/4-1/14   x Other Replacing KCl 40 x2 today Neuroendocrine PN 1/12     Provider, please specify the diagnosis or diagnoses that correspond(s) to the above indicators. Branden all that apply:    [ xxx  ] Hypokalemia   [   ] Other electrolyte disturbance (please specify): _______   [   ]  Clinically Undetermined       Please document in your progress notes daily for the duration of treatment until resolved, and include in your discharge summary.

## 2019-01-14 NOTE — PLAN OF CARE
Problem: Adult Inpatient Plan of Care  Goal: Plan of Care Review  Outcome: Ongoing (interventions implemented as appropriate)  Patient AAo x 4. VSS. NAD. TPN infusing per mar. Patient moved to clear liquid diet today. Accordian drain with 75 cc output and bart drain with 10 cc output. No complaints of pain or nausea this shift. IV antibiotics infusing per mar. 2l oxygen. Up with assist. Safety maintained. Will continue to monitor.

## 2019-01-14 NOTE — PT/OT/SLP PROGRESS
Physical Therapy Treatment    Patient Name:  Hoang Santos Jr.   MRN:  [id number]    Recommendations:     Discharge Recommendations:  (TBD)   Discharge Equipment Recommendations: (TBD)   Barriers to discharge: decreased mobility,strength and endurance    Assessment:     Hoang Santos Jr. is a 68 y.o. male admitted with a medical diagnosis of Malignant carcinoid tumor of duodenum.  He presents with the following impairments/functional limitations:  weakness, impaired endurance, impaired functional mobilty, gait instability, impaired balance, decreased lower extremity function, decreased ROM, impaired coordination, impaired cardiopulmonary response to activity, impaired skin,pt with improving tolerance and OOB today,pt progressing toward goals,pt's disposition pending.    Rehab Prognosis: Good; patient would benefit from acute skilled PT services to address these deficits and reach maximum level of function.    Recent Surgery: Procedure(s) (LRB):  DUODENECTOMY (N/A)  LYMPHADENECTOMY (N/A)  GASTROJEJUNOSTOMY (N/A)  FUNDOPLICATION (N/A)  EGD (ESOPHAGOGASTRODUODENOSCOPY) (N/A)  CHOLECYSTECTOMY  BIOPSY, LIVER 10 Days Post-Op    Plan:     During this hospitalization, patient to be seen 6 x/week to address the identified rehab impairments via gait training, therapeutic activities, therapeutic exercises and progress toward the following goals:    · Plan of Care Expires:  02/04/19    Subjective     Chief Complaint: n/a  Patient/Family Comments/goals: pt agreeable to up in chair  Pain/Comfort:  · Pain Rating 1: (no c/o's)      Objective:     Communicated with nsg prior to session.  Patient found all lines intact, call button in reach, nsg notified and spouse present central line, oxygen, telemetry  upon PT entry to room.     General Precautions: Standard, aspiration, fall, respiratory   Orthopedic Precautions:N/A   Braces: N/A     Functional Mobility:  · Bed Mobility:     · Supine to Sit: stand by assistance and contact guard  "assistance  · Transfers:     · Sit to Stand:  stand by assistance with rolling walker  · Bed to Chair: supervision and stand by assistance with  rolling walker  using  ambulation  · Gait: amb ~20' X 1 and ~80'X 2 with RW and S with IV in tow  · Balance: fair standing balance with RW      AM-PAC 6 CLICK MOBILITY  Turning over in bed (including adjusting bedclothes, sheets and blankets)?: 3  Sitting down on and standing up from a chair with arms (e.g., wheelchair, bedside commode, etc.): 3  Moving from lying on back to sitting on the side of the bed?: 3  Moving to and from a bed to a chair (including a wheelchair)?: 3  Need to walk in hospital room?: 3  Climbing 3-5 steps with a railing?: 2  Basic Mobility Total Score: 17       Therapeutic Activities and Exercises: pt ambulated to and used bathroom also,pt's O2 sats on rm air at rest 94%,post gait 91%,O2 redonned    Patient left up in chair with all lines intact, call button in reach, nsg notified and spouse present..    GOALS: see general POC  Multidisciplinary Problems     Physical Therapy Goals        Problem: Physical Therapy Goal    Goal Priority Disciplines Outcome Goal Variances Interventions   Physical Therapy Goal     PT, PT/OT Ongoing (interventions implemented as appropriate)     Description:  Goals to be met by: 19     Patient will increase functional independence with mobility by performin. Supine to sit with supervision.   2. Sit to supine with supervision.   3. Sit<>stand transfer with supervision .   4. OOB to chair for > 1 hour MET 2019  5.  Gait > 150 feet with SBA with /without AD   6.  Ascend/descend 4" curb step with CGA .                       Time Tracking:     PT Received On: 19  PT Start Time: 0850     PT Stop Time: 0916  PT Total Time (min): 26 min     Billable Minutes: Gait Training 16 and Therapeutic Activity 10    Treatment Type: Treatment  PT/PTA: PTA     PTA Visit Number: 2     Yousuf Wetzel, LOTTIE  2019  "

## 2019-01-14 NOTE — PLAN OF CARE
case discussed with MD   pt to d/c to home with TPN      TN spoke with pt's wife -- ok to set up home TPN - she will choose a HH agency    Aki with [facility] started teaching process today - for poss d/c Tues/ probably Wed with home TPN and HH     dme TBD         01/14/19 1650   Discharge Reassessment   Assessment Type Discharge Planning Reassessment   Provided patient/caregiver education on the expected discharge date and the discharge plan Yes   Do you have any problems affording any of your prescribed medications? TBD   Discharge Plan A Home with family;Home Health  (home TPN )

## 2019-01-14 NOTE — PROCEDURES
"Hoang Santos Jr. is a 68 y.o. male patient.    Temp: 98.7 °F (37.1 °C) (01/14/19 1524)  Pulse: 93 (01/14/19 1549)  Resp: 16 (01/14/19 1549)  BP: 139/62 (01/14/19 1524)  SpO2: (!) 93 % (01/14/19 1549)  Weight: 96.1 kg (211 lb 13.8 oz) (01/14/19 0506)  Height: 5' 6" (167.6 cm) (01/10/19 0900)    PICC  Date/Time: 1/14/2019 4:44 PM  Performed by: Antonio Burks RN  Supervising provider: Maria Ines Rosales RN  Consent Done: Yes  Time out: Immediately prior to procedure a time out was called to verify the correct patient, procedure, equipment, support staff and site/side marked as required  Indications: vascular access  Anesthesia: local infiltration  Local anesthetic: lidocaine 1% without epinephrine  Anesthetic Total (mL): 3  Preparation: skin prepped with ChloraPrep  Skin prep agent dried: skin prep agent completely dried prior to procedure  Sterile barriers: all five maximum sterile barriers used - cap, mask, sterile gown, sterile gloves, and large sterile sheet  Hand hygiene: hand hygiene performed prior to central venous catheter insertion  Location details: right basilic  Catheter type: double lumen  Catheter size: 5 Fr  Catheter Length: 39cm    Ultrasound guidance: yes  Vessel Caliber: medium and patent, compressibility normal  Vascular Doppler: not done  Needle advanced into vessel with real time Ultrasound guidance.  Guidewire confirmed in vessel.  Sterile sheath used.  no esophageal manometryNumber of attempts: 1  Post-procedure: blood return through all ports, chlorhexidine patch and sterile dressing applied  Estimated blood loss (mL): 0  Specimens: No  Implants: No  Assessment: placement verified by x-ray  Tip Termination Explanation: see rad. report  Complications: none  Comments: Do not use until placement verified by MD Antonio Burks  1/14/2019  "

## 2019-01-14 NOTE — PLAN OF CARE
Problem: SLP Goal  Goal: SLP Goal  Short Term Goals:  1. Pt will participate in ongoing swallow eval to make appropriate diet recs.    2. Pt will continue NPO with strict aspiration precautions in place.   3. Pt will participate in OMEs to improve laryngeal & pharyngeal strength.  4. Pt will consume PO trials w/ ST as tolerated.   Outcome: Ongoing (interventions implemented as appropriate)  Good participation in ST session this date.

## 2019-01-14 NOTE — PHYSICIAN QUERY
PT Name: Hoang Santos Jr.  MR #: [id number]    Physician Query Form - Cause and Effect Relationship Clarification      CDS/: Marlene Smith RN                 Contact information:[email or web address]  This form is a permanent document in the medical record.     Query Date: January 14, 2019    By submitting this query, we are merely seeking further clarification of documentation. Please utilize your independent clinical judgment when addressing the question(s) below.    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         Are the conditions:      [xxx  ] Due to or associated with each other   [  ] Unrelated to each other   [  ] Other (Please Specify): _________________________   [  ] Clinically Undetermined

## 2019-01-14 NOTE — PLAN OF CARE
Problem: Adult Inpatient Plan of Care  Goal: Plan of Care Review  Outcome: Ongoing (interventions implemented as appropriate)  Pt AAOx4, wife at bedside throughout shift. Pt denies pain, n/v/d and SOB. Pt complains of bloody tinged saliva, instructed pt to not use yanker suction with any type of force. TPN, lipids, and IV antibiotics infusing per MAR to IJTLC - dressing CDI. Clear liquid diet tolerated fairly. JAY drain remains inflated per order and accordion drain with appropriate output. Pt states he is passing gas but has not had a BM since 1/7. Safety maintained, bed alarm on - will cont to monitor.

## 2019-01-14 NOTE — PROGRESS NOTES
[facility] General Surgery  Neuroendocrine Tumor Service  Progress Note     Admission Date: 1/4/2019  Hospital Length of Stay: 10  Principal Problem: Malignant carcinoid tumor of duodenum    After discussion with speech therapy as well as the patient today, the patient continues to suffer from dysphagia and is thus unable to provide adequate hydration and nutritional support to himself through oral feeds. The swelling has improved and he has made minimal progress indicating that we can avoid the necessity of a Gtube for feeds.   However, the patient will require nutritional support moving forward and at home.   Thus, his central line will be removed, a PICC line will be placed and home TPN will be arranged.   The patient will require TPN for a minimum of 90 days/3 mo in order to provide adequate nutritional support for wound healing and all around well being.    Jamie Vines MD  [facility] General Surgery

## 2019-01-14 NOTE — PROGRESS NOTES
[facility] General Surgery  Neuroendocrine Tumor Service  Progress Note     Admission Date: 1/4/2019  Hospital Length of Stay: 10  Principal Problem: Malignant carcinoid tumor of duodenum     Subjective:      Interval History:   NAEO  Tolerating CLD today  Denies nausea/vomiting  Denies pain  Ambulating  Positive for flatus no bm     Scheduled Meds:   cyanocobalamin  1,000 mcg Subcutaneous Daily    enoxaparin  40 mg Subcutaneous Daily    fat emulsion 20%  250 mL Intravenous Daily    iron sucrose (VENOFER) IVPB  100 mg Intravenous Daily    pantoprazole  40 mg Intravenous BID    piperacillin-tazobactam (ZOSYN) IVPB  4.5 g Intravenous Q8H    vancomycin (VANCOCIN) IVPB  1,250 mg Intravenous Q12H     Continuous Infusions:   Amino acid 5% - dextrose 15% (CLINIMIX-E) solution with additives (1L  provides 510 kcal/L dextrose, with 50 gm AA, 150 gm CHO, Na 35, K 30, Mg 5, Ca 4.5, Acetate 80, Cl 39, Phos 15) 50 mL/hr at 01/13/19 2058     PRN Meds:.albuterol sulfate, dextrose 50%, dextrose 50%, diphenhydrAMINE, glucose, glucose, hydrALAZINE, hydrocodone-apap 7.5-325 MG/15 ML, labetalol, meclizine, naloxone, ondansetron, ondansetron, promethazine (PHENERGAN) IVPB, sodium chloride 0.9%, sodium chloride 0.9%, varibar honey ba sulfate, varibar nectar Ba Sulfate, varibar pudding Ba Sulfate, varibar thin liquid ba sulfate    Objective:      Temp:  [97.8 °F (36.6 °C)-99.3 °F (37.4 °C)] 97.8 °F (36.6 °C)  Pulse:  [] 96  Resp:  [16-20] 16  SpO2:  [92 %-97 %] 94 %  BP: ()/(50-63) 92/56  Weight change: -0.9 kg (-15.8 oz)    Intake/Output Summary (Last 24 hours) at 1/14/2019 0717  Last data filed at 1/14/2019 0612  Gross per 24 hour   Intake 2751.99 ml   Output 2285 ml   Net 466.99 ml       Physical Exam:  GEN: awake, alert, NAD  CV: RRR  PULM: CTAB  ABD: S/NT/ND, incision c/d/i, IR drain in place, JAY in place  EXT: Peripheral pulses present and equal bilaterally    Recent Labs   Lab 01/14/19  8577       K 3.6   CO2 30*   CL 99   BUN 17   CREATININE 1.0   CALCIUM 8.5*   PROT 6.9   AST 73*   ALT 49*   ALKPHOS 107   BILITOT 1.1*     Recent Labs   Lab 01/14/19  0525   WBC 17.77*   HGB 7.7*   HCT 24.7*          Significant Imaging: I have reviewed all pertinent imaging results/findings within the past 24 hours.     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

## 2019-01-15 LAB
ALBUMIN SERPL BCP-MCNC: 2.1 G/DL
ALP SERPL-CCNC: 108 U/L
ALT SERPL W/O P-5'-P-CCNC: 51 U/L
ANION GAP SERPL CALC-SCNC: 7 MMOL/L
ANISOCYTOSIS BLD QL SMEAR: SLIGHT
AST SERPL-CCNC: 73 U/L
BACTERIA BLD CULT: NORMAL
BACTERIA BLD CULT: NORMAL
BACTERIA SPEC AEROBE CULT: NO GROWTH
BASOPHILS # BLD AUTO: ABNORMAL K/UL
BASOPHILS NFR BLD: 0 %
BILIRUB SERPL-MCNC: 1 MG/DL
BUN SERPL-MCNC: 16 MG/DL
CALCIUM SERPL-MCNC: 8.6 MG/DL
CHLORIDE SERPL-SCNC: 99 MMOL/L
CO2 SERPL-SCNC: 31 MMOL/L
CREAT SERPL-MCNC: 1 MG/DL
DIFFERENTIAL METHOD: ABNORMAL
EOSINOPHIL # BLD AUTO: ABNORMAL K/UL
EOSINOPHIL NFR BLD: 0 %
ERYTHROCYTE [DISTWIDTH] IN BLOOD BY AUTOMATED COUNT: 15.3 %
EST. GFR  (AFRICAN AMERICAN): >60 ML/MIN/1.73 M^2
EST. GFR  (NON AFRICAN AMERICAN): >60 ML/MIN/1.73 M^2
GLUCOSE SERPL-MCNC: 130 MG/DL
HCT VFR BLD AUTO: 25.1 %
HGB BLD-MCNC: 7.6 G/DL
HYPOCHROMIA BLD QL SMEAR: ABNORMAL
LYMPHOCYTES # BLD AUTO: ABNORMAL K/UL
LYMPHOCYTES NFR BLD: 4 %
MAGNESIUM SERPL-MCNC: 2.2 MG/DL
MCH RBC QN AUTO: 28.5 PG
MCHC RBC AUTO-ENTMCNC: 30.3 G/DL
MCV RBC AUTO: 94 FL
METAMYELOCYTES NFR BLD MANUAL: 1 %
MONOCYTES # BLD AUTO: ABNORMAL K/UL
MONOCYTES NFR BLD: 1 %
NEUTROPHILS NFR BLD: 69 %
NEUTS BAND NFR BLD MANUAL: 25 %
NRBC BLD-RTO: ABNORMAL /100 WBC
PHOSPHATE SERPL-MCNC: 2.8 MG/DL
PLATELET # BLD AUTO: 398 K/UL
PLATELET BLD QL SMEAR: ABNORMAL
PMV BLD AUTO: 9.6 FL
POCT GLUCOSE: 114 MG/DL (ref 70–110)
POCT GLUCOSE: 117 MG/DL (ref 70–110)
POCT GLUCOSE: 145 MG/DL (ref 70–110)
POCT GLUCOSE: 162 MG/DL (ref 70–110)
POIKILOCYTOSIS BLD QL SMEAR: SLIGHT
POLYCHROMASIA BLD QL SMEAR: ABNORMAL
POTASSIUM SERPL-SCNC: 3.6 MMOL/L
PROCALCITONIN SERPL IA-MCNC: 0.32 NG/ML
PROT SERPL-MCNC: 6.9 G/DL
RBC # BLD AUTO: 2.67 M/UL
SODIUM SERPL-SCNC: 137 MMOL/L
TARGETS BLD QL SMEAR: ABNORMAL
VANCOMYCIN TROUGH SERPL-MCNC: 15.3 UG/ML
WBC # BLD AUTO: 14.67 K/UL

## 2019-01-15 PROCEDURE — 94664 DEMO&/EVAL PT USE INHALER: CPT

## 2019-01-15 PROCEDURE — 63600175 PHARM REV CODE 636 W HCPCS: Performed by: SURGERY

## 2019-01-15 PROCEDURE — 83735 ASSAY OF MAGNESIUM: CPT

## 2019-01-15 PROCEDURE — 63600175 PHARM REV CODE 636 W HCPCS: Performed by: STUDENT IN AN ORGANIZED HEALTH CARE EDUCATION/TRAINING PROGRAM

## 2019-01-15 PROCEDURE — 11000001 HC ACUTE MED/SURG PRIVATE ROOM

## 2019-01-15 PROCEDURE — 84100 ASSAY OF PHOSPHORUS: CPT

## 2019-01-15 PROCEDURE — 97535 SELF CARE MNGMENT TRAINING: CPT

## 2019-01-15 PROCEDURE — 99900035 HC TECH TIME PER 15 MIN (STAT)

## 2019-01-15 PROCEDURE — 85027 COMPLETE CBC AUTOMATED: CPT

## 2019-01-15 PROCEDURE — 25000003 PHARM REV CODE 250: Performed by: SURGERY

## 2019-01-15 PROCEDURE — 94761 N-INVAS EAR/PLS OXIMETRY MLT: CPT

## 2019-01-15 PROCEDURE — 97110 THERAPEUTIC EXERCISES: CPT

## 2019-01-15 PROCEDURE — C9113 INJ PANTOPRAZOLE SODIUM, VIA: HCPCS | Performed by: STUDENT IN AN ORGANIZED HEALTH CARE EDUCATION/TRAINING PROGRAM

## 2019-01-15 PROCEDURE — 80053 COMPREHEN METABOLIC PANEL: CPT

## 2019-01-15 PROCEDURE — 92526 ORAL FUNCTION THERAPY: CPT

## 2019-01-15 PROCEDURE — 85007 BL SMEAR W/DIFF WBC COUNT: CPT

## 2019-01-15 PROCEDURE — 97530 THERAPEUTIC ACTIVITIES: CPT

## 2019-01-15 PROCEDURE — 97116 GAIT TRAINING THERAPY: CPT

## 2019-01-15 PROCEDURE — 84145 PROCALCITONIN (PCT): CPT

## 2019-01-15 PROCEDURE — 80202 ASSAY OF VANCOMYCIN: CPT

## 2019-01-15 RX ORDER — VANCOMYCIN HCL IN 5 % DEXTROSE 1G/250ML
1000 PLASTIC BAG, INJECTION (ML) INTRAVENOUS
Status: DISCONTINUED | OUTPATIENT
Start: 2019-01-16 | End: 2019-01-16 | Stop reason: HOSPADM

## 2019-01-15 RX ADMIN — PIPERACILLIN AND TAZOBACTAM 4.5 G: 4; .5 INJECTION, POWDER, LYOPHILIZED, FOR SOLUTION INTRAVENOUS; PARENTERAL at 08:01

## 2019-01-15 RX ADMIN — ASCORBIC ACID, VITAMIN A PALMITATE, CHOLECALCIFEROL, THIAMINE HYDROCHLORIDE, RIBOFLAVIN-5 PHOSPHATE SODIUM, PYRIDOXINE HYDROCHLORIDE, NIACINAMIDE, DEXPANTHENOL, ALPHA-TOCOPHEROL ACETATE, VITAMIN K1, FOLIC ACID, BIOTIN, CYANOCOBALAMIN: 200; 3300; 200; 6; 3.6; 6; 40; 15; 10; 150; 600; 60; 5 INJECTION, SOLUTION INTRAVENOUS at 09:01

## 2019-01-15 RX ADMIN — CYANOCOBALAMIN 1000 MCG: 1000 INJECTION, SOLUTION INTRAMUSCULAR; SUBCUTANEOUS at 08:01

## 2019-01-15 RX ADMIN — VANCOMYCIN HYDROCHLORIDE 1250 MG: 100 INJECTION, POWDER, LYOPHILIZED, FOR SOLUTION INTRAVENOUS at 06:01

## 2019-01-15 RX ADMIN — IRON SUCROSE 100 MG: 20 INJECTION, SOLUTION INTRAVENOUS at 08:01

## 2019-01-15 RX ADMIN — PIPERACILLIN AND TAZOBACTAM 4.5 G: 4; .5 INJECTION, POWDER, LYOPHILIZED, FOR SOLUTION INTRAVENOUS; PARENTERAL at 01:01

## 2019-01-15 RX ADMIN — VANCOMYCIN HYDROCHLORIDE 1250 MG: 100 INJECTION, POWDER, LYOPHILIZED, FOR SOLUTION INTRAVENOUS at 04:01

## 2019-01-15 RX ADMIN — PIPERACILLIN AND TAZOBACTAM 4.5 G: 4; .5 INJECTION, POWDER, LYOPHILIZED, FOR SOLUTION INTRAVENOUS; PARENTERAL at 05:01

## 2019-01-15 RX ADMIN — ENOXAPARIN SODIUM 40 MG: 100 INJECTION SUBCUTANEOUS at 05:01

## 2019-01-15 RX ADMIN — PANTOPRAZOLE SODIUM 40 MG: 40 INJECTION, POWDER, FOR SOLUTION INTRAVENOUS at 10:01

## 2019-01-15 RX ADMIN — PANTOPRAZOLE SODIUM 40 MG: 40 INJECTION, POWDER, FOR SOLUTION INTRAVENOUS at 09:01

## 2019-01-15 NOTE — PLAN OF CARE
Problem: Occupational Therapy Goal  Goal: Occupational Therapy Goal  Goals to be met by: 1/25/2019    Patient will increase functional independence with ADLs by performing:    UE Dressing with Modified [place].  LE Dressing with Modified [place].  Grooming while standing with Modified [place].  Toileting from toilet with Modified [place] for hygiene and clothing management.   Rolling to Bilateral with Modified [place].   Supine to sit with Modified [place].  Step transfer with Modified [place]  Toilet transfer to toilet with Modified [place].  Upper extremity exercise program x10 reps per handout, with independence.      Outcome: Ongoing (interventions implemented as appropriate)  Patient with good progress and activity tolerance this date. Will benefit from continued skilled OT to address functional deficits.

## 2019-01-15 NOTE — NURSING
VN cued into pt's room for introduction. VN informed pt that VN would be working along side bedside nurse and PCT throughout shift. Level of present pain assessed. At present no distress noted. Discussed with patient High fall risk protocol and interventions that have been initiated and cont be in place for safety. Patient verbalized clear understanding and cooperation using teach back method. Patient ambulating with walker in room with occupational therapist without difficulties.Chair alarm presently activated and in use. Patient states speech therapist worked with him this am and he tolerated clear liquids fairly well. Will cont to be available to patient and intervene prn.

## 2019-01-15 NOTE — PLAN OF CARE
Problem: Adult Inpatient Plan of Care  Goal: Plan of Care Review  Outcome: Ongoing (interventions implemented as appropriate)  Pt AAOx4, VSS, NAD noted, no complaints of n/v/d or pain, TPN/Antibiotics infusing per order, patient tolerating clear liquid diet, monitoring blood sugar, family at bedside, ambulating to bathroom, safety has been maintained, will continue to monitor.

## 2019-01-15 NOTE — PT/OT/SLP PROGRESS
Physical Therapy Treatment    Patient Name:  Hoang Santos Jr.   MRN:  [id number]    Recommendations:     Discharge Recommendations:  (HH)   Discharge Equipment Recommendations: walker, rolling   Barriers to discharge: decreased mobility,balance and endurance    Assessment:     Hoang Santos Jr. is a 68 y.o. male admitted with a medical diagnosis of Malignant carcinoid tumor of duodenum.  He presents with the following impairments/functional limitations:  weakness, impaired endurance, impaired functional mobilty, gait instability, impaired balance, decreased lower extremity function, decreased ROM, impaired coordination, decreased safety awareness,pt with increased sitting tolerance and improving mobility,pt requires S with gait and will benefit from HH services upon discharge.    Rehab Prognosis: Good; patient would benefit from acute skilled PT services to address these deficits and reach maximum level of function.    Recent Surgery: Procedure(s) (LRB):  DUODENECTOMY (N/A)  LYMPHADENECTOMY (N/A)  GASTROJEJUNOSTOMY (N/A)  FUNDOPLICATION (N/A)  EGD (ESOPHAGOGASTRODUODENOSCOPY) (N/A)  CHOLECYSTECTOMY  BIOPSY, LIVER 11 Days Post-Op    Plan:     During this hospitalization, patient to be seen 6 x/week to address the identified rehab impairments via gait training, therapeutic activities, therapeutic exercises and progress toward the following goals:    · Plan of Care Expires:  02/04/19    Subjective     Chief Complaint: n/a  Patient/Family Comments/goals: pt requested bathroom prior to rx.  Pain/Comfort:  · Pain Rating 1: 0/10      Objective:     Communicated with nsg prior to session.  Patient found all lines intact, call button in reach, bed alarm on and nsg notified PICC line, JAY drain, peripheral IV  upon PT entry to room.     General Precautions: Standard, aspiration, fall, respiratory   Orthopedic Precautions:N/A   Braces: N/A     Functional Mobility:  · Bed Mobility:     · Sit to Supine: stand by  "assistance  · Transfers:     · Sit to Stand:  supervision and stand by assistance with rolling walker  · Toilet Transfer: supervision with  rolling walker  using  ambulation  · Gait: amb ~20' X 1 and ~130' X 1 with RW and S with IV in tow  · Balance: good standing balance with RW      AM-PAC 6 CLICK MOBILITY  Turning over in bed (including adjusting bedclothes, sheets and blankets)?: 3  Sitting down on and standing up from a chair with arms (e.g., wheelchair, bedside commode, etc.): 3  Moving from lying on back to sitting on the side of the bed?: 3  Moving to and from a bed to a chair (including a wheelchair)?: 3  Need to walk in hospital room?: 3  Climbing 3-5 steps with a railing?: 2  Basic Mobility Total Score: 17       Therapeutic Activities and Exercises: le supine ex's X 10-12 reps inc: ap,qs,hs,abd/add,slr       Patient left supine with all lines intact, call button in reach, bed alarm on and nsg notified..    GOALS: see general POC  Multidisciplinary Problems     Physical Therapy Goals        Problem: Physical Therapy Goal    Goal Priority Disciplines Outcome Goal Variances Interventions   Physical Therapy Goal     PT, PT/OT Ongoing (interventions implemented as appropriate)     Description:  Goals to be met by: 19     Patient will increase functional independence with mobility by performin. Supine to sit with supervision.   2. Sit to supine with supervision.   3. Sit<>stand transfer with supervision .   4. OOB to chair for > 1 hour MET 2019  5.  Gait > 150 feet with SBA with /without AD   6.  Ascend/descend 4" curb step with CGA .                       Time Tracking:     PT Received On: 01/15/19  PT Start Time: 1316     PT Stop Time: 1340  PT Total Time (min): 24 min     Billable Minutes: Gait Training 15 and Therapeutic Exercise 9    Treatment Type: Treatment  PT/PTA: PTA     PTA Visit Number: 3     Yousuf Wetzel, LOTITE  01/15/2019  "

## 2019-01-15 NOTE — PROGRESS NOTES
[facility] General Surgery  Neuroendocrine Tumor Service  Progress Note     Admission Date: 1/4/2019  Hospital Length of Stay: 11  Principal Problem: Malignant carcinoid tumor of duodenum     Subjective:      Interval History:   NAEO  Tolerating CLD with no nausea or vomiting   Denies nausea/vomiting  Ambulating  Had BM last night   Pain controlled     Scheduled Meds:   cyanocobalamin  1,000 mcg Subcutaneous Daily    enoxaparin  40 mg Subcutaneous Daily    fat emulsion 20%  250 mL Intravenous Daily    iron sucrose (VENOFER) IVPB  100 mg Intravenous Daily    pantoprazole  40 mg Intravenous BID    piperacillin-tazobactam (ZOSYN) IVPB  4.5 g Intravenous Q8H    vancomycin (VANCOCIN) IVPB  1,250 mg Intravenous Q12H     Continuous Infusions:   Amino acid 5% - dextrose 15% (CLINIMIX-E) solution with additives (1L  provides 510 kcal/L dextrose, with 50 gm AA, 150 gm CHO, Na 35, K 30, Mg 5, Ca 4.5, Acetate 80, Cl 39, Phos 15) 50 mL/hr at 01/14/19 2136     PRN Meds:.albuterol sulfate, bisacodyl, dextrose 50%, dextrose 50%, diphenhydrAMINE, glucose, glucose, hydrALAZINE, hydrocodone-apap 7.5-325 MG/15 ML, labetalol, meclizine, naloxone, ondansetron, ondansetron, promethazine (PHENERGAN) IVPB, sodium chloride 0.9%, sodium chloride 0.9%, varibar honey ba sulfate, varibar nectar Ba Sulfate, varibar pudding Ba Sulfate, varibar thin liquid ba sulfate    Objective:      Temp:  [97.6 °F (36.4 °C)-98.8 °F (37.1 °C)] 97.6 °F (36.4 °C)  Pulse:  [] 99  Resp:  [16-20] 19  SpO2:  [92 %-99 %] 92 %  BP: ()/(53-62) 110/56  Weight change: 2.2 kg (4 lb 13.6 oz)    Intake/Output Summary (Last 24 hours) at 1/15/2019 0711  Last data filed at 1/15/2019 0705  Gross per 24 hour   Intake 2506.8 ml   Output 1695 ml   Net 811.8 ml       Physical Exam:  GEN: awake, alert, NAD  CV: RRR  PULM: CTAB  ABD: S/NT/ND, incision c/d/i, IR drain in place, JAY in place  EXT: Peripheral pulses present and equal  bilaterally    Recent Labs   Lab 01/15/19  0500      K 3.6   CO2 31*   CL 99   BUN 16   CREATININE 1.0   CALCIUM 8.6*   PROT 6.9   AST 73*   ALT 51*   ALKPHOS 108   BILITOT 1.0     Recent Labs   Lab 01/15/19  0500   WBC 14.67*   HGB 7.6*   HCT 25.1*   *       Significant Imaging: I have reviewed all pertinent imaging results/findings within the past 24 hours.     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

## 2019-01-15 NOTE — PLAN OF CARE
Problem: SLP Goal  Goal: SLP Goal  Short Term Goals:  1. Pt will participate in ongoing swallow eval to make appropriate diet recs.    2. Pt will continue NPO with strict aspiration precautions in place.   3. Pt will participate in OMEs to improve laryngeal & pharyngeal strength.  4. Pt will consume PO trials w/ ST as tolerated.   Outcome: Ongoing (interventions implemented as appropriate)  Pt seen in room, he participated in all dysphagia ex with good success.

## 2019-01-15 NOTE — PLAN OF CARE
Problem: Adult Inpatient Plan of Care  Goal: Plan of Care Review  Outcome: Ongoing (interventions implemented as appropriate)  Pt is AAOx3 with no complaints of pain or n/v. Pt with no appetite, not drinking much of clear liquid trays. Pt up in chair and walking in room to bathroom with assist from walker and one person. Pt receiving IV TPN and antibiotics. R accordion drain put out 60cc thick drainage.

## 2019-01-15 NOTE — NURSING
VN note: VN notified Dr. Negrete X-ray obtained to verify placement of PICC. VN read x-ray to MD. MD stated ok to place nursing communication to use PICC line. Order placed per MD instruction.

## 2019-01-15 NOTE — PT/OT/SLP PROGRESS
Occupational Therapy   Treatment    Name: Hoang Santos Jr.  MRN: [id number]  Admitting Diagnosis:  Malignant carcinoid tumor of duodenum  11 Days Post-Op    Recommendations:     Discharge Recommendations: ( OT/PT/ST)  Discharge Equipment Recommendations:  walker, rolling  Barriers to discharge:  None    Assessment:   Patient with good progress and activity tolerance this date. Will benefit from continued skilled OT to address functional deficits.     Haong Santos Jr. is a 68 y.o. male with a medical diagnosis of Malignant carcinoid tumor of duodenum. Performance deficits affecting function are weakness, impaired endurance, impaired self care skills, impaired functional mobilty, gait instability, impaired balance, pain, decreased safety awareness.     Rehab Prognosis:  Good; patient would benefit from acute skilled OT services to address these deficits and reach maximum level of function.       Plan:     Patient to be seen 5 x/week to address the above listed problems via self-care/home management, therapeutic activities, therapeutic exercises  · Plan of Care Expires: 02/10/19  · Plan of Care Reviewed with: patient    Subjective     Pain/Comfort:  · Pain Rating 1: 0/10  · Pain Rating Post-Intervention 1: 0/10    Objective:     Communicated with: Mera baumann prior to session.  Patient found HOB elevated with PICC line, JAY drain upon OT entry to room.    General Precautions: Standard, fall, aspiration, respiratory   Orthopedic Precautions:N/A   Braces: N/A     Bed Mobility:    · Patient completed Scooting/Bridging with stand by assistance  · Patient completed Supine to Sit with contact guard assistance and with side rail     Functional Mobility/Transfers:  · Patient completed Sit <> Stand Transfer with contact guard assistance  with  rolling walker   · Patient completed Bed <> Chair Transfer using Step Transfer technique with contact guard assistance with rolling walker    Activities of Daily Living:  · Grooming:  CGA for balance to wash hands at sink    · Upper Body Dressing: contact guard assistance to keon gown as robe  · Toileting: contact guard assistance for balance to urinate standing at toileting    [facility] 6 Click ADL: 17    Treatment & Education:  Patient with bed mob as noted above. Ambulated to bathroom using RW with CGA and min VCs for safety awareness. ADLs as above. Patient ambulated around nsg station with CGA and 1 standing rest break. Required VCs to correct posture. Patient requesting to sit UIC x 1 hour. PCT present for linen change.     Patient left up in chair with all lines intact, call button in reach, nurse notified and PCT presentEducation:      GOALS:   Multidisciplinary Problems     Occupational Therapy Goals        Problem: Occupational Therapy Goal    Goal Priority Disciplines Outcome Interventions   Occupational Therapy Goal     OT, PT/OT Ongoing (interventions implemented as appropriate)    Description:  Goals to be met by: 1/25/2019    Patient will increase functional independence with ADLs by performing:    UE Dressing with Modified [place].  LE Dressing with Modified [place].  Grooming while standing with Modified [place].  Toileting from toilet with Modified [place] for hygiene and clothing management.   Rolling to Bilateral with Modified [place].   Supine to sit with Modified [place].  Step transfer with Modified [place]  Toilet transfer to toilet with Modified [place].  Upper extremity exercise program x10 reps per handout, with independence.                       Time Tracking:     OT Date of Treatment: 01/15/19  OT Start Time: 1030  OT Stop Time: 1100  OT Total Time (min): 30 min    Billable Minutes:Self Care/Home Management 10  Therapeutic Activity 20    JENNIFER Calero  1/15/2019

## 2019-01-15 NOTE — PLAN OF CARE
Problem: Adult Inpatient Plan of Care  Goal: Plan of Care Review  Outcome: Ongoing (interventions implemented as appropriate)  Mr. Santos is AAO X 4. Vital signs are stable. Has Right upper arm PICC. TPN, Lipids and IV antibiotics given as prescribed. Still coughing and bringing lots and lots of phylum. Voided frequently. Slept fairly well. Will continue to monitor patient.

## 2019-01-15 NOTE — PT/OT/SLP PROGRESS
"Speech Language Pathology Treatment    Patient Name:  Hoang Santos Jr.   MRN:  [id number]  Admitting Diagnosis: Malignant carcinoid tumor of duodenum    Recommendations:                 General Recommendations:  continued dysphagia therapy   Diet recommendations:  NPO, Liquid Diet Level: Clears   Aspiration Precautions: upright for meals, small bites/sips, frequent oral care   General Precautions: Standard, fall, aspiration, respiratory  Communication strategies:  able to express self    Subjective     Pt seen in room, seated in recliner. Wife not present.   Patient goals: "I am doing my exercises."     Pain/Comfort:  · Pain Rating 1: 0/10    Objective:     Has the patient been evaluated by SLP for swallowing?   Yes  Keep patient NPO? No   Current Respiratory Status: nasal cannula      Pt seen in room for direct and indirect dysphagia tx. Pt seated in recliner. Pt completed dysphagia ex program including: BOT x20, lingual strength/resistance ex x20, laryngeal lift x20 and hard effortful swallow x5 with good effort noted. Pt did take small amounts of water and requested popsicle following session. Pt with one throat clear on 9/10 trials. Pt encouraged to use cough to spit up secretions over use of yanker. Pt did agree and reported he is not using it consistently. Pt continues to report dcr intake and dcr appetite at this time. Pt will continue on TPN at [place]. Pt will need continued ST at [place].     Assessment:     Hoang Santos Jr. is a 68 y.o. male admitted with Malignant carcinoid tumor of duodenum who presents with an SLP diagnosis of dysphagia.      Goals:   Multidisciplinary Problems     SLP Goals        Problem: SLP Goal    Goal Priority Disciplines Outcome   SLP Goal     SLP Ongoing (interventions implemented as appropriate)   Description:  Short Term Goals:  1. Pt will participate in ongoing swallow eval to make appropriate diet recs.    2. Pt will continue NPO with strict aspiration precautions in place.   3. " Pt will participate in OMEs to improve laryngeal & pharyngeal strength.  4. Pt will consume PO trials w/ ST as tolerated.                    Plan:     · Patient to be seen:  3 x/week   · Plan of Care expires:  02/09/19  · Plan of Care reviewed with:  patient   · SLP Follow-Up:  Yes       Discharge recommendations:  ( PT/OT/ST services)       Time Tracking:     SLP Treatment Date:   01/15/19  Speech Start Time:  1145  Speech Stop Time:  1201     Speech Total Time (min):  16 min    Billable Minutes: Treatment Swallowing Dysfunction 16    AJITH Ley, CCC-SLP  01/15/2019

## 2019-01-16 VITALS
TEMPERATURE: 98 F | HEIGHT: 66 IN | DIASTOLIC BLOOD PRESSURE: 57 MMHG | OXYGEN SATURATION: 95 % | HEART RATE: 117 BPM | BODY MASS INDEX: 34.37 KG/M2 | RESPIRATION RATE: 18 BRPM | WEIGHT: 213.88 LBS | SYSTOLIC BLOOD PRESSURE: 103 MMHG

## 2019-01-16 PROBLEM — N30.00 ACUTE CYSTITIS WITHOUT HEMATURIA: Status: RESOLVED | Noted: 2019-01-14 | Resolved: 2019-01-16

## 2019-01-16 LAB
ALBUMIN SERPL BCP-MCNC: 2.1 G/DL
ALP SERPL-CCNC: 110 U/L
ALT SERPL W/O P-5'-P-CCNC: 52 U/L
ANION GAP SERPL CALC-SCNC: 7 MMOL/L
ANISOCYTOSIS BLD QL SMEAR: SLIGHT
AST SERPL-CCNC: 69 U/L
BASOPHILS # BLD AUTO: ABNORMAL K/UL
BASOPHILS NFR BLD: 0 %
BILIRUB SERPL-MCNC: 1.1 MG/DL
BUN SERPL-MCNC: 15 MG/DL
CALCIUM SERPL-MCNC: 8.6 MG/DL
CHLORIDE SERPL-SCNC: 101 MMOL/L
CO2 SERPL-SCNC: 30 MMOL/L
CREAT SERPL-MCNC: 1 MG/DL
DIFFERENTIAL METHOD: ABNORMAL
EOSINOPHIL # BLD AUTO: ABNORMAL K/UL
EOSINOPHIL NFR BLD: 0 %
ERYTHROCYTE [DISTWIDTH] IN BLOOD BY AUTOMATED COUNT: 15.6 %
EST. GFR  (AFRICAN AMERICAN): >60 ML/MIN/1.73 M^2
EST. GFR  (NON AFRICAN AMERICAN): >60 ML/MIN/1.73 M^2
GLUCOSE SERPL-MCNC: 130 MG/DL
HCT VFR BLD AUTO: 25.1 %
HGB BLD-MCNC: 7.6 G/DL
HYPOCHROMIA BLD QL SMEAR: ABNORMAL
LYMPHOCYTES # BLD AUTO: ABNORMAL K/UL
LYMPHOCYTES NFR BLD: 8 %
MAGNESIUM SERPL-MCNC: 2.1 MG/DL
MCH RBC QN AUTO: 28.7 PG
MCHC RBC AUTO-ENTMCNC: 30.3 G/DL
MCV RBC AUTO: 95 FL
MONOCYTES # BLD AUTO: ABNORMAL K/UL
MONOCYTES NFR BLD: 5 %
MYELOCYTES NFR BLD MANUAL: 1 %
NEUTROPHILS NFR BLD: 73 %
NEUTS BAND NFR BLD MANUAL: 13 %
PHOSPHATE SERPL-MCNC: 2.9 MG/DL
PLATELET # BLD AUTO: 455 K/UL
PLATELET BLD QL SMEAR: ABNORMAL
PMV BLD AUTO: 9.7 FL
POCT GLUCOSE: 130 MG/DL (ref 70–110)
POCT GLUCOSE: 133 MG/DL (ref 70–110)
POIKILOCYTOSIS BLD QL SMEAR: SLIGHT
POLYCHROMASIA BLD QL SMEAR: ABNORMAL
POTASSIUM SERPL-SCNC: 3.6 MMOL/L
PROT SERPL-MCNC: 7.1 G/DL
RBC # BLD AUTO: 2.65 M/UL
SODIUM SERPL-SCNC: 138 MMOL/L
TARGETS BLD QL SMEAR: ABNORMAL
WBC # BLD AUTO: 14.73 K/UL

## 2019-01-16 PROCEDURE — 94761 N-INVAS EAR/PLS OXIMETRY MLT: CPT

## 2019-01-16 PROCEDURE — 97116 GAIT TRAINING THERAPY: CPT

## 2019-01-16 PROCEDURE — 85027 COMPLETE CBC AUTOMATED: CPT

## 2019-01-16 PROCEDURE — 80053 COMPREHEN METABOLIC PANEL: CPT

## 2019-01-16 PROCEDURE — 94664 DEMO&/EVAL PT USE INHALER: CPT

## 2019-01-16 PROCEDURE — 84100 ASSAY OF PHOSPHORUS: CPT

## 2019-01-16 PROCEDURE — C9113 INJ PANTOPRAZOLE SODIUM, VIA: HCPCS | Performed by: STUDENT IN AN ORGANIZED HEALTH CARE EDUCATION/TRAINING PROGRAM

## 2019-01-16 PROCEDURE — 63600175 PHARM REV CODE 636 W HCPCS: Performed by: SURGERY

## 2019-01-16 PROCEDURE — 99900035 HC TECH TIME PER 15 MIN (STAT)

## 2019-01-16 PROCEDURE — 97530 THERAPEUTIC ACTIVITIES: CPT

## 2019-01-16 PROCEDURE — 83735 ASSAY OF MAGNESIUM: CPT

## 2019-01-16 PROCEDURE — 85007 BL SMEAR W/DIFF WBC COUNT: CPT

## 2019-01-16 PROCEDURE — 63600175 PHARM REV CODE 636 W HCPCS: Performed by: STUDENT IN AN ORGANIZED HEALTH CARE EDUCATION/TRAINING PROGRAM

## 2019-01-16 PROCEDURE — 25000003 PHARM REV CODE 250: Performed by: SURGERY

## 2019-01-16 RX ORDER — LEVOFLOXACIN 500 MG/1
500 TABLET, FILM COATED ORAL DAILY
Qty: 7 TABLET | Refills: 0 | Status: SHIPPED | OUTPATIENT
Start: 2019-01-16 | End: 2019-01-17

## 2019-01-16 RX ORDER — ONDANSETRON 4 MG/1
4 TABLET, FILM COATED ORAL EVERY 8 HOURS PRN
Qty: 30 TABLET | Refills: 2 | Status: SHIPPED | OUTPATIENT
Start: 2019-01-16 | End: 2019-07-30

## 2019-01-16 RX ORDER — MECLIZINE HYDROCHLORIDE 25 MG/1
25 TABLET ORAL 3 TIMES DAILY PRN
Qty: 14 TABLET | Refills: 0 | Status: SHIPPED | OUTPATIENT
Start: 2019-01-16 | End: 2019-07-30

## 2019-01-16 RX ORDER — HYDROCODONE BITARTRATE AND ACETAMINOPHEN 5; 325 MG/1; MG/1
1 TABLET ORAL EVERY 6 HOURS PRN
Qty: 30 TABLET | Refills: 0 | Status: SHIPPED | OUTPATIENT
Start: 2019-01-16 | End: 2019-02-11

## 2019-01-16 RX ORDER — BISACODYL 10 MG
10 SUPPOSITORY, RECTAL RECTAL DAILY PRN
Refills: 0 | COMMUNITY
Start: 2019-01-16 | End: 2019-05-10

## 2019-01-16 RX ORDER — HYDROCODONE BITARTRATE AND ACETAMINOPHEN 5; 325 MG/1; MG/1
1 TABLET ORAL EVERY 8 HOURS PRN
Qty: 30 TABLET | Refills: 0 | Status: SHIPPED | OUTPATIENT
Start: 2019-01-16 | End: 2019-01-16 | Stop reason: HOSPADM

## 2019-01-16 RX ORDER — OMEPRAZOLE 20 MG/1
20 CAPSULE, DELAYED RELEASE ORAL 2 TIMES DAILY
Qty: 30 CAPSULE | Refills: 4 | Status: SHIPPED | OUTPATIENT
Start: 2019-01-16 | End: 2019-01-17

## 2019-01-16 RX ADMIN — IRON SUCROSE 100 MG: 20 INJECTION, SOLUTION INTRAVENOUS at 08:01

## 2019-01-16 RX ADMIN — VANCOMYCIN HYDROCHLORIDE 1000 MG: 1 INJECTION, POWDER, FOR SOLUTION INTRAVENOUS at 07:01

## 2019-01-16 RX ADMIN — PIPERACILLIN AND TAZOBACTAM 4.5 G: 4; .5 INJECTION, POWDER, LYOPHILIZED, FOR SOLUTION INTRAVENOUS; PARENTERAL at 08:01

## 2019-01-16 RX ADMIN — CYANOCOBALAMIN 1000 MCG: 1000 INJECTION, SOLUTION INTRAMUSCULAR; SUBCUTANEOUS at 08:01

## 2019-01-16 RX ADMIN — PIPERACILLIN AND TAZOBACTAM 4.5 G: 4; .5 INJECTION, POWDER, LYOPHILIZED, FOR SOLUTION INTRAVENOUS; PARENTERAL at 01:01

## 2019-01-16 RX ADMIN — PANTOPRAZOLE SODIUM 40 MG: 40 INJECTION, POWDER, FOR SOLUTION INTRAVENOUS at 09:01

## 2019-01-16 NOTE — PROGRESS NOTES
"Vancomycin Dosing and Monitoring Pharmacy Protocol    Hoang Santos Jr. is a 68 y.o. male    Height: 5' 6" (1.676 m)   Wt Readings from Last 3 Encounters:   01/15/19 98.3 kg (216 lb 11.4 oz)   01/03/19 104.3 kg (230 lb)   01/03/19 101.9 kg (224 lb 12.1 oz)       Temp Readings from Last 3 Encounters:   01/15/19 99.3 °F (37.4 °C) (Oral)   01/03/19 97.1 °F (36.2 °C) (Oral)   11/19/18 97.4 °F (36.3 °C) (Oral)      Lab Results   Component Value Date/Time    WBC 14.67 (H) 01/15/2019 05:00 AM    WBC 17.77 (H) 01/14/2019 05:25 AM    WBC 19.91 (H) 01/13/2019 05:00 AM      Lab Results   Component Value Date/Time    CREATININE 1.0 01/15/2019 05:00 AM    CREATININE 1.0 01/14/2019 05:25 AM    CREATININE 1.0 01/13/2019 05:00 AM      Lab Results   Component Value Date/Time    LACTATE 1.0 03/10/2017 02:02 PM    LACTATE 2.9 (H) 03/10/2017 12:10 PM       Serum creatinine: 1 mg/dL 01/15/19 0500  Estimated creatinine clearance: 78.7 mL/min    Antibiotics (From admission, onward)      Start     Stop Route Frequency Ordered    01/16/19 0630  vancomycin in dextrose 5 % 1 gram/250 mL IVPB 1,000 mg      -- IV Every 12 hours (non-standard times) 01/15/19 2213    01/11/19 0930  piperacillin-tazobactam 4.5 g in dextrose 5 % 100 mL IVPB (ready to mix system)      -- IV Every 8 hours (non-standard times) 01/11/19 0148          Antifungals (From admission, onward)      None            Microbiology Results (last 7 days)       Procedure Component Value Units Date/Time    Blood culture [[id number]] Collected:  01/10/19 1737    Order Status:  Completed Specimen:  Blood Updated:  01/15/19 2012     Blood Culture, Routine No growth after 5 days.    Blood culture [[id number]] Collected:  01/10/19 1737    Order Status:  Completed Specimen:  Blood Updated:  01/15/19 2012     Blood Culture, Routine No growth after 5 days.    Culture, Anaerobe [[id number]] Collected:  01/11/19 1013    Order Status:  Completed Specimen:  Body Fluid from Abdomen Updated:  " 01/15/19 1254     Anaerobic Culture Culture in progress    Narrative:       RLQ drain    Aerobic culture [[id number]] Collected:  01/11/19 1627    Order Status:  Completed Specimen:  Abscess from Abdomen Updated:  01/15/19 0859     Aerobic Bacterial Culture No growth    AFB Culture & Smear [[id number]] Collected:  01/11/19 1627    Order Status:  Completed Specimen:  Abscess from Abdomen Updated:  01/14/19 1644     AFB Culture & Smear Culture in progress     AFB CULTURE STAIN No acid fast bacilli seen.    Urine culture [[id number]]  (Susceptibility) Collected:  01/10/19 2334    Order Status:  Completed Specimen:  Urine Updated:  01/14/19 1021     Urine Culture, Routine ESCHERICHIA COLI    Narrative:       Preferred Collection Type->Urine, Clean Catch    Aerobic culture [[id number]] Collected:  01/11/19 1013    Order Status:  Completed Specimen:  Body Fluid from Abdomen Updated:  01/14/19 0851     Aerobic Bacterial Culture No growth    Narrative:       RLQ drain    Culture, Anaerobic [[id number]] Collected:  01/11/19 1627    Order Status:  Completed Specimen:  Abscess from Abdomen Updated:  01/14/19 0747     Anaerobic Culture Culture in progress    Urine culture [[id number]]  (Susceptibility) Collected:  01/11/19 1029    Order Status:  Completed Specimen:  Urine, Catheterized Updated:  01/13/19 2057     Urine Culture, Routine --     ESCHERICHIA COLI  10,000 - 49,999 cfu/ml      Gram stain [[id number]] Collected:  01/11/19 1627    Order Status:  Completed Specimen:  Abscess from Abdomen Updated:  01/12/19 0103     Gram Stain Result Rare WBC's      No organisms seen    Fungus culture [[id number]] Collected:  01/11/19 1627    Order Status:  Sent Specimen:  Abscess from Abdomen Updated:  01/11/19 2005    Gram stain [[id number]] Collected:  01/11/19 1013    Order Status:  Completed Specimen:  Body Fluid from Abdomen Updated:  01/11/19 1711     Gram Stain Result Rare WBC's      No organisms seen    Narrative:       RLQ drain    Gram  stain [[id number]] Collected:  19 1025    Order Status:  Completed Specimen:  Urine, Catheterized Updated:  19 1710     Gram Stain Result Rare WBC's      No organisms seen            Indication/Target trough:   HAP (target trough level 10-15 mcg/ml)     Hemodialysis:   N/A    Dosing Weight:   Wt Readings from Last 1 Encounters:   01/15/19 98.3 kg (216 lb 11.4 oz)       Last Vancomycin dose: 1250 mg   Date/Time given: 1/15/19 at 1816          Vancomycin level:  Recent Labs   Lab Result Units 19  0045 01/15/19  1626   Vancomycin-Trough ug/mL 9.8* 15.3     Recent Labs   Lab Result Units 01/15/19  1626   Vancomycin-Trough ug/mL 15.3       Per Protocol Initial/Adjustments Dosin. Initial/Adjustment Dose: DECREASE Vancomycin will be adjusted from 1250mg q12hr to 1000mg q12hr  2. Vancomycin Trough Level will be drawn on 19 at 0600 date/time     Pharmacy will continue to follow.    Please contact if you have any further questions. Thank you.    Danny Taylor, PharmD  [phone]

## 2019-01-16 NOTE — PROGRESS NOTES
Cued into patient's room.  Permission received per patient to turn camera to view patient.  Introduced as VN for night shift that will be working with floor nurse and nursing assistant.  Jay TERRY and Lukas SOLANO at bedside completed turning and cleaning patient.  Educated patient on VN's role in patient care. Plan of care reviewed with patient. Education per flowsheet.  Opportunity given for questions and questions answered.  No complaints.  Will cont to monitor.  Instructed to call for assistance.  Will cont to monitor.

## 2019-01-16 NOTE — PLAN OF CARE
TN met with pt, wife  Petyon  prior to  admit    TPN to be delivered to pt's room -- final teaching done by Aki with [facility]   pt set up with [facility]   RW delivered to room per DME direct     Future Appointments   Date Time Provider Department Center   1/17/2019  9:30 AM Kory Jack MD [facility] UROLOGY [facility]   1/21/2019  2:45 PM Madeleine Alvarado MD [facility] TUMOR [facility]   1/28/2019 11:15 AM Madeleine Alvarado MD [facility] TUMOR [facility]   1/31/2019 11:00 AM Lee Kay MD [facility] IM [facility]   2/4/2019 11:45 AM Madeleine Alvarado MD [facility] TUMOR [facility]   2/11/2019  1:00 PM Salo Nuñez MD [facility] GASTRO [facility]     pt has transportation to home   Discharge rounds on patient. Discussed followup appointments, blue discharge folder, discharge nurse will go over home medications and reasons for medications and final discharge instructions. All patient/caregiver questions answered. Patient verbalized understanding.         01/16/19 5147   Final Note   Assessment Type Final Discharge Note   Anticipated Discharge Disposition Home-Health  ([facility]; [facility] )   What phone number can be called within the next 1-3 days to see how you are doing after discharge? [phone]   Hospital Follow Up  Appt(s) scheduled? Yes   Discharge plans and expectations educations in teach back method with documentation complete? Yes   Right Care Referral Info   Post Acute Recommendation Home-care   Referral Type (Infusion / Home Care )   Facility Name ([facility] )

## 2019-01-16 NOTE — PROGRESS NOTES
Discharge papers given to patient for VN to review, walker delivered to patient, nurse teaching on home TPN, wife at bedside, medications delivered to room, safety has been maintained, will continue to monitor.

## 2019-01-16 NOTE — PT/OT/SLP PROGRESS
Physical Therapy Treatment    Patient Name:  Hoang Santos Jr.   MRN:  [id number]    Recommendations:     Discharge Recommendations:  ( PT)   Discharge Equipment Recommendations: walker, rolling   Barriers to discharge: decreased endurance and strength    Assessment:     Hoang Santos Jr. is a 68 y.o. male admitted with a medical diagnosis of Malignant carcinoid tumor of duodenum.  He presents with the following impairments/functional limitations:  weakness, impaired endurance, impaired functional mobilty, gait instability, impaired balance, decreased lower extremity function, impaired coordination, impaired skin,pt with improving status and remains with decreased balance and endurance,pt will benefit from HH services upon discharge.    Rehab Prognosis: Good; patient would benefit from acute skilled PT services to address these deficits and reach maximum level of function.    Recent Surgery: Procedure(s) (LRB):  DUODENECTOMY (N/A)  LYMPHADENECTOMY (N/A)  GASTROJEJUNOSTOMY (N/A)  FUNDOPLICATION (N/A)  EGD (ESOPHAGOGASTRODUODENOSCOPY) (N/A)  CHOLECYSTECTOMY  BIOPSY, LIVER 12 Days Post-Op    Plan:     During this hospitalization, patient to be seen 6 x/week to address the identified rehab impairments via gait training, therapeutic activities, therapeutic exercises and progress toward the following goals:    · Plan of Care Expires:  02/04/19    Subjective     Chief Complaint: n/a  Patient/Family Comments/goals: pt has assistance at home  Pain/Comfort:  · Pain Rating 1: 0/10      Objective:     Communicated with nsg prior to session.  Patient found all lines intact, call button in reach and nsg notified JAY drain, peripheral IV, PICC line  upon PT entry to room.     General Precautions: Standard, aspiration, fall, respiratory   Orthopedic Precautions:N/A   Braces: N/A     Functional Mobility:  · Bed Mobility:     · Supine to Sit: supervision  · Transfers:     · Sit to Stand:  supervision with rolling walker  · Bed to  "Chair: supervision with  rolling walker  using  ambulation  · Toilet Transfer: supervision with  rolling walker  using  ambulation  · Gait: amb ~20' X 1 and ~225' X 1 with RW and S with IV in tow  · Balance: good standing balance with RW      AM-PAC 6 CLICK MOBILITY  Turning over in bed (including adjusting bedclothes, sheets and blankets)?: 3  Sitting down on and standing up from a chair with arms (e.g., wheelchair, bedside commode, etc.): 3  Moving from lying on back to sitting on the side of the bed?: 3  Moving to and from a bed to a chair (including a wheelchair)?: 3  Need to walk in hospital room?: 3  Climbing 3-5 steps with a railing?: 2  Basic Mobility Total Score: 17       Therapeutic Activities and Exercises: pt used bathroom and cleaned himself independently,washed hands at sink inside RW with S       Patient left up in chair with all lines intact, call button in reach and nsg notified..    GOALS: see general POC  Multidisciplinary Problems     Physical Therapy Goals        Problem: Physical Therapy Goal    Goal Priority Disciplines Outcome Goal Variances Interventions   Physical Therapy Goal     PT, PT/OT Ongoing (interventions implemented as appropriate)     Description:  Goals to be met by: 19     Patient will increase functional independence with mobility by performin. Supine to sit with supervision. MET 19  2. Sit to supine with supervision.   3. Sit<>stand transfer with supervision . 19  4. OOB to chair for > 1 hour MET 2019  5.  Gait > 150 feet with SBA with /without AD MET 19  6.  Ascend/descend 4" curb step with CGA .                        Time Tracking:     PT Received On: 19  PT Start Time: 1108     PT Stop Time: 1138  PT Total Time (min): 30 min     Billable Minutes: Gait Training 16 and Therapeutic Activity 14    Treatment Type: Treatment  PT/PTA: PTA     PTA Visit Number: 4     Yousuf Wetzel, LOTTIE  2019  "

## 2019-01-16 NOTE — NURSING
Patient d/c home d/c instructions given to the patient and his family member.   Patient advised to start taking Bisacodyl, Hydrocodone, Levofloxacin, Meclizine, omeprazole, and Ondansetron. These meds were delivered to the bedside by [facility].  The nurse is checking on the printed Rx for the Hydrocodone or if it was also delivered. He will keep all scheduled appointments.  Patient will keep all follow up appointments.    Patient verbalized understanding of the D/C instructions. Education given, refer to clinical reference for education.   Waiting for transport

## 2019-01-16 NOTE — PLAN OF CARE
"Problem: Physical Therapy Goal  Goal: Physical Therapy Goal  Goals to be met by: 19     Patient will increase functional independence with mobility by performin. Supine to sit with supervision. MET 19  2. Sit to supine with supervision.   3. Sit<>stand transfer with supervision . 19  4. OOB to chair for > 1 hour MET 2019  5.  Gait > 150 feet with SBA with /without AD MET 19  6.  Ascend/descend 4" curb step with CGA .      Outcome: Ongoing (interventions implemented as appropriate)  Goals 1,3,and 5 met      "

## 2019-01-16 NOTE — PLAN OF CARE
01/16/19 1556   Medicare Message   Important Message from Medicare regarding Discharge Appeal Rights Given to patient/caregiver;Explained to patient/caregiver   Date IMM was signed 01/16/19   Time IMM was signed 1100

## 2019-01-16 NOTE — PT/OT/SLP PROGRESS
Speech Language Pathology      Hoangkristy Santos Jr.  MRN: [id number]    Attempted therapy but Patient not available with PCT. Will re-attempt as time permits.          AJITH Ley, CCC-SLP  1/16/2019

## 2019-01-16 NOTE — PT/OT/SLP PROGRESS
Occupational Therapy  Visit Attempt    Patient Name:  Hoang Santos .   MRN:  [id number]    Patient not seen today secondary to receiving TPN and PICC training. Will follow-up as able.    JENNIFER Calero  1/16/2019

## 2019-01-16 NOTE — PROGRESS NOTES
Hoang Santos Jr. #[id number] (CSN: [id number]) (68 y.o. M) (Adm: 01/04/19)   [facility] [id number] A   PCP     HAKEEM BEST   Date of Birth     1950   Demographics     Address: Home Phone: Work Phone: Mobile Phone:     [address] [address] [phone]    SSN: Insurance: Marital Status: Sabianist:      MEDICARE  [employer]    Admission Dx      Malignant carcinoid tumor of duodenum (Malignant carcinoid tumor of duodenum [C7A.010])    Malignant carcinoid tumor of duodenum    Malignant carcinoid tumor of duodenum    Malignant carcinoid tumor of the duodenum   Chief Complaint     None   Documents Filed to Patient     Power of  Living Will Clinical Unknown Study Attachment Consent Form ABN Waiver After Visit Summary Lab Result Scan Code Status Patient Portal Status   Not on File Not on File Not on File Not on File Filed Not on File Filed Not on File FULL [Updated on 01/04/19 1542] Active   Auth/Cert Information      Open Auth/Cert for Hospital Account [id number]      Admission Information     Attending Provider Admitting Provider Admission Type Admission Date/Time   MD Madeleine Fay MD Elective 01/04/19  0528   Discharge Date Hospital Service Auth/Cert Status Service Area    Surgery Incomplete [facility]   Unit Room/Bed Admission Status    [facility] MEDICAL SURGICAL UNIT ACUTE K505/K505 A Admission (Confirmed)    Hospital Account     Name Acct ID Class Status Primary Coverage   Hoang Santos Jr. [id number] IP- Inpatient Open MEDICARE - MEDICARE PART A & B          Guarantor Account (for Hospital Account #[id number])     Name Relation to Pt Service Area Active? Acct Type   Hoang Santos Jr. Self OHSSA Yes Personal/Family   Address Phone     [address] [place] [address] [phone](H)            Coverage Information (for Hospital Account #[id number])     1. MEDICARE/MEDICARE PART A & B     F/O  Payor/Plan Precert #   MEDICARE/MEDICARE PART A & B    Subscriber Subscriber #   Hoang Santos Jr. [id number]   Address Phone   [address]  [place] [address] [phone]   2. BLUE CROSS BLUE SHIELD/BCBS FEDERAL     F/O Payor/Plan Precert #   BLUE CROSS BLUE SHIELD/BCBS FEDERAL    Subscriber Subscriber #   Hoang Santos Jr. [id number]   Address Phone   [address] [address]  [place] [address] [phone]          Emergency Contact Information     Name: Sneha Santos Relationship: Spouse   Address: [address] [place]: [place]: [place] Zip: [address] Phone:     Business phone:

## 2019-01-16 NOTE — PROGRESS NOTES
"[facility] [employer] [] (Order [id number])   General Supply   Date and Time: 2019  2:21 PM Department: [facility] Medical Surgical Unit Acute Rel By/Authorizing: Jamie Vines MD   Dept Order Information     Date Department   2019 [facility] Medical Surgical Unit Acute   Order Information     Order Date/Time Release Date/Time Start Date/Time End Date/Time   19 02:21 PM None 2019 None   Order Details     Frequency Duration Priority Order Class   None None Routine Clinic Performed   Quantity     Ordering Quantity   1   Quantity     Ordering Quantity Ordering Quantity   1 1   Order Details     Order ID   [id number]   Reprint Order Requisition     [facility] (Order #[id number]) on 19   Associated Diagnoses      ICD-10-CM ICD-9-CM   Malignant carcinoid tumor of the duodenum  - Primary     C7A.010 209.01   Order Questions     Question Answer Comment   Type of Walker: Adult (5'4"-6'6")    With wheels? Yes    Height: 5' 6" (1.676 m)    Weight: 97 kg (213 lb 13.5 oz)    Length of need (1-99 months): 99    Does patient have medical equipment at home? grab bar    Please check all that apply: Patient is unable to safely ambulate without equipment.           Process Instructions     Only use the "Resulting Agency" field below if you are sending DME for a patient during a [facility], [facility], or [facility] visit.      Collection Information     Patient Details   MRN:[id number]   Name Gender Identity  SSN   Soto Cha Jr. Male 1950       Address Phone Email [employer]   [address] [address] Home: [phone]   Work:    Cell: [phone]    [email or web address] J JR WATSON, AUBREY J JR. WATSON, AUBREY      Inpatient Unit Inpatient Room Inpatient Bed    [facility] MEDICAL SURGICAL UNIT ACUTE K505 K505 A       PCP Allergies     Lee Kay MD Epinephrine      Primary Visit Coverage     Payor Plan Sponsor Code Group Number Group Name " "  MEDICARE MEDICARE PART A & B      Primary visit coverage subscriber     Subscriber ID Subscriber Name Subscriber Address   [id number] [address]  [address] [place] [address]   Secondary Visit Coverage     Payor Plan Sponsor Code Group Number Group Name   CHAPO LUBIN [facility] [employer] [facility] FEDERAL  [id number] OTHER   Secondary Visit Coverage Subscriber     Subscriber ID Subscriber Name Subscriber Address   [id number] [address]  [address] [place] [address]   Additional Information     Associated Reports   Priority and Order Details   ADT-Related Order Information    Encounter     View Encounter          Order Provider Info         Office phone Pager E-mail   Ordering User Jamie Vines [phone] -- --   Authorizing Provider Jamie Vines [phone] -- --   Attending Provider Madeleine Alvarado [phone] -- --   [facility] [[id number]]     Electronically signed by: Jamie Vines MD on 01/16/19 1421 Status: Active   Ordering user: Jamie Vines MD 01/16/19 1421 Ordering provider: Jamie Vines MD   Authorized by: Jamie Vines MD Ordering mode: Standard   Frequency:  01/16/19 -     Diagnoses  Malignant carcinoid tumor of the duodenum [C7A.010]   Questionnaire     Question Answer   Type of Walker: Adult (5'4"-6'6")   With wheels? Yes   Height: 5' 6" (1.676 m)   Weight: 97 kg (213 lb 13.5 oz)   Length of need (1-99 months): 99   Does patient have medical equipment at home? grab bar   Please check all that apply: Patient is unable to safely ambulate without equipment.   Order Diagnosis: Malignant carcinoid tumor of the duodenum [C7A.010 (ICD-10-CM)] CPT:             Electronically signed by: Jamie Vines MD Lic #  < Not on File > NPI: [id number]       "

## 2019-01-16 NOTE — PLAN OF CARE
Problem: Adult Inpatient Plan of Care  Goal: Plan of Care Review  Patient Mr. Santos is alert and oriented X 4. On clear liquid diet. Tolerated fluids well. Right upper arm PICC with TPN infusing at the rate of 50 ml per hour. Denies any pain. IV antibiotics given as prescribed and documented. Voided frequently. RUQ Drain in situ, putting out very thick , black red/brown fluid. Will continue to monitor patient.

## 2019-01-16 NOTE — PROGRESS NOTES
[facility] General Surgery  Neuroendocrine Tumor Service  Progress Note     Admission Date: 1/4/2019  Hospital Length of Stay: 12  Principal Problem: Malignant carcinoid tumor of duodenum     Subjective:      Interval History:   NAEO  Tolerating CLD with no nausea or vomiting   Denies nausea/vomiting  Ambulating  Flatus   Pain controlled     Scheduled Meds:   cyanocobalamin  1,000 mcg Subcutaneous Daily    enoxaparin  40 mg Subcutaneous Daily    iron sucrose (VENOFER) IVPB  100 mg Intravenous Daily    pantoprazole  40 mg Intravenous BID    piperacillin-tazobactam (ZOSYN) IVPB  4.5 g Intravenous Q8H    vancomycin (VANCOCIN) IVPB  1,000 mg Intravenous Q12H     Continuous Infusions:   Amino acid 5% - dextrose 15% (CLINIMIX-E) solution with additives (1L  provides 510 kcal/L dextrose, with 50 gm AA, 150 gm CHO, Na 35, K 30, Mg 5, Ca 4.5, Acetate 80, Cl 39, Phos 15) 50 mL/hr at 01/15/19 2154     PRN Meds:.albuterol sulfate, bisacodyl, dextrose 50%, dextrose 50%, diphenhydrAMINE, glucose, glucose, hydrALAZINE, hydrocodone-apap 7.5-325 MG/15 ML, labetalol, meclizine, naloxone, ondansetron, ondansetron, promethazine (PHENERGAN) IVPB, sodium chloride 0.9%, sodium chloride 0.9%, varibar honey ba sulfate, varibar nectar Ba Sulfate, varibar pudding Ba Sulfate, varibar thin liquid ba sulfate    Objective:      Temp:  [97 °F (36.1 °C)-99.3 °F (37.4 °C)] 97.8 °F (36.6 °C)  Pulse:  [] 98  Resp:  [16-20] 20  SpO2:  [92 %-93 %] 92 %  BP: ()/(53-60) 106/55  Weight change: -1.3 kg (-13.9 oz)    Intake/Output Summary (Last 24 hours) at 1/16/2019 0710  Last data filed at 1/16/2019 0638  Gross per 24 hour   Intake 1983.33 ml   Output 865 ml   Net 1118.33 ml       Physical Exam:  GEN: awake, alert, NAD  CV: RRR  PULM: CTAB  ABD: S/NT/ND, incision c/d/i, IR drain in place, JAY in place  EXT: Peripheral pulses present and equal bilaterally    Recent Labs   Lab 01/16/19  0530      K 3.6   CO2 30*       BUN 15   CREATININE 1.0   CALCIUM 8.6*   PROT 7.1   AST 69*   ALT 52*   ALKPHOS 110   BILITOT 1.1*     Recent Labs   Lab 01/16/19  0530   WBC 14.73*   HGB 7.6*   HCT 25.1*   *       Significant Imaging: I have reviewed all pertinent imaging results/findings within the past 24 hours.     Assessment and Plan:      67 yo M s/p ex-lap, toupet fundoplication, hiatal hernia repair, cholecystectomy, sleeve duodenectomy with end-end anastomosis, liver biopsy, biliary stenting, and intralesional chemo for malignant carcinoid of the duodenum now with UTI and possible PNA   Also intraabdominal hematoma, leukocytosis     Cont vanc/zosyn  PT/OT  Ambulation/IS  Cont drains  Tolerating CLD  UC pansensitive ecoli on abx   Drain culture NGTD  TPN feeds     Jose Lance MD  Feel free to secure chat me (Ctrl+Alt+5) for any questions   01/16/2019

## 2019-01-17 ENCOUNTER — PATIENT OUTREACH (OUTPATIENT)
Dept: ADMINISTRATIVE | Facility: HOSPITAL | Age: 69
End: 2019-01-17

## 2019-01-17 RX ORDER — LEVOFLOXACIN 25 MG/ML
500 SOLUTION ORAL DAILY
Qty: 140 ML | Refills: 0 | Status: SHIPPED | OUTPATIENT
Start: 2019-01-17 | End: 2019-01-18

## 2019-01-17 NOTE — PROGRESS NOTES
[facility] is committed to your overall health.  To help you get the most out of each of your visits, we will review your information to make sure you are up to date on all of your recommended tests and/or procedures.       Your PCP  Lee Kay MD   found that you may be due for:       Health Maintenance Due   Topic Date Due    Pneumococcal Vaccine (65+ High/Highest Risk) (2 of 2 - PCV13) 06/16/2018    PROSTATE-SPECIFIC ANTIGEN  06/19/2018     You are more than welcome to visit our pharmacy here on campus to receive your vaccinations on the same day of your upcoming scheduled visit.           If you have had any of the above done at another facility, please bring the records or information with you so that your record at [facility] will be complete.  If you would like to schedule any of these, please contact me.     If you are currently taking medication, please bring it with you to your appointment for review.     Also, if you have any type of Advanced Directives, please bring them with you to your office visit so we may scan them into your chart.       Thank you for Choosing [facility] for your healthcare needs.        Additional Information  If you have questions, you can email [email or web address] or call [phone]  to talk to our [facility] staff. Remember, [facility] is NOT to be used for urgent needs. For medical emergencies, dial 911.

## 2019-01-17 NOTE — PT/OT/SLP DISCHARGE
"Physical Therapy Discharge Summary    Name: Hoang Santos Jr.  MRN: [id number]   Principal Problem: Malignant carcinoid tumor of duodenum     Patient Discharged from acute Physical Therapy on 2019.  Please refer to prior PT noted date on 2019 for functional status.     Assessment:     Patient appropriate for care in another setting.    Objective:     GOALS:   Multidisciplinary Problems     Physical Therapy Goals     Not on file          Multidisciplinary Problems (Resolved)        Problem: Physical Therapy Goal    Goal Priority Disciplines Outcome Goal Variances Interventions   Physical Therapy Goal   (Resolved)     PT, PT/OT Outcome(s) achieved     Description:  Goals to be met by: 19     Patient will increase functional independence with mobility by performin. Supine to sit with supervision. MET 19  2. Sit to supine with supervision.   3. Sit<>stand transfer with supervision . 19  4. OOB to chair for > 1 hour MET 2019  5.  Gait > 150 feet with SBA with /without AD MET 19  6.  Ascend/descend 4" curb step with CGA .                        Reasons for Discontinuation of Therapy Services  Transfer to alternate level of care.      Plan:     Patient Discharged to: Home with Home Health Service.    Harpreet Scruggs, PT  2019  "

## 2019-01-17 NOTE — PHYSICIAN QUERY
PT Name: Hoang Santos Jr.  MR #: [id number]     Physician Query Form - Diagnosis Clarification      CDS/: Marlene Smith RN                Contact information:[email or web address]    This form is a permanent document in the medical record.     Query Date: January 17, 2019    By submitting this query, we are merely seeking further clarification of documentation.  Please utilize your independent clinical judgment when addressing the question(s) below.     The medical record contains the following:      Findings Supporting Clinical Information Location in Medical Record   Possible pneumonia 69 yo M s/p ex-lap, toupet fundoplication, hiatal hernia repair, cholecystectomy, sleeve duodenectomy with end-end anastomosis, liver biopsy, biliary stenting, and intralesional chemo for malignant carcinoid of the duodenum now with UTI and possible PNA   Also intraabdominal hematoma, leukocytosis     Cont drains  Tolerating CLD  Work with speech today  UC pansensitive ecoli on abx   Drain culture NGTD  TPN feeds Neuroendocrine PN 1/15     Please clarify if the __possible pneumonia__ diagnosis has been:    [  ] Ruled In   [  ] Ruled In, Now Resolved   [ x ] Ruled Out   [  ] Other/Clarification of findings (please specify):   [  ] Clinically insignificant     [  ] Clinically undetermined     Please document in your progress notes daily for the duration of treatment, until resolved, and include in your discharge summary.

## 2019-01-17 NOTE — DISCHARGE SUMMARY
Discharge Summary      Admit Date: 1/4/2019    Discharge Date and Time: 01/16/2019    Attending Physician: SHAWNA GILL MD     Discharge Physician: Jose Lance MD     Principal Diagnoses: Malignant carcinoid tumor of duodenum  The primary encounter diagnosis was Malignant carcinoid tumor of the duodenum. Diagnoses of Malignant carcinoid tumor of duodenum, Pre-operative cardiovascular examination, Hyperkalemia, Hiatal hernia with gastroesophageal reflux, Severe obesity (BMI >= 40), Generalized abdominal pain, Hospital-acquired pneumonia, and Oropharyngeal dysphagia were also pertinent to this visit.    Discharged Condition: stable    Hospital Course:     67 yo M s/p ex-lap, toupet fundoplication, hiatal hernia repair, cholecystectomy, sleeve duodenectomy with end-end anastomosis, liver biopsy, biliary stenting, and intralesional chemo for malignant carcinoid of the duodenum  presented for exploratory laparotomy for carcinoid tumor of the duodenum. He had a sleeve duodenectomy, resection of about 8 cm of the first and the second part of duodenum with end-to-end anastomosis in a 2-layer technique, cholecystectomy, biliary stenting, liver biopsy, hiatal hernia repair, Portal lymphadenectomy and Intralesional chemo with 5-FU. The patient was scheduled to get a gastric series to assess if he had a leak in his duodenum but he had severe dysphagia and radiology noted that he had a high aspiration risk and thus he could not get the study done. ENT was consulted and he had a history ofdysphagia secondary to cervical osteophytesaffecting the mechanics of the swallow but his recent NGT placement has caused mucosal edema of the post-cricoid tissues exacerbating his underlying known dysphagia. The patient continues to suffer from dysphagia and is thus unable to provide adequate hydration and nutritional support to himself through oral feeds. The swelling has improved and he has made minimal progress indicating  that we can avoid the necessity of a Gtube for feeds. A PICC line will be placed and home TPN was set up. He tolerated a CLD and will stay on CLD diet for 2 weeks along with his TPN. He will then try to advance his diet. The patient will require TPN for a minimum of 90 days/3 mo in order to provide adequate nutritional support for wound healing and all around well being. He also had an elevated wbc which was secondary to a UTI that grew pansensitive ecoli. He completed a course of antibiotics for his UTI. He will follow up with surgery and will be on TPN at home.     PREOPERATIVE DIAGNOSES:  1.  Malignant carcinoid tumor of the duodenum.  2.  Hiatal hernia with gastroesophageal reflux.  3.  Obesity.     POSTOPERATIVE DIAGNOSES:  1.  Malignant carcinoid tumor of the duodenum.  2.  Hiatal hernia.  3.  Fatty liver.  4.  Chronic cholecystitis.     PROCEDURES DONE:  1.  Exploratory laparotomy.  2.  [place] lymph node mapping.  3.  Hiatal hernia repair.  4.  Toupet fundoplication, posterior 270 degree fundoplication.  5.  Liver biopsy.  6.  Liver ultrasound.  7.  Cholecystectomy.  8.  Common bile duct exploration.  9.  Sleeve duodenectomy, resection of about 8 cm of the first and the second   part of duodenum with end-to-end anastomosis in a 2-layer technique.  10.  Portal lymphadenectomy.  11.  Intralesional chemo with 5-FU.    Consults: IP CONSULT TO SOCIAL WORK/CASE MANAGEMENT  IP CONSULT TO REGISTERED DIETITIAN/NUTRITIONIST  IP CONSULT TO NEUROLOGY  IP CONSULT TO ENT  IP CONSULT TO INTERVENTIONAL RADIOLOGY  IP CONSULT TO PICC TEAM (MATT)      Disposition: Home or Self Care    Patient Instructions:   Discharge Medication List as of 1/16/2019  4:06 PM      START taking these medications    Details   bisacodyl (DULCOLAX) 10 mg Supp Place 1 suppository (10 mg total) rectally daily as needed., Starting Wed 1/16/2019, OTC      levoFLOXacin (LEVAQUIN) 500 MG tablet Take 1 tablet (500 mg total) by mouth once daily. for 7  "days, Starting Wed 1/16/2019, Until Wed 1/23/2019, Normal      meclizine (ANTIVERT) 25 mg tablet Take 1 tablet (25 mg total) by mouth 3 (three) times daily as needed for Dizziness., Starting Wed 1/16/2019, Normal      omeprazole (PRILOSEC) 20 MG capsule Take 1 capsule (20 mg total) by mouth 2 (two) times daily., Starting Wed 1/16/2019, Normal      ondansetron (ZOFRAN) 4 MG tablet Take 1 tablet (4 mg total) by mouth every 8 (eight) hours as needed for Nausea., Starting Wed 1/16/2019, Normal      HYDROcodone-acetaminophen (NORCO) 5-325 mg per tablet Take 1 tablet by mouth every 8 (eight) hours as needed for Pain., Starting Wed 1/16/2019, Print         CONTINUE these medications which have NOT CHANGED    Details   atorvastatin (LIPITOR) 40 MG tablet Take 1 tablet (40 mg total) by mouth once daily., Starting Tue 1/16/2018, Until Wed 1/16/2019, Normal      cholecalciferol, vitamin D3, 50,000 unit capsule Take 1 capsule (50,000 Units total) by mouth once a week., Starting Wed 6/21/2017, Normal      !! sildenafil (VIAGRA) 100 MG tablet Take 1 tablet (100 mg total) by mouth daily as needed for Erectile Dysfunction., Starting Wed 11/21/2018, Until Thu 11/21/2019, Normal      !! VIAGRA 100 mg tablet Take 1 tablet (100 mg total) by mouth once daily. Brand name only medication., Starting Tue 4/17/2018, Normal       !! - Potential duplicate medications found. Please discuss with provider.      STOP taking these medications       influenza (FLUZONE HIGH-DOSE) 180 mcg/0.5 mL vaccine Comments:   Reason for Stopping:               Discharge Procedure Orders   WALKER FOR HOME USE     Order Specific Question Answer Comments   Type of Walker: Adult (5'4"-6'6")    With wheels? Yes    Height: 5' 6" (1.676 m)    Weight: 97 kg (213 lb 13.5 oz)    Length of need (1-99 months): 99    Does patient have medical equipment at home? grab bar    Please check all that apply: Patient is unable to safely ambulate without equipment.      Ambulatory " referral to General Surgery   Referral Priority: Routine Referral Type: Surgical   Referral Reason: Specialty Services Required   Referred to Provider: SHAWNA GILL Specialty: General Surgery   Number of Visits Requested: 1     Diet clear liquid     Notify your health care provider if you experience any of the following:  temperature >100.4     Notify your health care provider if you experience any of the following:  persistent nausea and vomiting or diarrhea     Notify your health care provider if you experience any of the following:  severe uncontrolled pain     Notify your health care provider if you experience any of the following:  redness, tenderness, or signs of infection (pain, swelling, redness, odor or green/yellow discharge around incision site)     Notify your health care provider if you experience any of the following:  difficulty breathing or increased cough     Notify your health care provider if you experience any of the following:  persistent dizziness, light-headedness, or visual disturbances     Notify your health care provider if you experience any of the following:  increased confusion or weakness     Notify your health care provider if you experience any of the following:  severe persistent headache     Notify your health care provider if you experience any of the following:  worsening rash     Activity as tolerated     33 minutes was spent on this discharge summary.   Jose Lance MD   01/16/2019  6:12 PM

## 2019-01-17 NOTE — TELEPHONE ENCOUNTER
----- Message from Starr Sin sent at 1/17/2019 11:16 AM CST -----  Contact: Stacy with [facility]  JOSE ALEJANDRO:  Stacy with [facility] home [facility] called to clarify some medications for this patient.  She can be reached at [phone].  Thank you  abc

## 2019-01-18 ENCOUNTER — HOSPITAL ENCOUNTER (INPATIENT)
Facility: HOSPITAL | Age: 69
LOS: 21 days | Discharge: HOME HEALTH | DRG: 856 | End: 2019-02-08
Attending: EMERGENCY MEDICINE | Admitting: SURGERY
Payer: MEDICARE

## 2019-01-18 ENCOUNTER — PATIENT MESSAGE (OUTPATIENT)
Dept: NEUROLOGY | Facility: HOSPITAL | Age: 69
End: 2019-01-18

## 2019-01-18 ENCOUNTER — TELEPHONE (OUTPATIENT)
Dept: NEUROLOGY | Facility: HOSPITAL | Age: 69
End: 2019-01-18

## 2019-01-18 DIAGNOSIS — R05.3 CHRONIC COUGH: ICD-10-CM

## 2019-01-18 DIAGNOSIS — K85.92: ICD-10-CM

## 2019-01-18 DIAGNOSIS — J90 PLEURAL EFFUSION: ICD-10-CM

## 2019-01-18 DIAGNOSIS — J69.0 ASPIRATION PNEUMONIA OF RIGHT LOWER LOBE, UNSPECIFIED ASPIRATION PNEUMONIA TYPE: ICD-10-CM

## 2019-01-18 DIAGNOSIS — A41.9 SEVERE SEPSIS: ICD-10-CM

## 2019-01-18 DIAGNOSIS — K68.19 RETROPERITONEAL ABSCESS: ICD-10-CM

## 2019-01-18 DIAGNOSIS — R65.20 SEVERE SEPSIS: ICD-10-CM

## 2019-01-18 DIAGNOSIS — J69.0 ASPIRATION PNEUMONIA OF RIGHT LOWER LOBE: ICD-10-CM

## 2019-01-18 DIAGNOSIS — K31.1 GASTRIC OUTLET OBSTRUCTION: ICD-10-CM

## 2019-01-18 DIAGNOSIS — R13.12 OROPHARYNGEAL DYSPHAGIA: ICD-10-CM

## 2019-01-18 DIAGNOSIS — R00.0 TACHYCARDIA: ICD-10-CM

## 2019-01-18 DIAGNOSIS — J18.9 PNEUMONIA DUE TO INFECTIOUS ORGANISM, UNSPECIFIED LATERALITY, UNSPECIFIED PART OF LUNG: ICD-10-CM

## 2019-01-18 DIAGNOSIS — R13.10 DYSPHAGIA, UNSPECIFIED TYPE: ICD-10-CM

## 2019-01-18 DIAGNOSIS — A41.9 SEPSIS: ICD-10-CM

## 2019-01-18 DIAGNOSIS — G47.33 OSA (OBSTRUCTIVE SLEEP APNEA): ICD-10-CM

## 2019-01-18 DIAGNOSIS — C7A.010 MALIGNANT CARCINOID TUMOR OF THE DUODENUM: Primary | ICD-10-CM

## 2019-01-18 DIAGNOSIS — E78.5 HYPERLIPIDEMIA, UNSPECIFIED HYPERLIPIDEMIA TYPE: ICD-10-CM

## 2019-01-18 DIAGNOSIS — R10.84 GENERALIZED ABDOMINAL PAIN: ICD-10-CM

## 2019-01-18 DIAGNOSIS — Z01.810 PRE-OPERATIVE CARDIOVASCULAR EXAMINATION: ICD-10-CM

## 2019-01-18 DIAGNOSIS — C7A.010 MALIGNANT CARCINOID TUMOR OF DUODENUM: ICD-10-CM

## 2019-01-18 DIAGNOSIS — E46 MALNUTRITION COMPROMISING BODILY FUNCTION: ICD-10-CM

## 2019-01-18 LAB
ALBUMIN SERPL BCP-MCNC: 2.5 G/DL
ALP SERPL-CCNC: 124 U/L
ALT SERPL W/O P-5'-P-CCNC: 47 U/L
ANION GAP SERPL CALC-SCNC: 11 MMOL/L
ANISOCYTOSIS BLD QL SMEAR: ABNORMAL
AST SERPL-CCNC: 65 U/L
BACTERIA #/AREA URNS HPF: ABNORMAL /HPF
BACTERIA SPEC ANAEROBE CULT: NORMAL
BASOPHILS # BLD AUTO: ABNORMAL K/UL
BASOPHILS NFR BLD: 0 %
BILIRUB SERPL-MCNC: 1.1 MG/DL
BILIRUB UR QL STRIP: NEGATIVE
BUN SERPL-MCNC: 21 MG/DL
CALCIUM SERPL-MCNC: 9.2 MG/DL
CHLORIDE SERPL-SCNC: 103 MMOL/L
CLARITY UR: ABNORMAL
CO2 SERPL-SCNC: 26 MMOL/L
COLOR UR: YELLOW
CREAT SERPL-MCNC: 1.4 MG/DL
DACRYOCYTES BLD QL SMEAR: ABNORMAL
DIFFERENTIAL METHOD: ABNORMAL
EOSINOPHIL # BLD AUTO: ABNORMAL K/UL
EOSINOPHIL NFR BLD: 0 %
ERYTHROCYTE [DISTWIDTH] IN BLOOD BY AUTOMATED COUNT: 16 %
EST. GFR  (AFRICAN AMERICAN): 60 ML/MIN/1.73 M^2
EST. GFR  (NON AFRICAN AMERICAN): 52 ML/MIN/1.73 M^2
GLUCOSE SERPL-MCNC: 103 MG/DL
GLUCOSE UR QL STRIP: NEGATIVE
HCT VFR BLD AUTO: 28 %
HGB BLD-MCNC: 8.5 G/DL
HGB UR QL STRIP: ABNORMAL
HYALINE CASTS #/AREA URNS LPF: 1 /LPF
HYPOCHROMIA BLD QL SMEAR: ABNORMAL
KETONES UR QL STRIP: NEGATIVE
LACTATE SERPL-SCNC: 1.1 MMOL/L
LACTATE SERPL-SCNC: 1.2 MMOL/L
LACTATE SERPL-SCNC: 2.2 MMOL/L
LACTATE SERPL-SCNC: 2.5 MMOL/L
LEUKOCYTE ESTERASE UR QL STRIP: NEGATIVE
LYMPHOCYTES # BLD AUTO: ABNORMAL K/UL
LYMPHOCYTES NFR BLD: 2 %
MAGNESIUM SERPL-MCNC: 2 MG/DL
MCH RBC QN AUTO: 29 PG
MCHC RBC AUTO-ENTMCNC: 30.4 G/DL
MCV RBC AUTO: 96 FL
MICROSCOPIC COMMENT: ABNORMAL
MONOCYTES # BLD AUTO: ABNORMAL K/UL
MONOCYTES NFR BLD: 3 %
NEUTROPHILS NFR BLD: 72 %
NEUTS BAND NFR BLD MANUAL: 23 %
NITRITE UR QL STRIP: NEGATIVE
PH UR STRIP: 6 [PH] (ref 5–8)
PHOSPHATE SERPL-MCNC: 2.6 MG/DL
PLATELET # BLD AUTO: 602 K/UL
PLATELET BLD QL SMEAR: ABNORMAL
PMV BLD AUTO: 9.7 FL
POCT GLUCOSE: 100 MG/DL (ref 70–110)
POCT GLUCOSE: 118 MG/DL (ref 70–110)
POIKILOCYTOSIS BLD QL SMEAR: SLIGHT
POLYCHROMASIA BLD QL SMEAR: ABNORMAL
POTASSIUM SERPL-SCNC: 4.1 MMOL/L
PROCALCITONIN SERPL IA-MCNC: 0.37 NG/ML
PROT SERPL-MCNC: 8.3 G/DL
PROT UR QL STRIP: ABNORMAL
RBC # BLD AUTO: 2.93 M/UL
RBC #/AREA URNS HPF: 0 /HPF (ref 0–4)
SODIUM SERPL-SCNC: 140 MMOL/L
SP GR UR STRIP: 1.02 (ref 1–1.03)
SQUAMOUS #/AREA URNS HPF: 3 /HPF
TARGETS BLD QL SMEAR: ABNORMAL
URN SPEC COLLECT METH UR: ABNORMAL
UROBILINOGEN UR STRIP-ACNC: NEGATIVE EU/DL
WBC # BLD AUTO: 19.6 K/UL
WBC #/AREA URNS HPF: 9 /HPF (ref 0–5)
WBC CLUMPS URNS QL MICRO: ABNORMAL

## 2019-01-18 PROCEDURE — 80053 COMPREHEN METABOLIC PANEL: CPT

## 2019-01-18 PROCEDURE — 25500020 PHARM REV CODE 255: Performed by: SURGERY

## 2019-01-18 PROCEDURE — 96366 THER/PROPH/DIAG IV INF ADDON: CPT

## 2019-01-18 PROCEDURE — 63600175 PHARM REV CODE 636 W HCPCS: Performed by: STUDENT IN AN ORGANIZED HEALTH CARE EDUCATION/TRAINING PROGRAM

## 2019-01-18 PROCEDURE — 87040 BLOOD CULTURE FOR BACTERIA: CPT | Mod: 59

## 2019-01-18 PROCEDURE — 93010 EKG 12-LEAD: ICD-10-PCS | Mod: ,,, | Performed by: INTERNAL MEDICINE

## 2019-01-18 PROCEDURE — 82962 GLUCOSE BLOOD TEST: CPT

## 2019-01-18 PROCEDURE — 94799 UNLISTED PULMONARY SVC/PX: CPT

## 2019-01-18 PROCEDURE — 25000003 PHARM REV CODE 250: Performed by: EMERGENCY MEDICINE

## 2019-01-18 PROCEDURE — 93005 ELECTROCARDIOGRAM TRACING: CPT

## 2019-01-18 PROCEDURE — 81000 URINALYSIS NONAUTO W/SCOPE: CPT

## 2019-01-18 PROCEDURE — 96367 TX/PROPH/DG ADDL SEQ IV INF: CPT

## 2019-01-18 PROCEDURE — 84145 PROCALCITONIN (PCT): CPT

## 2019-01-18 PROCEDURE — 84100 ASSAY OF PHOSPHORUS: CPT

## 2019-01-18 PROCEDURE — C9113 INJ PANTOPRAZOLE SODIUM, VIA: HCPCS | Performed by: STUDENT IN AN ORGANIZED HEALTH CARE EDUCATION/TRAINING PROGRAM

## 2019-01-18 PROCEDURE — 83605 ASSAY OF LACTIC ACID: CPT | Mod: 91

## 2019-01-18 PROCEDURE — 27000646 HC AEROBIKA DEVICE

## 2019-01-18 PROCEDURE — 96361 HYDRATE IV INFUSION ADD-ON: CPT

## 2019-01-18 PROCEDURE — 36415 COLL VENOUS BLD VENIPUNCTURE: CPT

## 2019-01-18 PROCEDURE — 25000003 PHARM REV CODE 250: Performed by: STUDENT IN AN ORGANIZED HEALTH CARE EDUCATION/TRAINING PROGRAM

## 2019-01-18 PROCEDURE — 94664 DEMO&/EVAL PT USE INHALER: CPT

## 2019-01-18 PROCEDURE — 96374 THER/PROPH/DIAG INJ IV PUSH: CPT | Mod: 59

## 2019-01-18 PROCEDURE — 85007 BL SMEAR W/DIFF WBC COUNT: CPT

## 2019-01-18 PROCEDURE — 99900035 HC TECH TIME PER 15 MIN (STAT)

## 2019-01-18 PROCEDURE — 85027 COMPLETE CBC AUTOMATED: CPT

## 2019-01-18 PROCEDURE — 93010 ELECTROCARDIOGRAM REPORT: CPT | Mod: ,,, | Performed by: INTERNAL MEDICINE

## 2019-01-18 PROCEDURE — 99291 CRITICAL CARE FIRST HOUR: CPT | Mod: 25

## 2019-01-18 PROCEDURE — 96365 THER/PROPH/DIAG IV INF INIT: CPT

## 2019-01-18 PROCEDURE — 83735 ASSAY OF MAGNESIUM: CPT

## 2019-01-18 PROCEDURE — 11000001 HC ACUTE MED/SURG PRIVATE ROOM

## 2019-01-18 PROCEDURE — 63600175 PHARM REV CODE 636 W HCPCS: Performed by: EMERGENCY MEDICINE

## 2019-01-18 RX ORDER — IBUPROFEN 200 MG
24 TABLET ORAL
Status: DISCONTINUED | OUTPATIENT
Start: 2019-01-18 | End: 2019-02-08 | Stop reason: HOSPADM

## 2019-01-18 RX ORDER — PANTOPRAZOLE SODIUM 40 MG/10ML
40 INJECTION, POWDER, LYOPHILIZED, FOR SOLUTION INTRAVENOUS 2 TIMES DAILY
Status: DISCONTINUED | OUTPATIENT
Start: 2019-01-18 | End: 2019-01-23

## 2019-01-18 RX ORDER — IBUPROFEN 200 MG
16 TABLET ORAL
Status: DISCONTINUED | OUTPATIENT
Start: 2019-01-18 | End: 2019-02-08 | Stop reason: HOSPADM

## 2019-01-18 RX ORDER — ACETAMINOPHEN 650 MG/20.3ML
650 LIQUID ORAL
Status: COMPLETED | OUTPATIENT
Start: 2019-01-18 | End: 2019-01-18

## 2019-01-18 RX ORDER — ACETAMINOPHEN 10 MG/ML
1000 INJECTION, SOLUTION INTRAVENOUS EVERY 8 HOURS
Status: COMPLETED | OUTPATIENT
Start: 2019-01-18 | End: 2019-01-19

## 2019-01-18 RX ORDER — SODIUM CHLORIDE 0.9 % (FLUSH) 0.9 %
5 SYRINGE (ML) INJECTION
Status: DISCONTINUED | OUTPATIENT
Start: 2019-01-18 | End: 2019-02-08 | Stop reason: HOSPADM

## 2019-01-18 RX ORDER — ONDANSETRON 8 MG/1
8 TABLET, ORALLY DISINTEGRATING ORAL EVERY 6 HOURS PRN
Status: DISCONTINUED | OUTPATIENT
Start: 2019-01-18 | End: 2019-02-08 | Stop reason: HOSPADM

## 2019-01-18 RX ORDER — SODIUM BICARBONATE 1 MEQ/ML
50 SYRINGE (ML) INTRAVENOUS ONCE
Status: COMPLETED | OUTPATIENT
Start: 2019-01-18 | End: 2019-01-18

## 2019-01-18 RX ORDER — LINEZOLID 2 MG/ML
600 INJECTION, SOLUTION INTRAVENOUS
Status: DISCONTINUED | OUTPATIENT
Start: 2019-01-18 | End: 2019-02-01

## 2019-01-18 RX ORDER — ATORVASTATIN CALCIUM 40 MG/1
40 TABLET, FILM COATED ORAL DAILY
Status: DISCONTINUED | OUTPATIENT
Start: 2019-01-19 | End: 2019-01-23

## 2019-01-18 RX ORDER — INSULIN ASPART 100 [IU]/ML
1-10 INJECTION, SOLUTION INTRAVENOUS; SUBCUTANEOUS
Status: DISCONTINUED | OUTPATIENT
Start: 2019-01-18 | End: 2019-02-08 | Stop reason: HOSPADM

## 2019-01-18 RX ORDER — ENOXAPARIN SODIUM 100 MG/ML
40 INJECTION SUBCUTANEOUS EVERY 24 HOURS
Status: DISCONTINUED | OUTPATIENT
Start: 2019-01-19 | End: 2019-01-27

## 2019-01-18 RX ADMIN — PANTOPRAZOLE SODIUM 40 MG: 40 INJECTION, POWDER, LYOPHILIZED, FOR SOLUTION INTRAVENOUS at 10:01

## 2019-01-18 RX ADMIN — IOHEXOL 100 ML: 350 INJECTION, SOLUTION INTRAVENOUS at 06:01

## 2019-01-18 RX ADMIN — MEROPENEM 2 G: 1 INJECTION, POWDER, FOR SOLUTION INTRAVENOUS at 06:01

## 2019-01-18 RX ADMIN — LINEZOLID 600 MG: 600 INJECTION, SOLUTION INTRAVENOUS at 06:01

## 2019-01-18 RX ADMIN — SODIUM BICARBONATE 50 MEQ: 84 INJECTION, SOLUTION INTRAVENOUS at 04:01

## 2019-01-18 RX ADMIN — ACETAMINOPHEN 650 MG: 650 SOLUTION ORAL at 11:01

## 2019-01-18 RX ADMIN — PIPERACILLIN AND TAZOBACTAM 4.5 G: 4; .5 INJECTION, POWDER, LYOPHILIZED, FOR SOLUTION INTRAVENOUS; PARENTERAL at 12:01

## 2019-01-18 RX ADMIN — ACETAMINOPHEN 1000 MG: 10 INJECTION, SOLUTION INTRAVENOUS at 10:01

## 2019-01-18 RX ADMIN — SODIUM CHLORIDE 3048 ML: 0.9 INJECTION, SOLUTION INTRAVENOUS at 12:01

## 2019-01-18 RX ADMIN — VANCOMYCIN HYDROCHLORIDE 2500 MG: 100 INJECTION, POWDER, LYOPHILIZED, FOR SOLUTION INTRAVENOUS at 01:01

## 2019-01-18 NOTE — ED TRIAGE NOTES
Patient presented to ER via personal transport with complaints of fever and chills. Patient was released from the hospital on Wednesday 1/16/19 after an abdominal surgery. Incision is dry and intact without redness, swelling, and exudate. Patient is tachycardic in the 140's.  EKG obtained. Cardiac monitoring in place. PICC noted to right upper arm.

## 2019-01-18 NOTE — H&P
General Surgery Service  H&P    Chief Complaint:  Fever, malaise, cough    HPI:  68M recently discharged after post-op from duodenal carcinoid resection with duo-duo recon  Chronic issues with vocal cords and inability to swallow without aspiration, sent home on TPN via PICC  Had fevers, malaise, cough   In ED febrile to 103, WBC 20, elevated procalcitonin, needed some O2 NC  Given Vanc/Zosyn in ED, bolused 30cc/kg, lactate 2.2 not rechecked yet  MAPs improved with bolus now normotensive    PMH:  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    Prostatic hyperplasia     Sleep apnea      PSH:  Past Surgical History:   Procedure Laterality Date    BIOPSY, LIVER  1/4/2019    Per[facility] by [facility]ado [facility] at [facility] OR    CHOLECYSTECTOMY  1/4/2019    [facility]ado [facility] at [facility] OR    DUODENECTOMY N/A 1/4/2019    [facility] at [facility]    EGD (ESOPHAGOGASTRODUODENOSCOPY) N/A 1/4/2019    Performed by [facility] at [facility] OR    EGD (ESOPHAGOGASTRODUODENOSCOPY) N/A 9/24/2018    Performed by Salo Nuñez MD at [facility] ENDO (4TH FLR)    FUNDOPLICATION N/A 1/4/2019    Per[facility] by [facility]ado [facility] at [facility] OR    GASTROJEJUNOSTOMY N/A 1/4/2019    Per[facility] at [facility] OR    LYMPHADENECTOMY N/A 1/4/2019    Per[facility] at [facility] OR    PALATOPLASTY-(UPPP) N/A 2/14/2017    Performed by Orlando JAN Araujo III, MD at [facility] OR 2ND FLR    PH MONITORING, ESOPHAGUS, WIRELESS, (OFF REFLUX MEDS) N/A 9/24/2018    Performed by [facility]ez [facility] at [facility] (4TH FLR)    TONSILLECTOMY      TRANSPROSTATIC TISSUE RETRACTION N/A 6/28/2018    [facility]    ULTRASOUND-ENDOSCOPIC-UPPER N/A 11/5/2018    Performed by Gorge Reyes MD at [facility] ENDO    UVULOPALATOPHARYNGOPLASTY           FH:  Family  History   Problem Relation Age of Onset    Heart disease Mother     Diabetes Mother     Stroke Father     Cancer Sister         pancreatitic    COPD Sister     Prostate cancer Neg Hx     Kidney disease Neg Hx     Thyroid disease Neg Hx     Retinal detachment Neg Hx     Macular degeneration Neg Hx     Hypertension Neg Hx     Glaucoma Neg Hx          Social:  Social History     Socioeconomic History    Marital status:      Spouse name: Not on file    Number of children: 3    Years of education: Not on file    Highest education level: Not on file   Social Needs    Financial resource strain: Not on file    Food insecurity - worry: Not on file    Food insecurity - inability: Not on file    Transportation needs - medical: Not on file    Transportation needs - non-medical: Not on file   Occupational History    Not on file   Tobacco Use    Smoking status: Never Smoker    Smokeless tobacco: Never Used   Substance and Sexual Activity    Alcohol use: Yes     Alcohol/week: 1.2 oz     Types: 2 Cans of beer per week    Drug use: No    Sexual activity: Not Currently   Other Topics Concern    Not on file   Social History Narrative    Not on file         Allergies:  Review of patient's allergies indicates:   Allergen Reactions    Epinephrine      Neuroendocrine Tumor patient           Meds:  No current facility-administered medications on file prior to encounter.      Current Outpatient Medications on File Prior to Encounter   Medication Sig Dispense Refill    atorvastatin (LIPITOR) 40 MG tablet Take 1 tablet (40 mg total) by mouth once daily. 90 tablet 3    bisacodyl (DULCOLAX) 10 mg Supp Place 1 suppository (10 mg total) rectally daily as needed.  0    cholecalciferol, vitamin D3, 50,000 unit capsule Take 1 capsule (50,000 Units total) by mouth once a week. 12 capsule 3    HYDROcodone-acetaminophen (NORCO) 5-325 mg per tablet Take 1 tablet by mouth every 6 (six) hours as needed for Pain. 30  tablet 0    levoFLOXacin (LEVAQUIN) 250 mg/10 mL Soln Take 20 mLs (500 mg total) by mouth once daily for 7 days 140 mL 0    meclizine (ANTIVERT) 25 mg tablet Take 1 tablet (25 mg total) by mouth 3 (three) times daily as needed for Dizziness. 14 tablet 0    ondansetron (ZOFRAN) 4 MG tablet Take 1 tablet (4 mg total) by mouth every 8 (eight) hours as needed for Nausea. 30 tablet 2    sildenafil (VIAGRA) 100 MG tablet Take 1 tablet (100 mg total) by mouth daily as needed for Erectile Dysfunction. 30 tablet 3    VIAGRA 100 mg tablet Take 1 tablet (100 mg total) by mouth once daily. Brand name only medication. 10 tablet 11    [DISCONTINUED] omeprazole 2 mg/mL SusR Take 10 ml (20 mg total) by mouth 2 (two) times daily. 600 mL 11         ROS:  Review of Systems   Constitutional: Positive for chills, fever and malaise/fatigue.   Respiratory: Positive for cough, sputum production and wheezing. Negative for hemoptysis and shortness of breath.    Cardiovascular: Negative for chest pain, palpitations, claudication and leg swelling.   Gastrointestinal: Negative for abdominal pain, blood in stool, constipation, diarrhea, heartburn, melena, nausea and vomiting.   Genitourinary: Negative for dysuria, flank pain, frequency, hematuria and urgency.   Musculoskeletal: Positive for myalgias.   Skin: Negative for itching and rash.   Neurological: Positive for weakness. Negative for speech change, focal weakness and loss of consciousness.         Vitals:  Vitals:    01/18/19 1346 01/18/19 1401 01/18/19 1447 01/18/19 1450   BP: (!) 103/55 (!) 92/46 124/68    BP Location:   Left arm    Patient Position:   Lying    Pulse: (!) 120 (!) 116 (!) 113    Resp: (!) 26 (!) 27 (!) 21    Temp:    99.2 °F (37.3 °C)   TempSrc:    Oral   SpO2: 95% 97% 97%    Weight:         No intake/output data recorded.  I/O this shift:  In: 3148 [IV Piggyback:3148]  Out: -         Intake/Output Summary (Last 24 hours) at 1/18/2019 1607  Last data filed at  1/18/2019 1606  Gross per 24 hour   Intake 3148 ml   Output --   Net 3148 ml         Physical Exam:  Gen - No acute distress, awake/alert/oriented  HEENT - Normocephalic, atraumatic  CV - Regular rate and rhythm  Resp - normal work of breathing, dullness to percussion RLL  Abd - Soft, not tender, not distended, surgical scars healing  Ext - Warm and well perfused  G/U - no beach catheter in place  Vasc - palpable distal pulses BUE    Physical Exam        Recent Labs   Lab 01/18/19  1117   WBC 19.60*   RBC 2.93*   HGB 8.5*   HCT 28.0*   *   MCV 96   MCH 29.0   MCHC 30.4*     Recent Labs   Lab 01/18/19  1117      K 4.1      CO2 26   BUN 21   CREATININE 1.4     Recent Labs   Lab 01/18/19  1117   CALCIUM 9.2   PROT 8.3      K 4.1   CO2 26      BUN 21   CREATININE 1.4   ALKPHOS 124   ALT 47*   AST 65*   BILITOT 1.1*     Radiology:  Imaging Results          X-Ray Chest AP Portable (Final result)  Result time 01/18/19 12:07:44    Final result by Harpreet Lazar Jr., MD (01/18/19 12:07:44)                 Impression:      Persistent increased opacification right base likely pleural fluid with either atelectasis and/or consolidation.      Electronically signed by: Harpreet Lazar MD  Date:    01/18/2019  Time:    12:07             Narrative:    EXAMINATION:  XR CHEST AP PORTABLE    CLINICAL HISTORY:  Sepsis;    TECHNIQUE:  Single frontal view of the chest was performed.    COMPARISON:  January 14, 2019.    FINDINGS:  Right central venous catheter is been removed.  Right PICC line remains.  Persistent increased opacification particularly at the right base.  Probable pleural fluid.  Left lung is fairly well aerated.  No pneumothorax.                                  Micro:  Microbiology Results (last 7 days)     Procedure Component Value Units Date/Time    Blood culture x two cultures. Draw prior to antibiotics. [[id number]] Collected:  01/18/19 1117    Order Status:  Sent Specimen:  Blood from  Peripheral, Antecubital, Left Updated:  01/18/19 1246    Blood culture x two cultures. Draw prior to antibiotics. [[id number]] Collected:  01/18/19 1121    Order Status:  Sent Specimen:  Blood from Peripheral, Antecubital, Left Updated:  01/18/19 1246    Blood culture x two cultures. Draw prior to antibiotics. [[id number]] Collected:  01/18/19 1221    Order Status:  Sent Specimen:  Blood from Line, PICC Right Brachial Updated:  01/18/19 1221    Blood culture x two cultures. Draw prior to antibiotics. [[id number]]     Order Status:  Canceled Specimen:  Blood     Blood culture x two cultures. Draw prior to antibiotics. [[id number]]     Order Status:  Canceled Specimen:  Blood               Assessment and Plan:  68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  Now c sepsis likely from aspiration RLL PNA    Neuro/Pain: AAO, prn pain control   CV: HDS currently  Resp: O2 keep sats >94%, RT consult/treatments, CT chest P, if effusion have asked IR to tap  Renal: Cr 1.4, will monitor UOP, trend Cr, try to avoid nephrotoxics and adequately resuscitate  Heme/ID: leukocytosis, elev procal, empiric abx for aspiration PNA (vanc/zosyn prev admit) - will start zyvox, merrem currently  FEN/GI: NPO d/t aspiration, swallowing dysfunction, ST consult again while here,   Neuroendocrine: will monitor for s/s carcinoid issues, avoid triggers  Endocrine: no hx diabetes, will monitor  Surgical: f/u CT abd/pelv to r/o anastomotic issues, do not suspect this   PPX: DVT lovenox 40 q D  tomorrow    GI protonix 40 qD  Dispo: admit to ICU care, continue inpatient care, await resolution of suspected infection and results of studies    Braulio Negrete MD - HO4

## 2019-01-18 NOTE — ED PROVIDER NOTES
Encounter Date: 1/18/2019    SCRIBE #1 NOTE: I, Carlos Treviño, am scribing for, and in the presence of,  . I have scribed the entire note.       History     Chief Complaint   Patient presents with    Fever     68 year old male presents to ed cc of fever/ generalized weakness.      Hoang Santos Jr. is a 68 y.o. 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    The patient presents to the ED due to fever.  Pt has a carcinoid tumor removed by  January 4th. Pt was discharged 2 days ago from the hospital. Pt had fever present for 1 day and has gotten worse throughout the night. He has become generally weaker since the surgery as well. Pt denies n/v/d, hx of hypertension or diabetes.      The history is provided by the spouse and the patient.     Review of patient's allergies indicates:   Allergen Reactions    Epinephrine      Neuroendocrine Tumor patient       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    Prostatic hyperplasia     Sleep apnea      Past Surgical History:   Procedure Laterality Date    BIOPSY, LIVER  1/4/2019    Performed by [facility] at [facility] OR    CHOLECYSTECTOMY  1/4/2019    [facility] at [facility] OR    DUODENECTOMY N/A 1/4/2019    [facility]formed by [facility] at [facility] OR    EGD (ESOPHAGOGASTRODUODENOSCOPY) N/A 1/4/2019    Performed by [facility] at [facility] OR    EGD (ESOPHAGOGASTRODUODENOSCOPY) N/A 9/24/2018    [facility] ([facility] F[facility])    FUNDOPLICATION N/A 1/4/2019    Performed by [facility] at [facility] OR    GASTROJEJUNOSTOMY N/A 1/4/2019    Performed by [facility] at [facility] OR    LYMPHADENECTOMY N/A 1/4/2019    Performed by [facility]avrado [facility] at [facility]  OR    PALATOPLASTY-(UPPP) N/A 2/14/2017    Performed by Bob [facility]jo III, [facility] at [facility] OR 2ND FLR    PH MONITORING, ESOPHAGUS, WIRELESS, (OFF REFLUX MEDS) N/A 9/24/2018    Performed by [facility] at [facility] ENDO (4TH FLR)    TONSILLECTOMY      TRANSPROSTATIC TISSUE RETRACTION N/A 6/28/2018    [facility]    ULTRASOUND-ENDOSCOPIC-UPPER N/A 11/5/2018    [facility] at [facility] ENDO    UVULOPALATOPHARYNGOPLASTY       Family History   Problem Relation Age of Onset    Heart disease Mother     Diabetes Mother     Stroke Father     Cancer Sister         pancreatitic    COPD Sister     Prostate cancer Neg Hx     Kidney disease Neg Hx     Thyroid disease Neg Hx     Retinal detachment Neg Hx     Macular degeneration Neg Hx     Hypertension Neg Hx     Glaucoma Neg Hx      Social History     Tobacco Use    Smoking status: Never Smoker    Smokeless tobacco: Never Used   Substance Use Topics    Alcohol use: Yes     Alcohol/week: 1.2 oz     Types: 2 Cans of beer per week    Drug use: No     Review of Systems   Constitutional: Positive for chills, fatigue and fever.   Gastrointestinal: Negative for diarrhea, nausea and vomiting.   All other systems reviewed and are negative.      Physical Exam     Initial Vitals [01/18/19 1043]   BP Pulse Resp Temp SpO2   (!) 102/56 (!) 149 20 99.8 °F (37.7 °C) (!) 94 %      MAP       --         Physical Exam    Nursing note and vitals reviewed.  Constitutional: He appears well-developed and well-nourished. He is not diaphoretic. No distress.   HENT:   Head: Normocephalic and atraumatic.   Mouth/Throat: Oropharynx is clear and moist.   Eyes: EOM are normal. Pupils are equal, round, and reactive to light.   Neck: Normal range of motion. Neck supple.   Cardiovascular: Regular rhythm, normal heart sounds and intact distal pulses. Tachycardia present.    Pulmonary/Chest: No respiratory distress. He has decreased breath  sounds. He has no wheezes. He has no rhonchi. He has no rales.   Decreased breath sounds right base   Abdominal: Soft. Bowel sounds are normal. He exhibits no distension. There is no tenderness.   Mid-line longitudinal incision with scab formation, no dehiscence, drainage or surrounding erythema   Genitourinary: Penis normal.   Musculoskeletal: Normal range of motion. He exhibits edema (1+ tibial). He exhibits no tenderness.   Lymphadenopathy:     He has no cervical adenopathy.   Neurological: He is alert and oriented to person, place, and time. He has normal strength.   Skin: Skin is warm and dry.   PICC line right arm with no surrounding erythema   Psychiatric: He has a normal mood and affect. Thought content normal.         ED Course   Critical Care  Date/Time: 1/19/2019 9:56 AM  Performed by: Branden Grant MD  Authorized by: YOHANNES Shelley MD   Total critical care time (exclusive of procedural time) : 35 minutes  Critical care was necessary to treat or prevent imminent or life-threatening deterioration of the following conditions: sepsis.        Labs Reviewed   CBC W/ AUTO DIFFERENTIAL - Abnormal; Notable for the following components:       Result Value    WBC 19.60 (*)     RBC 2.93 (*)     Hemoglobin 8.5 (*)     Hematocrit 28.0 (*)     MCHC 30.4 (*)     RDW 16.0 (*)     Platelets 602 (*)     Lymph% 2.0 (*)     Mono% 3.0 (*)     Platelet Estimate Increased (*)     All other components within normal limits   COMPREHENSIVE METABOLIC PANEL - Abnormal; Notable for the following components:    Albumin 2.5 (*)     Total Bilirubin 1.1 (*)     AST 65 (*)     ALT 47 (*)     eGFR if non  52 (*)     All other components within normal limits   CULTURE, BLOOD   CULTURE, BLOOD   CULTURE, BLOOD   LACTIC ACID, PLASMA   LACTIC ACID, PLASMA   URINALYSIS, REFLEX TO URINE CULTURE          Imaging Results          X-Ray Chest AP Portable (Final result)  Result time 01/18/19 12:07:44    Final result by Harpreet  HALI Lazar Jr., MD (01/18/19 12:07:44)                 Impression:      Persistent increased opacification right base likely pleural fluid with either atelectasis and/or consolidation.      Electronically signed by: Harpreet Lazar MD  Date:    01/18/2019  Time:    12:07             Narrative:    EXAMINATION:  XR CHEST AP PORTABLE    CLINICAL HISTORY:  Sepsis;    TECHNIQUE:  Single frontal view of the chest was performed.    COMPARISON:  January 14, 2019.    FINDINGS:  Right central venous catheter is been removed.  Right PICC line remains.  Persistent increased opacification particularly at the right base.  Probable pleural fluid.  Left lung is fairly well aerated.  No pneumothorax.                                 Medical Decision Making:   ED Management:  4:14 PM  Sepsis reassessment.   Patient has recieved 30cc/kilo fluid bolus. Pt is still tachycardic and has bibasilar rales. Capillary refil <2 seconds. Blood pressure is 141/77. Mentation intact. Patient has been administered IV Zosyn and Vancomycin. Surgery has seen and evaluated patient.                       Clinical Impression:     1. Aspiration pneumonia of right lower lobe, unspecified aspiration pneumonia type        3. Malignant carcinoid tumor of duodenum    4. Pleural effusion    5. Malnutrition compromising bodily function        7. Oropharyngeal dysphagia    8. Chronic cough    9. Severe sepsis    10. Pneumonia due to infectious organism, unspecified laterality, unspecified part of lung                  I, Dr. Branden Grant, personally performed the services described in this documentation. All medical record entries made by the scribe were at my direction and in my presence.  I have reviewed the chart and agree that the record reflects my personal performance and is accurate and complete                   Branden Grant MD  01/19/19 4704       Branden Grant MD  01/19/19 5871

## 2019-01-18 NOTE — PROGRESS NOTES
Brief note:  Patient with autonomic dystrophy and dysphagia, history of aspiration trendency.  Admitted from ED with fever 103, incr WBC and findings compatible w RLL pneumonia.  Admit for aggressive CPR, iv Abx, etc.  TPN for malnutriton and inability to swallow

## 2019-01-18 NOTE — TELEPHONE ENCOUNTER
----- Message from Ermelinda Barrios sent at 1/18/2019  1:15 PM CST -----  Contact: Patients wife, Sneha  JOSE ALEJANDRO- Patients wife stopped by to inform the office that the patient is in the ER today for fever of 103, accelerated heart rate. Sneha states the patient was not able to swallow his antibiotics. Sneha can be reached at [phone]

## 2019-01-19 LAB
ALBUMIN SERPL BCP-MCNC: 2 G/DL
ALP SERPL-CCNC: 104 U/L
ALT SERPL W/O P-5'-P-CCNC: 35 U/L
ANION GAP SERPL CALC-SCNC: 7 MMOL/L
ANISOCYTOSIS BLD QL SMEAR: SLIGHT
APTT BLDCRRT: 32.2 SEC
AST SERPL-CCNC: 48 U/L
BASOPHILS # BLD AUTO: 0.02 K/UL
BASOPHILS NFR BLD: 0.1 %
BILIRUB SERPL-MCNC: 1.6 MG/DL
BUN SERPL-MCNC: 16 MG/DL
CALCIUM SERPL-MCNC: 8 MG/DL
CHLORIDE SERPL-SCNC: 106 MMOL/L
CO2 SERPL-SCNC: 27 MMOL/L
CREAT SERPL-MCNC: 1.2 MG/DL
DIFFERENTIAL METHOD: ABNORMAL
EOSINOPHIL # BLD AUTO: 0 K/UL
EOSINOPHIL NFR BLD: 0.1 %
ERYTHROCYTE [DISTWIDTH] IN BLOOD BY AUTOMATED COUNT: 16.2 %
EST. GFR  (AFRICAN AMERICAN): >60 ML/MIN/1.73 M^2
EST. GFR  (NON AFRICAN AMERICAN): >60 ML/MIN/1.73 M^2
GLUCOSE SERPL-MCNC: 93 MG/DL
HCT VFR BLD AUTO: 24.9 %
HGB BLD-MCNC: 7.4 G/DL
HYPOCHROMIA BLD QL SMEAR: ABNORMAL
INR PPP: 1.3
LACTATE SERPL-SCNC: 1.1 MMOL/L
LACTATE SERPL-SCNC: 1.7 MMOL/L
LYMPHOCYTES # BLD AUTO: 0.6 K/UL
LYMPHOCYTES NFR BLD: 4.1 %
MCH RBC QN AUTO: 28.6 PG
MCHC RBC AUTO-ENTMCNC: 29.7 G/DL
MCV RBC AUTO: 96 FL
MONOCYTES # BLD AUTO: 1.8 K/UL
MONOCYTES NFR BLD: 11.6 %
NEUTROPHILS # BLD AUTO: 13 K/UL
NEUTROPHILS NFR BLD: 84.1 %
PLATELET # BLD AUTO: 448 K/UL
PMV BLD AUTO: 8.7 FL
POCT GLUCOSE: 113 MG/DL (ref 70–110)
POCT GLUCOSE: 126 MG/DL (ref 70–110)
POCT GLUCOSE: 127 MG/DL (ref 70–110)
POCT GLUCOSE: 83 MG/DL (ref 70–110)
POIKILOCYTOSIS BLD QL SMEAR: SLIGHT
POLYCHROMASIA BLD QL SMEAR: ABNORMAL
POTASSIUM SERPL-SCNC: 4.5 MMOL/L
PROT SERPL-MCNC: 7.1 G/DL
PROTHROMBIN TIME: 13.4 SEC
RBC # BLD AUTO: 2.59 M/UL
SODIUM SERPL-SCNC: 140 MMOL/L
WBC # BLD AUTO: 15.54 K/UL

## 2019-01-19 PROCEDURE — 87102 FUNGUS ISOLATION CULTURE: CPT

## 2019-01-19 PROCEDURE — 97535 SELF CARE MNGMENT TRAINING: CPT

## 2019-01-19 PROCEDURE — 25000003 PHARM REV CODE 250: Performed by: STUDENT IN AN ORGANIZED HEALTH CARE EDUCATION/TRAINING PROGRAM

## 2019-01-19 PROCEDURE — 25000003 PHARM REV CODE 250: Performed by: SURGERY

## 2019-01-19 PROCEDURE — 97530 THERAPEUTIC ACTIVITIES: CPT

## 2019-01-19 PROCEDURE — 27000646 HC AEROBIKA DEVICE

## 2019-01-19 PROCEDURE — 85610 PROTHROMBIN TIME: CPT

## 2019-01-19 PROCEDURE — 87205 SMEAR GRAM STAIN: CPT

## 2019-01-19 PROCEDURE — 92610 EVALUATE SWALLOWING FUNCTION: CPT

## 2019-01-19 PROCEDURE — 87070 CULTURE OTHR SPECIMN AEROBIC: CPT

## 2019-01-19 PROCEDURE — 85025 COMPLETE CBC W/AUTO DIFF WBC: CPT

## 2019-01-19 PROCEDURE — 87015 SPECIMEN INFECT AGNT CONCNTJ: CPT

## 2019-01-19 PROCEDURE — 97166 OT EVAL MOD COMPLEX 45 MIN: CPT

## 2019-01-19 PROCEDURE — 63600175 PHARM REV CODE 636 W HCPCS: Performed by: RADIOLOGY

## 2019-01-19 PROCEDURE — 87075 CULTR BACTERIA EXCEPT BLOOD: CPT

## 2019-01-19 PROCEDURE — 27000221 HC OXYGEN, UP TO 24 HOURS

## 2019-01-19 PROCEDURE — 94664 DEMO&/EVAL PT USE INHALER: CPT

## 2019-01-19 PROCEDURE — 25000003 PHARM REV CODE 250: Performed by: RADIOLOGY

## 2019-01-19 PROCEDURE — C9113 INJ PANTOPRAZOLE SODIUM, VIA: HCPCS | Performed by: STUDENT IN AN ORGANIZED HEALTH CARE EDUCATION/TRAINING PROGRAM

## 2019-01-19 PROCEDURE — 63600175 PHARM REV CODE 636 W HCPCS: Performed by: STUDENT IN AN ORGANIZED HEALTH CARE EDUCATION/TRAINING PROGRAM

## 2019-01-19 PROCEDURE — 87186 SC STD MICRODIL/AGAR DIL: CPT

## 2019-01-19 PROCEDURE — 85730 THROMBOPLASTIN TIME PARTIAL: CPT

## 2019-01-19 PROCEDURE — 94799 UNLISTED PULMONARY SVC/PX: CPT

## 2019-01-19 PROCEDURE — G8987 SELF CARE CURRENT STATUS: HCPCS | Mod: CK

## 2019-01-19 PROCEDURE — 87116 MYCOBACTERIA CULTURE: CPT

## 2019-01-19 PROCEDURE — 87206 SMEAR FLUORESCENT/ACID STAI: CPT

## 2019-01-19 PROCEDURE — 87205 SMEAR GRAM STAIN: CPT | Mod: 59

## 2019-01-19 PROCEDURE — 87106 FUNGI IDENTIFICATION YEAST: CPT | Mod: 59

## 2019-01-19 PROCEDURE — 97161 PT EVAL LOW COMPLEX 20 MIN: CPT

## 2019-01-19 PROCEDURE — 83605 ASSAY OF LACTIC ACID: CPT

## 2019-01-19 PROCEDURE — 87070 CULTURE OTHR SPECIMN AEROBIC: CPT | Mod: 59

## 2019-01-19 PROCEDURE — 94761 N-INVAS EAR/PLS OXIMETRY MLT: CPT

## 2019-01-19 PROCEDURE — 80053 COMPREHEN METABOLIC PANEL: CPT

## 2019-01-19 PROCEDURE — 99900035 HC TECH TIME PER 15 MIN (STAT)

## 2019-01-19 PROCEDURE — 11000001 HC ACUTE MED/SURG PRIVATE ROOM

## 2019-01-19 PROCEDURE — 87077 CULTURE AEROBIC IDENTIFY: CPT

## 2019-01-19 PROCEDURE — G8988 SELF CARE GOAL STATUS: HCPCS | Mod: CI

## 2019-01-19 RX ORDER — LIDOCAINE HYDROCHLORIDE 10 MG/ML
INJECTION INFILTRATION; PERINEURAL CODE/TRAUMA/SEDATION MEDICATION
Status: COMPLETED | OUTPATIENT
Start: 2019-01-19 | End: 2019-01-19

## 2019-01-19 RX ORDER — FENTANYL CITRATE 50 UG/ML
INJECTION, SOLUTION INTRAMUSCULAR; INTRAVENOUS CODE/TRAUMA/SEDATION MEDICATION
Status: COMPLETED | OUTPATIENT
Start: 2019-01-19 | End: 2019-01-19

## 2019-01-19 RX ORDER — ESOMEPRAZOLE MAGNESIUM 20 MG/1
20 GRANULE, DELAYED RELEASE ORAL 2 TIMES DAILY
Qty: 1200 MG | Refills: 11 | Status: SHIPPED | OUTPATIENT
Start: 2019-01-19 | End: 2019-05-10

## 2019-01-19 RX ORDER — ALBUTEROL SULFATE 2.5 MG/.5ML
2.5 SOLUTION RESPIRATORY (INHALATION)
Status: DISCONTINUED | OUTPATIENT
Start: 2019-01-19 | End: 2019-01-23

## 2019-01-19 RX ORDER — SODIUM CHLORIDE FOR INHALATION 3 %
4 VIAL, NEBULIZER (ML) INHALATION
Status: DISCONTINUED | OUTPATIENT
Start: 2019-01-19 | End: 2019-01-23

## 2019-01-19 RX ORDER — MIDAZOLAM HYDROCHLORIDE 1 MG/ML
INJECTION INTRAMUSCULAR; INTRAVENOUS CODE/TRAUMA/SEDATION MEDICATION
Status: COMPLETED | OUTPATIENT
Start: 2019-01-19 | End: 2019-01-19

## 2019-01-19 RX ORDER — SODIUM CHLORIDE, SODIUM LACTATE, POTASSIUM CHLORIDE, CALCIUM CHLORIDE 600; 310; 30; 20 MG/100ML; MG/100ML; MG/100ML; MG/100ML
INJECTION, SOLUTION INTRAVENOUS CONTINUOUS
Status: DISCONTINUED | OUTPATIENT
Start: 2019-01-19 | End: 2019-01-20

## 2019-01-19 RX ADMIN — LINEZOLID 600 MG: 600 INJECTION, SOLUTION INTRAVENOUS at 05:01

## 2019-01-19 RX ADMIN — SODIUM CHLORIDE, SODIUM LACTATE, POTASSIUM CHLORIDE, AND CALCIUM CHLORIDE: .6; .31; .03; .02 INJECTION, SOLUTION INTRAVENOUS at 09:01

## 2019-01-19 RX ADMIN — RETINOL, ERGOCALCIFEROL, .ALPHA.-TOCOPHEROL ACETATE, DL-, PHYTONADIONE, ASCORBIC ACID, NIACINAMIDE, RIBOFLAVIN 5-PHOSPHATE SODIUM, THIAMINE HYDROCHLORIDE, PYRIDOXINE HYDROCHLORIDE, DEXPANTHENOL, BIOTIN, FOLIC ACID, AND CYANOCOBALAMIN: KIT at 01:01

## 2019-01-19 RX ADMIN — SODIUM CHLORIDE 500 ML: 0.9 INJECTION, SOLUTION INTRAVENOUS at 02:01

## 2019-01-19 RX ADMIN — MEROPENEM 2 G: 1 INJECTION, POWDER, FOR SOLUTION INTRAVENOUS at 03:01

## 2019-01-19 RX ADMIN — MIDAZOLAM HYDROCHLORIDE 1 MG: 1 INJECTION, SOLUTION INTRAMUSCULAR; INTRAVENOUS at 07:01

## 2019-01-19 RX ADMIN — LINEZOLID 600 MG: 600 INJECTION, SOLUTION INTRAVENOUS at 04:01

## 2019-01-19 RX ADMIN — MEROPENEM 2 G: 1 INJECTION, POWDER, FOR SOLUTION INTRAVENOUS at 09:01

## 2019-01-19 RX ADMIN — ACETAMINOPHEN 1000 MG: 10 INJECTION, SOLUTION INTRAVENOUS at 06:01

## 2019-01-19 RX ADMIN — SODIUM CHLORIDE, SODIUM LACTATE, POTASSIUM CHLORIDE, AND CALCIUM CHLORIDE: .6; .31; .03; .02 INJECTION, SOLUTION INTRAVENOUS at 11:01

## 2019-01-19 RX ADMIN — SODIUM CHLORIDE 500 ML: 0.9 INJECTION, SOLUTION INTRAVENOUS at 01:01

## 2019-01-19 RX ADMIN — PANTOPRAZOLE SODIUM 40 MG: 40 INJECTION, POWDER, LYOPHILIZED, FOR SOLUTION INTRAVENOUS at 09:01

## 2019-01-19 RX ADMIN — MEROPENEM 2 G: 1 INJECTION, POWDER, FOR SOLUTION INTRAVENOUS at 05:01

## 2019-01-19 RX ADMIN — ACETAMINOPHEN 1000 MG: 10 INJECTION, SOLUTION INTRAVENOUS at 01:01

## 2019-01-19 RX ADMIN — FENTANYL CITRATE 50 MCG: 50 INJECTION, SOLUTION INTRAMUSCULAR; INTRAVENOUS at 07:01

## 2019-01-19 RX ADMIN — LIDOCAINE HYDROCHLORIDE 10 ML: 10 INJECTION, SOLUTION INFILTRATION; PERINEURAL at 07:01

## 2019-01-19 RX ADMIN — SODIUM CHLORIDE, SODIUM LACTATE, POTASSIUM CHLORIDE, AND CALCIUM CHLORIDE: .6; .31; .03; .02 INJECTION, SOLUTION INTRAVENOUS at 01:01

## 2019-01-19 NOTE — PLAN OF CARE
Problem: Physical Therapy Goal  Goal: Physical Therapy Goal  Goals to be met by: 2019     Patient will increase functional independence with mobility by performin. Supine to sit with Modified [place]  2. Sit to stand transfer with Modified [place]  3. Gait  x 150 feet with Modified [place] using Rolling Walker.   4. Lower extremity exercise program x10 reps per handout, with independence    Outcome: Ongoing (interventions implemented as appropriate)  Initial PT evaluation performed.  Pt could benefit from skilled PT services M-F in order to maximize function prior to D/C.  HHPT recommended

## 2019-01-19 NOTE — PT/OT/SLP EVAL
Physical Therapy Evaluation    Patient Name:  Hoang Santos Jr.   MRN:  [id number]    Recommendations:     Discharge Recommendations:  (HHPT)   Discharge Equipment Recommendations: none   Barriers to discharge: None    Assessment:     Hoang Santos Jr. is a 68 y.o. male admitted with a medical diagnosis of Aspiration pneumonia of right lower lobe.  He presents with the following impairments/functional limitations:  weakness, impaired balance, impaired self care skills, decreased coordination, decreased safety awareness, impaired endurance, impaired functional mobilty, gait instability .    Rehab Prognosis: Good; patient would benefit from acute skilled PT services to address these deficits and reach maximum level of function.    Recent Surgery: * No surgery found *      Plan:     During this hospitalization, patient to be seen 5 x/week to address the identified rehab impairments via gait training, therapeutic activities, therapeutic exercises and progress toward the following goals:    · Plan of Care Expires:  02/02/19    Subjective     Chief Complaint: fatigue  Patient/Family Comments/goals: to get back to normal  Pain/Comfort:  · Pain Rating 1: 0/10    Patients cultural, spiritual, Yazidi conflicts given the current situation: no    Living Environment:  Pt lives sith his spouse in a H with 1 NICKO  Prior to admission, patients level of function was Mod Indep with ambualtion.  Equipment used at home: grab bar, walker, rolling.  DME owned (not currently used): none.  Upon discharge, patient will have assistance from spouse.    Objective:     Communicated with nsg prior to session.  Patient found all lines intact, call button in reach and bed alarm on PICC line, bed alarm(ICu monitoring)  upon PT entry to room.    General Precautions: Standard, fall, NPO, aspiration   Orthopedic Precautions:N/A   Braces: N/A     Exams:  · Cognitive Exam:  Patient is oriented to Person, Place, Time and Situation  · Gross Motor  Coordination:  WFL  · Postural Exam:  Patient presented with the following abnormalities:    · -       Rounded shoulders  · -       Forward head  · Sensation:    · -       Intact  light/touch B LE's  · Skin Integrity/Edema:      · -       Skin integrity: Visible skin intact  · RLE ROM: WFL  · RLE Strength: WFL  · LLE ROM: WFL  · LLE Strength: WFL    Functional Mobility:  · Bed Mobility:     · Scooting: stand by assistance  · Supine to Sit: minimum assistance  · Transfers:     · Sit to Stand:  contact guard assistance with no AD  · Gait: 12' with IV pole and CGA  · Balance: Good-/Fair+      Therapeutic Activities and Exercises:  Dangle protocol, pt sat at EOB with SBA approx 5m.  Instructed pt to breath in through nose and out through mouth throughout treatment session.  Pt able to return demonstration with Vc.   Pt instructed to perform B AP's, GS, QS, TKE's, while up in chair.  Pt verbailzed/demonstrated understanding    AM-PAC 6 CLICK MOBILITY  Total Score:18     Patient left up in chair with all lines intact, call button in reach and nsg notified.    GOALS:   Multidisciplinary Problems     Physical Therapy Goals        Problem: Physical Therapy Goal    Goal Priority Disciplines Outcome Goal Variances Interventions   Physical Therapy Goal     PT, PT/OT Ongoing (interventions implemented as appropriate)     Description:  Goals to be met by: 2019     Patient will increase functional independence with mobility by performin. Supine to sit with Modified [place]  2. Sit to stand transfer with Modified [place]  3. Gait  x 150 feet with Modified [place] using Rolling Walker.   4. Lower extremity exercise program x10 reps per handout, with independence                      History:     Past Medical History:   Diagnosis Date    Hyperlipidemia     Primary malignant neuroendocrine neoplasm of duodenum 2018    Prostatic hyperplasia     Sleep apnea        Past Surgical History:   Procedure  Laterality Date    BIOPSY, LIVER  1/4/2019    [facility] by [facility] OR    CHOLECYSTECTOMY  1/4/2019    [facility] by [facility] at [facility] OR    DUODENECTOMY N/A 1/4/2019    [facility] by [facility] at [facility] OR    EGD (ESOPHAGOGASTRODUODENOSCOPY) N/A 1/4/2019    Performed by [facility] at [facility] OR    EGD (ESOPHAGOGASTRODUODENOSCOPY) N/A 9/24/2018    Performed by Sal[facility]ez MD at [facility] ENDO (4TH FLR)    FUNDOPLICATION N/A 1/4/2019    Performed by [facility] at [facility] OR    GASTROJEJUNOSTOMY N/A 1/4/2019    Performed by [facility] at [facility] OR    LYMPHADENECTOMY N/A 1/4/2019    [facility]formed by [facility] at [facility] OR    PALATOPLASTY-(UPPP) N/A 2/14/2017    Performed by Bob Araujo III, MD at [facility] OR 2ND FLR    PH MONITORING, ESOPHAGUS, WIRELESS, (OFF REFLUX MEDS) N/A 9/24/2018    Performed by [facility] at [facility] ENDO (4TH FLR)    TONSILLECTOMY      TRANSPROSTATIC TISSUE RETRACTION N/A 6/28/2018    [facility]    ULTRASOUND-ENDOSCOPIC-UPPER N/A 11/5/2018    Performed by Gorge Reyes MD at [facility] ENDO    UVULOPALATOPHARYNGOPLASTY         Clinical Decision Making:     History  Co-morbidities and personal factors that may impact the plan of care Examination  Body Structures and Functions, activity limitations and participation restrictions that may impact the plan of care Clinical Presentation   Decision Making/ Complexity Score   Co-morbidities:   [] Time since onset of injury / illness / exacerbation  [] Status of current condition  []Patient's cognitive status and safety concerns    [] Multiple Medical Problems (see med hx)  Personal Factors:   [] Patient's age  [] Prior Level of function   [] Patient's home situation (environment and family support)  [] Patient's level of motivation  [] Expected progression of patient      HISTORY:(criteria)    [] 33996  - no personal factors/history    [] 85673 - has 1-2 personal factor/comorbidity     [] 16099 - has >3 personal factor/comorbidity     Body Regions:  [] Objective examination findings  [] Head     []  Neck  [] Trunk   [] Upper Extremity  [] Lower Extremity    Body Systems:  [] For communication ability, affect, cognition, language, and learning style: the assessment of the ability to make needs known, consciousness, orientation (person, place, and time), expected emotional /behavioral responses, and learning preferences (eg, learning barriers, education  needs)  [] For the neuromuscular system: a general assessment of gross coordinated movement (eg, balance, gait, locomotion, transfers, and transitions) and motor function  (motor control and motor learning)  [] For the musculoskeletal system: the assessment of gross symmetry, gross range of motion, gross strength, height, and weight  [] For the integumentary system: the assessment of pliability(texture), presence of scar formation, skin color, and skin integrity  [] For cardiovascular/pulmonary system: the assessment of heart rate, respiratory rate, blood pressure, and edema     Activity limitations:    [] Patient's cognitive status and saf ety concerns          [] Status of current condition      [] Weight bearing restriction  [] Cardiopulmunary Restriction    Participation Restrictions:   [] Goals and goal agreement with the patient     [] Rehab potential (prognosis) and probable outcome      Examination of Body System: (criteria)    [] 77463 - addressing 1-2 elements    [] 66091 - addressing a total of 3 or more elements     [] 88474 -  Addressing a total of 4 or more elements         Clinical Presentation: (criteria)  Choose one     On examination of body system using standardized tests and measures patient presents with (CHOOSE ONE) elements from any of the following: body structures and functions, activity limitations, and/or participation restrictions.   Leading to a clinical presentation that is considered (CHOOSE ONE)                              Clinical Decision Making  (Eval Complexity):  Choose One     Time Tracking:     PT Received On: 01/19/19  PT Start Time: 1124     PT Stop Time: 1149  PT Total Time (min): 25 min     Billable Minutes: Evaluation 15 and Therapeutic Activity 10 OT present      Melanie Amos, PT  01/19/2019

## 2019-01-19 NOTE — CONSULTS
CT chest performed without contrast and reviewed at the scanner. Small right effusion, not suitable for drain placement. D/w Dr. Shelley by telephone at the time of the scan. Please see separately dictated CT chest for additional details. CT AP with contrast to will be performed after patient has been hydrated.    Thank you for the opportunity to assist in the care of this patient.      Augustin Kendall MD  [facility]  Pager [phone]

## 2019-01-19 NOTE — PT/OT/SLP EVAL
"Occupational Therapy   Evaluation    Name: Hoang Santos Jr.  MRN: [id number]  Admitting Diagnosis:  Aspiration pneumonia of right lower lobe      Recommendations:     Discharge Recommendations: (HHOT/PT)  Discharge Equipment Recommendations:  shower chair  Barriers to discharge:  None    Assessment:     Hoang Santos Jr. is a 68 y.o. male with a medical diagnosis of Aspiration pneumonia of right lower lobe.  He presents with deconditioning. Performance deficits affecting function: weakness, impaired self care skills, impaired balance, impaired skin, impaired endurance, impaired functional mobilty, gait instability, impaired cardiopulmonary response to activity.      Rehab Prognosis: Good; patient would benefit from acute skilled OT services to address these deficits and reach maximum level of function.       Plan:     Patient to be seen 5 x/week to address the above listed problems via self-care/home management, therapeutic activities, therapeutic exercises  · Plan of Care Expires: 02/19/19  · Plan of Care Reviewed with: patient    Subjective     Chief Complaint: Pt states he "does not want to come back to hospital a third time."  Patient/Family Comments/goals: To return home    Occupational Profile:  Living Environment: Pt lives with spouse SSH, 1 NICKO, WIS with grab bars  Previous level of function: Mod I for self care and functional mobility  Equipment Used at Home:  grab bar, walker, rolling  Assistance upon Discharge: Family    Pain/Comfort:  Pain Rating 1: 0/10    Patients cultural, spiritual, Jain conflicts given the current situation:      Objective:     Communicated with: Nieves prior to session.  Patient found HOB elevated with PICC line(ICU Monitoring) upon OT entry to room.    General Precautions: Standard, aspiration, NPO   Orthopedic Precautions:N/A   Braces: N/A     Occupational Performance:    Bed Mobility:    · Patient completed Rolling/Turning to Left with  contact guard assistance  · Patient " completed Supine to Sit with minimum assistance  · Patient completed Sit to Supine with contact guard assistance    Functional Mobility/Transfers:  · Patient completed Sit <> Stand Transfer with minimum assistance  with  rolling walker   · Functional Mobility: Pt with Fair to Fair + dynamic seated and standing balance.    Activities of Daily Living:  · Grooming: set up assistance .  · Upper Body Dressing: moderate assistance to don gown as robe 2/2 IVs and BP cuff    Cognitive/Visual Perceptual:  Cognitive/Psychosocial Skills:     -       Oriented to: Person, Place, Time and Situation   -       Follows Commands/attention:Follows multistep  commands  -       Communication: clear/fluent  -       Memory: No Deficits noted  -       Safety awareness/insight to disability: impaired   -       Mood/Affect/Coping skills/emotional control: Appropriate to situation    Physical Exam:  Postural examination/scapula alignment:    -       Rounded shoulders  -       Forward head  Sensation:    -       Intact  Motor Planning:    -       WFL  Upper Extremity Range of Motion:  BUE WFL, L shoulder external rotation and shoulder flexion slightly limited   Upper Extremity Strength:  BUE 4- to 4/5   Strength:  WFL  Fine Motor Coordination:    -       Intact to open oral care swab packages, toothpaste screw top cap  Gross motor coordination:   WFL    AMPAC 6 Click ADL:  AMPAC Total Score: 15    Treatment & Education:  Pt educated on role of OT and POC.   Pt performing skills as listed above.  Pt provided with set up A to perform oral care with swab and use of suction.  Education:    Patient left up in chair with all lines intact, call button in reach and nsg notified    GOALS:   Multidisciplinary Problems     Occupational Therapy Goals        Problem: Occupational Therapy Goal    Goal Priority Disciplines Outcome Interventions   Occupational Therapy Goal     OT, PT/OT Ongoing (interventions implemented as appropriate)    Description:   Goals to be met by: 02/19/2019    Patient will increase functional independence with ADLs by performing:    UE Dressing with Modified [place].  LE Dressing with Modified [place].  Grooming while standing with Modified [place].  Toileting from toilet with Modified [place] for hygiene and clothing management.   Toilet transfer to toilet with Modified [place].  Increased functional strength to WFL for self care.  Upper extremity exercise program x10 reps per handout, with independence.                      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 OR    CHOLECYSTECTOMY  1/4/2019    Performed by [facility]ado [facility] at [facility] OR    DUODENECTOMY N/A 1/4/2019    Performed by [facility]ado [facility] at [facility] OR    EGD (ESOPHAGOGASTRODUODENOSCOPY) N/A 1/4/2019    Performed by [facility]ado MD at [facility] OR    EGD (ESOPHAGOGASTRODUODENOSCOPY) N/A 9/24/2018    Performed by Salo Nu[facility]ez MD at [facility] ENDO (4TH FLR)    FUNDOPLICATION N/A 1/4/2019    Performed by [facility]ado [facility] at [facility] OR    GASTROJEJUNOSTOMY N/A 1/4/2019    Performed by [facility] at [facility] OR    LYMPHADENECTOMY N/A 1/4/2019    Performed by [facility] at [facility] OR    PALATOPLASTY-(UPPP) N/A 2/14/2017    Performed by Bob Araujo III, MD at [facility] OR 2ND FLR    PH MONITORING, ESOPHAGUS, WIRELESS, (OFF REFLUX MEDS) N/A 9/24/2018    Performed by Salo [facility]ez MD at [facility] ENDO (4TH FLR)    TONSILLECTOMY      TRANSPROSTATIC TISSUE RETRACTION N/A 6/28/2018    Performed by K[facility] Jack [facility] at [facility] OR    ULTRASOUND-ENDOSCOPIC-UPPER N/A 11/5/2018    Performed by Gorge Reyes MD at [facility] ENDO     "UVULOPALATOPHARYNGOPLASTY         Clinical Decision Makin.  OT Mod:  "Pt evaluation falls under moderate complexity for evaluation coding due to identification of 3-5 performance deficits noted as stated above. Eval required Min/Mod assistance to complete on this date and detailed assessment(s) were utilized. Moreover, an expanded review of history and occupational profile obtained with additional review of cognitive, physical and psychosocial hx."     Time Tracking:     OT Date of Treatment: 19  OT Start Time: 1109  OT Stop Time: 1148  OT Total Time (min): 39 min Co Tx PT    Billable Minutes:Evaluation 10  Self Care/Home Management 13    Caitlyn Carrion OT  2019    "

## 2019-01-19 NOTE — NURSING
Last refill date: 5/18/18  Last office visit: 11/15/118  Next appointment date: 5/2/19    lisinopril-hydroCHLOROthiazide (PRINZIDE,ZESTORETIC) 20-12.5 MG per tablet     Sig: Take 1 tablet by mouth daily.     Original sig: TAKE 1 TABLET BY MOUTH DAILY    Disp:  90 tablet Â Â  Refills:  0    Start: 11/21/2018    Class: Eprescribe     Request approved Paged and notified Dr. Negrete concerning pt's BP of 78/46 and HR in 130's. Awaiting call back. Will continue to monitor.

## 2019-01-19 NOTE — PT/OT/SLP EVAL
Speech Language Pathology Evaluation  Bedside Swallow    Patient Name:  Hoang Santos Jr.   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    Prostatic hyperplasia     Sleep apnea        Past Surgical History:   Procedure Laterality Date    BIOPSY, LIVER  1/4/2019    Performed by [facility] at [facility] OR    CHOLECYSTECTOMY  1/4/2019    Performed by [facility]leine [facility]ado MD at [facility] OR    DUODENECTOMY N/A 1/4/2019    Performed by [facility]leine [facility]ado MD at [facility] OR    EGD (ESOPHAGOGASTRODUODENOSCOPY) N/A 1/4/2019    Performed by [facility]leine [facility]ado MD at [facility] OR    EGD (ESOPHAGOGASTRODUODENOSCOPY) N/A 9/24/2018    Performed by Salo Nu[facility]ez MD at [facility] ENDO (4TH FLR)    FUNDOPLICATION N/A 1/4/2019    Performed by Madeleine [facility]ado MD at [facility] OR    GASTROJEJUNOSTOMY N/A 1/4/2019    Performed by [facility]leine [facility]ado MD at [facility] OR    LYMPHADENECTOMY N/A 1/4/2019    Performed by [facility]leine [facility]ado MD at [facility] OR    PALATOPLASTY-(UPPP) N/A 2/14/2017    Performed by Bob Araujo III, MD at [facility] OR 2ND FLR    PH MONITORING, ESOPHAGUS, WIRELESS, (OFF REFLUX MEDS) N/A 9/24/2018    Performed by Salo Nuñez MD at [facility] ENDO (4TH FLR)    TONSILLECTOMY      TRANSPROSTATIC TISSUE RETRACTION N/A 6/28/2018    [facility] at [facility] OR    ULTRASOUND-ENDOSCOPIC-UPPER N/A 11/5/2018    Performed by Gorge Reyes MD at [facility] ENDO    UVULOPALATOPHARYNGOPLASTY       HPI:  68M recently discharged after post-op from  "duodenal carcinoid resection with duo-duo recon  Chronic issues with vocal cords and inability to swallow without aspiration, sent home on TPN via PICC  Had fevers, malaise, cough   In ED febrile to 103, WBC 20, elevated procalcitonin, needed some O2 NC  Given Vanc/Zosyn in ED, bolused 30cc/kg, lactate 2.2 not rechecked yet  MAPs improved with bolus now normotensive      Social History: Patient lives with his wife.    Prior Intubation HX:  N/A    Modified Barium Swallow: Most recent MBSS on 1/11/19:   Impressions  Pt presents with mild oral dysphagia and severe pharyngeal dysphagia for all textures. Pt seen seated at 90* for all trials today. O2 in place, IVs hooked up, and Yanker suction present for pt to utilize as necessary. Prior to any PO trials, pt used Yanker suction to remove large amount of mucous like secretions from pharynx. No complaints of pain prior to MBSS; however, pt did state he felt "more swollen since coming down here" in regards to his throat. Pt consumed cup sips thin liquid x2 (5 cc each), which resulted in initial penetration, which led to eventual aspiration. Pt began to cough to clear residue, but required Yanker suction to remove residuals. Nectar-thick liquid trialed x1 (5 cc cup) and Honey-thick liquid trialed x1. Both nectar and honey consistencies resulted in immediate penetration and aspiration of bolus. Pt required 4+ swallows to clear residue from pharynx. Finally, a tsp bite of pudding in barium paste consumed x1, which led to stasis in the pharyngeal cavity and a surplus of residue. Pt again required multiple swallows and cuing to use effortful swallow to clear bolus. During PO trials, pt often regurgitated bolus from UES, which places him at risk for delayed aspiration. He is to remain NPO with STRICT aspiration precautions in place. CT of abdomen previously scheduled for today; however, pt note deemed appropriate for CT scan as he is unsafe for the thin liquid bolus. ST reviewed " "recs w/ pt, family, MARA Garcia, and the MD team. ST to continue to follow while inpatient for ongoing dysphagia tx. Upon d/c from hospital, pt will benefit from continued ST services at either outpatient or home health level.       CT Chest:   Impression:       1. Occlusion of the right middle lobe bronchus with collapse of the right middle lobe.  This could be secondary to mucous plugging although potential endobronchial lesion or extrinsic obstructive lesion not excluded.  2. Additional dependent consolidative changes with air bronchograms seen within the right lower lobe which could be secondary to atelectasis and/or aspiration or developing pneumonia.  Mild left basilar atelectasis.  3. Small right pleural effusion.  4. Distal esophagus is distended with fluid.  Findings could relate to stasis or gastroesophageal reflux and would predispose patient for potential aspiration.  5. Stable postsurgical changes with small scattered foci of air and disorganized free fluid within the right upper quadrant.       Prior diet: per pt, clear liquids only at home after most recent d/c.      Subjective     SLP consulted for clinical swallow eval. SLP confirmed with RN prior to entry. Pt found in bed awake, alert and agreeable to participate in skilled ST session. Pt observed with dcr'd insight awareness and difficulty with memory recall of deficits 2/2 pt demonstrated limited understanding/rememberance of most recent MBSS (last Friday)/results and the recommendations. ( See Education section below for more detials)    Patient goals: "I did not know I had a swallowing issue" per pt     Pain/Comfort:  · Pain Rating 1: 0/10    Objective:     Oral Musculature Evaluation  · Oral Musculature: WFL  · Dentition: present and adequate  · Mucosal Quality: sticky  · Mandibular Strength and Mobility: WFL  · Oral Labial Strength and Mobility: WFL  · Lingual Strength and Mobility: (reduced strength)  · Buccal Strength and Mobility: (reduced " strength)  · Volitional Swallow: (delayed upon palpation and dcr'd laryngeal elevation/excursion)  · Voice Prior to PO Intake: (low volume, slightly wet vocal quality)    Bedside Swallow Eval: Pt seen for swallow eval this AM. Pt continues to demonstrated overt s/s of aspiration during and s/p swallow (d/t regurgitation) with thin and nectar thick liquids.   Consistencies Assessed:  · Thin liquids -via cup sips x3  · Nectar thick liquids -via cup sips x3     Oral Phase:   · Slow oral transit time    Pharyngeal Phase:   · Coughing/choking-- delayed coughing s/p swallow thin liquids x1  · decreased hyolaryngeal excursion to palpation-- across all PO trials  · delayed swallow initation-- across all PO trials  · regurgitation of food/liquids-- s/p swallow ALL trials  · multiple spontaneous swallows-- pt req'd at least 3 swallows per sip of trials  · wet vocal quality after swallow-- s/p swallow thin liquids x2 and nectar thick liquids x3    Compensatory Strategies  · Multiple swallows  · Volitional cough/throat clear    Treatment: SLP will continue to follow pt for indirect dysphagia tx for remediation of dysphagia.    Education:  SLP educated pt on role of SLP, clinical swallow eval, NPO recs/aspiration precautions with consideration for alternative means of nutrition, and POC. Pt acknowledge and inquired about NPO status and how long it would last. SLP provided pt with extensive education re: most recent MBSS results/recs. SLP explained pt on significant findings from MBSS and educated pt on anatomy and physiology of normal swallowing mechanism vs. Pt's swallowing mechanism and deficits observed, and his incr'd risk for aspiration with con't PO intake, which pt is current admitting dx. Pt stated he did not recall being educated on MBSS results (though education of MBSS is documented in EMR) and that he was also not aware that he was not supposed to eat or drink and prior to d/c on Wednesady 1/16, that MD Salvador  "stated he could participate in clear liquids for 4-6 wks with TPN-- However, per EMR, SLP's note stated strict NPO with alternative means of nutrition. Pt stated " I wouldn't have done nothing to put myself at risk!"   -SLP also answered questions about NPO status and consideration for alt means of nutrition-- SLP informed pt on purpose/reasoning for NPO rec to reduce risk of aspiration and rec for alternative means of nutrition (I.e. PEG tube) is form of receiving nutrition, non-orally, to further reduce risk of aspiration-- SLP explained to pt that MD team and GI MD would have to assess pt's candidacy to determine if pt is appropriate for PEG placement. Pt acknowledged and confirmed understanding of all education provided. However, pt will require frequent reinforcement 2/2 pt's dcr'd insight awareness and memory recall deficit.   -SLP posted swallow precautions above pt's HOB.      Assessment:     Hoang Santos Jr. is a 68 y.o. male with an SLP diagnosis of oropharyngeal Dysphagia.  He presents with overt s/s of aspiration during and s/p swallow (d/t regurgitation) with thin and nectar thick liquids (similar findings from recent MBSS on 1/11/19). Recs: Strict NPO with consideration for alternative means of nutrition/hydration/medication. Pt is not safe for an oral diet. SLP spoke with MD Lance to notifiy him of results/recs-- MD Lance stated he will con't NPO order and plan to have GI scope pt Monday and will go from there. SLP will continue to follow. SLP also notified MARA Combs of results/recs.    Goals:   Multidisciplinary Problems     SLP Goals        Problem: SLP Goal    Goal Priority Disciplines Outcome   SLP Goal     SLP Ongoing (interventions implemented as appropriate)   Description:  Short Term Goals:  1. Pt will participate in BSS to determine least restrictive diet.- MET 1/19  2. Pt will participate in indirect dysphagia exercises to strengthen musculature for swallowing mechanism with mod-max " effort.                        Plan:     · Patient to be seen:  3 x/week   · Plan of Care expires:  02/18/19  · Plan of Care reviewed with:  patient(MARA Combs and MD Lance)   · SLP Follow-Up:  Yes       Discharge recommendations:  (HH ST and PT)   Barriers to Discharge:  Safety Awareness impaired and nutritional status    Time Tracking:     SLP Treatment Date:   01/19/19  Speech Start Time:  1021  Speech Stop Time:  1045     Speech Total Time (min):  24 min    Billable Minutes: Eval Swallow and Oral Function 12 and Seld Care/Home Management Training 12    AJITH Ramírez, CCC-SLP  01/19/2019

## 2019-01-19 NOTE — NURSING
Arrived to ICU from ED via stretcher. Placed on cardiac monitoring and oriented to room. Call light within reach and safety precautions in place.

## 2019-01-19 NOTE — PLAN OF CARE
Problem: SLP Goal  Goal: SLP Goal  Short Term Goals:  1. Pt will participate in BSS to determine least restrictive diet.- MET 1/19  2. Pt will participate in indirect dysphagia exercises to strengthen musculature for swallowing mechanism with mod-max effort.      Outcome: Ongoing (interventions implemented as appropriate)  1/19: Pt seen for swallow eval this AM. Pt continues to demonstrated over s/s of aspiration during and s/p swallow (d/t regurgitation) with thin and nectar thick liquids.   Recs: Strict NPO with consideration for alternative means of nutrition/hydration/medication. Pt is not safe for an oral diet.  NOTE: pt recently participated in MBSS on 1/11/19 and recs were same as above.   See note for full details.  MARY Ramírez., CCC-SLP  Speech-Language Pathologist

## 2019-01-19 NOTE — PLAN OF CARE
01/19/19 1109   Discharge Assessment   Assessment Type Discharge Planning Assessment   Confirmed/corrected address and phone number on facesheet? Yes   Assessment information obtained from? Patient   Prior to hospitilization cognitive status: Alert/Oriented   Prior to hospitalization functional status: Independent;Assistive Equipment   Current cognitive status: Alert/Oriented   Current Functional Status: Independent;Assistive Equipment   Facility Arrived From: home   Lives With spouse   Able to Return to Prior Arrangements other (see comments)  (unsure)   Is patient able to care for self after discharge? Unable to determine at this time (comments)   Who are your caregiver(s) and their phone number(s)? Sneha Santos(wife)[phone]   Patient's perception of discharge disposition home health   Readmission Within the Last 30 Days current reason for admission unrelated to previous admission   If yes, most recent facility name: [facility]   Patient currently being followed by outpatient case management? No   Patient currently receives any other outside agency services? No   Equipment Currently Used at Home grab bar;walker, rolling   Do you have any problems affording any of your prescribed medications? No   Is the patient taking medications as prescribed? yes   Does the patient have transportation home? Yes   Transportation Anticipated family or friend will provide   Does the patient receive services at the [facility]? No   Discharge Plan A Home Health   Discharge Plan B Long-term acute care facility (LTAC)   DME Needed Upon Discharge  (unsure)   Patient/Family in Agreement with Plan yes   Readmission Questionnaire   At the time of your discharge, did someone talk to you about what your health problems were? Yes   At the time of discharge, did someone talk to you about what to watch out for regarding worsening of your health problem? Yes   At the time of discharge, did someone talk to you about what to do if you  experienced worsening of your health problem? Yes   At the time of discharge, did someone talk to you about which medication to take when you left the hospital and which ones to stop taking? Yes   At the time of discharge, did someone talk to you about when and where to follow up with a doctor after you left the hospital? Yes   What do you believe caused you to be sick enough to be re-admitted? The food was going down the wrong way and now he has pneumonia   How often do you need to have someone help you when you read instructions, pamphlets, or other written material from your doctor or pharmacy? Rarely   Do you have problems taking your medications as prescribed? No   Do you have any problems affording any of  your prescribed medications? No   Do you have problems obtaining/receiving your medications? No   Does the patient have transportation to healthcare appointments? Yes   Living Arrangements house   Does the patient have family/friends to help with healtcare needs after discharge? yes   Does your caregiver provide all the help you need? Yes   Are you currently feeling confused? No   Are you currently having problems thinking? No   Are you currently having memory problems? No   Have you felt down, depressed, or hopeless? 0   Have you felt little interest or pleasure in doing things? 0   In the last 7 days, my sleep quality was: fair   General Surgery Service  H&P     Chief Complaint:  Fever, malaise, cough     HPI:  68M recently discharged after post-op from duodenal carcinoid resection with duo-duo recon  Chronic issues with vocal cords and inability to swallow without aspiration, sent home on TPN via PICC  Had fevers, malaise, cough   In ED febrile to 103, WBC 20, elevated procalcitonin, needed some O2 NC  Given Vanc/Zosyn in ED, bolused 30cc/kg, lactate 2.2 not rechecked yet  MAPs improved with bolus now normotensive     The Sw met with the pt in ICU to complete the assessment and the 30 day readmit  questionnaire. The pt was discharged from [facility] 1-16-19. The pt currently has [facility] and [facility])at home. The pt's independent with adl's and uses a rolling walker and grab bars at home. The pt has transportation home at d/c. The pt states his food was going down the wrong way which caused him to develop pneumonia. The pt has a very supportive wife. The pt states there's nothing that could have been done differently to avoid this readmit. The Sw left her contact info on the white board in the pt's room.

## 2019-01-19 NOTE — PLAN OF CARE
Problem: Occupational Therapy Goal  Goal: Occupational Therapy Goal  Goals to be met by: 02/19/2019    Patient will increase functional independence with ADLs by performing:    UE Dressing with Modified Parmer.  LE Dressing with Modified Parmer.  Grooming while standing with Modified [place].  Toileting from toilet with Modified Parmer for hygiene and clothing management.   Toilet transfer to toilet with Modified Parmer.  Increased functional strength to WFL for self care.  Upper extremity exercise program x10 reps per handout, with independence.    Outcome: Ongoing (interventions implemented as appropriate)  Pt would benefit from continued OT to address deficits in self care and functional mobility. Recommending HHOT; DME needs SC

## 2019-01-19 NOTE — PLAN OF CARE
Problem: Adult Inpatient Plan of Care  Goal: Plan of Care Review  Outcome: Ongoing (interventions implemented as appropriate)  Pt on oxygen in no apparent distress.  IS and Aerobika tx. Given with ok pt. Effort.  Will cont. To monitor.

## 2019-01-19 NOTE — PROGRESS NOTES
GENERAL SURGERY  PROGRESS NOTE    Admit Date: 1/18/2019  Hospital Day: 1  Procedure:         SUBJECTIVE:     NAEON  Patient was on NC 2L last night  No fever or chills and wbc decreasing   Still NPO  No n/v, f/c, chest pain  Refused bipap due to discomfort after it was offered due to risk of sleep apnea    Review of Systems   Cardiovascular: Negative for chest pain.   Respiratory:  Negative for cough .    Gastrointestinal: Negative for nausea and vomiting.      Continuous Infusions:   lactated ringers 125 mL/hr at 01/19/19 0127     Scheduled Meds:   acetaminophen  1,000 mg Intravenous Q8H    atorvastatin  40 mg Oral Daily    enoxaparin  40 mg Subcutaneous Daily    linezolid 600mg/300ml  600 mg Intravenous Q12H    meropenem (MERREM) IVPB  2 g Intravenous Q8H    pantoprazole  40 mg Intravenous BID         OBJECTIVE:     Vital Signs (Most Recent)  Temp: 99.4 °F (37.4 °C) (01/19/19 0345)  Pulse: (!) 122 (01/19/19 0645)  Resp: (!) 31 (01/19/19 0645)  BP: 126/60 (01/19/19 0645)  SpO2: 98 % (01/19/19 0645)    Vital Signs Range (Last 24H):  Temp:  [99.2 °F (37.3 °C)-103.2 °F (39.6 °C)]   Pulse:  [107-149]   Resp:  [0-33]   BP: ()/(43-80)   SpO2:  [85 %-100 %]     I & O (Last 24H):    Intake/Output Summary (Last 24 hours) at 1/19/2019 0815  Last data filed at 1/19/2019 0603  Gross per 24 hour   Intake 5723 ml   Output 750 ml   Net 4973 ml     Ventilator Data (Last 24H):          Physical Exam:    GEN - AAOx4, NAD on NC   Oral - MMM; no exudate  CHEST - course breaths sounds bilaterally   HEART - rrr; no m/r/s3/s4  ABD - soft; nonttp, incidsion c/d/i,   EXTR - warm; no edema  NEURO - no asterixis or focal deficits   SKIN - no obvious skin breakdown or rash      Inpatient Data      Vitals:   Temp:  [99.2 °F (37.3 °C)-103.2 °F (39.6 °C)]   Pulse:  [107-149]   Resp:  [0-33]   BP: ()/(43-80)   SpO2:  [85 %-100 %]     Diet NPO Except for: Medication, Ice Chips   Intake/Output Summary (Last 24 hours) at 1/19/2019  0815  Last data filed at 1/19/2019 0603  Gross per 24 hour   Intake 5723 ml   Output 750 ml   Net 4973 ml          Recent Labs     01/18/19  1117 01/19/19  0439   WBC 19.60* 15.54*   HGB 8.5* 7.4*   HCT 28.0* 24.9*   * 448*    140   K 4.1 4.5    106   CO2 26 27   BUN 21 16   CREATININE 1.4 1.2   BILITOT 1.1* 1.6*   AST 65* 48*   ALT 47* 35   ALKPHOS 124 104   CALCIUM 9.2 8.0*   ALBUMIN 2.5* 2.0*   PROT 8.3 7.1   MG 2.0  --    PHOS 2.6*  --      Scheduled Meds:   acetaminophen 1,000 mg Q8H   atorvastatin 40 mg Daily   enoxaparin 40 mg Daily   linezolid 600mg/300ml 600 mg Q12H   meropenem (MERREM) IVPB 2 g Q8H   pantoprazole 40 mg BID      lactated ringers 125 mL/hr at 01/19/19 0127        Lines/Drains:       PICC Double Lumen 01/14/19 1644 right basilic (Active)   Number of days: 4            Peripheral IV - Single Lumen 01/18/19 1318 Left Hand (Active)   Number of days: 0       ASSESSMENT/PLAN:     Assessment and Plan:  68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 now c sepsis likely from aspiration RLL PNA  With occlusion of right middle lobe bronchus with collapse of RML on NC 2 L      Neuro/Pain:   AAO, prn pain control     CV:   HDS currently    Resp:   O2 keep sats >94  On NC 2L  Aspiration pneumonia   CT chest-Occlusion of the right middle lobe bronchus with collapse of the right middle lobe  Pulmonology consulted appreciate recs   Induced sputum cultures   RT with ICS, Acapella, ect   Refused BIPAP due to discomfort     Renal:   Cr 1.2, 750 UOP daily, trend Cr, try to avoid nephrotoxics     Heme  H/H around baseline will monitor     ID:   Aspiration pneumonia will start zyvox, merrem currently  Induced sputum and cultures   F/u cultures     FEN/GI:   speech consulted. NPO due to dysphagia  CT abdomen showed Persistent postoperative fluid collection with few scattered air bubbles seen within the right upper quadrant-monitor  GI consulted for possible scope on Monday for  dysphagia    Neuroendocrine:   will monitor for s/s carcinoid issues, avoid triggers    Endocrine:   no hx diabetes, will monitor  TPN started due to NPO     Surgical: f/u CT abd/pelv to r/o anastomotic issues, do not suspect this     Consults- Pulmonology and GI     PPX: DVT lovenox 40 q D  tomorrow    GI protonix 40 qD    Jose Lance MD  Feel free to secure chat me (Ctrl+Alt+5) for any questions   01/19/2019

## 2019-01-20 ENCOUNTER — ANESTHESIA EVENT (OUTPATIENT)
Dept: ENDOSCOPY | Facility: HOSPITAL | Age: 69
DRG: 856 | End: 2019-01-20
Payer: MEDICARE

## 2019-01-20 LAB
ALBUMIN SERPL BCP-MCNC: 1.9 G/DL
ALLENS TEST: ABNORMAL
ALP SERPL-CCNC: 101 U/L
ALT SERPL W/O P-5'-P-CCNC: 29 U/L
ANION GAP SERPL CALC-SCNC: 5 MMOL/L
ANISOCYTOSIS BLD QL SMEAR: SLIGHT
APTT BLDCRRT: 34.4 SEC
AST SERPL-CCNC: 39 U/L
BASOPHILS # BLD AUTO: 0.02 K/UL
BASOPHILS NFR BLD: 0.2 %
BILIRUB SERPL-MCNC: 0.7 MG/DL
BUN SERPL-MCNC: 15 MG/DL
CALCIUM SERPL-MCNC: 8.3 MG/DL
CHLORIDE SERPL-SCNC: 105 MMOL/L
CO2 SERPL-SCNC: 29 MMOL/L
CREAT SERPL-MCNC: 1 MG/DL
DELSYS: ABNORMAL
DIFFERENTIAL METHOD: ABNORMAL
EOSINOPHIL # BLD AUTO: 0 K/UL
EOSINOPHIL NFR BLD: 0.3 %
EP: 5
EP: 6
ERYTHROCYTE [DISTWIDTH] IN BLOOD BY AUTOMATED COUNT: 16 %
ERYTHROCYTE [SEDIMENTATION RATE] IN BLOOD BY WESTERGREN METHOD: 4 MM/H
ERYTHROCYTE [SEDIMENTATION RATE] IN BLOOD BY WESTERGREN METHOD: 4 MM/H
EST. GFR  (AFRICAN AMERICAN): >60 ML/MIN/1.73 M^2
EST. GFR  (NON AFRICAN AMERICAN): >60 ML/MIN/1.73 M^2
FIO2: 45
FIO2: 45
GLUCOSE SERPL-MCNC: 136 MG/DL
GRAM STN SPEC: NORMAL
GRAM STN SPEC: NORMAL
HCO3 UR-SCNC: 29.9 MMOL/L (ref 24–28)
HCO3 UR-SCNC: 30.1 MMOL/L (ref 24–28)
HCO3 UR-SCNC: 30.6 MMOL/L (ref 24–28)
HCT VFR BLD AUTO: 26.3 %
HGB BLD-MCNC: 7.6 G/DL
HYPOCHROMIA BLD QL SMEAR: ABNORMAL
INR PPP: 1.3
IP: 12
IP: 15
LYMPHOCYTES # BLD AUTO: 1.2 K/UL
LYMPHOCYTES NFR BLD: 10 %
MCH RBC QN AUTO: 28.4 PG
MCHC RBC AUTO-ENTMCNC: 28.9 G/DL
MCV RBC AUTO: 98 FL
MODE: ABNORMAL
MODE: ABNORMAL
MONOCYTES # BLD AUTO: 0.8 K/UL
MONOCYTES NFR BLD: 7.1 %
NEUTROPHILS # BLD AUTO: 9.5 K/UL
NEUTROPHILS NFR BLD: 82.4 %
PCO2 BLDA: 67.6 MMHG (ref 35–45)
PCO2 BLDA: 69.9 MMHG (ref 35–45)
PCO2 BLDA: 72.6 MMHG (ref 35–45)
PH SMN: 7.23 [PH] (ref 7.35–7.45)
PH SMN: 7.24 [PH] (ref 7.35–7.45)
PH SMN: 7.26 [PH] (ref 7.35–7.45)
PLATELET # BLD AUTO: 509 K/UL
PLATELET BLD QL SMEAR: ABNORMAL
PMV BLD AUTO: 9.3 FL
PO2 BLDA: 77 MMHG (ref 80–100)
PO2 BLDA: 80 MMHG (ref 80–100)
PO2 BLDA: 95 MMHG (ref 80–100)
POC BE: 2 MMOL/L
POC BE: 3 MMOL/L
POC BE: 3 MMOL/L
POC SATURATED O2: 92 % (ref 95–100)
POC SATURATED O2: 93 % (ref 95–100)
POC SATURATED O2: 95 % (ref 95–100)
POC TCO2: 32 MMOL/L (ref 23–27)
POC TCO2: 32 MMOL/L (ref 23–27)
POC TCO2: 33 MMOL/L (ref 23–27)
POCT GLUCOSE: 113 MG/DL (ref 70–110)
POCT GLUCOSE: 115 MG/DL (ref 70–110)
POCT GLUCOSE: 118 MG/DL (ref 70–110)
POCT GLUCOSE: 136 MG/DL (ref 70–110)
POCT GLUCOSE: 151 MG/DL (ref 70–110)
POIKILOCYTOSIS BLD QL SMEAR: SLIGHT
POLYCHROMASIA BLD QL SMEAR: ABNORMAL
POTASSIUM SERPL-SCNC: 5.2 MMOL/L
PROT SERPL-MCNC: 7.2 G/DL
PROTHROMBIN TIME: 13.7 SEC
RBC # BLD AUTO: 2.68 M/UL
SAMPLE: ABNORMAL
SITE: ABNORMAL
SODIUM SERPL-SCNC: 139 MMOL/L
STOMATOCYTES BLD QL SMEAR: PRESENT
WBC # BLD AUTO: 11.61 K/UL

## 2019-01-20 PROCEDURE — 94640 AIRWAY INHALATION TREATMENT: CPT

## 2019-01-20 PROCEDURE — 63600175 PHARM REV CODE 636 W HCPCS

## 2019-01-20 PROCEDURE — 36600 WITHDRAWAL OF ARTERIAL BLOOD: CPT

## 2019-01-20 PROCEDURE — 94667 MNPJ CHEST WALL 1ST: CPT

## 2019-01-20 PROCEDURE — 93010 ELECTROCARDIOGRAM REPORT: CPT | Mod: ,,, | Performed by: INTERNAL MEDICINE

## 2019-01-20 PROCEDURE — 85730 THROMBOPLASTIN TIME PARTIAL: CPT

## 2019-01-20 PROCEDURE — 27000221 HC OXYGEN, UP TO 24 HOURS

## 2019-01-20 PROCEDURE — 94799 UNLISTED PULMONARY SVC/PX: CPT

## 2019-01-20 PROCEDURE — 94761 N-INVAS EAR/PLS OXIMETRY MLT: CPT

## 2019-01-20 PROCEDURE — 99223 1ST HOSP IP/OBS HIGH 75: CPT | Mod: ,,, | Performed by: INTERNAL MEDICINE

## 2019-01-20 PROCEDURE — 11000001 HC ACUTE MED/SURG PRIVATE ROOM

## 2019-01-20 PROCEDURE — 63600175 PHARM REV CODE 636 W HCPCS: Mod: JG | Performed by: SURGERY

## 2019-01-20 PROCEDURE — 93005 ELECTROCARDIOGRAM TRACING: CPT

## 2019-01-20 PROCEDURE — 27000190 HC CPAP FULL FACE MASK W/VALVE

## 2019-01-20 PROCEDURE — 85610 PROTHROMBIN TIME: CPT

## 2019-01-20 PROCEDURE — 27000646 HC AEROBIKA DEVICE

## 2019-01-20 PROCEDURE — 25000003 PHARM REV CODE 250: Performed by: STUDENT IN AN ORGANIZED HEALTH CARE EDUCATION/TRAINING PROGRAM

## 2019-01-20 PROCEDURE — 25000242 PHARM REV CODE 250 ALT 637 W/ HCPCS: Performed by: STUDENT IN AN ORGANIZED HEALTH CARE EDUCATION/TRAINING PROGRAM

## 2019-01-20 PROCEDURE — 85025 COMPLETE CBC W/AUTO DIFF WBC: CPT

## 2019-01-20 PROCEDURE — 63600175 PHARM REV CODE 636 W HCPCS: Performed by: STUDENT IN AN ORGANIZED HEALTH CARE EDUCATION/TRAINING PROGRAM

## 2019-01-20 PROCEDURE — 99223 PR INITIAL HOSPITAL CARE,LEVL III: ICD-10-PCS | Mod: ,,, | Performed by: INTERNAL MEDICINE

## 2019-01-20 PROCEDURE — 99900035 HC TECH TIME PER 15 MIN (STAT)

## 2019-01-20 PROCEDURE — 80053 COMPREHEN METABOLIC PANEL: CPT

## 2019-01-20 PROCEDURE — 94664 DEMO&/EVAL PT USE INHALER: CPT

## 2019-01-20 PROCEDURE — 93010 EKG 12-LEAD: ICD-10-PCS | Mod: ,,, | Performed by: INTERNAL MEDICINE

## 2019-01-20 PROCEDURE — C9113 INJ PANTOPRAZOLE SODIUM, VIA: HCPCS | Performed by: STUDENT IN AN ORGANIZED HEALTH CARE EDUCATION/TRAINING PROGRAM

## 2019-01-20 PROCEDURE — 94660 CPAP INITIATION&MGMT: CPT

## 2019-01-20 PROCEDURE — P9047 ALBUMIN (HUMAN), 25%, 50ML: HCPCS | Mod: JG | Performed by: SURGERY

## 2019-01-20 RX ORDER — NALOXONE HCL 0.4 MG/ML
VIAL (ML) INJECTION
Status: COMPLETED
Start: 2019-01-20 | End: 2019-01-20

## 2019-01-20 RX ORDER — ALBUMIN HUMAN 250 G/1000ML
12.5 SOLUTION INTRAVENOUS CONTINUOUS
Status: DISCONTINUED | OUTPATIENT
Start: 2019-01-20 | End: 2019-01-28

## 2019-01-20 RX ORDER — NALOXONE HCL 0.4 MG/ML
0.4 VIAL (ML) INJECTION ONCE
Status: COMPLETED | OUTPATIENT
Start: 2019-01-20 | End: 2019-01-20

## 2019-01-20 RX ADMIN — MEROPENEM 2 G: 1 INJECTION, POWDER, FOR SOLUTION INTRAVENOUS at 01:01

## 2019-01-20 RX ADMIN — INSULIN ASPART 2 UNITS: 100 INJECTION, SOLUTION INTRAVENOUS; SUBCUTANEOUS at 05:01

## 2019-01-20 RX ADMIN — MEROPENEM 2 G: 1 INJECTION, POWDER, FOR SOLUTION INTRAVENOUS at 06:01

## 2019-01-20 RX ADMIN — ALBUMIN HUMAN 12.5 G: 0.25 SOLUTION INTRAVENOUS at 10:01

## 2019-01-20 RX ADMIN — PANTOPRAZOLE SODIUM 40 MG: 40 INJECTION, POWDER, LYOPHILIZED, FOR SOLUTION INTRAVENOUS at 10:01

## 2019-01-20 RX ADMIN — Medication 0.4 MG: at 06:01

## 2019-01-20 RX ADMIN — MEROPENEM 2 G: 1 INJECTION, POWDER, FOR SOLUTION INTRAVENOUS at 10:01

## 2019-01-20 RX ADMIN — PANTOPRAZOLE SODIUM 40 MG: 40 INJECTION, POWDER, LYOPHILIZED, FOR SOLUTION INTRAVENOUS at 09:01

## 2019-01-20 RX ADMIN — ENOXAPARIN SODIUM 40 MG: 100 INJECTION SUBCUTANEOUS at 06:01

## 2019-01-20 RX ADMIN — ALBUTEROL SULFATE 2.5 MG: 2.5 SOLUTION RESPIRATORY (INHALATION) at 07:01

## 2019-01-20 RX ADMIN — LINEZOLID 600 MG: 600 INJECTION, SOLUTION INTRAVENOUS at 06:01

## 2019-01-20 RX ADMIN — LINEZOLID 600 MG: 600 INJECTION, SOLUTION INTRAVENOUS at 05:01

## 2019-01-20 RX ADMIN — RETINOL, ERGOCALCIFEROL, .ALPHA.-TOCOPHEROL ACETATE, DL-, PHYTONADIONE, ASCORBIC ACID, NIACINAMIDE, RIBOFLAVIN 5-PHOSPHATE SODIUM, THIAMINE HYDROCHLORIDE, PYRIDOXINE HYDROCHLORIDE, DEXPANTHENOL, BIOTIN, FOLIC ACID, AND CYANOCOBALAMIN: KIT at 07:01

## 2019-01-20 RX ADMIN — NALOXONE HYDROCHLORIDE 0.4 MG: 0.4 INJECTION, SOLUTION INTRAMUSCULAR; INTRAVENOUS; SUBCUTANEOUS at 06:01

## 2019-01-20 RX ADMIN — ALBUTEROL SULFATE 2.5 MG: 2.5 SOLUTION RESPIRATORY (INHALATION) at 01:01

## 2019-01-20 NOTE — PROGRESS NOTES
[facility] General Surgery  Neuroendocrine Tumor Service  Progress Note     Admission Date: 1/18/2019  Hospital Length of Stay: 2  Principal Problem: Aspiration pneumonia of right lower lobe     Subjective:      Interval History:   S/p IR drainage of abdominal fluid collection  Episode of tachycardia/tachypnea overnight with associated hallucination  Breathing tx given  Placed on BiPAP  Denies any current pain    Scheduled Meds:   atorvastatin  40 mg Oral Daily    enoxaparin  40 mg Subcutaneous Daily    linezolid 600mg/300ml  600 mg Intravenous Q12H    meropenem (MERREM) IVPB  2 g Intravenous Q8H    pantoprazole  40 mg Intravenous BID     Continuous Infusions:   Amino acid 5% - dextrose 15% (CLINIMIX-E) solution with additives (1L  provides 510 kcal/L dextrose, with 50 gm AA, 150 gm CHO, Na 35, K 30, Mg 5, Ca 4.5, Acetate 80, Cl 39, Phos 15) 50 mL/hr at 01/19/19 1303    lactated ringers 50 mL/hr at 01/19/19 2351     PRN Meds:.albuterol sulfate, dextrose 50%, dextrose 50%, glucose, glucose, insulin aspart U-100, ondansetron, promethazine (PHENERGAN) IVPB, sodium chloride 0.9%, sodium chloride 3%    Objective:      Temp:  [97.9 °F (36.6 °C)-99.4 °F (37.4 °C)] 98.9 °F (37.2 °C)  Pulse:  [] 126  Resp:  [15-37] 17  SpO2:  [90 %-100 %] 100 %  BP: ()/() 112/56  Weight change: 8.894 kg (19 lb 9.7 oz)    Intake/Output Summary (Last 24 hours) at 1/20/2019 0723  Last data filed at 1/20/2019 0600  Gross per 24 hour   Intake 4065 ml   Output 1475 ml   Net 2590 ml       Physical Exam:  GEN: awake, alert, NAD  CV: tachycardic  PULM: on BiPAP  ABD: S/NT/ND, incision c/d/i, IR drain in place with sanguinous output  EXT: Peripheral pulses present and equal bilaterally    Recent Labs   Lab 01/20/19  0546      K 5.2*   CO2 29      BUN 15   CREATININE 1.0   CALCIUM 8.3*   PROT 7.2   AST 39   ALT 29   ALKPHOS 101   BILITOT 0.7     Recent Labs   Lab 01/20/19  0546   WBC 11.61   HGB  7.6*   HCT 26.3*   *       Significant Imaging: I have reviewed all pertinent imaging results/findings within the past 24 hours.     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

## 2019-01-20 NOTE — PROGRESS NOTES
Results for SOTO CHA JR. (MRN [id number]) as of 1/20/2019 03:40   Ref. Range 1/20/2019 02:46   POC PH Latest Ref Range: 7.35 - 7.45  7.239 (LL)   POC PCO2 Latest Ref Range: 35 - 45 mmHg 69.9 (HH)   POC PO2 Latest Ref Range: 80 - 100 mmHg 80   POC BE Latest Ref Range: -2 to 2 mmol/L 2   POC HCO3 Latest Ref Range: 24 - 28 mmol/L 29.9 (H)   POC SATURATED O2 Latest Ref Range: 95 - 100 % 93 (L)   POC TCO2 Latest Ref Range: 23 - 27 mmol/L 32 (H)   Sample Unknown ARTERIAL   DelSys Unknown Nasal Can   Allens Test Unknown Pass   Site Unknown LR       Results reported to Dr Negrete [id number]

## 2019-01-20 NOTE — PLAN OF CARE
Pt returned from IR with nonrebreather mask due to history of sleep apnea, VSS. Procedure went well, drain present in abdomen RUQ with brown fluid aspirating. See flowsheet for further assessment.

## 2019-01-20 NOTE — PROCEDURES
Radiology Post-Procedure Note    Pre Op Diagnosis: Abdominal abscess    Post Op Diagnosis: Same     Procedure:right abdominal drainage    Procedure performed by: Nathaniel Hylton MD    Written Informed Consent Obtained: Yes    Specimen Removed:  cc brown fluid    Estimated Blood Loss: Minimal    Findings: CT was used for localization of abnormal fluid collection. A needle was inserted into the fluid collection and purulent fluid was aspirated.  A wire was inserted into the collection and the tract was dilated.  A 12 Latvian all-purpose drainage catheter was inserted and a pigtail loop of the distal end was formed.  The catheter was sutured into place and approximately  350 mL fluid was removed.     A specimen was sent to the lab for further analysis and culture.    The patient tolerated procedure well and there were no complications. Please see procedure report under Imaging for further details.    Nathaniel Hylton M.D.  Diagnostic and Interventional Radiologist  Department of Radiology  Pager: [phone]

## 2019-01-20 NOTE — SUBJECTIVE & OBJECTIVE
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    Prostatic hyperplasia     Sleep apnea        Past Surgical History:   Procedure Laterality Date    BIOPSY, LIVER  1/4/2019    Performed by [facility]leine [facility]ado [facility] at [facility] OR    CHOLECYSTECTOMY  1/4/2019    Performed by Madleeine Alvarado MD at [facility] OR    DUODENECTOMY N/A 1/4/2019    Performed by [facility]leine [facility]ado [facility] at [facility] OR    EGD (ESOPHAGOGASTRODUODENOSCOPY) N/A 1/4/2019    Performed by [facility]leine [facility]ado MD at [facility] OR    EGD (ESOPHAGOGASTRODUODENOSCOPY) N/A 9/24/2018    Performed by [facility]o [facility] at [facility] ENDO (4TH FLR)    FUNDOPLICATION N/A 1/4/2019    Performed by Madeleine [facility]ado MD at [facility] OR    GASTROJEJUNOSTOMY N/A 1/4/2019    Performed by [facility]leine [facility]ado MD at [facility] OR    LYMPHADENECTOMY N/A 1/4/2019    Performed by Madeleine [facility]ado MD at [facility] OR    PALATOPLASTY-(UPPP) N/A 2/14/2017    Performed by [place] JAN Araujo III, MD at [facility] OR 2ND FLR    PH MONITORING, ESOPHAGUS, WIRELESS, (OFF REFLUX MEDS) N/A 9/24/2018    Performed by [facility]o [facility]ez [facility] at [facility] ENDO (4TH FLR)    TONSILLECTOMY      TRANSPROSTATIC TISSUE RETRACTION N/A 6/28/2018    [facility]    ULTRASOUND-ENDOSCOPIC-UPPER N/A 11/5/2018    Performed by Gorge Reyes MD at [facility] ENDO    UVULOPALATOPHARYNGOPLASTY         Review of patient's allergies indicates:   Allergen Reactions    Epinephrine      Neuroendocrine Tumor patient       Family History     Problem Relation (Age of Onset)    COPD Sister    Cancer Sister    Diabetes Mother    Heart disease Mother    Stroke Father        Tobacco Use    Smoking status: Never Smoker    Smokeless tobacco: Never Used   Substance and Sexual Activity    Alcohol use: Yes     Alcohol/week: 1.2 oz     Types: 2 Cans of beer per week    Drug use: No    Sexual activity: Not Currently      Review of Systems   Constitutional: Negative for chills and fever.   HENT: Positive for trouble swallowing. Negative for postnasal drip.    Eyes: Negative for pain and visual disturbance.   Respiratory: Positive for cough and choking. Negative for shortness of breath.    Cardiovascular: Negative for chest pain and leg swelling.   Gastrointestinal: Positive for abdominal pain, nausea and vomiting. Negative for abdominal distention, anal bleeding, blood in stool, constipation, diarrhea and rectal pain.   Endocrine: Negative for cold intolerance and heat intolerance.   Genitourinary: Negative for difficulty urinating and hematuria.   Musculoskeletal: Positive for arthralgias and back pain.   Neurological: Negative for dizziness and numbness.   Hematological: Negative for adenopathy. Does not bruise/bleed easily.   Psychiatric/Behavioral: Negative for agitation and confusion.     Objective:     Vital Signs (Most Recent):  Temp: (!) 100.8 °F (38.2 °C) (01/20/19 0730)  Pulse: (!) 111 (01/20/19 1245)  Resp: 18 (01/20/19 1245)  BP: (!) 111/56 (01/20/19 1230)  SpO2: 100 % (01/20/19 1245) Vital Signs (24h Range):  Temp:  [98.7 °F (37.1 °C)-100.8 °F (38.2 °C)] 100.8 °F (38.2 °C)  Pulse:  [111-137] 111  Resp:  [17-37] 18  SpO2:  [90 %-100 %] 100 %  BP: (106-164)/() 111/56     Weight: 110.5 kg (243 lb 9.7 oz) (01/20/19 0600)  Body mass index is 34.95 kg/m².      Intake/Output Summary (Last 24 hours) at 1/20/2019 1544  Last data filed at 1/20/2019 1200  Gross per 24 hour   Intake 2291.66 ml   Output 1425 ml   Net 866.66 ml       Lines/Drains/Airways     Peripherally Inserted Central Catheter Line                 PICC Double Lumen 01/14/19 1644 right basilic 5 days          Drain                 Closed/Suction Drain 01/19/19 1955 Right RUQ Accordion 12 Fr. less than 1 day          Peripheral Intravenous Line                 Peripheral IV - Single Lumen 01/18/19 1318 Left Hand 2 days                Physical Exam    Constitutional: He is oriented to person, place, and time. He appears well-developed and well-nourished. No distress.   HENT:   Head: Normocephalic.   Eyes: Conjunctivae are normal. No scleral icterus.   Neck: No tracheal deviation present. No thyromegaly present.   Cardiovascular: Normal rate, regular rhythm and normal heart sounds. Exam reveals no gallop and no friction rub.   No murmur heard.  Pulmonary/Chest: Effort normal. He has no wheezes. He has rales.   Abdominal: Soft. Bowel sounds are normal. He exhibits distension. There is no tenderness. There is no rebound and no guarding.   Musculoskeletal: Normal range of motion. He exhibits no edema or tenderness.   Neurological: He is alert and oriented to person, place, and time.   Skin: He is not diaphoretic.   Psychiatric: He has a normal mood and affect. His behavior is normal.   Nursing note and vitals reviewed.      Significant Labs:  CBC:   Recent Labs   Lab 01/19/19  0439 01/20/19  0546   WBC 15.54* 11.61   HGB 7.4* 7.6*   HCT 24.9* 26.3*   * 509*       Significant Imaging:  Imaging results within the past 24 hours have been reviewed.

## 2019-01-20 NOTE — CONSULTS
[facility]  Initial Consult Note      Primary Attending:  YOHANNES Shelley MD   Consultant Attending: Brad Brantley MD   Consultant Fellow: Kunal Ayala MD       Chief Complaint/Reason for Consult      Abnormal CT     History of Present Illness      Mr. Hoang Santos is a 68 year old man admitted to the surgical service at [facility] for post-operative fevers and tachycardia.       He underwent duodenectomy on 1/4 with Dr. Steiner for a malignant small bowel carcinoid. Following his surgery he was noted to have significant issues with aspiration and dysphagia -- thought to be secondary to cervical osteophytesand exacerbated by mucosal edema caused by NGT placement. His post operative course was additionally complicated by leukocytosis and fever on POD 6-7. Extensive evaluation turned up a likely e coli UTI and possible RLL HAP. HE was treated initially with broad spectrum antibiotics, which were stopped after the UTI was diagnosed as it was felt he had likely received adequate therapy and he was no longer febrile.     He was discharged home on 1/16 in fair condition with a PICC line and TPN. He was sent back to the hospital on 1/18 as his HH nurse noted him to be febrile and tachycardic. He had a temperature of 103 in the ED and was re-admitted for evaluation. Chest imaging again re-demonstrated volume loss in the RML and RLL with small pleural effusion and likely consolidation of the medial RLL. UA was improved from before.    Mr. Santos does endorse some productive cough which he says was present around the time of discharge. He brings up some whitish yellow phlegm. He does not report dyspnea at this time. He does have a history of GEMMA and underwent UPPP in 2017. He does not think it helped, but has not been back for a repeat sleep study. He does not wear CPAP at night. Denies abdominal pain at this time. No significant change to bowel or bladder habits. No myalgia or arthralgia. He has  no chest pain or discomfort. He denies sore throat or congestion.        Past Medical History      Medical:  has a past medical history of Hyperlipidemia, Primary malignant neuroendocrine neoplasm of duodenum, Prostatic hyperplasia, and Sleep apnea.    Surgical:  has a past surgical history that includes Tonsillectomy; Uvulopalatopharyngoplasty; cystoscopy with insertion of minimally invasive implant to enlarge prostatic urethra (N/A, 6/28/2018); Esophagogastroduodenoscopy (N/A, 9/24/2018); endoscopic ultrasound of upper gastrointestinal tract (N/A, 11/5/2018); duodenectomy (N/A, 1/4/2019); Gastrojejunostomy (N/A, 1/4/2019); Gastric fundoplication (N/A, 1/4/2019); Esophagogastroduodenoscopy (N/A, 1/4/2019); Cholecystectomy (1/4/2019); and Liver biopsy (1/4/2019).    Family: family history includes COPD in his sister; Cancer in his sister; Diabetes in his mother; Heart disease in his mother; Stroke in his father.    Social:  reports that  has never smoked. he has never used smokeless tobacco. He reports that he drinks about 1.2 oz of alcohol per week. He reports that he does not use drugs.    Allergies and Medications reviewed     Review of Systems      · Other than those symptoms mentioned above, an extensive review of systems was unremarkable.       On Examination     Vital Signs   Temp:  [97.9 °F (36.6 °C)-101.9 °F (38.8 °C)]   Pulse:  []   Resp:  [15-37]   BP: ()/()   SpO2:  [90 %-100 %]    Physical Exam   GENERAL: The patient is alert and oriented, in no apparent distress. he is pleasant and conversant in full sentences.    HEENT: Pupils are equally round and briskly reactive to light. Extraocular muscles are grossly intact. Oral mucous membranes are moist without lesions. Uvula surgically absent. Mallampati 4    NECK: The patient has no noted JVD. No adenopathy is appreciated.    CHEST/LUNGS Bronchial breath sounds in both bases. Decreased air entry on the right. Occasional monophonic wheeze  on the right. There is no subcutaneous air appreciated. There is no tenderness to the chest wall.    HEART: The patient has a regular rate and rhythm. No murmurs, rubs, or gallops are appreciated. Distal pulses are 2+. Extremities are warm without evidence of cyanosis or poor perfusion.    ABDOMEN: The patients abdomen is completely soft, nontender, and nondistended. Surgical incision is CDI. Bowel sounds are present. No organomegaly is appreciated. No masses are appreciated. There are no peritoneal signs.    EXTREMITIES: The patient has no peripheral edema. There is no focal long bone tenderness or deformity.    SKIN: The patients skin is warm and dry, without rashes or lesions.    PSYCHIATRIC: The patient has normal mental status and has an appropriate affect.    NEUROLOGIC: The patient has equal strength in both upper and lower extremities without focal deficit. There are no gross deficits to the cranial nerves.       All recent labs and imaging studies have been reviewed    Pertinent findings include:    Lab Results   Component Value Date    WBC 15.54 (H) 01/19/2019    HGB 7.4 (L) 01/19/2019    HCT 24.9 (L) 01/19/2019    MCV 96 01/19/2019     (H) 01/19/2019       I have personally and independently interpretted the following tests:    CT chest - there is consolidative change in the medial RLL with air bronchograms - chronic inflammatory changes vs pneumonia considered. RML seems to be occluded. Small right pleurla effusion    Assessment and Plan / Recommendations     The findings on his CT chest could be seen in multiple conditions, which are certainly possible in Mr. Santos.     Mucous plugging of the middle and lower airways due to infection/inflammtation     Chronic inflammation and consolidation of the dependent lung zones from chronic aspiration pneumonitis    Undetected airway lesion causing obstruction and consolidation/atelectasis    Compression from abdominal cavity and hepatomegaly causing  lower lung atelectasis and volume loss.    Of these options, infection and chronic aspiration seem most likely, however there could be a combination of any of these.    For now, would recommend conservative treatment with antibiotics which would cover common causes of HAP (MRSA, pseudomonas, streptococcus, etc) in addition to providing good pulmonary toilet as you are doing.     Repeat imaging should likely be done once he has had time to recover from his surgery further and has had likely source of infection addressed. If the volume loss/ atelectasis continues and there continues to be no clear reason -- bronchoscopy could be considered at that time to r/o bronchial lesion.    This plan was discussed with staff pulmonologist Dr. Brantley    We will continue to follow with you, thank you for the opportunity to be involved in Mr. Santos's care.    Please call or message directly through EPIC with any additional questions or concerns.    Kunal Ayala MD  [facility] Pulmonary and Critical Care Fellow  Pager: [phone]  Cell: [phone]    01/19/2019  9:37 PM

## 2019-01-20 NOTE — CONSULTS
[facility]  Gastroenterology  Consult Note    Patient Name: Hoang Santos Jr.  MRN: [id number]  Admission Date: 1/18/2019  Hospital Length of Stay: 2 days  Code Status: Full Code   Attending Provider: YOHANNES Shelley MD   Consulting Provider: Cipriano Tomas MD  Primary Care Physician: Lee Kay MD  Principal Problem:Aspiration pneumonia of right lower lobe    Inpatient consult to Gastroenterology-[facility]  Consult performed by: Cipriano Tomas MD  Consult ordered by: Jose Lance MD  Reason for consult: Dysphagia        Subjective:     HPI:  This is a 67yo male here with a h/o duodenal carcinoid s/p recent resection here with suspected aspiration pneumonia. He notes some dysphagia to solids/liquids in the substernal region with some post-prandial vomiting. No overt fevers, some cough It has been present for 2 days. No other exacerbating/relieving factors or other associated symptoms.     The following portions of the patient's history were reviewed and updated as appropriate: allergies, current medications, past family history, past medical history, past social history, past surgical history and problem list.      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    Prostatic hyperplasia     Sleep apnea        Past Surgical History:   Procedure Laterality Date    BIOPSY, LIVER  1/4/2019    [facility] OR    CHOLECYSTECTOMY  1/4/2019    [facility] OR    DUODENECTOMY N/A 1/4/2019    [facility] OR    EGD (ESOPHAGOGASTRODUODENOSCOPY) N/A 1/4/2019    [facility] at [facility] OR    EGD (ESOPHAGOGASTRODUODENOSCOPY) N/A 9/24/2018    Performed by [facility] at [facility] ENDO (4TH FLR)    FUNDOPLICATION N/A 1/4/2019    [facility] at [facility] OR     GASTROJEJUNOSTOMY N/A 1/4/2019    [facility] at [facility] OR    LYMPHADENECTOMY N/A 1/4/2019    [facility] at [facility] OR    PALATOPLASTY-(UPPP) N/A 2/14/2017    Performed by Bob Araujo III, MD at [facility] OR 2ND FLR    PH MONITORING, ESOPHAGUS, WIRELESS, (OFF REFLUX MEDS) N/A 9/24/2018    Performed by [facility] at [facility] ENDO (4TH FLR)    TONSILLECTOMY      TRANSPROSTATIC TISSUE RETRACTION N/A 6/28/2018    [facility] K[facility] at [facility] OR    ULTRASOUND-ENDOSCOPIC-UPPER N/A 11/5/2018    Performed by Gorge Reyes MD at [facility] ENDO    UVULOPALATOPHARYNGOPLASTY         Review of patient's allergies indicates:   Allergen Reactions    Epinephrine      Neuroendocrine Tumor patient       Family History     Problem Relation (Age of Onset)    COPD Sister    Cancer Sister    Diabetes Mother    Heart disease Mother    Stroke Father        Tobacco Use    Smoking status: Never Smoker    Smokeless tobacco: Never Used   Substance and Sexual Activity    Alcohol use: Yes     Alcohol/week: 1.2 oz     Types: 2 Cans of beer per week    Drug use: No    Sexual activity: Not Currently     Review of Systems   Constitutional: Negative for chills and fever.   HENT: Positive for trouble swallowing. Negative for postnasal drip.    Eyes: Negative for pain and visual disturbance.   Respiratory: Positive for cough and choking. Negative for shortness of breath.    Cardiovascular: Negative for chest pain and leg swelling.   Gastrointestinal: Positive for abdominal pain, nausea and vomiting. Negative for abdominal distention, anal bleeding, blood in stool, constipation, diarrhea and rectal pain.   Endocrine: Negative for cold intolerance and heat intolerance.   Genitourinary: Negative for difficulty urinating and hematuria.   Musculoskeletal: Positive for arthralgias and back pain.   Neurological: Negative for dizziness and numbness.   Hematological: Negative for  adenopathy. Does not bruise/bleed easily.   Psychiatric/Behavioral: Negative for agitation and confusion.     Objective:     Vital Signs (Most Recent):  Temp: (!) 100.8 °F (38.2 °C) (01/20/19 0730)  Pulse: (!) 111 (01/20/19 1245)  Resp: 18 (01/20/19 1245)  BP: (!) 111/56 (01/20/19 1230)  SpO2: 100 % (01/20/19 1245) Vital Signs (24h Range):  Temp:  [98.7 °F (37.1 °C)-100.8 °F (38.2 °C)] 100.8 °F (38.2 °C)  Pulse:  [111-137] 111  Resp:  [17-37] 18  SpO2:  [90 %-100 %] 100 %  BP: (106-164)/() 111/56     Weight: 110.5 kg (243 lb 9.7 oz) (01/20/19 0600)  Body mass index is 34.95 kg/m².      Intake/Output Summary (Last 24 hours) at 1/20/2019 1544  Last data filed at 1/20/2019 1200  Gross per 24 hour   Intake 2291.66 ml   Output 1425 ml   Net 866.66 ml       Lines/Drains/Airways     Peripherally Inserted Central Catheter Line                 PICC Double Lumen 01/14/19 1644 right basilic 5 days          Drain                 Closed/Suction Drain 01/19/19 1955 Right RUQ Accordion 12 Fr. less than 1 day          Peripheral Intravenous Line                 Peripheral IV - Single Lumen 01/18/19 1318 Left Hand 2 days                Physical Exam   Constitutional: He is oriented to person, place, and time. He appears well-developed and well-nourished. No distress.   HENT:   Head: Normocephalic.   Eyes: Conjunctivae are normal. No scleral icterus.   Neck: No tracheal deviation present. No thyromegaly present.   Cardiovascular: Normal rate, regular rhythm and normal heart sounds. Exam reveals no gallop and no friction rub.   No murmur heard.  Pulmonary/Chest: Effort normal. He has no wheezes. He has rales.   Abdominal: Soft. Bowel sounds are normal. He exhibits distension. There is no tenderness. There is no rebound and no guarding.   Musculoskeletal: Normal range of motion. He exhibits no edema or tenderness.   Neurological: He is alert and oriented to person, place, and time.   Skin: He is not diaphoretic.   Psychiatric: He  has a normal mood and affect. His behavior is normal.   Nursing note and vitals reviewed.      Significant Labs:  CBC:   Recent Labs   Lab 01/19/19  0439 01/20/19  0546   WBC 15.54* 11.61   HGB 7.4* 7.6*   HCT 24.9* 26.3*   * 509*       Significant Imaging:  Imaging results within the past 24 hours have been reviewed.    Assessment/Plan:     * Aspiration pneumonia of right lower lobe    - abx     Dysphagia    - will plan egd tomorrow  - d/w primary team  - ppi  - npo         Thank you for your consult. I will follow-up with patient. Please contact us if you have any additional questions.    Cipriano Tomas MD  Gastroenterology  [facility]

## 2019-01-20 NOTE — CONSULTS
Inpatient Radiology Pre-procedure Note    History of Present Illness:  Hoang Santos Jr. is a 68 y.o. male who presents for abscess drainage.  Admission H&P reviewed.  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 ENDO (4TH FLR)    FUNDOPLICATION N/A 1/4/2019    Performed by [facility]ado MD at [facility] OR    GASTROJEJUNOSTOMY N/A 1/4/2019    Performed by [facility]ado MD at [facility] OR    LYMPHADENECTOMY N/A 1/4/2019    Performed by [facility]ado MD at [facility] OR    PALATOPLASTY-(UPPP) N/A 2/14/2017    Performed by Bob [facility]ujo III, MD at [facility] OR 2ND FLR    PH MONITORING, ESOPHAGUS, WIRELESS, (OFF REFLUX MEDS) N/A 9/24/2018    Performed by [facility] at [facility] ENDO (4TH FLR)    TONSILLECTOMY      TRANSPROSTATIC TISSUE RETRACTION N/A 6/28/2018    Performed by [facility] at [facility] OR    ULTRASOUND-ENDOSCOPIC-UPPER N/A 11/5/2018    Performed by Gorge Reyes MD at [facility] ENDO    UVULOPALATOPHARYNGOPLASTY         Review of Systems:   As documented in primary team H&P    Home Meds:   Prior to Admission medications    Medication Sig Start Date End Date Taking? Authorizing Provider   atorvastatin (LIPITOR) 40 MG tablet Take 1 tablet (40 mg total) by mouth once daily. 1/16/18 1/16/19  Lee Kay MD   bisacodyl (DULCOLAX) 10 mg Supp Place 1 suppository (10  mg total) rectally daily as needed. 1/16/19   Jose Lance MD   cholecalciferol, vitamin D3, 50,000 unit capsule Take 1 capsule (50,000 Units total) by mouth once a week. 6/21/17   Lee Kay MD   esomeprazole (NEXIUM PACKET) 20 mg GrPS Take 20 mg by mouth 2 (two) times daily. 1/19/19 1/19/20  Madeleine Alvarado MD   HYDROcodone-acetaminophen (NORCO) 5-325 mg per tablet Take 1 tablet by mouth every 6 (six) hours as needed for Pain. 1/16/19   Jose Lance MD   meclizine (ANTIVERT) 25 mg tablet Take 1 tablet (25 mg total) by mouth 3 (three) times daily as needed for Dizziness. 1/16/19   Jose Lance MD   ondansetron (ZOFRAN) 4 MG tablet Take 1 tablet (4 mg total) by mouth every 8 (eight) hours as needed for Nausea. 1/16/19   Jose Lance MD   sildenafil (VIAGRA) 100 MG tablet Take 1 tablet (100 mg total) by mouth daily as needed for Erectile Dysfunction. 11/21/18 11/21/19  Darya Hernandez MD   VIAGRA 100 mg tablet Take 1 tablet (100 mg total) by mouth once daily. Brand name only medication. 4/17/18   Lee Kay MD     Scheduled Meds:    atorvastatin  40 mg Oral Daily    enoxaparin  40 mg Subcutaneous Daily    linezolid 600mg/300ml  600 mg Intravenous Q12H    meropenem (MERREM) IVPB  2 g Intravenous Q8H    pantoprazole  40 mg Intravenous BID     Continuous Infusions:    Amino acid 5% - dextrose 15% (CLINIMIX-E) solution with additives (1L  provides 510 kcal/L dextrose, with 50 gm AA, 150 gm CHO, Na 35, K 30, Mg 5, Ca 4.5, Acetate 80, Cl 39, Phos 15) 50 mL/hr at 01/19/19 1303    lactated ringers 50 mL/hr at 01/19/19 1259     PRN Meds:albuterol sulfate, dextrose 50%, dextrose 50%, glucose, glucose, insulin aspart U-100, ondansetron, promethazine (PHENERGAN) IVPB, sodium chloride 0.9%, sodium chloride 3%  Anticoagulants/Antiplatelets: no anticoagulation    Allergies:   Review of patient's allergies indicates:   Allergen Reactions    Epinephrine       Neuroendocrine Tumor patient       Sedation Hx: have not been any systemic reactions    Labs:  Recent Labs   Lab 01/19/19 0439   INR 1.3*       Recent Labs   Lab 01/19/19 0439   WBC 15.54*   HGB 7.4*   HCT 24.9*   MCV 96   *      Recent Labs   Lab 01/18/19  1117 01/19/19 0439    93    140   K 4.1 4.5    106   CO2 26 27   BUN 21 16   CREATININE 1.4 1.2   CALCIUM 9.2 8.0*   MG 2.0  --    ALT 47* 35   AST 65* 48*   ALBUMIN 2.5* 2.0*   BILITOT 1.1* 1.6*         Vitals:  Temp: 99.4 °F (37.4 °C) (01/19/19 1515)  Pulse: (!) 121 (01/19/19 1903)  Resp: (!) 27 (01/19/19 1903)  BP: 107/64 (01/19/19 1903)  SpO2: 95 % (01/19/19 1903)     Physical Exam:  ASA: 3  Mallampati: 2    General: no acute distress  Mental Status: alert and oriented to person, place and time  HEENT: normocephalic, atraumatic  Chest: unlabored breathing  Heart: regular heart rate  Abdomen: nondistended  Extremity: moves all extremities    Plan: CT guided abscess drainage  Sedation Plan: Moderate    Nathaniel Hylton M.D.  Diagnostic and Interventional Radiologist  Department of Radiology  Pager: [phone]

## 2019-01-20 NOTE — PLAN OF CARE
"Pt woke tachycardic, tachypneic, stating "I feel like I was in an alternate reality but I know that I'm at [facility] in [place] in ICU." Pt was noted to be wheezing so PRN breathing treatment was given. Pt stated that he felt much better after the treatment. Pt then dozed back off to sleep. Will continue to monitor.  "

## 2019-01-20 NOTE — HPI
This is a 67yo male here with a h/o duodenal carcinoid s/p recent resection here with suspected aspiration pneumonia. He notes some dysphagia to solids/liquids in the substernal region with some post-prandial vomiting. No overt fevers, some cough It has been present for 2 days. No other exacerbating/relieving factors or other associated symptoms.     The following portions of the patient's history were reviewed and updated as appropriate: allergies, current medications, past family history, past medical history, past social history, past surgical history and problem list.

## 2019-01-20 NOTE — ANESTHESIA PREPROCEDURE EVALUATION
01/21/2019  Hoangchristine Santos Jr. is a 68 y.o., male s/p NET surgery 1/4/19, in ICU with sepsis from aspiration PNA for EGD. Plan to intubate patient for EGD then transfer back to ICU intubated for pulmonary to perform bronchoscopy with sedation and secured airway.      1/21/19 MAC was very difficult with desats to 60s reversed with 2 people taking turns pulling jaw forward w non-rebreathing mask; rec for next time:  ETT or maybe LMA-gastro. JR    1/4/19 ETT note: DL, MAC 4, DL with bougie, Gr 3v, obesity, short neck, poor head/neck extension, oropharyngeal edema or fat    Patient Active Problem List   Diagnosis    Hyperlipidemia    GEMMA (obstructive sleep apnea)    Vertigo    Erectile dysfunction    Chronic cough    Voice disturbance    Vocal fold atrophy    Vocal fold scar    Benign non-nodular prostatic hyperplasia with lower urinary tract symptoms    Dysphagia    Cervical osteophyte    Carcinoid tumor    Malignant carcinoid tumor of the duodenum    Pre-operative cardiovascular examination    Preoperative respiratory examination    SOB (shortness of breath)    Complication of surgical procedure    Malignant carcinoid tumor of duodenum    Severe obesity (BMI >= 40)    Aspiration pneumonia of right lower lobe    Malnutrition compromising bodily function    Pleural effusion    Sepsis     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    Prostatic hyperplasia     Sleep apnea          Review of patient's allergies indicates:   Allergen Reactions    Epinephrine      Neuroendocrine Tumor patient         Vitals:    01/21/19 1130   BP:    Pulse:    Resp:    Temp: 36.8 °C (98.2 °F)     Lab Results   Component Value Date    WBC 7.89 01/21/2019    HGB 7.3 (L) 01/21/2019    HCT 25.1 (L) 01/21/2019     (H)  01/21/2019    CHOL 273 (H) 01/16/2018    CHOL 273 (H) 01/16/2018    TRIG 121 01/16/2018    TRIG 121 01/16/2018    HDL 64 01/16/2018    HDL 64 01/16/2018    ALT 22 01/21/2019    AST 28 01/21/2019     01/21/2019    K 4.2 01/21/2019     01/21/2019    CREATININE 0.9 01/21/2019    BUN 15 01/21/2019    CO2 30 (H) 01/21/2019    TSH 0.591 03/10/2017    PSA 1.2 06/19/2017    INR 1.2 01/21/2019    HGBA1C 5.5 06/19/2017           Anesthesia Evaluation    I have reviewed the Patient Summary Reports.    I have reviewed the Nursing Notes.   I have reviewed the Medications.     Review of Systems  Anesthesia Hx:  History of prior surgery of interest to airway management or planning: (h/o Palatoplasty ('17)) facial plastic/reconstructive. Previous anesthesia: General Personal Hx of Anesthesia complications Slow To Awaken/Delayed Emergence   Hematology/Oncology:         -- Anemia: Oncology Comments: NET s/p surgery    Cardiovascular:   Exercise tolerance: poor hyperlipidemia ECG has been reviewed. Has only become debilitated after surgery earlier this month.   Pulmonary:   Pneumonia Shortness of breath Sleep Apnea, CPAP    Renal/:  Renal/ Normal     Musculoskeletal:   Arthritis         Physical Exam  General:  Obesity    Airway/Jaw/Neck:  Airway Findings: Mouth Opening: Normal Tongue: Large  General Airway Assessment: Adult  Mallampati: III  Improves to IV with phonation.  TM Distance: Normal, at least 6 cm      Dental:  DENTAL FINDINGS: Normal        Mental Status:  Mental Status Findings:  Cooperative, Alert and Oriented     · ECHO 11/2018  ·   · Challenging study  · Left ventricle ejection fraction is normal at 65%  · Grade I (mild) left ventricular diastolic dysfunction consistent with impaired relaxation.  · Left ventricle shows concentric hypertrophy.  · RV difficul to assess due to patient body habitus. RV systolic function appears normal, however, it appears the apex is hypokinetic         Anesthesia  Plan  Type of Anesthesia, risks & benefits discussed:  Anesthesia Type:  MAC, general  Patient's Preference:   Intra-op Monitoring Plan:   Intra-op Monitoring Plan Comments:   Post Op Pain Control Plan:   Post Op Pain Control Plan Comments:   Induction:   IV  Beta Blocker:  Patient is not currently on a Beta-Blocker (No further documentation required).       Informed Consent: Patient understands risks and agrees with Anesthesia plan.  Questions answered. Anesthesia consent signed with patient.  ASA Score: 3     Day of Surgery Review of History & Physical: I have interviewed and examined the patient. I have reviewed the patient's H&P dated:            Ready For Surgery From Anesthesia Perspective.

## 2019-01-21 ENCOUNTER — ANESTHESIA (OUTPATIENT)
Dept: ENDOSCOPY | Facility: HOSPITAL | Age: 69
DRG: 856 | End: 2019-01-21
Payer: MEDICARE

## 2019-01-21 LAB
ALBUMIN SERPL BCP-MCNC: 2.1 G/DL
ALP SERPL-CCNC: 85 U/L
ALT SERPL W/O P-5'-P-CCNC: 22 U/L
ANION GAP SERPL CALC-SCNC: 7 MMOL/L
APTT BLDCRRT: 32.7 SEC
AST SERPL-CCNC: 28 U/L
BACTERIA SPEC ANAEROBE CULT: NORMAL
BASOPHILS # BLD AUTO: 0.03 K/UL
BASOPHILS NFR BLD: 0.4 %
BILIRUB SERPL-MCNC: 0.5 MG/DL
BUN SERPL-MCNC: 15 MG/DL
CALCIUM SERPL-MCNC: 8.6 MG/DL
CHLORIDE SERPL-SCNC: 105 MMOL/L
CO2 SERPL-SCNC: 30 MMOL/L
CREAT SERPL-MCNC: 0.9 MG/DL
DIFFERENTIAL METHOD: ABNORMAL
EOSINOPHIL # BLD AUTO: 0.2 K/UL
EOSINOPHIL NFR BLD: 2 %
ERYTHROCYTE [DISTWIDTH] IN BLOOD BY AUTOMATED COUNT: 15.9 %
EST. GFR  (AFRICAN AMERICAN): >60 ML/MIN/1.73 M^2
EST. GFR  (NON AFRICAN AMERICAN): >60 ML/MIN/1.73 M^2
GLUCOSE SERPL-MCNC: 121 MG/DL
HCT VFR BLD AUTO: 25.1 %
HGB BLD-MCNC: 7.3 G/DL
INR PPP: 1.2
LYMPHOCYTES # BLD AUTO: 0.8 K/UL
LYMPHOCYTES NFR BLD: 9.5 %
MCH RBC QN AUTO: 28.4 PG
MCHC RBC AUTO-ENTMCNC: 29.1 G/DL
MCV RBC AUTO: 98 FL
MONOCYTES # BLD AUTO: 1 K/UL
MONOCYTES NFR BLD: 12.5 %
NEUTROPHILS # BLD AUTO: 5.9 K/UL
NEUTROPHILS NFR BLD: 75.1 %
PLATELET # BLD AUTO: 459 K/UL
PMV BLD AUTO: 9.3 FL
POCT GLUCOSE: 109 MG/DL (ref 70–110)
POCT GLUCOSE: 119 MG/DL (ref 70–110)
POCT GLUCOSE: 126 MG/DL (ref 70–110)
POTASSIUM SERPL-SCNC: 4.2 MMOL/L
PROT SERPL-MCNC: 7 G/DL
PROTHROMBIN TIME: 12.2 SEC
RBC # BLD AUTO: 2.57 M/UL
SODIUM SERPL-SCNC: 142 MMOL/L
WBC # BLD AUTO: 7.89 K/UL

## 2019-01-21 PROCEDURE — 87205 SMEAR GRAM STAIN: CPT

## 2019-01-21 PROCEDURE — 63600175 PHARM REV CODE 636 W HCPCS: Performed by: NURSE ANESTHETIST, CERTIFIED REGISTERED

## 2019-01-21 PROCEDURE — B4185 PARENTERAL SOL 10 GM LIPIDS: HCPCS | Performed by: SURGERY

## 2019-01-21 PROCEDURE — 25000003 PHARM REV CODE 250: Performed by: SURGERY

## 2019-01-21 PROCEDURE — 99900035 HC TECH TIME PER 15 MIN (STAT)

## 2019-01-21 PROCEDURE — 27000221 HC OXYGEN, UP TO 24 HOURS

## 2019-01-21 PROCEDURE — 25000003 PHARM REV CODE 250: Performed by: STUDENT IN AN ORGANIZED HEALTH CARE EDUCATION/TRAINING PROGRAM

## 2019-01-21 PROCEDURE — 63600175 PHARM REV CODE 636 W HCPCS: Performed by: SURGERY

## 2019-01-21 PROCEDURE — 85610 PROTHROMBIN TIME: CPT

## 2019-01-21 PROCEDURE — 87106 FUNGI IDENTIFICATION YEAST: CPT

## 2019-01-21 PROCEDURE — 97530 THERAPEUTIC ACTIVITIES: CPT

## 2019-01-21 PROCEDURE — 87070 CULTURE OTHR SPECIMN AEROBIC: CPT

## 2019-01-21 PROCEDURE — 94664 DEMO&/EVAL PT USE INHALER: CPT

## 2019-01-21 PROCEDURE — 94761 N-INVAS EAR/PLS OXIMETRY MLT: CPT

## 2019-01-21 PROCEDURE — 63600175 PHARM REV CODE 636 W HCPCS: Performed by: STUDENT IN AN ORGANIZED HEALTH CARE EDUCATION/TRAINING PROGRAM

## 2019-01-21 PROCEDURE — 97535 SELF CARE MNGMENT TRAINING: CPT

## 2019-01-21 PROCEDURE — 94799 UNLISTED PULMONARY SVC/PX: CPT

## 2019-01-21 PROCEDURE — 99900025 HC BRONCHOSCOPY-ASST (STAT)

## 2019-01-21 PROCEDURE — P9047 ALBUMIN (HUMAN), 25%, 50ML: HCPCS | Mod: JG | Performed by: SURGERY

## 2019-01-21 PROCEDURE — 25000003 PHARM REV CODE 250: Performed by: NURSE ANESTHETIST, CERTIFIED REGISTERED

## 2019-01-21 PROCEDURE — 37000009 HC ANESTHESIA EA ADD 15 MINS: Performed by: INTERNAL MEDICINE

## 2019-01-21 PROCEDURE — 11000001 HC ACUTE MED/SURG PRIVATE ROOM

## 2019-01-21 PROCEDURE — C9113 INJ PANTOPRAZOLE SODIUM, VIA: HCPCS | Performed by: STUDENT IN AN ORGANIZED HEALTH CARE EDUCATION/TRAINING PROGRAM

## 2019-01-21 PROCEDURE — 94660 CPAP INITIATION&MGMT: CPT

## 2019-01-21 PROCEDURE — 43235 PR EGD, FLEX, DIAGNOSTIC: ICD-10-PCS | Mod: ,,, | Performed by: INTERNAL MEDICINE

## 2019-01-21 PROCEDURE — 85730 THROMBOPLASTIN TIME PARTIAL: CPT

## 2019-01-21 PROCEDURE — 97110 THERAPEUTIC EXERCISES: CPT

## 2019-01-21 PROCEDURE — 31622 DX BRONCHOSCOPE/WASH: CPT

## 2019-01-21 PROCEDURE — 85025 COMPLETE CBC W/AUTO DIFF WBC: CPT

## 2019-01-21 PROCEDURE — 37000008 HC ANESTHESIA 1ST 15 MINUTES: Performed by: INTERNAL MEDICINE

## 2019-01-21 PROCEDURE — 43235 EGD DIAGNOSTIC BRUSH WASH: CPT | Performed by: INTERNAL MEDICINE

## 2019-01-21 PROCEDURE — 80053 COMPREHEN METABOLIC PANEL: CPT

## 2019-01-21 PROCEDURE — 43235 EGD DIAGNOSTIC BRUSH WASH: CPT | Mod: ,,, | Performed by: INTERNAL MEDICINE

## 2019-01-21 PROCEDURE — 92526 ORAL FUNCTION THERAPY: CPT

## 2019-01-21 RX ORDER — MIDAZOLAM HYDROCHLORIDE 1 MG/ML
INJECTION, SOLUTION INTRAMUSCULAR; INTRAVENOUS
Status: DISCONTINUED | OUTPATIENT
Start: 2019-01-21 | End: 2019-01-21

## 2019-01-21 RX ORDER — GLYCOPYRROLATE 0.2 MG/ML
INJECTION INTRAMUSCULAR; INTRAVENOUS
Status: DISCONTINUED | OUTPATIENT
Start: 2019-01-21 | End: 2019-01-21

## 2019-01-21 RX ORDER — CYANOCOBALAMIN 1000 UG/ML
1000 INJECTION, SOLUTION INTRAMUSCULAR; SUBCUTANEOUS DAILY
Status: DISCONTINUED | OUTPATIENT
Start: 2019-01-21 | End: 2019-02-08 | Stop reason: HOSPADM

## 2019-01-21 RX ORDER — KETAMINE HYDROCHLORIDE 50 MG/ML
INJECTION, SOLUTION INTRAMUSCULAR; INTRAVENOUS
Status: DISCONTINUED | OUTPATIENT
Start: 2019-01-21 | End: 2019-01-21

## 2019-01-21 RX ORDER — PROPOFOL 10 MG/ML
VIAL (ML) INTRAVENOUS
Status: DISCONTINUED | OUTPATIENT
Start: 2019-01-21 | End: 2019-01-21

## 2019-01-21 RX ORDER — LIDOCAINE HCL/PF 100 MG/5ML
SYRINGE (ML) INTRAVENOUS
Status: DISCONTINUED | OUTPATIENT
Start: 2019-01-21 | End: 2019-01-21

## 2019-01-21 RX ORDER — SODIUM CHLORIDE 450 MG/100ML
INJECTION, SOLUTION INTRAVENOUS CONTINUOUS PRN
Status: DISCONTINUED | OUTPATIENT
Start: 2019-01-21 | End: 2019-01-21

## 2019-01-21 RX ADMIN — PANTOPRAZOLE SODIUM 40 MG: 40 INJECTION, POWDER, LYOPHILIZED, FOR SOLUTION INTRAVENOUS at 08:01

## 2019-01-21 RX ADMIN — MEROPENEM 2 G: 1 INJECTION, POWDER, FOR SOLUTION INTRAVENOUS at 10:01

## 2019-01-21 RX ADMIN — ALBUMIN HUMAN 12.5 G: 0.25 SOLUTION INTRAVENOUS at 03:01

## 2019-01-21 RX ADMIN — TOPICAL ANESTHETIC 1 EACH: 200 SPRAY DENTAL; PERIODONTAL at 03:01

## 2019-01-21 RX ADMIN — GLYCOPYRROLATE 0.2 MG: 0.2 INJECTION, SOLUTION INTRAMUSCULAR; INTRAVENOUS at 03:01

## 2019-01-21 RX ADMIN — PROPOFOL 20 MG: 10 INJECTION, EMULSION INTRAVENOUS at 03:01

## 2019-01-21 RX ADMIN — LINEZOLID 600 MG: 600 INJECTION, SOLUTION INTRAVENOUS at 05:01

## 2019-01-21 RX ADMIN — ALBUMIN HUMAN 12.5 G: 0.25 SOLUTION INTRAVENOUS at 05:01

## 2019-01-21 RX ADMIN — MIDAZOLAM 2 MG: 1 INJECTION INTRAMUSCULAR; INTRAVENOUS at 03:01

## 2019-01-21 RX ADMIN — SODIUM CHLORIDE: 0.45 INJECTION, SOLUTION INTRAVENOUS at 03:01

## 2019-01-21 RX ADMIN — LIDOCAINE HYDROCHLORIDE 100 MG: 20 INJECTION, SOLUTION INTRAVENOUS at 03:01

## 2019-01-21 RX ADMIN — PANTOPRAZOLE SODIUM 40 MG: 40 INJECTION, POWDER, LYOPHILIZED, FOR SOLUTION INTRAVENOUS at 09:01

## 2019-01-21 RX ADMIN — PROPOFOL 10 MG: 10 INJECTION, EMULSION INTRAVENOUS at 03:01

## 2019-01-21 RX ADMIN — SOYBEAN OIL 250 ML: 20 INJECTION, SOLUTION INTRAVENOUS at 08:01

## 2019-01-21 RX ADMIN — ASCORBIC ACID, VITAMIN A PALMITATE, CHOLECALCIFEROL, THIAMINE HYDROCHLORIDE, RIBOFLAVIN-5 PHOSPHATE SODIUM, PYRIDOXINE HYDROCHLORIDE, NIACINAMIDE, DEXPANTHENOL, ALPHA-TOCOPHEROL ACETATE, VITAMIN K1, FOLIC ACID, BIOTIN, CYANOCOBALAMIN: 200; 3300; 200; 6; 3.6; 6; 40; 15; 10; 150; 600; 60; 5 INJECTION, SOLUTION INTRAVENOUS at 08:01

## 2019-01-21 RX ADMIN — ALBUMIN HUMAN 12.5 G: 0.25 SOLUTION INTRAVENOUS at 09:01

## 2019-01-21 RX ADMIN — KETAMINE HYDROCHLORIDE 20 MG: 50 INJECTION, SOLUTION, CONCENTRATE INTRAMUSCULAR; INTRAVENOUS at 03:01

## 2019-01-21 RX ADMIN — ALBUMIN HUMAN 12.5 G: 0.25 SOLUTION INTRAVENOUS at 12:01

## 2019-01-21 RX ADMIN — CYANOCOBALAMIN 1000 MCG: 1000 INJECTION, SOLUTION INTRAMUSCULAR at 09:01

## 2019-01-21 RX ADMIN — ENOXAPARIN SODIUM 40 MG: 100 INJECTION SUBCUTANEOUS at 05:01

## 2019-01-21 RX ADMIN — MEROPENEM 2 G: 1 INJECTION, POWDER, FOR SOLUTION INTRAVENOUS at 12:01

## 2019-01-21 RX ADMIN — MEROPENEM 2 G: 1 INJECTION, POWDER, FOR SOLUTION INTRAVENOUS at 05:01

## 2019-01-21 RX ADMIN — ALBUMIN HUMAN 12.5 G: 0.25 SOLUTION INTRAVENOUS at 08:01

## 2019-01-21 RX ADMIN — IRON SUCROSE 100 MG: 20 INJECTION, SOLUTION INTRAVENOUS at 09:01

## 2019-01-21 NOTE — PLAN OF CARE
Rounded on patient in ICU  Patient AAOx3  Wife and daughter at bedside- Patient has RW at home  Patient is readmission less then 30 days.   Patient was discharged on __________ with home health with [facility], PICC line and home TPN (with [facility]) and clear liquid diet- patient readmitted on 1/18/19 with aspiration pneumonia    Assessment and Plan:  68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  Now c sepsis likely from aspiration RLL PNA        Patient scheduled for endoscopy today.    Will send information to [facility]       Future Appointments   Date Time Provider Department Center   1/28/2019 11:15 AM Madeleine Alvarado MD [facility] TUMOR [facility]   1/31/2019 11:00 AM Lee Kay MD [facility] IM [facility]   2/4/2019 11:45 AM Madeleine Alvarado MD [facility] TUMOR [facility]   2/11/2019  1:00 PM Salo Nuñez MD [facility] GASTRO [address]   3/7/2019 10:15 AM Kory Jack MD [facility] UROLOGY [facility]        01/21/19 1129   Discharge Reassessment   Assessment Type Discharge Planning Reassessment   Provided patient/caregiver education on the expected discharge date and the discharge plan No   Discharge Plan A Home;Home with family;Home Health   Can the patient answer the patient profile reliably? Yes, cognitively intact   How does the patient rate their overall health at the present time? Poor   Describe the patient's ability to walk at the present time. Minor restrictions or changes     Khloe Maradiaga RN, CCM, CMSRN  RN Transition Navigator  [phone]

## 2019-01-21 NOTE — PROGRESS NOTES
"[facility]  Consult Follow Up    Primary Attending:  YOHANNES Shelley MD   Consultant Attending: Brad Brantley   Consultant Fellow: Kunal Ayala MD     Chief Complaint/Reason for Consult      Chronic respiratory failure, atelectasis     Interval History      Following his IR procedure Mr Santos was very sedated and minimally responsive. Blood gas this AM revealed an acute on chronic hypercapnia with respiratory acidosis. He was placed on BiPAP with minimal improvement. BiPAP settings were optimized and he was given one dose of narcan. He made complete recovery by ~ 9am and was back to sats of 94% on RA. He is working with RT to help clear secretions.      Updated Review of Systems      Review of Systems   Constitutional: Positive for fever and malaise/fatigue.   HENT: Negative for sore throat.    Respiratory: Positive for cough and sputum production. Negative for hemoptysis, shortness of breath and wheezing.    Cardiovascular: Negative for chest pain and leg swelling.          Medications and nursing orders have been reviewed    On Examination     BP (!) 113/55   Pulse (!) 116   Temp 97.9 °F (36.6 °C) (Oral)   Resp (!) 28   Ht 5' 10" (1.778 m)   Wt 110.5 kg (243 lb 9.7 oz)   SpO2 99%   BMI 34.95 kg/m²     Physical Exam   Constitutional: He is oriented to person, place, and time. He appears well-developed and well-nourished.  Non-toxic appearance. No distress.   HENT:   Head: Normocephalic and atraumatic.   Eyes: Conjunctivae are normal. No scleral icterus.   Neck: Neck supple. No JVD present.   Cardiovascular: Normal rate, regular rhythm and normal heart sounds.   Pulmonary/Chest: No accessory muscle usage. No apnea and no tachypnea. No respiratory distress. He has decreased breath sounds. He has rhonchi in the right lower field.   Abdominal: Soft. Bowel sounds are normal. He exhibits no distension. There is no tenderness.   Musculoskeletal: He exhibits no edema.   Neurological: He is alert " and oriented to person, place, and time.   Initially very somnolent but would respond to voice. Improved over the course of an hour   Skin: Skin is warm and dry. Capillary refill takes less than 2 seconds.   Psychiatric: He has a normal mood and affect. His behavior is normal.       All recent labs and imaging studies have been reviewed    Pertinent findings include:    Lab Results   Component Value Date    WBC 11.61 01/20/2019    HGB 7.6 (L) 01/20/2019    HCT 26.3 (L) 01/20/2019    MCV 98 01/20/2019     (H) 01/20/2019         I have personally and independently interpretted the following tests:    No new imaging    Assessment and Plan / Recommendations     Hoang Santos Jr.    · RLL consolidation /atelectasis - suspect either infection vs chronic aspiration as cause  · RML atelectasis - suspect either mucous plugging vs endobronchial lesion  · Small R pleural effusion  · Severe GEMMA  · Likely OHS (obesiy hypoventilation)    - Needs to be on CPAP every night at least 10 cm  - Will need re-evaluation with sleep [facility]  - Continue pulmonary toilet  - Continue antibiotics  - GI planning for EGD tomorrow, feel he is ready for this procedure from pulmonary standpoint  - Anaesthesia will need to be present and aware of his poor response to conscious sedation last night  - We were planning on possible bronchoscopy in the future, however will reach out to GI team in the AM to possible arrange for dual procedure since he will be already sedated.   - NPO   - Will follow up      This plan was discussed with staff pulmonologist Dr. Brantley    We will continue to follow with you, thank you for the opportunity to be involved in Mr. Santos's care.    Please call or message directly through EPIC with any additional questions or concerns.    Kunal Ayala MD  [facility] Fellow  Pager: [phone]  Cell: [phone]

## 2019-01-21 NOTE — [employer]OVATION PATIENT INSTRUCTIONS
Discharge Summary/Instructions after an Endoscopic Procedure  Patient Name: Hoang Santos  Patient MRN: [id number]  Patient YOB: 1950 Monday, January 21, 2019  Jose Kahn MD  RESTRICTIONS:  During your procedure today, you received medications for sedation.  These   medications may affect your judgment, balance and coordination.  Therefore,   for 24 hours, you have the following restrictions:   - DO NOT drive a car, operate machinery, make legal/financial decisions,   sign important papers or drink alcohol.    ACTIVITY:  Today: no heavy lifting, straining or running due to procedural   sedation/anesthesia.  The following day: return to full activity including work.  DIET:  Eat and drink normally unless instructed otherwise.     TREATMENT FOR COMMON SIDE EFFECTS:  - Mild abdominal pain, nausea, belching, bloating or excessive gas:  rest,   eat lightly and use a heating pad.  - Sore Throat: treat with throat lozenges and/or gargle with warm salt   water.  - Because air was used during the procedure, expelling large amounts of air   from your rectum or belching is normal.  - If a bowel prep was taken, you may not have a bowel movement for 1-3 days.    This is normal.  SYMPTOMS TO WATCH FOR AND REPORT TO YOUR PHYSICIAN:  1. Abdominal pain or bloating, other than gas cramps.  2. Chest pain.  3. Back pain.  4. Signs of infection such as: chills or fever occurring within 24 hours   after the procedure.  5. Rectal bleeding, which would show as bright red, maroon, or black stools.   (A tablespoon of blood from the rectum is not serious, especially if   hemorrhoids are present.)  6. Vomiting.  7. Weakness or dizziness.  GO DIRECTLY TO THE NEAREST EMERGENCY ROOM IF YOU HAVE ANY OF THE FOLLOWING:      Difficulty breathing              Chills and/or fever over 101 F   Persistent vomiting and/or vomiting blood   Severe abdominal pain   Severe chest pain   Black, tarry stools   Bleeding- more than one  tablespoon   Any other symptom or condition that you feel may need urgent attention  Your doctor recommends these additional instructions:  If any biopsies were taken, your doctors clinic will contact you in 1 to 2   weeks with any results.  - Return patient to hospital doss for ongoing care.   - Continue present medications.   - Return to referring physician.   - Full liquid diet.  For questions, problems or results please call your physician - Jose Kahn MD at Work:  [phone].  EMERGENCY PHONE NUMBER: [phone],  LAB RESULTS: [phone]  IF A COMPLICATION OR EMERGENCY SITUATION ARISES AND YOU ARE UNABLE TO REACH   YOUR PHYSICIAN - GO DIRECTLY TO THE EMERGENCY ROOM.  Jose Kahn MD  1/21/2019 3:51:18 PM  This report has been verified and signed electronically.  [employer]

## 2019-01-21 NOTE — PT/OT/SLP PROGRESS
Occupational Therapy  Missed Visit    Patient Name:  Hoang Santos Jr.   MRN:  [id number]    Patient not seen today secondary to  pt prepping to leave the floor for testing. Will follow-up .    Antonio Echeverria OT  1/21/2019

## 2019-01-21 NOTE — TRANSFER OF CARE
"Anesthesia Transfer of Care Note    Patient: Hoang Santos Jr.    Procedure(s) Performed: Procedure(s) (LRB):  ESOPHAGOGASTRODUODENOSCOPY (EGD) (N/A)    Patient location: ICU    Anesthesia Type: MAC    Transport from OR: Transported from OR on 6-10 L/min O2 by face mask with adequate spontaneous ventilation. Continuous ECG monitoring in transport. Continuous SpO2 monitoring in transport    Post pain: adequate analgesia    Post assessment: no apparent anesthetic complications    Post vital signs: stable    Level of consciousness: responds to stimulation and sedated    Nausea/Vomiting: no nausea/vomiting    Complications: none    Transfer of care protocol was followedComments: Report given to RN at bedside. Resp. At bedside, attached pt to venti-mask.      Last vitals:   Visit Vitals  BP (!) 153/70 (BP Location: Left arm, Patient Position: Lying)   Pulse (!) 136   Temp 36.8 °C (98.2 °F) (Oral)   Resp (!) 23   Ht 5' 10" (1.778 m)   Wt 106.8 kg (235 lb 7.2 oz)   SpO2 (!) 92%   BMI 33.78 kg/m²     "

## 2019-01-21 NOTE — PROGRESS NOTES
[facility]  Consult Follow Up    Primary Attending:  YOHANNES Shelley MD   Consultant Attending: Sheldon Weber MD   Consultant Fellow: Lucero Quispe MD     Chief Complaint/Reason for Consult      Chronic respiratory failure, atelectasis     Interval History   Mr. Santos is doing well this am. He is on 1 L NC and doing well. He is having an EGD today and we will plan to bronch him in conjunction with that. No fevers or chills, no increase cough.             Medications and nursing orders have been reviewed    On Examination     Temp:  [97.9 °F (36.6 °C)-98.5 °F (36.9 °C)] 98.2 °F (36.8 °C)  Pulse:  [] 102  Resp:  [13-29] 22  SpO2:  [93 %-100 %] 97 %  BP: ()/(50-73) 100/58      Physical Exam   Constitutional: He is oriented to person, place, and time. He appears well-developed and well-nourished.  Non-toxic appearance. No distress.   HENT:   Head: Normocephalic and atraumatic.   Eyes: Conjunctivae and EOM are normal. Pupils are equal, round, and reactive to light. No scleral icterus.   Neck: Neck supple. No JVD present.   Cardiovascular: Normal rate, regular rhythm and normal heart sounds.   Pulmonary/Chest: No accessory muscle usage. No apnea and no tachypnea. No respiratory distress. He has rhonchi in the right lower field.   Abdominal: Soft. Bowel sounds are normal. He exhibits no distension. There is no tenderness.   Musculoskeletal: He exhibits no edema.   Neurological: He is alert and oriented to person, place, and time.   Initially very somnolent but would respond to voice. Improved over the course of an hour   Skin: Skin is warm and dry. Capillary refill takes less than 2 seconds.   Psychiatric: He has a normal mood and affect. His behavior is normal.       All recent labs and imaging studies have been reviewed      I have personally and independently interpretted the following tests:    CXR with improved aeration bilaterally    Assessment and Plan / Recommendations     Hoang EDEN  Tom Rodriguez.    · RLL consolidation /atelectasis - suspect either infection vs chronic aspiration as cause  · RML atelectasis - suspect either mucous plugging vs endobronchial lesion  · Small R pleural effusion  · Severe GEMMA  · Likely OHS (obesiy hypoventilation)    - Needs to be on CPAP every night at least 10 cm  - Will need re-evaluation with sleep study and sleep med referral at [facility].  - Continue pulmonary toilet  - Continue antibiotics  - Will plan for bronchoscopy today in conjunction with EGD for airway survey and will biopsy if there are any endobronchial lesions    We will continue to follow with you, thank you for the opportunity to be involved in Mr. Santos's care.    Please call or message directly through EPIC with any additional questions or concerns.    Lucero Quispe M.D.  [facility] Fellow

## 2019-01-21 NOTE — PROGRESS NOTES
"[facility]  Adult Nutrition  Progress Note    SUMMARY       Recommendations     1. To better meet estimated, advance TPN to goal rate of 90 ml/hr w/ lipids 3 x week: To provide 1745 kcal, 108g pro, 2150 ml FLuid; GIR: 2.1   2. RD to monitor    Goals: Meet >85% EEN Daily  Nutrition Goal Status: new  Communication of RD Recs: (plan of care)    Reason for Assessment    Reason For Assessment: new TPN  Diagnosis: (aspiration)  Relevant Medical History: duodenal rsxn; HLD, NET duodenum, obesity  Interdisciplinary Rounds: did not attend    General Information Comments: Pt has been receiving cyclic PN overnight at home with CL, NPO per SLP for dysphagia. Pt is receiving PN support at this time. NFPE: adequate/excess fat, adequate LBM.     Nutrition Discharge Planning: PN with oral diet per SLP    Nutrition Risk Screen    Nutrition Risk Screen: dysphagia or difficulty swallowing    Nutrition/Diet History    Patient Reported Diet/Restrictions/Preferences: (liquids)  Food Preferences: n/a  Spiritual, Cultural Beliefs, Islam Practices, Values that Affect Care: no  Food Allergies: NKFA  Factors Affecting Nutritional Intake: NPO, altered gastrointestinal function    Anthropometrics    Temp: 98.2 °F (36.8 °C)  Height Method: Stated  Height: 5' 10" (177.8 cm)  Height (inches): 70 in  Weight Method: Bed Scale  Weight: 106.8 kg (235 lb 7.2 oz)  Weight (lb): 235.45 lb  Ideal Body Weight (IBW), Male: 166 lb  % Ideal Body Weight, Male (lb): 141.84 lb  BMI (Calculated): 33.9  BMI Grade: 30 - 34.9- obesity - grade I       Lab/Procedures/Meds    Pertinent Labs Reviewed: reviewed  Pertinent Labs Comments: alb 2.1  Pertinent Medications Reviewed: reviewed  Pertinent Medications Comments: B12, IV albumin, abx, Iron sucralose    Physical Findings/Assessment     Overall: Obese, nourished    Skin: Incision, drain    Estimated/Assessed Needs    Weight Used For Calorie Calculations: 106.8 kg (235 lb 7.2 oz)  Energy Calorie " Requirements (kcal): 1850 kcal  Energy Need Method: [place]  Protein Requirements: 107g (1g/kg)  Weight Used For Protein Calculations: 106.8 kg (235 lb 7.2 oz)     Estimated Fluid Requirement Method: RDA Method(or per MD)  RDA Method (mL): 1850         Nutrition Prescription Ordered    Current Diet Order: NPO  Current Nutrition Support Formula Ordered: Clinimix E 5/15  Current Nutrition Support Rate Ordered: 65 (ml)  Current Nutrition Support Frequency Ordered: ml/hr  Oral Nutrition Supplement: Lipids tid    Evaluation of Received Nutrient/Fluid Intake    Parenteral Calories (kcal): 1108  Parenteral Protein (gm): 78  Parenteral Fluid (mL): 1560  Lipid Calories (kcals): 214 kcals  GIR (Glucose Infusion Rate) (mg/kg/min): 1.5 mg/kg/min  Total Calories (kcal/kg): 1320  % Kcal Needs: 71%  % Protein Needs: 73%  I/O: reviewed  Energy Calories Required: not meeting needs  Protein Required: not meeting needs  Fluid Required: (per MD)  Comments: LBM: 1/18    % Meal Intake: NPO    Nutrition Risk    Level of Risk/Frequency of Follow-up: (2 x week)     Assessment and Plan    Nutrition Problem  Altered GI Function    Related to (etiology):   Duodenal NET    Signs and Symptoms (as evidenced by):   NPO    Interventions:  Collaboration with providers    Nutrition Diagnosis Status:   New     Monitor and Evaluation    Food and Nutrient Intake: parenteral nutrition intake, energy intake  Food and Nutrient Adminstration: diet order, enteral and parenteral nutrition administration  Physical Activity and Function: nutrition-related ADLs and IADLs  Anthropometric Measurements: weight change, weight  Biochemical Data, Medical Tests and Procedures: electrolyte and renal panel, inflammatory profile  Nutrition-Focused Physical Findings: overall appearance     Malnutrition Assessment          Orbital Region (Subcutaneous Fat Loss): well nourished  Upper Arm Region (Subcutaneous Fat Loss): well nourished  Thoracic and Lumbar Region:  well nourished   Orthodox Region (Muscle Loss): well nourished  Clavicle Bone Region (Muscle Loss): well nourished  Clavicle and Acromion Bone Region (Muscle Loss): well nourished  Scapular Bone Region (Muscle Loss): well nourished  Dorsal Hand (Muscle Loss): well nourished  Patellar Region (Muscle Loss): well nourished  Anterior Thigh Region (Muscle Loss): well nourished  Posterior Calf Region (Muscle Loss): well nourished                 Nutrition Follow-Up    RD Follow-up?: Yes

## 2019-01-21 NOTE — PLAN OF CARE
Report received from MARA Hwang. Lovenox last 1/20 at 1814. Npo since admission. Patient awake, alert, and oriented x4.

## 2019-01-21 NOTE — PLAN OF CARE
Problem: Adult Inpatient Plan of Care  Goal: Plan of Care Review  Outcome: Ongoing (interventions implemented as appropriate)  Mr Santos is awake and oriented, resp ventilation improved with bipap, able to be weaned to nasal cannula. Remained alert throughout the day, up to chair for 2 hours. ruq drain output 100ml of milky pink tan color. Sinus tach on monitor, rate 100-120, BP stable, resp even and unlabored. Plan for EGD and Bronch tomorrow with anesthesia.

## 2019-01-21 NOTE — PROGRESS NOTES
[facility]  General Surgery  Neuroendocrine Tumor Service  Progress Note     Admission Date: 1/18/2019  Hospital Length of Stay: 3  Principal Problem: Aspiration pneumonia of right lower lobe     Subjective:      Interval History:   S/p IR drainage of abdominal fluid collection  BiPAP needed intermittently  Denies any current pain  NPO for scope today    Scheduled Meds:   atorvastatin  40 mg Oral Daily    cyanocobalamin  1,000 mcg Subcutaneous Daily    enoxaparin  40 mg Subcutaneous Daily    iron sucrose (VENOFER) IVPB  100 mg Intravenous Daily    linezolid 600mg/300ml  600 mg Intravenous Q12H    meropenem (MERREM) IVPB  2 g Intravenous Q8H    pantoprazole  40 mg Intravenous BID     Continuous Infusions:   albumin human 25% 12.5 g (01/21/19 0317)    Amino acid 5% - dextrose 15% (CLINIMIX-E) solution with additives (1L  provides 510 kcal/L dextrose, with 50 gm AA, 150 gm CHO, Na 35, K 30, Mg 5, Ca 4.5, Acetate 80, Cl 39, Phos 15) 65 mL/hr at 01/1950     PRN Meds:.albuterol sulfate, dextrose 50%, dextrose 50%, glucose, glucose, insulin aspart U-100, ondansetron, promethazine (PHENERGAN) IVPB, sodium chloride 0.9%, sodium chloride 3%    Objective:      Temp:  [97.9 °F (36.6 °C)-98.5 °F (36.9 °C)] 98.1 °F (36.7 °C)  Pulse:  [] 101  Resp:  [13-32] 21  SpO2:  [89 %-100 %] 97 %  BP: ()/(50-73) 129/60  Weight change: -3.7 kg (-2.5 oz)    Intake/Output Summary (Last 24 hours) at 1/21/2019 0913  Last data filed at 1/21/2019 0745  Gross per 24 hour   Intake 2482.67 ml   Output 2200 ml   Net 282.67 ml     Drain output - 175    Physical Exam:  GEN: awake, alert, NAD  CV: tachycardic  PULM: normal work of breathing, on NC  ABD: S/NT/ND, incision c/d/i, IR drain in place with sanguinous output  EXT: Peripheral pulses present and equal bilaterally    Recent Labs   Lab 01/21/19  0557      K 4.2   CO2 30*      BUN 15   CREATININE 0.9   CALCIUM 8.6*   PROT 7.0   AST 28   ALT 22    ALKPHOS 85   BILITOT 0.5     Recent Labs   Lab 01/21/19  0557   WBC 7.89   HGB 7.3*   HCT 25.1*   *       Significant Imaging: I have reviewed all pertinent imaging results/findings within the past 24 hours.    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

## 2019-01-21 NOTE — PROCEDURES
Bronchoscopy Procedure Note      Date of Operation: 01/21/2019    Pre-op Diagnosis: RML atelectasis    Post-op Diagnosis: RML atelectasis     Surgeons: Lucero Quispe MD(fellow) &  Sheldon Weber MD(staff)      Anesthesia:   See anesthesia note (MAC with anesthesiologist present)     Operation: Flexible fiberoptic bronchoscopy, diagnostic    Specimen: 10 cc bronchial wash    Estimated Blood Loss: Minimal    Indications:   RML atelectasis concern for mucous plug vs endobronchial lesion    Consent:   The risks, benefits, complications, treatment options and expected outcomes were discussed with the patient. The possibilities of reaction to medication, pulmonary aspiration, pneumothorax, bleeding, failure to diagnose their condition, and creating a complication requiring transfusion or operation were discussed with the patient. All questions were answered, and the consent was signed and placed in the chart.    Description of Procedure:  The patient was seen in the ICU where the procedure was performed. The patient was identified as  Hoang Santos Jr. with 1950  and the procedure verified as Flexible Fiberoptic Bronchoscopy.  A Time Out was conducted, and the above information confirmed.     The patient underwent MAC anesthesia and an EGD. After the completion of the EGD, MAC was continued and bronchoscopy was performed. The scope was passed through the oral airway and then through  the cords. The cords were unremarkable in appearance with normal motion. The scope was then passed into the trachea.  Careful inspection of the tracheal lumen was accomplished. The scope was sequentially passed into the left main and then left upper and lower bronchi and segmental bronchi. The scope was then withdrawn and advanced into the right main bronchus and then into the RUL, RML, and RLL bronchi and segmental bronchi.  No endobronchial lesions were noted. There was thin mucous noted throughout and the RML and RLL were edematous. A  wash of the RML was obtained and will be sent for cultures. No other abnormalities were noted.     Dr. Weber and Anesthesia were present for the entire procedure.    Lucero Quispe M.D.  [facility] Pulmonary & Critical Care Fellow

## 2019-01-21 NOTE — ANESTHESIA POSTPROCEDURE EVALUATION
"Anesthesia Post Evaluation    Patient: Hoang Santos Jr.    Procedure(s) Performed: Procedure(s) (LRB):  ESOPHAGOGASTRODUODENOSCOPY (EGD) (N/A)    Final Anesthesia Type: MAC  Patient location during evaluation: ICU  Level of consciousness: responds to stimulation  Post-procedure vital signs: reviewed and stable  Pain management: adequate  Airway patency: patent  PONV status at discharge: No PONV  Anesthetic complications: no      Follow-up not needed.        Visit Vitals  BP (!) 153/70 (BP Location: Left arm, Patient Position: Lying)   Pulse (!) 136   Temp 36.8 °C (98.2 °F) (Oral)   Resp (!) 23   Ht 5' 10" (1.778 m)   Wt 106.8 kg (235 lb 7.2 oz)   SpO2 (!) 93%   BMI 33.78 kg/m²       Pain/Perla Score: Perla Score: 9 (1/21/2019  7:12 AM)        "

## 2019-01-21 NOTE — PLAN OF CARE
Problem: Adult Inpatient Plan of Care  Goal: Plan of Care Review  Pt on documented O2. No apparent distress noted. Will continue to monitor.

## 2019-01-21 NOTE — PROGRESS NOTES
Results for SOTO CHA JR. (MRN [id number]) as of 1/20/2019 17:59   Ref. Range 1/20/2019 06:15   POC PH Latest Ref Range: 7.35 - 7.45  7.233 (LL)   POC PCO2 Latest Ref Range: 35 - 45 mmHg 72.6 (HH)   POC PO2 Latest Ref Range: 80 - 100 mmHg 95   POC BE Latest Ref Range: -2 to 2 mmol/L 3   POC HCO3 Latest Ref Range: 24 - 28 mmol/L 30.6 (H)   POC SATURATED O2 Latest Ref Range: 95 - 100 % 95   POC TCO2 Latest Ref Range: 23 - 27 mmol/L 33 (H)   FiO2 Unknown 45   Sample Unknown ARTERIAL   DelSys Unknown CPAP/BiPAP   Allens Test Unknown Pass   Site Unknown LR   Mode Unknown BiPAP   Rate Unknown 4     Results reported to Dr Ayala [id number]

## 2019-01-21 NOTE — PT/OT/SLP PROGRESS
"Speech Language Pathology Treatment    Patient Name:  Hoang Santos Jr.   MRN:  [id number]  Admitting Diagnosis: Aspiration pneumonia of right lower lobe    Recommendations:                 General Recommendations:  Dysphagia therapy  Diet recommendations:  NPO, Liquid Diet Level: NPO   Aspiration Precautions: Continue alternate means of nutrition, Frequent oral care and Strict aspiration precautions   General Precautions: Standard, aspiration, NPO, fall  Communication strategies:  none    Subjective     Pt seen at the bedside for ongoing dysphagia tx. Pt's wife and daughter present for tx. ST checked w/ RN Pricilla prior to entering room. Pt awake/alert, with good participation this visit.  When asked if the pt had any questions re: his POC, pt responded, "How come I never had any of this swallow trouble before? Just with peanuts and popcorn." Pt continues to exhibit limited insight into swallowing deficit and requires continued education re: severity of dysphagia.     Pain/Comfort:  · Pain Rating 1: 0/10    Objective:     Has the patient been evaluated by SLP for swallowing?   Yes  Keep patient NPO? Yes   Current Respiratory Status: nasal cannula      Pt seated upright for dysphagia tx today with nasal cannula in place. White coating noted on posterior 1/2 of tongue, which pt attributed to hx of plaque build up on tongue. Monitor coating + continue daily oral care to rule out Thrush. He produced 3 sets of 10 for "kuh" and "guh" to improve BOT strength. Pitch glide exercise "ah-ee" completed with fair-good accuracy, for 2 sets of 10. Lingual resistance exercises completed by lateralizing tongue against plastic spoon held by SLP. Pt produced strong resistance x10 on each side. Given small stress ball, pt completed modified Shaker exercise for laryngeal strength and lift. Pt held ball in place for 1 minute with fair-good strength, then elevated and lowered chin to ball x30. Pt encouraged to frequently swallow " accumulation of saliva and produce HARD swallow as often as possible. Education provided to pt and family re: osteophytes' affect on swallow function, edema s/p intubation, OMEs function, and aspiration risks. Pt verbalized understanding of taught material. Oral motor exercise handout left w/ pt to continue exercises 3x day as tolerated.      Assessment:     Hoang Santos Jr. is a 68 y.o. male with an SLP diagnosis of severe oropharyngeal Dysphagia.  Good participation with all oral motor exercises today. Reinforcement of realistic goals needed as pt continues to present with limited insight into deficits. Pt to continue with indirect dysphagia tx, with PO trials as appropriate. ST to continue w/ POC.    Goals:   Multidisciplinary Problems     SLP Goals        Problem: SLP Goal    Goal Priority Disciplines Outcome   SLP Goal     SLP Ongoing (interventions implemented as appropriate)   Description:  Short Term Goals:  1. Pt will participate in BSS to determine least restrictive diet.- MET 1/19  2. Pt will participate in indirect dysphagia exercises to strengthen musculature for swallowing mechanism with mod-max effort.                        Plan:     · Patient to be seen:  3 x/week   · Plan of Care expires:  02/18/19  · Plan of Care reviewed with:  patient, spouse, daughter   · SLP Follow-Up:  Yes       Discharge recommendations:  other (see comments)(TBD)   Barriers to Discharge:  None    Time Tracking:     SLP Treatment Date:   01/21/19  Speech Start Time:  1310  Speech Stop Time:  1344     Speech Total Time (min):  34 min    Billable Minutes: Treatment Swallowing Dysfunction 20 and Seld Care/Home Management Training 14    Yissel Huang CCC-SLP  01/21/2019

## 2019-01-21 NOTE — PLAN OF CARE
Problem: SLP Goal  Goal: SLP Goal  Short Term Goals:  1. Pt will participate in BSS to determine least restrictive diet.- MET 1/19  2. Pt will participate in indirect dysphagia exercises to strengthen musculature for swallowing mechanism with mod-max effort.       Outcome: Ongoing (interventions implemented as appropriate)  1/21: conitnued dysphagia tx. Pt participated in OMEs targeting lingual strength, vocal chord adduction, and hyolaryngeal elevation. ST to cont w/ POC.

## 2019-01-21 NOTE — PROGRESS NOTES
Clinimix/ TPN orders renewed for today, same formula and at the same rate.   M-V-I and trace elements are added to Clinimix Daily and Lipid emulsion to be given every Mon-Wed-Fri  at 22:00  (per [employer] approved pharmacy protocol).

## 2019-01-22 ENCOUNTER — TELEPHONE (OUTPATIENT)
Dept: OTOLARYNGOLOGY | Facility: CLINIC | Age: 69
End: 2019-01-22

## 2019-01-22 PROBLEM — R00.0 TACHYCARDIA: Status: ACTIVE | Noted: 2019-01-22

## 2019-01-22 LAB
ALBUMIN SERPL BCP-MCNC: 2.8 G/DL
ALP SERPL-CCNC: 98 U/L
ALT SERPL W/O P-5'-P-CCNC: 19 U/L
ANION GAP SERPL CALC-SCNC: 7 MMOL/L
APTT BLDCRRT: 32.6 SEC
AST SERPL-CCNC: 33 U/L
BACTERIA SPEC AEROBE CULT: NORMAL
BASOPHILS # BLD AUTO: 0.04 K/UL
BASOPHILS NFR BLD: 0.6 %
BILIRUB SERPL-MCNC: 0.5 MG/DL
BUN SERPL-MCNC: 15 MG/DL
CALCIUM SERPL-MCNC: 8.8 MG/DL
CHLORIDE SERPL-SCNC: 103 MMOL/L
CO2 SERPL-SCNC: 31 MMOL/L
CREAT SERPL-MCNC: 0.8 MG/DL
DIFFERENTIAL METHOD: ABNORMAL
EOSINOPHIL # BLD AUTO: 0.2 K/UL
EOSINOPHIL NFR BLD: 2.2 %
ERYTHROCYTE [DISTWIDTH] IN BLOOD BY AUTOMATED COUNT: 15.7 %
EST. GFR  (AFRICAN AMERICAN): >60 ML/MIN/1.73 M^2
EST. GFR  (NON AFRICAN AMERICAN): >60 ML/MIN/1.73 M^2
GLUCOSE SERPL-MCNC: 117 MG/DL
GRAM STN SPEC: NORMAL
HCT VFR BLD AUTO: 24.6 %
HGB BLD-MCNC: 7.7 G/DL
INR PPP: 1.1
LYMPHOCYTES # BLD AUTO: 0.7 K/UL
LYMPHOCYTES NFR BLD: 10.3 %
MCH RBC QN AUTO: 30.4 PG
MCHC RBC AUTO-ENTMCNC: 31.3 G/DL
MCV RBC AUTO: 97 FL
MONOCYTES # BLD AUTO: 0.9 K/UL
MONOCYTES NFR BLD: 13 %
NEUTROPHILS # BLD AUTO: 5 K/UL
NEUTROPHILS NFR BLD: 73.2 %
PLATELET # BLD AUTO: 458 K/UL
PMV BLD AUTO: 9.4 FL
POCT GLUCOSE: 114 MG/DL (ref 70–110)
POCT GLUCOSE: 121 MG/DL (ref 70–110)
POCT GLUCOSE: 77 MG/DL (ref 70–110)
POCT GLUCOSE: 98 MG/DL (ref 70–110)
POTASSIUM SERPL-SCNC: 4.2 MMOL/L
PROT SERPL-MCNC: 7.1 G/DL
PROTHROMBIN TIME: 11.7 SEC
RBC # BLD AUTO: 2.53 M/UL
SODIUM SERPL-SCNC: 141 MMOL/L
WBC # BLD AUTO: 6.82 K/UL

## 2019-01-22 PROCEDURE — 92526 ORAL FUNCTION THERAPY: CPT

## 2019-01-22 PROCEDURE — 94799 UNLISTED PULMONARY SVC/PX: CPT

## 2019-01-22 PROCEDURE — 99900035 HC TECH TIME PER 15 MIN (STAT)

## 2019-01-22 PROCEDURE — 97530 THERAPEUTIC ACTIVITIES: CPT

## 2019-01-22 PROCEDURE — 63600175 PHARM REV CODE 636 W HCPCS: Performed by: SURGERY

## 2019-01-22 PROCEDURE — 27202055 HC BRONCHOSCOPE, DISP

## 2019-01-22 PROCEDURE — 25000003 PHARM REV CODE 250: Performed by: STUDENT IN AN ORGANIZED HEALTH CARE EDUCATION/TRAINING PROGRAM

## 2019-01-22 PROCEDURE — 27000221 HC OXYGEN, UP TO 24 HOURS

## 2019-01-22 PROCEDURE — 99232 PR SUBSEQUENT HOSPITAL CARE,LEVL II: ICD-10-PCS | Mod: ,,, | Performed by: OTOLARYNGOLOGY

## 2019-01-22 PROCEDURE — 94660 CPAP INITIATION&MGMT: CPT

## 2019-01-22 PROCEDURE — P9047 ALBUMIN (HUMAN), 25%, 50ML: HCPCS | Mod: JG | Performed by: SURGERY

## 2019-01-22 PROCEDURE — 94664 DEMO&/EVAL PT USE INHALER: CPT

## 2019-01-22 PROCEDURE — 63600175 PHARM REV CODE 636 W HCPCS: Performed by: STUDENT IN AN ORGANIZED HEALTH CARE EDUCATION/TRAINING PROGRAM

## 2019-01-22 PROCEDURE — 97110 THERAPEUTIC EXERCISES: CPT

## 2019-01-22 PROCEDURE — 85610 PROTHROMBIN TIME: CPT

## 2019-01-22 PROCEDURE — 25000003 PHARM REV CODE 250: Performed by: SURGERY

## 2019-01-22 PROCEDURE — 85025 COMPLETE CBC W/AUTO DIFF WBC: CPT

## 2019-01-22 PROCEDURE — C9113 INJ PANTOPRAZOLE SODIUM, VIA: HCPCS | Performed by: STUDENT IN AN ORGANIZED HEALTH CARE EDUCATION/TRAINING PROGRAM

## 2019-01-22 PROCEDURE — 11000001 HC ACUTE MED/SURG PRIVATE ROOM

## 2019-01-22 PROCEDURE — 80053 COMPREHEN METABOLIC PANEL: CPT

## 2019-01-22 PROCEDURE — 99232 SBSQ HOSP IP/OBS MODERATE 35: CPT | Mod: ,,, | Performed by: OTOLARYNGOLOGY

## 2019-01-22 PROCEDURE — 85730 THROMBOPLASTIN TIME PARTIAL: CPT

## 2019-01-22 PROCEDURE — 94761 N-INVAS EAR/PLS OXIMETRY MLT: CPT

## 2019-01-22 PROCEDURE — S0171 BUMETANIDE 0.5 MG: HCPCS | Performed by: SURGERY

## 2019-01-22 PROCEDURE — 63600175 PHARM REV CODE 636 W HCPCS: Mod: JG | Performed by: SURGERY

## 2019-01-22 RX ORDER — BUMETANIDE 0.25 MG/ML
1 INJECTION INTRAMUSCULAR; INTRAVENOUS DAILY
Status: DISCONTINUED | OUTPATIENT
Start: 2019-01-22 | End: 2019-01-25

## 2019-01-22 RX ORDER — FLUCONAZOLE 2 MG/ML
400 INJECTION, SOLUTION INTRAVENOUS
Status: DISCONTINUED | OUTPATIENT
Start: 2019-01-22 | End: 2019-01-28

## 2019-01-22 RX ADMIN — MEROPENEM 2 G: 1 INJECTION, POWDER, FOR SOLUTION INTRAVENOUS at 10:01

## 2019-01-22 RX ADMIN — FLUCONAZOLE, SODIUM CHLORIDE 400 MG: 2 INJECTION INTRAVENOUS at 06:01

## 2019-01-22 RX ADMIN — ALBUMIN HUMAN 12.5 G: 0.25 SOLUTION INTRAVENOUS at 04:01

## 2019-01-22 RX ADMIN — LINEZOLID 600 MG: 600 INJECTION, SOLUTION INTRAVENOUS at 05:01

## 2019-01-22 RX ADMIN — ALBUMIN HUMAN 12.5 G: 0.25 SOLUTION INTRAVENOUS at 11:01

## 2019-01-22 RX ADMIN — PANTOPRAZOLE SODIUM 40 MG: 40 INJECTION, POWDER, LYOPHILIZED, FOR SOLUTION INTRAVENOUS at 08:01

## 2019-01-22 RX ADMIN — CYANOCOBALAMIN 1000 MCG: 1000 INJECTION, SOLUTION INTRAMUSCULAR at 08:01

## 2019-01-22 RX ADMIN — ALBUMIN HUMAN 12.5 G: 0.25 SOLUTION INTRAVENOUS at 01:01

## 2019-01-22 RX ADMIN — ENOXAPARIN SODIUM 40 MG: 100 INJECTION SUBCUTANEOUS at 04:01

## 2019-01-22 RX ADMIN — LINEZOLID 600 MG: 600 INJECTION, SOLUTION INTRAVENOUS at 04:01

## 2019-01-22 RX ADMIN — BUMETANIDE 1 MG: 0.25 INJECTION INTRAMUSCULAR; INTRAVENOUS at 08:01

## 2019-01-22 RX ADMIN — ASCORBIC ACID, VITAMIN A PALMITATE, CHOLECALCIFEROL, THIAMINE HYDROCHLORIDE, RIBOFLAVIN-5 PHOSPHATE SODIUM, PYRIDOXINE HYDROCHLORIDE, NIACINAMIDE, DEXPANTHENOL, ALPHA-TOCOPHEROL ACETATE, VITAMIN K1, FOLIC ACID, BIOTIN, CYANOCOBALAMIN: 200; 3300; 200; 6; 3.6; 6; 40; 15; 10; 150; 600; 60; 5 INJECTION, SOLUTION INTRAVENOUS at 08:01

## 2019-01-22 RX ADMIN — IRON SUCROSE 100 MG: 20 INJECTION, SOLUTION INTRAVENOUS at 08:01

## 2019-01-22 RX ADMIN — ALBUMIN HUMAN 12.5 G: 0.25 SOLUTION INTRAVENOUS at 06:01

## 2019-01-22 RX ADMIN — ALBUMIN HUMAN 12.5 G: 0.25 SOLUTION INTRAVENOUS at 10:01

## 2019-01-22 RX ADMIN — MEROPENEM 2 G: 1 INJECTION, POWDER, FOR SOLUTION INTRAVENOUS at 01:01

## 2019-01-22 NOTE — PT/OT/SLP PROGRESS
Physical Therapy Treatment    Patient Name:  Hoang Santos Jr.   MRN:  [id number]    Recommendations:     Discharge Recommendations:  ( PT/OT)   Discharge Equipment Recommendations: none   Barriers to discharge: None    Assessment:     Hoang Santos Jr. is a 68 y.o. male admitted with a medical diagnosis of Aspiration pneumonia of right lower lobe.  He presents with the following impairments/functional limitations:  weakness, impaired endurance, impaired self care skills, impaired functional mobilty, gait instability, impaired balance .Patient participated well with standing exercise and sitting EOB prior to his procedures today; initial BP supine 100/58  O2 sats 96%; in sitting following exercise, /60  O2 sats 96%; cont with POC.    Rehab Prognosis: Good; patient would benefit from acute skilled PT services to address these deficits and reach maximum level of function.    Recent Surgery: Procedure(s) (LRB):  ESOPHAGOGASTRODUODENOSCOPY (EGD) (N/A) Day of Surgery    Plan:     During this hospitalization, patient to be seen 5 x/week to address the identified rehab impairments via gait training, therapeutic activities, therapeutic exercises and progress toward the following goals:    · Plan of Care Expires:  02/02/19    Subjective     Pain/Comfort:  · Pain Rating 1: 0/10  · Pain Rating Post-Intervention 1: 0/10      Objective:     Communicated with nurse prior to session.  Patient found with bed alarm, PICC line(ICU monitoring)  upon PT entry to room.     General Precautions: Standard, aspiration, NPO, fall   Orthopedic Precautions:N/A   Braces: N/A     Functional Mobility:  · Bed Mobility:     · Rolling Left:  contact guard assistance  · Scooting: stand by assistance  · Supine to Sit: minimum assistance  · Supine to Sit: contact guard assistance and minimum assistance  · Transfers:     · Sit to Stand:  contact guard assistance and stand by assistance with no AD  · Gait/functional mobility: Patient  stepped in place and laterally toward HOB with CGA      AM-PAC 6 CLICK MOBILITY  Turning over in bed (including adjusting bedclothes, sheets and blankets)?: 3  Sitting down on and standing up from a chair with arms (e.g., wheelchair, bedside commode, etc.): 3  Moving from lying on back to sitting on the side of the bed?: 3  Moving to and from a bed to a chair (including a wheelchair)?: 3  Need to walk in hospital room?: 3  Climbing 3-5 steps with a railing?: 2  Basic Mobility Total Score: 17       Therapeutic Activities and Exercises:   Patient rolled with CG and VCs for technique, sup to sit with CG/Harley; pt scooted to EOB; performed seated APs, FAQ, marches, hip abd/add x 10 reps; pt stood and performed heel rises, knee squats, marches then sat to rest; stood again for toe rises, hip abd/add, and GS then sat for rest; stood again for stepping in place and stepping x 10 steps then laterally toward HOB; pt sat ~5 more minutes then returned to supine with CG/Harley and VCs for technique.    Patient left HOB elevated with all lines intact, call button in reach, bed alarm on, nurse notified and wife and dtr present..    GOALS:   Multidisciplinary Problems     Physical Therapy Goals        Problem: Physical Therapy Goal    Goal Priority Disciplines Outcome Goal Variances Interventions   Physical Therapy Goal     PT, PT/OT Ongoing (interventions implemented as appropriate)     Description:  Goals to be met by: 2019     Patient will increase functional independence with mobility by performin. Supine to sit with Modified [place]  2. Sit to stand transfer with Modified [place]  3. Gait  x 150 feet with Modified [place] using Rolling Walker.   4. Lower extremity exercise program x10 reps per handout, with independence                      Time Tracking:     PT Received On: 19  PT Start Time: 1152     PT Stop Time: 1225  PT Total Time (min): 33 min     Billable Minutes: Therapeutic Activity 18  minutes and Therapeutic Exercise 15 minutes    Treatment Type: Treatment  PT/PTA: PT     PTA Visit Number: 0     Myesha Renner, PT  01/21/2019

## 2019-01-22 NOTE — PROCEDURES
"Hoang Santos Jr. is a 68 y.o. male patient.    Temp: 98.6 °F (37 °C) (01/22/19 1515)  Pulse: 102 (01/22/19 1730)  Resp: (!) 25 (01/22/19 1730)  BP: 126/61 (01/22/19 1730)  SpO2: 95 % (01/22/19 1730)  Weight: 107 kg (235 lb 14.3 oz) (01/22/19 0600)  Height: 5' 10" (177.8 cm) (01/21/19 1531)       Procedures       SEPARATE PROCEDURE NOTE:     ANESTHESIA:  Topical xylocaine with natalie-synephrine        PROCEDURE:  After verbal consent was obtained, the flexible scope was passed through the patient's nasal cavity without difficulty.  The nasopharynx (adenoid pad) and eustachian tube orifices were first visualized and were found to be normal, without masses or irregularity.  The posterior pharyngeal wall and base of tongue were then examined and no mass or irregular tissue was seen.  The scope was then advanced to the larynx, and the epiglottis, valleculae, and piriform sinuses were examined.  The false vocal folds and true vocal folds were then examined.  The arytenoids and the interarytenoid area was examined.  All findings were normal except:posterior pharyngeal wall with submucosal fullness,  omega-shaped epiglottis which rests against posterior pharyngeal wall during respiration(this is causing muffled voice along with thick secretions), mild bilateral symmetric vocal fold atrophy, and mild post-cricoid edema. There are significant pooling of secretions in the piriform sinus, vallecula, and in supraglottis.         The patient tolerated the procedure well without complications.         Parvin Bravo  1/22/2019  "

## 2019-01-22 NOTE — PLAN OF CARE
Problem: SLP Goal  Goal: SLP Goal  Short Term Goals:  1. Pt will participate in BSS to determine least restrictive diet.- MET 1/19  2. Pt will participate in indirect dysphagia exercises to strengthen musculature for swallowing mechanism with mod-max effort.       Outcome: Ongoing (interventions implemented as appropriate)  1/22: Pt continues to exhibit clinical s/s of aspiration at bedside on saliva, ice chips and water. Pt is NOT deemed safe to SWALLOW liquid constrast due to aspiration and airway risk. Pt with hx of pharyngeal residuals per MBS done on 1/11/19.   Cont NPO with alternative source of nutrition and continued dysphagia exercise program.  AJITH Ley, CCC-SLP  Speech Pathologist 1/22/2019

## 2019-01-22 NOTE — PT/OT/SLP PROGRESS
Occupational Therapy   Treatment    Name: Hoang Santos Jr.  MRN: [id number]  Admitting Diagnosis:  Aspiration pneumonia of right lower lobe  1 Day Post-Op    Recommendations:     Discharge Recommendations: (HHOT/PT)  Discharge Equipment Recommendations:  none  Barriers to discharge:  None    Assessment:     Hoang Santos Jr. is a 68 y.o. male with a medical diagnosis of Aspiration pneumonia of right lower lobe. Performance deficits affecting function are weakness, impaired endurance, impaired self care skills, gait instability.     Rehab Prognosis:  Good; patient would benefit from acute skilled OT services to address these deficits and reach maximum level of function.       Plan:     Patient to be seen 5 x/week to address the above listed problems via self-care/home management, therapeutic activities, therapeutic exercises  · Plan of Care Expires: 02/22/19  · Plan of Care Reviewed with: patient    Subjective     Pain/Comfort:  · Pain Rating 1: 0/10    Objective:     Communicated with: Nurse prior to session.  Patient found up in chair with PICC line upon OT entry to room.    General Precautions: Standard, aspiration, NPO, fall   Orthopedic Precautions:N/A   Braces: N/A     Occupational Performance:     [facility] 6 Click ADL: 15    Treatment & Education:  Pt found sitting up in chair with posture askew. Pt requested to use urinal in chair. Pt educated on UE/LE AROM exercises for post-sx mobility with return demonstration of each exercise performed seated in chair. Poor carryover was noted for multiple exercises 2/2 low trunk mobility at baseline and poor body awareness. Pt easily fatigued.     Patient left up in chair with all lines intact, call button in reach and nurse notifiedEducation:      GOALS:   Multidisciplinary Problems     Occupational Therapy Goals        Problem: Occupational Therapy Goal    Goal Priority Disciplines Outcome Interventions   Occupational Therapy Goal     OT, PT/OT Ongoing (interventions  implemented as appropriate)    Description:  Goals to be met by: 02/19/2019    Patient will increase functional independence with ADLs by performing:    UE Dressing with Modified [place].  LE Dressing with Modified [place].  Grooming while standing with Modified [place].  Toileting from toilet with Modified [place] for hygiene and clothing management.   Toilet transfer to toilet with Modified [place].  Increased functional strength to WFL for self care.  Upper extremity exercise program x10 reps per handout, with independence.                      Time Tracking:     OT Date of Treatment: 01/22/19  OT Start Time: 0958  OT Stop Time: 1024  OT Total Time (min): 26 min    Billable Minutes:Therapeutic Exercise 26    ADDIE Aaron  1/22/2019    I certify that I was present in the room directing the student in service delivery and guiding them using my skilled judgment. As the co-signing therapist I have reviewed the students documentation and am responsible for the treatment, assessment, and plan.

## 2019-01-22 NOTE — SUBJECTIVE & OBJECTIVE
Interval History: Patient known to me for prior consult for dysphagia. Patient remains with difficulty swallowing, wet voice. He is currently NPO. MBSS showed tawny aspiration on 1/11/2019. His course has been complicated by aspiration pneumonia, sepsis, and intrabdominal abscess.     Medications:  Continuous Infusions:   albumin human 25% 12.5 g (01/22/19 1648)    Amino acid 5% - dextrose 15% (CLINIMIX-E) solution with additives (1L  provides 510 kcal/L dextrose, with 50 gm AA, 150 gm CHO, Na 35, K 30, Mg 5, Ca 4.5, Acetate 80, Cl 39, Phos 15) 65 mL/hr at 01/21/19 2012    Amino acid 5% - dextrose 15% (CLINIMIX-E) solution with additives (1L  provides 510 kcal/L dextrose, with 50 gm AA, 150 gm CHO, Na 35, K 30, Mg 5, Ca 4.5, Acetate 80, Cl 39, Phos 15)       Scheduled Meds:   atorvastatin  40 mg Oral Daily    bumetanide  1 mg Intravenous Daily    cyanocobalamin  1,000 mcg Subcutaneous Daily    enoxaparin  40 mg Subcutaneous Daily    fluconazole (DIFLUCAN) IVPB  400 mg Intravenous Q24H    iron sucrose (VENOFER) IVPB  100 mg Intravenous Daily    linezolid 600mg/300ml  600 mg Intravenous Q12H    meropenem (MERREM) IVPB  2 g Intravenous Q8H    pantoprazole  40 mg Intravenous BID     PRN Meds:albuterol sulfate, dextrose 50%, dextrose 50%, glucose, glucose, insulin aspart U-100, ondansetron, promethazine (PHENERGAN) IVPB, sodium chloride 0.9%, sodium chloride 3%     Review of patient's allergies indicates:   Allergen Reactions    Epinephrine      Neuroendocrine Tumor patient       Objective:     Vital Signs (24h Range):  Temp:  [98 °F (36.7 °C)-98.6 °F (37 °C)] 98.6 °F (37 °C)  Pulse:  [] 102  Resp:  [15-32] 23  SpO2:  [85 %-100 %] 94 %  BP: ()/(55-89) 109/61     Date 01/22/19 0700 - 01/23/19 0659   Shift 3570-2200 1577-7047 6379-7916 24 Hour Total   INTAKE   I.V.(mL/kg) 89.3(0.8) 21.2(0.2)  110.5(1)   IV Piggyback 200 300  500   .7 137.6  718.3   Shift Total(mL/kg) 870(8.1) 458.8(4.3)   1328.8(12.4)   OUTPUT   Urine(mL/kg/hr) 1495(1.7) 125  1620   Shift Total(mL/kg) 1495(14) 125(1.2)  1620(15.1)   Weight (kg) 107 107 107 107     Lines/Drains/Airways     Peripherally Inserted Central Catheter Line                 PICC Double Lumen 01/14/19 1644 right basilic 8 days          Drain                 Closed/Suction Drain 01/19/19 1955 Right RUQ Accordion 12 Fr. 2 days          Peripheral Intravenous Line                 Peripheral IV - Single Lumen 01/18/19 1318 Left Hand 4 days                Physical Exam     Constitutional: Well appearing / communicating.  NAD.  Eyes: EOM I Bilaterally  Head/Face: Normocephalic.  Negative paranasal sinus pressure/tenderness.  Salivary glands WNL.  House Brackmann I Bilaterally.    Right Ear: External Auditory Canal WNL,TM w/o masses/lesions/perforations.  Auricle WNL.  Left Ear: External Auditory Canal WNL,TM w/o masses/lesions/perforations. Auricle WNL.  Nose: No gross nasal septal deviation. Inferior Turbinates 3+ bilaterally. No septal perforation. No masses/lesions. External nasal skin without masses/lesions.  Oral Cavity: Gingiva/lips WNL.  FOM Soft, no masses palpated. Oral Tongue mobile. Hard Palate WNL.   Oropharynx: BOT WNL. No masses/lesions noted. Tonsillar fossa/pharyngeal wall without lesions. Posterior oropharynx WNL.  Soft palate without masses. Midline uvula.   Neck/Lymphatic: No LAD I-VI bilaterally.  No thyromegaly.  No masses noted on exam.    Mirror laryngoscopy/nasopharyngoscopy: Active gag reflex.  Unable to perform.    Neuro/Psychiatric: AOx3.  Normal mood and affect.   Cardiovascular: Normal carotid pulses bilaterally, no increasing jugular venous distention noted at cervical region bilaterally.    Respiratory: Normal respiratory effort, no stridor, no retractions noted.    See separate procedure note for FFL.     Significant Labs:  CBC:   Recent Labs   Lab 01/22/19  0543   WBC 6.82   RBC 2.53*   HGB 7.7*   HCT 24.6*   *   MCV 97   MCH  30.4   MCHC 31.3*     CMP:   Recent Labs   Lab 01/22/19  0543   *   CALCIUM 8.8   ALBUMIN 2.8*   PROT 7.1      K 4.2   CO2 31*      BUN 15   CREATININE 0.8   ALKPHOS 98   ALT 19   AST 33   BILITOT 0.5     Coagulation:   Recent Labs   Lab 01/22/19  0543   LABPROT 11.7   INR 1.1   APTT 32.6*       Significant Diagnostics:  1/11/2019: MBSS:   There was penetration and aspiration through varying consistencies including thin liquids.  Abnormal oral transit was noted throughout the entire examination.  Partially visualized drainage catheter projects over the prevertebral soft tissues.  Prominent anterior bridging osteophytes identified at C2-C3, C3-C4 and C4-C5.

## 2019-01-22 NOTE — PLAN OF CARE
Problem: Physical Therapy Goal  Goal: Physical Therapy Goal  Goals to be met by: 2019     Patient will increase functional independence with mobility by performin. Supine to sit with Modified [place]  2. Sit to stand transfer with Modified [place]  3. Gait  x 150 feet with Modified [place] using Rolling Walker.   4. Lower extremity exercise program x10 reps per handout, with independence     Outcome: Ongoing (interventions implemented as appropriate)  Continue working toward goals.

## 2019-01-22 NOTE — PLAN OF CARE
Problem: Physical Therapy Goal  Goal: Physical Therapy Goal  Goals to be met by: 2019     Patient will increase functional independence with mobility by performin. Supine to sit with Modified [place]  2. Sit to stand transfer with Modified [place]  3. Gait  x 150 feet with Modified [place] using Rolling Walker.   4. Lower extremity exercise program x10 reps per handout, with independence     Outcome: Ongoing (interventions implemented as appropriate)  Patient participated well with standing exercise and sitting EOB prior to his procedures today; initial BP supine 100/58  O2 sats 96%; in sitting following exercise, /60  O2 sats 96%; cont with POC.

## 2019-01-22 NOTE — PROGRESS NOTES
[facility]  Neuroendocrine Tumor Service  Progress Note     Admission Date: 1/18/2019  Hospital Length of Stay: 4  Principal Problem: Aspiration pneumonia of right lower lobe     Subjective:      Interval History:   S/p IR drainage of abdominal fluid collection  Denies any current pain  S/p ERCP and bronchoscopy yesterday - breathing improved since    Scheduled Meds:   atorvastatin  40 mg Oral Daily    bumetanide  1 mg Intravenous Daily    cyanocobalamin  1,000 mcg Subcutaneous Daily    enoxaparin  40 mg Subcutaneous Daily    fat emulsion 20%  250 mL Intravenous Every Mon, Fri    iron sucrose (VENOFER) IVPB  100 mg Intravenous Daily    linezolid 600mg/300ml  600 mg Intravenous Q12H    meropenem (MERREM) IVPB  2 g Intravenous Q8H    pantoprazole  40 mg Intravenous BID     Continuous Infusions:   albumin human 25% 12.5 g (01/22/19 0633)    Amino acid 5% - dextrose 15% (CLINIMIX-E) solution with additives (1L  provides 510 kcal/L dextrose, with 50 gm AA, 150 gm CHO, Na 35, K 30, Mg 5, Ca 4.5, Acetate 80, Cl 39, Phos 15) 65 mL/hr at 01/21/19 2012     PRN Meds:.albuterol sulfate, dextrose 50%, dextrose 50%, glucose, glucose, insulin aspart U-100, ondansetron, promethazine (PHENERGAN) IVPB, sodium chloride 0.9%, sodium chloride 3%    Objective:      Temp:  [98.2 °F (36.8 °C)-98.6 °F (37 °C)] 98.4 °F (36.9 °C)  Pulse:  [] 100  Resp:  [15-36] 19  SpO2:  [89 %-100 %] 98 %  BP: ()/(55-74) 128/60  Weight change: 0 kg (0 lb)    Intake/Output Summary (Last 24 hours) at 1/22/2019 0728  Last data filed at 1/22/2019 0720  Gross per 24 hour   Intake 3121.76 ml   Output 1340 ml   Net 1781.76 ml     Drain output - 175    Physical Exam:  GEN: awake, alert, NAD  CV: tachycardic  PULM: normal work of breathing, on NC  ABD: S/NT/ND, incision c/d/i, IR drain in place with sanguinous output  EXT: Peripheral pulses present and equal bilaterally    Recent Labs   Lab 01/22/19  8275       K 4.2   CO2 31*      BUN 15   CREATININE 0.8   CALCIUM 8.8   PROT 7.1   AST 33   ALT 19   ALKPHOS 98   BILITOT 0.5     Recent Labs   Lab 01/22/19  0543   WBC 6.82   HGB 7.7*   HCT 24.6*   *       Significant Imaging: I have reviewed all pertinent imaging results/findings within the past 24 hours.    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    Jamie Vines MD  Neuroendocrine Surgery

## 2019-01-22 NOTE — PROGRESS NOTES
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 Patient was hospitalized for aspiration pneumonia and also found to have intra-abdominal abscess. EGD revealed congestion in gastric and antrum that was felt to be related to abscess. Patient continues to be NPO and on antibiotics.    He denies fevers/chills overnight. Abdominal pain improved following CT-guided drain placement. He is awaiting speech therapy evaluation today.    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      Review of Systems  General ROS: negative for - chills, fever or weight loss  Cardiovascular ROS: no chest pain or dyspnea on exertion  Gastrointestinal ROS: improving abdominal pain, no change in bowel habits, or black/ bloody stools    Physical Examination  /89   Pulse 94   Temp 98 °F (36.7 °C) (Oral)   Resp 20   Ht 5' 10" (1.778 m)   Wt 107 kg (235 lb 14.3 oz)   SpO2 (!) 92%   BMI 33.85 kg/m²   General appearance: alert, cooperative, no distress  HENT: Normocephalic, atraumatic, neck symmetrical, no nasal discharge   Lungs: clear to auscultation in all fields, symmetric chest wall expansion bilaterally, no wheeze, rale, or rhonchi  Heart: normal rate, regular rhythm without rub; palpable peripheral pulsesI   Abdomen: soft, epigastric tenderness; bowel sounds normoactive; no organomegaly  Extremities: extremities symmetric; no clubbing, cyanosis, or edema  Neurologic: Alert and oriented X 3, normal strength, normal coordination    Labs:  Lab Results   Component Value Date    WBC 6.82 01/22/2019    HGB 7.7 (L) 01/22/2019    HCT 24.6 (L) 01/22/2019    MCV 97 01/22/2019     (H) 01/22/2019     Lab Results   Component Value Date    ALT 19 " 01/22/2019    AST 33 01/22/2019    ALKPHOS 98 01/22/2019    BILITOT 0.5 01/22/2019         Imaging:  CT abdomen 1/18 -- 1. Persistent postoperative fluid collection with few scattered air bubbles seen within the right upper quadrant status post recent partial duodenectomy.  Possible developing rim enhancement seen particularly involving the inferior aspect of this collection which may reflect developing abscess.  2. **Retained foreign body catheter is coiled within the right lower quadrant.**  3. Abnormal wall thickening and enhancement seen involving the duodenum noting recent postsurgical changes.    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 Patient with intra-abdominal infection related to post-surgical changes. Continue antibiotics and drainage for abscess. Dysphagia appears related to oropharyngeal pathology and speech evaluating. No esophageal etiology of dysphagia, but gastric congestion may be contributing to nausea.    Plan:  - slowly advance diet once cleared by speech  - antibiotics and drain for intra-abdominal abscess    Thank you for this interesting consult. We will continue to follow.    Juaquin Ocasio MD  [facility], PGY V  [phone]

## 2019-01-22 NOTE — PLAN OF CARE
Problem: Respiratory Compromise (Pneumonia)  Goal: Effective Oxygenation and Ventilation  Outcome: Ongoing (interventions implemented as appropriate)  Pt enc'ed to sit up high in bed, use incentive spirometer every hour, deep breathe, cough often. Incentive spirometer is at bs and in pts reach on bedside table.

## 2019-01-22 NOTE — PT/OT/SLP PROGRESS
Physical Therapy Treatment    Patient Name:  Hoang Santos Jr.   MRN:  [id number]    Recommendations:     Discharge Recommendations:  ( PT/OT)   Discharge Equipment Recommendations: none   Barriers to discharge: None    Assessment:     Hoang Santos Jr. is a 68 y.o. male admitted with a medical diagnosis of Aspiration pneumonia of right lower lobe.  He presents with the following impairments/functional limitations:  weakness, impaired endurance, impaired self care skills, impaired functional mobilty, gait instability, impaired balance, decreased upper extremity function, decreased lower extremity function, decreased ROM.  Pt sat EOB x ~20 mins with S and performed BLE therex with rest breaks as needed.  Pt would continue to benefit from P.T. To address impairments listed above.    Rehab Prognosis: Good; patient would benefit from acute skilled PT services to address these deficits and reach maximum level of function.    Recent Surgery: Procedure(s) (LRB):  ESOPHAGOGASTRODUODENOSCOPY (EGD) (N/A) 1 Day Post-Op    Plan:     During this hospitalization, patient to be seen 5 x/week to address the identified rehab impairments via gait training, therapeutic activities, therapeutic exercises and progress toward the following goals:    · Plan of Care Expires:  02/02/19    Subjective     Patient/Family Comments/goals: Pt agreed to tx.  Pain/Comfort:  · Pain Rating 1: 0/10  · Pain Rating Post-Intervention 1: 0/10      Objective:     Communicated with RN(Lin) prior to session.  Patient found all lines intact, call button in reach, bed alarm on and RN notified PICC line(full ICU monitoring/ abdominal drain)  upon PT entry to room.     General Precautions: Standard, aspiration, NPO, fall   Orthopedic Precautions:N/A   Braces:       Functional Mobility:  · Bed Mobility:     · Rolling Right: contact guard assistance, minimum assistance and with B/R for assist  · Scooting: stand by assistance and to EOB and back into  bed.  · Supine to Sit: contact guard assistance  · Sit to Supine: contact guard assistance  · Transfers:     · Sit to Stand:  contact guard assistance with no AD and x 4 reps  · Gait: 3 steps x 2 forward and back with Swapna/close CGA(HHA) with a seated rest between bouts.  2 small side steps up to HOB with Swapna/close CGA(HHA).  · Balance: sitting good-, standing fair, gait fair-      AM-PAC 6 CLICK MOBILITY  Turning over in bed (including adjusting bedclothes, sheets and blankets)?: 3  Sitting down on and standing up from a chair with arms (e.g., wheelchair, bedside commode, etc.): 3  Moving from lying on back to sitting on the side of the bed?: 3  Moving to and from a bed to a chair (including a wheelchair)?: 3  Need to walk in hospital room?: 3  Climbing 3-5 steps with a railing?: 2  Basic Mobility Total Score: 17       Therapeutic Activities and Exercises:   Seated BLE therex: AP(20x), LAQ(10 x 2 reps), hip flexion x 10, hip ABD/ADD with pillow and glut sets 10 x 2.  Rest breaks given between bouts secondary to mild SOB and decreased strength and endurance.  Pt held a pillow over his abdominal area for support and decreased pain while performing LE therex.  Standing BLE therex: Marching in place 10 x 2, seated rest, heel raises x 10 reps, seated rest, mini squats 10 x 2 with a seated rest between bouts.,     Patient left supine with all lines intact, call button in reach, bed alarm on and RN notified..    GOALS:   Multidisciplinary Problems     Physical Therapy Goals        Problem: Physical Therapy Goal    Goal Priority Disciplines Outcome Goal Variances Interventions   Physical Therapy Goal     PT, PT/OT Ongoing (interventions implemented as appropriate)     Description:  Goals to be met by: 2019     Patient will increase functional independence with mobility by performin. Supine to sit with Modified [place]  2. Sit to stand transfer with Modified [place]  3. Gait  x 150 feet with Modified  [place] using Rolling Walker.   4. Lower extremity exercise program x10 reps per handout, with independence                      Time Tracking:     PT Received On: 01/22/19  PT Start Time: 1610     PT Stop Time: 1639  PT Total Time (min): 29 min     Billable Minutes: Therapeutic Activity 10 and Therapeutic Exercise 19    Treatment Type: Treatment  PT/PTA: PTA     PTA Visit Number: 1     Anais Peralta, LOTTIE  01/22/2019

## 2019-01-22 NOTE — PROGRESS NOTES
[facility]  Otorhinolaryngology-Head & Neck Surgery  Progress Note    Subjective:     Post-Op Info:  Procedure(s) (LRB):  ESOPHAGOGASTRODUODENOSCOPY (EGD) (N/A)   1 Day Post-Op  Hospital Day: 5     Interval History: Patient known to me for prior consult for dysphagia. Patient remains with difficulty swallowing, wet voice. He is currently NPO. MBSS showed tawny aspiration on 1/11/2019. His course has been complicated by aspiration pneumonia, sepsis, and intrabdominal abscess.     Medications:  Continuous Infusions:   albumin human 25% 12.5 g (01/22/19 1648)    Amino acid 5% - dextrose 15% (CLINIMIX-E) solution with additives (1L  provides 510 kcal/L dextrose, with 50 gm AA, 150 gm CHO, Na 35, K 30, Mg 5, Ca 4.5, Acetate 80, Cl 39, Phos 15) 65 mL/hr at 01/21/19 2012    Amino acid 5% - dextrose 15% (CLINIMIX-E) solution with additives (1L  provides 510 kcal/L dextrose, with 50 gm AA, 150 gm CHO, Na 35, K 30, Mg 5, Ca 4.5, Acetate 80, Cl 39, Phos 15)       Scheduled Meds:   atorvastatin  40 mg Oral Daily    bumetanide  1 mg Intravenous Daily    cyanocobalamin  1,000 mcg Subcutaneous Daily    enoxaparin  40 mg Subcutaneous Daily    fluconazole (DIFLUCAN) IVPB  400 mg Intravenous Q24H    iron sucrose (VENOFER) IVPB  100 mg Intravenous Daily    linezolid 600mg/300ml  600 mg Intravenous Q12H    meropenem (MERREM) IVPB  2 g Intravenous Q8H    pantoprazole  40 mg Intravenous BID     PRN Meds:albuterol sulfate, dextrose 50%, dextrose 50%, glucose, glucose, insulin aspart U-100, ondansetron, promethazine (PHENERGAN) IVPB, sodium chloride 0.9%, sodium chloride 3%     Review of patient's allergies indicates:   Allergen Reactions    Epinephrine      Neuroendocrine Tumor patient       Objective:     Vital Signs (24h Range):  Temp:  [98 °F (36.7 °C)-98.6 °F (37 °C)] 98.6 °F (37 °C)  Pulse:  [] 102  Resp:  [15-32] 23  SpO2:  [85 %-100 %] 94 %  BP: ()/(55-89) 109/61     Date 01/22/19 0700 -  01/23/19 0659   Shift 7643-2500 5694-4094 2672-4723 24 Hour Total   INTAKE   I.V.(mL/kg) 89.3(0.8) 21.2(0.2)  110.5(1)   IV Piggyback 200 300  500   .7 137.6  718.3   Shift Total(mL/kg) 870(8.1) 458.8(4.3)  1328.8(12.4)   OUTPUT   Urine(mL/kg/hr) 1495(1.7) 125  1620   Shift Total(mL/kg) 1495(14) 125(1.2)  1620(15.1)   Weight (kg) 107 107 107 107     Lines/Drains/Airways     Peripherally Inserted Central Catheter Line                 PICC Double Lumen 01/14/19 1644 right basilic 8 days          Drain                 Closed/Suction Drain 01/19/19 1955 Right RUQ Accordion 12 Fr. 2 days          Peripheral Intravenous Line                 Peripheral IV - Single Lumen 01/18/19 1318 Left Hand 4 days                Physical Exam     Constitutional: Well appearing / communicating.  NAD.  Eyes: EOM I Bilaterally  Head/Face: Normocephalic.  Negative paranasal sinus pressure/tenderness.  Salivary glands WNL.  House Brackmann I Bilaterally.    Right Ear: External Auditory Canal WNL,TM w/o masses/lesions/perforations.  Auricle WNL.  Left Ear: External Auditory Canal WNL,TM w/o masses/lesions/perforations. Auricle WNL.  Nose: No gross nasal septal deviation. Inferior Turbinates 3+ bilaterally. No septal perforation. No masses/lesions. External nasal skin without masses/lesions.  Oral Cavity: Gingiva/lips WNL.  FOM Soft, no masses palpated. Oral Tongue mobile. Hard Palate WNL.   Oropharynx: BOT WNL. No masses/lesions noted. Tonsillar fossa/pharyngeal wall without lesions. Posterior oropharynx WNL.  Soft palate without masses. Midline uvula.   Neck/Lymphatic: No LAD I-VI bilaterally.  No thyromegaly.  No masses noted on exam.    Mirror laryngoscopy/nasopharyngoscopy: Active gag reflex.  Unable to perform.    Neuro/Psychiatric: AOx3.  Normal mood and affect.   Cardiovascular: Normal carotid pulses bilaterally, no increasing jugular venous distention noted at cervical region bilaterally.    Respiratory: Normal respiratory  effort, no stridor, no retractions noted.    See separate procedure note for FFL.     Significant Labs:  CBC:   Recent Labs   Lab 01/22/19  0543   WBC 6.82   RBC 2.53*   HGB 7.7*   HCT 24.6*   *   MCV 97   MCH 30.4   MCHC 31.3*     CMP:   Recent Labs   Lab 01/22/19  0543   *   CALCIUM 8.8   ALBUMIN 2.8*   PROT 7.1      K 4.2   CO2 31*      BUN 15   CREATININE 0.8   ALKPHOS 98   ALT 19   AST 33   BILITOT 0.5     Coagulation:   Recent Labs   Lab 01/22/19  0543   LABPROT 11.7   INR 1.1   APTT 32.6*       Significant Diagnostics:  1/11/2019: MBSS:   There was penetration and aspiration through varying consistencies including thin liquids.  Abnormal oral transit was noted throughout the entire examination.  Partially visualized drainage catheter projects over the prevertebral soft tissues.  Prominent anterior bridging osteophytes identified at C2-C3, C3-C4 and C4-C5.    Assessment/Plan:     Dysphagia    Continue NPO status; continue to work with speech therapy. Recommend repeat MBSS when deemed safe to attempt via speech therapy based on clinical improvement.  This will likely  be a prolonged speech rehabilitative effort.  If patient does not start to make significant improvement PEG tube  may need to be considered.          Parvin Bravo MD  Otorhinolaryngology-Head & Neck Surgery  [facility]

## 2019-01-22 NOTE — PLAN OF CARE
Problem: Occupational Therapy Goal  Goal: Occupational Therapy Goal  Goals to be met by: 02/19/2019    Patient will increase functional independence with ADLs by performing:    UE Dressing with Modified [place].  LE Dressing with Modified [place].  Grooming while standing with Modified [place].  Toileting from toilet with Modified [place] for hygiene and clothing management.   Toilet transfer to toilet with Modified [place].  Increased functional strength to WFL for self care.  Upper extremity exercise program x10 reps per handout, with independence.     Outcome: Ongoing (interventions implemented as appropriate)  Pt progressing towards goals.  Cont POC.

## 2019-01-22 NOTE — ASSESSMENT & PLAN NOTE
Continue NPO status; continue to work with speech therapy. Recommend repeat MBSS when deemed safe to attempt via speech therapy based on clinical improvement.  This will likely  be a prolonged speech rehabilitative effort.  If patient does not start to make significant improvement PEG tube  may need to be considered.

## 2019-01-22 NOTE — PT/OT/SLP PROGRESS
"Speech Language Pathology Treatment    Patient Name:  Hoang Santos Jr.   MRN:  [id number]  Admitting Diagnosis: Aspiration pneumonia of right lower lobe    Recommendations:                 General Recommendations:  Dysphagia therapy pt CANNOT SWALLOW LIQUID CONTRAST DUE TO ASPIRATION RISK.   Diet recommendations:  NPO, Liquid Diet Level: NPO   Aspiration Precautions: FREQUENT ORAL CARE PLEASE   General Precautions: Standard, fall, aspiration, NPO  Communication strategies:  none    Subjective     Pt seen in ICU with wife present.   Patient goals: "I just want to go home."     Pain/Comfort:  · Pain Rating 1: 0/10    Objective:     Has the patient been evaluated by SLP for swallowing?   Yes  Keep patient NPO? Yes   Current Respiratory Status: nasal cannula      Pt seen in room for ongoing ST services. Reviewed NPO precautions and discussed rational for exercises. He verbalized understanding.   Surgeon asked SLP to re-eval swallow for oral consumption of liquid contrast.   Pt presented with ice chips  x3 trials and tsp swallows of thin x6. Pt with consistent delay in pharyngeal swallow, reduced laryngeal lift and overt wet voice and pt with regurgitation of saliva back into oral cavity. Pt was encouraged to swallow hard x3. Pt continues to exhibit clinical s/s of aspiration and airway threat on PO trials. Pt is NOT deemed safe to SWALLOW or orally ingest Liquid Contrast.    Pt stated he is not using Yanker as much like in the past when he has pooled saliva.   Pt participated in dysphagia ex to target oral and pharyngeal musculature with min-mod cues.   Pt completed gargle, lingual resistance/lift, base of tongue using /g/k/ sounds x20 and laryngeal lift x20 with fair participation. Pt's wife reports, his voice is "grumbling sounding and wet at times." Pt is becoming more aware of the "wet voice."  Discussed intensive ST at DC via OP therapy pending overall discharge plans.   Encourage frequent oral care even tooth " brushing but to swish and spit. Explained to pt NOT to swallow liquid when rinsing his mouth. Both parties verbalized understanding.      Assessment:     Hoang Santos Jr. is a 68 y.o. male with an SLP diagnosis of continued s/s of pharyngeal dysphagia per bedside observations.     Goals:   Multidisciplinary Problems     SLP Goals        Problem: SLP Goal    Goal Priority Disciplines Outcome   SLP Goal     SLP Ongoing (interventions implemented as appropriate)   Description:  Short Term Goals:  1. Pt will participate in BSS to determine least restrictive diet.- MET 1/19  2. Pt will participate in indirect dysphagia exercises to strengthen musculature for swallowing mechanism with mod-max effort.                        Plan:     · Patient to be seen:  3 x/week   · Plan of Care expires:  02/18/19  · Plan of Care reviewed with:  patient, spouse   · SLP Follow-Up:  Yes       Discharge recommendations:  (OP SLP at [place])       Time Tracking:     SLP Treatment Date:   01/22/19  Speech Start Time:  1340  Speech Stop Time:  1400     Speech Total Time (min):  20 min    Billable Minutes: Treatment Swallowing Dysfunction 20    AJITH Ley, CCC-SLP  01/22/2019

## 2019-01-22 NOTE — TELEPHONE ENCOUNTER
Spoke to Lin as per Dr Kim she was notified to have ENT cart by the bed side. Lin verbalized understanding

## 2019-01-22 NOTE — TELEPHONE ENCOUNTER
----- Message from Lester Ruiz sent at 1/22/2019  1:10 PM CST -----  Contact: Lin/[phone]  CONSULTS  Patient: Hoang Santos  Facility: [facility]  Room/Bed number: 260  Referring MD: Dr. Vines  Diagnosis: pharyngeal dyshagia  Person calling and phone #: [phone]

## 2019-01-23 ENCOUNTER — ANESTHESIA EVENT (OUTPATIENT)
Dept: SURGERY | Facility: HOSPITAL | Age: 69
End: 2019-01-23

## 2019-01-23 LAB
ALBUMIN SERPL BCP-MCNC: 3.4 G/DL
ALP SERPL-CCNC: 89 U/L
ALT SERPL W/O P-5'-P-CCNC: 19 U/L
ANION GAP SERPL CALC-SCNC: 4 MMOL/L
APTT BLDCRRT: 34.3 SEC
AST SERPL-CCNC: 34 U/L
BACTERIA BLD CULT: NORMAL
BACTERIA SPEC ANAEROBE CULT: NORMAL
BASOPHILS # BLD AUTO: 0.02 K/UL
BASOPHILS NFR BLD: 0.4 %
BILIRUB SERPL-MCNC: 0.6 MG/DL
BUN SERPL-MCNC: 15 MG/DL
CALCIUM SERPL-MCNC: 8.8 MG/DL
CHLORIDE SERPL-SCNC: 102 MMOL/L
CO2 SERPL-SCNC: 36 MMOL/L
CREAT SERPL-MCNC: 0.8 MG/DL
DIFFERENTIAL METHOD: ABNORMAL
EOSINOPHIL # BLD AUTO: 0.2 K/UL
EOSINOPHIL NFR BLD: 2.7 %
ERYTHROCYTE [DISTWIDTH] IN BLOOD BY AUTOMATED COUNT: 15.8 %
EST. GFR  (AFRICAN AMERICAN): >60 ML/MIN/1.73 M^2
EST. GFR  (NON AFRICAN AMERICAN): >60 ML/MIN/1.73 M^2
GLUCOSE SERPL-MCNC: 138 MG/DL
HCT VFR BLD AUTO: 25.1 %
HGB BLD-MCNC: 7.2 G/DL
INR PPP: 1.2
LYMPHOCYTES # BLD AUTO: 0.6 K/UL
LYMPHOCYTES NFR BLD: 10.1 %
MAGNESIUM SERPL-MCNC: 1.8 MG/DL
MCH RBC QN AUTO: 28 PG
MCHC RBC AUTO-ENTMCNC: 28.7 G/DL
MCV RBC AUTO: 98 FL
MONOCYTES # BLD AUTO: 0.9 K/UL
MONOCYTES NFR BLD: 16.2 %
NEUTROPHILS # BLD AUTO: 3.9 K/UL
NEUTROPHILS NFR BLD: 69.3 %
PHOSPHATE SERPL-MCNC: 2.6 MG/DL
PLATELET # BLD AUTO: 388 K/UL
PMV BLD AUTO: 8.9 FL
POCT GLUCOSE: 116 MG/DL (ref 70–110)
POCT GLUCOSE: 123 MG/DL (ref 70–110)
POCT GLUCOSE: 125 MG/DL (ref 70–110)
POCT GLUCOSE: 148 MG/DL (ref 70–110)
POCT GLUCOSE: 96 MG/DL (ref 70–110)
POTASSIUM SERPL-SCNC: 3.9 MMOL/L
PROT SERPL-MCNC: 7.1 G/DL
PROTHROMBIN TIME: 12 SEC
RBC # BLD AUTO: 2.57 M/UL
SODIUM SERPL-SCNC: 142 MMOL/L
WBC # BLD AUTO: 5.56 K/UL

## 2019-01-23 PROCEDURE — 80053 COMPREHEN METABOLIC PANEL: CPT

## 2019-01-23 PROCEDURE — 11000001 HC ACUTE MED/SURG PRIVATE ROOM

## 2019-01-23 PROCEDURE — 85730 THROMBOPLASTIN TIME PARTIAL: CPT

## 2019-01-23 PROCEDURE — 94761 N-INVAS EAR/PLS OXIMETRY MLT: CPT

## 2019-01-23 PROCEDURE — 27000221 HC OXYGEN, UP TO 24 HOURS

## 2019-01-23 PROCEDURE — 63600175 PHARM REV CODE 636 W HCPCS: Performed by: STUDENT IN AN ORGANIZED HEALTH CARE EDUCATION/TRAINING PROGRAM

## 2019-01-23 PROCEDURE — 84100 ASSAY OF PHOSPHORUS: CPT

## 2019-01-23 PROCEDURE — 94664 DEMO&/EVAL PT USE INHALER: CPT

## 2019-01-23 PROCEDURE — 94799 UNLISTED PULMONARY SVC/PX: CPT

## 2019-01-23 PROCEDURE — 99900035 HC TECH TIME PER 15 MIN (STAT)

## 2019-01-23 PROCEDURE — 94660 CPAP INITIATION&MGMT: CPT

## 2019-01-23 PROCEDURE — 97116 GAIT TRAINING THERAPY: CPT

## 2019-01-23 PROCEDURE — 85025 COMPLETE CBC W/AUTO DIFF WBC: CPT

## 2019-01-23 PROCEDURE — 25000003 PHARM REV CODE 250: Performed by: SURGERY

## 2019-01-23 PROCEDURE — 63600175 PHARM REV CODE 636 W HCPCS: Performed by: SURGERY

## 2019-01-23 PROCEDURE — P9047 ALBUMIN (HUMAN), 25%, 50ML: HCPCS | Mod: JG | Performed by: SURGERY

## 2019-01-23 PROCEDURE — 25000003 PHARM REV CODE 250: Performed by: STUDENT IN AN ORGANIZED HEALTH CARE EDUCATION/TRAINING PROGRAM

## 2019-01-23 PROCEDURE — 27000646 HC AEROBIKA DEVICE

## 2019-01-23 PROCEDURE — C9113 INJ PANTOPRAZOLE SODIUM, VIA: HCPCS | Performed by: STUDENT IN AN ORGANIZED HEALTH CARE EDUCATION/TRAINING PROGRAM

## 2019-01-23 PROCEDURE — S0171 BUMETANIDE 0.5 MG: HCPCS | Performed by: SURGERY

## 2019-01-23 PROCEDURE — 25500020 PHARM REV CODE 255: Performed by: STUDENT IN AN ORGANIZED HEALTH CARE EDUCATION/TRAINING PROGRAM

## 2019-01-23 PROCEDURE — 85610 PROTHROMBIN TIME: CPT

## 2019-01-23 PROCEDURE — 83735 ASSAY OF MAGNESIUM: CPT

## 2019-01-23 RX ADMIN — ALBUMIN HUMAN 12.5 G: 0.25 SOLUTION INTRAVENOUS at 06:01

## 2019-01-23 RX ADMIN — MEROPENEM 2 G: 1 INJECTION, POWDER, FOR SOLUTION INTRAVENOUS at 06:01

## 2019-01-23 RX ADMIN — ASCORBIC ACID, VITAMIN A PALMITATE, CHOLECALCIFEROL, THIAMINE HYDROCHLORIDE, RIBOFLAVIN-5 PHOSPHATE SODIUM, PYRIDOXINE HYDROCHLORIDE, NIACINAMIDE, DEXPANTHENOL, ALPHA-TOCOPHEROL ACETATE, VITAMIN K1, FOLIC ACID, BIOTIN, CYANOCOBALAMIN: 200; 3300; 200; 6; 3.6; 6; 40; 15; 10; 150; 600; 60; 5 INJECTION, SOLUTION INTRAVENOUS at 08:01

## 2019-01-23 RX ADMIN — BUMETANIDE 1 MG: 0.25 INJECTION INTRAMUSCULAR; INTRAVENOUS at 08:01

## 2019-01-23 RX ADMIN — MEROPENEM 2 G: 1 INJECTION, POWDER, FOR SOLUTION INTRAVENOUS at 09:01

## 2019-01-23 RX ADMIN — LINEZOLID 600 MG: 600 INJECTION, SOLUTION INTRAVENOUS at 05:01

## 2019-01-23 RX ADMIN — ENOXAPARIN SODIUM 40 MG: 100 INJECTION SUBCUTANEOUS at 04:01

## 2019-01-23 RX ADMIN — CYANOCOBALAMIN 1000 MCG: 1000 INJECTION, SOLUTION INTRAMUSCULAR at 08:01

## 2019-01-23 RX ADMIN — IRON SUCROSE 100 MG: 20 INJECTION, SOLUTION INTRAVENOUS at 08:01

## 2019-01-23 RX ADMIN — ALBUMIN HUMAN 12.5 G: 0.25 SOLUTION INTRAVENOUS at 03:01

## 2019-01-23 RX ADMIN — FLUCONAZOLE, SODIUM CHLORIDE 400 MG: 2 INJECTION INTRAVENOUS at 04:01

## 2019-01-23 RX ADMIN — DIATRIZOATE MEGLUMINE AND DIATRIZOATE SODIUM 200 ML: 660; 100 LIQUID ORAL; RECTAL at 10:01

## 2019-01-23 RX ADMIN — ALBUMIN HUMAN 12.5 G: 0.25 SOLUTION INTRAVENOUS at 08:01

## 2019-01-23 RX ADMIN — MEROPENEM 2 G: 1 INJECTION, POWDER, FOR SOLUTION INTRAVENOUS at 01:01

## 2019-01-23 RX ADMIN — LINEZOLID 600 MG: 600 INJECTION, SOLUTION INTRAVENOUS at 06:01

## 2019-01-23 RX ADMIN — PANTOPRAZOLE SODIUM 40 MG: 40 INJECTION, POWDER, LYOPHILIZED, FOR SOLUTION INTRAVENOUS at 08:01

## 2019-01-23 NOTE — PLAN OF CARE
Problem: Adult Inpatient Plan of Care  Goal: Plan of Care Review  Outcome: Ongoing (interventions implemented as appropriate)  Pt is AAOX4. No c/o  pain or nausea. All medication tolerated well. Picc line is CDI. Cardiac monitoring is in place. No signs of distress. Bed is in lowest position with call light in reach. Will continue to monitor.

## 2019-01-23 NOTE — PHYSICIAN QUERY
PT Name: Hoang Santos Jr.  MR #: [id number]    Physician Query Form - Respiratory Condition Clarification      CDS/: Marlene Smith RN                 Contact information:[email or web address]    This form is a permanent document in the medical record.    Query Date: January 23, 2019    By submitting this query, we are merely seeking further clarification of documentation. Please utilize your independent clinical judgment when addressing the question(s) below.    The Medical record contains the following   Indicators   Supporting Clinical Findings Location in Medical Record      SOB, BUNCH, Wheezing, Productive Cough, Use of Accessory Muscles, etc. Positive for cough, sputum production and wheezing    GEMMA-OHS decompensation overnight 2/2 procedural conscious sedation. Needs repeat PSG as outpt and lots of attention to proper & comfortable mask fit. 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 Blood gas this AM revealed an acute on chronic hypercapnia with respiratory acidosis. Pulm PN 1/20      RR         ABGs         O2 sat  pH 7.239   pCO2 69.9   pO2 80   BE 2   HCO3 29.9 ABG 1/20 0246   x   BiPAP/Intubation He was placed on BiPAP with minimal improvement. BiPAP settings were optimized and he was given one dose of narcan    He made complete recovery by ~ 9am and was back to sats of 94% on RA. He is working with RT to help clear secretions.     Pulm PN 1/20      Supplemental O2        Home O2, Oxygen Dependence     x   Treatment Needs to be on CPAP  every night at least 10 cm Pulm PN 1/20      Other       Respiratory failure can be acute, chronic or both. It is generally further specified as hypoxic, hypercapnic or both. Lastly, it is important to identify an etiology, if known or suspected.   References:: https://www.acphospitalist.org/archives/2013/10/coding; htm; http://[employer]/acute-respiratory-failure-know    The clinical guidelines noted below are only system guidelines, and do not replace the providers clinical judgment.    Provider, please specify diagnosis or diagnoses associated with above clinical findings.   [   ] Acute and (on) Chronic Respiratory Failure with Hypercapnia - pCO2 >10 mmHg over baseline and pH < 7.35   [   ] Chronic Respiratory Failure with Hypoxia -Continuous home oxygen use   [   ] Chronic Respiratory Failure with Hypercapnia - Normal pH with high pCO2 (chronic hypercapnia) (common example: COPD)   [   ] Chronic Respiratory Failure with Hypoxia and Hypercapnia - Continuous home oxygen and Normal pH with high pCO2 (chronic hypercapnia) (common example: COPD)   [   ] Other Chronic Respiratory Failure   [  x ] Acute Respiratory Insufficiency - Generally describes less severe respiratory symptoms and measurements (pO2, SpO2, pH, and pCO2) NOT meeting criteria for respiratory failure     [   ] Acute Respiratory Distress - Generally describes less severe respiratory symptoms (tachypnea, in respiratory distress, increased work of breathing, unable to speak in complete sentences, labored breathing, use of accessory muscles, RR> 24, cyanosis, dyspnea, wheezing, stridor, lethargy) without sufficient measurements (pO2, SpO2, pH, and pCO2) to meet criteria for respiratory failure    [   ] Hypoxia Only   [   ] Other Respiratory Diagnosis (please specify): _________________________________   [   ]  Clinically Undetermined       Please document in your progress notes daily for the duration of treatment until resolved and  include in your discharge summary.

## 2019-01-23 NOTE — PT/OT/SLP PROGRESS
Speech Language Pathology      Hoang Santos Jr.  MRN: [id number]      1300:  Attempted indirect dysphagia with patient. He reported having to spit up material (contrast). Pt reports he has the taste in his throat and cannot get rid of it. He was seated in recliner and attempted one set of exercises but reported he felt abdominal  pressure for a BM. He reported he tried to call RN but was having trouble with remote. SLP called RN and made her aware of his abd discomfort and need for BM.   Will return as able.        AJITH Ley, CCC-SLP  1/23/2019

## 2019-01-23 NOTE — PROGRESS NOTES
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 Patient was hospitalized for aspiration pneumonia and also found to have intra-abdominal abscess. EGD revealed congestion in gastric and antrum that was felt to be related to abscess. Patient continues to be NPO and on antibiotics.    Overnight he had no acute events. SLP working with patient and reporting stil unable to swallow without aspiration risk.  Discussed with patient that depending on if this recovers he may need a feeding tube.     Review of Systems  HEENT: persistent dysphagia and choking when attempting to swallow   Cardiovascular ROS: no chest pain or dyspnea on exertion  Gastrointestinal ROS: no abdominal pain, change in bowel habits, or black/ bloody stools.     Physical Examination  /62   Pulse 99   Temp 98.1 °F (36.7 °C)   Resp 20   Ht 5' 10" (1.778 m)   Wt 106.5 kg (234 lb 12.6 oz)   SpO2 99%   BMI 33.69 kg/m²      General appearance: alert, cooperative, no distress  HENT: Normocephalic, atraumatic, neck symmetrical, no nasal discharge   Lungs: clear to auscultation bilaterally, no dullness to percussion bilaterally  Heart: regular rate and rhythm without rub; no displacement of the PMI   Abdomen: non-tender; bowel sounds normoactive; no organomegaly. Abdomen slightly firm, drains in place with dark purulent material   Extremities: extremities symmetric; no clubbing, cyanosis, or edema. SCDs in place.   Neurologic: Alert and oriented X 3, normal strength, moves all extremities     Labs:  WBC 6  H/H 7.2/25  Plt 388    Imaging:  KUB:   NG tube terminates below the diaphragm, tip over the stomach.  Right upper quadrant pigtail catheter and surgical clips are noted.  Partially visualized bowel gas pattern is unremarkable.  There are small bilateral pleural effusions with " bibasilar atelectasis.  Degenerative changes of the lumbar spine noted.    Imaging reviewed personally. Medical records reviewed.     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 Patient with intra-abdominal infection related to post-surgical changes. Continue antibiotics and drainage for abscess. Dysphagia appears related to oropharyngeal pathology and speech evaluating. No esophageal etiology of dysphagia, but gastric congestion may be contributing to nausea.    Plan:  - still unable to swallow safely per SLP. Continue to work with SLP and monitor for improvement. If no improvement may need feeding tube eventually. For now continue treatment per slp. Most consistent with OP dysphagia.   - continue abx, drains still putting out purulent material   - will consider repeat EGD as indicated     Thank you for this consult. Please call with any questions.     Jessie Shelley, DO  [facility]  01/23/2019  11:01 AM

## 2019-01-23 NOTE — PLAN OF CARE
Problem: Adult Inpatient Plan of Care  Goal: Plan of Care Review  Outcome: Ongoing (interventions implemented as appropriate)  .Will continue to monitor.  . Tolerance of therapy well. 02 Sat's WNL

## 2019-01-23 NOTE — PT/OT/SLP PROGRESS
Physical Therapy Treatment    Patient Name:  Hoang Santos Jr.   MRN:  [id number]    Recommendations:     Discharge Recommendations:  (Home health PT/OT)   Discharge Equipment Recommendations: none   Barriers to discharge: None    Assessment:     Hoang Santos Jr. is a 68 y.o. male admitted with a medical diagnosis of Aspiration pneumonia of right lower lobe.  He presents with the following impairments/functional limitations:  weakness, impaired endurance, impaired self care skills, impaired balance, gait instability, impaired functional mobilty, decreased coordination, decreased upper extremity function, decreased lower extremity function, decreased ROM, decreased safety awareness, impaired skin. Pt able to ambulate ~30-40 ft and ~5-6 turning steps both with RW and min/close CGA. BP in supine 145/77 , sitting at /74 , in standing 130/70 . Pt coughing up large amounts of phlegm throughout session. Would benefit from continued PT services to increase pt's tolerance to increased out of bed therapeutic activity and exercise.    Rehab Prognosis: Good; patient would benefit from acute skilled PT services to address these deficits and reach maximum level of function.    Recent Surgery: Procedure(s) (LRB):  ESOPHAGOGASTRODUODENOSCOPY (EGD) (N/A) 2 Days Post-Op    Plan:     During this hospitalization, patient to be seen 5 x/week to address the identified rehab impairments via gait training, therapeutic activities, therapeutic exercises and progress toward the following goals:    · Plan of Care Expires:  02/02/19    Subjective     Chief Complaint: None voiced  Patient/Family Comments/goals: None voiced  Pain/Comfort:  · Pain Rating 1: (Did complain of pain)      Objective:     Communicated with  nurse prior to session.  Patient found all lines intact, call button in reach, bed alarm on,  nurse notified and  3 student nurses present PICC line(Abdominal drain)  upon PT entry to room.     General  Precautions: Standard, aspiration, fall, NPO   Orthopedic Precautions:N/A   Braces: N/A     Functional Mobility:  · Bed Mobility:     · Rolling Right: contact guard assistance and minimum assistance  · Scooting: stand by assistance and  EOB  · Supine to Sit: contact guard assistance  · Sit to Supine: contact guard assistance  · Transfers:     · Sit to Stand:  contact guard assistance with rolling walker  · Gait:  2 trials ~30-40 ft and ~5-6 ft with RW and min/close CGA      AM-PAC 6 CLICK MOBILITY  Turning over in bed (including adjusting bedclothes, sheets and blankets)?: 3  Sitting down on and standing up from a chair with arms (e.g., wheelchair, bedside commode, etc.): 3  Moving from lying on back to sitting on the side of the bed?: 3  Moving to and from a bed to a chair (including a wheelchair)?: 3  Need to walk in hospital room?: 3  Climbing 3-5 steps with a railing?: 2  Basic Mobility Total Score: 17       Therapeutic Activities and Exercises:   Pt's BP taken in supine 145/77 , in sitting 139/74 , and in standing 130/70 . Performed ambulation training by 2 trials with RW and min/close CGA. Slow fan. Doctor requested pt return to room for an x-ray and requested PTA wait and get pt back out of bed. After ~8 minutes PTA returned to room. Pt agreed to ambulate to bedside chair ~5-6 turning steps.    Patient left up in chair with all lines intact, call button in reach and  nurse notified..    GOALS:   Multidisciplinary Problems     Physical Therapy Goals        Problem: Physical Therapy Goal    Goal Priority Disciplines Outcome Goal Variances Interventions   Physical Therapy Goal     PT, PT/OT Ongoing (interventions implemented as appropriate)     Description:  Goals to be met by: 2019     Patient will increase functional independence with mobility by performin. Supine to sit with Modified [place]  2. Sit to stand transfer with Modified [place]  3. Gait  x 150 feet with  Modified [place] using Rolling Walker.   4. Lower extremity exercise program x10 reps per handout, with independence                      Time Tracking:     PT Received On: 01/23/19  PT Start Time: 1117     PT Stop Time: 1157 Time interrupted by ~8 minutes as doctor requested pt return to room for an x-ray  PT Total Time (min): 40 min - 8 minutes= 32 TOTAL BILLABLE PT Time    Billable Minutes: Gait Training  32    Treatment Type: Treatment  PT/PTA: PTA     PTA Visit Number: 2     Naa Lewis, PTA  01/23/2019

## 2019-01-23 NOTE — PHYSICIAN QUERY
PT Name: Hoang Santos Jr.  MR #: [id number]    Physician Query Form - Nutrition Clarification     CDS/: Marlene Smith RN                 Contact information: [email or web address]    This form is a permanent document in the medical record.     Query Date: January 23, 2019    By submitting this query, we are merely seeking further clarification of documentation.. Please utilize your independent clinical judgment when addressing the question(s) below.    The Medical record contains the following:   Indicators  Supporting Clinical Findings Location in Medical Record   x % of Estimated Energy Intake over a time frame from p.o., TF, or TPN Energy Calories Required: not meeting needs  Protein Required: not meeting needs    % Meal Intake: NPO     Weight Status over a time frame     x Subcutaneous Fat and/or Muscle Loss Orbital Region (Subcutaneous Fat Loss): well nourished  Upper Arm Region (Subcutaneous Fat Loss): well nourished  Thoracic and Lumbar Region: well nourished   [place]le Region (Muscle Loss): well nourished  Clavicle Bone Region (Muscle Loss): well nourished  Clavicle and Acromion Bone Region (Muscle Loss): well nourished  Scapular Bone Region (Muscle Loss): well nourished  Dorsal Hand (Muscle Loss): well nourished  Patellar Region (Muscle Loss): well nourished  Anterior Thigh Region (Muscle Loss): well nourished  Posterior Calf Region (Muscle Loss): well nourished      Fluid Accumulation or Edema      Reduced  Strength     x Wt / BMI / Usual Body Weight Weight: 106.8 kg   BMI (Calculated): 33.9     Delayed Wound Healing / Failure to Thrive     x Acute or Chronic Illness duodenal rsxn; HLD, NET duodenum, obesity     Medication     x Treatment To better meet estimated, advance TPN to goal rate of 90 ml/hr w/ lipids 3 x week: To provide 1745 kcal, 108g pro, 2150 ml FLuid; GIR: 2.1    x Other Malnutrition compromising bodily function    TPN for malnutriton and inability to swallow ED Prov note  1/18    Gen Surg PN 1/18       AND / ASPEN Clinical Characteristics (October 2011)  A minimum of two characteristics is recommended for diagnosing either moderate or severe malnutrition   Mild Malnutrition Moderate Malnutrition Severe Malnutrition   Energy Intake from p.o., TF or TPN. < 75% intake of estimated energy needs for less than 7 days < 75% intake of estimated energy needs for greater than 7 days < 50% intake of estimated energy needs for > 5 days   Weight Loss 1-2% in 1 month  5% in 3 months  7.5% in 6 months  10% in 1 year 1-2 % in 1 week  5% in 1 month  7.5% in 3 months  10% in 6 months  20% in 1 year > 2% in 1 week  > 5% in 1 month  > 7.5% in 3 months  > 10% in 6 months  > 20% in 1 year   Physical Findings     None *Mild subcutaneous fat and/or muscle loss  *Mild fluid accumulation  *Stage II decubitus  *Surgical wound or non-healing wound *Mod/severe subcutaneous fat and/or muscle loss  *Mod/severe fluid accumulation  *Stage III or IV decubitus  *Non-healing surgical wound     Provider, please specify diagnosis or diagnoses associated with above clinical findings.    [ x ] Moderate Protein-Calorie Malnutrition   [  ] Other Nutritional Diagnosis (please specify):    [  ] Other:    [  ] Clinically Undetermined       Please document in your progress notes daily for the duration of treatment until resolved and include in your discharge summary.

## 2019-01-23 NOTE — PLAN OF CARE
Problem: Adult Inpatient Plan of Care  Goal: Plan of Care Review  Outcome: Revised  Patient is awake and oriented.  Patient has NGT placed for Gastroview.  NGT removed after Gastroview and X-rays.  Patient is receiving Clinimix, Albumin, and antibiotics.  Patient has bowel movements X2 this shift.  Walked in hallway with PT.  Sat up in chair for several hours this shift.  No complaints of pain or shortness of breath.  Wife at bedside.  Safety is maintained with bed low, wheels locked and side rails up.  Up to bedside commode with assist.  Call light within reach.  Will continue to monitor.

## 2019-01-23 NOTE — PLAN OF CARE
TN Met with pt and wife Sneha  [phone]   pt d/c'd to home earlier this month with TPN per CPP and HH with [facility]   pt has RW     pt will need tube feeds - Aki with CPP informed   info will be sent to him per [facility] and chart will be opened in [facility]         01/23/19 0306   Discharge Reassessment   Assessment Type Discharge Planning Reassessment   Provided patient/caregiver education on the expected discharge date and the discharge plan Yes   Do you have any problems affording any of your prescribed medications? TBD   Discharge Plan A Home with family;Home Health;Home

## 2019-01-23 NOTE — PROGRESS NOTES
[facility] General Surgery  Neuroendocrine Tumor Service  Progress Note     Admission Date: 1/18/2019  Hospital Length of Stay: 5  Principal Problem: Aspiration pneumonia of right lower lobe     Subjective:      Interval History:   S/p IR drainage of abdominal fluid collection  Denies any current pain  HE is still NPO after failing swallow study  ENT procedure yesterday.   He has flatus no bm  He has been ambulating     Scheduled Meds:   atorvastatin  40 mg Oral Daily    bumetanide  1 mg Intravenous Daily    cyanocobalamin  1,000 mcg Subcutaneous Daily    enoxaparin  40 mg Subcutaneous Daily    fluconazole (DIFLUCAN) IVPB  400 mg Intravenous Q24H    iron sucrose (VENOFER) IVPB  100 mg Intravenous Daily    linezolid 600mg/300ml  600 mg Intravenous Q12H    meropenem (MERREM) IVPB  2 g Intravenous Q8H    pantoprazole  40 mg Intravenous BID     Continuous Infusions:   albumin human 25% 12.5 g (01/23/19 0349)    Amino acid 5% - dextrose 15% (CLINIMIX-E) solution with additives (1L  provides 510 kcal/L dextrose, with 50 gm AA, 150 gm CHO, Na 35, K 30, Mg 5, Ca 4.5, Acetate 80, Cl 39, Phos 15) 65 mL/hr at 01/22/19 2030     PRN Meds:.albuterol sulfate, dextrose 50%, dextrose 50%, glucose, glucose, insulin aspart U-100, ondansetron, promethazine (PHENERGAN) IVPB, sodium chloride 0.9%, sodium chloride 3%    Objective:      Temp:  [98 °F (36.7 °C)-98.9 °F (37.2 °C)] 98.8 °F (37.1 °C)  Pulse:  [] 104  Resp:  [17-32] 18  SpO2:  [85 %-99 %] 97 %  BP: (102-137)/(56-89) 128/61  Weight change: -0.3 kg (-10.6 oz)    Intake/Output Summary (Last 24 hours) at 1/23/2019 0800  Last data filed at 1/23/2019 0621  Gross per 24 hour   Intake 3548.75 ml   Output 2495 ml   Net 1053.75 ml     Drain output - 175    Physical Exam:  GEN: awake, alert, NAD  CV: tachycardic  PULM: normal work of breathing, on NC  ABD: S/NT/ND, incision c/d/i, IR drain in place with sanguinous output  EXT: Peripheral pulses present  and equal bilaterally    Recent Labs   Lab 01/23/19  0532      K 3.9   CO2 36*      BUN 15   CREATININE 0.8   CALCIUM 8.8   PROT 7.1   AST 34   ALT 19   ALKPHOS 89   BILITOT 0.6     Recent Labs   Lab 01/23/19  0532   WBC 5.56   HGB 7.2*   HCT 25.1*   *       Significant Imaging: I have reviewed all pertinent imaging results/findings within the past 24 hours.    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

## 2019-01-23 NOTE — PLAN OF CARE
Problem: Physical Therapy Goal  Goal: Physical Therapy Goal  Goals to be met by: 2019     Patient will increase functional independence with mobility by performin. Supine to sit with Modified [place]  2. Sit to stand transfer with Modified [place]  3. Gait  x 150 feet with Modified [place] using Rolling Walker.   4. Lower extremity exercise program x10 reps per handout, with independence     Outcome: Ongoing (interventions implemented as appropriate)      Pt continues to work toward achieving goals.

## 2019-01-23 NOTE — PHYSICIAN QUERY
PT Name: Hoang Santos Jr.  MR #: [id number]     Physician Query Form - Documentation Clarification      CDS/: Marlene Smith RN                 Contact information:[email or web address]    This form is a permanent document in the medical record.     Query Date: January 23, 2019    By submitting this query, we are merely seeking further clarification of documentation. Please utilize your independent clinical judgment when addressing the question(s) below.    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    Please clarify the sepsis diagnosis.    Provider Use Only      ___severe sepsis due to candida albicans    ___severe sepsis due to other source, source___________________    _x__sepsis due to candida albicans    ___sepsis, due to other source, source_________________    ___other_________________________________________                                                                                                                           [  ] Clinically Undetermined

## 2019-01-23 NOTE — PT/OT/SLP PROGRESS
"Occupational Therapy  Visit Attempt    Patient Name:  Hoang Santos Jr.   MRN:  [id number]    Patient not seen today secondary to prolonged discussion with MDs regarding NGT placement and discussion for peg placement. Will follow-up later, as time permits.    PM attempt, patient reported increased fatigue after "such a busy day". Patient encouraged to move as he could throughout the day and therapy will return tomorrow's date.     JENNIFER Calero  1/23/2019  "

## 2019-01-23 NOTE — PROGRESS NOTES
Cued into patient's room.  Permission received per patient to turn camera to view patient.  Introduced as VN for night shift that will be working with floor nurse and nursing assistant.  Educated patient on VN's role in patient care. Plan of care reviewed with patient. Education per flowsheet.  Opportunity given for questions and questions answered.  No complaints.  Instructed to call for assistance.  Will cont to monitor.

## 2019-01-24 ENCOUNTER — ANESTHESIA (OUTPATIENT)
Dept: SURGERY | Facility: HOSPITAL | Age: 69
End: 2019-01-24

## 2019-01-24 ENCOUNTER — ANESTHESIA (OUTPATIENT)
Dept: SURGERY | Facility: HOSPITAL | Age: 69
DRG: 856 | End: 2019-01-24
Payer: MEDICARE

## 2019-01-24 ENCOUNTER — ANESTHESIA EVENT (OUTPATIENT)
Dept: SURGERY | Facility: HOSPITAL | Age: 69
DRG: 856 | End: 2019-01-24
Payer: MEDICARE

## 2019-01-24 PROBLEM — K31.1 GASTRIC OUTLET OBSTRUCTION: Status: ACTIVE | Noted: 2019-01-24

## 2019-01-24 LAB
ABO + RH BLD: NORMAL
ALBUMIN SERPL BCP-MCNC: 3.6 G/DL
ALP SERPL-CCNC: 86 U/L
ALT SERPL W/O P-5'-P-CCNC: 18 U/L
ANION GAP SERPL CALC-SCNC: 8 MMOL/L
ANION GAP SERPL CALC-SCNC: 9 MMOL/L
APTT BLDCRRT: 35.9 SEC
AST SERPL-CCNC: 36 U/L
BACTERIA SPEC AEROBE CULT: NORMAL
BASOPHILS # BLD AUTO: 0.02 K/UL
BASOPHILS # BLD AUTO: 0.02 K/UL
BASOPHILS NFR BLD: 0.2 %
BASOPHILS NFR BLD: 0.5 %
BILIRUB SERPL-MCNC: 0.6 MG/DL
BLD GP AB SCN CELLS X3 SERPL QL: NORMAL
BLD PROD TYP BPU: NORMAL
BLD PROD TYP BPU: NORMAL
BLOOD UNIT EXPIRATION DATE: NORMAL
BLOOD UNIT EXPIRATION DATE: NORMAL
BLOOD UNIT TYPE CODE: 5100
BLOOD UNIT TYPE CODE: 5100
BLOOD UNIT TYPE: NORMAL
BLOOD UNIT TYPE: NORMAL
BUN SERPL-MCNC: 16 MG/DL
BUN SERPL-MCNC: 19 MG/DL
CALCIUM SERPL-MCNC: 8.9 MG/DL
CALCIUM SERPL-MCNC: 9.3 MG/DL
CHLORIDE SERPL-SCNC: 103 MMOL/L
CHLORIDE SERPL-SCNC: 103 MMOL/L
CO2 SERPL-SCNC: 33 MMOL/L
CO2 SERPL-SCNC: 33 MMOL/L
CODING SYSTEM: NORMAL
CODING SYSTEM: NORMAL
CREAT SERPL-MCNC: 0.8 MG/DL
CREAT SERPL-MCNC: 0.8 MG/DL
DIFFERENTIAL METHOD: ABNORMAL
DIFFERENTIAL METHOD: ABNORMAL
DISPENSE STATUS: NORMAL
DISPENSE STATUS: NORMAL
EOSINOPHIL # BLD AUTO: 0 K/UL
EOSINOPHIL # BLD AUTO: 0.2 K/UL
EOSINOPHIL NFR BLD: 0.3 %
EOSINOPHIL NFR BLD: 3.5 %
ERYTHROCYTE [DISTWIDTH] IN BLOOD BY AUTOMATED COUNT: 16.2 %
ERYTHROCYTE [DISTWIDTH] IN BLOOD BY AUTOMATED COUNT: 16.3 %
EST. GFR  (AFRICAN AMERICAN): >60 ML/MIN/1.73 M^2
EST. GFR  (AFRICAN AMERICAN): >60 ML/MIN/1.73 M^2
EST. GFR  (NON AFRICAN AMERICAN): >60 ML/MIN/1.73 M^2
EST. GFR  (NON AFRICAN AMERICAN): >60 ML/MIN/1.73 M^2
GLUCOSE SERPL-MCNC: 109 MG/DL
GLUCOSE SERPL-MCNC: 146 MG/DL
GRAM STN SPEC: NORMAL
HCT VFR BLD AUTO: 25.6 %
HCT VFR BLD AUTO: 34.6 %
HGB BLD-MCNC: 10.4 G/DL
HGB BLD-MCNC: 7.4 G/DL
INR PPP: 1.3
LYMPHOCYTES # BLD AUTO: 0.7 K/UL
LYMPHOCYTES # BLD AUTO: 0.9 K/UL
LYMPHOCYTES NFR BLD: 15.6 %
LYMPHOCYTES NFR BLD: 9.2 %
MCH RBC QN AUTO: 28.4 PG
MCH RBC QN AUTO: 28.5 PG
MCHC RBC AUTO-ENTMCNC: 28.9 G/DL
MCHC RBC AUTO-ENTMCNC: 30.1 G/DL
MCV RBC AUTO: 95 FL
MCV RBC AUTO: 99 FL
MONOCYTES # BLD AUTO: 0.3 K/UL
MONOCYTES # BLD AUTO: 0.8 K/UL
MONOCYTES NFR BLD: 18.8 %
MONOCYTES NFR BLD: 3.1 %
NEUTROPHILS # BLD AUTO: 2.6 K/UL
NEUTROPHILS # BLD AUTO: 8.6 K/UL
NEUTROPHILS NFR BLD: 61.4 %
NEUTROPHILS NFR BLD: 86.5 %
PLATELET # BLD AUTO: 317 K/UL
PLATELET # BLD AUTO: 346 K/UL
PMV BLD AUTO: 8.8 FL
PMV BLD AUTO: 8.9 FL
POCT GLUCOSE: 115 MG/DL (ref 70–110)
POCT GLUCOSE: 130 MG/DL (ref 70–110)
POCT GLUCOSE: 88 MG/DL (ref 70–110)
POTASSIUM SERPL-SCNC: 3.8 MMOL/L
POTASSIUM SERPL-SCNC: 4 MMOL/L
PROT SERPL-MCNC: 7.2 G/DL
PROTHROMBIN TIME: 13 SEC
RBC # BLD AUTO: 2.6 M/UL
RBC # BLD AUTO: 3.66 M/UL
SODIUM SERPL-SCNC: 144 MMOL/L
SODIUM SERPL-SCNC: 145 MMOL/L
TRANS ERYTHROCYTES VOL PATIENT: NORMAL ML
TRANS ERYTHROCYTES VOL PATIENT: NORMAL ML
WBC # BLD AUTO: 4.3 K/UL
WBC # BLD AUTO: 9.96 K/UL

## 2019-01-24 PROCEDURE — 63600175 PHARM REV CODE 636 W HCPCS: Performed by: NURSE ANESTHETIST, CERTIFIED REGISTERED

## 2019-01-24 PROCEDURE — 25000003 PHARM REV CODE 250: Performed by: STUDENT IN AN ORGANIZED HEALTH CARE EDUCATION/TRAINING PROGRAM

## 2019-01-24 PROCEDURE — 63600175 PHARM REV CODE 636 W HCPCS: Performed by: STUDENT IN AN ORGANIZED HEALTH CARE EDUCATION/TRAINING PROGRAM

## 2019-01-24 PROCEDURE — 87186 SC STD MICRODIL/AGAR DIL: CPT

## 2019-01-24 PROCEDURE — 85610 PROTHROMBIN TIME: CPT

## 2019-01-24 PROCEDURE — 80053 COMPREHEN METABOLIC PANEL: CPT

## 2019-01-24 PROCEDURE — 25000003 PHARM REV CODE 250: Performed by: SURGERY

## 2019-01-24 PROCEDURE — 86850 RBC ANTIBODY SCREEN: CPT

## 2019-01-24 PROCEDURE — 87075 CULTR BACTERIA EXCEPT BLOOD: CPT

## 2019-01-24 PROCEDURE — 87106 FUNGI IDENTIFICATION YEAST: CPT | Mod: 59

## 2019-01-24 PROCEDURE — 27000190 HC CPAP FULL FACE MASK W/VALVE

## 2019-01-24 PROCEDURE — 36000709 HC OR TIME LEV III EA ADD 15 MIN: Performed by: SURGERY

## 2019-01-24 PROCEDURE — 87070 CULTURE OTHR SPECIMN AEROBIC: CPT | Mod: 59

## 2019-01-24 PROCEDURE — 63600175 PHARM REV CODE 636 W HCPCS: Performed by: SURGERY

## 2019-01-24 PROCEDURE — S0171 BUMETANIDE 0.5 MG: HCPCS | Performed by: SURGERY

## 2019-01-24 PROCEDURE — 85730 THROMBOPLASTIN TIME PARTIAL: CPT

## 2019-01-24 PROCEDURE — 20000000 HC ICU ROOM

## 2019-01-24 PROCEDURE — C9290 INJ, BUPIVACAINE LIPOSOME: HCPCS | Performed by: SURGERY

## 2019-01-24 PROCEDURE — 37000009 HC ANESTHESIA EA ADD 15 MINS: Performed by: SURGERY

## 2019-01-24 PROCEDURE — C1729 CATH, DRAINAGE: HCPCS | Performed by: SURGERY

## 2019-01-24 PROCEDURE — 87077 CULTURE AEROBIC IDENTIFY: CPT

## 2019-01-24 PROCEDURE — 25000003 PHARM REV CODE 250: Performed by: ANESTHESIOLOGY

## 2019-01-24 PROCEDURE — P9021 RED BLOOD CELLS UNIT: HCPCS

## 2019-01-24 PROCEDURE — P9047 ALBUMIN (HUMAN), 25%, 50ML: HCPCS | Mod: JG | Performed by: SURGERY

## 2019-01-24 PROCEDURE — 37000008 HC ANESTHESIA 1ST 15 MINUTES: Performed by: SURGERY

## 2019-01-24 PROCEDURE — 94660 CPAP INITIATION&MGMT: CPT

## 2019-01-24 PROCEDURE — 25000003 PHARM REV CODE 250: Performed by: NURSE ANESTHETIST, CERTIFIED REGISTERED

## 2019-01-24 PROCEDURE — 27201423 OPTIME MED/SURG SUP & DEVICES STERILE SUPPLY: Performed by: SURGERY

## 2019-01-24 PROCEDURE — 97116 GAIT TRAINING THERAPY: CPT

## 2019-01-24 PROCEDURE — 85025 COMPLETE CBC W/AUTO DIFF WBC: CPT | Mod: 91

## 2019-01-24 PROCEDURE — 99900035 HC TECH TIME PER 15 MIN (STAT)

## 2019-01-24 PROCEDURE — 97530 THERAPEUTIC ACTIVITIES: CPT

## 2019-01-24 PROCEDURE — 87102 FUNGUS ISOLATION CULTURE: CPT | Mod: 59

## 2019-01-24 PROCEDURE — 80048 BASIC METABOLIC PNL TOTAL CA: CPT

## 2019-01-24 PROCEDURE — 94761 N-INVAS EAR/PLS OXIMETRY MLT: CPT

## 2019-01-24 PROCEDURE — 36000708 HC OR TIME LEV III 1ST 15 MIN: Performed by: SURGERY

## 2019-01-24 PROCEDURE — 87205 SMEAR GRAM STAIN: CPT | Mod: 59

## 2019-01-24 PROCEDURE — 86922 COMPATIBILITY TEST ANTIGLOB: CPT

## 2019-01-24 RX ORDER — PANTOPRAZOLE SODIUM 40 MG/10ML
40 INJECTION, POWDER, LYOPHILIZED, FOR SOLUTION INTRAVENOUS DAILY
Status: DISCONTINUED | OUTPATIENT
Start: 2019-01-25 | End: 2019-01-31

## 2019-01-24 RX ORDER — HYDROMORPHONE HYDROCHLORIDE 2 MG/ML
0.5 INJECTION, SOLUTION INTRAMUSCULAR; INTRAVENOUS; SUBCUTANEOUS EVERY 4 HOURS PRN
Status: DISCONTINUED | OUTPATIENT
Start: 2019-01-24 | End: 2019-02-03

## 2019-01-24 RX ORDER — VECURONIUM BROMIDE FOR INJECTION 1 MG/ML
INJECTION, POWDER, LYOPHILIZED, FOR SOLUTION INTRAVENOUS
Status: DISCONTINUED | OUTPATIENT
Start: 2019-01-24 | End: 2019-01-24

## 2019-01-24 RX ORDER — HYDROCODONE BITARTRATE AND ACETAMINOPHEN 500; 5 MG/1; MG/1
TABLET ORAL
Status: DISCONTINUED | OUTPATIENT
Start: 2019-01-24 | End: 2019-02-02

## 2019-01-24 RX ORDER — KETOROLAC TROMETHAMINE 30 MG/ML
15 INJECTION, SOLUTION INTRAMUSCULAR; INTRAVENOUS EVERY 6 HOURS
Status: DISCONTINUED | OUTPATIENT
Start: 2019-01-25 | End: 2019-01-25

## 2019-01-24 RX ORDER — SODIUM CHLORIDE, SODIUM LACTATE, POTASSIUM CHLORIDE, CALCIUM CHLORIDE 600; 310; 30; 20 MG/100ML; MG/100ML; MG/100ML; MG/100ML
INJECTION, SOLUTION INTRAVENOUS CONTINUOUS PRN
Status: DISCONTINUED | OUTPATIENT
Start: 2019-01-24 | End: 2019-01-24

## 2019-01-24 RX ORDER — BUMETANIDE 0.25 MG/ML
1 INJECTION INTRAMUSCULAR; INTRAVENOUS ONCE
Status: DISCONTINUED | OUTPATIENT
Start: 2019-01-24 | End: 2019-01-25

## 2019-01-24 RX ORDER — FENTANYL CITRATE 50 UG/ML
INJECTION, SOLUTION INTRAMUSCULAR; INTRAVENOUS
Status: DISCONTINUED | OUTPATIENT
Start: 2019-01-24 | End: 2019-01-24

## 2019-01-24 RX ORDER — PHENYLEPHRINE HYDROCHLORIDE 10 MG/ML
INJECTION INTRAVENOUS
Status: DISCONTINUED | OUTPATIENT
Start: 2019-01-24 | End: 2019-01-24

## 2019-01-24 RX ORDER — VASOPRESSIN 20 [USP'U]/ML
INJECTION, SOLUTION INTRAMUSCULAR; SUBCUTANEOUS
Status: DISCONTINUED | OUTPATIENT
Start: 2019-01-24 | End: 2019-01-24

## 2019-01-24 RX ORDER — ROCURONIUM BROMIDE 10 MG/ML
INJECTION, SOLUTION INTRAVENOUS
Status: DISCONTINUED | OUTPATIENT
Start: 2019-01-24 | End: 2019-01-24

## 2019-01-24 RX ORDER — ONDANSETRON HYDROCHLORIDE 2 MG/ML
INJECTION, SOLUTION INTRAMUSCULAR; INTRAVENOUS
Status: DISCONTINUED | OUTPATIENT
Start: 2019-01-24 | End: 2019-01-24

## 2019-01-24 RX ORDER — SUCCINYLCHOLINE CHLORIDE 20 MG/ML
INJECTION INTRAMUSCULAR; INTRAVENOUS
Status: DISCONTINUED | OUTPATIENT
Start: 2019-01-24 | End: 2019-01-24

## 2019-01-24 RX ORDER — LIDOCAINE HCL/PF 100 MG/5ML
SYRINGE (ML) INTRAVENOUS
Status: DISCONTINUED | OUTPATIENT
Start: 2019-01-24 | End: 2019-01-24

## 2019-01-24 RX ORDER — BUPIVACAINE HYDROCHLORIDE 5 MG/ML
INJECTION, SOLUTION PERINEURAL
Status: DISCONTINUED | OUTPATIENT
Start: 2019-01-24 | End: 2019-01-24 | Stop reason: HOSPADM

## 2019-01-24 RX ORDER — SODIUM CHLORIDE 9 MG/ML
INJECTION, SOLUTION INTRAVENOUS CONTINUOUS PRN
Status: DISCONTINUED | OUTPATIENT
Start: 2019-01-24 | End: 2019-01-24

## 2019-01-24 RX ORDER — BACITRACIN 50000 [IU]/1
INJECTION, POWDER, FOR SOLUTION INTRAMUSCULAR
Status: DISCONTINUED | OUTPATIENT
Start: 2019-01-24 | End: 2019-01-24 | Stop reason: HOSPADM

## 2019-01-24 RX ORDER — CHLORHEXIDINE GLUCONATE ORAL RINSE 1.2 MG/ML
15 SOLUTION DENTAL 2 TIMES DAILY
Status: DISCONTINUED | OUTPATIENT
Start: 2019-01-24 | End: 2019-02-08 | Stop reason: HOSPADM

## 2019-01-24 RX ORDER — PROPOFOL 10 MG/ML
VIAL (ML) INTRAVENOUS
Status: DISCONTINUED | OUTPATIENT
Start: 2019-01-24 | End: 2019-01-24

## 2019-01-24 RX ORDER — DEXAMETHASONE SODIUM PHOSPHATE 4 MG/ML
INJECTION, SOLUTION INTRA-ARTICULAR; INTRALESIONAL; INTRAMUSCULAR; INTRAVENOUS; SOFT TISSUE
Status: DISCONTINUED | OUTPATIENT
Start: 2019-01-24 | End: 2019-01-24

## 2019-01-24 RX ORDER — MIDAZOLAM HYDROCHLORIDE 1 MG/ML
INJECTION INTRAMUSCULAR; INTRAVENOUS
Status: DISCONTINUED | OUTPATIENT
Start: 2019-01-24 | End: 2019-01-24

## 2019-01-24 RX ADMIN — ALBUMIN HUMAN 12.5 G: 0.25 SOLUTION INTRAVENOUS at 06:01

## 2019-01-24 RX ADMIN — PROPOFOL 100 MG: 10 INJECTION, EMULSION INTRAVENOUS at 03:01

## 2019-01-24 RX ADMIN — KETOROLAC TROMETHAMINE 15 MG: 30 INJECTION, SOLUTION INTRAMUSCULAR at 11:01

## 2019-01-24 RX ADMIN — DEXAMETHASONE SODIUM PHOSPHATE 8 MG: 4 INJECTION, SOLUTION INTRAMUSCULAR; INTRAVENOUS at 05:01

## 2019-01-24 RX ADMIN — PHENYLEPHRINE HYDROCHLORIDE 100 MCG: 10 INJECTION INTRAVENOUS at 05:01

## 2019-01-24 RX ADMIN — VASOPRESSIN 1 UNITS: 20 INJECTION INTRAVENOUS at 06:01

## 2019-01-24 RX ADMIN — VASOPRESSIN 2 UNITS: 20 INJECTION INTRAVENOUS at 07:01

## 2019-01-24 RX ADMIN — CHLORHEXIDINE GLUCONATE 15 ML: 1.2 RINSE ORAL at 10:01

## 2019-01-24 RX ADMIN — PHENYLEPHRINE HYDROCHLORIDE 200 MCG: 10 INJECTION INTRAVENOUS at 05:01

## 2019-01-24 RX ADMIN — ALBUMIN HUMAN 12.5 G: 0.25 SOLUTION INTRAVENOUS at 01:01

## 2019-01-24 RX ADMIN — ASCORBIC ACID, VITAMIN A PALMITATE, CHOLECALCIFEROL, THIAMINE HYDROCHLORIDE, RIBOFLAVIN-5 PHOSPHATE SODIUM, PYRIDOXINE HYDROCHLORIDE, NIACINAMIDE, DEXPANTHENOL, ALPHA-TOCOPHEROL ACETATE, VITAMIN K1, FOLIC ACID, BIOTIN, CYANOCOBALAMIN: 200; 3300; 200; 6; 3.6; 6; 40; 15; 10; 150; 600; 60; 5 INJECTION, SOLUTION INTRAVENOUS at 11:01

## 2019-01-24 RX ADMIN — SODIUM CHLORIDE, SODIUM LACTATE, POTASSIUM CHLORIDE, AND CALCIUM CHLORIDE: .6; .31; .03; .02 INJECTION, SOLUTION INTRAVENOUS at 07:01

## 2019-01-24 RX ADMIN — PHENYLEPHRINE HYDROCHLORIDE 200 MCG: 10 INJECTION INTRAVENOUS at 06:01

## 2019-01-24 RX ADMIN — VASOPRESSIN 1 UNITS: 20 INJECTION INTRAVENOUS at 08:01

## 2019-01-24 RX ADMIN — MEROPENEM 2 G: 1 INJECTION, POWDER, FOR SOLUTION INTRAVENOUS at 10:01

## 2019-01-24 RX ADMIN — BUMETANIDE 1 MG: 0.25 INJECTION INTRAMUSCULAR; INTRAVENOUS at 08:01

## 2019-01-24 RX ADMIN — PHENYLEPHRINE HYDROCHLORIDE 200 MCG: 10 INJECTION INTRAVENOUS at 04:01

## 2019-01-24 RX ADMIN — SUGAMMADEX 200 MG: 100 INJECTION, SOLUTION INTRAVENOUS at 08:01

## 2019-01-24 RX ADMIN — PHENYLEPHRINE HYDROCHLORIDE 200 MCG: 10 INJECTION INTRAVENOUS at 03:01

## 2019-01-24 RX ADMIN — ALBUMIN HUMAN 12.5 G: 0.25 SOLUTION INTRAVENOUS at 08:01

## 2019-01-24 RX ADMIN — PHENYLEPHRINE HYDROCHLORIDE 100 MCG: 10 INJECTION INTRAVENOUS at 06:01

## 2019-01-24 RX ADMIN — ROCURONIUM BROMIDE 30 MG: 10 INJECTION, SOLUTION INTRAVENOUS at 03:01

## 2019-01-24 RX ADMIN — MIDAZOLAM HYDROCHLORIDE 2 MG: 1 INJECTION, SOLUTION INTRAMUSCULAR; INTRAVENOUS at 03:01

## 2019-01-24 RX ADMIN — PHENYLEPHRINE HYDROCHLORIDE 100 MCG: 10 INJECTION INTRAVENOUS at 04:01

## 2019-01-24 RX ADMIN — CYANOCOBALAMIN 1000 MCG: 1000 INJECTION, SOLUTION INTRAMUSCULAR at 08:01

## 2019-01-24 RX ADMIN — ALBUMIN HUMAN 12.5 G: 0.25 SOLUTION INTRAVENOUS at 02:01

## 2019-01-24 RX ADMIN — MEROPENEM 2 G: 1 INJECTION, POWDER, FOR SOLUTION INTRAVENOUS at 05:01

## 2019-01-24 RX ADMIN — LINEZOLID 600 MG: 600 INJECTION, SOLUTION INTRAVENOUS at 11:01

## 2019-01-24 RX ADMIN — LINEZOLID 600 MG: 600 INJECTION, SOLUTION INTRAVENOUS at 08:01

## 2019-01-24 RX ADMIN — VECURONIUM BROMIDE FOR INJECTION 4 MG: 1 INJECTION, POWDER, LYOPHILIZED, FOR SOLUTION INTRAVENOUS at 05:01

## 2019-01-24 RX ADMIN — ROCURONIUM BROMIDE 5 MG: 10 INJECTION, SOLUTION INTRAVENOUS at 03:01

## 2019-01-24 RX ADMIN — SUCCINYLCHOLINE CHLORIDE 120 MG: 20 INJECTION, SOLUTION INTRAMUSCULAR; INTRAVENOUS at 03:01

## 2019-01-24 RX ADMIN — VASOPRESSIN 1 UNITS: 20 INJECTION INTRAVENOUS at 05:01

## 2019-01-24 RX ADMIN — ENOXAPARIN SODIUM 40 MG: 100 INJECTION SUBCUTANEOUS at 11:01

## 2019-01-24 RX ADMIN — LIDOCAINE HYDROCHLORIDE 100 MG: 20 INJECTION, SOLUTION INTRAVENOUS at 03:01

## 2019-01-24 RX ADMIN — VASOPRESSIN 1 UNITS: 20 INJECTION INTRAVENOUS at 07:01

## 2019-01-24 RX ADMIN — FLUCONAZOLE, SODIUM CHLORIDE 400 MG: 2 INJECTION INTRAVENOUS at 11:01

## 2019-01-24 RX ADMIN — IRON SUCROSE 100 MG: 20 INJECTION, SOLUTION INTRAVENOUS at 08:01

## 2019-01-24 RX ADMIN — FENTANYL CITRATE 50 MCG: 50 INJECTION, SOLUTION INTRAMUSCULAR; INTRAVENOUS at 03:01

## 2019-01-24 RX ADMIN — SODIUM CHLORIDE: 0.9 INJECTION, SOLUTION INTRAVENOUS at 03:01

## 2019-01-24 RX ADMIN — ONDANSETRON 4 MG: 2 INJECTION, SOLUTION INTRAMUSCULAR; INTRAVENOUS at 07:01

## 2019-01-24 RX ADMIN — MEROPENEM 2 G: 1 INJECTION, POWDER, FOR SOLUTION INTRAVENOUS at 04:01

## 2019-01-24 NOTE — ANESTHESIA PREPROCEDURE EVALUATION
01/23/2019  Hoang Santos Jr. is a 68 y.o., male s/p NET surgery 1/4/19 now with dysphagia and malnutrition. Scheduled for G-Tube/J-tube.    Was previously in ICU with sepsis from aspiration RLL PNA.    1/21/19 MAC was very difficult with desats to 60s reversed with 2 people taking turns pulling jaw forward w non-rebreathing mask; rec for next time:  ETT or maybe LMA-gastro. JR    1/4/19 ETT note: DL, MAC 4, DL with bougie, Gr 3v, obesity, short neck, poor head/neck extension, oropharyngeal edema or fat    Patient Active Problem List   Diagnosis    Hyperlipidemia    GEMMA (obstructive sleep apnea)    Vertigo    Erectile dysfunction    Chronic cough    Voice disturbance    Vocal fold atrophy    Vocal fold scar    Benign non-nodular prostatic hyperplasia with lower urinary tract symptoms    Dysphagia    Cervical osteophyte    Carcinoid tumor    Malignant carcinoid tumor of the duodenum    Pre-operative cardiovascular examination    Preoperative respiratory examination    SOB (shortness of breath)    Complication of surgical procedure    Malignant carcinoid tumor of duodenum    Severe obesity (BMI >= 40)    Aspiration pneumonia of right lower lobe    Malnutrition compromising bodily function    Pleural effusion    Sepsis    Tachycardia     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  Lab Results   Component Value Date    WBC 5.56 01/23/2019    HGB 7.2 (L) 01/23/2019    HCT 25.1 (L) 01/23/2019     (H) 01/23/2019    CHOL 273 (H) 01/16/2018    CHOL 273 (H) 01/16/2018    TRIG 121 01/16/2018     TRIG 121 01/16/2018    HDL 64 01/16/2018    HDL 64 01/16/2018    ALT 19 01/23/2019    AST 34 01/23/2019     01/23/2019    K 3.9 01/23/2019     01/23/2019    CREATININE 0.8 01/23/2019    BUN 15 01/23/2019    CO2 36 (H) 01/23/2019    TSH 0.591 03/10/2017    PSA 1.2 06/19/2017    INR 1.2 01/23/2019    HGBA1C 5.5 06/19/2017           Anesthesia Evaluation    I have reviewed the Patient Summary Reports.    I have reviewed the Nursing Notes.   I have reviewed the Medications.     Review of Systems  Anesthesia Hx:  History of prior surgery of interest to airway management or planning: (h/o Palatoplasty ('17)) facial plastic/reconstructive. Previous anesthesia: General Personal Hx of Anesthesia complications Slow To Awaken/Delayed Emergence   Hematology/Oncology:         -- Anemia: Oncology Comments: NET s/p surgery    Cardiovascular:   Exercise tolerance: poor hyperlipidemia ECG has been reviewed. Has only become debilitated after surgery earlier this month.    Cardiac clearance in chart    Pulmonary:   Pneumonia Shortness of breath Sleep Apnea, CPAP    Renal/:  Renal/ Normal     Musculoskeletal:   Arthritis         Physical Exam  General:  Obesity    Airway/Jaw/Neck:  Airway Findings: Mouth Opening: Normal Tongue: Large  General Airway Assessment: Adult  Mallampati: IV  TM Distance: Normal, at least 6 cm      Dental:  Dental Findings: In tact, upper front caps, upper partial dentures   Chest/Lungs:  Chest/Lungs Findings: Rhonchi     Heart/Vascular:  Heart Findings:       Mental Status:  Mental Status Findings:  Cooperative, Alert and Oriented     · ECHO 11/2018  ·   · Challenging study  · Left ventricle ejection fraction is normal at 65%  · Grade I (mild) left ventricular diastolic dysfunction consistent with impaired relaxation.  · Left ventricle shows concentric hypertrophy.  · RV difficul to assess due to patient body habitus. RV systolic function appears normal, however, it appears the apex is  hypokinetic         Anesthesia Plan  Type of Anesthesia, risks & benefits discussed:  Anesthesia Type:  general  Patient's Preference:   Intra-op Monitoring Plan: standard ASA monitors  Intra-op Monitoring Plan Comments:   Post Op Pain Control Plan: multimodal analgesia  Post Op Pain Control Plan Comments:   Induction:   IV  Beta Blocker:  Patient is not currently on a Beta-Blocker (No further documentation required).       Informed Consent: Patient understands risks and agrees with Anesthesia plan.  Questions answered. Anesthesia consent signed with patient.  ASA Score: 3     Day of Surgery Review of History & Physical:            Ready For Surgery From Anesthesia Perspective.

## 2019-01-24 NOTE — ANESTHESIA PREPROCEDURE EVALUATION
01/24/2019  Hoang Santos Jr. is a 68 y.o., male s/p NET surgery 1/4/19 now with dysphagia and malnutrition. Scheduled for G-Tube/J-tube with EGD.     Was previously in ICU with sepsis from aspiration RLL PNA.    1/21/19 MAC was very difficult with desats to 60s reversed with 2 people taking turns pulling jaw forward w non-rebreathing mask; rec for next time:  ETT or maybe LMA-gastro. JR    1/4/19 ETT note: DL, MAC 4, DL with bougie, Gr 3v, obesity, short neck, poor head/neck extension, oropharyngeal edema or fat    Patient Active Problem List   Diagnosis    Hyperlipidemia    GEMMA (obstructive sleep apnea)    Vertigo    Erectile dysfunction    Chronic cough    Voice disturbance    Vocal fold atrophy    Vocal fold scar    Benign non-nodular prostatic hyperplasia with lower urinary tract symptoms    Dysphagia    Cervical osteophyte    Carcinoid tumor    Malignant carcinoid tumor of the duodenum    Pre-operative cardiovascular examination    Preoperative respiratory examination    SOB (shortness of breath)    Complication of surgical procedure    Malignant carcinoid tumor of duodenum    Severe obesity (BMI >= 40)    Aspiration pneumonia of right lower lobe    Malnutrition compromising bodily function    Pleural effusion    Sepsis    Tachycardia     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  Lab Results   Component Value Date    WBC 4.30 01/24/2019    HGB 7.4 (L) 01/24/2019    HCT 25.6 (L) 01/24/2019     01/24/2019    CHOL 273 (H) 01/16/2018    CHOL 273 (H) 01/16/2018    TRIG 121  01/16/2018    TRIG 121 01/16/2018    HDL 64 01/16/2018    HDL 64 01/16/2018    ALT 18 01/24/2019    AST 36 01/24/2019     01/24/2019    K 3.8 01/24/2019     01/24/2019    CREATININE 0.8 01/24/2019    BUN 16 01/24/2019    CO2 33 (H) 01/24/2019    TSH 0.591 03/10/2017    PSA 1.2 06/19/2017    INR 1.3 (H) 01/24/2019    HGBA1C 5.5 06/19/2017           Pre-op Assessment    I have reviewed the Patient Summary Reports.     I have reviewed the Nursing Notes.   I have reviewed the Medications.     Review of Systems  Anesthesia Hx:  History of prior surgery of interest to airway management or planning: (h/o Palatoplasty ('17)) facial plastic/reconstructive. Previous anesthesia: General Personal Hx of Anesthesia complications Slow To Awaken/Delayed Emergence   Hematology/Oncology:         -- Anemia: Oncology Comments: NET s/p surgery    Cardiovascular:   Exercise tolerance: poor hyperlipidemia ECG has been reviewed. Has only become debilitated after surgery earlier this month.    Cardiac clearance in chart    Pulmonary:   Pneumonia Shortness of breath Sleep Apnea, CPAP    Renal/:  Renal/ Normal     Musculoskeletal:   Arthritis         Physical Exam  General:  Obesity    Airway/Jaw/Neck:  Airway Findings: Mouth Opening: Normal Tongue: Large  General Airway Assessment: Adult  Mallampati: IV  TM Distance: Normal, at least 6 cm      Dental:  Dental Findings: In tact, upper front caps, upper partial dentures   Chest/Lungs:  Chest/Lungs Findings: Rhonchi     Heart/Vascular:  Heart Findings:       Mental Status:  Mental Status Findings:  Cooperative, Alert and Oriented     · ECHO 11/2018  ·   · Challenging study  · Left ventricle ejection fraction is normal at 65%  · Grade I (mild) left ventricular diastolic dysfunction consistent with impaired relaxation.  · Left ventricle shows concentric hypertrophy.  · RV difficul to assess due to patient body habitus. RV systolic function appears normal, however, it appears the  apex is hypokinetic     Lab Results   Component Value Date    WBC 4.30 01/24/2019    HGB 7.4 (L) 01/24/2019    HCT 25.6 (L) 01/24/2019    MCV 99 (H) 01/24/2019     01/24/2019     BMP  Lab Results   Component Value Date     01/24/2019    K 3.8 01/24/2019     01/24/2019    CO2 33 (H) 01/24/2019    BUN 16 01/24/2019    CREATININE 0.8 01/24/2019    CALCIUM 9.3 01/24/2019    ANIONGAP 8 01/24/2019    ESTGFRAFRICA >60 01/24/2019    EGFRNONAA >60 01/24/2019         Anesthesia Plan  Type of Anesthesia, risks & benefits discussed:  Anesthesia Type:  general  Patient's Preference:   Intra-op Monitoring Plan: standard ASA monitors  Intra-op Monitoring Plan Comments:   Post Op Pain Control Plan: multimodal analgesia  Post Op Pain Control Plan Comments:   Induction:   IV  Beta Blocker:  Patient is not currently on a Beta-Blocker (No further documentation required).       Informed Consent: Patient understands risks and agrees with Anesthesia plan.  Questions answered. Anesthesia consent signed with patient.  ASA Score: 3     Day of Surgery Review of History & Physical:            Ready For Surgery From Anesthesia Perspective.

## 2019-01-24 NOTE — CONSULTS
[facility]  Adult Nutrition  Consult Note    SUMMARY     Recommendations    Recommendation  1. Continue with current TPN and rate until Enteral route available for use   2. Once G/J tube available, rec starting  Isosource 1.5 via j-tube on pump @ 10 mL/hr to INC as YAJAIRA by 10 mL every 12 hrs to goal rate of 60 mL/hr to provide 2160 calories, 97 gm Pro, 1120 mL water.    3. 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    Prostatic hyperplasia     Sleep apnea      Past Surgical History:   Procedure Laterality Date    BIOPSY, LIVER  1/4/2019    [facility]    CHOLECYSTECTOMY  1/4/2019    [facility] at [facility] OR    DUODENECTOMY N/A 1/4/2019    [facility] at [facility] OR    EGD (ESOPHAGOGASTRODUODENOSCOPY) N/A 1/4/2019    [facility] at [facility] OR    EGD (ESOPHAGOGASTRODUODENOSCOPY) N/A 9/24/2018    Performed by Salo Nuñez MD at [facility] ENDO (4TH FLR)    ESOPHAGOGASTRODUODENOSCOPY (EGD) N/A 1/21/2019    Performed by Jose Kahn MD at [facility] ENDO    FUNDOPLICATION N/A 1/4/2019    Per[facility] by [facility] at [facility] OR    GASTROJEJUNOSTOMY N/A 1/4/2019    Per[facility] at [facility] OR    LYMPHADENECTOMY N/A 1/4/2019    [facility] at [facility] OR    PALATOPLASTY-(UPPP) N/A 2/14/2017    Performed by Bob Araujo III, MD at [facility] OR 2ND FLR    PH MONITORING, ESOPHAGUS, WIRELESS, (OFF REFLUX MEDS) N/A 9/24/2018    Performed by Salo Nuñez  "MD at [facility] ENDO (4TH FLR)    TONSILLECTOMY      TRANSPROSTATIC TISSUE RETRACTION N/A 6/28/2018    [facility] Jack [facility] at [facility]    ULTRASOUND-ENDOSCOPIC-UPPER N/A 11/5/2018    Per[facility] at [facility]O    UVULOPALATOPHARYNGOPLASTY       General Information Comments: NFPE not warranted. Patient well nourished. PPN continues. G/J placement pending    Nutrition Discharge Planning: Home on TF via pump    Nutrition Risk Screen    Nutrition Risk Screen: dysphagia or difficulty swallowing    Nutrition/Diet History    Patient Reported Diet/Restrictions/Preferences: (liquids)  Food Preferences: n/a  Spiritual, Cultural Beliefs, Mormonism Practices, Values that Affect Care: (Druze)  Food Allergies: NKFA  Factors Affecting Nutritional Intake: NPO, altered gastrointestinal function    Anthropometrics    Temp: 98.9 °F (37.2 °C)  Height Method: Stated  Height: 5' 10" (177.8 cm)  Height (inches): 70 in  Weight Method: Bed Scale  Weight: 102.4 kg (225 lb 12 oz)  Weight (lb): 225.75 lb  Ideal Body Weight (IBW), Male: 166 lb  % Ideal Body Weight, Male (lb): 135.99 lb  BMI (Calculated): 32.5  BMI Grade: 30 - 34.9- obesity - grade I       Lab/Procedures/Meds    Pertinent Labs Reviewed: reviewed  Recent Labs   Lab 01/24/19  0515      K 3.8      CO2 33*   BUN 16   CREATININE 0.8     Recent Labs   Lab 01/24/19  0515   ALT 18   AST 36   ALKPHOS 86   BILITOT 0.6   PROT 7.2   ALBUMIN 3.6       Pertinent Medications Reviewed: reviewed  Scheduled Meds:   bumetanide  1 mg Intravenous Daily    bumetanide  1 mg Intravenous Once    cyanocobalamin  1,000 mcg Subcutaneous Daily    enoxaparin  40 mg Subcutaneous Daily    fluconazole (DIFLUCAN) IVPB  400 mg Intravenous Q24H    iron sucrose (VENOFER) IVPB  100 mg Intravenous Daily    linezolid 600mg/300ml  600 mg Intravenous Q12H    meropenem (MERREM) IVPB  2 g Intravenous Q8H     Continuous Infusions:   albumin human 25% 10 mL/hr at " 01/24/19 1518    Amino acid 5% - dextrose 15% (CLINIMIX-E) solution with additives (1L  provides 510 kcal/L dextrose, with 50 gm AA, 150 gm CHO, Na 35, K 30, Mg 5, Ca 4.5, Acetate 80, Cl 39, Phos 15) 65 mL/hr (01/24/19 1518)    Amino acid 5% - dextrose 15% (CLINIMIX-E) solution with additives (1L  provides 510 kcal/L dextrose, with 50 gm AA, 150 gm CHO, Na 35, K 30, Mg 5, Ca 4.5, Acetate 80, Cl 39, Phos 15)       PRN Meds:.sodium chloride, bacitracin, bupivacaine 0.5% (5 mg/ml), bupivacaine liposome (PF), dextrose 50%, dextrose 50%, glucose, glucose, insulin aspart U-100, ondansetron, promethazine (PHENERGAN) IVPB, sodium chloride 0.9%      Estimated/Assessed Needs    Weight Used For Calorie Calculations: 106.8 kg (235 lb 7.2 oz)  Energy Calorie Requirements (kcal): 1850 kcal  Energy Need Method: [facility]     Protein Requirements: 107g (1g/kg)  Weight Used For Protein Calculations: 106.8 kg (235 lb 7.2 oz)     Estimated Fluid Requirement Method: RDA Method(or per MD)  RDA Method (mL): 1850       Nutrition Prescription Ordered    Current Diet Order: NPO  Current Nutrition Support Formula Ordered: Clinimix E 5/15  Current Nutrition Support Rate Ordered: 50 (ml)  Current Nutrition Support Frequency Ordered: ml/hr  Oral Nutrition Supplement: 0    Evaluation of Received Nutrient/Fluid Intake    Parenteral Calories (kcal): 852  Parenteral Protein (gm): 60  Parenteral Fluid (mL): 1200      GIR (Glucose Infusion Rate) (mg/kg/min): 0.8 mg/kg/min    Total Calories (kcal): 852  Total Calories (kcal/kg): 0  % Kcal Needs: 46    Total Protein (gm): 60  % Protein Needs: 58    I/O: reviewed  Energy Calories Required: not meeting needs  Protein Required: not meeting needs  Fluid Required: (per MD)    Comments: LBM 1/24  Tolerance: tolerating    % Intake of Estimated Energy Needs: 50 - 75 %  % Meal Intake: NPO    Nutrition Risk    Level of Risk/Frequency of Follow-up: (2 x week)     Assessment and Plan    Dysphagia     Nutrition Problem  Altered GI Function     Related to (etiology):   Duodenal NET     Signs and Symptoms (as evidenced by):   NPO  Placement of G/J tube     Interventions:  Collaboration with providers     Nutrition Diagnosis Status:   continues              Monitor and Evaluation    Food and Nutrient Intake: parenteral nutrition intake, energy intake  Food and Nutrient Adminstration: diet order, enteral and parenteral nutrition administration  Physical Activity and Function: nutrition-related ADLs and IADLs  Anthropometric Measurements: weight change, weight  Biochemical Data, Medical Tests and Procedures: electrolyte and renal panel, inflammatory profile  Nutrition-Focused Physical Findings: overall appearance     Malnutrition Assessment    Orbital Region (Subcutaneous Fat Loss): well nourished  Upper Arm Region (Subcutaneous Fat Loss): well nourished  Thoracic and Lumbar Region: well nourished   [place] Region (Muscle Loss): well nourished  Clavicle Bone Region (Muscle Loss): well nourished  Clavicle and Acromion Bone Region (Muscle Loss): well nourished  Scapular Bone Region (Muscle Loss): well nourished  Dorsal Hand (Muscle Loss): well nourished  Patellar Region (Muscle Loss): well nourished  Anterior Thigh Region (Muscle Loss): well nourished  Posterior Calf Region (Muscle Loss): well nourished     Nutrition Follow-Up    RD Follow-up?: Yes

## 2019-01-24 NOTE — PLAN OF CARE
Problem: Physical Therapy Goal  Goal: Physical Therapy Goal  Goals to be met by: 2019     Patient will increase functional independence with mobility by performin. Supine to sit with Modified [place]  2. Sit to stand transfer with Modified [place]  3. Gait  x 150 feet with Modified [place] using Rolling Walker.   4. Lower extremity exercise program x10 reps per handout, with independence     Outcome: Ongoing (interventions implemented as appropriate)  Goals ongoing

## 2019-01-24 NOTE — PT/OT/SLP PROGRESS
Occupational Therapy  Visit Attempt    Patient Name:  Hoang Santos .   MRN:  [id number]    Patient not seen today secondary to currently receiving blood and awaiting surgery for Jtube. Will follow-up next avail date.     JENNIFER Calero  1/24/2019

## 2019-01-24 NOTE — PT/OT/SLP PROGRESS
Physical Therapy Treatment    Patient Name:  Hoang Santos Jr.   MRN:  [id number]    Recommendations:     Discharge Recommendations:  ( PT)   Discharge Equipment Recommendations: none   Barriers to discharge: decreased mobility and endurance    Assessment:     Hoang Santos Jr. is a 68 y.o. male admitted with a medical diagnosis of Aspiration pneumonia of right lower lobe.  He presents with the following impairments/functional limitations:  weakness, impaired endurance, impaired functional mobilty, gait instability, impaired balance, decreased upper extremity function, decreased ROM, impaired coordination, impaired skin, impaired cardiopulmonary response to activity,pt with improving endurance and remains with decreased balance and strength,pt will benefit from  services upon discharge.    Rehab Prognosis: Good; patient would benefit from acute skilled PT services to address these deficits and reach maximum level of function.    Recent Surgery: Procedure(s) (LRB):  ESOPHAGOGASTRODUODENOSCOPY (EGD) (N/A) 3 Days Post-Op    Plan:     During this hospitalization, patient to be seen 5 x/week to address the identified rehab impairments via gait training, therapeutic activities, therapeutic exercises and progress toward the following goals:    · Plan of Care Expires:  02/02/19    Subjective     Chief Complaint: n/a  Patient/Family Comments/goals: pt is having a procedure today  Pain/Comfort:  · Pain Rating 1: (no c/o's)      Objective:     Communicated with nsg prior to session.  Patient found all lines intact, call button in reach, nsg notified and spouse present JAY drain, oxygen, peripheral IV, PICC line  upon PT entry to room.     General Precautions: Standard, aspiration, fall, NPO   Orthopedic Precautions:N/A   Braces: N/A     Functional Mobility:  · Transfers:     · Sit to Stand:  contact guard assistance with hand-held assist  · Toilet Transfer: contact guard assistance and minimum assistance with  hand-held  assist  using  ambulation  · Gait: amb ~225' X 1 with RW and S with IV in tow  · Balance: good standing balance with RW      AM-PAC 6 CLICK MOBILITY  Turning over in bed (including adjusting bedclothes, sheets and blankets)?: 4  Sitting down on and standing up from a chair with arms (e.g., wheelchair, bedside commode, etc.): 3  Moving from lying on back to sitting on the side of the bed?: 3  Moving to and from a bed to a chair (including a wheelchair)?: 3  Need to walk in hospital room?: 3  Climbing 3-5 steps with a railing?: 2  Basic Mobility Total Score: 18       Therapeutic Activities and Exercises: pt used bathroom during rx requiring S using IV pole,O2 sats on rm air at rest 95%,nsg notified       Patient left up in chair with all lines intact, call button in reach, nsg notified and spouse present..    GOALS: see general POC  Multidisciplinary Problems     Physical Therapy Goals        Problem: Physical Therapy Goal    Goal Priority Disciplines Outcome Goal Variances Interventions   Physical Therapy Goal     PT, PT/OT Ongoing (interventions implemented as appropriate)     Description:  Goals to be met by: 2019     Patient will increase functional independence with mobility by performin. Supine to sit with Modified [place]  2. Sit to stand transfer with Modified [place]  3. Gait  x 150 feet with Modified [place] using Rolling Walker.   4. Lower extremity exercise program x10 reps per handout, with independence                      Time Tracking:     PT Received On: 19  PT Start Time: 0850     PT Stop Time: 0916  PT Total Time (min): 26 min     Billable Minutes: Gait Training 17 and Therapeutic Activity 9    Treatment Type: Treatment  PT/PTA: PTA     PTA Visit Number: 3     Yousuf Wetzel, LOTTIE  2019

## 2019-01-24 NOTE — PROGRESS NOTES
[facility] General Surgery  Neuroendocrine Tumor Service  Progress Note     Admission Date: 1/18/2019  Hospital Length of Stay: 6  Principal Problem: Aspiration pneumonia of right lower lobe     Subjective:      Interval History:   S/p IR drainage of abdominal fluid collection  Denies any current pain  HE is still NPO   He had multiple bowel movements   He feels improved     Scheduled Meds:   bumetanide  1 mg Intravenous Daily    cyanocobalamin  1,000 mcg Subcutaneous Daily    enoxaparin  40 mg Subcutaneous Daily    fluconazole (DIFLUCAN) IVPB  400 mg Intravenous Q24H    iron sucrose (VENOFER) IVPB  100 mg Intravenous Daily    linezolid 600mg/300ml  600 mg Intravenous Q12H    meropenem (MERREM) IVPB  2 g Intravenous Q8H     Continuous Infusions:   albumin human 25% 12.5 g (01/24/19 0607)    Amino acid 5% - dextrose 15% (CLINIMIX-E) solution with additives (1L  provides 510 kcal/L dextrose, with 50 gm AA, 150 gm CHO, Na 35, K 30, Mg 5, Ca 4.5, Acetate 80, Cl 39, Phos 15) 50 mL/hr at 01/23/19 2031     PRN Meds:.sodium chloride, dextrose 50%, dextrose 50%, glucose, glucose, insulin aspart U-100, ondansetron, promethazine (PHENERGAN) IVPB, sodium chloride 0.9%    Objective:      Temp:  [98.1 °F (36.7 °C)-98.6 °F (37 °C)] 98.1 °F (36.7 °C)  Pulse:  [] 104  Resp:  [18-24] 21  SpO2:  [95 %-99 %] 95 %  BP: (104-145)/(62-77) 116/76  Weight change: -4.1 kg (-0.6 oz)    Intake/Output Summary (Last 24 hours) at 1/24/2019 0737  Last data filed at 1/24/2019 0607  Gross per 24 hour   Intake 2190.25 ml   Output 2818 ml   Net -627.75 ml     Drain output - 175    Physical Exam:  GEN: awake, alert, NAD  CV: tachycardic  PULM: normal work of breathing, on NC  ABD: S/NT/ND, incision c/d/i, IR drain in place with sanguinous output  EXT: Peripheral pulses present and equal bilaterally    Recent Labs   Lab 01/24/19  0515      K 3.8   CO2 33*      BUN 16   CREATININE 0.8   CALCIUM 9.3   PROT  7.2   AST 36   ALT 18   ALKPHOS 86   BILITOT 0.6     Recent Labs   Lab 01/24/19  0515   WBC 4.30   HGB 7.4*   HCT 25.6*          Significant Imaging: I have reviewed all pertinent imaging results/findings within the past 24 hours.    Micro: GNR and Yeast on aerobic culture from abdominal fluid drainage, not speciated  Resp Cx - many yeast, few GPC/GPR/GNR     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  Now c sepsis likely from aspiration RLL PNA  S/p IR drain of abdominal fluid collection    Pain control prn  F/u cultures from IR - on linezolid/merrem  monitor UOP, trend Cr, try to avoid nephrotoxics   NPO also for dysphagia/aspiration risk   cont TPN  PPI  lovenox  Aggressive resp therapy fluconazole   Abd culture grew Kluyvera ascorbata on abx   Had multiple bowel movements     Jose Lance MD  Feel free to secure chat me (Ctrl+Alt+5) for any questions   01/24/2019

## 2019-01-24 NOTE — PROGRESS NOTES
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 Patient was hospitalized for aspiration pneumonia and also found to have intra-abdominal abscess. EGD revealed congestion in gastric and antrum that was felt to be related to abscess. Patient continues to be NPO and on antibiotics.    Interval history: yesterday he had rouble with trying to swallow contrast and felt like he was choking. He had no other issues. He is comfortable this morning. He is having a G/J tube today by surgery for which he feels ready. Family at bedside this morning provides collateral history.     Review of Systems  General ROS: negative for chills, fever or weight loss  Cardiovascular ROS: no chest pain or dyspnea on exertion  Gastrointestinal ROS: no abdominal pain, change in bowel habits, or black/ bloody stools. Positive dysphagia and choking on secretions.     Physical Examination  /76   Pulse 97   Temp 98.7 °F (37.1 °C)   Resp 18   Ht 5' 10" (1.778 m)   Wt 102.4 kg (225 lb 12 oz)   SpO2 95%   BMI 32.39 kg/m²      General appearance: alert, cooperative, no distress, overweight  HENT: Normocephalic, atraumatic, neck symmetrical, no nasal discharge, dry mucous membranes   Lungs: clear to auscultation bilaterally, no dullness to percussion bilaterally  Heart: regular rate and rhythm without rub; no displacement of the PMI   Abdomen: soft, non-tender; bowel sounds normoactive; no organomegaly  Extremities: extremities symmetric; no clubbing, cyanosis, or edema  Neurologic: Alert and oriented X 3, normal strength, moving around in bed     Labs:  H/H 7.4/25.6    Imaging:  KUB:   Enteric contrast is once again identified within the stomach as well as scattered throughout nondilated loops of large bowel.  No significantly dilated loops of small bowel are identified on " today's examination.  Multiple surgical clips project over the right upper quadrant.  There is a stably positioned right upper quadrant pigtail drainage catheter in place.  Limited evaluation of free intraperitoneal air due to technique.  Visualized osseous structures are intact.    Imaging personally reviewed     Assessment:   68 yoM with pmh NET in duodenum s/p resection presents for acute onset solid/liquid dysphagia and aspiration pneumonia. Patient with intra-abdominal infection related to post-surgical changes. Continue antibiotics and drainage for abscess. Dysphagia appears related to oropharyngeal pathology and speech evaluating. No esophageal etiology of dysphagia, but gastric congestion may be contributing to nausea.      Plan:  - remains NPO and working with SLP  - surgery team to place J/G tube today  - continue abx per primary team    Thank you for this consult. Please call with questions.     Jessie Shelley DO  [facility]  01/24/2019  11:53 AM

## 2019-01-24 NOTE — PLAN OF CARE
Problem: Adult Inpatient Plan of Care  Goal: Plan of Care Review  Outcome: Ongoing (interventions implemented as appropriate)  Pt is aaox4. No c/o nausea or pain . Iv is CDI and infusing. Pt has been Npo for surgery. RUQ drain is secured. Picc line is CDI.  Cardiac and glucose is being monitored. No signs of distress. Bed is in lowest position with call light in place will continue to monitor.

## 2019-01-25 ENCOUNTER — ANESTHESIA (OUTPATIENT)
Dept: INTENSIVE CARE | Facility: HOSPITAL | Age: 69
DRG: 856 | End: 2019-01-25
Payer: MEDICARE

## 2019-01-25 ENCOUNTER — ANESTHESIA EVENT (OUTPATIENT)
Dept: INTENSIVE CARE | Facility: HOSPITAL | Age: 69
DRG: 856 | End: 2019-01-25
Payer: MEDICARE

## 2019-01-25 LAB
ALBUMIN SERPL BCP-MCNC: 3.7 G/DL
ALLENS TEST: ABNORMAL
ALP SERPL-CCNC: 78 U/L
ALT SERPL W/O P-5'-P-CCNC: 101 U/L
ANION GAP SERPL CALC-SCNC: 7 MMOL/L
ANION GAP SERPL CALC-SCNC: 7 MMOL/L
APTT BLDCRRT: 36.4 SEC
AST SERPL-CCNC: 204 U/L
BASOPHILS # BLD AUTO: 0.02 K/UL
BASOPHILS NFR BLD: 0.1 %
BILIRUB SERPL-MCNC: 0.9 MG/DL
BUN SERPL-MCNC: 22 MG/DL
BUN SERPL-MCNC: 29 MG/DL
CALCIUM SERPL-MCNC: 8.3 MG/DL
CALCIUM SERPL-MCNC: 8.8 MG/DL
CHLORIDE SERPL-SCNC: 102 MMOL/L
CHLORIDE SERPL-SCNC: 102 MMOL/L
CO2 SERPL-SCNC: 33 MMOL/L
CO2 SERPL-SCNC: 35 MMOL/L
CREAT SERPL-MCNC: 1 MG/DL
CREAT SERPL-MCNC: 1.3 MG/DL
DELSYS: ABNORMAL
DIFFERENTIAL METHOD: ABNORMAL
EOSINOPHIL # BLD AUTO: 0 K/UL
EOSINOPHIL NFR BLD: 0.1 %
EP: 5
ERYTHROCYTE [DISTWIDTH] IN BLOOD BY AUTOMATED COUNT: 17.1 %
ERYTHROCYTE [SEDIMENTATION RATE] IN BLOOD BY WESTERGREN METHOD: 28 MM/H
ERYTHROCYTE [SEDIMENTATION RATE] IN BLOOD BY WESTERGREN METHOD: 28 MM/H
EST. GFR  (AFRICAN AMERICAN): >60 ML/MIN/1.73 M^2
EST. GFR  (AFRICAN AMERICAN): >60 ML/MIN/1.73 M^2
EST. GFR  (NON AFRICAN AMERICAN): 56 ML/MIN/1.73 M^2
EST. GFR  (NON AFRICAN AMERICAN): >60 ML/MIN/1.73 M^2
FIO2: 100
FIO2: 55
FIO2: 55
GLUCOSE SERPL-MCNC: 154 MG/DL
GLUCOSE SERPL-MCNC: 185 MG/DL
HCO3 UR-SCNC: 33.7 MMOL/L (ref 24–28)
HCO3 UR-SCNC: 35 MMOL/L (ref 24–28)
HCO3 UR-SCNC: 38.4 MMOL/L (ref 24–28)
HCT VFR BLD AUTO: 34.5 %
HGB BLD-MCNC: 10 G/DL
INR PPP: 1.2
IP: 12
LYMPHOCYTES # BLD AUTO: 1.1 K/UL
LYMPHOCYTES NFR BLD: 7.3 %
MCH RBC QN AUTO: 28.4 PG
MCHC RBC AUTO-ENTMCNC: 29 G/DL
MCV RBC AUTO: 98 FL
MIN VOL: 12.8
MIN VOL: 13.1
MODE: ABNORMAL
MONOCYTES # BLD AUTO: 0.5 K/UL
MONOCYTES NFR BLD: 3.6 %
NEUTROPHILS # BLD AUTO: 12.9 K/UL
NEUTROPHILS NFR BLD: 88.6 %
PCO2 BLDA: 115.5 MMHG (ref 35–45)
PCO2 BLDA: 37.3 MMHG (ref 35–45)
PCO2 BLDA: 58 MMHG (ref 35–45)
PEEP: 5
PEEP: 5
PH SMN: 7.13 [PH] (ref 7.35–7.45)
PH SMN: 7.37 [PH] (ref 7.35–7.45)
PH SMN: 7.58 [PH] (ref 7.35–7.45)
PIP: 29
PIP: 30
PLATELET # BLD AUTO: 382 K/UL
PMV BLD AUTO: 9 FL
PO2 BLDA: 101 MMHG (ref 80–100)
PO2 BLDA: 207 MMHG (ref 80–100)
PO2 BLDA: 99 MMHG (ref 80–100)
POC BE: 13 MMOL/L
POC BE: 8 MMOL/L
POC BE: 9 MMOL/L
POC SATURATED O2: 100 % (ref 95–100)
POC SATURATED O2: 94 % (ref 95–100)
POC SATURATED O2: 99 % (ref 95–100)
POC TCO2: 35 MMOL/L (ref 23–27)
POC TCO2: 36 MMOL/L (ref 23–27)
POC TCO2: 42 MMOL/L (ref 23–27)
POCT GLUCOSE: 124 MG/DL (ref 70–110)
POCT GLUCOSE: 127 MG/DL (ref 70–110)
POCT GLUCOSE: 128 MG/DL (ref 70–110)
POCT GLUCOSE: 164 MG/DL (ref 70–110)
POTASSIUM SERPL-SCNC: 4.5 MMOL/L
POTASSIUM SERPL-SCNC: 5.6 MMOL/L
PROT SERPL-MCNC: 7.3 G/DL
PROTHROMBIN TIME: 12.3 SEC
RBC # BLD AUTO: 3.52 M/UL
SAMPLE: ABNORMAL
SITE: ABNORMAL
SODIUM SERPL-SCNC: 142 MMOL/L
SODIUM SERPL-SCNC: 144 MMOL/L
SP02: 100
SP02: 100
VT: 450
VT: 450
WBC # BLD AUTO: 14.54 K/UL

## 2019-01-25 PROCEDURE — 99900026 HC AIRWAY MAINTENANCE (STAT)

## 2019-01-25 PROCEDURE — 25000003 PHARM REV CODE 250: Performed by: SURGERY

## 2019-01-25 PROCEDURE — 94002 VENT MGMT INPAT INIT DAY: CPT

## 2019-01-25 PROCEDURE — 85730 THROMBOPLASTIN TIME PARTIAL: CPT

## 2019-01-25 PROCEDURE — 63600175 PHARM REV CODE 636 W HCPCS: Performed by: SURGERY

## 2019-01-25 PROCEDURE — 85025 COMPLETE CBC W/AUTO DIFF WBC: CPT

## 2019-01-25 PROCEDURE — S0171 BUMETANIDE 0.5 MG: HCPCS | Performed by: SURGERY

## 2019-01-25 PROCEDURE — 25000003 PHARM REV CODE 250: Performed by: STUDENT IN AN ORGANIZED HEALTH CARE EDUCATION/TRAINING PROGRAM

## 2019-01-25 PROCEDURE — C9113 INJ PANTOPRAZOLE SODIUM, VIA: HCPCS | Performed by: SURGERY

## 2019-01-25 PROCEDURE — 94761 N-INVAS EAR/PLS OXIMETRY MLT: CPT

## 2019-01-25 PROCEDURE — 25000003 PHARM REV CODE 250: Performed by: ANESTHESIOLOGY

## 2019-01-25 PROCEDURE — 85610 PROTHROMBIN TIME: CPT

## 2019-01-25 PROCEDURE — 20000000 HC ICU ROOM

## 2019-01-25 PROCEDURE — 63600175 PHARM REV CODE 636 W HCPCS: Performed by: STUDENT IN AN ORGANIZED HEALTH CARE EDUCATION/TRAINING PROGRAM

## 2019-01-25 PROCEDURE — 36600 WITHDRAWAL OF ARTERIAL BLOOD: CPT

## 2019-01-25 PROCEDURE — 80053 COMPREHEN METABOLIC PANEL: CPT

## 2019-01-25 PROCEDURE — B4185 PARENTERAL SOL 10 GM LIPIDS: HCPCS | Performed by: SURGERY

## 2019-01-25 PROCEDURE — 94660 CPAP INITIATION&MGMT: CPT

## 2019-01-25 PROCEDURE — 99900035 HC TECH TIME PER 15 MIN (STAT)

## 2019-01-25 PROCEDURE — 82803 BLOOD GASES ANY COMBINATION: CPT

## 2019-01-25 PROCEDURE — 80048 BASIC METABOLIC PNL TOTAL CA: CPT

## 2019-01-25 PROCEDURE — 27000221 HC OXYGEN, UP TO 24 HOURS

## 2019-01-25 RX ORDER — NALOXONE HCL 0.4 MG/ML
0.4 VIAL (ML) INJECTION
Status: COMPLETED | OUTPATIENT
Start: 2019-01-25 | End: 2019-01-25

## 2019-01-25 RX ORDER — SUCCINYLCHOLINE CHLORIDE 20 MG/ML
INJECTION INTRAMUSCULAR; INTRAVENOUS
Status: DISPENSED
Start: 2019-01-25 | End: 2019-01-25

## 2019-01-25 RX ORDER — NALOXONE HCL 0.4 MG/ML
0.02 VIAL (ML) INJECTION
Status: DISCONTINUED | OUTPATIENT
Start: 2019-01-25 | End: 2019-02-08 | Stop reason: HOSPADM

## 2019-01-25 RX ORDER — DOPAMINE HCL IN DEXTROSE 5 % 400MG/.25L
2 INFUSION BOTTLE (ML) INTRAVENOUS CONTINUOUS
Status: DISCONTINUED | OUTPATIENT
Start: 2019-01-25 | End: 2019-01-27

## 2019-01-25 RX ORDER — PROPOFOL 10 MG/ML
5 INJECTION, EMULSION INTRAVENOUS CONTINUOUS
Status: DISCONTINUED | OUTPATIENT
Start: 2019-01-25 | End: 2019-01-25

## 2019-01-25 RX ORDER — PROPOFOL 10 MG/ML
INJECTION, EMULSION INTRAVENOUS
Status: DISPENSED
Start: 2019-01-25 | End: 2019-01-25

## 2019-01-25 RX ORDER — KETOROLAC TROMETHAMINE 30 MG/ML
10 INJECTION, SOLUTION INTRAMUSCULAR; INTRAVENOUS EVERY 6 HOURS
Status: DISCONTINUED | OUTPATIENT
Start: 2019-01-25 | End: 2019-01-25

## 2019-01-25 RX ORDER — HYDROMORPHONE HCL IN 0.9% NACL 6 MG/30 ML
PATIENT CONTROLLED ANALGESIA SYRINGE INTRAVENOUS CONTINUOUS
Status: DISCONTINUED | OUTPATIENT
Start: 2019-01-25 | End: 2019-01-28

## 2019-01-25 RX ORDER — ETOMIDATE 2 MG/ML
INJECTION INTRAVENOUS
Status: DISPENSED
Start: 2019-01-25 | End: 2019-01-25

## 2019-01-25 RX ORDER — ROCURONIUM BROMIDE 10 MG/ML
INJECTION, SOLUTION INTRAVENOUS
Status: DISCONTINUED
Start: 2019-01-25 | End: 2019-01-25 | Stop reason: WASHOUT

## 2019-01-25 RX ORDER — SODIUM CHLORIDE 9 MG/ML
INJECTION, SOLUTION INTRAVENOUS CONTINUOUS
Status: DISCONTINUED | OUTPATIENT
Start: 2019-01-25 | End: 2019-01-27

## 2019-01-25 RX ADMIN — MEROPENEM 2 G: 1 INJECTION, POWDER, FOR SOLUTION INTRAVENOUS at 09:01

## 2019-01-25 RX ADMIN — PANTOPRAZOLE SODIUM 40 MG: 40 INJECTION, POWDER, LYOPHILIZED, FOR SOLUTION INTRAVENOUS at 08:01

## 2019-01-25 RX ADMIN — NALOXONE HYDROCHLORIDE 0.4 MG: 0.4 INJECTION, SOLUTION INTRAMUSCULAR; INTRAVENOUS; SUBCUTANEOUS at 06:01

## 2019-01-25 RX ADMIN — Medication: at 09:01

## 2019-01-25 RX ADMIN — FLUCONAZOLE, SODIUM CHLORIDE 400 MG: 2 INJECTION INTRAVENOUS at 04:01

## 2019-01-25 RX ADMIN — MEROPENEM 2 G: 1 INJECTION, POWDER, FOR SOLUTION INTRAVENOUS at 06:01

## 2019-01-25 RX ADMIN — BUMETANIDE 1 MG: 0.25 INJECTION INTRAMUSCULAR; INTRAVENOUS at 08:01

## 2019-01-25 RX ADMIN — KETOROLAC TROMETHAMINE 10 MG: 30 INJECTION, SOLUTION INTRAMUSCULAR at 11:01

## 2019-01-25 RX ADMIN — CHLORHEXIDINE GLUCONATE 15 ML: 1.2 RINSE ORAL at 08:01

## 2019-01-25 RX ADMIN — MEROPENEM 2 G: 1 INJECTION, POWDER, FOR SOLUTION INTRAVENOUS at 03:01

## 2019-01-25 RX ADMIN — BUMETANIDE: 0.25 INJECTION INTRAMUSCULAR; INTRAVENOUS at 09:01

## 2019-01-25 RX ADMIN — LINEZOLID 600 MG: 600 INJECTION, SOLUTION INTRAVENOUS at 06:01

## 2019-01-25 RX ADMIN — SOYBEAN OIL 250 ML: 20 INJECTION, SOLUTION INTRAVENOUS at 09:01

## 2019-01-25 RX ADMIN — LINEZOLID 600 MG: 600 INJECTION, SOLUTION INTRAVENOUS at 08:01

## 2019-01-25 RX ADMIN — DOPAMINE HYDROCHLORIDE IN DEXTROSE 2 MCG/KG/MIN: 1.6 INJECTION, SOLUTION INTRAVENOUS at 09:01

## 2019-01-25 RX ADMIN — KETOROLAC TROMETHAMINE 15 MG: 30 INJECTION, SOLUTION INTRAMUSCULAR at 06:01

## 2019-01-25 RX ADMIN — SODIUM CHLORIDE 500 ML: 0.9 INJECTION, SOLUTION INTRAVENOUS at 09:01

## 2019-01-25 RX ADMIN — ASCORBIC ACID, VITAMIN A PALMITATE, CHOLECALCIFEROL, THIAMINE HYDROCHLORIDE, RIBOFLAVIN-5 PHOSPHATE SODIUM, PYRIDOXINE HYDROCHLORIDE, NIACINAMIDE, DEXPANTHENOL, ALPHA-TOCOPHEROL ACETATE, VITAMIN K1, FOLIC ACID, BIOTIN, CYANOCOBALAMIN: 200; 3300; 200; 6; 3.6; 6; 40; 15; 10; 150; 600; 60; 5 INJECTION, SOLUTION INTRAVENOUS at 09:01

## 2019-01-25 RX ADMIN — KETOROLAC TROMETHAMINE 10 MG: 30 INJECTION, SOLUTION INTRAMUSCULAR at 05:01

## 2019-01-25 RX ADMIN — IRON SUCROSE 100 MG: 20 INJECTION, SOLUTION INTRAVENOUS at 08:01

## 2019-01-25 RX ADMIN — CHLORHEXIDINE GLUCONATE 15 ML: 1.2 RINSE ORAL at 09:01

## 2019-01-25 RX ADMIN — ENOXAPARIN SODIUM 40 MG: 100 INJECTION SUBCUTANEOUS at 04:01

## 2019-01-25 RX ADMIN — CYANOCOBALAMIN 1000 MCG: 1000 INJECTION, SOLUTION INTRAMUSCULAR at 08:01

## 2019-01-25 NOTE — PLAN OF CARE
pt underwent plcmt of GJ tube 01/24 --   01/24 - TN met with Aki with [facility] to discuss pt's upcoming needs for enteral feedings   (pt current with [facility] for home TPN and [facility]).    wife Sneha       1/24 s/p  1. Diag laparoscopy  2. Laparotomy 3. Extensive lysis of adhesions, extensive 4. Repair enterotomy  5. EGD 6. Gastrojejunostomy side to side stapled and handsewn  7. Drainage rt retroperitoneal abscess from pancreatitis with severe edema all around 8.  Release of omentum  8. 24 Fr malecot juanjose gastrostomy  pt now intubated in ICU        01/25/19 0858   Discharge Reassessment   Assessment Type Discharge Planning Assessment

## 2019-01-25 NOTE — ANESTHESIA PROCEDURE NOTES
Intubation    Diagnosis: Resp failure  Patient location during procedure: ICU  Procedure start time: 1/25/2019 6:51 AM  Timeout: 1/25/2019 6:50 AM  Procedure end time: 1/25/2019 6:58 AM  Staffing  Anesthesiologist: Roby Harper MD  Resident/CRNA: Lewis Ennis MD  Performed: anesthesiologist and resident/CRNA   Anesthesiologist was present at the time of the procedure.  Preanesthetic Checklist  Completed: patient identified, site marked, surgical consent, pre-op evaluation, timeout performed, IV checked, risks and benefits discussed, monitors and equipment checked and anesthesia consent given  Intubation  Indication: respiratory failure  Pre-oxygenation. Induction: intravenous, mask ventilation: easy mask.  Intubation: postinduction, laryngoscopy direct utilizing bougie, [place] 4.  Endotracheal Tube: oral, 7.0 mm ID, cuffed (inflated to minimal occlusive pressure)  Attempts: 2, Grade IV - could not see cords or arytenoids  Complicating Factors: anterior larynx and retrognathia  Tube secured at 25 cm at the lips.  Findings post-intubation: bilateral breath sounds, atraumatic / condition of teeth unchanged, positive ETCO2  Position Confirmation: auscultation

## 2019-01-25 NOTE — ANESTHESIA POSTPROCEDURE EVALUATION
"Anesthesia Post Evaluation    Patient: Hoang Santos Jr.    Procedure(s) Performed: Procedure(s) (LRB):  EGD (ESOPHAGOGASTRODUODENOSCOPY) (N/A)  GASTROJEJUNOSTOMY (Right)  LYSIS, ADHESIONS (N/A)  LAPAROTOMY, EXPLORATORY (N/A)    Final Anesthesia Type: general  Patient location during evaluation: ICU  Patient participation: No - Unable to Participate, Intubation  Level of consciousness: sedated  Post-procedure vital signs: reviewed and stable  Pain management: adequate  Airway patency: patent  PONV status at discharge: No PONV  Anesthetic complications: no      Cardiovascular status: blood pressure returned to baseline and hemodynamically stable  Respiratory status: ventilator  Hydration status: euvolemic  Follow-up not needed.        Visit Vitals  BP (!) 95/59   Pulse 98   Temp 37.2 °C (98.9 °F) (Axillary)   Resp (!) 116   Ht 5' 10" (1.778 m)   Wt 108.2 kg (238 lb 8.6 oz)   SpO2 99%   BMI 34.23 kg/m²       Pain/Perla Score: Pain Rating Prior to Med Admin: 0 (1/25/2019  5:12 PM)  Pain Rating Post Med Admin: 0 (1/25/2019 12:21 PM)        "

## 2019-01-25 NOTE — PLAN OF CARE
Problem: Adult Inpatient Plan of Care  Goal: Plan of Care Review  Outcome: Ongoing (interventions implemented as appropriate)  Patient is currently on BiPap 12/5 60%. Will continue to monitor and titrate as needed.

## 2019-01-25 NOTE — NURSING
Pt not responding to stimuli. Called R.T. For blood gas and co2 115. Gave pt narcan x 2 doses with no response. Pt then intubated per anesthesia. Pt's wife is aware of the change in status to intubation as well.

## 2019-01-25 NOTE — TRANSFER OF CARE
"Anesthesia Transfer of Care Note    Patient: Hoang Santos Jr.    Procedure(s) Performed: Procedure(s) (LRB):  EGD (ESOPHAGOGASTRODUODENOSCOPY) (N/A)  GASTROJEJUNOSTOMY (Right)  LYSIS, ADHESIONS (N/A)  LAPAROTOMY, EXPLORATORY (N/A)    Patient location: ICU    Anesthesia Type: general    Transport from OR: Transported from OR on 100% O2 by closed face mask with adequate spontaneous ventilation    Post pain: adequate analgesia    Post assessment: no apparent anesthetic complications    Post vital signs: stable    Level of consciousness: sedated and responds to stimulation    Nausea/Vomiting: no nausea/vomiting    Complications: none    Transfer of care protocol was followedComments: Upon arrival to ICU,  Patient attached to Bipap with settings of 12 / 5, 60%.  Satting 100% ,  Vt: 200-400.        Last vitals:   Visit Vitals  /73   Pulse 93   Temp 37.2 °C (98.9 °F)   Resp 22   Ht 5' 10" (1.778 m)   Wt 102.4 kg (225 lb 12 oz)   SpO2 100%   BMI 32.39 kg/m²     "

## 2019-01-25 NOTE — OR NURSING
Pts wife called and updated on pts status, pt will be going to ICU [id number]. Dr will be out to talk to family when they finish procedure. Jenae Gibson RN

## 2019-01-25 NOTE — PT/OT/SLP PROGRESS
Occupational Therapy  Missed Visit    Patient Name:  Hoang Santos Jr.   MRN:  [id number]    Patient not seen today secondary to nursing hold due to recent intubation and GJ tube placement. Will follow-up.    ADDIE Aaron  1/25/2019

## 2019-01-25 NOTE — CONSULTS
Consult Note  [facility]    Attending: Sheldon Weber  Fellow: Lucero Quispe  Admit Date: 1/18/2019  Today's Date: 01/25/2019  Reason for Consult:  Ventilator management, apnea    SUBJECTIVE:     HPI:  Mr. Hoang Santos is a 68 year old man admitted to the surgical service at [facility] [place], [place] for post-operative fevers and tachycardia.      He underwent duodenectomy on 1/4 with Dr. Steiner for a malignant small bowel carcinoid. Following his surgery he was noted to have significant issues with aspiration and dysphagia -- thought to be secondary to cervical osteophytesand exacerbated by mucosal edema caused by NGT placement. His post operative course was additionally complicated by UTI and HAP that was adequately treated prior to discharge.      He was discharged home on 1/16 in fair condition with a PICC line and TPN. He was sent back to the hospital on 1/18 as his HH nurse noted him to be febrile and tachycardic.  Chest imaging again re-demonstrated volume loss in the RML and RLL with small pleural effusion and likely consolidation of the medial RLL. UA was improved from before. He was started on CPT and airway clearance which resulted in improvement in his CXR. Bronchoscopy was performed and airway exam was normal. Bronchial washing was unrevealing.   He was ultimately found to have an abdominal abscess and was taken  back to surgery yesterday. Post surgery he was extubated and placed on bipap at 10/5 and continued to be progressively more hypercapnic. He was then intubated this morning around 7 am. He has continued to have apnea on the ventilator today and we were re-consulted for assistance.        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    Prostatic hyperplasia     Sleep apnea      Past Surgical  History:   Procedure Laterality Date    BIOPSY, LIVER  1/4/2019    [facility] at [facility]    CHOLECYSTECTOMY  1/4/2019    [facility] at [facility] OR    DUODENECTOMY N/A 1/4/2019    [facility] at [facility] OR    EGD (ESOPHAGOGASTRODUODENOSCOPY) N/A 1/24/2019    [facility] at [facility] OR    EGD (ESOPHAGOGASTRODUODENOSCOPY) N/A 1/4/2019    Per[facility] by [facility] at [facility] OR    EGD (ESOPHAGOGASTRODUODENOSCOPY) N/A 9/24/2018    Performed by Sal[facility]ez [facility] at [facility] (4TH FLR)    ESOPHAGOGASTRODUODENOSCOPY (EGD) N/A 1/21/2019    Performed by Jose Kahn MD at [facility] ENDO    FUNDOPLICATION N/A 1/4/2019    [facility] by [facility] at [facility] OR    GASTROJEJUNOSTOMY Right 1/24/2019    [facility] at [facility] OR    GASTROJEJUNOSTOMY N/A 1/4/2019    [facility] at [facility] OR    LAPAROTOMY, EXPLORATORY N/A 1/24/2019    [facility] by [facility] at [facility] OR    LYMPHADENECTOMY N/A 1/4/2019    Per[facility] by [facility] at [facility] OR    LYSIS, ADHESIONS N/A 1/24/2019    Per[facility] by [facility] at [facility] OR    PALATOPLASTY-(UPPP) N/A 2/14/2017    Performed by Bob Araujo III, MD at [facility] OR 2ND FLR    PH MONITORING, ESOPHAGUS, WIRELESS, (OFF REFLUX MEDS) N/A 9/24/2018    Performed by Sal[facility] Nu[facility]ez [facility] at [facility] (4TH FLR)    TONSILLECTOMY      TRANSPROSTATIC TISSUE RETRACTION N/A 6/28/2018    [facility]    ULTRASOUND-ENDOSCOPIC-UPPER N/A 11/5/2018    Performed by Gorge Reyes MD at [facility] ENDO    UVULOPALATOPHARYNGOPLASTY       Family History   Problem Relation Age of Onset    Heart disease Mother     Diabetes Mother     Stroke Father     Cancer Sister         pancreatitic    COPD Sister     Prostate cancer Neg Hx     Kidney disease Neg Hx      Thyroid disease Neg Hx     Retinal detachment Neg Hx     Macular degeneration Neg Hx     Hypertension Neg Hx     Glaucoma Neg Hx      Social History     Tobacco Use    Smoking status: Never Smoker    Smokeless tobacco: Never Used   Substance Use Topics    Alcohol use: Yes     Alcohol/week: 1.2 oz     Types: 2 Cans of beer per week    Drug use: No       All medications reviewed.    Review of Systems   Unable to perform ROS: Intubated       OBJECTIVE:     Vital Signs Trends/Hx Reviewed    Temp:  [97.7 °F (36.5 °C)-99.7 °F (37.6 °C)] 98.9 °F (37.2 °C)  Pulse:  [] 98  Resp:  [] 116  SpO2:  [94 %-100 %] 99 %  BP: ()/(51-88) 95/59    Vent Mode: A/C  Oxygen Concentration (%):  [] 45  Resp Rate Total:  [18 br/min-28 br/min] 18 br/min  Vt Set:  [450 mL] 450 mL  PEEP/CPAP:  [5 cmH20] 5 cmH20  Mean Airway Pressure:  [11 naL78-85 cmH20] 11 cmH20      Physical Exam:  General: NAD, cooperative & interactive.  HEENT: AT/NC, PERRL, EOMI, oral and nasal mucosa moist.   Neck: swelling noted   Cardiac: normal rate, regular rhythm, with no MRG with brisk cap refill and symmetric pulses in distal extremities.  Respiratory: Normal inspection. Symmetric chest rise. Auscultation clear bilaterally. No increased work of breathing noted.   Abdomen: Soft, NT/ND. +BS. No hepatosplenomegaly.   Extremities: No edema.   Neuro: Grossly intact to brief exam. Follows commands and appropriately answers questions.        Laboratory:  CBC and BMP stable  ABG improved at last check     ASSESSMENT/PLAN:     Acute Hypercapnic Respiratory failure  - likely secondary to anesthesia and under ventilation with the bipap overnight.   - he is tolerating pressure support without any apnea spells now.   - he is on a small amount of pain medicine, continue to use the lowest dose possible to control his pain  - will extubate to bipap and monitor overnight.   - bipap setttings should be 15/8 at least to start and may need titration,  however now that he is more awake and alert he will likely not have as much of an issue.       Thank you for allowing us to participate in the care of this patient.  Please call with questions.    Lucero Quispe M.D.  [facility] Pulmonary/Critical Care Fellow

## 2019-01-25 NOTE — PLAN OF CARE
Problem: Adult Inpatient Plan of Care  Goal: Plan of Care Review  Outcome: Ongoing (interventions implemented as appropriate)  Patient on  with documented settings.  Alarms are set and functioning with adequate volumes.  AMBU bag and mask at bedside. Will continue to monitor.

## 2019-01-25 NOTE — PROGRESS NOTES
[facility] General Surgery  Neuroendocrine Tumor Service  Progress Note     Admission Date: 1/18/2019  Hospital Length of Stay: 7  Principal Problem: Aspiration pneumonia of right lower lobe   Gastric outlet obstruction     Subjective:      Interval History:   1/24/18 - Laparotomy, ASHLEIGH, enterotomy repair, EGD, GastroJ, Retroperitoneal peripancreatic abscess open drainage, Omental flap creation, Lori gastrostomy c 24 Fr Malecot     Reintubated early hours this morning by anesthesia    NPO/TPN    Scheduled Meds:   bumetanide  1 mg Intravenous Daily    bumetanide  1 mg Intravenous Once    chlorhexidine  15 mL Mouth/Throat BID    cyanocobalamin  1,000 mcg Subcutaneous Daily    enoxaparin  40 mg Subcutaneous Daily    etomidate        fluconazole (DIFLUCAN) IVPB  400 mg Intravenous Q24H    iron sucrose (VENOFER) IVPB  100 mg Intravenous Daily    linezolid 600mg/300ml  600 mg Intravenous Q12H    meropenem (MERREM) IVPB  2 g Intravenous Q8H    pantoprazole  40 mg Intravenous Daily    propofol        succinylcholine         Continuous Infusions:   albumin human 25% Stopped (01/24/19 2040)    Amino acid 5% - dextrose 15% (CLINIMIX-E) solution with additives (1L  provides 510 kcal/L dextrose, with 50 gm AA, 150 gm CHO, Na 35, K 30, Mg 5, Ca 4.5, Acetate 80, Cl 39, Phos 15) 50 mL/hr at 01/24/19 2331     PRN Meds:.sodium chloride, dextrose 50%, dextrose 50%, glucose, glucose, HYDROmorphone, insulin aspart U-100, ondansetron, promethazine (PHENERGAN) IVPB, sodium chloride 0.9%    Objective:      Temp:  [97.7 °F (36.5 °C)-99.7 °F (37.6 °C)] 99 °F (37.2 °C)  Pulse:  [] 108  Resp:  [] 134  SpO2:  [94 %-100 %] 100 %  BP: ()/(51-93) 83/51  Weight change: 0 kg (0 lb)    Intake/Output Summary (Last 24 hours) at 1/25/2019 0829  Last data filed at 1/25/2019 0700  Gross per 24 hour   Intake 4107.17 ml   Output 1715 ml   Net 2392.17 ml     UOP 1.2 L    G tube 90 bilious    Physical  Exam:  GEN: ETT in place  CV: tachycardic  PULM: normal work of breathing, on NC  ABD: S/NT/ND, incision c/d/i, tubes in good position  EXT: Peripheral pulses present and equal bilaterally    Recent Labs   Lab 01/25/19  0444      K 5.6*   CO2 35*      BUN 22   CREATININE 1.0   CALCIUM 8.8   PROT 7.3   *   *   ALKPHOS 78   BILITOT 0.9     Recent Labs   Lab 01/25/19  0444   WBC 14.54*   HGB 10.0*   HCT 34.5*   *       Significant Imaging: I have reviewed all pertinent imaging results/findings within the past 24 hours.    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  Now c sepsis likely from aspiration RLL PNA  S/p IR drain of abdominal fluid collection    Pain control prn  F/u OR cultures 1/24/18  monitor UOP, trend Cr, try to avoid nephrotoxics   K up 5.6 - will give NS, recheck noon, no EKG changes, Ca wnl  Check ABG after rapid intubation, CXR noted ETT in good potition  cont TPN/NPO, PPI  F/u bronch labs rare budding yeast on gram stain on fluconazole   Abd culture grew Kluyvera ascorbata on abx       Braulio Negrete MD - HO4

## 2019-01-25 NOTE — OP NOTE
[facility]  Surgery Department  Operative Note    SUMMARY     Date of Procedure: 1/24/2019     Procedure: 1. Diag laparoscopy  2. Laparotomy  3. Extensive lysis of adhesions, extensive  4. Repair enterotomy  5. EGD  6. Gastrojejunostomy side to side stapled and handsewn  7. Drainage rt retroperitoneal abscess from pancreatitis with severe edema all around  8.  Release of omentum  8. 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    Attestation: I was present and scrubbed for the entire procedure.

## 2019-01-25 NOTE — PT/OT/SLP PROGRESS
Physical Therapy      Patient Name:  Hoang Santos Jr.   MRN:  [id number]     Nursing (Jonathan) hold secondary to pt being intubated this morning. Will follow-up when medically stable.    Anais Peralta, PTA

## 2019-01-25 NOTE — OP NOTE
DATE OF PROCEDURE:  01/24/2019.    PREOPERATIVE DIAGNOSES:  1.  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  1.  Delayed gastric emptying with pancreatitis with right retroperitoneal   abscess.    POSTOPERATIVE DIAGNOSES:  1.  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  2.  Extensive omental adhesions causing gastric outlet obstruction like   symptoms.    PROCEDURES DONE:  1.  Diagnostic laparoscopy, aborted later.  2.  Laparotomy.  3.  Extensive lysis of adhesion complicated releasing the small bowel and   stomach causing gastric outlet obstruction like symptoms.  4.  Repair of enterotomy.  5.  Upper endoscopy, EGD.  6.  Gastrojejunostomy, side-to-side staple and handsewn, incision and drainage   of the right retroperitoneal abscess from pancreatitis with severe edema all   around.  7.  Release of omentum.  8.  A 24-Wolof gastrostomy using Malecot 24-Wolof catheter.    SURGEON:  Romina Salvador M.D.    COSURGEON:  Brandon Shelley M.D.    SURGERY RESIDENT:  Dr. Jamie Vines.    BLOOD LOSS:  About 250 mL.  The patient transferred to ICU in a stable   condition.    HISTORY AND INDICATION:  This is a 68-year-old gentleman who underwent   duodenectomy about two weeks back, who was having significant pharyngeal   dysphagia.  He had osteophytes of the vertebra causing significant impingement   of the esophagus.  He was not able to swallow overall.  The patient has had this   problem before.  However, after the surgery, his swallowing was worse and could   not even swallow his own secretions.  He had a complete workup with ENT.  The   plan was to place a feeding tube and the G-tube to drainage the secretions and   also for feeding.  I had a long discussion with the patient and the family.  He   has been on TPN and had been in the hospital.  He also had episodes of  pneumonia   for which he was on IV antibiotics from aspiration of his own secretions.    After going over all the risk and benefits, consent was obtained.  I had a long   discussion with the family.  I told them the risk of surgery such as bleeding,   infection, DVT, PE, MI, stroke, prolonged ICU stay, reexplorations, etc.    FINDINGS:  The laparoscopic showed extensive adhesions to the midline.  There   was significant edema and laparoscopic lysis of adhesion could not be carried   out easily.  Also, the whole small bowel and stomach were all friable.  Any   attempt to do anything caused a seromuscular tear.  Therefore, midline   laparotomy was done and the patient had significant adhesions to the midline.    Meticulous lysis of adhesion was done.  There were few seromuscular tears, which   were approximated.  The omentum was stuck to the right upper quadrant causing   gastric outlet like symptoms.  Upper endoscopy was done.  It was advanced beyond   the anastomosis; however, just beyond the anastomosis, there was significant   extrinsic compression from edema all around.  The patient had an abscess in the   right upper quadrant where the pigtail catheter was.  Careful lysis of adhesion   was done and this area was reached.  The patient also had a saponification of   the area all around consistent with pancreatitis.  Finally, because of the   gastric outlet like symptom, the patient would benefit from G-tube and a   gastrojejunostomy.    PROCEDURE IN DETAIL:  The patient was brought to the Operating Room.  He was   placed in supine position.  He was then sedated and intubated.  The patient did   not have a Schrader at the beginning of the case; however, since the case was   extended and at the end of the case, a Schrader catheter was placed.  Abdomen was   prepped and draped in the usual sterile manner.  Timeout was done to verify the   ID of the patient and procedure and everyone concurred.  Just below the left   costal  margin, a small incision was made.  Veress needle was inserted into the   abdominal cavity and a 5-mm Optiview trocar was inserted.  This trocar was   inserted under direct vision.  Abdomen was visualized.  The patient had a patent   left side of the abdomen.  However, the midline and left upper quadrant was   densely adhered.  The right upper quadrant was also significantly stuck.  I   tried to place two more 5-mm ports in the right lower quadrant and in the left   lower quadrant to see whether I can manage to do lysis of adhesion since the   adhesions were thick and any attempts were futile, I decided to proceed with a   laparotomy.    The ports were all taken out.  A midline laparotomy was made at the previous   incision.  Carefully, the fascia was identified.  The previous Prolene suture   was transected.  Carefully, the abdominal cavity was entered and just below the   fascia, the small bowel was stuck densely.  I spent a lot of time and meticulous   attempt was made to take down the adhesions.  In the process of doing that,   there were some seromuscular tear.  This was approximated; however, there was   one area, there was enterotomy.  This enterotomy was approximated in 2 layers   using 4-0 PDS stitch.  Carefully, the lysis of adhesion was done.  The wound   protector was placed and Carver retractor was used to expose the abdominal   wall.    I spent a lot of time and also requested the help of Dr. Shelley who helped me   in doing lysis of adhesion and gastrojejunostomy.  Carefully, the adhesions   were taken down all around.  In the upper quadrant, the liver was identified.    The stomach was densely adhered to the undersurface of the stomach.  I tried to   mobilize as much as possible; however, the stomach could not be mobilized much.    The greater omentum formed like a band and it was stuck to the right upper   quadrant causing a gastric outlet like obstruction.  This omentum was carefully   taken  down using a combination of cautery and LigaSure.  Finally, the omentum   was completely clear of the anterior surface of the stomach.  I also started to   do lysis of adhesion in the right upper quadrant.  In the process of doing that,   I reached the right retroperitoneum.  I was able to feel the pigtail catheter,   which was placed before the whole area was irrigated with lots of antibiotic   solution.  In the process of doing lysis of adhesion, there was significant   amount of purulent drainage that was drained.  The patient had extensive   saponification all around and he had edema close to the gastroduodenal   anastomosis and in the tissues all around the area of the head of the pancreas   consistent with pancreatitis with a pancreatic necrosis.    An upper endoscopy was done.  There was some difficulty in inserting the scope   into the esophagus as the patient had significant osteophytes.  Carefully, the   scope was advanced into the stomach.  With a careful maneuvering and with   difficulty, the gastroduodenal anastomosis was visualized.  Beyond that, there   was some edema consistent with extrinsic compression.  The scope could not be   advanced.  I tried to manipulate; however, could not be accomplished.  At this   point, I looked at the body of the stomach.  Since the stomach was really deep,   the PEG tube could not be done in this fashion, so the scope was withdrawn and   upper endoscopy was completed.  The whole abdomen was irrigated with lots of   antibiotic solution and Betadine solution was used to irrigate the abdomen.  The   omentum was completely taken down all around.  So, the stomach was completely   free.  At this point, my original plan was to do a gastrostomy tube and   jejunostomy tube for his feeding; however, seeing the edema at the   gastroduodenal area and also socked in the abdomen, I decided to do   gastrojejunostomy to prevent anymore problem down the future.  Carefully the    ligament of Treitz was identified.  About 30 cm distal to that, the loop of   jejunum was brought close to the greater curvature of the stomach.  A   side-to-side anastomosis was accomplished using iDrive stapler and the   gastrostomy and jejunostomy opening was approximated using Gambee stitch using   4-0 PDS stitch.  Following this, the whole abdomen was irrigated with lots of   Betadine solution and antibiotic solution and was completely drained.  In order   to promote feeding, I decided to place a gastrostomy tube.  I looked at all the   area of the stomach.  The best area was the antrum, which could be close to the   right abdominal wall.  A pursestring suture was made at the antrum.  Following   this, the gastrostomy was made and a 24-[place] Malecot catheter was inserted   into the lumen of the stomach.  Prior to placing into the stomach, it was   brought from the abdominal wall into the abdominal cavity.  Care was taken, so   that the Malecot tip was just at the lumen.  The pursestring suture was   approximated around the Malecot catheter.  The second layer of pursestring   suture was taken all around and it was tied to the Malecot catheter.  The   stomach was then tacked to the right-sided abdominal wall around the Malecot   catheter.  The Malecot catheter was secured to the skin using 2-0 nylon stitch.    Following this, the whole abdomen was extensively irrigated with lots of   antibiotic solution and was completely drained.  Meticulous hemostasis was   accomplished all around.  The gastrojejunal anastomosis was sprayed with Evicel   and also the place where the seromuscular tears were approximated and also where   the enterotomy was approximated.  Following this, the abdominal wall was   infiltrated with a diluted solution of Marcaine and Exparel and normal saline.    The fascia was approximated with running #1 Prolene stitch.  Skin was closed   using 4-0 Monocryl.  The instrument and sponge counts were  correct.  KUB did not   show any evidence of foreign body.  The patient was extubated.  Prior to   extubation, a Schrader catheter was placed in the bladder.  Proper dressings were   placed.  He was extubated, taken to ICU in a stable condition.          /lei [id number] blank(s)        MARNIE  dd: 01/24/2019 20:45:39 (CST)  td: 01/24/2019 23:53:47 (CST)  Doc ID   #[id number]  Job ID #[id number]    CC:

## 2019-01-26 LAB
ALBUMIN SERPL BCP-MCNC: 3.2 G/DL
ALLENS TEST: ABNORMAL
ALLENS TEST: ABNORMAL
ALP SERPL-CCNC: 72 U/L
ALT SERPL W/O P-5'-P-CCNC: 58 U/L
ANION GAP SERPL CALC-SCNC: 6 MMOL/L
ANISOCYTOSIS BLD QL SMEAR: SLIGHT
APTT BLDCRRT: 33.6 SEC
AST SERPL-CCNC: 79 U/L
BASOPHILS # BLD AUTO: 0.01 K/UL
BASOPHILS # BLD AUTO: 0.01 K/UL
BASOPHILS NFR BLD: 0.1 %
BASOPHILS NFR BLD: 0.1 %
BILIRUB SERPL-MCNC: 0.6 MG/DL
BUN SERPL-MCNC: 33 MG/DL
CALCIUM SERPL-MCNC: 8.5 MG/DL
CHLORIDE SERPL-SCNC: 101 MMOL/L
CO2 SERPL-SCNC: 38 MMOL/L
CREAT SERPL-MCNC: 1.1 MG/DL
DACRYOCYTES BLD QL SMEAR: ABNORMAL
DELSYS: ABNORMAL
DIFFERENTIAL METHOD: ABNORMAL
DIFFERENTIAL METHOD: ABNORMAL
EOSINOPHIL # BLD AUTO: 0 K/UL
EOSINOPHIL # BLD AUTO: 0 K/UL
EOSINOPHIL NFR BLD: 0.1 %
EOSINOPHIL NFR BLD: 0.3 %
ERYTHROCYTE [DISTWIDTH] IN BLOOD BY AUTOMATED COUNT: 16.5 %
ERYTHROCYTE [DISTWIDTH] IN BLOOD BY AUTOMATED COUNT: 17.1 %
EST. GFR  (AFRICAN AMERICAN): >60 ML/MIN/1.73 M^2
EST. GFR  (NON AFRICAN AMERICAN): >60 ML/MIN/1.73 M^2
FLOW: 2
GLUCOSE SERPL-MCNC: 124 MG/DL
HCO3 UR-SCNC: 38.8 MMOL/L (ref 24–28)
HCO3 UR-SCNC: 39.4 MMOL/L (ref 24–28)
HCT VFR BLD AUTO: 28.8 %
HCT VFR BLD AUTO: 30.6 %
HGB BLD-MCNC: 8.2 G/DL
HGB BLD-MCNC: 8.9 G/DL
HYPOCHROMIA BLD QL SMEAR: ABNORMAL
INR PPP: 1.2
LYMPHOCYTES # BLD AUTO: 1.2 K/UL
LYMPHOCYTES # BLD AUTO: 1.4 K/UL
LYMPHOCYTES NFR BLD: 10.2 %
LYMPHOCYTES NFR BLD: 9.6 %
MCH RBC QN AUTO: 28.5 PG
MCH RBC QN AUTO: 29.1 PG
MCHC RBC AUTO-ENTMCNC: 28.5 G/DL
MCHC RBC AUTO-ENTMCNC: 29.1 G/DL
MCV RBC AUTO: 100 FL
MCV RBC AUTO: 100 FL
MODE: ABNORMAL
MONOCYTES # BLD AUTO: 1.2 K/UL
MONOCYTES # BLD AUTO: 1.4 K/UL
MONOCYTES NFR BLD: 9.9 %
MONOCYTES NFR BLD: 9.9 %
NEUTROPHILS # BLD AUTO: 11.3 K/UL
NEUTROPHILS # BLD AUTO: 9.4 K/UL
NEUTROPHILS NFR BLD: 79.5 %
NEUTROPHILS NFR BLD: 79.9 %
OVALOCYTES BLD QL SMEAR: ABNORMAL
PCO2 BLDA: 74.9 MMHG (ref 35–45)
PCO2 BLDA: 86.5 MMHG (ref 35–45)
PH SMN: 7.27 [PH] (ref 7.35–7.45)
PH SMN: 7.32 [PH] (ref 7.35–7.45)
PLATELET # BLD AUTO: 239 K/UL
PLATELET # BLD AUTO: 282 K/UL
PLATELET BLD QL SMEAR: ABNORMAL
PMV BLD AUTO: 8.8 FL
PMV BLD AUTO: 8.9 FL
PO2 BLDA: 30 MMHG (ref 40–60)
PO2 BLDA: 39 MMHG (ref 40–60)
POC BE: 12 MMOL/L
POC BE: 13 MMOL/L
POC SATURATED O2: 48 % (ref 95–100)
POC SATURATED O2: 62 % (ref 95–100)
POC TCO2: 41 MMOL/L (ref 24–29)
POC TCO2: 42 MMOL/L (ref 24–29)
POCT GLUCOSE: 110 MG/DL (ref 70–110)
POCT GLUCOSE: 124 MG/DL (ref 70–110)
POCT GLUCOSE: 130 MG/DL (ref 70–110)
POIKILOCYTOSIS BLD QL SMEAR: SLIGHT
POTASSIUM SERPL-SCNC: 4.7 MMOL/L
PROT SERPL-MCNC: 7 G/DL
PROTHROMBIN TIME: 12.1 SEC
RBC # BLD AUTO: 2.88 M/UL
RBC # BLD AUTO: 3.06 M/UL
SAMPLE: ABNORMAL
SAMPLE: ABNORMAL
SITE: ABNORMAL
SITE: ABNORMAL
SODIUM SERPL-SCNC: 145 MMOL/L
STOMATOCYTES BLD QL SMEAR: PRESENT
WBC # BLD AUTO: 11.83 K/UL
WBC # BLD AUTO: 14.11 K/UL

## 2019-01-26 PROCEDURE — 25000003 PHARM REV CODE 250: Performed by: STUDENT IN AN ORGANIZED HEALTH CARE EDUCATION/TRAINING PROGRAM

## 2019-01-26 PROCEDURE — 85730 THROMBOPLASTIN TIME PARTIAL: CPT

## 2019-01-26 PROCEDURE — 25000003 PHARM REV CODE 250: Performed by: SURGERY

## 2019-01-26 PROCEDURE — 85025 COMPLETE CBC W/AUTO DIFF WBC: CPT

## 2019-01-26 PROCEDURE — 94761 N-INVAS EAR/PLS OXIMETRY MLT: CPT

## 2019-01-26 PROCEDURE — 85610 PROTHROMBIN TIME: CPT

## 2019-01-26 PROCEDURE — 63600175 PHARM REV CODE 636 W HCPCS: Performed by: STUDENT IN AN ORGANIZED HEALTH CARE EDUCATION/TRAINING PROGRAM

## 2019-01-26 PROCEDURE — C9113 INJ PANTOPRAZOLE SODIUM, VIA: HCPCS | Performed by: SURGERY

## 2019-01-26 PROCEDURE — 20000000 HC ICU ROOM

## 2019-01-26 PROCEDURE — 63600175 PHARM REV CODE 636 W HCPCS: Performed by: SURGERY

## 2019-01-26 PROCEDURE — 99900035 HC TECH TIME PER 15 MIN (STAT)

## 2019-01-26 PROCEDURE — A4216 STERILE WATER/SALINE, 10 ML: HCPCS | Performed by: SURGERY

## 2019-01-26 PROCEDURE — 27000221 HC OXYGEN, UP TO 24 HOURS

## 2019-01-26 PROCEDURE — 80053 COMPREHEN METABOLIC PANEL: CPT

## 2019-01-26 PROCEDURE — 25000003 PHARM REV CODE 250: Performed by: ANESTHESIOLOGY

## 2019-01-26 PROCEDURE — 94660 CPAP INITIATION&MGMT: CPT

## 2019-01-26 PROCEDURE — 82803 BLOOD GASES ANY COMBINATION: CPT

## 2019-01-26 PROCEDURE — 94664 DEMO&/EVAL PT USE INHALER: CPT

## 2019-01-26 PROCEDURE — 94799 UNLISTED PULMONARY SVC/PX: CPT

## 2019-01-26 RX ADMIN — FLUCONAZOLE, SODIUM CHLORIDE 400 MG: 2 INJECTION INTRAVENOUS at 06:01

## 2019-01-26 RX ADMIN — CHLORHEXIDINE GLUCONATE 15 ML: 1.2 RINSE ORAL at 08:01

## 2019-01-26 RX ADMIN — ENOXAPARIN SODIUM 40 MG: 100 INJECTION SUBCUTANEOUS at 06:01

## 2019-01-26 RX ADMIN — CYANOCOBALAMIN 1000 MCG: 1000 INJECTION, SOLUTION INTRAMUSCULAR at 08:01

## 2019-01-26 RX ADMIN — MEROPENEM 2 G: 1 INJECTION, POWDER, FOR SOLUTION INTRAVENOUS at 06:01

## 2019-01-26 RX ADMIN — LINEZOLID 600 MG: 600 INJECTION, SOLUTION INTRAVENOUS at 06:01

## 2019-01-26 RX ADMIN — MEROPENEM 2 G: 1 INJECTION, POWDER, FOR SOLUTION INTRAVENOUS at 02:01

## 2019-01-26 RX ADMIN — AMPHOTERICIN B 50 MG: 50 INJECTION, POWDER, LYOPHILIZED, FOR SOLUTION INTRAVENOUS at 08:01

## 2019-01-26 RX ADMIN — Medication: at 07:01

## 2019-01-26 RX ADMIN — AMPHOTERICIN B: 50 INJECTION, POWDER, LYOPHILIZED, FOR SOLUTION INTRAVENOUS at 12:01

## 2019-01-26 RX ADMIN — PANTOPRAZOLE SODIUM 40 MG: 40 INJECTION, POWDER, LYOPHILIZED, FOR SOLUTION INTRAVENOUS at 08:01

## 2019-01-26 RX ADMIN — ASCORBIC ACID, VITAMIN A PALMITATE, CHOLECALCIFEROL, THIAMINE HYDROCHLORIDE, RIBOFLAVIN-5 PHOSPHATE SODIUM, PYRIDOXINE HYDROCHLORIDE, NIACINAMIDE, DEXPANTHENOL, ALPHA-TOCOPHEROL ACETATE, VITAMIN K1, FOLIC ACID, BIOTIN, CYANOCOBALAMIN: 200; 3300; 200; 6; 3.6; 6; 40; 15; 10; 150; 600; 60; 5 INJECTION, SOLUTION INTRAVENOUS at 08:01

## 2019-01-26 RX ADMIN — MEROPENEM 2 G: 1 INJECTION, POWDER, FOR SOLUTION INTRAVENOUS at 10:01

## 2019-01-26 NOTE — EICU
Notified of decreased tidal volume and respiratory rate while sleeping.  Tidal volumes even < 200 ml when asleep.  Current BiPAP settings 14/7 with rate 12.    · Increase IPAP to 18 and mandatory rate to 16.

## 2019-01-26 NOTE — PROGRESS NOTES
"[facility] Pulmonary/Critical Care Fellow Progress Note    Subjective:      Extubated yesterday. Doing well this morning.  Remains hypercapnic but following commands.     Objective:   24-hour Vitals:  Temp:  [97.4 °F (36.3 °C)-98.5 °F (36.9 °C)] 98.5 °F (36.9 °C)  Pulse:  [] 115  Resp:  [] 95  SpO2:  [76 %-100 %] 97 %  BP: ()/(52-87) 129/87    Physical Examination:  Vitals: /87   Pulse (!) 115   Temp 98.5 °F (36.9 °C) (Oral)   Resp (!) 95   Ht 5' 10" (1.778 m)   Wt 108.5 kg (239 lb 3.2 oz)   SpO2 97%   BMI 34.32 kg/m²     General: Awake, alert, NAD, conversant without increased WOB  HEENT: MMM, NC in place  CV: RRR, normal S1/S2, no MRG  Chest: Few rhonchi, no wheeze  Abdomen: Soft, Gastrostomy tube in place, midline incision healing well  Extremities: Trivial edema  Neuro: Alert, moves all extremities    Laboratory:  Trended Lab Data:  Recent Labs   Lab 01/25/19  0444 01/26/19  0607 01/26/19  1355   WBC 14.54* 14.11* 11.83   HGB 10.0* 8.9* 8.2*   HCT 34.5* 30.6* 28.8*   * 282 239       Recent Labs   Lab 01/23/19  0532  01/25/19  0444 01/25/19  1151 01/26/19  0607      < > 144 142 145   K 3.9   < > 5.6* 4.5 4.7      < > 102 102 101   CO2 36*   < > 35* 33* 38*   BUN 15   < > 22 29* 33*   CREATININE 0.8   < > 1.0 1.3 1.1   *   < > 154* 185* 124*   CALCIUM 8.8   < > 8.8 8.3* 8.5*   MG 1.8  --   --   --   --    PHOS 2.6*  --   --   --   --     < > = values in this interval not displayed.       Recent Labs   Lab 01/24/19  0515 01/25/19  0444 01/26/19  0607   PROT 7.2 7.3 7.0   ALBUMIN 3.6 3.7 3.2*   BILITOT 0.6 0.9 0.6   AST 36 204* 79*   ALT 18 101* 58*   ALKPHOS 86 78 72       Recent Labs   Lab 01/24/19  0515 01/25/19  0444 01/26/19  0607   INR 1.3* 1.2 1.2       Cardiac: No results for input(s): TROPONINI, CKTOTAL, CKMB, BNP in the last 168 hours.    FLP:   Lab Results   Component Value Date    CHOL 273 (H) 01/16/2018    CHOL 273 (H) 01/16/2018    HDL 64 01/16/2018    " HDL 64 01/16/2018    LDLCALC 184.8 (H) 01/16/2018    LDLCALC 184.8 (H) 01/16/2018    TRIG 121 01/16/2018    TRIG 121 01/16/2018    CHOLHDL 23.4 01/16/2018    CHOLHDL 23.4 01/16/2018     DM:   Lab Results   Component Value Date    HGBA1C 5.5 06/19/2017    HGBA1C 5.7 12/07/2015    LDLCALC 184.8 (H) 01/16/2018    LDLCALC 184.8 (H) 01/16/2018    CREATININE 1.1 01/26/2019     Thyroid:   Lab Results   Component Value Date    TSH 0.591 03/10/2017    P0LBQWD 7.3 12/07/2015     Anemia: No results found for: IRON, TIBC, FERRITIN, [id number], FOLATE  Urinalysis:   Lab Results   Component Value Date    LABURIN ESCHERICHIA COLI  10,000 - 49,999 cfu/ml   01/11/2019    COLORU Yellow 01/18/2019    SPECGRAV 1.025 01/18/2019    NITRITE Negative 01/18/2019    PROTEINUR neg 05/31/2018    KETONESU Negative 01/18/2019    UROBILINOGEN Negative 01/18/2019    BILIRUBINUR neg 05/31/2018       Microbiology Data:  Microbiology Results (last 7 days)     Procedure Component Value Units Date/Time    Aerobic culture [[id number]] Collected:  01/24/19 1843    Order Status:  Completed Specimen:  Abscess from Abdomen Updated:  01/26/19 1412     Aerobic Bacterial Culture --     ESCHERICHIA COLI  Few  Susceptibility pending      Narrative:       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]  Post-op Diagnosis: SAME  Procedure(s):  INSERTION, JEJUNOSTOMY TUBE, LAPAROSCOPIC  EGD (ESOPHAGOGASTRODUODENOSCOPY)  INSERTION, GASTROSTOMY TUBE, LAPAROSCOPIC  Number of specimens: 1  Name of specimens: INTRA-ABDOMINAL ABSCESS #1    Aerobic culture [[id number]] Collected:  01/24/19 1843    Order Status:  Completed Specimen:  Abscess from Abdomen Updated:  01/26/19 1411     Aerobic Bacterial Culture --     ESCHERICHIA COLI  Few  Susceptibility pending      Narrative:       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]  Post-op Diagnosis: SAME  Procedure(s):  INSERTION, JEJUNOSTOMY TUBE, LAPAROSCOPIC  EGD  (ESOPHAGOGASTRODUODENOSCOPY)  INSERTION, GASTROSTOMY TUBE, LAPAROSCOPIC  Number of specimens: 1  Name of specimens:  INTRA-ABDOMINAL ABSCESS TISSUE    Aerobic culture [[id number]] Collected:  01/24/19 1843    Order Status:  Completed Specimen:  Abscess from Abdomen Updated:  01/26/19 1331     Aerobic Bacterial Culture --     CANDIDA ALBICANS  Rare      Narrative:       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]  Post-op Diagnosis: SAME  Procedure(s):  INSERTION, JEJUNOSTOMY TUBE, LAPAROSCOPIC  EGD (ESOPHAGOGASTRODUODENOSCOPY)  INSERTION, GASTROSTOMY TUBE, LAPAROSCOPIC  Number of specimens: 1  Name of specimens:  INTRA-ABDOMINAL ABSCESS #3    Aerobic culture [[id number]] Collected:  01/24/19 1843    Order Status:  Completed Specimen:  Abscess from Abdomen Updated:  01/26/19 0928     Aerobic Bacterial Culture No growth    Narrative:       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]  Post-op Diagnosis: SAME  Procedure(s):  INSERTION, JEJUNOSTOMY TUBE, LAPAROSCOPIC  EGD (ESOPHAGOGASTRODUODENOSCOPY)  INSERTION, GASTROSTOMY TUBE, LAPAROSCOPIC  Number of specimens: 1  Name of specimens:  INTRA-ABDOMINAL ABSCESS #2    Gram stain [[id number]] Collected:  01/24/19 1843    Order Status:  Completed Specimen:  Abscess from Abdomen Updated:  01/24/19 2335     Gram Stain Result Rare WBC's      No organisms seen    Narrative:       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]  Post-op Diagnosis: SAME  Procedure(s):  INSERTION, JEJUNOSTOMY TUBE, LAPAROSCOPIC  EGD (ESOPHAGOGASTRODUODENOSCOPY)  INSERTION, GASTROSTOMY TUBE, LAPAROSCOPIC  Number of specimens: 1  Name of specimens:  INTRA-ABDOMINAL ABSCESS TISSUE    Gram stain [[id number]] Collected:  01/24/19 1843    Order Status:  Completed Specimen:  Abscess from Abdomen Updated:  01/24/19 2330     Gram Stain Result Rare WBC's      No organisms seen    Narrative:       Pre-op Diagnosis: Malignant carcinoid tumor of  duodenum  [C7A.010]  Post-op Diagnosis: SAME  Procedure(s):  INSERTION, JEJUNOSTOMY TUBE, LAPAROSCOPIC  EGD (ESOPHAGOGASTRODUODENOSCOPY)  INSERTION, GASTROSTOMY TUBE, LAPAROSCOPIC  Number of specimens: 1  Name of specimens:  INTRA-ABDOMINAL ABSCESS #2    Gram stain [[id number]] Collected:  01/24/19 1843    Order Status:  Completed Specimen:  Abscess from Abdomen Updated:  01/24/19 2328     Gram Stain Result Rare WBC's      No organisms seen    Narrative:       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]  Post-op Diagnosis: SAME  Procedure(s):  INSERTION, JEJUNOSTOMY TUBE, LAPAROSCOPIC  EGD (ESOPHAGOGASTRODUODENOSCOPY)  INSERTION, GASTROSTOMY TUBE, LAPAROSCOPIC  Number of specimens: 1  Name of specimens:  INTRA-ABDOMINAL ABSCESS #1    Gram stain [[id number]] Collected:  01/24/19 1843    Order Status:  Completed Specimen:  Abscess from Abdomen Updated:  01/24/19 2327     Gram Stain Result Rare WBC's      No organisms seen    Narrative:       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]  Post-op Diagnosis: SAME  Procedure(s):  INSERTION, JEJUNOSTOMY TUBE, LAPAROSCOPIC  EGD (ESOPHAGOGASTRODUODENOSCOPY)  INSERTION, GASTROSTOMY TUBE, LAPAROSCOPIC  Number of specimens: 1  Name of specimens:  INTRA-ABDOMINAL ABSCESS #3    Fungus culture [[id number]] Collected:  01/24/19 1843    Order Status:  Sent Specimen:  Abscess from Abdomen Updated:  01/24/19 2138    Culture, Anaerobe [[id number]] Collected:  01/24/19 1843    Order Status:  Sent Specimen:  Abscess from Abdomen Updated:  01/24/19 2138    Fungus culture [[id number]] Collected:  01/24/19 1843    Order Status:  Sent Specimen:  Abscess from Abdomen Updated:  01/24/19 2138    Fungus culture [[id number]] Collected:  01/24/19 1843    Order Status:  Sent Specimen:  Abscess from Abdomen Updated:  01/24/19 2138    Culture, Anaerobe [[id number]] Collected:  01/24/19 1843    Order Status:  Sent Specimen:  Abscess from Abdomen Updated:  01/24/19  2138    Fungus culture [[id number]] Collected:  01/24/19 1843    Order Status:  Sent Specimen:  Abscess from Abdomen Updated:  01/24/19 2138    Culture, Anaerobe [[id number]] Collected:  01/24/19 1843    Order Status:  Canceled Specimen:  Abscess from Abdomen Updated:  01/24/19 1843    Culture, Anaerobe [[id number]] Collected:  01/24/19 1843    Order Status:  Canceled Specimen:  Abscess from Abdomen Updated:  01/24/19 1843    Culture, Respiratory with Gram Stain [[id number]] Collected:  01/21/19 1705    Order Status:  Completed Specimen:  Respiratory from Bronchial Wash, RML Updated:  01/24/19 1027     Respiratory Culture --     CANDIDA ALBICANS  Moderate       Gram Stain (Respiratory) Rare WBC's     Gram Stain (Respiratory) Rare budding yeast    Blood culture x two cultures. Draw prior to antibiotics. [[id number]] Collected:  01/18/19 1221    Order Status:  Completed Specimen:  Blood from Line, PICC Right Brachial Updated:  01/23/19 2012     Blood Culture, Routine No growth after 5 days.    Narrative:       Aerobic and anaerobic    Blood culture x two cultures. Draw prior to antibiotics. [[id number]] Collected:  01/18/19 1117    Order Status:  Completed Specimen:  Blood from Peripheral, Antecubital, Left Updated:  01/23/19 1412     Blood Culture, Routine No growth after 5 days.    Narrative:       Aerobic and anaerobic    Blood culture x two cultures. Draw prior to antibiotics. [[id number]] Collected:  01/18/19 1121    Order Status:  Completed Specimen:  Blood from Peripheral, Antecubital, Left Updated:  01/23/19 1412     Blood Culture, Routine No growth after 5 days.    Narrative:       Aerobic and anaerobic    Culture, Anaerobic [[id number]] Collected:  01/19/19 2011    Order Status:  Completed Specimen:  Body Fluid from Abdomen Updated:  01/23/19 1024     Anaerobic Culture No anaerobes isolated    Culture, Respiratory with Gram Stain [[id number]] Collected:  01/19/19 0846    Order Status:  Completed Specimen:   Respiratory from Sputum, Expectorated Updated:  01/22/19 1354     Respiratory Culture --     SARWAT ALBICANS  Many  Normal respiratory ovidio also present       Gram Stain (Respiratory) >10 epithelial cells per low power field     Gram Stain (Respiratory) Moderate WBC's     Gram Stain (Respiratory) Many yeast     Gram Stain (Respiratory) Few Gram positive cocci     Gram Stain (Respiratory) Few Gram positive rods     Gram Stain (Respiratory) Rare Gram negative rods    Aerobic culture [[id number]]  (Susceptibility) Collected:  01/19/19 2011    Order Status:  Completed Specimen:  Body Fluid from Abdomen Updated:  01/22/19 0924     Aerobic Bacterial Culture --     KLUYVERA ASCORBATA  Moderate       Aerobic Bacterial Culture --     CANDIDA ALBICANS  Moderate      Narrative:       retroperitoneum    AFB Culture & Smear [[id number]] Collected:  01/19/19 2011    Order Status:  Completed Specimen:  Body Fluid from Abdomen Updated:  01/21/19 1501     AFB Culture & Smear Culture in progress     AFB CULTURE STAIN No acid fast bacilli seen.    Gram stain [[id number]] Collected:  01/19/19 2011    Order Status:  Completed Specimen:  Body Fluid from Abdomen Updated:  01/20/19 0131     Gram Stain Result No WBC's      No organisms seen    Fungus culture [[id number]] Collected:  01/19/19 2011    Order Status:  Sent Specimen:  Body Fluid from Abdomen Updated:  01/19/19 2141        Current Medications:     Infusions:   sodium chloride 0.9% 500 mL (01/25/19 0921)    albumin human 25% Stopped (01/24/19 2040)    Amino acid 5% - dextrose 15% (CLINIMIX-E) solution with additives (1L  provides 510 kcal/L dextrose, with 50 gm AA, 150 gm CHO, Na 35, K 30, Mg 5, Ca 4.5, Acetate 80, Cl 39, Phos 15) 50 mL/hr at 01/26/19 0800    Amino acid 5% - dextrose 15% (CLINIMIX-E) solution with additives (1L  provides 510 kcal/L dextrose, with 50 gm AA, 150 gm CHO, Na 35, K 30, Mg 5, Ca 4.5, Acetate 80, Cl 39, Phos 15)      bumex/mannitol infusion 3 mL/hr at  01/26/19 0800    DOPamine 2 mcg/kg/min (01/26/19 0800)    hydromorphone in 0.9 % NaCl 6 mg/30 ml          Scheduled:   conventional amphotericin B (FUNGIZONE) continuous irrigation  50 mg Irrigation Q12H    chlorhexidine  15 mL Mouth/Throat BID    cyanocobalamin  1,000 mcg Subcutaneous Daily    enoxaparin  40 mg Subcutaneous Daily    [START ON 1/28/2019] fat emulsion 20%  250 mL Intravenous Every Mon, Wed, Fri    fluconazole (DIFLUCAN) IVPB  400 mg Intravenous Q24H    linezolid 600mg/300ml  600 mg Intravenous Q12H    meropenem (MERREM) IVPB  2 g Intravenous Q8H    pantoprazole  40 mg Intravenous Daily        PRN:  sodium chloride, dextrose 50%, dextrose 50%, glucose, glucose, HYDROmorphone, insulin aspart U-100, naloxone, ondansetron, promethazine (PHENERGAN) IVPB, sodium chloride 0.9%     Assessment:     Hoang Santos  is a 68 y.o.male with  Patient Active Problem List    Diagnosis Date Noted    Gastric outlet obstruction 01/24/2019    Infectious necrosis of pancreas     Retroperitoneal abscess     Tachycardia 01/22/2019    Sepsis 01/18/2019    Aspiration pneumonia of right lower lobe     Malnutrition compromising bodily function     Pleural effusion     Severe obesity (BMI >= 40)     Malignant carcinoid tumor of duodenum 01/04/2019    SOB (shortness of breath)     Complication of surgical procedure     Preoperative respiratory examination 12/19/2018    Pre-operative cardiovascular examination 11/26/2018    Malignant carcinoid tumor of the duodenum 11/19/2018    Carcinoid tumor 11/05/2018    Cervical osteophyte 11/02/2018    Dysphagia 09/24/2018    Benign non-nodular prostatic hyperplasia with lower urinary tract symptoms 06/28/2018    Voice disturbance 06/23/2017    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    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    Vertigo 03/16/2017    Erectile dysfunction 03/16/2017    Chronic cough 03/16/2017    GEMMA (obstructive sleep apnea) 02/14/2017    Hyperlipidemia          Plan:     - Respiratory failure was likely 2/2 lingering effects of anesthesia compounded by narcotics and his GEMMA/OHS  - Minimize sedating meds and wean narcotics as early as possible  - Mobilize him ASAP  - Continue BiPAP 18/7 QHS and intermittently during the day as need and with naps  - Strict NPO for aspiration risk per SLP recs - diet per primary service  - Wean off Dopamine per primary service  - Maintain euvolemia    Paul Salazar  [employer] Pulmonary/Critical Care Fellow

## 2019-01-26 NOTE — PROGRESS NOTES
[facility]  General Surgery  Neuroendocrine Tumor Service  Progress Note     Admission Date: 1/18/2019  Hospital Length of Stay: 8  Principal Problem: Aspiration pneumonia of right lower lobe   Gastric outlet obstruction     Subjective:      Interval History:   68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  Now c sepsis likely from aspiration RLL PNA  S/p IR drain of abdominal fluid collection  1/24/18 - Laparotomy, ASHLEIGH, enterotomy repair, EGD, GastroJ, Retroperitoneal peripancreatic abscess open drainage, Omental flap creation, Lori gastrostomy c 24 Fr Malecot     extubated    NPO/TPN/NGT  PEG draining bilious    Scheduled Meds:   chlorhexidine  15 mL Mouth/Throat BID    cyanocobalamin  1,000 mcg Subcutaneous Daily    enoxaparin  40 mg Subcutaneous Daily    fluconazole (DIFLUCAN) IVPB  400 mg Intravenous Q24H    linezolid 600mg/300ml  600 mg Intravenous Q12H    meropenem (MERREM) IVPB  2 g Intravenous Q8H    pantoprazole  40 mg Intravenous Daily     Continuous Infusions:   sodium chloride 0.9% 500 mL (01/25/19 0921)    albumin human 25% Stopped (01/24/19 2040)    Amino acid 5% - dextrose 15% (CLINIMIX-E) solution with additives (1L  provides 510 kcal/L dextrose, with 50 gm AA, 150 gm CHO, Na 35, K 30, Mg 5, Ca 4.5, Acetate 80, Cl 39, Phos 15) 50 mL/hr at 01/26/19 0800    bumex/mannitol infusion 3 mL/hr at 01/26/19 0800    DOPamine 2 mcg/kg/min (01/26/19 0800)    hydromorphone in 0.9 % NaCl 6 mg/30 ml       PRN Meds:.sodium chloride, dextrose 50%, dextrose 50%, glucose, glucose, HYDROmorphone, insulin aspart U-100, naloxone, ondansetron, promethazine (PHENERGAN) IVPB, sodium chloride 0.9%    Objective:      Temp:  [97.4 °F (36.3 °C)-98.9 °F (37.2 °C)] 97.4 °F (36.3 °C)  Pulse:  [] 104  Resp:  [] 84  SpO2:  [76 %-100 %] 94 %  BP: ()/(50-72) 105/72  Weight change: 6.1 kg (13 lb 7.2 oz)    Intake/Output Summary (Last 24 hours) at 1/26/2019 1001  Last data  filed at 1/26/2019 0945  Gross per 24 hour   Intake 1798.03 ml   Output 2860 ml   Net -1061.97 ml     UOP 1.7L    G tube 300    Physical Exam:  GEN: ETT in place  CV: tachycardic  PULM: normal work of breathing, on NC  ABD: S/NT/ND, incision c/d/i, tubes in good position  EXT: Peripheral pulses present and equal bilaterally    Recent Labs   Lab 01/26/19  0607      K 4.7   CO2 38*      BUN 33*   CREATININE 1.1   CALCIUM 8.5*   PROT 7.0   AST 79*   ALT 58*   ALKPHOS 72   BILITOT 0.6     Recent Labs   Lab 01/26/19  0607   WBC 14.11*   HGB 8.9*   HCT 30.6*          Significant Imaging: I have reviewed all pertinent imaging results/findings within the past 24 hours.    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  Now c sepsis likely from aspiration RLL PNA  S/p IR drain of abdominal fluid collection  1/24/18 - Laparotomy, ASHLEIGH, enterotomy repair, EGD, GastroJ, Retroperitoneal peripancreatic abscess open drainage, Omental flap creation, Lori gastrostomy c 24 Fr Malecot     Pain control prn  F/u OR cultures 1/24/18  monitor UOP, trend Cr, try to avoid nephrotoxics   K normal, Cr normal  Extubated now  cont TPN/NPO, PPI  F/u bronch labs rare budding yeast on gram stain on fluconazole   Abd culture grew Kluyvera ascorbata on abx   Will IRRIGATE DRAIN with amphotericin and dakins  Take out beach  Poss out of ICU tomorrow        Braulio Negrete MD - HO4

## 2019-01-26 NOTE — NURSING
Notified DR. Negrete of Amp B order yesterday not being seen by pharmacy due to being a Nursing Communication order. She will clarify when she gets in today

## 2019-01-27 LAB
ALBUMIN SERPL BCP-MCNC: 2.8 G/DL
ALP SERPL-CCNC: 168 U/L
ALT SERPL W/O P-5'-P-CCNC: 61 U/L
ANION GAP SERPL CALC-SCNC: 6 MMOL/L
ANISOCYTOSIS BLD QL SMEAR: SLIGHT
APTT BLDCRRT: 31.3 SEC
AST SERPL-CCNC: 105 U/L
BASOPHILS # BLD AUTO: 0.02 K/UL
BASOPHILS NFR BLD: 0.2 %
BILIRUB SERPL-MCNC: 1.4 MG/DL
BUN SERPL-MCNC: 39 MG/DL
CALCIUM SERPL-MCNC: 8.6 MG/DL
CHLORIDE SERPL-SCNC: 98 MMOL/L
CO2 SERPL-SCNC: 42 MMOL/L
CREAT SERPL-MCNC: 1 MG/DL
DIFFERENTIAL METHOD: ABNORMAL
EOSINOPHIL # BLD AUTO: 0.1 K/UL
EOSINOPHIL NFR BLD: 0.7 %
ERYTHROCYTE [DISTWIDTH] IN BLOOD BY AUTOMATED COUNT: 16.7 %
EST. GFR  (AFRICAN AMERICAN): >60 ML/MIN/1.73 M^2
EST. GFR  (NON AFRICAN AMERICAN): >60 ML/MIN/1.73 M^2
GLUCOSE SERPL-MCNC: 144 MG/DL
HCT VFR BLD AUTO: 25.5 %
HGB BLD-MCNC: 7.1 G/DL
HYPOCHROMIA BLD QL SMEAR: ABNORMAL
INR PPP: 1.1
LYMPHOCYTES # BLD AUTO: 1.2 K/UL
LYMPHOCYTES NFR BLD: 11.5 %
MAGNESIUM SERPL-MCNC: 1.7 MG/DL
MCH RBC QN AUTO: 28.3 PG
MCHC RBC AUTO-ENTMCNC: 27.8 G/DL
MCV RBC AUTO: 102 FL
MONOCYTES # BLD AUTO: 1 K/UL
MONOCYTES NFR BLD: 9.8 %
NEUTROPHILS # BLD AUTO: 7.9 K/UL
NEUTROPHILS NFR BLD: 77.8 %
OVALOCYTES BLD QL SMEAR: ABNORMAL
PHOSPHATE SERPL-MCNC: 2.9 MG/DL
PLATELET # BLD AUTO: 229 K/UL
PLATELET BLD QL SMEAR: ABNORMAL
PMV BLD AUTO: 9.2 FL
POCT GLUCOSE: 123 MG/DL (ref 70–110)
POCT GLUCOSE: 125 MG/DL (ref 70–110)
POCT GLUCOSE: 138 MG/DL (ref 70–110)
POCT GLUCOSE: 147 MG/DL (ref 70–110)
POIKILOCYTOSIS BLD QL SMEAR: SLIGHT
POLYCHROMASIA BLD QL SMEAR: ABNORMAL
POTASSIUM SERPL-SCNC: 4.2 MMOL/L
PROT SERPL-MCNC: 6.7 G/DL
PROTHROMBIN TIME: 11.4 SEC
RBC # BLD AUTO: 2.51 M/UL
SODIUM SERPL-SCNC: 146 MMOL/L
STOMATOCYTES BLD QL SMEAR: PRESENT
WBC # BLD AUTO: 10.25 K/UL

## 2019-01-27 PROCEDURE — 63600175 PHARM REV CODE 636 W HCPCS: Performed by: SURGERY

## 2019-01-27 PROCEDURE — 85025 COMPLETE CBC W/AUTO DIFF WBC: CPT

## 2019-01-27 PROCEDURE — 97164 PT RE-EVAL EST PLAN CARE: CPT

## 2019-01-27 PROCEDURE — 20000000 HC ICU ROOM

## 2019-01-27 PROCEDURE — 63600175 PHARM REV CODE 636 W HCPCS: Performed by: STUDENT IN AN ORGANIZED HEALTH CARE EDUCATION/TRAINING PROGRAM

## 2019-01-27 PROCEDURE — A4216 STERILE WATER/SALINE, 10 ML: HCPCS | Performed by: SURGERY

## 2019-01-27 PROCEDURE — 94770 HC EXHALED C02 TEST: CPT

## 2019-01-27 PROCEDURE — 99900035 HC TECH TIME PER 15 MIN (STAT)

## 2019-01-27 PROCEDURE — 25000003 PHARM REV CODE 250: Performed by: STUDENT IN AN ORGANIZED HEALTH CARE EDUCATION/TRAINING PROGRAM

## 2019-01-27 PROCEDURE — 94664 DEMO&/EVAL PT USE INHALER: CPT

## 2019-01-27 PROCEDURE — 85610 PROTHROMBIN TIME: CPT

## 2019-01-27 PROCEDURE — 97168 OT RE-EVAL EST PLAN CARE: CPT

## 2019-01-27 PROCEDURE — 25000003 PHARM REV CODE 250: Performed by: ANESTHESIOLOGY

## 2019-01-27 PROCEDURE — 85730 THROMBOPLASTIN TIME PARTIAL: CPT

## 2019-01-27 PROCEDURE — 25000003 PHARM REV CODE 250: Performed by: SURGERY

## 2019-01-27 PROCEDURE — 84100 ASSAY OF PHOSPHORUS: CPT

## 2019-01-27 PROCEDURE — 97530 THERAPEUTIC ACTIVITIES: CPT

## 2019-01-27 PROCEDURE — 80053 COMPREHEN METABOLIC PANEL: CPT

## 2019-01-27 PROCEDURE — 94799 UNLISTED PULMONARY SVC/PX: CPT

## 2019-01-27 PROCEDURE — 83735 ASSAY OF MAGNESIUM: CPT

## 2019-01-27 PROCEDURE — C9113 INJ PANTOPRAZOLE SODIUM, VIA: HCPCS | Performed by: SURGERY

## 2019-01-27 RX ADMIN — AMPHOTERICIN B 50 MG: 50 INJECTION, POWDER, LYOPHILIZED, FOR SOLUTION INTRAVENOUS at 08:01

## 2019-01-27 RX ADMIN — LINEZOLID 600 MG: 600 INJECTION, SOLUTION INTRAVENOUS at 06:01

## 2019-01-27 RX ADMIN — MEROPENEM 2 G: 1 INJECTION, POWDER, FOR SOLUTION INTRAVENOUS at 03:01

## 2019-01-27 RX ADMIN — CYANOCOBALAMIN 1000 MCG: 1000 INJECTION, SOLUTION INTRAMUSCULAR at 10:01

## 2019-01-27 RX ADMIN — FLUCONAZOLE, SODIUM CHLORIDE 400 MG: 2 INJECTION INTRAVENOUS at 06:01

## 2019-01-27 RX ADMIN — AMPHOTERICIN B 50 MG: 50 INJECTION, POWDER, LYOPHILIZED, FOR SOLUTION INTRAVENOUS at 10:01

## 2019-01-27 RX ADMIN — ASCORBIC ACID, VITAMIN A PALMITATE, CHOLECALCIFEROL, THIAMINE HYDROCHLORIDE, RIBOFLAVIN-5 PHOSPHATE SODIUM, PYRIDOXINE HYDROCHLORIDE, NIACINAMIDE, DEXPANTHENOL, ALPHA-TOCOPHEROL ACETATE, VITAMIN K1, FOLIC ACID, BIOTIN, CYANOCOBALAMIN: 200; 3300; 200; 6; 3.6; 6; 40; 15; 10; 150; 600; 60; 5 INJECTION, SOLUTION INTRAVENOUS at 08:01

## 2019-01-27 RX ADMIN — CHLORHEXIDINE GLUCONATE 15 ML: 1.2 RINSE ORAL at 08:01

## 2019-01-27 RX ADMIN — MEROPENEM 2 G: 1 INJECTION, POWDER, FOR SOLUTION INTRAVENOUS at 09:01

## 2019-01-27 RX ADMIN — PANTOPRAZOLE SODIUM 40 MG: 40 INJECTION, POWDER, LYOPHILIZED, FOR SOLUTION INTRAVENOUS at 10:01

## 2019-01-27 RX ADMIN — CHLORHEXIDINE GLUCONATE 15 ML: 1.2 RINSE ORAL at 10:01

## 2019-01-27 RX ADMIN — MEROPENEM 2 G: 1 INJECTION, POWDER, FOR SOLUTION INTRAVENOUS at 05:01

## 2019-01-27 NOTE — PT/OT/SLP RE-EVAL
Occupational Therapy   Re-evaluation    Name: Hoang Santos Jr.  MRN: [id number]  Admitting Diagnosis:  Aspiration pneumonia of right lower lobe 3 Days Post-Op    Recommendations:     Discharge Recommendations: other (see comments)(HH)  Discharge Equipment Recommendations:  other (see comments)(TBD)  Barriers to discharge:  None    Assessment:     Hoang Santos Jr. is a 68 y.o. male with a medical diagnosis of Aspiration pneumonia of right lower lobe.  He presents with performance deficits affecting function are weakness, impaired self care skills, impaired endurance, impaired functional mobilty, gait instability, impaired balance, decreased upper extremity function, impaired cardiopulmonary response to activity.      Rehab Prognosis:  good; patient would benefit from acute skilled OT services to address these deficits and reach maximum level of function.       Plan:     Patient to be seen 5 x/week to address the above listed problems via self-care/home management, therapeutic activities, therapeutic exercises  · Plan of Care Expires: 02/27/19  · Plan of Care Reviewed with: patient, family    Subjective     Chief Complaint: weakness; wants to eat  Patient/Family stated goals: return to PLOF  Communicated with: nurse prior to session.  Pain/Comfort:  · Pain Rating 1: 0/10  · Pain Rating Post-Intervention 1: 0/10    Objective:     Communicated with: nurse prior to session.  Patient found up in chair with: JAY drain, PICC line, peripheral IV, PCA(ICU monitoring) upon OT entry to room.    General Precautions: Standard, aspiration, fall, respiratory   Orthopedic Precautions:N/A   Braces:       Occupational Performance:    Bed Mobility:    · Patient completed Sit to Supine with contact guard assistance    Functional Mobility/Transfers:  · Patient completed Sit <> Stand Transfer with minimum assistance and moderate assistance  with  rolling walker   · Patient completed Bed <> Chair Transfer using Step Transfer technique  with minimum assistance with rolling walker  · Functional Mobility:     Activities of Daily Living:  ·     Cognitive/Visual Perceptual:  AO4    Physical Exam:  BUE AROM/strength wFL  Fair+ stand balance    AMPA 6 Click:  [facility] Total Score: 14    Treatment & Education:Education:    Re-eval performed.  Pt performed BUE AROM ex all planes.  Sit to stand x 3 trials; Dizziness reported 1st stand.  HR went from 120's to 130s with standing.    Patient left HOB elevated with all lines intact, call button in reach, nurse notified and family present    GOALS:   Multidisciplinary Problems     Occupational Therapy Goals        Problem: Occupational Therapy Goal    Goal Priority Disciplines Outcome Interventions   Occupational Therapy Goal     OT, PT/OT Ongoing (interventions implemented as appropriate)    Description:  Goals to be met by: 02/19/2019    Patient will increase functional independence with ADLs by performing:    UE Dressing with Modified [place].  LE Dressing with Modified [place].  Grooming while standing with Modified [place].  Toileting from toilet with Modified [place] for hygiene and clothing management.   Toilet transfer to toilet with Modified [place].  Increased functional strength to WFL for self care.  Upper extremity exercise program x10 reps per handout, with independence.                      History:     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    Prostatic hyperplasia     Sleep apnea        Past Surgical History:   Procedure Laterality Date    BIOPSY, LIVER  1/4/2019    Performed by [facility]leine [facility]ado [facility] at [facility]    CHOLECYSTECTOMY  1/4/2019    Performed by Madeleine [facility]ado MD at [facility]    DUODENECTOMY N/A 1/4/2019    Performed by [facility]leine [facility]ado [facility] at [facility]    EGD (ESOPHAGOGASTRODUODENOSCOPY) N/A 1/24/2019    Performed by [facility]leine [facility]ado [facility] at [facility]     EGD (ESOPHAGOGASTRODUODENOSCOPY) N/A 1/4/2019    Performed by [facility] at [facility] OR    EGD (ESOPHAGOGASTRODUODENOSCOPY) N/A 9/24/2018    Performed by Salo Nuñez MD at [facility] ENDO (4TH FLR)    ESOPHAGOGASTRODUODENOSCOPY (EGD) N/A 1/21/2019    Performed by Jose Kahn MD at [facility] ENDO    FUNDOPLICATION N/A 1/4/2019    Performed by [facility]leine [facility]ado MD at [facility] OR    GASTROJEJUNOSTOMY Right 1/24/2019    Performed by [facility]leine [facility]ado MD at [facility] OR    GASTROJEJUNOSTOMY N/A 1/4/2019    Performed by Madeleine Alvarado MD at [facility]    LAPAROTOMY, EXPLORATORY N/A 1/24/2019    Performed by [facility]ado MD at [facility] OR    LYMPHADENECTOMY N/A 1/4/2019    Performed by [facility]leine [facility]ado MD at [facility] OR    LYSIS, ADHESIONS N/A 1/24/2019    Performed by [facility]leine [facility]ado MD at [facility] OR    PALATOPLASTY-(UPPP) N/A 2/14/2017    Performed by [place] JAN Araujo III, MD at [facility] OR 2ND FLR    PH MONITORING, ESOPHAGUS, WIRELESS, (OFF REFLUX MEDS) N/A 9/24/2018    Performed by Salo Nuñez MD at [facility] ENDO (4TH FLR)    TONSILLECTOMY      TRANSPROSTATIC TISSUE RETRACTION N/A 6/28/2018    Performed by K[facility] Jack [facility] at [facility]    ULTRASOUND-ENDOSCOPIC-UPPER N/A 11/5/2018    Performed by Gorge Reyes MD at [facility] ENDO    UVULOPALATOPHARYNGOPLASTY         Clinical Decision Making:          Time Tracking:     OT Date of Treatment: 01/27/19  OT Start Time: 1240  OT Stop Time: 1307  OT Total Time (min): 27 min    Billable Minutes:Re-eval 15  Therapeutic Activity 12    Antonio S Efrainta, OT  1/27/2019

## 2019-01-27 NOTE — PT/OT/SLP RE-EVAL
Physical Therapy Re-evaluation    Patient Name:  Hoang Santos Jr.   MRN:  [id number]    Recommendations:     Discharge Recommendations:  (HHPT)   Discharge Equipment Recommendations: (Ongoing assessment)   Barriers to discharge: just extubated, currently still in ICU    Assessment:     Hoang Santos Jr. is a 68 y.o. male admitted with a medical diagnosis of Aspiration pneumonia of right lower lobe.  He presents with the following impairments/functional limitations:  weakness, gait instability, decreased upper extremity function, impaired cardiopulmonary response to activity, impaired balance, impaired endurance, decreased lower extremity function, decreased safety awareness, impaired self care skills, impaired functional mobilty, decreased coordination .    Rehab Prognosis:  Good; patient would benefit from acute skilled PT services to address these deficits and reach maximum level of function.      Recent Surgery: Procedure(s) (LRB):  EGD (ESOPHAGOGASTRODUODENOSCOPY) (N/A)  GASTROJEJUNOSTOMY (Right)  LYSIS, ADHESIONS (N/A)  LAPAROTOMY, EXPLORATORY (N/A) 3 Days Post-Op    Plan:     During this hospitalization, patient to be seen 6 x/week to address the above listed problems via gait training, therapeutic activities, therapeutic exercises  · Plan of Care Expires:  02/17/19   Plan of Care Reviewed with: patient, spouse    Subjective     Communicated with NSG prior to session.  Patient found seated in BS chair upon PT entry to room, agreeable to evaluation.      Chief Complaint: Dizziness with standing  Patient comments/goals: PLOF  Pain/Comfort:  · Pain Rating 1: 0/10    Patients cultural, spiritual, Spiritism conflicts given the current situation: no      Objective:     Patient found with: JAY drain, PICC line, peripheral IV, PCA(ICU monitoring)     General Precautions: Standard, aspiration, fall, respiratory   Orthopedic Precautions:N/A   Braces: N/A     Exams:  · Cognitive Exam:  Patient is oriented to Person  and Place  · Gross Motor Coordination:  impaired 2/2 weakness  · Postural Exam:  Patient presented with the following abnormalities:    · -       Rounded shoulders  · -       Forward head  · -       pendulous abdomen  · Sensation:    · -       Intact  light/touch B LE's  · Skin Integrity/Edema:      · -       visible skin intact  · RLE ROM: WFL  · RLE Strength: WFL  · LLE ROM: WFL  · LLE Strength: WFL    Functional Mobility:  · Transfers:     · Sit to Stand:  minimum assistance with rolling walker and VC for hadn placment/safety  · Bed to Chair: minimum assistance with  rolling walker and VC for hand placement/safety and walker management/safety  using  Step Transfer  · Balance: Fair+ sit, Fair stand  · Bed Mobility  · Sit to supine:  Mijn A    AM-PAC 6 CLICK MOBILITY  Total Score:17       Therapeutic Activities and Exercises:   Instructed pt to perform B AP's and TKE's while in bed.  Pt verbalizied/demonstrated understanding    Patient left HOB elevated with all lines intact, call button in reach, nsg notified and family present.    GOALS:   Multidisciplinary Problems     Physical Therapy Goals        Problem: Physical Therapy Goal    Goal Priority Disciplines Outcome Goal Variances Interventions   Physical Therapy Goal     PT, PT/OT Ongoing (interventions implemented as appropriate)     Description:  Goals to be met by: 2019  Patient will increase functional independence with mobility by performin. Supine to sit with Modified [place]  2. Sit to stand transfer with Modified [place]  3. Gait  x 150 feet with Modified [place] using Rolling Walker.   4. Lower extremity exercise program x10 reps per handout, with independence                       History:     Past Medical History:   Diagnosis Date    Hyperlipidemia     Primary malignant neuroendocrine neoplasm of duodenum 2018    Prostatic hyperplasia     Sleep apnea        Past Surgical History:   Procedure Laterality Date     BIOPSY, LIVER  1/4/2019    [facility] at [facility]    CHOLECYSTECTOMY  1/4/2019    [facility]od [facility] at [facility]    DUODENECTOMY N/A 1/4/2019    [facility] at [facility]    EGD (ESOPHAGOGASTRODUODENOSCOPY) N/A 1/24/2019    Per[facility] at [facility]    EGD (ESOPHAGOGASTRODUODENOSCOPY) N/A 1/4/2019    Performed by [facility] at [facility]    EGD (ESOPHAGOGASTRODUODENOSCOPY) N/A 9/24/2018    Performed by Salo Nuñez MD at [facility] ENDO (4TH FLR)    ESOPHAGOGASTRODUODENOSCOPY (EGD) N/A 1/21/2019    Performed by Jose Kahn MD at [facility] ENDO    FUNDOPLICATION N/A 1/4/2019    Performed by [facility] at [facility] OR    GASTROJEJUNOSTOMY Right 1/24/2019    [facility] at [facility] OR    GASTROJEJUNOSTOMY N/A 1/4/2019    [facility] at [facility] OR    LAPAROTOMY, EXPLORATORY N/A 1/24/2019    Per[facility] at [facility] OR    LYMPHADENECTOMY N/A 1/4/2019    Per[facility] at [facility]    LYSIS, ADHESIONS N/A 1/24/2019    Performed [facility] at [facility] OR    PALATOPLASTY-(UPPP) N/A 2/14/2017    Performed by Bob Araujo III, MD at [facility] OR 2ND FLR    PH MONITORING, ESOPHAGUS, WIRELESS, (OFF REFLUX MEDS) N/A 9/24/2018    Performed by Salo Nuñez MD at [facility] ENDO (4TH FLR)    TONSILLECTOMY      TRANSPROSTATIC TISSUE RETRACTION N/A 6/28/2018    [facility]    ULTRASOUND-ENDOSCOPIC-UPPER N/A 11/5/2018    Performed by Gorge Reyes MD at [facility] ENDO    UVULOPALATOPHARYNGOPLASTY         Clinical Decision Making:     History  Co-morbidities and personal factors that may impact the plan of care Examination  Body Structures and Functions, activity limitations and participation restrictions that may impact the plan of care Clinical Presentation   Decision  Making/ Complexity Score   Co-morbidities:   [] Time since onset of injury / illness / exacerbation  [] Status of current condition  []Patient's cognitive status and safety concerns    [] Multiple Medical Problems (see med hx)  Personal Factors:   [] Patient's age  [] Prior Level of function   [] Patient's home situation (environment and family support)  [] Patient's level of motivation  [] Expected progression of patient      HISTORY:(criteria)    [] 63356 - no personal factors/history    [] 70951 - has 1-2 personal factor/comorbidity     [] 20446 - has >3 personal factor/comorbidity     Body Regions:  [] Objective examination findings  [] Head     []  Neck  [] Trunk   [] Upper Extremity  [] Lower Extremity    Body Systems:  [] For communication ability, affect, cognition, language, and learning style: the assessment of the ability to make needs known, consciousness, orientation (person, place, and time), expected emotional /behavioral responses, and learning preferences (eg, learning barriers, education  needs)  [] For the neuromuscular system: a general assessment of gross coordinated movement (eg, balance, gait, locomotion, transfers, and transitions) and motor function  (motor control and motor learning)  [] For the musculoskeletal system: the assessment of gross symmetry, gross range of motion, gross strength, height, and weight  [] For the integumentary system: the assessment of pliability(texture), presence of scar formation, skin color, and skin integrity  [] For cardiovascular/pulmonary system: the assessment of heart rate, respiratory rate, blood pressure, and edema     Activity limitations:    [] Patient's cognitive status and saf ety concerns          [] Status of current condition      [] Weight bearing restriction  [] Cardiopulmunary Restriction    Participation Restrictions:   [] Goals and goal agreement with the patient     [] Rehab potential (prognosis) and probable outcome      Examination of Body  System: (criteria)    [] 85615 - addressing 1-2 elements    [] 70946 - addressing a total of 3 or more elements     [] 17379 -  Addressing a total of 4 or more elements         Clinical Presentation: (criteria)  Choose one     On examination of body system using standardized tests and measures patient presents with (CHOOSE ONE) elements from any of the following: body structures and functions, activity limitations, and/or participation restrictions.  Leading to a clinical presentation that is considered (CHOOSE ONE)                              Clinical Decision Making  (Eval Complexity):  Choose One     Time Tracking:     PT Received On: 01/27/19  PT Start Time: 1257     PT Stop Time: 1307  PT Total Time (min): 10 min     Billable Minutes: Re-eval 10 OT present      Melanie Amos, PT  01/27/2019

## 2019-01-27 NOTE — PROGRESS NOTES
[facility]  General Surgery  Neuroendocrine Tumor Service  Progress Note     Admission Date: 1/18/2019  Hospital Length of Stay: 9  Principal Problem: Aspiration pneumonia of right lower lobe   Gastric outlet obstruction     Subjective:      Interval History:   68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  Now c sepsis likely from aspiration RLL PNA  S/p IR drain of abdominal fluid collection  1/24/18 - Laparotomy, ASHLEIGH, enterotomy repair, EGD, GastroJ, Retroperitoneal peripancreatic abscess open drainage, Omental flap creation, Lori gastrostomy c 24 Fr Malecot     extubated    NPO/TPN/NGT  PEG draining bilious    Scheduled Meds:   conventional amphotericin B (FUNGIZONE) continuous irrigation  50 mg Irrigation Q12H    chlorhexidine  15 mL Mouth/Throat BID    cyanocobalamin  1,000 mcg Subcutaneous Daily    enoxaparin  40 mg Subcutaneous Daily    [START ON 1/28/2019] fat emulsion 20%  250 mL Intravenous Every Mon, Wed, Fri    fluconazole (DIFLUCAN) IVPB  400 mg Intravenous Q24H    linezolid 600mg/300ml  600 mg Intravenous Q12H    meropenem (MERREM) IVPB  2 g Intravenous Q8H    pantoprazole  40 mg Intravenous Daily     Continuous Infusions:   sodium chloride 0.9% 500 mL (01/25/19 0921)    albumin human 25% Stopped (01/24/19 2040)    Amino acid 5% - dextrose 15% (CLINIMIX-E) solution with additives (1L  provides 510 kcal/L dextrose, with 50 gm AA, 150 gm CHO, Na 35, K 30, Mg 5, Ca 4.5, Acetate 80, Cl 39, Phos 15) 50 mL/hr at 01/26/19 2045    bumex/mannitol infusion 3 mL/hr at 01/26/19 0800    DOPamine 2 mcg/kg/min (01/26/19 0800)    hydromorphone in 0.9 % NaCl 6 mg/30 ml       PRN Meds:.sodium chloride, dextrose 50%, dextrose 50%, glucose, glucose, HYDROmorphone, insulin aspart U-100, naloxone, ondansetron, promethazine (PHENERGAN) IVPB, sodium chloride 0.9%    Objective:      Temp:  [97.5 °F (36.4 °C)-98.5 °F (36.9 °C)] 97.9 °F (36.6 °C)  Pulse:  [104-121] 121  Resp:   [13-97] 25  SpO2:  [87 %-99 %] 98 %  BP: (100-155)/(57-87) 115/70  Weight change:     Intake/Output Summary (Last 24 hours) at 1/27/2019 0813  Last data filed at 1/27/2019 0800  Gross per 24 hour   Intake 2343.93 ml   Output 4325 ml   Net -1981.07 ml     UOP 1.7L    G tube 50    Physical Exam:  GEN: ETT in place  CV: tachycardic  PULM: normal work of breathing, on NC  ABD: S/NT/ND, incision c/d/i, tubes in good position  EXT: Peripheral pulses present and equal bilaterally    Recent Labs   Lab 01/27/19  0417   *   K 4.2   CO2 42*   CL 98   BUN 39*   CREATININE 1.0   CALCIUM 8.6*   PROT 6.7   *   ALT 61*   ALKPHOS 168*   BILITOT 1.4*     Recent Labs   Lab 01/27/19  0417   WBC 10.25   HGB 7.1*   HCT 25.5*          Significant Imaging: I have reviewed all pertinent imaging results/findings within the past 24 hours.    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  Now c sepsis likely from aspiration RLL PNA  S/p IR drain of abdominal fluid collection  1/24/18 - Laparotomy, ASHLEIGH, enterotomy repair, EGD, GastroJ, Retroperitoneal peripancreatic abscess open drainage, Omental flap creation, Lori gastrostomy c 24 Fr Malecot   ABLA expectyed postop  Pain control prn  F/u OR cultures 1/24/18  monitor UOP, trend Cr, try to avoid nephrotoxics   K normal, Cr normal  Extubated now  cont TPN/NPO, PPI  F/u bronch labs rare budding yeast on gram stain on fluconazole   Abd culture grew Kluyvera ascorbata on abx   Will IRRIGATE DRAIN with amphotericin and dakins  Take out beach  Poss out of ICU   begin TF vi aG tube\OOB

## 2019-01-27 NOTE — NURSING
Pt was more awake in the afternoon. Placed bed in upright chair position, and patient sat upright from 1300 to 1900. Pt stated this is the best he felt in awhile.

## 2019-01-27 NOTE — PLAN OF CARE
Problem: Physical Therapy Goal  Goal: Physical Therapy Goal  Goals to be met by: 2019  Patient will increase functional independence with mobility by performin. Supine to sit with Modified [place]  2. Sit to stand transfer with Modified [place]  3. Gait  x 150 feet with Modified [place] using Rolling Walker.   4. Lower extremity exercise program x10 reps per handout, with independence     Outcome: Ongoing (interventions implemented as appropriate)  PT re-evaluation performed.  Goals remain appropriate.  Pt could benefit from skilled PT services 6x/wk in order to maximize function prior to D/C.  Ongoing assessment pending pt progress for disposition and DME.

## 2019-01-27 NOTE — PROGRESS NOTES
"[facility] Pulmonary/Critical Care Fellow Progress Note    Subjective:      Coughed up some blood this morning.  Nose feels dry.  Afebrile.  Mentating well.  Denies significant SOB.  E Coli and candida growing in surgical cultures.     Objective:   24-hour Vitals:  Temp:  [97.5 °F (36.4 °C)-98.2 °F (36.8 °C)] 98.2 °F (36.8 °C)  Pulse:  [104-124] 113  Resp:  [] 37  SpO2:  [91 %-99 %] 95 %  BP: (115-155)/(65-84) 134/71    Physical Examination:  Vitals: /71   Pulse (!) 113   Temp 98.2 °F (36.8 °C) (Oral)   Resp (!) 37   Ht 5' 10" (1.778 m)   Wt 108.5 kg (239 lb 3.2 oz)   SpO2 95%   BMI 34.32 kg/m²     General: Awake, alert, NAD, conversant without increased WOB  HEENT: MMM, NC in place, dried blood noted in distal nares and on roof of mouth with what appear to be a few mucosal tears on roof of mouth  CV: RRR, normal S1/S2, no MRG  Chest: Few rhonchi, no wheeze  Abdomen: Soft, Gastrostomy tube in place, midline incision healing well  Extremities: Trivial edema  Neuro: Alert, moves all extremities    Laboratory:  Trended Lab Data:  Recent Labs   Lab 01/26/19  0607 01/26/19  1355 01/27/19  0417   WBC 14.11* 11.83 10.25   HGB 8.9* 8.2* 7.1*   HCT 30.6* 28.8* 25.5*    239 229       Recent Labs   Lab 01/23/19  0532  01/25/19  1151 01/26/19  0607 01/27/19  0417      < > 142 145 146*   K 3.9   < > 4.5 4.7 4.2      < > 102 101 98   CO2 36*   < > 33* 38* 42*   BUN 15   < > 29* 33* 39*   CREATININE 0.8   < > 1.3 1.1 1.0   *   < > 185* 124* 144*   CALCIUM 8.8   < > 8.3* 8.5* 8.6*   MG 1.8  --   --   --  1.7   PHOS 2.6*  --   --   --  2.9    < > = values in this interval not displayed.       Recent Labs   Lab 01/25/19 0444 01/26/19 0607 01/27/19 0417   PROT 7.3 7.0 6.7   ALBUMIN 3.7 3.2* 2.8*   BILITOT 0.9 0.6 1.4*   * 79* 105*   * 58* 61*   ALKPHOS 78 72 168*       Recent Labs   Lab 01/25/19 0444 01/26/19  0607 01/27/19 0417   INR 1.2 1.2 1.1       Cardiac: No results for " input(s): TROPONINI, CKTOTAL, CKMB, BNP in the last 168 hours.    FLP:   Lab Results   Component Value Date    CHOL 273 (H) 01/16/2018    CHOL 273 (H) 01/16/2018    HDL 64 01/16/2018    HDL 64 01/16/2018    LDLCALC 184.8 (H) 01/16/2018    LDLCALC 184.8 (H) 01/16/2018    TRIG 121 01/16/2018    TRIG 121 01/16/2018    CHOLHDL 23.4 01/16/2018    CHOLHDL 23.4 01/16/2018     DM:   Lab Results   Component Value Date    HGBA1C 5.5 06/19/2017    HGBA1C 5.7 12/07/2015    LDLCALC 184.8 (H) 01/16/2018    LDLCALC 184.8 (H) 01/16/2018    CREATININE 1.0 01/27/2019     Thyroid:   Lab Results   Component Value Date    TSH 0.591 03/10/2017    U8EHZRS 7.3 12/07/2015     Anemia: No results found for: IRON, TIBC, FERRITIN, [id number], FOLATE  Urinalysis:   Lab Results   Component Value Date    LABURIN ESCHERICHIA COLI  10,000 - 49,999 cfu/ml   01/11/2019    COLORU Yellow 01/18/2019    SPECGRAV 1.025 01/18/2019    NITRITE Negative 01/18/2019    PROTEINUR neg 05/31/2018    KETONESU Negative 01/18/2019    UROBILINOGEN Negative 01/18/2019    BILIRUBINUR neg 05/31/2018       Microbiology Data:  Microbiology Results (last 7 days)     Procedure Component Value Units Date/Time    Aerobic culture [[id number]]  (Susceptibility) Collected:  01/24/19 1843    Order Status:  Completed Specimen:  Abscess from Abdomen Updated:  01/27/19 1402     Aerobic Bacterial Culture --     ESCHERICHIA COLI  Davi      Narrative:       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]  Post-op Diagnosis: SAME  Procedure(s):  INSERTION, JEJUNOSTOMY TUBE, LAPAROSCOPIC  EGD (ESOPHAGOGASTRODUODENOSCOPY)  INSERTION, GASTROSTOMY TUBE, LAPAROSCOPIC  Number of specimens: 1  Name of specimens: INTRA-ABDOMINAL ABSCESS #1    Aerobic culture [[id number]]  (Susceptibility) Collected:  01/24/19 1843    Order Status:  Completed Specimen:  Abscess from Abdomen Updated:  01/27/19 1402     Aerobic Bacterial Culture --     ESCHERICHIA COLI  Davi      Narrative:        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]  Post-op Diagnosis: SAME  Procedure(s):  INSERTION, JEJUNOSTOMY TUBE, LAPAROSCOPIC  EGD (ESOPHAGOGASTRODUODENOSCOPY)  INSERTION, GASTROSTOMY TUBE, LAPAROSCOPIC  Number of specimens: 1  Name of specimens:  INTRA-ABDOMINAL ABSCESS TISSUE    Aerobic culture [[id number]] Collected:  01/24/19 1843    Order Status:  Completed Specimen:  Abscess from Abdomen Updated:  01/26/19 1331     Aerobic Bacterial Culture --     CANDIDA ALBICANS  Rare      Narrative:       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]  Post-op Diagnosis: SAME  Procedure(s):  INSERTION, JEJUNOSTOMY TUBE, LAPAROSCOPIC  EGD (ESOPHAGOGASTRODUODENOSCOPY)  INSERTION, GASTROSTOMY TUBE, LAPAROSCOPIC  Number of specimens: 1  Name of specimens:  INTRA-ABDOMINAL ABSCESS #3    Aerobic culture [[id number]] Collected:  01/24/19 1843    Order Status:  Completed Specimen:  Abscess from Abdomen Updated:  01/26/19 0928     Aerobic Bacterial Culture No growth    Narrative:       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]  Post-op Diagnosis: SAME  Procedure(s):  INSERTION, JEJUNOSTOMY TUBE, LAPAROSCOPIC  EGD (ESOPHAGOGASTRODUODENOSCOPY)  INSERTION, GASTROSTOMY TUBE, LAPAROSCOPIC  Number of specimens: 1  Name of specimens:  INTRA-ABDOMINAL ABSCESS #2    Gram stain [[id number]] Collected:  01/24/19 1843    Order Status:  Completed Specimen:  Abscess from Abdomen Updated:  01/24/19 2335     Gram Stain Result Rare WBC's      No organisms seen    Narrative:       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]  Post-op Diagnosis: SAME  Procedure(s):  INSERTION, JEJUNOSTOMY TUBE, LAPAROSCOPIC  EGD (ESOPHAGOGASTRODUODENOSCOPY)  INSERTION, GASTROSTOMY TUBE, LAPAROSCOPIC  Number of specimens: 1  Name of specimens:  INTRA-ABDOMINAL ABSCESS TISSUE    Gram stain [[id number]] Collected:  01/24/19 1843    Order Status:  Completed Specimen:  Abscess from  Abdomen Updated:  01/24/19 2330     Gram Stain Result Rare WBC's      No organisms seen    Narrative:       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]  Post-op Diagnosis: SAME  Procedure(s):  INSERTION, JEJUNOSTOMY TUBE, LAPAROSCOPIC  EGD (ESOPHAGOGASTRODUODENOSCOPY)  INSERTION, GASTROSTOMY TUBE, LAPAROSCOPIC  Number of specimens: 1  Name of specimens:  INTRA-ABDOMINAL ABSCESS #2    Gram stain [[id number]] Collected:  01/24/19 1843    Order Status:  Completed Specimen:  Abscess from Abdomen Updated:  01/24/19 2328     Gram Stain Result Rare WBC's      No organisms seen    Narrative:       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]  Post-op Diagnosis: SAME  Procedure(s):  INSERTION, JEJUNOSTOMY TUBE, LAPAROSCOPIC  EGD (ESOPHAGOGASTRODUODENOSCOPY)  INSERTION, GASTROSTOMY TUBE, LAPAROSCOPIC  Number of specimens: 1  Name of specimens:  INTRA-ABDOMINAL ABSCESS #1    Gram stain [[id number]] Collected:  01/24/19 1843    Order Status:  Completed Specimen:  Abscess from Abdomen Updated:  01/24/19 2327     Gram Stain Result Rare WBC's      No organisms seen    Narrative:       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]  Post-op Diagnosis: SAME  Procedure(s):  INSERTION, JEJUNOSTOMY TUBE, LAPAROSCOPIC  EGD (ESOPHAGOGASTRODUODENOSCOPY)  INSERTION, GASTROSTOMY TUBE, LAPAROSCOPIC  Number of specimens: 1  Name of specimens:  INTRA-ABDOMINAL ABSCESS #3    Fungus culture [[id number]] Collected:  01/24/19 1843    Order Status:  Sent Specimen:  Abscess from Abdomen Updated:  01/24/19 2138    Culture, Anaerobe [[id number]] Collected:  01/24/19 1843    Order Status:  Sent Specimen:  Abscess from Abdomen Updated:  01/24/19 2138    Fungus culture [[id number]] Collected:  01/24/19 1843    Order Status:  Sent Specimen:  Abscess from Abdomen Updated:  01/24/19 2138    Fungus culture [[id number]] Collected:  01/24/19 1843    Order Status:  Sent Specimen:  Abscess from Abdomen  Updated:  01/24/19 2138    Culture, Anaerobe [[id number]] Collected:  01/24/19 1843    Order Status:  Sent Specimen:  Abscess from Abdomen Updated:  01/24/19 2138    Fungus culture [[id number]] Collected:  01/24/19 1843    Order Status:  Sent Specimen:  Abscess from Abdomen Updated:  01/24/19 2138    Culture, Anaerobe [[id number]] Collected:  01/24/19 1843    Order Status:  Canceled Specimen:  Abscess from Abdomen Updated:  01/24/19 1843    Culture, Anaerobe [[id number]] Collected:  01/24/19 1843    Order Status:  Canceled Specimen:  Abscess from Abdomen Updated:  01/24/19 1843    Culture, Respiratory with Gram Stain [[id number]] Collected:  01/21/19 1705    Order Status:  Completed Specimen:  Respiratory from Bronchial Wash, RML Updated:  01/24/19 1027     Respiratory Culture --     CANDIDA ALBICANS  Moderate       Gram Stain (Respiratory) Rare WBC's     Gram Stain (Respiratory) Rare budding yeast    Blood culture x two cultures. Draw prior to antibiotics. [[id number]] Collected:  01/18/19 1221    Order Status:  Completed Specimen:  Blood from Line, PICC Right Brachial Updated:  01/23/19 2012     Blood Culture, Routine No growth after 5 days.    Narrative:       Aerobic and anaerobic    Blood culture x two cultures. Draw prior to antibiotics. [[id number]] Collected:  01/18/19 1117    Order Status:  Completed Specimen:  Blood from Peripheral, Antecubital, Left Updated:  01/23/19 1412     Blood Culture, Routine No growth after 5 days.    Narrative:       Aerobic and anaerobic    Blood culture x two cultures. Draw prior to antibiotics. [[id number]] Collected:  01/18/19 1121    Order Status:  Completed Specimen:  Blood from Peripheral, Antecubital, Left Updated:  01/23/19 1412     Blood Culture, Routine No growth after 5 days.    Narrative:       Aerobic and anaerobic    Culture, Anaerobic [[id number]] Collected:  01/19/19 2011    Order Status:  Completed Specimen:  Body Fluid from Abdomen Updated:  01/23/19 1024     Anaerobic  Culture No anaerobes isolated    Culture, Respiratory with Gram Stain [[id number]] Collected:  01/19/19 0846    Order Status:  Completed Specimen:  Respiratory from Sputum, Expectorated Updated:  01/22/19 1354     Respiratory Culture --     SARWAT ALBICANS  Many  Normal respiratory ovidio also present       Gram Stain (Respiratory) >10 epithelial cells per low power field     Gram Stain (Respiratory) Moderate WBC's     Gram Stain (Respiratory) Many yeast     Gram Stain (Respiratory) Few Gram positive cocci     Gram Stain (Respiratory) Few Gram positive rods     Gram Stain (Respiratory) Rare Gram negative rods    Aerobic culture [[id number]]  (Susceptibility) Collected:  01/19/19 2011    Order Status:  Completed Specimen:  Body Fluid from Abdomen Updated:  01/22/19 0924     Aerobic Bacterial Culture --     KLUYVERA ASCORBATA  Moderate       Aerobic Bacterial Culture --     CANDIDA ALBICANS  Moderate      Narrative:       retroperitoneum    AFB Culture & Smear [[id number]] Collected:  01/19/19 2011    Order Status:  Completed Specimen:  Body Fluid from Abdomen Updated:  01/21/19 1501     AFB Culture & Smear Culture in progress     AFB CULTURE STAIN No acid fast bacilli seen.        Current Medications:     Infusions:   albumin human 25% Stopped (01/24/19 2040)    Amino acid 5% - dextrose 15% (CLINIMIX-E) solution with additives (1L  provides 510 kcal/L dextrose, with 50 gm AA, 150 gm CHO, Na 35, K 30, Mg 5, Ca 4.5, Acetate 80, Cl 39, Phos 15) 50 mL/hr at 01/26/19 2045    Amino acid 5% - dextrose 15% (CLINIMIX-E) solution with additives (1L  provides 510 kcal/L dextrose, with 50 gm AA, 150 gm CHO, Na 35, K 30, Mg 5, Ca 4.5, Acetate 80, Cl 39, Phos 15)      hydromorphone in 0.9 % NaCl 6 mg/30 ml          Scheduled:   conventional amphotericin B (FUNGIZONE) continuous irrigation  50 mg Irrigation Q12H    chlorhexidine  15 mL Mouth/Throat BID    cyanocobalamin  1,000 mcg Subcutaneous Daily    enoxaparin  40 mg  Subcutaneous Daily    [START ON 1/28/2019] fat emulsion 20%  250 mL Intravenous Every Mon, Wed, Fri    fluconazole (DIFLUCAN) IVPB  400 mg Intravenous Q24H    linezolid 600mg/300ml  600 mg Intravenous Q12H    meropenem (MERREM) IVPB  2 g Intravenous Q8H    pantoprazole  40 mg Intravenous Daily        PRN:  sodium chloride, dextrose 50%, dextrose 50%, glucose, glucose, HYDROmorphone, insulin aspart U-100, naloxone, ondansetron, promethazine (PHENERGAN) IVPB, sodium chloride 0.9%     Assessment:     Hoang Santos Jr. is a 68 y.o.male with  Patient Active Problem List    Diagnosis Date Noted    Gastric outlet obstruction 01/24/2019    Infectious necrosis of pancreas     Retroperitoneal abscess     Tachycardia 01/22/2019    Sepsis 01/18/2019    Aspiration pneumonia of right lower lobe     Malnutrition compromising bodily function     Pleural effusion     Severe obesity (BMI >= 40)     Malignant carcinoid tumor of duodenum 01/04/2019    SOB (shortness of breath)     Complication of surgical procedure     Preoperative respiratory examination 12/19/2018    Pre-operative cardiovascular examination 11/26/2018    Malignant carcinoid tumor of the duodenum 11/19/2018    Carcinoid tumor 11/05/2018    Cervical osteophyte 11/02/2018    Dysphagia 09/24/2018    Benign non-nodular prostatic hyperplasia with lower urinary tract symptoms 06/28/2018    Voice disturbance 06/23/2017    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    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  Use humidified O2 if needed  - - Respiratory failure was likely 2/2 lingering effects of anesthesia compounded by narcotics and his GEMMA/OHS  - Minimize sedating meds and wean narcotics as early as possible  - Mobilize him ASAP  - Continue BiPAP 18/7 QHS and intermittently during the day as need and with naps  - Strict NPO for aspiration risk per SLP recs - diet per primary service  - Maintain euvolemia    Paul Salazar  [employer] Pulmonary/Critical Care Fellow

## 2019-01-27 NOTE — PLAN OF CARE
Problem: Occupational Therapy Goal  Goal: Occupational Therapy Goal  Goals to be met by: 02/19/2019    Patient will increase functional independence with ADLs by performing:    UE Dressing with Modified Wilson.  LE Dressing with Modified Wilson.  Grooming while standing with Modified Wilson.  Toileting from toilet with Modified Wilson for hygiene and clothing management.   Toilet transfer to toilet with Modified Wilson.  Increased functional strength to WFL for self care.  Upper extremity exercise program x10 reps per handout, with independence.     Outcome: Ongoing (interventions implemented as appropriate)  Re-eval performed; goals remain appropriate    Cont POC

## 2019-01-28 LAB
ABO + RH BLD: NORMAL
ALBUMIN SERPL BCP-MCNC: 2.6 G/DL
ALP SERPL-CCNC: 159 U/L
ALT SERPL W/O P-5'-P-CCNC: 52 U/L
AMYLASE, BODY FLUID: NORMAL U/L
ANION GAP SERPL CALC-SCNC: 8 MMOL/L
ANISOCYTOSIS BLD QL SMEAR: ABNORMAL
APTT BLDCRRT: 29.6 SEC
AST SERPL-CCNC: 83 U/L
BACTERIA SPEC AEROBE CULT: NORMAL
BASOPHILS # BLD AUTO: 0.02 K/UL
BASOPHILS # BLD AUTO: 0.02 K/UL
BASOPHILS # BLD AUTO: 0.03 K/UL
BASOPHILS NFR BLD: 0.1 %
BASOPHILS NFR BLD: 0.2 %
BASOPHILS NFR BLD: 0.2 %
BILIRUB FLD-MCNC: 9.2 MG/DL
BILIRUB SERPL-MCNC: 1.2 MG/DL
BLD GP AB SCN CELLS X3 SERPL QL: NORMAL
BLD PROD TYP BPU: NORMAL
BLD PROD TYP BPU: NORMAL
BLOOD UNIT EXPIRATION DATE: NORMAL
BLOOD UNIT EXPIRATION DATE: NORMAL
BLOOD UNIT TYPE CODE: 5100
BLOOD UNIT TYPE CODE: 5100
BLOOD UNIT TYPE: NORMAL
BLOOD UNIT TYPE: NORMAL
BODY FLUID SOURCE AMYLASE: NORMAL
BODY FLUID SOURCE, BILIRUBIN: NORMAL
BUN SERPL-MCNC: 34 MG/DL
CALCIUM SERPL-MCNC: 8.6 MG/DL
CHLORIDE SERPL-SCNC: 99 MMOL/L
CO2 SERPL-SCNC: 40 MMOL/L
CODING SYSTEM: NORMAL
CODING SYSTEM: NORMAL
CREAT SERPL-MCNC: 0.8 MG/DL
DIFFERENTIAL METHOD: ABNORMAL
DISPENSE STATUS: NORMAL
DISPENSE STATUS: NORMAL
EOSINOPHIL # BLD AUTO: 0 K/UL
EOSINOPHIL # BLD AUTO: 0.1 K/UL
EOSINOPHIL # BLD AUTO: 0.1 K/UL
EOSINOPHIL NFR BLD: 0.4 %
EOSINOPHIL NFR BLD: 0.8 %
EOSINOPHIL NFR BLD: 0.8 %
ERYTHROCYTE [DISTWIDTH] IN BLOOD BY AUTOMATED COUNT: 16 %
ERYTHROCYTE [DISTWIDTH] IN BLOOD BY AUTOMATED COUNT: 16.3 %
ERYTHROCYTE [DISTWIDTH] IN BLOOD BY AUTOMATED COUNT: 16.7 %
EST. GFR  (AFRICAN AMERICAN): >60 ML/MIN/1.73 M^2
EST. GFR  (NON AFRICAN AMERICAN): >60 ML/MIN/1.73 M^2
GLUCOSE SERPL-MCNC: 134 MG/DL
HCT VFR BLD AUTO: 19.8 %
HCT VFR BLD AUTO: 26.1 %
HCT VFR BLD AUTO: 27.2 %
HGB BLD-MCNC: 5.7 G/DL
HGB BLD-MCNC: 8.1 G/DL
HGB BLD-MCNC: 8.4 G/DL
HYPOCHROMIA BLD QL SMEAR: ABNORMAL
INR PPP: 1.1
LYMPHOCYTES # BLD AUTO: 0.6 K/UL
LYMPHOCYTES # BLD AUTO: 0.8 K/UL
LYMPHOCYTES # BLD AUTO: 1 K/UL
LYMPHOCYTES NFR BLD: 4.2 %
LYMPHOCYTES NFR BLD: 7.2 %
LYMPHOCYTES NFR BLD: 7.4 %
MAGNESIUM SERPL-MCNC: 1.7 MG/DL
MCH RBC QN AUTO: 28.4 PG
MCH RBC QN AUTO: 29 PG
MCH RBC QN AUTO: 29.7 PG
MCHC RBC AUTO-ENTMCNC: 28.8 G/DL
MCHC RBC AUTO-ENTMCNC: 30.9 G/DL
MCHC RBC AUTO-ENTMCNC: 31 G/DL
MCV RBC AUTO: 94 FL
MCV RBC AUTO: 96 FL
MCV RBC AUTO: 99 FL
MONOCYTES # BLD AUTO: 1 K/UL
MONOCYTES # BLD AUTO: 1.1 K/UL
MONOCYTES # BLD AUTO: 1.2 K/UL
MONOCYTES NFR BLD: 7.6 %
MONOCYTES NFR BLD: 8.8 %
MONOCYTES NFR BLD: 9.2 %
NEUTROPHILS # BLD AUTO: 11.7 K/UL
NEUTROPHILS # BLD AUTO: 13.1 K/UL
NEUTROPHILS # BLD AUTO: 9.1 K/UL
NEUTROPHILS NFR BLD: 82.6 %
NEUTROPHILS NFR BLD: 82.8 %
NEUTROPHILS NFR BLD: 86.8 %
NUM UNITS TRANS PACKED RBC: NORMAL
NUM UNITS TRANS PACKED RBC: NORMAL
PHOSPHATE SERPL-MCNC: 1.5 MG/DL
PLATELET # BLD AUTO: 184 K/UL
PLATELET # BLD AUTO: 195 K/UL
PLATELET # BLD AUTO: 202 K/UL
PLATELET BLD QL SMEAR: ABNORMAL
PMV BLD AUTO: 10.2 FL
PMV BLD AUTO: 9.2 FL
PMV BLD AUTO: 9.7 FL
POCT GLUCOSE: 109 MG/DL (ref 70–110)
POCT GLUCOSE: 99 MG/DL (ref 70–110)
POIKILOCYTOSIS BLD QL SMEAR: SLIGHT
POLYCHROMASIA BLD QL SMEAR: ABNORMAL
POTASSIUM SERPL-SCNC: 3.8 MMOL/L
PROT SERPL-MCNC: 6.2 G/DL
PROTHROMBIN TIME: 11.4 SEC
RBC # BLD AUTO: 2.01 M/UL
RBC # BLD AUTO: 2.73 M/UL
RBC # BLD AUTO: 2.9 M/UL
SODIUM SERPL-SCNC: 147 MMOL/L
SPHEROCYTES BLD QL SMEAR: ABNORMAL
WBC # BLD AUTO: 11.06 K/UL
WBC # BLD AUTO: 14.08 K/UL
WBC # BLD AUTO: 15.08 K/UL

## 2019-01-28 PROCEDURE — 86922 COMPATIBILITY TEST ANTIGLOB: CPT

## 2019-01-28 PROCEDURE — 85610 PROTHROMBIN TIME: CPT

## 2019-01-28 PROCEDURE — 83735 ASSAY OF MAGNESIUM: CPT

## 2019-01-28 PROCEDURE — 82150 ASSAY OF AMYLASE: CPT

## 2019-01-28 PROCEDURE — 27000221 HC OXYGEN, UP TO 24 HOURS

## 2019-01-28 PROCEDURE — 25000003 PHARM REV CODE 250: Performed by: ANESTHESIOLOGY

## 2019-01-28 PROCEDURE — 97110 THERAPEUTIC EXERCISES: CPT

## 2019-01-28 PROCEDURE — 99900035 HC TECH TIME PER 15 MIN (STAT)

## 2019-01-28 PROCEDURE — 25000003 PHARM REV CODE 250: Performed by: STUDENT IN AN ORGANIZED HEALTH CARE EDUCATION/TRAINING PROGRAM

## 2019-01-28 PROCEDURE — B4185 PARENTERAL SOL 10 GM LIPIDS: HCPCS | Performed by: STUDENT IN AN ORGANIZED HEALTH CARE EDUCATION/TRAINING PROGRAM

## 2019-01-28 PROCEDURE — A4216 STERILE WATER/SALINE, 10 ML: HCPCS | Performed by: SURGERY

## 2019-01-28 PROCEDURE — 80053 COMPREHEN METABOLIC PANEL: CPT

## 2019-01-28 PROCEDURE — 94664 DEMO&/EVAL PT USE INHALER: CPT

## 2019-01-28 PROCEDURE — 25000003 PHARM REV CODE 250: Performed by: SURGERY

## 2019-01-28 PROCEDURE — 20000000 HC ICU ROOM

## 2019-01-28 PROCEDURE — 94660 CPAP INITIATION&MGMT: CPT

## 2019-01-28 PROCEDURE — 82247 BILIRUBIN TOTAL: CPT

## 2019-01-28 PROCEDURE — 63600175 PHARM REV CODE 636 W HCPCS: Performed by: SURGERY

## 2019-01-28 PROCEDURE — 86850 RBC ANTIBODY SCREEN: CPT

## 2019-01-28 PROCEDURE — 85025 COMPLETE CBC W/AUTO DIFF WBC: CPT | Mod: 91

## 2019-01-28 PROCEDURE — C9113 INJ PANTOPRAZOLE SODIUM, VIA: HCPCS | Performed by: SURGERY

## 2019-01-28 PROCEDURE — 84100 ASSAY OF PHOSPHORUS: CPT

## 2019-01-28 PROCEDURE — 94770 HC EXHALED C02 TEST: CPT

## 2019-01-28 PROCEDURE — 63600175 PHARM REV CODE 636 W HCPCS: Mod: JG | Performed by: STUDENT IN AN ORGANIZED HEALTH CARE EDUCATION/TRAINING PROGRAM

## 2019-01-28 PROCEDURE — P9016 RBC LEUKOCYTES REDUCED: HCPCS

## 2019-01-28 PROCEDURE — 94799 UNLISTED PULMONARY SVC/PX: CPT

## 2019-01-28 PROCEDURE — 85730 THROMBOPLASTIN TIME PARTIAL: CPT

## 2019-01-28 PROCEDURE — 63600175 PHARM REV CODE 636 W HCPCS: Performed by: STUDENT IN AN ORGANIZED HEALTH CARE EDUCATION/TRAINING PROGRAM

## 2019-01-28 PROCEDURE — 94761 N-INVAS EAR/PLS OXIMETRY MLT: CPT

## 2019-01-28 PROCEDURE — P9047 ALBUMIN (HUMAN), 25%, 50ML: HCPCS | Mod: JG | Performed by: SURGERY

## 2019-01-28 PROCEDURE — 25500020 PHARM REV CODE 255: Performed by: SURGERY

## 2019-01-28 RX ORDER — HYDROCODONE BITARTRATE AND ACETAMINOPHEN 500; 5 MG/1; MG/1
TABLET ORAL
Status: DISCONTINUED | OUTPATIENT
Start: 2019-01-28 | End: 2019-02-02

## 2019-01-28 RX ORDER — ALBUMIN HUMAN 250 G/1000ML
12.5 SOLUTION INTRAVENOUS CONTINUOUS
Status: DISCONTINUED | OUTPATIENT
Start: 2019-01-28 | End: 2019-01-31

## 2019-01-28 RX ADMIN — ALBUMIN HUMAN 12.5 G: 0.25 SOLUTION INTRAVENOUS at 09:01

## 2019-01-28 RX ADMIN — MEROPENEM 2 G: 1 INJECTION, POWDER, FOR SOLUTION INTRAVENOUS at 02:01

## 2019-01-28 RX ADMIN — LINEZOLID 600 MG: 600 INJECTION, SOLUTION INTRAVENOUS at 06:01

## 2019-01-28 RX ADMIN — AMPHOTERICIN B 50 MG: 50 INJECTION, POWDER, LYOPHILIZED, FOR SOLUTION INTRAVENOUS at 08:01

## 2019-01-28 RX ADMIN — ALBUMIN HUMAN 12.5 G: 0.25 SOLUTION INTRAVENOUS at 11:01

## 2019-01-28 RX ADMIN — PANTOPRAZOLE SODIUM 40 MG: 40 INJECTION, POWDER, LYOPHILIZED, FOR SOLUTION INTRAVENOUS at 08:01

## 2019-01-28 RX ADMIN — AMPHOTERICIN B 50 MG: 50 INJECTION, POWDER, LYOPHILIZED, FOR SOLUTION INTRAVENOUS at 09:01

## 2019-01-28 RX ADMIN — MEROPENEM 2 G: 1 INJECTION, POWDER, FOR SOLUTION INTRAVENOUS at 09:01

## 2019-01-28 RX ADMIN — IOHEXOL 100 ML: 350 INJECTION, SOLUTION INTRAVENOUS at 03:01

## 2019-01-28 RX ADMIN — AMPHOTERICIN B 300 MG: 50 INJECTION, POWDER, LYOPHILIZED, FOR SOLUTION INTRAVENOUS at 12:01

## 2019-01-28 RX ADMIN — POTASSIUM PHOSPHATE, MONOBASIC AND POTASSIUM PHOSPHATE, DIBASIC 20 MMOL: 224; 236 INJECTION, SOLUTION INTRAVENOUS at 08:01

## 2019-01-28 RX ADMIN — ASCORBIC ACID, VITAMIN A PALMITATE, CHOLECALCIFEROL, THIAMINE HYDROCHLORIDE, RIBOFLAVIN-5 PHOSPHATE SODIUM, PYRIDOXINE HYDROCHLORIDE, NIACINAMIDE, DEXPANTHENOL, ALPHA-TOCOPHEROL ACETATE, VITAMIN K1, FOLIC ACID, BIOTIN, CYANOCOBALAMIN: 200; 3300; 200; 6; 3.6; 6; 40; 15; 10; 150; 600; 60; 5 INJECTION, SOLUTION INTRAVENOUS at 10:01

## 2019-01-28 RX ADMIN — CYANOCOBALAMIN 1000 MCG: 1000 INJECTION, SOLUTION INTRAMUSCULAR at 08:01

## 2019-01-28 RX ADMIN — I.V. FAT EMULSION 250 ML: 20 EMULSION INTRAVENOUS at 09:01

## 2019-01-28 RX ADMIN — CHLORHEXIDINE GLUCONATE 15 ML: 1.2 RINSE ORAL at 09:01

## 2019-01-28 RX ADMIN — LINEZOLID 600 MG: 600 INJECTION, SOLUTION INTRAVENOUS at 08:01

## 2019-01-28 RX ADMIN — CHLORHEXIDINE GLUCONATE 15 ML: 1.2 RINSE ORAL at 08:01

## 2019-01-28 NOTE — PROGRESS NOTES
[facility]  Neuroendocrine Tumor Service  Progress Note     Admission Date: 1/18/2019  Hospital Length of Stay: 10  Principal Problem: Aspiration pneumonia of right lower lobe   Gastric outlet obstruction     Subjective:      Interval History:   68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  Now c sepsis likely from aspiration RLL PNA  S/p IR drain of abdominal fluid collection  1/24/18 - Laparotomy, ASHLEIGH, enterotomy repair, EGD, GastroJ, Retroperitoneal peripancreatic abscess open drainage, Omental flap creation, Lori gastrostomy c 24 Fr Malecot     NAEO  Some fatigue and weakness this morning  Increase in drainage output last night ~900cc brown liquid  No SOB  Pain controlled    Scheduled Meds:   conventional amphotericin B (FUNGIZONE) continuous irrigation  50 mg Irrigation Q12H    chlorhexidine  15 mL Mouth/Throat BID    cyanocobalamin  1,000 mcg Subcutaneous Daily    fat emulsion 20%  250 mL Intravenous Every Mon, Wed, Fri    fluconazole (DIFLUCAN) IVPB  400 mg Intravenous Q24H    linezolid 600mg/300ml  600 mg Intravenous Q12H    meropenem (MERREM) IVPB  2 g Intravenous Q8H    pantoprazole  40 mg Intravenous Daily    potassium phosphate IVPB  20 mmol Intravenous Once     Continuous Infusions:   albumin human 25% Stopped (01/24/19 2040)    Amino acid 5% - dextrose 15% (CLINIMIX-E) solution with additives (1L  provides 510 kcal/L dextrose, with 50 gm AA, 150 gm CHO, Na 35, K 30, Mg 5, Ca 4.5, Acetate 80, Cl 39, Phos 15) 50 mL/hr at 01/27/19 2023    Amino acid 5% - dextrose 15% (CLINIMIX-E) solution with additives (1L  provides 510 kcal/L dextrose, with 50 gm AA, 150 gm CHO, Na 35, K 30, Mg 5, Ca 4.5, Acetate 80, Cl 39, Phos 15)      hydromorphone in 0.9 % NaCl 6 mg/30 ml       PRN Meds:.sodium chloride, sodium chloride, dextrose 50%, dextrose 50%, glucose, glucose, HYDROmorphone, insulin aspart U-100, naloxone, ondansetron, promethazine (PHENERGAN) IVPB,  sodium chloride 0.9%    Objective:      Temp:  [98.2 °F (36.8 °C)-99 °F (37.2 °C)] 98.4 °F (36.9 °C)  Pulse:  [] 104  Resp:  [] 18  SpO2:  [78 %-100 %] 98 %  BP: ()/(51-81) 132/64  Weight change:     Intake/Output Summary (Last 24 hours) at 1/28/2019 0904  Last data filed at 1/28/2019 0839  Gross per 24 hour   Intake 2729.99 ml   Output 3730 ml   Net -1000.01 ml     UOP 3L    G tube 30    Physical Exam:  GEN: awake, alert, NAD  CV: RRR  PULM: normal work of breathing, on NC  ABD: S/NT/ND, incision c/d/i, tubes in good position  EXT: Peripheral pulses present and equal bilaterally    Recent Labs   Lab 01/28/19  0350   *   K 3.8   CO2 40*   CL 99   BUN 34*   CREATININE 0.8   CALCIUM 8.6*   PROT 6.2   AST 83*   ALT 52*   ALKPHOS 159*   BILITOT 1.2*     Recent Labs   Lab 01/28/19  0350   WBC 11.06   HGB 5.7*   HCT 19.8*          Significant Imaging: I have reviewed all pertinent imaging results/findings within the past 24 hours.    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  Now c sepsis likely from aspiration RLL PNA  S/p IR drain of abdominal fluid collection  1/24/18 - Laparotomy, ASHLEIGH, enterotomy repair, EGD, GastroJ, Retroperitoneal peripancreatic abscess open drainage, Omental flap creation, Lori gastrostomy c 24 Fr Malecot     Pain control prn  F/u OR cultures 1/24/18  monitor UOP, trend Cr, try to avoid nephrotoxics   K normal, Cr normal  cont TPN/NPO, PPI  F/u bronch labs rare budding yeast on gram stain on fluconazole   Abd culture grew Kluyvera ascorbata on abx   Will IRRIGATE DRAIN with amphotericin and dakins  begin TF vi aG tube @ 10cc  2 U PRBC this morn  Will stop lovenox/toradol  Repeat CBC this morn post transfusion

## 2019-01-28 NOTE — PLAN OF CARE
Pt remains in ICU; progress notes reviewed:  HPI:  68M recently discharged after post-op from duodenal carcinoid resection with duo-duo recon  Chronic issues with vocal cords and inability to swallow without aspiration, sent home on TPN via PICC      pt underwent plcmt of GJ tube 01/24 --   01/24 - TN met with Aki with [facility] to discuss pt's upcoming needs for enteral feedings   (pt current with [facility] for home TPN and [facility]).    wife Sneha        1/24 s/p  1. Diag laparoscopy2. Laparotomy 3. Extensive lysis of adhesions, extensive 4. Repair enterotomy  5. EGD 6. Gastrojejunostomy side to side stapled and handsewn  7. Drainage rt retroperitoneal abscess from pancreatitis with severe edema all around 8.  Release of omentum  8. 24 Fr malecot juanjose gastrostomy      TN visited with pt and spouse and updated on plan of care.  Continue TPN  CT with contrast today  check drain amylase, T bili.    PT/OT working with pt; discharge date and disposition TBD.    Tn to continue to follow.     01/28/19 1354   Discharge Reassessment   Assessment Type Discharge Planning Reassessment   Provided patient/caregiver education on the expected discharge date and the discharge plan Yes   Do you have any problems affording any of your prescribed medications? No   Discharge Plan A (tbd)

## 2019-01-28 NOTE — PLAN OF CARE
Notified Dr. Shelley and Madeleine that after giving patient 200cc of oral contrast via PEG tube, patient vomited 200cc of blood/contrast back up. Received orders to stop giving the oral contrast and get CT of ABD with IV contrast. Will implement orders.

## 2019-01-28 NOTE — PLAN OF CARE
Problem: Adult Inpatient Plan of Care  Goal: Plan of Care Review  Outcome: Ongoing (interventions implemented as appropriate)  Mr Santos has maintained sinus tach on monitor, bp stable, on room air maintaining 02 sat 92%, awake and oriented, pt given emotional support, voices frustration with his length of hospitalization. Pt up to the chair with min to mod assistance. Tolerating tube feedings, no c/o nausea, fullness or discomfort. Pt is coughing up bright red blood, MD's aware, lovenox held and if problem persists will look into further testing. Pt has had good urine output. No BM. Has stayed afebrile today.

## 2019-01-28 NOTE — PLAN OF CARE
Problem: Adult Inpatient Plan of Care  Goal: Plan of Care Review  Outcome: Ongoing (interventions implemented as appropriate)  Patient on oxygen. No Distress Noted. Will continue to monitor.ETCO2 monitorig in progress. Will continue to monitor.Incentive Spirometry performed.Aerobika performed. Will continue to monitor.

## 2019-01-28 NOTE — PLAN OF CARE
Problem: Adult Inpatient Plan of Care  Goal: Plan of Care Review  Outcome: Revised  Pt VSS, tolerated BiPAP well overnight. Small breaks of 10 min given every 3 hours per patient request. H+H low (see flowsheet). MD orders to transfuse 2 U PRBC's. Pt notified and approves. Will redraw H+H afterwards. Incision CDI. Dressing changed per order. Drains patent and output noted. MD aware of increased output from accordian drain. Will continue to monitor.

## 2019-01-28 NOTE — PLAN OF CARE
ED Provider Note    Scribed for Blaine Blackmon M.D. by Anabell Shen. 3/25/2017  9:37 AM    Primary Care Provider: Pcp Pt States None  Means of arrival: Walk-In  History limited by: None    CHIEF COMPLAINT  Chief Complaint   Patient presents with   • Dental Pain     HPI  Benjamin Post is a 74 y.o. male who presents to the ED complaining of persistent dental pain.  The patient has suffered from dental pain for over a week.  He was seen by a dentist but was not prescribed antibiotics.  The patient then went to a local clinic, but again was denied antibiotics.  He was unable to tolerate the pain at home.  He does not note associated fevers, chills, nausea, vomiting, breath shortness, headache, or facial swelling.  The patient suffers from hypertension and cancer.  His oncologist is Dr. Vu.  He completes blood work regularly.  The patient's family denies further pertinent medical history.   Information comes from an  provided by the family    REVIEW OF SYSTEMS    CONSTITUTIONAL:  Denies fever, chills,   HENT: Positive for dental pain. Denies facial swelling, dysphagia.    RESPIRATORY:  Denies difficulty breathing.  GI:  Denies  nausea, vomiting,   SKIN:  Denies facial swelling.  NEURO: Denies headache.   E.     PAST MEDICAL HISTORY  Past Medical History   Diagnosis Date   • Hypertension    • Cancer (CMS-HCC)    • Indigestion    • Heart burn      FAMILY HISTORY  No one else sick at this time    SOCIAL HISTORY  The patient reports that he has never smoked. He reports that he does not drink alcohol or use illicit drugs. The patient speaks Tajik and is not fluent in English. Patient accompanied to the ED by his family.     SURGICAL HISTORY  Past Surgical History   Procedure Laterality Date   • Bone marrow aspiration  11/5/2015     Procedure: BONE MARROW ASPIRATION;  Surgeon: Rosita Bolton M.D.;  Location: [facility];  Service:    • Bone marrow biopsy, ndl/trocar  11/5/2015      Problem: Infection (Pneumonia)  Goal: Resolution of Infection Signs/Symptoms    Intervention: Prevent Infection Progression   01/28/19 0610   Prevent or Manage Infection   Fever Reduction/Comfort Measures lightweight bedding;medication administered   Manage Diarrhea   Isolation Precautions protective environment maintained         Problem: Device-Related Complication Risk (Artificial Airway)  Goal: Optimal Device Function    Intervention: Optimize Device Care and Function   01/28/19 0610   Support Asthma Symptom Control   Airway/Ventilation Management calming measures promoted;airway patency maintained   Optimize Device Care and Function   Airway Safety Measures mask at bedside;suction at bedside   Optimize Eating and Swallowing   Aspiration Precautions NPO pending swallow screening/evaluation;tube feeding placement verified   Prevent Deficiencies/Improve Nutritional Intake   Oral Care tongue brushed;teeth brushed         Problem: Skin and Tissue Injury (Artificial Airway)  Goal: Absence of Device-Related Skin or Tissue Injury    Intervention: Maintain Skin and Tissue Health   01/28/19 0610   Prevent Carmelita-procedural Injury   Device Skin Pressure Protection absorbent pad utilized/changed            Procedure: BONE MARROW BIOPSY, NDL/TROCAR;  Surgeon: Rosita Bolton M.D.;  Location: [facility];  Service:    • Bone marrow biopsy, ndl/trocar  3/23/2016     Procedure: BONE MARROW BIOPSY, NDL/TROCAR;  Surgeon: Fili Prakash M.D.;  Location: [facility];  Service:    • Bone marrow aspiration  3/23/2016     Procedure: BONE MARROW ASPIRATION;  Surgeon: Fili Prakash M.D.;  Location: [facility];  Service:    • Bone marrow biopsy, ndl/trocar  7/12/2016     Procedure: BONE MARROW BIOPSY, NDL/TROCAR;  Surgeon: Fili Prakash M.D.;  Location: [facility];  Service:    • Bone marrow aspiration  7/12/2016     Procedure: BONE MARROW ASPIRATION;  Surgeon: Fili Prakash M.D.;  Location: [facility];  Service:      CURRENT MEDICATIONS  No current facility-administered medications on file prior to encounter.     Current Outpatient Prescriptions on File Prior to Encounter   Medication Sig Dispense Refill   • oxycodone immediate-release (ROXICODONE) 5 MG Tab Take 1 Tab by mouth every four hours as needed (Moderate Pain; (Pain scale 4-6)). 30 Tab 0   • levetiracetam (KEPPRA) 500 MG Tab Take 1 Tab by mouth 2 Times a Day. 42 Tab 0   • Bosutinib 100 MG Tab Take 300 mg by mouth every day.     • loperamide (IMODIUM) 2 MG Cap Take 3 mg by mouth every day.     • Multiple Vitamins-Minerals (PRESERVISION AREDS 2) Cap Take 1 Cap by mouth every day.     • tamsulosin (FLOMAX) 0.4 MG capsule Take 0.4 mg by mouth every morning.     • carvedilol (COREG) 3.125 MG TABS Take 3.125 mg by mouth 2 times a day, with meals.       ALLERGIES  No Known Allergies    PHYSICAL EXAM  VITAL SIGNS: /58 mmHg  Pulse 77  Temp(Src) 36.2 °C (97.1 °F)  Resp 16  Wt 73.936 kg (163 lb)  SpO2 98%     Constitutional: Patient is awake and alert. No acute respiratory distress. Well developed, Well nourished, Non-toxic appearance.  HENT: Normocephalic, Atraumatic,   Oropharynx  pink moist with no exudates, Nose patent. No facial swelling, Poor dentition in general, Irritation around the gums to all remaining lower teeth as well as up above. No signs of Justin's Angioma, Multiple teeth with receding gum lines, Mildly  Neck:  Supple no nuchal rigidity  Lymphatic: No supraclavicular lymph nodes.   Cardiovascular: Heart is regular rate and rhythm no murmur  Thorax & Lungs: Chest is symmetrical, with good breath sounds.   Skin: No facial cellulitis  Neurologic: Alert & oriented Strength is symmetrical in upper and lower extremities     COURSE & MEDICAL DECISION MAKING  Pertinent Labs & Imaging studies reviewed. (See chart for details)    Blood work from 3/22/17 shows: WBC 4, H and H 9 and 26. Platelets 14.     9:37 AM - Patient seen and examined at bedside. Patient presents with dental pain.  I discussed his plans for discharge with a prescription for Norco and Amoxil. The patient will receive one 5 mg dose of Norco prior to discharge.  The patient was instructed to return to the ED if his symptoms worsen. The patient will receive further information to take home.  All questions and concerns were addressed.  Patient understands and agrees.     Decision Making  Patient has cancer. He has had bad teeth. Dental infections. This time we'll try him on oral antibiotics and pain medicines and follow-up with his oncologist this week.    I have queried the patient in the prescription monitoring program, but due to technical issues, was unable to view the narcotic information. The patient will return for new or worsening symptoms and is stable at the time of discharge.The patient is referred to a primary physician for blood pressure management, diabetic screening, and for all other preventative health concerns.    DISPOSITION:  Patient will be discharged home in stable condition.    FOLLOW UP:  LEVAR Vu M.D.  [address] [facility] [place] [address]  [phone]    Schedule an appointment as soon as  possible for a visit in 2 days      OUTPATIENT MEDICATIONS:  New Prescriptions    AMOXICILLIN (AMOXIL) 500 MG CAP    Take 1 Cap by mouth 3 times a day for 10 days.    HYDROCODONE-ACETAMINOPHEN (NORCO) 5-325 MG TAB PER TABLET    Take 1-2 Tabs by mouth every four hours as needed.     FINAL IMPRESSION  1. Dental disorder    2. Cancer (CMS-[facility])      PLAN  1.Follow up with Dr. Vu in 2 days  2. Take Norco for pain relief  3. Begin Amoxil   4. Return to the emergency department for increased pains, fevers, vomiting or change in condition.     Anabell MCKEON (Scribe), am scribing for, and in the presence of, Blaine Blackmon M.D..    Electronically signed by: Anabell Shen (Scribe), 3/25/2017    Blaine MCKEON M.D. personally performed the services described in this documentation, as scribed by Anabell Shen in my presence, and it is both accurate and complete.    The note accurately reflects work and decisions made by me.  Blaine Blackmon  3/25/2017  1:22 PM

## 2019-01-28 NOTE — PROGRESS NOTES
Pulmonary & Critical Care Medicine Progress Note    Subjective:   Mr. Santos is doing ok this morning. He has not had any further hemoptysis since late yesterday. No shortness of breath this morning or worsening cough or sputum. No fevers or chills    Past medical, surgical, family, and social history from initial consult was reviewed and verified during today's visit.     Vital Signs:   Temp:  [98.2 °F (36.8 °C)-99 °F (37.2 °C)] (P) 98.4 °F (36.9 °C)  Pulse:  [] 101  Resp:  [10-39] 18  SpO2:  [90 %-100 %] 96 %  BP: ()/(51-81) 138/65      Fluid Balance:     Intake/Output Summary (Last 24 hours) at 1/28/2019 1306  Last data filed at 1/28/2019 1228  Gross per 24 hour   Intake 3351.66 ml   Output 3430 ml   Net -78.34 ml       Review of Systems:   A comprehensive 12-point review of systems was performed, and is negative except for those items mentioned above in the HPI section of this note.     Physical Exam:   General: ill appearing AAM, NAD  HEENT: AT/NC, PERRL, EOMI, oral and nasal mucosa moist.   Cardiac: normal rate, regular rhythm with no MRG with brisk cap refill and symmetric pulses in distal extremities.  Respiratory: Normal inspection. Symmetric chest rise. Auscultation clear bilaterally. No increased work of breathing noted.   Abdomen: Soft, NT/ND. +BS. No hepatosplenomegaly.   Extremities: No edema.   Neuro: Grossly intact to brief exam. Oriented x3 with appropriate mood/affect to situation.      Personal Review and Summary of Interval Diagnostics    Laboratory Studies:   No results for input(s): PH, PCO2, PO2, HCO3, POCSATURATED, BE in the last 24 hours.  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]  Post-op Diagnosis: SAME  Procedure(s):  INSERTION, JEJUNOSTOMY TUBE, LAPAROSCOPIC  EGD (ESOPHAGOGASTRODUODENOSCOPY)  INSERTION, GASTROSTOMY TUBE, LAPAROSCOPIC  Number of specimens: 1  Name of specimens: INTRA-ABDOMINAL ABSCESS #1    Aerobic culture [[id number]]  (Susceptibility) Collected:  01/24/19 1843    Order Status:  Completed Specimen:  Abscess from Abdomen Updated:  01/28/19 0941     Aerobic Bacterial Culture --     ESCHERICHIA COLI  Few      Narrative:       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]  Post-op Diagnosis: SAME  Procedure(s):  INSERTION, JEJUNOSTOMY TUBE, LAPAROSCOPIC  EGD (ESOPHAGOGASTRODUODENOSCOPY)  INSERTION, GASTROSTOMY TUBE, LAPAROSCOPIC  Number of specimens: 1  Name of specimens:  INTRA-ABDOMINAL ABSCESS TISSUE    Aerobic culture [[id number]] Collected:  01/24/19 1843    Order Status:  Completed Specimen:  Abscess from Abdomen Updated:  01/28/19 0934     Aerobic Bacterial Culture --     CANDIDA ALBICANS  Rare      Narrative:       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]  Post-op Diagnosis: SAME  Procedure(s):  INSERTION, JEJUNOSTOMY TUBE, LAPAROSCOPIC  EGD (ESOPHAGOGASTRODUODENOSCOPY)  INSERTION, GASTROSTOMY TUBE, LAPAROSCOPIC  Number of specimens: 1  Name of specimens:  INTRA-ABDOMINAL ABSCESS #3    Culture, Anaerobe [[id number]] Collected:  01/24/19 1843    Order Status:  Completed Specimen:  Abscess from Abdomen Updated:  01/28/19 0854     Anaerobic Culture Culture in progress    Narrative:       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]  Post-op Diagnosis: SAME  Procedure(s):  INSERTION, JEJUNOSTOMY TUBE,  LAPAROSCOPIC  EGD (ESOPHAGOGASTRODUODENOSCOPY)  INSERTION, GASTROSTOMY TUBE, LAPAROSCOPIC  Number of specimens: 1  Name of specimens: INTRA-ABDOMINAL ABSCESS #1    Culture, Anaerobe [[id number]] Collected:  01/24/19 1843    Order Status:  Completed Specimen:  Abscess from Abdomen Updated:  01/28/19 0854     Anaerobic Culture Culture in progress    Narrative:       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]  Post-op Diagnosis: SAME  Procedure(s):  INSERTION, JEJUNOSTOMY TUBE, LAPAROSCOPIC  EGD (ESOPHAGOGASTRODUODENOSCOPY)  INSERTION, GASTROSTOMY TUBE, LAPAROSCOPIC  Number of specimens: 1  Name of specimens:  INTRA-ABDOMINAL ABSCESS TISSUE    Aerobic culture [[id number]] Collected:  01/24/19 1843    Order Status:  Completed Specimen:  Abscess from Abdomen Updated:  01/26/19 0928     Aerobic Bacterial Culture No growth    Narrative:       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]  Post-op Diagnosis: SAME  Procedure(s):  INSERTION, JEJUNOSTOMY TUBE, LAPAROSCOPIC  EGD (ESOPHAGOGASTRODUODENOSCOPY)  INSERTION, GASTROSTOMY TUBE, LAPAROSCOPIC  Number of specimens: 1  Name of specimens:  INTRA-ABDOMINAL ABSCESS #2    Gram stain [[id number]] Collected:  01/24/19 1843    Order Status:  Completed Specimen:  Abscess from Abdomen Updated:  01/24/19 2335     Gram Stain Result Rare WBC's      No organisms seen    Narrative:       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]  Post-op Diagnosis: SAME  Procedure(s):  INSERTION, JEJUNOSTOMY TUBE, LAPAROSCOPIC  EGD (ESOPHAGOGASTRODUODENOSCOPY)  INSERTION, GASTROSTOMY TUBE, LAPAROSCOPIC  Number of specimens: 1  Name of specimens:  INTRA-ABDOMINAL ABSCESS TISSUE    Gram stain [[id number]] Collected:  01/24/19 1843    Order Status:  Completed Specimen:  Abscess from Abdomen Updated:  01/24/19 2330     Gram Stain Result Rare WBC's      No organisms seen    Narrative:       Pre-op Diagnosis: Malignant carcinoid tumor of  duodenum  [C7A.010]  Post-op Diagnosis: SAME  Procedure(s):  INSERTION, JEJUNOSTOMY TUBE, LAPAROSCOPIC  EGD (ESOPHAGOGASTRODUODENOSCOPY)  INSERTION, GASTROSTOMY TUBE, LAPAROSCOPIC  Number of specimens: 1  Name of specimens:  INTRA-ABDOMINAL ABSCESS #2    Gram stain [[id number]] Collected:  01/24/19 1843    Order Status:  Completed Specimen:  Abscess from Abdomen Updated:  01/24/19 2328     Gram Stain Result Rare WBC's      No organisms seen    Narrative:       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]  Post-op Diagnosis: SAME  Procedure(s):  INSERTION, JEJUNOSTOMY TUBE, LAPAROSCOPIC  EGD (ESOPHAGOGASTRODUODENOSCOPY)  INSERTION, GASTROSTOMY TUBE, LAPAROSCOPIC  Number of specimens: 1  Name of specimens:  INTRA-ABDOMINAL ABSCESS #1    Gram stain [[id number]] Collected:  01/24/19 1843    Order Status:  Completed Specimen:  Abscess from Abdomen Updated:  01/24/19 2327     Gram Stain Result Rare WBC's      No organisms seen    Narrative:       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]  Post-op Diagnosis: SAME  Procedure(s):  INSERTION, JEJUNOSTOMY TUBE, LAPAROSCOPIC  EGD (ESOPHAGOGASTRODUODENOSCOPY)  INSERTION, GASTROSTOMY TUBE, LAPAROSCOPIC  Number of specimens: 1  Name of specimens:  INTRA-ABDOMINAL ABSCESS #3    Fungus culture [[id number]] Collected:  01/24/19 1843    Order Status:  Sent Specimen:  Abscess from Abdomen Updated:  01/24/19 2138    Fungus culture [[id number]] Collected:  01/24/19 1843    Order Status:  Sent Specimen:  Abscess from Abdomen Updated:  01/24/19 2138    Fungus culture [[id number]] Collected:  01/24/19 1843    Order Status:  Sent Specimen:  Abscess from Abdomen Updated:  01/24/19 2138    Fungus culture [[id number]] Collected:  01/24/19 1843    Order Status:  Sent Specimen:  Abscess from Abdomen Updated:  01/24/19 2138    Culture, Anaerobe [[id number]] Collected:  01/24/19 1843    Order Status:  Canceled Specimen:  Abscess from Abdomen Updated:   01/24/19 1843    Culture, Anaerobe [[id number]] Collected:  01/24/19 1843    Order Status:  Canceled Specimen:  Abscess from Abdomen Updated:  01/24/19 1843    Culture, Respiratory with Gram Stain [[id number]] Collected:  01/21/19 1705    Order Status:  Completed Specimen:  Respiratory from Bronchial Wash, RML Updated:  01/24/19 1027     Respiratory Culture --     CANDIDA ALBICANS  Moderate       Gram Stain (Respiratory) Rare WBC's     Gram Stain (Respiratory) Rare budding yeast    Blood culture x two cultures. Draw prior to antibiotics. [[id number]] Collected:  01/18/19 1221    Order Status:  Completed Specimen:  Blood from Line, PICC Right Brachial Updated:  01/23/19 2012     Blood Culture, Routine No growth after 5 days.    Narrative:       Aerobic and anaerobic    Blood culture x two cultures. Draw prior to antibiotics. [[id number]] Collected:  01/18/19 1117    Order Status:  Completed Specimen:  Blood from Peripheral, Antecubital, Left Updated:  01/23/19 1412     Blood Culture, Routine No growth after 5 days.    Narrative:       Aerobic and anaerobic    Blood culture x two cultures. Draw prior to antibiotics. [[id number]] Collected:  01/18/19 1121    Order Status:  Completed Specimen:  Blood from Peripheral, Antecubital, Left Updated:  01/23/19 1412     Blood Culture, Routine No growth after 5 days.    Narrative:       Aerobic and anaerobic    Culture, Anaerobic [[id number]] Collected:  01/19/19 2011    Order Status:  Completed Specimen:  Body Fluid from Abdomen Updated:  01/23/19 1024     Anaerobic Culture No anaerobes isolated    Culture, Respiratory with Gram Stain [[id number]] Collected:  01/19/19 0846    Order Status:  Completed Specimen:  Respiratory from Sputum, Expectorated Updated:  01/22/19 1354     Respiratory Culture --     SARWAT ALBICANS  Many  Normal respiratory ovidio also present       Gram Stain (Respiratory) >10 epithelial cells per low power field     Gram Stain (Respiratory) Moderate WBC's      Gram Stain (Respiratory) Many yeast     Gram Stain (Respiratory) Few Gram positive cocci     Gram Stain (Respiratory) Few Gram positive rods     Gram Stain (Respiratory) Rare Gram negative rods    Aerobic culture [[id number]]  (Susceptibility) Collected:  01/19/19 2011    Order Status:  Completed Specimen:  Body Fluid from Abdomen Updated:  01/22/19 0924     Aerobic Bacterial Culture --     KLUYVERA ASCORBATA  Moderate       Aerobic Bacterial Culture --     CANDIDA ALBICANS  Moderate      Narrative:       retroperitoneum    AFB Culture & Smear [[id number]] Collected:  01/19/19 2011    Order Status:  Completed Specimen:  Body Fluid from Abdomen Updated:  01/21/19 1501     AFB Culture & Smear Culture in progress     AFB CULTURE STAIN No acid fast bacilli seen.        Chest Imaging:     Infusions:     Amino acid 5% - dextrose 15% (CLINIMIX-E) solution with additives (1L  provides 510 kcal/L dextrose, with 50 gm AA, 150 gm CHO, Na 35, K 30, Mg 5, Ca 4.5, Acetate 80, Cl 39, Phos 15)      Amino acid 5% - dextrose 15% (CLINIMIX-E) solution with additives (1L  provides 510 kcal/L dextrose, with 50 gm AA, 150 gm CHO, Na 35, K 30, Mg 5, Ca 4.5, Acetate 80, Cl 39, Phos 15) 50 mL/hr at 01/27/19 2023    hydromorphone in 0.9 % NaCl 6 mg/30 ml         Scheduled Medications:    conventional amphotericin B (FUNGIZONE) continuous irrigation  50 mg Irrigation Q12H    amphotericin B liposome (AMBISOME) IVPB  3 mg/kg (Dosing Weight) Intravenous Q24H    chlorhexidine  15 mL Mouth/Throat BID    cyanocobalamin  1,000 mcg Subcutaneous Daily    fat emulsion 20%  250 mL Intravenous Every Mon, Wed, Fri    linezolid 600mg/300ml  600 mg Intravenous Q12H    meropenem (MERREM) IVPB  2 g Intravenous Q8H    pantoprazole  40 mg Intravenous Daily       PRN Medications:   sodium chloride, sodium chloride, dextrose 50%, dextrose 50%, glucose, glucose, HYDROmorphone, insulin aspart U-100, naloxone, ondansetron, promethazine (PHENERGAN) IVPB,  sodium chloride 0.9%    Impression & Recommendations    Acute Hypoxic and Hypercapnic Respiratory Failure  - resolved on minimal O2 via nasal canula  -continue bipap at night for GEMMA  - will need bipap when discharged home for hypercapnea    Hemoptysis  - minimal and seems to have resolved.  This small amount of hemoptysis does not explain his dramatic drop in Hb.   - his previous bronchoscopy had no airway lesions or blood noted at all.   - Also candida is typically normal respiratory ovidio so I would not treat that from the bronchoscopy.     Anemia  - concern for acute bleeding   - stool is not frankly bloody, do not suspect and airway source  - would be highly suspicious for intra-abdominal bleeding especially if his Hb continues to drop     We will sign off at this time. Please call back with any questions or concerns.         Lucero Quispe M.D.  [employer] Pulmonary/Critical Care Fellow

## 2019-01-28 NOTE — PLAN OF CARE
Problem: Adult Inpatient Plan of Care  Goal: Plan of Care Review  Outcome: Ongoing (interventions implemented as appropriate)  Patient on RA, no respiratory distress noted. Plans to wear Bipap tonight. Patient performs Aerobika therapy and IS therapy. Ambu bag and mask at the bedside. Family at the bedside. Patient notes that he is coughing up a small amount of blood. RN is aware. Will continue to monitor.

## 2019-01-28 NOTE — PT/OT/SLP PROGRESS
Occupational Therapy  Missed Visit    Patient Name:  Hoang Santos Jr.   MRN:  [id number]    Patient not seen today secondary to nursing hold; pt with low H&H on this date. Will follow-up.    ADDIE Aaron  1/28/2019     I certify that I was present in the room directing the student in service delivery and guiding them using my skilled judgment. As the co-signing therapist I have reviewed the students documentation and am responsible for the treatment, assessment, and plan.

## 2019-01-29 ENCOUNTER — ANESTHESIA (OUTPATIENT)
Dept: SURGERY | Facility: HOSPITAL | Age: 69
DRG: 856 | End: 2019-01-29
Payer: MEDICARE

## 2019-01-29 ENCOUNTER — ANESTHESIA EVENT (OUTPATIENT)
Dept: SURGERY | Facility: HOSPITAL | Age: 69
DRG: 856 | End: 2019-01-29
Payer: MEDICARE

## 2019-01-29 LAB
ALBUMIN SERPL BCP-MCNC: 2.9 G/DL
ALP SERPL-CCNC: 131 U/L
ALT SERPL W/O P-5'-P-CCNC: 40 U/L
ANION GAP SERPL CALC-SCNC: 9 MMOL/L
APTT BLDCRRT: 28.9 SEC
AST SERPL-CCNC: 61 U/L
BACTERIA SPEC AEROBE CULT: NORMAL
BASOPHILS # BLD AUTO: 0.03 K/UL
BASOPHILS NFR BLD: 0.2 %
BILIRUB SERPL-MCNC: 1.1 MG/DL
BUN SERPL-MCNC: 26 MG/DL
CALCIUM SERPL-MCNC: 8.9 MG/DL
CHLORIDE SERPL-SCNC: 99 MMOL/L
CO2 SERPL-SCNC: 37 MMOL/L
CREAT SERPL-MCNC: 0.8 MG/DL
DIFFERENTIAL METHOD: ABNORMAL
EOSINOPHIL # BLD AUTO: 0.2 K/UL
EOSINOPHIL NFR BLD: 1.2 %
ERYTHROCYTE [DISTWIDTH] IN BLOOD BY AUTOMATED COUNT: 16.3 %
EST. GFR  (AFRICAN AMERICAN): >60 ML/MIN/1.73 M^2
EST. GFR  (NON AFRICAN AMERICAN): >60 ML/MIN/1.73 M^2
GLUCOSE SERPL-MCNC: 119 MG/DL
HCT VFR BLD AUTO: 26.5 %
HGB BLD-MCNC: 8.3 G/DL
INR PPP: 1.1
LYMPHOCYTES # BLD AUTO: 0.8 K/UL
LYMPHOCYTES NFR BLD: 6.5 %
MAGNESIUM SERPL-MCNC: 1.9 MG/DL
MCH RBC QN AUTO: 29.9 PG
MCHC RBC AUTO-ENTMCNC: 31.3 G/DL
MCV RBC AUTO: 95 FL
MONOCYTES # BLD AUTO: 1.2 K/UL
MONOCYTES NFR BLD: 9.5 %
NEUTROPHILS # BLD AUTO: 10.2 K/UL
NEUTROPHILS NFR BLD: 82.1 %
PHOSPHATE SERPL-MCNC: 1.8 MG/DL
PLATELET # BLD AUTO: 186 K/UL
PMV BLD AUTO: 9.6 FL
POTASSIUM SERPL-SCNC: 3.8 MMOL/L
PROT SERPL-MCNC: 6.8 G/DL
PROTHROMBIN TIME: 11.4 SEC
RBC # BLD AUTO: 2.78 M/UL
SODIUM SERPL-SCNC: 145 MMOL/L
WBC # BLD AUTO: 12.37 K/UL

## 2019-01-29 PROCEDURE — 25000003 PHARM REV CODE 250: Performed by: STUDENT IN AN ORGANIZED HEALTH CARE EDUCATION/TRAINING PROGRAM

## 2019-01-29 PROCEDURE — 63600175 PHARM REV CODE 636 W HCPCS: Performed by: SURGERY

## 2019-01-29 PROCEDURE — 25000003 PHARM REV CODE 250: Performed by: ANESTHESIOLOGY

## 2019-01-29 PROCEDURE — P9047 ALBUMIN (HUMAN), 25%, 50ML: HCPCS | Mod: JG | Performed by: SURGERY

## 2019-01-29 PROCEDURE — C9113 INJ PANTOPRAZOLE SODIUM, VIA: HCPCS | Performed by: SURGERY

## 2019-01-29 PROCEDURE — 63600175 PHARM REV CODE 636 W HCPCS: Performed by: STUDENT IN AN ORGANIZED HEALTH CARE EDUCATION/TRAINING PROGRAM

## 2019-01-29 PROCEDURE — 37000009 HC ANESTHESIA EA ADD 15 MINS: Performed by: SURGERY

## 2019-01-29 PROCEDURE — 25000003 PHARM REV CODE 250: Performed by: SURGERY

## 2019-01-29 PROCEDURE — A4216 STERILE WATER/SALINE, 10 ML: HCPCS | Performed by: SURGERY

## 2019-01-29 PROCEDURE — 20000000 HC ICU ROOM

## 2019-01-29 PROCEDURE — 83735 ASSAY OF MAGNESIUM: CPT

## 2019-01-29 PROCEDURE — 80053 COMPREHEN METABOLIC PANEL: CPT

## 2019-01-29 PROCEDURE — 63600175 PHARM REV CODE 636 W HCPCS: Mod: JG | Performed by: STUDENT IN AN ORGANIZED HEALTH CARE EDUCATION/TRAINING PROGRAM

## 2019-01-29 PROCEDURE — 25500020 PHARM REV CODE 255: Performed by: SURGERY

## 2019-01-29 PROCEDURE — 84100 ASSAY OF PHOSPHORUS: CPT

## 2019-01-29 PROCEDURE — 94660 CPAP INITIATION&MGMT: CPT

## 2019-01-29 PROCEDURE — 27000190 HC CPAP FULL FACE MASK W/VALVE

## 2019-01-29 PROCEDURE — 85610 PROTHROMBIN TIME: CPT

## 2019-01-29 PROCEDURE — 85025 COMPLETE CBC W/AUTO DIFF WBC: CPT

## 2019-01-29 PROCEDURE — 94003 VENT MGMT INPAT SUBQ DAY: CPT

## 2019-01-29 PROCEDURE — 94761 N-INVAS EAR/PLS OXIMETRY MLT: CPT

## 2019-01-29 PROCEDURE — 63600175 PHARM REV CODE 636 W HCPCS: Performed by: NURSE ANESTHETIST, CERTIFIED REGISTERED

## 2019-01-29 PROCEDURE — 99900035 HC TECH TIME PER 15 MIN (STAT)

## 2019-01-29 PROCEDURE — 94799 UNLISTED PULMONARY SVC/PX: CPT

## 2019-01-29 PROCEDURE — 27000221 HC OXYGEN, UP TO 24 HOURS

## 2019-01-29 PROCEDURE — 37000008 HC ANESTHESIA 1ST 15 MINUTES: Performed by: SURGERY

## 2019-01-29 PROCEDURE — 94664 DEMO&/EVAL PT USE INHALER: CPT

## 2019-01-29 PROCEDURE — 63600175 PHARM REV CODE 636 W HCPCS: Performed by: INTERNAL MEDICINE

## 2019-01-29 PROCEDURE — 97110 THERAPEUTIC EXERCISES: CPT

## 2019-01-29 PROCEDURE — 25000003 PHARM REV CODE 250: Performed by: NURSE ANESTHETIST, CERTIFIED REGISTERED

## 2019-01-29 PROCEDURE — 63600175 PHARM REV CODE 636 W HCPCS: Mod: JG | Performed by: SURGERY

## 2019-01-29 PROCEDURE — S0171 BUMETANIDE 0.5 MG: HCPCS | Performed by: SURGERY

## 2019-01-29 PROCEDURE — 85730 THROMBOPLASTIN TIME PARTIAL: CPT

## 2019-01-29 RX ORDER — ONDANSETRON 2 MG/ML
INJECTION INTRAMUSCULAR; INTRAVENOUS
Status: DISCONTINUED | OUTPATIENT
Start: 2019-01-29 | End: 2019-01-29

## 2019-01-29 RX ORDER — PROPOFOL 10 MG/ML
5 INJECTION, EMULSION INTRAVENOUS CONTINUOUS
Status: DISCONTINUED | OUTPATIENT
Start: 2019-01-29 | End: 2019-01-30

## 2019-01-29 RX ORDER — SUCCINYLCHOLINE CHLORIDE 20 MG/ML
INJECTION INTRAMUSCULAR; INTRAVENOUS
Status: DISCONTINUED | OUTPATIENT
Start: 2019-01-29 | End: 2019-01-29

## 2019-01-29 RX ORDER — HYDROMORPHONE HYDROCHLORIDE 2 MG/ML
0.5 INJECTION, SOLUTION INTRAMUSCULAR; INTRAVENOUS; SUBCUTANEOUS EVERY 6 HOURS PRN
Status: DISCONTINUED | OUTPATIENT
Start: 2019-01-29 | End: 2019-02-08 | Stop reason: HOSPADM

## 2019-01-29 RX ORDER — SODIUM CHLORIDE 9 MG/ML
INJECTION, SOLUTION INTRAVENOUS CONTINUOUS PRN
Status: DISCONTINUED | OUTPATIENT
Start: 2019-01-29 | End: 2019-01-29

## 2019-01-29 RX ORDER — PROPOFOL 10 MG/ML
VIAL (ML) INTRAVENOUS
Status: DISCONTINUED | OUTPATIENT
Start: 2019-01-29 | End: 2019-01-29

## 2019-01-29 RX ORDER — BUMETANIDE 0.25 MG/ML
1 INJECTION INTRAMUSCULAR; INTRAVENOUS DAILY
Status: DISCONTINUED | OUTPATIENT
Start: 2019-01-29 | End: 2019-02-05

## 2019-01-29 RX ORDER — ROCURONIUM BROMIDE 10 MG/ML
INJECTION, SOLUTION INTRAVENOUS
Status: DISCONTINUED | OUTPATIENT
Start: 2019-01-29 | End: 2019-01-29

## 2019-01-29 RX ORDER — PHENYLEPHRINE HYDROCHLORIDE 10 MG/ML
INJECTION INTRAVENOUS
Status: DISCONTINUED | OUTPATIENT
Start: 2019-01-29 | End: 2019-01-29

## 2019-01-29 RX ORDER — FENTANYL CITRATE 50 UG/ML
INJECTION, SOLUTION INTRAMUSCULAR; INTRAVENOUS
Status: DISCONTINUED | OUTPATIENT
Start: 2019-01-29 | End: 2019-01-29

## 2019-01-29 RX ORDER — LIDOCAINE HCL/PF 100 MG/5ML
SYRINGE (ML) INTRAVENOUS
Status: DISCONTINUED | OUTPATIENT
Start: 2019-01-29 | End: 2019-01-29

## 2019-01-29 RX ADMIN — PANTOPRAZOLE SODIUM 40 MG: 40 INJECTION, POWDER, LYOPHILIZED, FOR SOLUTION INTRAVENOUS at 09:01

## 2019-01-29 RX ADMIN — PHENYLEPHRINE HYDROCHLORIDE 80 MCG: 10 INJECTION INTRAVENOUS at 02:01

## 2019-01-29 RX ADMIN — HYDROMORPHONE HYDROCHLORIDE 0.5 MG: 2 INJECTION INTRAMUSCULAR; INTRAVENOUS; SUBCUTANEOUS at 07:01

## 2019-01-29 RX ADMIN — ASCORBIC ACID, VITAMIN A PALMITATE, CHOLECALCIFEROL, THIAMINE HYDROCHLORIDE, RIBOFLAVIN-5 PHOSPHATE SODIUM, PYRIDOXINE HYDROCHLORIDE, NIACINAMIDE, DEXPANTHENOL, ALPHA-TOCOPHEROL ACETATE, VITAMIN K1, FOLIC ACID, BIOTIN, CYANOCOBALAMIN: 200; 3300; 200; 6; 3.6; 6; 40; 15; 10; 150; 600; 60; 5 INJECTION, SOLUTION INTRAVENOUS at 08:01

## 2019-01-29 RX ADMIN — AMPHOTERICIN B 300 MG: 50 INJECTION, POWDER, LYOPHILIZED, FOR SOLUTION INTRAVENOUS at 11:01

## 2019-01-29 RX ADMIN — SODIUM CHLORIDE: 0.9 INJECTION, SOLUTION INTRAVENOUS at 01:01

## 2019-01-29 RX ADMIN — LINEZOLID 600 MG: 600 INJECTION, SOLUTION INTRAVENOUS at 06:01

## 2019-01-29 RX ADMIN — HYDROMORPHONE HYDROCHLORIDE 0.5 MG: 2 INJECTION INTRAMUSCULAR; INTRAVENOUS; SUBCUTANEOUS at 04:01

## 2019-01-29 RX ADMIN — CHLORHEXIDINE GLUCONATE 15 ML: 1.2 RINSE ORAL at 09:01

## 2019-01-29 RX ADMIN — ONDANSETRON 4 MG: 2 INJECTION, SOLUTION INTRAMUSCULAR; INTRAVENOUS at 02:01

## 2019-01-29 RX ADMIN — ALBUMIN HUMAN 12.5 G: 0.25 SOLUTION INTRAVENOUS at 10:01

## 2019-01-29 RX ADMIN — ALBUMIN HUMAN 12.5 G: 0.25 SOLUTION INTRAVENOUS at 07:01

## 2019-01-29 RX ADMIN — MEROPENEM 2 G: 1 INJECTION, POWDER, FOR SOLUTION INTRAVENOUS at 01:01

## 2019-01-29 RX ADMIN — IOHEXOL 40 ML: 350 INJECTION, SOLUTION INTRAVENOUS at 03:01

## 2019-01-29 RX ADMIN — SUCCINYLCHOLINE CHLORIDE 120 MG: 20 INJECTION, SOLUTION INTRAMUSCULAR; INTRAVENOUS at 01:01

## 2019-01-29 RX ADMIN — PHENYLEPHRINE HYDROCHLORIDE 100 MCG: 10 INJECTION INTRAVENOUS at 01:01

## 2019-01-29 RX ADMIN — PHENYLEPHRINE HYDROCHLORIDE 120 MCG: 10 INJECTION INTRAVENOUS at 01:01

## 2019-01-29 RX ADMIN — CYANOCOBALAMIN 1000 MCG: 1000 INJECTION, SOLUTION INTRAMUSCULAR at 09:01

## 2019-01-29 RX ADMIN — ROCURONIUM BROMIDE 5 MG: 10 INJECTION, SOLUTION INTRAVENOUS at 01:01

## 2019-01-29 RX ADMIN — AMPHOTERICIN B 50 MG: 50 INJECTION, POWDER, LYOPHILIZED, FOR SOLUTION INTRAVENOUS at 09:01

## 2019-01-29 RX ADMIN — MEROPENEM 2 G: 1 INJECTION, POWDER, FOR SOLUTION INTRAVENOUS at 09:01

## 2019-01-29 RX ADMIN — AMPHOTERICIN B 50 MG: 50 INJECTION, POWDER, LYOPHILIZED, FOR SOLUTION INTRAVENOUS at 10:01

## 2019-01-29 RX ADMIN — ALBUMIN HUMAN 12.5 G: 0.25 SOLUTION INTRAVENOUS at 11:01

## 2019-01-29 RX ADMIN — LINEZOLID 600 MG: 600 INJECTION, SOLUTION INTRAVENOUS at 07:01

## 2019-01-29 RX ADMIN — ALBUMIN HUMAN 12.5 G: 0.25 SOLUTION INTRAVENOUS at 03:01

## 2019-01-29 RX ADMIN — CHLORHEXIDINE GLUCONATE 15 ML: 1.2 RINSE ORAL at 08:01

## 2019-01-29 RX ADMIN — ROCURONIUM BROMIDE 25 MG: 10 INJECTION, SOLUTION INTRAVENOUS at 01:01

## 2019-01-29 RX ADMIN — FENTANYL CITRATE 50 MCG: 50 INJECTION, SOLUTION INTRAMUSCULAR; INTRAVENOUS at 01:01

## 2019-01-29 RX ADMIN — MEROPENEM 2 G: 1 INJECTION, POWDER, FOR SOLUTION INTRAVENOUS at 06:01

## 2019-01-29 RX ADMIN — PROPOFOL 10 MCG/KG/MIN: 10 INJECTION, EMULSION INTRAVENOUS at 11:01

## 2019-01-29 RX ADMIN — PROPOFOL 5 MCG/KG/MIN: 10 INJECTION, EMULSION INTRAVENOUS at 03:01

## 2019-01-29 RX ADMIN — POTASSIUM PHOSPHATE, MONOBASIC AND POTASSIUM PHOSPHATE, DIBASIC 30 MMOL: 224; 236 INJECTION, SOLUTION INTRAVENOUS at 09:01

## 2019-01-29 RX ADMIN — LIDOCAINE HYDROCHLORIDE 60 MG: 20 INJECTION, SOLUTION INTRAVENOUS at 01:01

## 2019-01-29 RX ADMIN — BUMETANIDE 1 MG: 0.25 INJECTION INTRAMUSCULAR; INTRAVENOUS at 09:01

## 2019-01-29 RX ADMIN — ALBUMIN HUMAN 12.5 G: 0.25 SOLUTION INTRAVENOUS at 04:01

## 2019-01-29 RX ADMIN — PROPOFOL 100 MG: 10 INJECTION, EMULSION INTRAVENOUS at 01:01

## 2019-01-29 NOTE — PT/OT/SLP PROGRESS
Physical Therapy      Patient Name:  Hoang Santos .   MRN:  [id number]    Patient not seen at this time secondary to pt requestd PT come back later after lunch.  Will follow-up     Myesha Renner, PT   1/28/2019

## 2019-01-29 NOTE — CONSULTS
Interventional Radiology Consult/Pre-Procedure Note      Chief Complaint/Reason for Consult: Suspected bile leak    History of Present Illness:  Hoang Santos Jr. is a 68 y.o. male with the PMH listed below who is s/p resection of a duodenal NET, laparotomy, ASHLEIGH, enterotomy repair, EGD, GastroJ, retroperitoneal peripancreatic abscess drainage, omental and surgical gastrostomy who presents with newly bilious output from a RUQ pigtail catheter. Dr. Salvador suspects a bile leak and consults IR for PTC with drain to facilitate healing. Thoracentesis also requested.    Admission H&P reviewed. Pt seen and case discussed in person with Dr. Salvador.    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    Prostatic hyperplasia     Sleep apnea      Past Surgical History:   Procedure Laterality Date    BIOPSY, LIVER  1/4/2019    Performed by [facility]ado [facility] at [facility] OR    CHOLECYSTECTOMY  1/4/2019    [facility] at [facility]    DUODENECTOMY N/A 1/4/2019    [facility] at [facility] OR    EGD (ESOPHAGOGASTRODUODENOSCOPY) N/A 1/24/2019    [facility] by [facility] at [facility] OR    EGD (ESOPHAGOGASTRODUODENOSCOPY) N/A 1/4/2019    [facility] by [facility] at [facility] OR    EGD (ESOPHAGOGASTRODUODENOSCOPY) N/A 9/24/2018    Performed by Salo Nu[facility]ez MD at [facility] ENDO (4TH FLR)    ESOPHAGOGASTRODUODENOSCOPY (EGD) N/A 1/21/2019    Performed by Jose Kahn MD at [facility] ENDO    FUNDOPLICATION N/A 1/4/2019    Performed by [facility] at [facility] OR    GASTROJEJUNOSTOMY Right 1/24/2019    [facility] at [facility] OR    GASTROJEJUNOSTOMY N/A 1/4/2019    [facility]formed by [facility] at [facility] OR    LAPAROTOMY, EXPLORATORY N/A 1/24/2019    Per[facility] by [facility] at [facility] OR    LYMPHADENECTOMY N/A 1/4/2019     [facility] at [facility] OR    LYSIS, ADHESIONS N/A 1/24/2019    Performed by [facility]ado [facility] at [facility] OR    PALATOPLASTY-(UPPP) N/A 2/14/2017    Performed by Wever JAN Araujo III, MD at [facility] OR 2ND FLR    PH MONITORING, ESOPHAGUS, WIRELESS, (OFF REFLUX MEDS) N/A 9/24/2018    Performed by Salo Nuñez MD at [facility] ENDO (4TH FLR)    TONSILLECTOMY      TRANSPROSTATIC TISSUE RETRACTION N/A 6/28/2018    [facility] by [facility] at [facility]    ULTRASOUND-ENDOSCOPIC-UPPER N/A 11/5/2018    Performed by Gorge Reyes MD at [facility] ENDO    UVULOPALATOPHARYNGOPLASTY         Allergies:   Review of patient's allergies indicates:   Allergen Reactions    Epinephrine      Neuroendocrine Tumor patient      Toradol [ketorolac] Other (See Comments)     Creatinine rises even with sub therapeutic dose       Home Meds:   Prior to Admission medications    Medication Sig Start Date End Date Taking? Authorizing Provider   atorvastatin (LIPITOR) 40 MG tablet Take 1 tablet (40 mg total) by mouth once daily. 1/16/18 1/16/19  Lee Kay MD   bisacodyl (DULCOLAX) 10 mg Supp Place 1 suppository (10 mg total) rectally daily as needed. 1/16/19   Jose Lance MD   cholecalciferol, vitamin D3, 50,000 unit capsule Take 1 capsule (50,000 Units total) by mouth once a week. 6/21/17   Lee Kay MD   esomeprazole (NEXIUM PACKET) 20 mg GrPS Take 20 mg by mouth 2 (two) times daily. 1/19/19 1/19/20  Madeleine Alvarado MD   HYDROcodone-acetaminophen (NORCO) 5-325 mg per tablet Take 1 tablet by mouth every 6 (six) hours as needed for Pain. 1/16/19   Jose Lance MD   meclizine (ANTIVERT) 25 mg tablet Take 1 tablet (25 mg total) by mouth 3 (three) times daily as needed for Dizziness. 1/16/19   Jose Lance MD   ondansetron (ZOFRAN) 4 MG tablet Take 1 tablet (4 mg total) by mouth every 8 (eight) hours as needed for Nausea. 1/16/19   Jose Lance MD    sildenafil (VIAGRA) 100 MG tablet Take 1 tablet (100 mg total) by mouth daily as needed for Erectile Dysfunction. 11/21/18 11/21/19  Darya Hernandez MD   VIAGRA 100 mg tablet Take 1 tablet (100 mg total) by mouth once daily. Brand name only medication. 4/17/18   Lee Kay MD     Scheduled Meds:    conventional amphotericin B (FUNGIZONE) continuous irrigation  50 mg Irrigation Q12H    amphotericin B liposome (AMBISOME) IVPB  3 mg/kg (Dosing Weight) Intravenous Q24H    bumetanide  1 mg Intravenous Daily    chlorhexidine  15 mL Mouth/Throat BID    cyanocobalamin  1,000 mcg Subcutaneous Daily    linezolid 600mg/300ml  600 mg Intravenous Q12H    meropenem (MERREM) IVPB  2 g Intravenous Q8H    pantoprazole  40 mg Intravenous Daily    potassium phosphate IVPB  30 mmol Intravenous Once     Continuous Infusions:    albumin human 25% 12.5 g (01/29/19 0730)    Amino acid 5% - dextrose 15% (CLINIMIX-E) solution with additives (1L  provides 510 kcal/L dextrose, with 50 gm AA, 150 gm CHO, Na 35, K 30, Mg 5, Ca 4.5, Acetate 80, Cl 39, Phos 15) 50 mL/hr at 01/29/19 0730     PRN Meds:sodium chloride, sodium chloride, dextrose 50%, dextrose 50%, glucose, glucose, HYDROmorphone, insulin aspart U-100, naloxone, ondansetron, promethazine (PHENERGAN) IVPB, sodium chloride 0.9%    Anticoagulation/Antiplatelet Meds: no anticoagulation    Review of Systems:   As documented in admission H&P.    Physical Exam:  Temp: 98.2 °F (36.8 °C) (01/29/19 0315)  Pulse: 94 (01/29/19 0800)  Resp: (!) 21 (01/29/19 0800)  BP: 138/66 (01/29/19 0800)  SpO2: 97 % (01/29/19 0800)     General: NAD  HEENT: Normocephalic, sclera anicteric, oropharynx clear  Neck: Supple, no palpable lymphadenopathy  Heart: RRR  Lungs: Symmetric excursions, breathing unlabored  Abd: ND, mildly TTP over Rt abd, RUQ pigtail in place with bilious green output in bag  Extremities: Trace BLE edema  Neuro: Gross nonfocal    Labs:  Recent Labs   Lab  01/29/19  0426   INR 1.1       Recent Labs   Lab 01/29/19  0426   WBC 12.37   HGB 8.3*   HCT 26.5*   MCV 95         Recent Labs   Lab 01/29/19  0426   *      K 3.8   CL 99   CO2 37*   BUN 26*   CREATININE 0.8   CALCIUM 8.9   MG 1.9   ALT 40   AST 61*   ALBUMIN 2.9*   BILITOT 1.1*       Imaging:  CT AP 1/28/19, 1/18/19, 1/11/19 reviewed.    Assessment/Plan:  Probable bile leak for PTC with drain placement today with Anesthesiology assistance. Will also attempt therapeutic thoracentesis.    Sedation: Per Anesthesia    Risks (including, but not limited to, pain, bleeding, infection, damage to liver/nearby structures, procedure/treatment failure, and the need for additional procedures, death), benefits, and alternatives were discussed with the patient's wife, Sneha. All questions were answered to the best of my abilities. The patient's wishes to proceed with percutaneous transhepatic cholangiogram with drain placement. Written informed consent was obtained.      Augustin PostB[facility]  Pager [phone]

## 2019-01-29 NOTE — PT/OT/SLP PROGRESS
Occupational Therapy   Treatment    Name: Hoang Santos Jr.  MRN: [id number]  Admitting Diagnosis:  Aspiration pneumonia of right lower lobe  5 Days Post-Op    Recommendations:     Discharge Recommendations: (ongoing assessment )  Discharge Equipment Recommendations:  (TBD)  Barriers to discharge:  None    Assessment:     Hoang Santos Jr. is a 68 y.o. male with a medical diagnosis of Aspiration pneumonia of right lower lobe. Performance deficits affecting function are weakness, impaired endurance, gait instability, impaired self care skills, impaired balance, impaired cardiopulmonary response to activity, decreased upper extremity function, decreased lower extremity function, decreased safety awareness.     Rehab Prognosis:  Fair; patient would benefit from acute skilled OT services to address these deficits and reach maximum level of function.       Plan:     Patient to be seen 5 x/week to address the above listed problems via self-care/home management, therapeutic activities, therapeutic exercises  · Plan of Care Expires: 02/27/19  · Plan of Care Reviewed with: patient    Subjective     Pain/Comfort:  · Pain Rating 1: 0/10    Objective:     Communicated with: Nurse prior to session.  Patient found HOB elevated with oxygen, JAY drain, PICC line, PCA, peripheral IV, blood pressure cuff upon OT entry to room.    General Precautions: Standard, aspiration, fall, respiratory   Orthopedic Precautions:N/A   Braces: N/A     Occupational Performance:     [facility] 6 Click ADL: 14    Treatment & Education:  Nurse advises that pt is being taken to IR soon but he can participate in therex at the bed level. Pt did BUE AROM therex from supine in bed, including stretches to promote cervical ROM. Pt educated on exercises he can do from bed level to promote mobility, pt verbalized understanding and returned demonstration.     Patient left HOB elevated with all lines intact, call button in reach, bed alarm on, nurse notified and wife  presentEducation:      GOALS:   Multidisciplinary Problems     Occupational Therapy Goals        Problem: Occupational Therapy Goal    Goal Priority Disciplines Outcome Interventions   Occupational Therapy Goal     OT, PT/OT Ongoing (interventions implemented as appropriate)    Description:  Goals to be met by: 02/19/2019    Patient will increase functional independence with ADLs by performing:    UE Dressing with Modified [place].  LE Dressing with Modified [place].  Grooming while standing with Modified [place].  Toileting from toilet with Modified [place] for hygiene and clothing management.   Toilet transfer to toilet with Modified [place].  Increased functional strength to WFL for self care.  Upper extremity exercise program x10 reps per handout, with independence.                      Time Tracking:     OT Date of Treatment: 01/29/19  OT Start Time: 1150  OT Stop Time: 1206  OT Total Time (min): 16 min    Billable Minutes:Therapeutic Exercise 16    ADDIE Aaron  1/29/2019     I certify that I was present in the room directing the student in service delivery and guiding them using my skilled judgment. As the co-signing therapist I have reviewed the students documentation and am responsible for the treatment, assessment, and plan.

## 2019-01-29 NOTE — PLAN OF CARE
Problem: Occupational Therapy Goal  Goal: Occupational Therapy Goal  Goals to be met by: 02/19/2019    Patient will increase functional independence with ADLs by performing:    UE Dressing with Modified [place].  LE Dressing with Modified [place].  Grooming while standing with Modified [place].  Toileting from toilet with Modified [place] for hygiene and clothing management.   Toilet transfer to toilet with Modified [place].  Increased functional strength to WFL for self care.  Upper extremity exercise program x10 reps per handout, with independence.     Pt able to participate in therapy today at bed level.  Cont POC.

## 2019-01-29 NOTE — PT/OT/SLP PROGRESS
Physical Therapy Treatment    Patient Name:  Hoang Santos Jr.   MRN:  [id number]    Recommendations:     Discharge Recommendations:  (ongoing assessment; possibly HH PT/OT )   Discharge Equipment Recommendations: (TBD)   Barriers to discharge: Patient with decline in physical status; extubated 2 days ago, still in ICU    Assessment:     Hoang Santos Jr. is a 68 y.o. male admitted with a medical diagnosis of Aspiration pneumonia of right lower lobe.  He presents with the following impairments/functional limitations:  weakness, impaired endurance, gait instability, impaired functional mobilty, impaired self care skills, impaired balance, decreased safety awareness, decreased lower extremity function, decreased coordination, decreased upper extremity function Patient just received blood; nurse OK'd bed exercises; pt participated well; will .    Rehab Prognosis: Good; patient would benefit from acute skilled PT services to address these deficits and reach maximum level of function.    Recent Surgery: Procedure(s) (LRB):  EGD (ESOPHAGOGASTRODUODENOSCOPY) (N/A)  GASTROJEJUNOSTOMY (Right)  LYSIS, ADHESIONS (N/A)  LAPAROTOMY, EXPLORATORY (N/A) 5 Days Post-Op    Plan:     During this hospitalization, patient to be seen 6 x/week to address the identified rehab impairments via gait training, therapeutic activities, therapeutic exercises and progress toward the following goals:    · Plan of Care Expires:  02/17/19    Subjective     Pain/Comfort:  · Pain Rating 1: 0/10  · Pain Rating Post-Intervention 1: 0/10      Objective:     Communicated with nurse prior to session.  Patient found with JAY drain, PICC line, peripheral IV, PCA(ICU monitoring)  upon PT entry to room.     General Precautions: Standard, aspiration, fall, respiratory   Orthopedic Precautions:N/A   Braces: N/A     Functional Mobility:  · Bed Mobility:     · Scooting: moderate assistance, of 2 persons and use of drawsheet toward HOB      AM-PAC 6 CLICK  MOBILITY  Turning over in bed (including adjusting bedclothes, sheets and blankets)?: 3  Sitting down on and standing up from a chair with arms (e.g., wheelchair, bedside commode, etc.): 3  Moving from lying on back to sitting on the side of the bed?: 3  Moving to and from a bed to a chair (including a wheelchair)?: 3  Need to walk in hospital room?: 3  Climbing 3-5 steps with a railing?: 2  Basic Mobility Total Score: 17       Therapeutic Activities and Exercises:   Patient performed BLEs/UEs in bed 10 reps each APs, ankle circles, QS, GS, heel slides, hip abd/add, hip rolls L/R, TKEs, ham/gastroc stretches 2x 20 sec, piriformis  and IR/add stretches x 30 sec; pt performed 10 reps fist pumps, forward grabs, unilateral shld fl    Patient left HOB elevated with all lines intact, call button in reach, bed alarm on, nurse notified and wife present..    GOALS:   Multidisciplinary Problems     Physical Therapy Goals        Problem: Physical Therapy Goal    Goal Priority Disciplines Outcome Goal Variances Interventions   Physical Therapy Goal     PT, PT/OT Ongoing (interventions implemented as appropriate)     Description:  Goals to be met by: 2019  Patient will increase functional independence with mobility by performin. Supine to sit with Modified [place]  2. Sit to stand transfer with Modified [place]  3. Gait  x 150 feet with Modified [place] using Rolling Walker.   4. Lower extremity exercise program x10 reps per handout, with independence                       Time Tracking:     PT Received On: 19  PT Start Time: 1332     PT Stop Time: 1355  PT Total Time (min): 23 min     Billable Minutes: Therapeutic Exercise 23 minutes    Treatment Type: Treatment  PT/PTA: PT     PTA Visit Number: 0     Myesha Renner, PT  2019

## 2019-01-29 NOTE — PLAN OF CARE
Problem: Adult Inpatient Plan of Care  Goal: Plan of Care Review  Outcome: Ongoing (interventions implemented as appropriate)  Patient on oxygen. No Distress Noted. Will continue to monitor.Bipap on standby. Patient wears at night. Aerobika performed. Incentive Spirometry performed.Will continue to monitor.

## 2019-01-29 NOTE — ANESTHESIA PREPROCEDURE EVALUATION
01/29/2019  Hoang Santos Jr. is a 68 y.o., male with NET s/p Ex-Lap on 1/24. Pt required re-intubation following procedure 2/2 hypercarbia. Going for cholangiogram under MAC.     1/24/19 - DL Mac 4, anterior larynx. 8.0 tube.     1/21/19 MAC was very difficult with desats to 60s reversed with 2 people taking turns pulling jaw forward w non-rebreathing mask; rec for next time:  ETT or maybe LMA-gastro. JR    1/4/19 ETT note: DL, MAC 4, DL with bougie, Gr 3v, obesity, short neck, poor head/neck extension, oropharyngeal edema or fat    Patient Active Problem List   Diagnosis    Hyperlipidemia    GEMMA (obstructive sleep apnea)    Vertigo    Erectile dysfunction    Chronic cough    Voice disturbance    Vocal fold atrophy    Vocal fold scar    Benign non-nodular prostatic hyperplasia with lower urinary tract symptoms    Dysphagia    Cervical osteophyte    Carcinoid tumor    Malignant carcinoid tumor of the duodenum    Pre-operative cardiovascular examination    Preoperative respiratory examination    SOB (shortness of breath)    Complication of surgical procedure    Malignant carcinoid tumor of duodenum    Severe obesity (BMI >= 40)    Aspiration pneumonia of right lower lobe    Malnutrition compromising bodily function    Pleural effusion    Sepsis    Tachycardia    Infectious necrosis of pancreas    Retroperitoneal abscess    Gastric outlet obstruction     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  Lab Results   Component Value Date    WBC 12.37 01/29/2019    HGB 8.3 (L) 01/29/2019    HCT  26.5 (L) 01/29/2019     01/29/2019    CHOL 273 (H) 01/16/2018    CHOL 273 (H) 01/16/2018    TRIG 121 01/16/2018    TRIG 121 01/16/2018    HDL 64 01/16/2018    HDL 64 01/16/2018    ALT 40 01/29/2019    AST 61 (H) 01/29/2019     01/29/2019    K 3.8 01/29/2019    CL 99 01/29/2019    CREATININE 0.8 01/29/2019    BUN 26 (H) 01/29/2019    CO2 37 (H) 01/29/2019    TSH 0.591 03/10/2017    PSA 1.2 06/19/2017    INR 1.1 01/29/2019    HGBA1C 5.5 06/19/2017           Pre-op Assessment    I have reviewed the Patient Summary Reports.     I have reviewed the Nursing Notes.   I have reviewed the Medications.     Review of Systems  Anesthesia Hx:  History of prior surgery of interest to airway management or planning: (h/o Palatoplasty ('17)) facial plastic/reconstructive. Previous anesthesia: General Personal Hx of Anesthesia complications Slow To Awaken/Delayed Emergence   Hematology/Oncology:         -- Anemia: Oncology Comments: NET s/p surgery    Cardiovascular:   Exercise tolerance: poor hyperlipidemia ECG has been reviewed. Has only become debilitated after surgery earlier this month.    Cardiac clearance in chart    Pulmonary:   Pneumonia Shortness of breath Sleep Apnea, CPAP    Renal/:  Renal/ Normal     Musculoskeletal:   Arthritis         Physical Exam  General:  Obesity    Airway/Jaw/Neck:  Airway Findings: Mouth Opening: Normal Tongue: Large  General Airway Assessment: Adult  Mallampati: IV  TM Distance: Normal, at least 6 cm      Dental:  Dental Findings: In tact, upper front caps, upper partial dentures   Chest/Lungs:  Chest/Lungs Findings: Rhonchi     Heart/Vascular:  Heart Findings:       Mental Status:  Mental Status Findings:  Cooperative, Alert and Oriented     · ECHO 11/2018  ·   · Challenging study  · Left ventricle ejection fraction is normal at 65%  · Grade I (mild) left ventricular diastolic dysfunction consistent with impaired relaxation.  · Left ventricle shows concentric hypertrophy.  · RV  difficul to assess due to patient body habitus. RV systolic function appears normal, however, it appears the apex is hypokinetic     Lab Results   Component Value Date    WBC 12.37 01/29/2019    HGB 8.3 (L) 01/29/2019    HCT 26.5 (L) 01/29/2019    MCV 95 01/29/2019     01/29/2019     BMP  Lab Results   Component Value Date     01/29/2019    K 3.8 01/29/2019    CL 99 01/29/2019    CO2 37 (H) 01/29/2019    BUN 26 (H) 01/29/2019    CREATININE 0.8 01/29/2019    CALCIUM 8.9 01/29/2019    ANIONGAP 9 01/29/2019    ESTGFRAFRICA >60 01/29/2019    EGFRNONAA >60 01/29/2019         Anesthesia Plan  Type of Anesthesia, risks & benefits discussed:  Anesthesia Type:  general  Patient's Preference:   Intra-op Monitoring Plan: standard ASA monitors  Intra-op Monitoring Plan Comments:   Post Op Pain Control Plan: multimodal analgesia  Post Op Pain Control Plan Comments:   Induction:   IV  Beta Blocker:  Patient is not currently on a Beta-Blocker (No further documentation required).       Informed Consent: Patient understands risks and agrees with Anesthesia plan.  Questions answered. Anesthesia consent signed with patient.  ASA Score: 3     Day of Surgery Review of History & Physical:            Ready For Surgery From Anesthesia Perspective.

## 2019-01-29 NOTE — PROGRESS NOTES
[facility] General Surgery  Neuroendocrine Tumor Service  Progress Note     Admission Date: 1/18/2019  Hospital Length of Stay: 11  Principal Problem: Aspiration pneumonia of right lower lobe   Gastric outlet obstruction     Subjective:      Interval History:   68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  Now c sepsis likely from aspiration RLL PNA  S/p IR drain of abdominal fluid collection  1/24/18 - Laparotomy, ASHLEIGH, enterotomy repair, EGD, GastroJ, Retroperitoneal peripancreatic abscess open drainage, Omental flap creation, Lori gastrostomy c 24 Fr Malecot     NAEO  Similar drainage  Consult placed to IR for possible thoracentesis  TFs stopped yesterday for concern leak  GT Malecot now to wall suction  Pulm signed off      Scheduled Meds:   conventional amphotericin B (FUNGIZONE) continuous irrigation  50 mg Irrigation Q12H    amphotericin B liposome (AMBISOME) IVPB  3 mg/kg (Dosing Weight) Intravenous Q24H    bumetanide  1 mg Intravenous Daily    chlorhexidine  15 mL Mouth/Throat BID    cyanocobalamin  1,000 mcg Subcutaneous Daily    fat emulsion 20%  250 mL Intravenous Every Mon, Wed, Fri    linezolid 600mg/300ml  600 mg Intravenous Q12H    meropenem (MERREM) IVPB  2 g Intravenous Q8H    pantoprazole  40 mg Intravenous Daily    potassium phosphate IVPB  30 mmol Intravenous Once     Continuous Infusions:   albumin human 25% 12.5 g (01/29/19 0730)    Amino acid 5% - dextrose 15% (CLINIMIX-E) solution with additives (1L  provides 510 kcal/L dextrose, with 50 gm AA, 150 gm CHO, Na 35, K 30, Mg 5, Ca 4.5, Acetate 80, Cl 39, Phos 15) 50 mL/hr at 01/29/19 0730     PRN Meds:.sodium chloride, sodium chloride, dextrose 50%, dextrose 50%, glucose, glucose, HYDROmorphone, insulin aspart U-100, naloxone, ondansetron, promethazine (PHENERGAN) IVPB, sodium chloride 0.9%    Objective:      Temp:  [98.2 °F (36.8 °C)-98.6 °F (37 °C)] 98.2 °F (36.8 °C)  Pulse:  [] 94  Resp:   [15-37] 21  SpO2:  [91 %-100 %] 97 %  BP: (119-165)/(59-84) 138/66  Weight change:     Intake/Output Summary (Last 24 hours) at 1/29/2019 0850  Last data filed at 1/29/2019 0730  Gross per 24 hour   Intake 2831.34 ml   Output 3020 ml   Net -188.66 ml     UOP 1.2L    G tube 470 - bloody, more recently overnight not putting out  NGT out now    Physical Exam:  GEN: awake, alert, NAD  CV: RRR  PULM: normal work of breathing, on NC  ABD: S/NT/ND, incision c/d/i, tubes in good position  EXT: Peripheral pulses present and equal bilaterally    Recent Labs   Lab 01/29/19  0426      K 3.8   CO2 37*   CL 99   BUN 26*   CREATININE 0.8   CALCIUM 8.9   PROT 6.8   AST 61*   ALT 40   ALKPHOS 131   BILITOT 1.1*     Recent Labs   Lab 01/29/19  0426   WBC 12.37   HGB 8.3*   HCT 26.5*          Significant Imaging: I have reviewed all pertinent imaging results/findings within the past 24 hours.    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  Now c sepsis likely from aspiration RLL PNA  S/p IR drain of abdominal fluid collection  1/24/18 - Laparotomy, ASHLEIGH, enterotomy repair, EGD, GastroJ, Retroperitoneal peripancreatic abscess open drainage, Omental flap creation, Lori gastrostomy c 24 Fr Malecot     Pain control prn  monitor UOP, trend Cr, try to avoid nephrotoxics - currently Cr wnl, UOP adequate  K normal, Cr normal  cont TPN/NPO, PPI  Abd culture grew Kluyvera ascorbata on abx - amphotericin irrigation of accordion drain, amphotericin IV, linezolid, merrem  Will IRRIGATE DRAIN with amphotericin and dakins - still doing, max volume ~500cc/d  TF held for GI bleed potentially and also for potential leak  2 U PRBC this yest - now stable ~24 hr - will continue to monitor  Cont to hold lovenox/toradol  IR consult for  poss thoracentesis entered - will follow up        Braulio Negrete MD

## 2019-01-29 NOTE — ANESTHESIA POSTPROCEDURE EVALUATION
"Anesthesia Post Evaluation    Patient: Hoang Santos Jr.    Procedure(s) Performed: Procedure(s) (LRB):  CHOLANGIOGRAM, PERCUTANEOUS, TRANSHEPATIC (Right)    Final Anesthesia Type: general  Patient location during evaluation: PACU  Patient participation: Yes- Able to Participate  Level of consciousness: awake and alert, oriented and awake  Post-procedure vital signs: reviewed and stable  Pain management: adequate  Airway patency: patent  PONV status at discharge: No PONV  Anesthetic complications: no      Cardiovascular status: blood pressure returned to baseline  Respiratory status: unassisted and room air  Hydration status: euvolemic  Follow-up not needed.        Visit Vitals  BP (!) 110/57   Pulse 101   Temp 37.2 °C (99 °F) (Oral)   Resp (!) 24   Ht 5' 10" (1.778 m)   Wt 108.5 kg (239 lb 3.2 oz)   SpO2 97%   BMI 34.32 kg/m²       Pain/Perla Score: No Data Recorded      "

## 2019-01-29 NOTE — NURSING
Pt VSS for night. Wore BiPAP all night and tolerated well. Pt  Gtube to LCWS with sanguanous drainage tolerating well, H+H stable, MD aware. Drain to gravity, draining consistantly. MDL abd inc CDI, edges approximated. No C/O pain overnight with PCA being DC. No c/o N/V for shift. MD stated to drop NG tube if pt becomes nauseous. All lines patent and infusing. Pt in bed in lowest position. No distress noted. Will continue to monitor.

## 2019-01-29 NOTE — CONSULTS
Pulmonary & Critical Care Medicine Progress Note    Subjective:   Mr. Santos continues to require minimal oxygen support. He denies any shortness of breath, chest pain, or any hemoptysis. No fever overnight.     Past medical, surgical, family, and social history from initial consult was reviewed and verified during today's visit.     Vital Signs:   Temp:  [98.2 °F (36.8 °C)-98.6 °F (37 °C)] 98.2 °F (36.8 °C)  Pulse:  [] 96  Resp:  [15-50] 50  SpO2:  [91 %-100 %] 96 %  BP: (118-165)/(59-84) 120/62      Fluid Balance:     Intake/Output Summary (Last 24 hours) at 1/29/2019 1149  Last data filed at 1/29/2019 1000  Gross per 24 hour   Intake 2991.34 ml   Output 3520 ml   Net -528.66 ml       Review of Systems:   A comprehensive 12-point review of systems was performed, and is negative except for those items mentioned above in the HPI section of this note.     Physical Exam:   General: ill appearing AAM, NAD  HEENT: AT/NC, PERRL, EOMI, oral and nasal mucosa moist.  Nasal canula in place  Cardiac: normal rate, regular rhythm with no MRG with brisk cap refill and symmetric pulses in distal extremities.  Respiratory: Normal inspection. Symmetric chest rise. Auscultation clear bilaterally. No increased work of breathing noted.   Abdomen: Soft, NT/ND. +BS. No hepatosplenomegaly.   Extremities: No edema.   Neuro: Grossly intact to brief exam. Oriented x3 with appropriate mood/affect to situation.      Personal Review and Summary of Interval Diagnostics    Laboratory Studies:   No results for input(s): PH, PCO2, PO2, HCO3, POCSATURATED, BE in the last 24 hours.  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]  Post-op Diagnosis: SAME  Procedure(s):  INSERTION, JEJUNOSTOMY TUBE, LAPAROSCOPIC  EGD (ESOPHAGOGASTRODUODENOSCOPY)  INSERTION, GASTROSTOMY TUBE, LAPAROSCOPIC  Number of specimens: 1  Name of specimens:  INTRA-ABDOMINAL ABSCESS #2    Aerobic culture [[id number]]  (Susceptibility) Collected:  01/24/19 1843    Order Status:  Completed Specimen:  Abscess from Abdomen Updated:  01/28/19 0941     Aerobic Bacterial Culture --     ESCHERICHIA COLI  Few      Narrative:       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]  Post-op Diagnosis: SAME  Procedure(s):  INSERTION, JEJUNOSTOMY TUBE, LAPAROSCOPIC  EGD (ESOPHAGOGASTRODUODENOSCOPY)  INSERTION, GASTROSTOMY TUBE, LAPAROSCOPIC  Number of specimens: 1  Name of specimens: INTRA-ABDOMINAL ABSCESS #1    Aerobic culture [[id number]]  (Susceptibility) Collected:  01/24/19 1843    Order Status:  Completed Specimen:  Abscess from Abdomen Updated:  01/28/19 0941     Aerobic Bacterial Culture --     ESCHERICHIA COLI  Few      Narrative:       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]  Post-op Diagnosis: SAME  Procedure(s):  INSERTION, JEJUNOSTOMY TUBE, LAPAROSCOPIC  EGD (ESOPHAGOGASTRODUODENOSCOPY)  INSERTION, GASTROSTOMY TUBE, LAPAROSCOPIC  Number of specimens: 1  Name of specimens:  INTRA-ABDOMINAL ABSCESS TISSUE    Aerobic culture [[id number]] Collected:  01/24/19 1843    Order Status:  Completed Specimen:  Abscess from Abdomen Updated:  01/28/19 0934     Aerobic Bacterial Culture --     CANDIDA ALBICANS  Rare      Narrative:       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]  Post-op Diagnosis: SAME  Procedure(s):  INSERTION, JEJUNOSTOMY TUBE,  LAPAROSCOPIC  EGD (ESOPHAGOGASTRODUODENOSCOPY)  INSERTION, GASTROSTOMY TUBE, LAPAROSCOPIC  Number of specimens: 1  Name of specimens:  INTRA-ABDOMINAL ABSCESS #3    Culture, Anaerobe [[id number]] Collected:  01/24/19 1843    Order Status:  Completed Specimen:  Abscess from Abdomen Updated:  01/28/19 0854     Anaerobic Culture Culture in progress    Narrative:       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]  Post-op Diagnosis: SAME  Procedure(s):  INSERTION, JEJUNOSTOMY TUBE, LAPAROSCOPIC  EGD (ESOPHAGOGASTRODUODENOSCOPY)  INSERTION, GASTROSTOMY TUBE, LAPAROSCOPIC  Number of specimens: 1  Name of specimens: INTRA-ABDOMINAL ABSCESS #1    Culture, Anaerobe [[id number]] Collected:  01/24/19 1843    Order Status:  Completed Specimen:  Abscess from Abdomen Updated:  01/28/19 0854     Anaerobic Culture Culture in progress    Narrative:       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]  Post-op Diagnosis: SAME  Procedure(s):  INSERTION, JEJUNOSTOMY TUBE, LAPAROSCOPIC  EGD (ESOPHAGOGASTRODUODENOSCOPY)  INSERTION, GASTROSTOMY TUBE, LAPAROSCOPIC  Number of specimens: 1  Name of specimens:  INTRA-ABDOMINAL ABSCESS TISSUE    Gram stain [[id number]] Collected:  01/24/19 1843    Order Status:  Completed Specimen:  Abscess from Abdomen Updated:  01/24/19 2335     Gram Stain Result Rare WBC's      No organisms seen    Narrative:       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]  Post-op Diagnosis: SAME  Procedure(s):  INSERTION, JEJUNOSTOMY TUBE, LAPAROSCOPIC  EGD (ESOPHAGOGASTRODUODENOSCOPY)  INSERTION, GASTROSTOMY TUBE, LAPAROSCOPIC  Number of specimens: 1  Name of specimens:  INTRA-ABDOMINAL ABSCESS TISSUE    Gram stain [[id number]] Collected:  01/24/19 1843    Order Status:  Completed Specimen:  Abscess from Abdomen Updated:  01/24/19 2330     Gram Stain Result Rare WBC's      No organisms seen    Narrative:       Pre-op Diagnosis: Malignant carcinoid tumor of  duodenum  [C7A.010]  Post-op Diagnosis: SAME  Procedure(s):  INSERTION, JEJUNOSTOMY TUBE, LAPAROSCOPIC  EGD (ESOPHAGOGASTRODUODENOSCOPY)  INSERTION, GASTROSTOMY TUBE, LAPAROSCOPIC  Number of specimens: 1  Name of specimens:  INTRA-ABDOMINAL ABSCESS #2    Gram stain [[id number]] Collected:  01/24/19 1843    Order Status:  Completed Specimen:  Abscess from Abdomen Updated:  01/24/19 2328     Gram Stain Result Rare WBC's      No organisms seen    Narrative:       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]  Post-op Diagnosis: SAME  Procedure(s):  INSERTION, JEJUNOSTOMY TUBE, LAPAROSCOPIC  EGD (ESOPHAGOGASTRODUODENOSCOPY)  INSERTION, GASTROSTOMY TUBE, LAPAROSCOPIC  Number of specimens: 1  Name of specimens:  INTRA-ABDOMINAL ABSCESS #1    Gram stain [[id number]] Collected:  01/24/19 1843    Order Status:  Completed Specimen:  Abscess from Abdomen Updated:  01/24/19 2327     Gram Stain Result Rare WBC's      No organisms seen    Narrative:       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]  Post-op Diagnosis: SAME  Procedure(s):  INSERTION, JEJUNOSTOMY TUBE, LAPAROSCOPIC  EGD (ESOPHAGOGASTRODUODENOSCOPY)  INSERTION, GASTROSTOMY TUBE, LAPAROSCOPIC  Number of specimens: 1  Name of specimens:  INTRA-ABDOMINAL ABSCESS #3    Fungus culture [[id number]] Collected:  01/24/19 1843    Order Status:  Sent Specimen:  Abscess from Abdomen Updated:  01/24/19 2138    Fungus culture [[id number]] Collected:  01/24/19 1843    Order Status:  Sent Specimen:  Abscess from Abdomen Updated:  01/24/19 2138    Fungus culture [[id number]] Collected:  01/24/19 1843    Order Status:  Sent Specimen:  Abscess from Abdomen Updated:  01/24/19 2138    Fungus culture [[id number]] Collected:  01/24/19 1843    Order Status:  Sent Specimen:  Abscess from Abdomen Updated:  01/24/19 2138    Culture, Anaerobe [[id number]] Collected:  01/24/19 1843    Order Status:  Canceled Specimen:  Abscess from Abdomen Updated:   01/24/19 1843    Culture, Anaerobe [[id number]] Collected:  01/24/19 1843    Order Status:  Canceled Specimen:  Abscess from Abdomen Updated:  01/24/19 1843    Culture, Respiratory with Gram Stain [[id number]] Collected:  01/21/19 1705    Order Status:  Completed Specimen:  Respiratory from Bronchial Wash, RML Updated:  01/24/19 1027     Respiratory Culture --     CANDIDA ALBICANS  Moderate       Gram Stain (Respiratory) Rare WBC's     Gram Stain (Respiratory) Rare budding yeast    Blood culture x two cultures. Draw prior to antibiotics. [[id number]] Collected:  01/18/19 1221    Order Status:  Completed Specimen:  Blood from Line, PICC Right Brachial Updated:  01/23/19 2012     Blood Culture, Routine No growth after 5 days.    Narrative:       Aerobic and anaerobic    Blood culture x two cultures. Draw prior to antibiotics. [[id number]] Collected:  01/18/19 1117    Order Status:  Completed Specimen:  Blood from Peripheral, Antecubital, Left Updated:  01/23/19 1412     Blood Culture, Routine No growth after 5 days.    Narrative:       Aerobic and anaerobic    Blood culture x two cultures. Draw prior to antibiotics. [[id number]] Collected:  01/18/19 1121    Order Status:  Completed Specimen:  Blood from Peripheral, Antecubital, Left Updated:  01/23/19 1412     Blood Culture, Routine No growth after 5 days.    Narrative:       Aerobic and anaerobic    Culture, Anaerobic [[id number]] Collected:  01/19/19 2011    Order Status:  Completed Specimen:  Body Fluid from Abdomen Updated:  01/23/19 1024     Anaerobic Culture No anaerobes isolated    Culture, Respiratory with Gram Stain [[id number]] Collected:  01/19/19 0846    Order Status:  Completed Specimen:  Respiratory from Sputum, Expectorated Updated:  01/22/19 1354     Respiratory Culture --     SARWAT ALBICANS  Many  Normal respiratory ovidio also present       Gram Stain (Respiratory) >10 epithelial cells per low power field     Gram Stain (Respiratory) Moderate WBC's      Gram Stain (Respiratory) Many yeast     Gram Stain (Respiratory) Few Gram positive cocci     Gram Stain (Respiratory) Few Gram positive rods     Gram Stain (Respiratory) Rare Gram negative rods        Chest Imaging:     Infusions:     albumin human 25% 12.5 g (01/29/19 1002)    Amino acid 5% - dextrose 15% (CLINIMIX-E) solution with additives (1L  provides 510 kcal/L dextrose, with 50 gm AA, 150 gm CHO, Na 35, K 30, Mg 5, Ca 4.5, Acetate 80, Cl 39, Phos 15) 50 mL/hr at 01/29/19 0730    Amino acid 5% - dextrose 15% (CLINIMIX-E) solution with additives (1L  provides 510 kcal/L dextrose, with 50 gm AA, 150 gm CHO, Na 35, K 30, Mg 5, Ca 4.5, Acetate 80, Cl 39, Phos 15)         Scheduled Medications:    conventional amphotericin B (FUNGIZONE) continuous irrigation  50 mg Irrigation Q12H    amphotericin B liposome (AMBISOME) IVPB  3 mg/kg (Dosing Weight) Intravenous Q24H    bumetanide  1 mg Intravenous Daily    chlorhexidine  15 mL Mouth/Throat BID    cyanocobalamin  1,000 mcg Subcutaneous Daily    linezolid 600mg/300ml  600 mg Intravenous Q12H    meropenem (MERREM) IVPB  2 g Intravenous Q8H    pantoprazole  40 mg Intravenous Daily    potassium phosphate IVPB  30 mmol Intravenous Once       PRN Medications:   sodium chloride, sodium chloride, dextrose 50%, dextrose 50%, glucose, glucose, HYDROmorphone, insulin aspart U-100, naloxone, ondansetron, promethazine (PHENERGAN) IVPB, sodium chloride 0.9%    Impression & Recommendations    Acute Hypoxic and Hypercapnic Respiratory Failure  - resolved on minimal O2 via nasal canula  -continue bipap at night for GEMMA  - will need bipap when discharged home for hypercapnea  - continue     Aspiration  - continued aspiration due to swallowing issues.   - needs continued speech therapy involvement  - there is nothing from a pulmonary standpoint that can be done to prevent aspiration other than NPO with typical aspiration precautions.   - we do not treat aspiration with  prophylactic antibiotics  - I do not think that this is the source of his infection currently, especially with cultures positive from his abdomen.   He does NOT need a tracheostomy. He is not having any issues with oxygenation on minimal supplemental oxygen.     Would consider scaling back antibiotics based on sensitivities, if he becomes resistant to these broad spectrum antibiotics he won't have any other options.   There does not seem to be any indication for amphotericin and given its significant side effect profile, would discontinue and discuss with ID if there is concern.         Please call back with any questions or concerns.         Lucero Quispe M.D.  [facility] Pul[facility]/Critical Care Fellow

## 2019-01-29 NOTE — PLAN OF CARE
Problem: Physical Therapy Goal  Goal: Physical Therapy Goal  Goals to be met by: 2019  Patient will increase functional independence with mobility by performin. Supine to sit with Modified [place]  2. Sit to stand transfer with Modified [place]  3. Gait  x 150 feet with Modified [place] using Rolling Walker.   4. Lower extremity exercise program x10 reps per handout, with independence      Outcome: Ongoing (interventions implemented as appropriate)  Patient just received blood; nurse OK'd bed exercises; pt participated well; will cont with POC.

## 2019-01-29 NOTE — PROCEDURES
Interventional Radiology Post-Procedure Note    Pre Op Diagnosis: Bile leak  Post Op Diagnosis: Same    Procedure: PTC with internal-external drain placement    Procedure performed by: Faiza    Written Informed Consent Obtained: Yes  Specimen Removed: No  Estimated Blood Loss: Minimal    Findings:   Non-dilated intrahepatic ducts. Bile leak from proximal (central) CBD. Right-sided 8-Fr internal-external biliary drain placed with tip in the small bowel. To bag gravity drainage.    Limited chest US performed on the right and left demonstrates trace bilateral pleural effusion. Not enough fluid for safe thoracentesis so no thoracentesis was performed.    No immediate complications. Patient tolerated procedure well. Please see full dictated procedure report for additional details.    Wife, Sneha, updated immediately following procedure.      Augustin Kendall MD  [facility]  Pager [phone]

## 2019-01-29 NOTE — PLAN OF CARE
Recommendations     1. If pt to remain on TPN without enteral support rec increase PN to 90 ml/hr with lipids 3 x week to better meet estimated needs.   -To provide: 1748 kcal, 108g pro, 2160 mL fluid (GIR: 2.07)  2. When able to utilize enteral access rec TF Isosource 1.5 initiated @ 10 ml/hr and advanced 10 ml q 4 hrs to goal rate of 60 ml/hr + fluid flushes of 135 mL q 3 hrs and advance flushes as tolerated to provide 1L/day of extra fluid.    -TF to provide: 2160 kcal, 98g pro, 110 ml fluid  -Hold TF for n/v/abd discomfort  3. RD to monitor     Goals: Meet >85% EEN Daily  Nutrition Goal Status: goal not met  Communication of RD Recs: (plan of care)

## 2019-01-29 NOTE — TRANSFER OF CARE
"Anesthesia Transfer of Care Note    Patient: Hoang Santos Jr.    Procedure(s) Performed: Procedure(s) (LRB):  CHOLANGIOGRAM, PERCUTANEOUS, TRANSHEPATIC (Right)    Patient location: ICU    Anesthesia Type: general    Transport from OR: Transported from OR intubated on 100% O2 by AMBU with adequate controlled ventilation. Continuous ECG monitoring in transport. Continuous SpO2 monitoring in transport    Post pain: adequate analgesia    Post assessment: no apparent anesthetic complications and tolerated procedure well    Post vital signs: stable    Level of consciousness: sedated    Nausea/Vomiting: no nausea/vomiting    Complications: none    Transfer of care protocol was followed      Last vitals:   Visit Vitals  BP (!) 108/59   Pulse 96   Temp 37.1 °C (98.8 °F) (Oral)   Resp (!) 50   Ht 5' 10" (1.778 m)   Wt 108.5 kg (239 lb 3.2 oz)   SpO2 96%   BMI 34.32 kg/m²     "

## 2019-01-29 NOTE — PLAN OF CARE
Mr. Santos went to IR for a biliary drain and returned intubated. Changed his ventilator settings to , FiO2 30%, PEEP of 5 and he is doing well. Will start sedation and monitor overnight with plans to SAT/SBT in the morning. He would benefit from diuresis. He is 9 liters positive since admission.     Lucero Quispe M.D.  [facility] Pulmonary/Critical Care Fellow

## 2019-01-29 NOTE — PROGRESS NOTES
Discussed with im and his wife after CT     Ct shows minimal fluid collection RUQ    Ileus pattern seen. Minimal contrast in small bowel  stomach distended. Will stop tube feeding    Told wife he had leak of intestinal content looking at the clour    Will continue present treatment  Told then that radiology may ado thoracentesis

## 2019-01-30 LAB
ALBUMIN SERPL BCP-MCNC: 3.4 G/DL
ALLENS TEST: ABNORMAL
ALP SERPL-CCNC: 116 U/L
ALT SERPL W/O P-5'-P-CCNC: 42 U/L
ANION GAP SERPL CALC-SCNC: 11 MMOL/L
APTT BLDCRRT: 30.6 SEC
AST SERPL-CCNC: 69 U/L
BACTERIA SPEC ANAEROBE CULT: NORMAL
BASOPHILS # BLD AUTO: 0.02 K/UL
BASOPHILS NFR BLD: 0.2 %
BILIRUB SERPL-MCNC: 1.9 MG/DL
BLD PROD TYP BPU: NORMAL
BLD PROD TYP BPU: NORMAL
BLOOD UNIT EXPIRATION DATE: NORMAL
BLOOD UNIT EXPIRATION DATE: NORMAL
BLOOD UNIT TYPE CODE: 5100
BLOOD UNIT TYPE CODE: 5100
BLOOD UNIT TYPE: NORMAL
BLOOD UNIT TYPE: NORMAL
BUN SERPL-MCNC: 30 MG/DL
CALCIUM SERPL-MCNC: 9.2 MG/DL
CHLORIDE SERPL-SCNC: 99 MMOL/L
CO2 SERPL-SCNC: 32 MMOL/L
CODING SYSTEM: NORMAL
CODING SYSTEM: NORMAL
CREAT SERPL-MCNC: 1 MG/DL
DELSYS: ABNORMAL
DIFFERENTIAL METHOD: ABNORMAL
DISPENSE STATUS: NORMAL
DISPENSE STATUS: NORMAL
EOSINOPHIL # BLD AUTO: 0.1 K/UL
EOSINOPHIL NFR BLD: 0.8 %
ERYTHROCYTE [DISTWIDTH] IN BLOOD BY AUTOMATED COUNT: 15.6 %
ERYTHROCYTE [SEDIMENTATION RATE] IN BLOOD BY WESTERGREN METHOD: 16 MM/H
EST. GFR  (AFRICAN AMERICAN): >60 ML/MIN/1.73 M^2
EST. GFR  (NON AFRICAN AMERICAN): >60 ML/MIN/1.73 M^2
FIO2: 35
GLUCOSE SERPL-MCNC: 97 MG/DL
HCO3 UR-SCNC: 34 MMOL/L (ref 24–28)
HCT VFR BLD AUTO: 24.5 %
HCT VFR BLD AUTO: 32.5 %
HGB BLD-MCNC: 10.3 G/DL
HGB BLD-MCNC: 7.7 G/DL
INR PPP: 1.1
LYMPHOCYTES # BLD AUTO: 0.7 K/UL
LYMPHOCYTES NFR BLD: 7.7 %
MAGNESIUM SERPL-MCNC: 1.9 MG/DL
MCH RBC QN AUTO: 29.3 PG
MCHC RBC AUTO-ENTMCNC: 31.4 G/DL
MCV RBC AUTO: 93 FL
MODE: ABNORMAL
MONOCYTES # BLD AUTO: 1.1 K/UL
MONOCYTES NFR BLD: 11.8 %
NEUTROPHILS # BLD AUTO: 7.7 K/UL
NEUTROPHILS NFR BLD: 79.1 %
PCO2 BLDA: 35.8 MMHG (ref 35–45)
PEEP: 5
PH SMN: 7.58 [PH] (ref 7.35–7.45)
PHOSPHATE SERPL-MCNC: 1.9 MG/DL
PLATELET # BLD AUTO: 172 K/UL
PMV BLD AUTO: 9.8 FL
PO2 BLDA: 94 MMHG (ref 80–100)
POC BE: 12 MMOL/L
POC SATURATED O2: 98 % (ref 95–100)
POC TCO2: 35 MMOL/L (ref 23–27)
POTASSIUM SERPL-SCNC: 3.3 MMOL/L
PROT SERPL-MCNC: 6.7 G/DL
PROTHROMBIN TIME: 11.9 SEC
RBC # BLD AUTO: 2.63 M/UL
SAMPLE: ABNORMAL
SITE: ABNORMAL
SODIUM SERPL-SCNC: 142 MMOL/L
TRANS ERYTHROCYTES VOL PATIENT: NORMAL ML
TRANS ERYTHROCYTES VOL PATIENT: NORMAL ML
VT: 450
WBC # BLD AUTO: 9.67 K/UL

## 2019-01-30 PROCEDURE — 27000646 HC AEROBIKA DEVICE

## 2019-01-30 PROCEDURE — C9113 INJ PANTOPRAZOLE SODIUM, VIA: HCPCS | Performed by: SURGERY

## 2019-01-30 PROCEDURE — P9047 ALBUMIN (HUMAN), 25%, 50ML: HCPCS | Mod: JG | Performed by: SURGERY

## 2019-01-30 PROCEDURE — 85610 PROTHROMBIN TIME: CPT

## 2019-01-30 PROCEDURE — 83735 ASSAY OF MAGNESIUM: CPT

## 2019-01-30 PROCEDURE — 99900035 HC TECH TIME PER 15 MIN (STAT)

## 2019-01-30 PROCEDURE — 94660 CPAP INITIATION&MGMT: CPT

## 2019-01-30 PROCEDURE — 25000003 PHARM REV CODE 250: Performed by: SURGERY

## 2019-01-30 PROCEDURE — S0171 BUMETANIDE 0.5 MG: HCPCS | Performed by: SURGERY

## 2019-01-30 PROCEDURE — 25000003 PHARM REV CODE 250: Performed by: STUDENT IN AN ORGANIZED HEALTH CARE EDUCATION/TRAINING PROGRAM

## 2019-01-30 PROCEDURE — P9021 RED BLOOD CELLS UNIT: HCPCS

## 2019-01-30 PROCEDURE — B4185 PARENTERAL SOL 10 GM LIPIDS: HCPCS | Performed by: SURGERY

## 2019-01-30 PROCEDURE — 63600175 PHARM REV CODE 636 W HCPCS: Performed by: SURGERY

## 2019-01-30 PROCEDURE — 27000221 HC OXYGEN, UP TO 24 HOURS

## 2019-01-30 PROCEDURE — 80053 COMPREHEN METABOLIC PANEL: CPT

## 2019-01-30 PROCEDURE — 97530 THERAPEUTIC ACTIVITIES: CPT

## 2019-01-30 PROCEDURE — 85025 COMPLETE CBC W/AUTO DIFF WBC: CPT

## 2019-01-30 PROCEDURE — 85014 HEMATOCRIT: CPT

## 2019-01-30 PROCEDURE — 27000190 HC CPAP FULL FACE MASK W/VALVE

## 2019-01-30 PROCEDURE — 63600175 PHARM REV CODE 636 W HCPCS: Mod: JG | Performed by: SURGERY

## 2019-01-30 PROCEDURE — 94799 UNLISTED PULMONARY SVC/PX: CPT

## 2019-01-30 PROCEDURE — 63600175 PHARM REV CODE 636 W HCPCS: Performed by: STUDENT IN AN ORGANIZED HEALTH CARE EDUCATION/TRAINING PROGRAM

## 2019-01-30 PROCEDURE — 85018 HEMOGLOBIN: CPT

## 2019-01-30 PROCEDURE — 85730 THROMBOPLASTIN TIME PARTIAL: CPT

## 2019-01-30 PROCEDURE — A4216 STERILE WATER/SALINE, 10 ML: HCPCS | Performed by: SURGERY

## 2019-01-30 PROCEDURE — 25000003 PHARM REV CODE 250: Performed by: ANESTHESIOLOGY

## 2019-01-30 PROCEDURE — 36600 WITHDRAWAL OF ARTERIAL BLOOD: CPT

## 2019-01-30 PROCEDURE — 82803 BLOOD GASES ANY COMBINATION: CPT

## 2019-01-30 PROCEDURE — 20000000 HC ICU ROOM

## 2019-01-30 PROCEDURE — 36430 TRANSFUSION BLD/BLD COMPNT: CPT

## 2019-01-30 PROCEDURE — 94003 VENT MGMT INPAT SUBQ DAY: CPT

## 2019-01-30 PROCEDURE — 94664 DEMO&/EVAL PT USE INHALER: CPT

## 2019-01-30 PROCEDURE — 84100 ASSAY OF PHOSPHORUS: CPT

## 2019-01-30 PROCEDURE — 97110 THERAPEUTIC EXERCISES: CPT

## 2019-01-30 PROCEDURE — 63600175 PHARM REV CODE 636 W HCPCS: Mod: JG | Performed by: STUDENT IN AN ORGANIZED HEALTH CARE EDUCATION/TRAINING PROGRAM

## 2019-01-30 PROCEDURE — 94761 N-INVAS EAR/PLS OXIMETRY MLT: CPT

## 2019-01-30 PROCEDURE — 99900017 HC EXTUBATION W/PARAMETERS (STAT)

## 2019-01-30 RX ORDER — HYDROCODONE BITARTRATE AND ACETAMINOPHEN 500; 5 MG/1; MG/1
TABLET ORAL
Status: DISCONTINUED | OUTPATIENT
Start: 2019-01-30 | End: 2019-02-08 | Stop reason: HOSPADM

## 2019-01-30 RX ORDER — DEXTROSE MONOHYDRATE, SODIUM CHLORIDE, AND POTASSIUM CHLORIDE 50; 1.49; 4.5 G/1000ML; G/1000ML; G/1000ML
INJECTION, SOLUTION INTRAVENOUS
Status: DISCONTINUED
Start: 2019-01-30 | End: 2019-01-30 | Stop reason: WASHOUT

## 2019-01-30 RX ORDER — BUMETANIDE 0.25 MG/ML
1 INJECTION INTRAMUSCULAR; INTRAVENOUS ONCE
Status: COMPLETED | OUTPATIENT
Start: 2019-01-30 | End: 2019-01-30

## 2019-01-30 RX ADMIN — AMPHOTERICIN B 50 MG: 50 INJECTION, POWDER, LYOPHILIZED, FOR SOLUTION INTRAVENOUS at 10:01

## 2019-01-30 RX ADMIN — CHLORHEXIDINE GLUCONATE 15 ML: 1.2 RINSE ORAL at 09:01

## 2019-01-30 RX ADMIN — ALBUMIN HUMAN 12.5 G: 0.25 SOLUTION INTRAVENOUS at 05:01

## 2019-01-30 RX ADMIN — ASCORBIC ACID, VITAMIN A PALMITATE, CHOLECALCIFEROL, THIAMINE HYDROCHLORIDE, RIBOFLAVIN-5 PHOSPHATE SODIUM, PYRIDOXINE HYDROCHLORIDE, NIACINAMIDE, DEXPANTHENOL, ALPHA-TOCOPHEROL ACETATE, VITAMIN K1, FOLIC ACID, BIOTIN, CYANOCOBALAMIN: 200; 3300; 200; 6; 3.6; 6; 40; 15; 10; 150; 600; 60; 5 INJECTION, SOLUTION INTRAVENOUS at 09:01

## 2019-01-30 RX ADMIN — ALBUMIN HUMAN 12.5 G: 0.25 SOLUTION INTRAVENOUS at 10:01

## 2019-01-30 RX ADMIN — BUMETANIDE 1 MG: 0.25 INJECTION INTRAMUSCULAR; INTRAVENOUS at 09:01

## 2019-01-30 RX ADMIN — LINEZOLID 600 MG: 600 INJECTION, SOLUTION INTRAVENOUS at 08:01

## 2019-01-30 RX ADMIN — MEROPENEM 2 G: 1 INJECTION, POWDER, FOR SOLUTION INTRAVENOUS at 09:01

## 2019-01-30 RX ADMIN — POTASSIUM PHOSPHATE, MONOBASIC AND POTASSIUM PHOSPHATE, DIBASIC 30 MMOL: 224; 236 INJECTION, SOLUTION INTRAVENOUS at 09:01

## 2019-01-30 RX ADMIN — ALBUMIN HUMAN 12.5 G: 0.25 SOLUTION INTRAVENOUS at 06:01

## 2019-01-30 RX ADMIN — BUMETANIDE 1 MG: 0.25 INJECTION INTRAMUSCULAR; INTRAVENOUS at 03:01

## 2019-01-30 RX ADMIN — I.V. FAT EMULSION 250 ML: 20 EMULSION INTRAVENOUS at 09:01

## 2019-01-30 RX ADMIN — AMPHOTERICIN B 300 MG: 50 INJECTION, POWDER, LYOPHILIZED, FOR SOLUTION INTRAVENOUS at 12:01

## 2019-01-30 RX ADMIN — PANTOPRAZOLE SODIUM 40 MG: 40 INJECTION, POWDER, LYOPHILIZED, FOR SOLUTION INTRAVENOUS at 09:01

## 2019-01-30 RX ADMIN — MEROPENEM 2 G: 1 INJECTION, POWDER, FOR SOLUTION INTRAVENOUS at 05:01

## 2019-01-30 RX ADMIN — AMPHOTERICIN B 50 MG: 50 INJECTION, POWDER, LYOPHILIZED, FOR SOLUTION INTRAVENOUS at 09:01

## 2019-01-30 RX ADMIN — CYANOCOBALAMIN 1000 MCG: 1000 INJECTION, SOLUTION INTRAMUSCULAR at 09:01

## 2019-01-30 RX ADMIN — MEROPENEM 2 G: 1 INJECTION, POWDER, FOR SOLUTION INTRAVENOUS at 03:01

## 2019-01-30 RX ADMIN — ALBUMIN HUMAN 12.5 G: 0.25 SOLUTION INTRAVENOUS at 09:01

## 2019-01-30 RX ADMIN — LINEZOLID 600 MG: 600 INJECTION, SOLUTION INTRAVENOUS at 06:01

## 2019-01-30 NOTE — PT/OT/SLP PROGRESS
Physical Therapy      Patient Name:  Hoang Santos .   MRN:  [id number]  [id number]  Patient not seen today secondary to pt having drain in IR and needed to be intubated. Will follow-up    Myesha Renner, PT   1/29/2019

## 2019-01-30 NOTE — PLAN OF CARE
Problem: Adult Inpatient Plan of Care  Goal: Plan of Care Review  Outcome: Ongoing (interventions implemented as appropriate)  Pt extubated this AM to 2L NC, pt tolerating well. Acopella & IS done with encouragement. Pt education on both instruments.  Pt oobtc x 2.5 hrs during shift.  Transfused 2 units PRBC.  No adverse effects. Schrader discontinued pt voiding responding to Bumex.  Moderate dark red bloody output from G-tube connected to LIS.  Moderate brown milky thick output from abscess drainage. abd taut, distended non tender to touch. Spouse @ bedside.  Repeat H/H sent  For post blood transfusion. POC reviewed with pt and spouse. All questions answered. Will continue to monitor pt closely.

## 2019-01-30 NOTE — PROGRESS NOTES
Pulmonary & Critical Care Medicine Progress Note    Subjective:   Mr. Santos did well with SAT/SBT this am and was extubated to nasal canula. He has remained afebrile overnight and has not had any issues. He has a strong cough and is only complaining of pain in his abdomen.     Past medical, surgical, family, and social history from initial consult was reviewed and verified during today's visit.     Vital Signs:   Temp:  [98.5 °F (36.9 °C)-100.2 °F (37.9 °C)] 98.5 °F (36.9 °C)  Pulse:  [] 97  Resp:  [15-50] 29  SpO2:  [92 %-100 %] 97 %  BP: ()/(53-66) 119/57      Fluid Balance:     Intake/Output Summary (Last 24 hours) at 1/30/2019 0851  Last data filed at 1/30/2019 0800  Gross per 24 hour   Intake 1556.25 ml   Output 2925 ml   Net -1368.75 ml       Review of Systems:   A comprehensive 12-point review of systems was performed, and is negative except for those items mentioned above in the HPI section of this note.     Physical Exam:   General: ill appearing AAM, NAD  HEENT: AT/NC, PERRL, EOMI, oral and nasal mucosa moist.  Nasal canula in place  Cardiac: normal rate, regular rhythm with no MRG with brisk cap refill and symmetric pulses in distal extremities.  Respiratory: Normal inspection. Symmetric chest rise. Auscultation clear bilaterally. No increased work of breathing noted.   Abdomen: Soft, diffusely tender to palpation  Extremities: No edema.   Neuro: Grossly intact to brief exam. Oriented x3 with appropriate mood/affect to situation.      Personal Review and Summary of Interval Diagnostics    Laboratory Studies:   Recent Labs   Lab 01/30/19  0501   PH 7.585*   PCO2 35.8   PO2 94   HCO3 34.0*   POCSATURATED 98   BE 12     Recent Labs   Lab 01/30/19  0514   WBC 9.67   RBC 2.63*   HGB 7.7*   HCT 24.5*      MCV 93   MCH 29.3   MCHC 31.4*     Recent Labs   Lab 01/30/19  0514      K 3.3*   CL 99   CO2 32*   BUN 30*   CREATININE 1.0   MG 1.9       Microbiology Data:   Microbiology Results  (last 7 days)     Procedure Component Value Units Date/Time    Fungus culture [[id number]] Collected:  01/19/19 2011    Order Status:  Completed Specimen:  Body Fluid from Abdomen Updated:  01/29/19 1258     Fungus (Mycology) Culture --     YEAST   Many  Identification pending      Fungus culture [[id number]] Collected:  01/24/19 1843    Order Status:  Completed Specimen:  Abscess from Abdomen Updated:  01/29/19 1256     Fungus (Mycology) Culture --     YEAST   Few  Identification pending      Narrative:       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]  Post-op Diagnosis: SAME  Procedure(s):  INSERTION, JEJUNOSTOMY TUBE, LAPAROSCOPIC  EGD (ESOPHAGOGASTRODUODENOSCOPY)  INSERTION, GASTROSTOMY TUBE, LAPAROSCOPIC  Number of specimens: 1  Name of specimens:  INTRA-ABDOMINAL ABSCESS #1    Fungus culture [[id number]] Collected:  01/24/19 1843    Order Status:  Completed Specimen:  Abscess from Abdomen Updated:  01/29/19 1252     Fungus (Mycology) Culture --     YEAST   Few  Identification pending      Narrative:       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]  Post-op Diagnosis: SAME  Procedure(s):  INSERTION, JEJUNOSTOMY TUBE, LAPAROSCOPIC  EGD (ESOPHAGOGASTRODUODENOSCOPY)  INSERTION, GASTROSTOMY TUBE, LAPAROSCOPIC  Number of specimens: 1  Name of specimens:  INTRA-ABDOMINAL ABSCESS #3    Fungus culture [[id number]] Collected:  01/24/19 1843    Order Status:  Completed Specimen:  Abscess from Abdomen Updated:  01/29/19 1246     Fungus (Mycology) Culture Culture in progress    Narrative:       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]  Post-op Diagnosis: SAME  Procedure(s):  INSERTION, JEJUNOSTOMY TUBE, LAPAROSCOPIC  EGD (ESOPHAGOGASTRODUODENOSCOPY)  INSERTION, GASTROSTOMY TUBE, LAPAROSCOPIC  Number of specimens: 1  Name of specimens:  INTRA-ABDOMINAL ABSCESS #2    Fungus culture [[id number]] Collected:  01/24/19 1843    Order Status:  Completed Specimen:   Abscess from Abdomen Updated:  01/29/19 1246     Fungus (Mycology) Culture Culture in progress    Narrative:       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]  Post-op Diagnosis: SAME  Procedure(s):  INSERTION, JEJUNOSTOMY TUBE, LAPAROSCOPIC  EGD (ESOPHAGOGASTRODUODENOSCOPY)  INSERTION, GASTROSTOMY TUBE, LAPAROSCOPIC  Number of specimens: 1  Name of specimens:  INTRA-ABDOMINAL ABSCESS TISSUE    Aerobic culture [[id number]] Collected:  01/24/19 1843    Order Status:  Completed Specimen:  Abscess from Abdomen Updated:  01/29/19 1123     Aerobic Bacterial Culture --     CANDIDA ALBICANS  Rare      Narrative:       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]  Post-op Diagnosis: SAME  Procedure(s):  INSERTION, JEJUNOSTOMY TUBE, LAPAROSCOPIC  EGD (ESOPHAGOGASTRODUODENOSCOPY)  INSERTION, GASTROSTOMY TUBE, LAPAROSCOPIC  Number of specimens: 1  Name of specimens:  INTRA-ABDOMINAL ABSCESS #2    Aerobic culture [[id number]]  (Susceptibility) Collected:  01/24/19 1843    Order Status:  Completed Specimen:  Abscess from Abdomen Updated:  01/28/19 0941     Aerobic Bacterial Culture --     ESCHERICHIA COLI  Few      Narrative:       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]  Post-op Diagnosis: SAME  Procedure(s):  INSERTION, JEJUNOSTOMY TUBE, LAPAROSCOPIC  EGD (ESOPHAGOGASTRODUODENOSCOPY)  INSERTION, GASTROSTOMY TUBE, LAPAROSCOPIC  Number of specimens: 1  Name of specimens: INTRA-ABDOMINAL ABSCESS #1    Aerobic culture [[id number]]  (Susceptibility) Collected:  01/24/19 1843    Order Status:  Completed Specimen:  Abscess from Abdomen Updated:  01/28/19 0941     Aerobic Bacterial Culture --     ESCHERICHIA COLI  Few      Narrative:       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]  Post-op Diagnosis: SAME  Procedure(s):  INSERTION, JEJUNOSTOMY TUBE, LAPAROSCOPIC  EGD (ESOPHAGOGASTRODUODENOSCOPY)  INSERTION, GASTROSTOMY TUBE,  LAPAROSCOPIC  Number of specimens: 1  Name of specimens:  INTRA-ABDOMINAL ABSCESS TISSUE    Aerobic culture [[id number]] Collected:  01/24/19 1843    Order Status:  Completed Specimen:  Abscess from Abdomen Updated:  01/28/19 0934     Aerobic Bacterial Culture --     CANDIDA ALBICANS  Rare      Narrative:       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]  Post-op Diagnosis: SAME  Procedure(s):  INSERTION, JEJUNOSTOMY TUBE, LAPAROSCOPIC  EGD (ESOPHAGOGASTRODUODENOSCOPY)  INSERTION, GASTROSTOMY TUBE, LAPAROSCOPIC  Number of specimens: 1  Name of specimens:  INTRA-ABDOMINAL ABSCESS #3    Culture, Anaerobe [[id number]] Collected:  01/24/19 1843    Order Status:  Completed Specimen:  Abscess from Abdomen Updated:  01/28/19 0854     Anaerobic Culture Culture in progress    Narrative:       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]  Post-op Diagnosis: SAME  Procedure(s):  INSERTION, JEJUNOSTOMY TUBE, LAPAROSCOPIC  EGD (ESOPHAGOGASTRODUODENOSCOPY)  INSERTION, GASTROSTOMY TUBE, LAPAROSCOPIC  Number of specimens: 1  Name of specimens: INTRA-ABDOMINAL ABSCESS #1    Culture, Anaerobe [[id number]] Collected:  01/24/19 1843    Order Status:  Completed Specimen:  Abscess from Abdomen Updated:  01/28/19 0854     Anaerobic Culture Culture in progress    Narrative:       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]  Post-op Diagnosis: SAME  Procedure(s):  INSERTION, JEJUNOSTOMY TUBE, LAPAROSCOPIC  EGD (ESOPHAGOGASTRODUODENOSCOPY)  INSERTION, GASTROSTOMY TUBE, LAPAROSCOPIC  Number of specimens: 1  Name of specimens:  INTRA-ABDOMINAL ABSCESS TISSUE    Gram stain [[id number]] Collected:  01/24/19 1843    Order Status:  Completed Specimen:  Abscess from Abdomen Updated:  01/24/19 2335     Gram Stain Result Rare WBC's      No organisms seen    Narrative:       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]  Post-op Diagnosis:  SAME  Procedure(s):  INSERTION, JEJUNOSTOMY TUBE, LAPAROSCOPIC  EGD (ESOPHAGOGASTRODUODENOSCOPY)  INSERTION, GASTROSTOMY TUBE, LAPAROSCOPIC  Number of specimens: 1  Name of specimens:  INTRA-ABDOMINAL ABSCESS TISSUE    Gram stain [[id number]] Collected:  01/24/19 1843    Order Status:  Completed Specimen:  Abscess from Abdomen Updated:  01/24/19 2330     Gram Stain Result Rare WBC's      No organisms seen    Narrative:       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]  Post-op Diagnosis: SAME  Procedure(s):  INSERTION, JEJUNOSTOMY TUBE, LAPAROSCOPIC  EGD (ESOPHAGOGASTRODUODENOSCOPY)  INSERTION, GASTROSTOMY TUBE, LAPAROSCOPIC  Number of specimens: 1  Name of specimens:  INTRA-ABDOMINAL ABSCESS #2    Gram stain [[id number]] Collected:  01/24/19 1843    Order Status:  Completed Specimen:  Abscess from Abdomen Updated:  01/24/19 2328     Gram Stain Result Rare WBC's      No organisms seen    Narrative:       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]  Post-op Diagnosis: SAME  Procedure(s):  INSERTION, JEJUNOSTOMY TUBE, LAPAROSCOPIC  EGD (ESOPHAGOGASTRODUODENOSCOPY)  INSERTION, GASTROSTOMY TUBE, LAPAROSCOPIC  Number of specimens: 1  Name of specimens:  INTRA-ABDOMINAL ABSCESS #1    Gram stain [[id number]] Collected:  01/24/19 1843    Order Status:  Completed Specimen:  Abscess from Abdomen Updated:  01/24/19 2327     Gram Stain Result Rare WBC's      No organisms seen    Narrative:       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]  Post-op Diagnosis: SAME  Procedure(s):  INSERTION, JEJUNOSTOMY TUBE, LAPAROSCOPIC  EGD (ESOPHAGOGASTRODUODENOSCOPY)  INSERTION, GASTROSTOMY TUBE, LAPAROSCOPIC  Number of specimens: 1  Name of specimens:  INTRA-ABDOMINAL ABSCESS #3    Culture, Anaerobe [[id number]] Collected:  01/24/19 1843    Order Status:  Canceled Specimen:  Abscess from Abdomen Updated:  01/24/19 1843    Culture, Anaerobe [[id number]] Collected:  01/24/19 1843     Order Status:  Canceled Specimen:  Abscess from Abdomen Updated:  01/24/19 1843    Culture, Respiratory with Gram Stain [[id number]] Collected:  01/21/19 1705    Order Status:  Completed Specimen:  Respiratory from Bronchial Wash, RML Updated:  01/24/19 1027     Respiratory Culture --     CANDIDA ALBICANS  Moderate       Gram Stain (Respiratory) Rare WBC's     Gram Stain (Respiratory) Rare budding yeast    Blood culture x two cultures. Draw prior to antibiotics. [[id number]] Collected:  01/18/19 1221    Order Status:  Completed Specimen:  Blood from Line, PICC Right Brachial Updated:  01/23/19 2012     Blood Culture, Routine No growth after 5 days.    Narrative:       Aerobic and anaerobic    Blood culture x two cultures. Draw prior to antibiotics. [[id number]] Collected:  01/18/19 1117    Order Status:  Completed Specimen:  Blood from Peripheral, Antecubital, Left Updated:  01/23/19 1412     Blood Culture, Routine No growth after 5 days.    Narrative:       Aerobic and anaerobic    Blood culture x two cultures. Draw prior to antibiotics. [[id number]] Collected:  01/18/19 1121    Order Status:  Completed Specimen:  Blood from Peripheral, Antecubital, Left Updated:  01/23/19 1412     Blood Culture, Routine No growth after 5 days.    Narrative:       Aerobic and anaerobic    Culture, Anaerobic [[id number]] Collected:  01/19/19 2011    Order Status:  Completed Specimen:  Body Fluid from Abdomen Updated:  01/23/19 1024     Anaerobic Culture No anaerobes isolated        Chest Imaging:     Infusions:     albumin human 25% 12.5 g (01/30/19 0800)    Amino acid 5% - dextrose 15% (CLINIMIX-E) solution with additives (1L  provides 510 kcal/L dextrose, with 50 gm AA, 150 gm CHO, Na 35, K 30, Mg 5, Ca 4.5, Acetate 80, Cl 39, Phos 15) 50 mL/hr at 01/30/19 0800    propofol 5 mcg/kg/min (01/30/19 0800)       Scheduled Medications:    conventional amphotericin B (FUNGIZONE) continuous irrigation  50 mg Irrigation Q12H     amphotericin B liposome (AMBISOME) IVPB  3 mg/kg (Dosing Weight) Intravenous Q24H    bumetanide  1 mg Intravenous Daily    chlorhexidine  15 mL Mouth/Throat BID    cyanocobalamin  1,000 mcg Subcutaneous Daily    linezolid 600mg/300ml  600 mg Intravenous Q12H    meropenem (MERREM) IVPB  2 g Intravenous Q8H    pantoprazole  40 mg Intravenous Daily    potassium phosphate IVPB  30 mmol Intravenous Once       PRN Medications:   sodium chloride, sodium chloride, dextrose 50%, dextrose 50%, glucose, glucose, HYDROmorphone, HYDROmorphone, insulin aspart U-100, naloxone, ondansetron, promethazine (PHENERGAN) IVPB, sodium chloride 0.9%    Impression & Recommendations    Acute Hypoxic and Hypercapnic Respiratory Failure  - intubated for his IR procedure- he is very sensitive to any anesthesia and sedating medication   -extubated this am now on 3 L O2 via nasal canula can wean for SpO2 88% and above  -continue bipap at night for GEMMA  - will need bipap when discharged home for hypercapnea     Aspiration  - continued aspiration due to swallowing issues.   - needs continued speech therapy involvement  -Continue NPO with aspiration precautions.   - I do not think that this is the source of his infection currently, especially with cultures positive from his abdomen and his CXR that is much improved from previous    Would consider scaling back antibiotics based on sensitivities, if he becomes resistant to these broad spectrum antibiotics he won't have any other options.   There does not seem to be any indication for amphotericin and given its significant side effect profile, would discontinue and discuss with ID if there is concern.     Thank you for allowing us to participate in his care. Please call back with any questions or concerns.       Lucero Quispe M.D.  [facility]

## 2019-01-30 NOTE — PLAN OF CARE
Problem: Adult Inpatient Plan of Care  Goal: Plan of Care Review  Outcome: Ongoing (interventions implemented as appropriate)  This AM, pt stated he was feeling down and discouraged and tired. He worked with PT/OT for in-bed exercises. Pt traveled to and from IR with CRNA and RN for billiary drain. Pt intubated during procedure and returned intubated. VSS, Propofol gtt started for comfort. Schrader placed. G tube to LIWS, flushed with water q4h. Wife at bedside, questions and concerns addressed. Refer to flowsheets for vitals, gtts, and assessments. WCTM.

## 2019-01-30 NOTE — PHYSICIAN QUERY
PT Name: Hoang Santos Jr.  MR #: [id number]    Physician Query Form - Respiratory Condition Clarification      CDS/: Marlene Smith RN                 Contact information:[email or web address]     This form is a permanent document in the medical record.    Query Date: January 30, 2019    By submitting this query, we are merely seeking further clarification of documentation. Please utilize your independent clinical judgment when addressing the question(s) below.    The Medical record contains the following   Indicators   Supporting Clinical Findings Location in Medical Record      SOB, BUNCH, Wheezing, Productive Cough, Use of Accessory Muscles, etc.     x   Acute/Chronic Illness 68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  Now c sepsis likely from aspiration RLL PNA Neuroendocrine H&P 1/18      Radiology Findings     x   Respiratory Distress or Failure The patient's working assessments include post-op resp failure    Acute Hypercapnic Respiratory failure  - likely secondary to anesthesia and under ventilation with the bipap overnight.   - he is tolerating pressure support without any apnea spells now.     Decompensated chronic hypercapnic respiratory failure 2/2 sedating Rx. Now clinically improved despite high pCO2.     - Respiratory failure was likely 2/2 lingering effects of anesthesia compounded by narcotics and his GEMMA/OHS  - Minimize sedating meds and wean narcotics as early as possible Pulm note 1/15                  Pulm PN 1/26   x   Hypoxia or Hypercapnia Briefly, it is noted that pt is known to us from prior ICU stay for hypoxemia.  Back on vent for post-op hypoxemia    He was ultimately found to have an abdominal abscess and was taken  back to surgery yesterday. Post surgery he was extubated and placed on bipap at 10/5 and continued to be progressively more hypercapnic. He was then intubated this morning around 7 am. He has continued to have apnea on the ventilator today and we were  re-consulted for assistance.  Pulm CN 1/15   x   RR         ABGs         O2 sat  pH 7.129   pCO2 115.5   pO2 99   BE 9   HCO3 38.4 ABG 1/25   x   BiPAP/Intubation Pt not responding to stimuli. Called R.T. For blood gas and co2 115. Gave pt narcan x 2 doses with no response. Pt then intubated per anesthesia. Pt's wife is aware of the change in status to intubation as well. RN note 1/25 0610      Supplemental O2        Home O2, Oxygen Dependence     x   Treatment  will extubate to bipap and monitor overnight Pulm CN 1/15      Other       Respiratory failure can be acute, chronic or both. It is generally further specified as hypoxic, hypercapnic or both. Lastly, it is important to identify an etiology, if known or suspected.   References:: https://www.acphospitalist.org/archives/2013/10/coding; htm; http://"[employer]/acute-respiratory-failure-know    The clinical guidelines noted below are only system guidelines, and do not replace the providers clinical judgment.    Provider, please specify diagnosis or diagnoses associated with above clinical findings.   [   ] Acute Respiratory Failure with Hypoxia - ABG pO2 < 60 mmHg or O2 sat of 88% on RA and respiratory symptoms documented   [   ] Acute Respiratory Failure with Hypercapnia - pCO2 > 50 mmHg with pH < 7.35 and respiratory symptoms documented   [   ] Acute Respiratory Failure with Hypoxia and Hypercapnia - Hypoxia: ABG pO2 < 60 mmHg or O2 sat of 88% on RA and Hypercapnia: pCO2 > 50 mmHg with pH < 7.35 and respiratory symptoms documented   [   ] Other Acute Respiratory Failure     [   ] Acute and (on) Chronic Respiratory Failure with Hypoxia - pO2 >10 mmHg below baseline OR SpO2 < 91% on usual home O2 OR O2 ? 2L/min over baseline home O2    [   ] Acute and (on) Chronic Respiratory Failure with Hypercapnia - pCO2 >10 mmHg over baseline and pH < 7.35   [   ] Acute and (on) Chronic Respiratory Failure with Hypoxia and Hypercapnia - Hypoxic: pO2 >10 mmHg below  baseline OR SpO2 < 91% on usual home O2 OR O2 ? 2L/min over baseline home O2 and Hypercapnic:  pCO2 >10 mmHg over baseline and pH < 7.35    [   ] Other Acute and (on) Chronic Respiratory Failure    [   ] Acute Respiratory Insufficiency - Generally describes less severe respiratory symptoms and measurements (pO2, SpO2, pH, and pCO2) NOT meeting criteria for respiratory failure     [   ] Acute Respiratory Distress - Generally describes less severe respiratory symptoms (tachypnea, in respiratory distress, increased work of breathing, unable to speak in complete sentences, labored breathing, use of accessory muscles, RR> 24, cyanosis, dyspnea, wheezing, stridor, lethargy) without sufficient measurements (pO2, SpO2, pH, and pCO2) to meet criteria for respiratory failure    [   ] Hypoxia Only   [   ] No Respiratory Failure, Maintained on Vent for Routine Care or Airway Protection -  purposely intubated for airway protection (e.g.: angioedema, stroke, trauma); without meeting the criteria for respiratory failure.    [ xx  ] Other Respiratory Diagnosis (please specify): __obesiy hypoventilation_______________________________   [   ]  Clinically Undetermined       Please document in your progress notes daily for the duration of treatment until resolved and include in your discharge summary.

## 2019-01-30 NOTE — PLAN OF CARE
Problem: Occupational Therapy Goal  Goal: Occupational Therapy Goal  Goals to be met by: 02/19/2019    Patient will increase functional independence with ADLs by performing:    UE Dressing with Modified [place].  LE Dressing with Modified [place].  Grooming while standing with Modified [place].  Toileting from toilet with Modified [place] for hygiene and clothing management.   Toilet transfer to toilet with Modified [place].  Increased functional strength to WFL for self care.  Upper extremity exercise program x10 reps per handout, with independence.     Outcome: Ongoing (interventions implemented as appropriate)  Pt with improved activity tolerance and participation today. Progressing towards goals.  Cont POC

## 2019-01-30 NOTE — PLAN OF CARE
Problem: Infection (Sepsis/Septic Shock)  Goal: Absence of Infection Signs/Symptoms  Outcome: Ongoing (interventions implemented as appropriate)  Intervention: Prevent or Manage Infection Progression   01/29/19 0336 01/29/19 2305 01/30/19 0059   Prevent or Manage Infection   Fever Reduction/Comfort Measures --  --  lightweight bedding;lightweight clothing   Infection Management --  aseptic technique maintained --    Prevent Infection and Maximize Resistance   Infection Prevention --  rest/sleep promoted;single patient room provided --    Manage Diarrhea   Isolation Precautions protective environment maintained --  --          Problem: Respiratory Compromise (Sepsis/Septic Shock)  Goal: Effective Oxygenation and Ventilation    Intervention: Promote Airway Secretion Clearance   01/29/19 2305 01/30/19 0059   Promote Airway Secretion Clearance   Cough And Deep Breathing --  done independently per patient   Promote Airway Secretion Clearance   Activity Management activity clustered for rest period --      Intervention: Optimize Oxygenation and Ventilation   01/29/19 1915 01/29/19 2305   Support Asthma Symptom Control   Airway/Ventilation Management airway patency maintained;humidification applied;pulmonary hygiene promoted --    Prevent Additional Skin Injury   Head of Bed (HOB) --  HOB at 30 degrees         Problem: Noninvasive Ventilation Acute  Goal: Effective Unassisted Ventilation and Oxygenation    Intervention: Monitor and Manage Noninvasive Ventilation   01/29/19 1915   Support Asthma Symptom Control   Airway/Ventilation Management airway patency maintained;humidification applied;pulmonary hygiene promoted

## 2019-01-30 NOTE — PLAN OF CARE
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      1/24 s/p  1. Diag laparoscopy2. Laparotomy 3. Extensive lysis of adhesions, extensive 4. Repair enterotomy  5. EGD 6. Gastrojejunostomy side to side stapled and handsewn  7. Drainage rt retroperitoneal abscess from pancreatitis with severe edema all around 8.  Release of omentum  8. 24 Fr malecot juanjose gastrostomy    1/29 s/p PTC with internal-external drain placement and thoracentesis       PT/OT working with pt and TN will follow for recommendations.    TN visited with pt this am ; Tn encouraged pt to continuing working with PT/OT ; Pt lives with wife Sneha  ([phone] ) and anticipating returning home upon d/c with [facility] and [facility] ([facility])          01/30/19 1038   Discharge Reassessment   Assessment Type Discharge Planning Reassessment   Provided patient/caregiver education on the expected discharge date and the discharge plan Yes   Do you have any problems affording any of your prescribed medications? No   Discharge Plan A Home with family;Home Health   Discharge Plan B Skilled Nursing Facility   DME Needed Upon Discharge  (tbd)   Patient choice form signed by patient/caregiver N/A   Anticipated Discharge  Disposition Home   Can the patient answer the patient profile reliably? Yes, cognitively intact   How does the patient rate their overall health at the present time? Fair   Describe the patient's ability to walk at the present time. Major restrictions/daily assistance from another person   How often would a person be available to care for the patient? Whenever needed   Number of comorbid conditions (as recorded on the chart) Five or more   During the past month, has the patient often been bothered by feeling down, depressed or hopeless? Yes   During the past month, has the patient often been bothered by little interest or pleasure in doing things? Yes

## 2019-01-30 NOTE — PROGRESS NOTES
First ABG attempt was venous. RT redrew and all ABG results reported to eICU Dr. Lott. Directive is to decreased RR to 12 on ventilator.    Results for AYESHA CHAHARLEY EDEN  (MRN [id number]) as of 1/30/2019 05:32   Ref. Range 1/30/2019 05:01   POC PH Latest Ref Range: 7.35 - 7.45  7.585 (HH)   POC PCO2 Latest Ref Range: 35 - 45 mmHg 35.8   POC PO2 Latest Ref Range: 80 - 100 mmHg 94   POC BE Latest Ref Range: -2 to 2 mmol/L 12   POC HCO3 Latest Ref Range: 24 - 28 mmol/L 34.0 (H)   POC SATURATED O2 Latest Ref Range: 95 - 100 % 98   POC TCO2 Latest Ref Range: 23 - 27 mmol/L 35 (H)   FiO2 Unknown 35   Vt Unknown 450   PEEP Unknown 5   Sample Unknown ARTERIAL   DelSys Unknown Adult Vent   Allens Test Unknown Pass   Site Unknown LR   Mode Unknown AC/PRVC   Rate Unknown 16

## 2019-01-30 NOTE — PROGRESS NOTES
[facility] General Surgery  Neuroendocrine Tumor Service  Progress Note     Admission Date: 1/18/2019  Hospital Length of Stay: 12  Principal Problem: Aspiration pneumonia of right lower lobe   Gastric outlet obstruction     Subjective:      Interval History:   68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  Now c sepsis likely from aspiration RLL PNA  S/p IR drain of abdominal fluid collection  1/24/18 - Laparotomy, ASHLEIGH, enterotomy repair, EGD, GastroJ, Retroperitoneal peripancreatic abscess open drainage, Omental flap creation, Lori gastrostomy c 24 Fr Malecot     NAEO  Intubated after IR procedure  Pulm helping  Extubation today    Scheduled Meds:   conventional amphotericin B (FUNGIZONE) continuous irrigation  50 mg Irrigation Q12H    amphotericin B liposome (AMBISOME) IVPB  3 mg/kg (Dosing Weight) Intravenous Q24H    bumetanide  1 mg Intravenous Daily    bumetanide  1 mg Intravenous Once    chlorhexidine  15 mL Mouth/Throat BID    cyanocobalamin  1,000 mcg Subcutaneous Daily    linezolid 600mg/300ml  600 mg Intravenous Q12H    meropenem (MERREM) IVPB  2 g Intravenous Q8H    pantoprazole  40 mg Intravenous Daily    potassium phosphate IVPB  30 mmol Intravenous Once     Continuous Infusions:   albumin human 25% 12.5 g (01/30/19 1015)    Amino acid 5% - dextrose 15% (CLINIMIX-E) solution with additives (1L  provides 510 kcal/L dextrose, with 50 gm AA, 150 gm CHO, Na 35, K 30, Mg 5, Ca 4.5, Acetate 80, Cl 39, Phos 15) 50 mL/hr at 01/30/19 1000     PRN Meds:.sodium chloride, sodium chloride, sodium chloride, dextrose 50%, dextrose 50%, glucose, glucose, HYDROmorphone, HYDROmorphone, insulin aspart U-100, naloxone, ondansetron, promethazine (PHENERGAN) IVPB, sodium chloride 0.9%    Objective:      Temp:  [98.5 °F (36.9 °C)-100.2 °F (37.9 °C)] 98.5 °F (36.9 °C)  Pulse:  [] 91  Resp:  [15-50] 15  SpO2:  [92 %-100 %] 100 %  BP: ()/(53-66) 105/54  Weight change:      Intake/Output Summary (Last 24 hours) at 1/30/2019 1028  Last data filed at 1/30/2019 0900  Gross per 24 hour   Intake 916.25 ml   Output 2175 ml   Net -1258.75 ml     UOP 2.7L   --> 105 today  G tube 470 --> 230 today  NGT out now    Physical Exam:  GEN: awake, alert, NAD  CV: RRR  PULM: normal work of breathing, on NC  ABD: S/NT/ND, incision c/d/i, tubes in good position  EXT: Peripheral pulses present and equal bilaterally    Recent Labs   Lab 01/30/19  0514      K 3.3*   CO2 32*   CL 99   BUN 30*   CREATININE 1.0   CALCIUM 9.2   PROT 6.7   AST 69*   ALT 42   ALKPHOS 116   BILITOT 1.9*     Recent Labs   Lab 01/30/19  0514   WBC 9.67   HGB 7.7*   HCT 24.5*          Significant Imaging: I have reviewed all pertinent imaging results/findings within the past 24 hours.    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  Now c sepsis likely from aspiration RLL PNA  S/p IR drain of abdominal fluid collection  1/24/18 - Laparotomy, ASHLEIGH, enterotomy repair, EGD, GastroJ, Retroperitoneal peripancreatic abscess open drainage, Omental flap creation, Lori gastrostomy c 24 Fr Malecot     Pain control prn  monitor UOP, trend Cr, try to avoid nephrotoxics - currently Cr wnl, UOP adequate  cont TPN/NPO, PPI --> Lytes replacing today, will recheck at noonish along c CBC for H/H drop  Abd culture grew Kluyvera ascorbata on abx - amphotericin irrigation of accordion drain, amphotericin IV, linezolid, merrem  Will IRRIGATE DRAIN with amphotericin and dakins - still doing, max volume ~500cc/d  TF held for GI bleed potentially and also for potential leak  No xfuse in 2 days - now stable ~24 hr - will continue to monitor, check CBC noon  Cont to hold lovenox/toradol  IR thoracentesis yesterday - intubated - extubation  today - appreciate pulm help  Dispo - cont ICU care        Braulio Negrete MD

## 2019-01-31 LAB
ALBUMIN SERPL BCP-MCNC: 4 G/DL
ALP SERPL-CCNC: 102 U/L
ALT SERPL W/O P-5'-P-CCNC: 35 U/L
ANION GAP SERPL CALC-SCNC: 11 MMOL/L
APTT BLDCRRT: 30.7 SEC
AST SERPL-CCNC: 48 U/L
BASOPHILS # BLD AUTO: 0.02 K/UL
BASOPHILS NFR BLD: 0.3 %
BILIRUB SERPL-MCNC: 1.6 MG/DL
BUN SERPL-MCNC: 31 MG/DL
CALCIUM SERPL-MCNC: 9.6 MG/DL
CHLORIDE SERPL-SCNC: 98 MMOL/L
CO2 SERPL-SCNC: 34 MMOL/L
CREAT SERPL-MCNC: 1 MG/DL
DIFFERENTIAL METHOD: ABNORMAL
EOSINOPHIL # BLD AUTO: 0.2 K/UL
EOSINOPHIL NFR BLD: 3 %
ERYTHROCYTE [DISTWIDTH] IN BLOOD BY AUTOMATED COUNT: 15.6 %
EST. GFR  (AFRICAN AMERICAN): >60 ML/MIN/1.73 M^2
EST. GFR  (NON AFRICAN AMERICAN): >60 ML/MIN/1.73 M^2
GLUCOSE SERPL-MCNC: 112 MG/DL
HCT VFR BLD AUTO: 32.3 %
HGB BLD-MCNC: 10.1 G/DL
INR PPP: 1.1
LYMPHOCYTES # BLD AUTO: 0.6 K/UL
LYMPHOCYTES NFR BLD: 8.4 %
MAGNESIUM SERPL-MCNC: 1.9 MG/DL
MCH RBC QN AUTO: 29.2 PG
MCHC RBC AUTO-ENTMCNC: 31.3 G/DL
MCV RBC AUTO: 93 FL
MONOCYTES # BLD AUTO: 1 K/UL
MONOCYTES NFR BLD: 14.1 %
NEUTROPHILS # BLD AUTO: 5.3 K/UL
NEUTROPHILS NFR BLD: 73.9 %
PHOSPHATE SERPL-MCNC: 3.3 MG/DL
PLATELET # BLD AUTO: 161 K/UL
PMV BLD AUTO: 9.7 FL
POTASSIUM SERPL-SCNC: 3.5 MMOL/L
PROT SERPL-MCNC: 7.4 G/DL
PROTHROMBIN TIME: 11.6 SEC
RBC # BLD AUTO: 3.46 M/UL
SODIUM SERPL-SCNC: 143 MMOL/L
WBC # BLD AUTO: 7.23 K/UL

## 2019-01-31 PROCEDURE — 94660 CPAP INITIATION&MGMT: CPT

## 2019-01-31 PROCEDURE — 85610 PROTHROMBIN TIME: CPT

## 2019-01-31 PROCEDURE — 63600175 PHARM REV CODE 636 W HCPCS: Performed by: STUDENT IN AN ORGANIZED HEALTH CARE EDUCATION/TRAINING PROGRAM

## 2019-01-31 PROCEDURE — 25000003 PHARM REV CODE 250: Performed by: SURGERY

## 2019-01-31 PROCEDURE — 11000001 HC ACUTE MED/SURG PRIVATE ROOM

## 2019-01-31 PROCEDURE — C9113 INJ PANTOPRAZOLE SODIUM, VIA: HCPCS | Performed by: SURGERY

## 2019-01-31 PROCEDURE — 63600175 PHARM REV CODE 636 W HCPCS: Mod: JG | Performed by: STUDENT IN AN ORGANIZED HEALTH CARE EDUCATION/TRAINING PROGRAM

## 2019-01-31 PROCEDURE — 92610 EVALUATE SWALLOWING FUNCTION: CPT

## 2019-01-31 PROCEDURE — 85025 COMPLETE CBC W/AUTO DIFF WBC: CPT

## 2019-01-31 PROCEDURE — 25000003 PHARM REV CODE 250: Performed by: ANESTHESIOLOGY

## 2019-01-31 PROCEDURE — 80053 COMPREHEN METABOLIC PANEL: CPT

## 2019-01-31 PROCEDURE — 97803 MED NUTRITION INDIV SUBSEQ: CPT | Performed by: DIETITIAN, REGISTERED

## 2019-01-31 PROCEDURE — 63600175 PHARM REV CODE 636 W HCPCS: Performed by: SURGERY

## 2019-01-31 PROCEDURE — 83735 ASSAY OF MAGNESIUM: CPT

## 2019-01-31 PROCEDURE — 94664 DEMO&/EVAL PT USE INHALER: CPT

## 2019-01-31 PROCEDURE — 25000003 PHARM REV CODE 250: Performed by: STUDENT IN AN ORGANIZED HEALTH CARE EDUCATION/TRAINING PROGRAM

## 2019-01-31 PROCEDURE — S0171 BUMETANIDE 0.5 MG: HCPCS | Performed by: SURGERY

## 2019-01-31 PROCEDURE — C9113 INJ PANTOPRAZOLE SODIUM, VIA: HCPCS | Performed by: STUDENT IN AN ORGANIZED HEALTH CARE EDUCATION/TRAINING PROGRAM

## 2019-01-31 PROCEDURE — 92526 ORAL FUNCTION THERAPY: CPT

## 2019-01-31 PROCEDURE — 94799 UNLISTED PULMONARY SVC/PX: CPT

## 2019-01-31 PROCEDURE — A4216 STERILE WATER/SALINE, 10 ML: HCPCS | Performed by: SURGERY

## 2019-01-31 PROCEDURE — 94761 N-INVAS EAR/PLS OXIMETRY MLT: CPT

## 2019-01-31 PROCEDURE — 97535 SELF CARE MNGMENT TRAINING: CPT

## 2019-01-31 PROCEDURE — 27000190 HC CPAP FULL FACE MASK W/VALVE

## 2019-01-31 PROCEDURE — 84100 ASSAY OF PHOSPHORUS: CPT

## 2019-01-31 PROCEDURE — 85730 THROMBOPLASTIN TIME PARTIAL: CPT

## 2019-01-31 PROCEDURE — 97116 GAIT TRAINING THERAPY: CPT

## 2019-01-31 PROCEDURE — P9047 ALBUMIN (HUMAN), 25%, 50ML: HCPCS | Mod: JG | Performed by: SURGERY

## 2019-01-31 PROCEDURE — 99900035 HC TECH TIME PER 15 MIN (STAT)

## 2019-01-31 RX ORDER — PANTOPRAZOLE SODIUM 40 MG/10ML
40 INJECTION, POWDER, LYOPHILIZED, FOR SOLUTION INTRAVENOUS 2 TIMES DAILY
Status: DISCONTINUED | OUTPATIENT
Start: 2019-01-31 | End: 2019-02-05

## 2019-01-31 RX ADMIN — MEROPENEM 2 G: 1 INJECTION, POWDER, FOR SOLUTION INTRAVENOUS at 09:01

## 2019-01-31 RX ADMIN — AMPHOTERICIN B 300 MG: 50 INJECTION, POWDER, LYOPHILIZED, FOR SOLUTION INTRAVENOUS at 11:01

## 2019-01-31 RX ADMIN — ALBUMIN HUMAN 12.5 G: 0.25 SOLUTION INTRAVENOUS at 04:01

## 2019-01-31 RX ADMIN — MEROPENEM 2 G: 1 INJECTION, POWDER, FOR SOLUTION INTRAVENOUS at 06:01

## 2019-01-31 RX ADMIN — ASCORBIC ACID, VITAMIN A PALMITATE, CHOLECALCIFEROL, THIAMINE HYDROCHLORIDE, RIBOFLAVIN-5 PHOSPHATE SODIUM, PYRIDOXINE HYDROCHLORIDE, NIACINAMIDE, DEXPANTHENOL, ALPHA-TOCOPHEROL ACETATE, VITAMIN K1, FOLIC ACID, BIOTIN, CYANOCOBALAMIN: 200; 3300; 200; 6; 3.6; 6; 40; 15; 10; 150; 600; 60; 5 INJECTION, SOLUTION INTRAVENOUS at 08:01

## 2019-01-31 RX ADMIN — PANTOPRAZOLE SODIUM 40 MG: 40 INJECTION, POWDER, LYOPHILIZED, FOR SOLUTION INTRAVENOUS at 08:01

## 2019-01-31 RX ADMIN — CYANOCOBALAMIN 1000 MCG: 1000 INJECTION, SOLUTION INTRAMUSCULAR at 08:01

## 2019-01-31 RX ADMIN — CHLORHEXIDINE GLUCONATE 15 ML: 1.2 RINSE ORAL at 08:01

## 2019-01-31 RX ADMIN — BUMETANIDE 1 MG: 0.25 INJECTION INTRAMUSCULAR; INTRAVENOUS at 08:01

## 2019-01-31 RX ADMIN — LINEZOLID 600 MG: 600 INJECTION, SOLUTION INTRAVENOUS at 06:01

## 2019-01-31 RX ADMIN — LINEZOLID 600 MG: 600 INJECTION, SOLUTION INTRAVENOUS at 07:01

## 2019-01-31 RX ADMIN — AMPHOTERICIN B 50 MG: 50 INJECTION, POWDER, LYOPHILIZED, FOR SOLUTION INTRAVENOUS at 08:01

## 2019-01-31 RX ADMIN — MEROPENEM 2 G: 1 INJECTION, POWDER, FOR SOLUTION INTRAVENOUS at 03:01

## 2019-01-31 NOTE — NURSING TRANSFER
Nursing Transfer Note      1/31/2019     Transfer To: [address]    Transfer via bed    Transfer with  to O2    Transported by mara guerin and ashley     Medicines sent: yes     Chart send with patient: Yes    Notified: spouse    Patient reassessed at: 1410 1/31    Upon arrival to floor: cardiac monitor applied, patient oriented to room, call bell in reach and bed in lowest position

## 2019-01-31 NOTE — PROGRESS NOTES
Pt. on BiPAP with documented settings. Alarms on and functioning properly. Pt appears to be in no distress and will continue to monitor

## 2019-01-31 NOTE — NURSING
Patient is a transfer from ICU.  Patient is awake and alert.  Wife is at bedside. Introduced self as VN.  Instructed to call for any questions or assistance and patient verbalized complete understanding.  Will continue to monitor.

## 2019-01-31 NOTE — PLAN OF CARE
Problem: SLP Goal  Goal: SLP Goal  Short Term Goals:   1. Pt will participate in BSS to determine least restrictive diet.- MET 1/31  2. Pt will participate in indirect dysphagia exercises to strengthen musculature for swallowing mechanism with mod-max effort.        Outcome: Ongoing (interventions implemented as appropriate)  Pt seen for swallow eval this AM. Minimal trials given at the bedside 2/2 high risk of aspiration of all po intake prior to intubation per MBSS completed on 1/11/2019. ST continues to recommend: Strict NPO with consideration for alternative means of nutrition/hydration/medication. Pt is not safe for an oral diet. ST will continue to follow for dysphagia therapy.   MARY Islas., CCC-SLP  Spectra: [phone]  01/31/2019

## 2019-01-31 NOTE — PLAN OF CARE
Problem: Adult Inpatient Plan of Care  Goal: Plan of Care Review  Outcome: Ongoing (interventions implemented as appropriate)  0800 pt. Extubated with narn   Will cont to monitor and meet resp needs

## 2019-01-31 NOTE — PT/OT/SLP EVAL
Speech Language Pathology Evaluation  Bedside Swallow    Patient Name:  Hoang Santos Jr.   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    Prostatic hyperplasia     Sleep apnea        Past Surgical History:   Procedure Laterality Date    BIOPSY, LIVER  1/4/2019    Performed by Madeleine Alvarado [facility] at [facility] OR    CHOLANGIOGRAM, PERCUTANEOUS, TRANSHEPATIC Right 1/29/2019    Performed by YOHANNES Shelley MD at [facility] OR    CHOLECYSTECTOMY  1/4/2019    Performed by [facility]ado [facility] at [facility] OR    DUODENECTOMY N/A 1/4/2019    Performed by [facility]ado [facility] at [facility] OR    EGD (ESOPHAGOGASTRODUODENOSCOPY) N/A 1/24/2019    Performed by [facility]ado [facility] at [facility] OR    EGD (ESOPHAGOGASTRODUODENOSCOPY) N/A 1/4/2019    Performed by [facility]leine [facility]ado [facility] at [facility] OR    EGD (ESOPHAGOGASTRODUODENOSCOPY) N/A 9/24/2018    Performed by Salo [facility]ez [facility] at [facility] ENDO (4TH FLR)    ESOPHAGOGASTRODUODENOSCOPY (EGD) N/A 1/21/2019    Performed by Jose Kahn MD at [facility] ENDO    FUNDOPLICATION N/A 1/4/2019    Performed by Madeleine [facility]ado MD at [facility] OR    GASTROJEJUNOSTOMY Right 1/24/2019    Performed by [facility]leine [facility]ado [facility] at [facility] OR    GASTROJEJUNOSTOMY N/A 1/4/2019    Performed by Madeleine Alvarado [facility] at [facility] OR    LAPAROTOMY, EXPLORATORY N/A 1/24/2019    Performed by Madeleine [facility]ado [facility] at [facility] OR    LYMPHADENECTOMY N/A 1/4/2019    Performed by [facility]leine [facility]ado [facility] at [facility] OR     "LYSIS, ADHESIONS N/A 1/24/2019    [facility] at [facility] OR    PALATOPLASTY-(UPPP) N/A 2/14/2017    Performed by Bob Araujo III, MD at [facility] OR 2ND FLR    PH MONITORING, ESOPHAGUS, WIRELESS, (OFF REFLUX MEDS) N/A 9/24/2018    Performed by Salo Nuñez MD at [facility] ENDO (4TH FLR)    TONSILLECTOMY      TRANSPROSTATIC TISSUE RETRACTION N/A 6/28/2018    Performed by Kory Jack [facility] at [facility] OR    ULTRASOUND-ENDOSCOPIC-UPPER N/A 11/5/2018    Performed by Gorge Reyes MD at [facility] ENDO    UVULOPALATOPHARYNGOPLASTY         Prior Intubation HX:  1/25-1/30    Modified Barium Swallow 1/11/2018: Pt presents with mild oral dysphagia and severe pharyngeal dysphagia for all textures. Pt seen seated at 90* for all trials today. O2 in place, IVs hooked up, and Yanker suction present for pt to utilize as necessary. Prior to any PO trials, pt used Yanker suction to remove large amount of mucous like secretions from pharynx. No complaints of pain prior to MBSS; however, pt did state he felt "more swollen since coming down here" in regards to his throat. Pt consumed cup sips thin liquid x2 (5 cc each), which resulted in initial penetration, which led to eventual aspiration. Pt began to cough to clear residue, but required Yanker suction to remove residuals. Nectar-thick liquid trialed x1 (5 cc cup) and Honey-thick liquid trialed x1. Both nectar and honey consistencies resulted in immediate penetration and aspiration of bolus. Pt required 4+ swallows to clear residue from pharynx. Finally, a tsp bite of pudding in barium paste consumed x1, which led to stasis in the pharyngeal cavity and a surplus of residue. Pt again required multiple swallows and cuing to use effortful swallow to clear bolus. During PO trials, pt often regurgitated bolus from UES, which places him at risk for delayed aspiration. He is to remain NPO with STRICT aspiration precautions in place. CT of abdomen previously " scheduled for today; however, pt note deemed appropriate for CT scan as he is unsafe for the thin liquid bolus. ST reviewed recs w/ pt, family, MARA Garcia, and the MD team. ST to continue to follow while inpatient for ongoing dysphagia tx. Upon d/c from hospital, pt will benefit from continued ST services at either outpatient or home health level.   Compared to previous MBSS completed in 07/2018, pt with notable decrease in swallow function and safety. Cervical osteophytes continue to be present which contributes to pt's dysphagia as bolus pathway becomes constricted. Per ENT's note yesterday, pt's laryngeal structures include: posterior pharyngeal wall with submucosal fullness,  omega-shaped epiglottis which rests against posterior pharyngeal wall during respiration, mild bilateral symmetric vocal fold atrophy, and moderate post-cricoid edema. There was significant pooling of secretions in the piriform sinus.       Chest X-Rays 1/31: Endotracheal tube has been intervally removed.  Right-sided PICC line projects in stable radiographic position.  Cardiomediastinal silhouette is unchanged.  There are low lung volumes bilaterally.  No significant change in cardiopulmonary status compared to prior examination.  No evidence of pneumothorax.    Prior diet: NPO prior to intubation; ST following prior to intubation. See previous notes.         Subjective     Patient awake and cooperative with family member present in room. RN present in room at beginning of session.       Objective:     Oral care provided with toothbrush, toothpaste, oral swabs, and suctioning prior to po trials and dysphagia therapy. Wet vocal quality and cough noted during oral care. Patient coughing up thick mucous.     Oral Musculature Evaluation  · Oral Musculature: general weakness  · Dentition: present and adequate  · Mucosal Quality: good  · Mandibular Strength and Mobility: WFL  · Oral Labial Strength and Mobility: WFL  · Lingual Strength and  Mobility: impaired strength  · Buccal Strength and Mobility: (reduced strength)  · Volitional Cough: elicited; productive  · Volitional Swallow: delayed; decreased hyolaryngeal elevation upon palpation  · Voice Prior to PO Intake: mildly hoarse; inconsistent wet vocal quality which is cleared with a cued cough    Bedside Swallow Eval:   Consistencies Assessed:  · Thin liquids ice chip x1   · No further trials given 2/2 high aspiration risk    Oral Phase:   · Slow oral transit time    Pharyngeal Phase:   · coughing/choking  · multiple spontaneous swallows  · wet vocal quality after swallow    Compensatory Strategies  · None    Treatment: SLP provided education on continued NPO recommendations, SLP role, s/s and risks of aspiraiton, safe swallow precautions, and POC. SLP reviewed swallowing exercises. Patient independently recalled 3 swallowing exercises. He completed falsetto exercise, hard /g/ and /k/ words, effortful swallows, Mary Anne exercise, and Chin Tuck Against Resistance exercise utilizing the stress ball. Patient and family member v/u of all discussed and are in agreement with the POC.     Assessment:     Hoang Santos Jr. is a 68 y.o. male with an SLP diagnosis of Oropharyngea Dysphagia.  He presents with continued s/s of aspiration with minimal po trials, and is a high aspiration risk at this time. ST will continue to follow.     Goals:   Multidisciplinary Problems     SLP Goals        Problem: SLP Goal    Goal Priority Disciplines Outcome   SLP Goal     SLP Ongoing (interventions implemented as appropriate)   Description:  Short Term Goals:   1. Pt will participate in BSS to determine least restrictive diet.- MET 1/31  2. Pt will participate in indirect dysphagia exercises to strengthen musculature for swallowing mechanism with mod-max effort.                         Plan:     · Patient to be seen:  3 x/week   · Plan of Care expires:  02/18/19  · Plan of Care reviewed with:  patient, family   · SLP  Follow-Up:  Yes       Discharge recommendations:  (ongoing ST s/p d/c)   Barriers to Discharge:  Level of Skilled Assistance Needed      Time Tracking:     SLP Treatment Date:   01/31/19  Speech Start Time:  0900  Speech Stop Time:  0925     Speech Total Time (min):  25 min    Billable Minutes: Treatment Swallowing Dysfunction 15 and Eval Swallow and Oral Function 10    AJITH Rock, CCC-SLP   Spectra: [phone]  01/31/2019

## 2019-01-31 NOTE — PLAN OF CARE
Problem: Nutrition Impaired (Sepsis/Septic Shock)  Goal: Optimal Nutrition Intake    Intervention: Promote and Optimize Nutrition Delivery  Recommendation:   1. Continue with current TPN and rate with 20% Lipids 250 mL daily.    2. Once medically able, rec start trickle feeds of Isosource 1.5 @ 10 mL/hr to INC by 10 mL every 8 hrs until reach goal rate of  60 mL/hr to provide 2160 calories, 97 gm Pro, 1100 mL water.    3. Administer 135 mL water every 4 hrs to meet fluid needs      Goals: Meet >85% EEN Daily  Nutrition Goal Status: goal not met  Communication of RD Recs: reviewed with RN     Nutrition Discharge Planning: Home on TF via pump

## 2019-01-31 NOTE — PLAN OF CARE
Problem: Physical Therapy Goal  Goal: Physical Therapy Goal  Goals to be met by: 2019  Patient will increase functional independence with mobility by performin. Supine to sit with Modified [place]  2. Sit to stand transfer with Modified [place]  3. Gait  x 150 feet with Modified [place] using Rolling Walker.   4. Lower extremity exercise program x10 reps per handout, with independence      Outcome: Ongoing (interventions implemented as appropriate)  Pt progressing toward goals; increased tolerance to activity; cont with POC.

## 2019-01-31 NOTE — PLAN OF CARE
Problem: Adult Inpatient Plan of Care  Goal: Plan of Care Review  Outcome: Ongoing (interventions implemented as appropriate)  Afebrile throughout the night. VSS. On Bipap HS with occasional breaks. Remains on TPN and albumin. Awake, alert, oriented. Right lateral accordion to dependent drainage - milky, purulent drainage, 150cc.  Flushed with Amphotericin and Dakins per order. Right biliary drain bile drainage - 200cc. G tube to LIWS dark red, 300cc. Abdominal rounded and taut, non-tender. Urine output adequate with urinal. Denies any pain or SOB. Safety precautions in place.

## 2019-01-31 NOTE — PROGRESS NOTES
"[facility]  Adult Nutrition  Progress Note    SUMMARY       Recommendations    Recommendation:   1. Continue with current TPN and rate with 20% Lipids 250 mL daily.    2. Once medically able, rec start trickle feeds of Isosource 1.5 @ 10 mL/hr to INC by 10 mL every 8 hrs until reach goal rate of  60 mL/hr to provide 2160 calories, 97 gm Pro, 1100 mL water.    3. Administer 135 mL water every 4 hrs to meet fluid needs     Goals: Meet >85% EEN Daily  Nutrition Goal Status: goal not met  Communication of RD Recs: reviewed with RN    Nutrition Discharge Planning: Home on TF via pump    Reason for Assessment    Reason For Assessment: RD follow-up  Diagnosis: (aspiration)    Relevant Medical History:   Past Medical History:   Diagnosis Date    Hyperlipidemia     Primary malignant neuroendocrine neoplasm of duodenum 2018    Prostatic hyperplasia     Sleep apnea        General Information Comments: NFPE completed on 19 with no symptoms of malnutrition identified. Patient appears nourished. TF on hold. TPN infusing at this time due to possible ileus. NGT to LIS with +300 mL out put.     Nutrition Risk Screen    Nutrition Risk Screen: dysphagia or difficulty swallowing    Nutrition/Diet History    Patient Reported Diet/Restrictions/Preferences: (liquids)  Food Preferences: n/a  Spiritual, Cultural Beliefs, Church Practices, Values that Affect Care: no  Food Allergies: NKFA  Factors Affecting Nutritional Intake: NPO, altered gastrointestinal function    Anthropometrics    Temp: 98.5 °F (36.9 °C)  Height Method: Stated  Height: 5' 10" (177.8 cm)  Height (inches): 70 in  Weight Method: Bed Scale  Weight: 103.2 kg (227 lb 8.2 oz)  Weight (lb): 227.52 lb  Ideal Body Weight (IBW), Male: 166 lb  % Ideal Body Weight, Male (lb): 137.06 lb  BMI (Calculated): 32.7  BMI Grade: 30 - 34.9- obesity - grade I  Usual Body Weight (UBW), k.2 kg(19)  % Usual Body Weight: 100.21  % Weight Change From " Usual Weight: 0 %       Lab/Procedures/Meds    Pertinent Labs Reviewed: reviewed  Recent Labs   Lab 01/31/19 0430      K 3.5   CL 98   CO2 34*   BUN 31*   CREATININE 1.0   MG 1.9     Recent Labs   Lab 01/31/19 0430   ALT 35   AST 48*   ALKPHOS 102   BILITOT 1.6*   PROT 7.4   ALBUMIN 4.0     Recent Labs   Lab 01/31/19 0430   WBC 7.23   RBC 3.46*   HGB 10.1*   HCT 32.3*      MCV 93   MCH 29.2   MCHC 31.3*       Pertinent Medications Reviewed: reviewed  Scheduled Meds:   conventional amphotericin B (FUNGIZONE) continuous irrigation  50 mg Irrigation Q12H    amphotericin B liposome (AMBISOME) IVPB  3 mg/kg (Dosing Weight) Intravenous Q24H    bumetanide  1 mg Intravenous Daily    chlorhexidine  15 mL Mouth/Throat BID    cyanocobalamin  1,000 mcg Subcutaneous Daily    linezolid 600mg/300ml  600 mg Intravenous Q12H    meropenem (MERREM) IVPB  2 g Intravenous Q8H    pantoprazole  40 mg Intravenous Daily     Continuous Infusions:   Amino acid 5% - dextrose 15% (CLINIMIX-E) solution with additives (1L  provides 510 kcal/L dextrose, with 50 gm AA, 150 gm CHO, Na 35, K 30, Mg 5, Ca 4.5, Acetate 80, Cl 39, Phos 15) 50 mL/hr at 01/30/19 2104    Amino acid 5% - dextrose 15% (CLINIMIX-E) solution with additives (1L  provides 510 kcal/L dextrose, with 50 gm AA, 150 gm CHO, Na 35, K 30, Mg 5, Ca 4.5, Acetate 80, Cl 39, Phos 15)       PRN Meds:.sodium chloride, sodium chloride, sodium chloride, dextrose 50%, dextrose 50%, glucose, glucose, HYDROmorphone, HYDROmorphone, insulin aspart U-100, naloxone, ondansetron, promethazine (PHENERGAN) IVPB, sodium chloride 0.9%      Estimated/Assessed Needs    Weight Used For Calorie Calculations: 103.2 kg (227 lb 8.2 oz)  Energy Calorie Requirements (kcal): 1813  Energy Need Method: [facility]     Protein Requirements: 103(1.0 gm Pro/kg)  Weight Used For Protein Calculations: 103.2 kg (227 lb 8.2 oz)     Estimated Fluid Requirement Method: RDA Method  RDA Method  (mL): 1813     CHO Requirement: 50%      Nutrition Prescription Ordered    Current Diet Order: NPO  Current Nutrition Support Formula Ordered: Clinimix E 5/15( with 20% Lipids 250 mL Daily)  Current Nutrition Support Rate Ordered: 50 (ml)  Current Nutrition Support Frequency Ordered: mL/hr   Oral Nutrition Supplement: 0    Evaluation of Received Nutrient/Fluid Intake    Parenteral Calories (kcal): 852  Parenteral Protein (gm): 60  Parenteral Fluid (mL): 1200  Lipid Calories (kcals): 500 kcals  GIR (Glucose Infusion Rate) (mg/kg/min): 0.8 mg/kg/min    Total Calories (kcal): 1352  % Kcal Needs: 75    Total Protein (gm): 60  % Protein Needs: 58    I/O: reviewed  Energy Calories Required: not meeting needs  Protein Required: not meeting needs  Fluid Required: (per MD)    Comments: LBM: 1/24  Tolerance: tolerating    % Intake of Estimated Energy Needs: 50 - 75 %  % Meal Intake: NPO    Nutrition Risk    Level of Risk/Frequency of Follow-up: (2 x week)       Assessment and Plan    Nutrition Problem  Altered GI Function     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     Signs and Symptoms (as evidenced by):   NPO     Interventions:  Collaboration with providers     Nutrition Diagnosis Status:   Continues        Monitor and Evaluation    Food and Nutrient Intake: parenteral nutrition intake, energy intake  Food and Nutrient Adminstration: diet order, enteral and parenteral nutrition administration  Physical Activity and Function: nutrition-related ADLs and IADLs  Anthropometric Measurements: weight change, weight  Biochemical Data, Medical Tests and Procedures: electrolyte and renal panel, inflammatory profile  Nutrition-Focused Physical Findings: overall appearance     Malnutrition Assessment  Orbital Region (Subcutaneous Fat Loss): well nourished  Upper Arm Region (Subcutaneous Fat Loss): well nourished  Thoracic and Lumbar Region: well nourished   [place] Region (Muscle Loss): well nourished  Clavicle Bone Region (Muscle Loss):  well nourished  Clavicle and Acromion Bone Region (Muscle Loss): well nourished  Scapular Bone Region (Muscle Loss): well nourished  Dorsal Hand (Muscle Loss): well nourished  Patellar Region (Muscle Loss): well nourished  Anterior Thigh Region (Muscle Loss): well nourished  Posterior Calf Region (Muscle Loss): well nourished       Nutrition Follow-Up    RD Follow-up?: Yes

## 2019-01-31 NOTE — PT/OT/SLP PROGRESS
Physical Therapy Treatment    Patient Name:  Hoang Santos Jr.   MRN:  [id number]    Recommendations:     Discharge Recommendations:  (ongoing assessment)   Discharge Equipment Recommendations: (TBD)   Barriers to discharge: None    Assessment:     Hoang Santos Jr. is a 68 y.o. male admitted with a medical diagnosis of Aspiration pneumonia of right lower lobe.  He presents with the following impairments/functional limitations:  weakness, impaired endurance, gait instability, impaired functional mobilty, impaired self care skills, impaired balance, decreased lower extremity function, decreased upper extremity function, orthopedic precautions Increased tolerance to activity.    Rehab Prognosis: Good; patient would benefit from acute skilled PT services to address these deficits and reach maximum level of function.    Recent Surgery: Procedure(s) (LRB):  CHOLANGIOGRAM, PERCUTANEOUS, TRANSHEPATIC (Right) 1 Day Post-Op    Plan:     During this hospitalization, patient to be seen 6 x/week to address the identified rehab impairments via gait training, therapeutic activities, therapeutic exercises and progress toward the following goals:    · Plan of Care Expires:  02/17/19    Subjective     Pain/Comfort:  · Pain Rating 1: 0/10  · Pain Rating Post-Intervention 1: 0/10      Objective:     Communicated with nurse prior to session.  Patient found with peripheral IV, SCD, JAY drain, oxygen(ICU monitoring)  upon PT entry to room.     General Precautions: Standard, aspiration, fall, respiratory   Orthopedic Precautions:N/A   Braces: N/A     Functional Mobility:  · Bed Mobility:     · Rolling Right: minimum assistance and moderate assistance  · Supine to Sit: contact guard assistance and minimum assistance  · Transfers:     · Sit to Stand:  contact guard assistance, minimum assistance and of 2 persons with hand-held assist  · Bed to Chair: contact guard assistance, minimum assistance and of 2 persons with  hand-held assist  using   Step Transfer  · Gait: Pt amb 5ft from EOB to chair with CG/Harley x 2 with slow fan, short step length, erect stance      AM-PAC 6 CLICK MOBILITY  Turning over in bed (including adjusting bedclothes, sheets and blankets)?: 3  Sitting down on and standing up from a chair with arms (e.g., wheelchair, bedside commode, etc.): 3  Moving from lying on back to sitting on the side of the bed?: 3  Moving to and from a bed to a chair (including a wheelchair)?: 3  Need to walk in hospital room?: 3  Climbing 3-5 steps with a railing?: 2  Basic Mobility Total Score: 17       Therapeutic Activities and Exercises:   functional mob as described above; also performed 10 reps BLE seated exercise including heel/toe rises, FAQ, marches, GS and ham/gastroc stretches 2x 30 sec each; declined further ex.    Patient left up in chair with all lines intact, call button in reach, nurse notified and wife present..    GOALS:   Multidisciplinary Problems     Physical Therapy Goals        Problem: Physical Therapy Goal    Goal Priority Disciplines Outcome Goal Variances Interventions   Physical Therapy Goal     PT, PT/OT Ongoing (interventions implemented as appropriate)     Description:  Goals to be met by: 2019  Patient will increase functional independence with mobility by performin. Supine to sit with Modified [place]  2. Sit to stand transfer with Modified [place]  3. Gait  x 150 feet with Modified [place] using Rolling Walker.   4. Lower extremity exercise program x10 reps per handout, with independence                       Time Tracking:     PT Received On: 19  PT Start Time: 1053     PT Stop Time: 1121  PT Total Time (min): 28 min with OT    Billable Minutes: Therapeutic Activity 8 minutes    Treatment Type: Treatment  PT/PTA: PT     PTA Visit Number: 0     Myesha Renner, PT  2019

## 2019-02-01 LAB
ALBUMIN SERPL BCP-MCNC: 3.5 G/DL
ALP SERPL-CCNC: 110 U/L
ALT SERPL W/O P-5'-P-CCNC: 38 U/L
ANION GAP SERPL CALC-SCNC: 12 MMOL/L
APTT BLDCRRT: 30.9 SEC
AST SERPL-CCNC: 60 U/L
BACTERIA SPEC ANAEROBE CULT: NORMAL
BASOPHILS # BLD AUTO: 0.04 K/UL
BASOPHILS NFR BLD: 0.6 %
BILIRUB SERPL-MCNC: 2 MG/DL
BUN SERPL-MCNC: 35 MG/DL
CALCIUM SERPL-MCNC: 9.6 MG/DL
CHLORIDE SERPL-SCNC: 99 MMOL/L
CO2 SERPL-SCNC: 31 MMOL/L
CREAT SERPL-MCNC: 1.1 MG/DL
DIFFERENTIAL METHOD: ABNORMAL
EOSINOPHIL # BLD AUTO: 0.1 K/UL
EOSINOPHIL NFR BLD: 1.9 %
ERYTHROCYTE [DISTWIDTH] IN BLOOD BY AUTOMATED COUNT: 15.4 %
EST. GFR  (AFRICAN AMERICAN): >60 ML/MIN/1.73 M^2
EST. GFR  (NON AFRICAN AMERICAN): >60 ML/MIN/1.73 M^2
GLUCOSE SERPL-MCNC: 102 MG/DL
HCT VFR BLD AUTO: 34.7 %
HGB BLD-MCNC: 10.7 G/DL
INR PPP: 1.2
LYMPHOCYTES # BLD AUTO: 1.5 K/UL
LYMPHOCYTES NFR BLD: 21 %
MAGNESIUM SERPL-MCNC: 2.1 MG/DL
MCH RBC QN AUTO: 29 PG
MCHC RBC AUTO-ENTMCNC: 30.8 G/DL
MCV RBC AUTO: 94 FL
MONOCYTES # BLD AUTO: 0.5 K/UL
MONOCYTES NFR BLD: 7.1 %
NEUTROPHILS # BLD AUTO: 5 K/UL
NEUTROPHILS NFR BLD: 69 %
PHOSPHATE SERPL-MCNC: 2.5 MG/DL
PLATELET # BLD AUTO: 177 K/UL
PMV BLD AUTO: 9.8 FL
POTASSIUM SERPL-SCNC: 3.6 MMOL/L
PROT SERPL-MCNC: 7.1 G/DL
PROTHROMBIN TIME: 12 SEC
RBC # BLD AUTO: 3.69 M/UL
SODIUM SERPL-SCNC: 142 MMOL/L
WBC # BLD AUTO: 7.19 K/UL

## 2019-02-01 PROCEDURE — 25000003 PHARM REV CODE 250: Performed by: ANESTHESIOLOGY

## 2019-02-01 PROCEDURE — 97535 SELF CARE MNGMENT TRAINING: CPT

## 2019-02-01 PROCEDURE — 80053 COMPREHEN METABOLIC PANEL: CPT

## 2019-02-01 PROCEDURE — B4185 PARENTERAL SOL 10 GM LIPIDS: HCPCS | Performed by: SURGERY

## 2019-02-01 PROCEDURE — 63600175 PHARM REV CODE 636 W HCPCS: Performed by: STUDENT IN AN ORGANIZED HEALTH CARE EDUCATION/TRAINING PROGRAM

## 2019-02-01 PROCEDURE — S0171 BUMETANIDE 0.5 MG: HCPCS | Performed by: SURGERY

## 2019-02-01 PROCEDURE — 83735 ASSAY OF MAGNESIUM: CPT

## 2019-02-01 PROCEDURE — 84100 ASSAY OF PHOSPHORUS: CPT

## 2019-02-01 PROCEDURE — C9113 INJ PANTOPRAZOLE SODIUM, VIA: HCPCS | Performed by: STUDENT IN AN ORGANIZED HEALTH CARE EDUCATION/TRAINING PROGRAM

## 2019-02-01 PROCEDURE — 94664 DEMO&/EVAL PT USE INHALER: CPT

## 2019-02-01 PROCEDURE — 25000003 PHARM REV CODE 250: Performed by: STUDENT IN AN ORGANIZED HEALTH CARE EDUCATION/TRAINING PROGRAM

## 2019-02-01 PROCEDURE — 94761 N-INVAS EAR/PLS OXIMETRY MLT: CPT

## 2019-02-01 PROCEDURE — 94660 CPAP INITIATION&MGMT: CPT

## 2019-02-01 PROCEDURE — 25000003 PHARM REV CODE 250: Performed by: SURGERY

## 2019-02-01 PROCEDURE — 85025 COMPLETE CBC W/AUTO DIFF WBC: CPT

## 2019-02-01 PROCEDURE — 63600175 PHARM REV CODE 636 W HCPCS: Performed by: SURGERY

## 2019-02-01 PROCEDURE — 85610 PROTHROMBIN TIME: CPT

## 2019-02-01 PROCEDURE — 27000221 HC OXYGEN, UP TO 24 HOURS

## 2019-02-01 PROCEDURE — 99900035 HC TECH TIME PER 15 MIN (STAT)

## 2019-02-01 PROCEDURE — 97110 THERAPEUTIC EXERCISES: CPT

## 2019-02-01 PROCEDURE — 92526 ORAL FUNCTION THERAPY: CPT

## 2019-02-01 PROCEDURE — 97530 THERAPEUTIC ACTIVITIES: CPT

## 2019-02-01 PROCEDURE — 85730 THROMBOPLASTIN TIME PARTIAL: CPT

## 2019-02-01 PROCEDURE — 11000001 HC ACUTE MED/SURG PRIVATE ROOM

## 2019-02-01 PROCEDURE — 63600175 PHARM REV CODE 636 W HCPCS: Mod: JG | Performed by: STUDENT IN AN ORGANIZED HEALTH CARE EDUCATION/TRAINING PROGRAM

## 2019-02-01 PROCEDURE — 94799 UNLISTED PULMONARY SVC/PX: CPT

## 2019-02-01 RX ORDER — LINEZOLID 2 MG/ML
600 INJECTION, SOLUTION INTRAVENOUS
Status: DISCONTINUED | OUTPATIENT
Start: 2019-02-01 | End: 2019-02-03

## 2019-02-01 RX ADMIN — CYANOCOBALAMIN 1000 MCG: 1000 INJECTION, SOLUTION INTRAMUSCULAR at 09:02

## 2019-02-01 RX ADMIN — AMPHOTERICIN B 300 MG: 50 INJECTION, POWDER, LYOPHILIZED, FOR SOLUTION INTRAVENOUS at 01:02

## 2019-02-01 RX ADMIN — POTASSIUM PHOSPHATE, MONOBASIC AND POTASSIUM PHOSPHATE, DIBASIC 30 MMOL: 224; 236 INJECTION, SOLUTION INTRAVENOUS at 01:02

## 2019-02-01 RX ADMIN — MEROPENEM 2 G: 1 INJECTION, POWDER, FOR SOLUTION INTRAVENOUS at 11:02

## 2019-02-01 RX ADMIN — PANTOPRAZOLE SODIUM 40 MG: 40 INJECTION, POWDER, LYOPHILIZED, FOR SOLUTION INTRAVENOUS at 09:02

## 2019-02-01 RX ADMIN — BUMETANIDE 1 MG: 0.25 INJECTION INTRAMUSCULAR; INTRAVENOUS at 09:02

## 2019-02-01 RX ADMIN — CHLORHEXIDINE GLUCONATE 15 ML: 1.2 RINSE ORAL at 09:02

## 2019-02-01 RX ADMIN — CHLORHEXIDINE GLUCONATE 15 ML: 1.2 RINSE ORAL at 10:02

## 2019-02-01 RX ADMIN — LINEZOLID 600 MG: 600 INJECTION, SOLUTION INTRAVENOUS at 07:02

## 2019-02-01 RX ADMIN — MEROPENEM 2 G: 1 INJECTION, POWDER, FOR SOLUTION INTRAVENOUS at 03:02

## 2019-02-01 RX ADMIN — MEROPENEM 2 G: 1 INJECTION, POWDER, FOR SOLUTION INTRAVENOUS at 05:02

## 2019-02-01 RX ADMIN — ASCORBIC ACID, VITAMIN A PALMITATE, CHOLECALCIFEROL, THIAMINE HYDROCHLORIDE, RIBOFLAVIN-5 PHOSPHATE SODIUM, PYRIDOXINE HYDROCHLORIDE, NIACINAMIDE, DEXPANTHENOL, ALPHA-TOCOPHEROL ACETATE, VITAMIN K1, FOLIC ACID, BIOTIN, CYANOCOBALAMIN: 200; 3300; 200; 6; 3.6; 6; 40; 15; 10; 150; 600; 60; 5 INJECTION, SOLUTION INTRAVENOUS at 10:02

## 2019-02-01 RX ADMIN — I.V. FAT EMULSION 250 ML: 20 EMULSION INTRAVENOUS at 10:02

## 2019-02-01 RX ADMIN — LINEZOLID 600 MG: 600 INJECTION, SOLUTION INTRAVENOUS at 09:02

## 2019-02-01 RX ADMIN — PANTOPRAZOLE SODIUM 40 MG: 40 INJECTION, POWDER, LYOPHILIZED, FOR SOLUTION INTRAVENOUS at 10:02

## 2019-02-01 NOTE — PLAN OF CARE
Problem: Adult Inpatient Plan of Care  Goal: Plan of Care Review  Outcome: Ongoing (interventions implemented as appropriate)  Pt is AAOx3. No complaints of nausea, vomiting, or diarrhea. No complaints of pain. TPN maintained. On 2L NC 02. NPO. G tube is on low continuous wall suction and draining dark red fluid. Biliary drain is draining. Drain connected to the beach is draining milky fluid. Suction set up at the bedside due pt unable to swallow and producing a lot of mucuous. Safety precautions maintained. Tolerated all medications well.

## 2019-02-01 NOTE — PLAN OF CARE
Problem: Physical Therapy Goal  Goal: Physical Therapy Goal  Goals to be met by: 2019  Patient will increase functional independence with mobility by performin. Supine to sit with Modified [place]  2. Sit to stand transfer with Modified [place]  3. Gait  x 150 feet with Modified [place] using Rolling Walker.   4. Lower extremity exercise program x10 reps per handout, with independence      Outcome: Ongoing (interventions implemented as appropriate)  Continue working toward goals.

## 2019-02-01 NOTE — PLAN OF CARE
02/01/19 1115   Medicare Message   Important Message from Medicare regarding Discharge Appeal Rights Given to patient/caregiver;Signed/date by patient/caregiver;Explained to patient/caregiver   Date IMM was signed 02/01/19   Time IMM was signed [id number]

## 2019-02-01 NOTE — PLAN OF CARE
Problem: SLP Goal  Goal: SLP Goal  Short Term Goals:   1. Pt will participate in BSS to determine least restrictive diet.- MET 1/31  2. Pt will participate in indirect dysphagia exercises to strengthen musculature for swallowing mechanism with mod-max effort.        Outcome: Ongoing (interventions implemented as appropriate)  2/1: ongoing swallow tx this visit. Pt completed OMEs targeting vocal cord adduction, laryngeal lift/excursion, and BOT strength/mobility with good effort. ST to f/u.

## 2019-02-01 NOTE — PLAN OF CARE
Problem: Occupational Therapy Goal  Goal: Occupational Therapy Goal  Goals to be met by: 02/19/2019    Patient will increase functional independence with ADLs by performing:    UE Dressing with Modified [place].  LE Dressing with Modified [place].  Grooming while standing with Modified [place].  Toileting from toilet with Modified [place] for hygiene and clothing management.   Toilet transfer to toilet with Modified [place].  Increased functional strength to WFL for self care.  Upper extremity exercise program x10 reps per handout, with independence.     Outcome: Ongoing (interventions implemented as appropriate)  Hoang Santos  is a 68 y.o. male with a medical diagnosis of Aspiration pneumonia of right lower lobe.   Performance deficits affecting function are weakness, impaired endurance, impaired self care skills, impaired functional mobilty, gait instability, impaired balance, decreased lower extremity function, decreased upper extremity function, decreased safety awareness, impaired cardiopulmonary response to activity. Pt agreeable to tx.  Pt transferred with CGA-Min A using RW to BSC and chair. Progressing towards goals. Continue OT services to address functional goals, progressing as able.    JENNIFER Bui  1/31/19

## 2019-02-01 NOTE — PT/OT/SLP PROGRESS
Physical Therapy Treatment    Patient Name:  Hoang Santos Jr.   MRN:  [id number]    Recommendations:     Discharge Recommendations:  (ongoing assessment)   Discharge Equipment Recommendations: (TBD)   Barriers to discharge: None    Assessment:     Hoang Santos Jr. is a 68 y.o. male admitted with a medical diagnosis of Aspiration pneumonia of right lower lobe.  He presents with the following impairments/functional limitations:  weakness, impaired endurance, gait instability, impaired functional mobilty, impaired self care skills, impaired balance, decreased safety awareness, decreased upper extremity function, decreased lower extremity function, impaired cardiopulmonary response to activity .Pt with increased mobility today; progressing toward goals; amb with RW in room~25ft; will cont with POC.    Rehab Prognosis: Good; patient would benefit from acute skilled PT services to address these deficits and reach maximum level of function.    Recent Surgery: Procedure(s) (LRB):  CHOLANGIOGRAM, PERCUTANEOUS, TRANSHEPATIC (Right) 2 Days Post-Op    Plan:     During this hospitalization, patient to be seen 6 x/week to address the identified rehab impairments via therapeutic activities, therapeutic exercises, gait training and progress toward the following goals:    · Plan of Care Expires:  02/17/19    Subjective     Pain/Comfort:  · Pain Rating 1: 0/10  · Pain Rating Post-Intervention 1: 0/10      Objective:     Communicated with nurse prior to session.  Patient found with JAY drain, oxygen, SCD, peripheral IV(ICU monitoring)  upon PT entry to room.     General Precautions: Standard, aspiration, fall, respiratory   Orthopedic Precautions:N/A   Braces: N/A     Functional Mobility:  · Bed Mobility:     · Scooting: stand by assistance to EOB  · Supine to Sit: contact guard assistance and minimal assistance and use of bed raill  · Transfers:     · Sit to Stand:  contact guard assistance with rolling walker  · Bed to Chair:  contact guard assistance and minimum assistance with  rolling walker  using  Step Transfer  · Gait: Pt amb 25ft with RW and CG/Harley plus additional A of 1 for line/tube management; supplemental O2; pt amb with slow fan,short step length, erect stance      AM-PAC 6 CLICK MOBILITY  Turning over in bed (including adjusting bedclothes, sheets and blankets)?: 3  Sitting down on and standing up from a chair with arms (e.g., wheelchair, bedside commode, etc.): 3  Moving from lying on back to sitting on the side of the bed?: 3  Moving to and from a bed to a chair (including a wheelchair)?: 3  Need to walk in hospital room?: 3  Climbing 3-5 steps with a railing?: 2  Basic Mobility Total Score: 17       Therapeutic Activities and Exercises:   Patient performed bed mob as described above; bed mobility as described above; pt transferred to [facility] and stood for cleansing; pt used RW to step to bed; arranged lines/tubes and amb with RW as described above; pt demonstrated knowledge of AROM LE exercise.    Patient left up in chair with all lines intact, call button in reach, bed alarm on, nurse notified and wife present..    GOALS:   Multidisciplinary Problems     Physical Therapy Goals        Problem: Physical Therapy Goal    Goal Priority Disciplines Outcome Goal Variances Interventions   Physical Therapy Goal     PT, PT/OT Ongoing (interventions implemented as appropriate)     Description:  Goals to be met by: 2019  Patient will increase functional independence with mobility by performin. Supine to sit with Modified [place]  2. Sit to stand transfer with Modified [place]  3. Gait  x 150 feet with Modified [place] using Rolling Walker.   4. Lower extremity exercise program x10 reps per handout, with independence                       Time Tracking:     PT Received On: 19  PT Start Time: 1420     PT Stop Time: 1452  PT Total Time (min): 32 min with OT    Billable Minutes: Gait Training 16  minutes    Treatment Type: Treatment  PT/PTA: PT     PTA Visit Number: 0     Myesha Renner, PT  01/31/2019

## 2019-02-01 NOTE — PT/OT/SLP PROGRESS
Occupational Therapy   Treatment    Name: Hoang Santos Jr.  MRN: [id number]  Admitting Diagnosis:  Aspiration pneumonia of right lower lobe  3 Days Post-Op    Recommendations:     Discharge Recommendations: (TBD)  Discharge Equipment Recommendations:  (TBD)  Barriers to discharge:  None    Assessment:     Hoang Santos Jr. is a 68 y.o. male with a medical diagnosis of Aspiration pneumonia of right lower lobe.   Performance deficits affecting function are weakness, impaired endurance, impaired self care skills, impaired functional mobilty, gait instability, impaired balance, decreased lower extremity function, decreased upper extremity function, decreased safety awareness, impaired cardiopulmonary response to activity. Pt agreeable to tx.  Pt transferred with CGA-Min A using RW to BSC and chair. Progressing towards goals. Continue OT services to address functional goals, progressing as able.      Rehab Prognosis:  Good; patient would benefit from acute skilled OT services to address these deficits and reach maximum level of function.       Plan:     Patient to be seen 5 x/week to address the above listed problems via self-care/home management, therapeutic activities, therapeutic exercises  · Plan of Care Expires: 02/27/19  · Plan of Care Reviewed with: patient, spouse    Subjective     Pain/Comfort:  · Pain Rating 1: 0/10  · Pain Rating Post-Intervention 1: 0/10    Objective:     Communicated with: RN prior to session.  Patient found supine with oxygen, peripheral IV, JAY drain upon OT entry to room.    General Precautions: Standard, aspiration, fall, respiratory   Orthopedic Precautions:N/A   Braces: N/A     Occupational Performance:     Bed Mobility:    · Patient completed Rolling/Turning to Right with stand by assistance and with side rail  · Patient completed Scooting/Bridging with stand by assistance and scoot seated to EOB  · Patient completed Supine to Sit with contact guard assistance, minimum assistance,  with side rail and increased effort, vc's for effective technique     Functional Mobility/Transfers:  · Patient completed Sit <> Stand Transfer with minimum assistance  with  rolling walker   · Patient completed Bed <> Chair Transfer using Stand Pivot technique with minimum assistance with rolling walker  · Patient completed Toilet Transfer Stand Pivot technique with minimum assistance with  rolling walker and bedside commode  · Functional Mobility: Pt ambulated with CGA-Min A using RW.  See PT notes for details.    Activities of Daily Living:  · Toileting: minimum assistance for thorough perianal cleaning      [facility] 6 Click ADL: 14      Patient left up in chair with all lines intact, call button in reach and PT and spouse presentEducation:      GOALS:   Multidisciplinary Problems     Occupational Therapy Goals        Problem: Occupational Therapy Goal    Goal Priority Disciplines Outcome Interventions   Occupational Therapy Goal     OT, PT/OT Ongoing (interventions implemented as appropriate)    Description:  Goals to be met by: 02/19/2019    Patient will increase functional independence with ADLs by performing:    UE Dressing with Modified [place].  LE Dressing with Modified [place].  Grooming while standing with Modified [place].  Toileting from toilet with Modified [place] for hygiene and clothing management.   Toilet transfer to toilet with Modified [place].  Increased functional strength to WFL for self care.  Upper extremity exercise program x10 reps per handout, with independence.                      Time Tracking:     OT Date of Treatment: 01/31/19  OT Start Time: 1420  OT Stop Time: 1447  OT Total Time (min): 27 min    Billable Minutes:Self Care/Home Management 16    JENNIFER Bui   1/31/2019

## 2019-02-01 NOTE — PT/OT/SLP PROGRESS
"Speech Language Pathology Treatment    Patient Name:  Hoang Santos Jr.   MRN:  [id number]  Admitting Diagnosis: Aspiration pneumonia of right lower lobe    Recommendations:                 General Recommendations:  Dysphagia therapy  Diet recommendations:  NPO, Liquid Diet Level: NPO   Aspiration Precautions: NPO, Continue alternate means of nutrition, Frequent oral care and Strict aspiration precautions   General Precautions: Standard, NPO, fall, aspiration, respiratory  Communication strategies:  none    Subjective   Pt seen for ongoing swallow tx. Pt awake, sitting in chair with wife present upon arrival. ST checked w/ RNSparkle, prior to entry. Per [place], pt complaining of swollen tongue this AM.  Patient goals: Pt stated, "hopefully by Sunday I'll be home."     Pain/Comfort:  · Pain Rating 1: 0/10    Objective:     Has the patient been evaluated by SLP for swallowing?   Yes  Keep patient NPO? Yes   Current Respiratory Status: nasal cannula      Pt completed OMEs targeting BOT strength and mobility, laryngeal lift, laryngeal excursion, and vocal cord adduction. Pt completed Shaker maneuver hold for 1 min and up/down x30. Pt then completed hard "kuh" and "guh" x15 each to improve t.b. Retraction. Pitch glide exercises ('ah-ee") completed x15. Finally, pt followed Supraglottic maneuver x5 with mod A. Pt encouraged to swallow hard between exercises as saliva accumulated in oral cavity/pharynx. Pt used Yanker suction x2 during tx to remove saliva. ST assessed tongue 2/2 concern for edema, but did not appear clinically enlarged. No odnynophagia this date.    Assessment:     Hoang Santos Jr. is a 68 y.o. male with an SLP diagnosis of Dysphagia.  He presents with severe s/s of dysphagia, continued difficulty managing secretions, and use of Yanker suction throughout day. Pt to continue strict NPO with oral care. ST to f/u for ongoing indirect dysphagia tx.    Goals:   Multidisciplinary Problems     SLP Goals        " Problem: SLP Goal    Goal Priority Disciplines Outcome   SLP Goal     SLP Ongoing (interventions implemented as appropriate)   Description:  Short Term Goals:   1. Pt will participate in BSS to determine least restrictive diet.- MET 1/31  2. Pt will participate in indirect dysphagia exercises to strengthen musculature for swallowing mechanism with mod-max effort.                         Plan:     · Patient to be seen:  3 x/week   · Plan of Care expires:  02/18/19  · Plan of Care reviewed with:  patient, spouse   · SLP Follow-Up:  Yes       Discharge recommendations:  other (see comments)(Continued ST at next level of care)   Barriers to Discharge:  None    Time Tracking:     SLP Treatment Date:   02/01/19  Speech Start Time:  1322  Speech Stop Time:  1340     Speech Total Time (min):  18 min    Billable Minutes: Treatment Swallowing Dysfunction 18    Yissel Huang CCC-SLP  02/01/2019

## 2019-02-01 NOTE — PLAN OF CARE
Problem: Physical Therapy Goal  Goal: Physical Therapy Goal  Goals to be met by: 2019  Patient will increase functional independence with mobility by performin. Supine to sit with Modified [place]  2. Sit to stand transfer with Modified [place]  3. Gait  x 150 feet with Modified [place] using Rolling Walker.   4. Lower extremity exercise program x10 reps per handout, with independence      Outcome: Ongoing (interventions implemented as appropriate)  Pt with increased mobility today; progressing toward goals; amb with RW in room~25ft; will cont with POC.

## 2019-02-01 NOTE — PROGRESS NOTES
.Pharmacy New Medication Education    Patient accepted medication education.    Pharmacy educated patient on name and purpose of medications and possible side effects, using the teach-back method.     0.9%  NaCl infusion (for blood administration)   Amino acid 5% - dextrose 15% (CLINIMIX-E) solution with additives (1L  provides 510 kcal/L dextrose, with 50 gm AA, 150 gm CHO, Na 35, K 30, Mg 5, Ca 4.5, Acetate 80, Cl 39, Phos 15)   linezolid 600mg/300ml 600 mg/300 mL IVPB 600 mg   pantoprazole injection 40 mg       Learners of pharmacy medication education included:  Patient    Patient +/- learner response:  Verbalized Understanding, Teachback

## 2019-02-01 NOTE — PT/OT/SLP PROGRESS
Physical Therapy Treatment    Patient Name:  Hoang Santos Jr.   MRN:  [id number]    Recommendations:     Discharge Recommendations:  (ongoing assessment)   Discharge Equipment Recommendations: (TBD)   Barriers to discharge: None    Assessment:     Hoang Santos Jr. is a 68 y.o. male admitted with a medical diagnosis of Aspiration pneumonia of right lower lobe.  He presents with the following impairments/functional limitations:  weakness, impaired endurance, impaired self care skills, impaired functional mobilty, gait instability, impaired balance, decreased upper extremity function, decreased lower extremity function, decreased ROM, impaired cardiopulmonary response to activity.  Pt would benefit from continue P.T. To address impairments listed above.    Rehab Prognosis: Good; patient would benefit from acute skilled PT services to address these deficits and reach maximum level of function.    Recent Surgery: Procedure(s) (LRB):  CHOLANGIOGRAM, PERCUTANEOUS, TRANSHEPATIC (Right) 3 Days Post-Op    Plan:     During this hospitalization, patient to be seen 6 x/week to address the identified rehab impairments via therapeutic activities, therapeutic exercises, gait training and progress toward the following goals:    · Plan of Care Expires:  02/17/19    Subjective       Patient/Family Comments/goals: Pt agreed to tx.  Pain/Comfort:  · Pain Rating 1: 0/10  · Pain Rating Post-Intervention 1: 0/10      Objective:     Communicated with RN (Sparkle) prior to session.  Patient found all lines intact, call button in reach, bed alarm on and RN notified oxygen, peripheral IV, AJY drain(DRAIN connected to the wall)  upon PT entry to room.     General Precautions: Standard, aspiration, fall, respiratory   Orthopedic Precautions:N/A   Braces:       Functional Mobility:  · Bed Mobility:     · Rolling Right: stand by assistance and contact guard assistance  · Scooting: stand by assistance and to EOB  · Supine to Sit: contact guard  assistance  · Transfers:     · Sit to Stand:  contact guard assistance and minimum assistance with rolling walker and x 2 reps  · Bed to Chair: minimum assistance with  rolling walker  using  Step Transfer  · Gait: 4ft to b/s chair with RW and Swapna for increased stability and line management.  · Balance: sitting good, standing RW fair, gait RW fair-      AM-PAC 6 CLICK MOBILITY  Turning over in bed (including adjusting bedclothes, sheets and blankets)?: 3  Sitting down on and standing up from a chair with arms (e.g., wheelchair, bedside commode, etc.): 3  Moving from lying on back to sitting on the side of the bed?: 3  Moving to and from a bed to a chair (including a wheelchair)?: 3  Need to walk in hospital room?: 3  Climbing 3-5 steps with a railing?: 2  Basic Mobility Total Score: 17       Therapeutic Activities and Exercises:   Pt sat EOB x 15 mins with S while assisting pt with gown change and line management.    Seated BLE therex: AP, LAQ hip flexion/ABD/ADD, glut sets x 15 reps with 2 brief rest breaks. vc's for proper technique.      Patient left up in chair with all lines intact, call button in reach and chair alarm on..    GOALS:   Multidisciplinary Problems     Physical Therapy Goals        Problem: Physical Therapy Goal    Goal Priority Disciplines Outcome Goal Variances Interventions   Physical Therapy Goal     PT, PT/OT Ongoing (interventions implemented as appropriate)     Description:  Goals to be met by: 2019  Patient will increase functional independence with mobility by performin. Supine to sit with Modified [place]  2. Sit to stand transfer with Modified [place]  3. Gait  x 150 feet with Modified [place] using Rolling Walker.   4. Lower extremity exercise program x10 reps per handout, with independence                       Time Tracking:     PT Received On: 19  PT Start Time: 1050     PT Stop Time: 1111  PT Total Time (min): 21 min   Returned to room: 1135 to  1143  8 mins    Billable Minutes: Therapeutic Activity 21 and Therapeutic Exercise 8    Treatment Type: Treatment  PT/PTA: PT     PTA Visit Number: 1     Anais Peralta PTA  02/01/2019

## 2019-02-01 NOTE — PROGRESS NOTES
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  Now c sepsis likely from aspiration RLL PNA  S/p IR drain of abdominal fluid collection  1/24/18 - Laparotomy, ASHLEIGH, enterotomy repair, EGD, GastroJ, Retroperitoneal peripancreatic abscess open drainage, Omental flap creation, Lori gastrostomy c 24 Fr Malecot     NAEO  Extubated  Stable for step down    Scheduled Meds:   conventional amphotericin B (FUNGIZONE) continuous irrigation  50 mg Irrigation Q12H    amphotericin B liposome (AMBISOME) IVPB  3 mg/kg (Dosing Weight) Intravenous Q24H    bumetanide  1 mg Intravenous Daily    chlorhexidine  15 mL Mouth/Throat BID    cyanocobalamin  1,000 mcg Subcutaneous Daily    linezolid 600mg/300ml  600 mg Intravenous Q12H    meropenem (MERREM) IVPB  2 g Intravenous Q8H    pantoprazole  40 mg Intravenous Daily     Continuous Infusions:   Amino acid 5% - dextrose 15% (CLINIMIX-E) solution with additives (1L  provides 510 kcal/L dextrose, with 50 gm AA, 150 gm CHO, Na 35, K 30, Mg 5, Ca 4.5, Acetate 80, Cl 39, Phos 15) 50 mL/hr at 01/30/19 2104    Amino acid 5% - dextrose 15% (CLINIMIX-E) solution with additives (1L  provides 510 kcal/L dextrose, with 50 gm AA, 150 gm CHO, Na 35, K 30, Mg 5, Ca 4.5, Acetate 80, Cl 39, Phos 15)       PRN Meds:.sodium chloride, sodium chloride, sodium chloride, dextrose 50%, dextrose 50%, glucose, glucose, HYDROmorphone, HYDROmorphone, insulin aspart U-100, naloxone, ondansetron, promethazine (PHENERGAN) IVPB, sodium chloride 0.9%    Objective:      Temp:  [97.2 °F (36.2 °C)-99.8 °F (37.7 °C)] 98.3 °F (36.8 °C)  Pulse:  [] 104  Resp:  [17-56] 20  SpO2:  [89 %-100 %] 99 %  BP: (107-129)/(56-67)  108/58  Weight change: 1.3 kg (2 lb 13.9 oz)    Intake/Output Summary (Last 24 hours) at 1/31/2019 1812  Last data filed at 1/31/2019 1400  Gross per 24 hour   Intake 2043.87 ml   Output 3020 ml   Net -976.13 ml       Physical Exam:  GEN: awake, alert, NAD  CV: RRR  PULM: normal work of breathing, on NC  ABD: S/NT/ND, incision c/d/i, tubes in good position  EXT: Peripheral pulses present and equal bilaterally    Recent Labs   Lab 01/31/19  0430      K 3.5   CO2 34*   CL 98   BUN 31*   CREATININE 1.0   CALCIUM 9.6   PROT 7.4   AST 48*   ALT 35   ALKPHOS 102   BILITOT 1.6*     Recent Labs   Lab 01/31/19  0430   WBC 7.23   HGB 10.1*   HCT 32.3*          Significant Imaging: I have reviewed all pertinent imaging results/findings within the past 24 hours.    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  Now c sepsis likely from aspiration RLL PNA  S/p IR drain of abdominal fluid collection  1/24/18 - Laparotomy, ASHLEIGH, enterotomy repair, EGD, GastroJ, Retroperitoneal peripancreatic abscess open drainage, Omental flap creation, Lori gastrostomy c 24 Fr [place]ot     Pain control prn  monitor UOP, trend Cr, try to avoid nephrotoxics - currently Cr wnl, UOP adequate  cont TPN/NPO, PPI --> Lytes replacing prn  Abd culture grew Kluyvera ascorbata on abx - amphotericin irrigation of accordion drain, amphotericin IV, linezolid, merrem  TF held for GI bleed potentially and also for potential leak  No xfuse in 3 days - now stable ~24 hr - will continue to monitor - cont blood tinged G tube output, on BID protonix  Cont to hold lovenox/toradol  Extubated  Dispo - stepped down to floor, cont inpt care        Braulio Negrete MD

## 2019-02-01 NOTE — PROGRESS NOTES
Reordered Clinimix 5/15 and fat Emulsion 20%, per protocol. MVI and Trace Elements added to TPN. Phos level low. MD Onofre notified and pharmacy recommended giving a supplemental dose of Sodium Phos IVPB.      Debra Escalante, PharmD  [phone]

## 2019-02-01 NOTE — PLAN OF CARE
TN met with pt and wife Sneha Garcia     1st day post ICU    discussed possible d/c options with pt and  wife (LTAC vs home with HH)   d/c disp pending progress with PT/OT       s/p duodenal carcinoid resection with duo-duo recon  Now c sepsis likely from aspiration RLL PNA  S/p IR drain of abdominal fluid collection  1/24/18 - Laparotomy, ASHLEIGH, enterotomy repair, EGD, GastroJ, Retroperitoneal peripancreatic abscess open drainage, Omental flap creation, Lori gastrostomy c 24 Fr [place]ot               02/01/19 1634   Discharge Reassessment   Assessment Type Discharge Planning Reassessment   Provided patient/caregiver education on the expected discharge date and the discharge plan Yes   Do you have any problems affording any of your prescribed medications? TBD   Discharge Plan A Home;Home with family;Home Health   Discharge Plan B Long-term acute care facility (LTAC)   DME Needed Upon Discharge  (TBD  )   Anticipated Discharge Disposition Home   Can the patient answer the patient profile reliably? Yes, cognitively intact

## 2019-02-02 LAB
ALBUMIN SERPL BCP-MCNC: 3.2 G/DL
ALP SERPL-CCNC: 115 U/L
ALT SERPL W/O P-5'-P-CCNC: 50 U/L
ANION GAP SERPL CALC-SCNC: 10 MMOL/L
APTT BLDCRRT: 29.4 SEC
AST SERPL-CCNC: 79 U/L
BASOPHILS # BLD AUTO: 0.04 K/UL
BASOPHILS NFR BLD: 0.5 %
BILIRUB SERPL-MCNC: 1.8 MG/DL
BUN SERPL-MCNC: 34 MG/DL
CALCIUM SERPL-MCNC: 9.4 MG/DL
CHLORIDE SERPL-SCNC: 99 MMOL/L
CO2 SERPL-SCNC: 31 MMOL/L
CREAT SERPL-MCNC: 1.1 MG/DL
DIFFERENTIAL METHOD: ABNORMAL
EOSINOPHIL # BLD AUTO: 0.1 K/UL
EOSINOPHIL NFR BLD: 1.5 %
ERYTHROCYTE [DISTWIDTH] IN BLOOD BY AUTOMATED COUNT: 15.6 %
EST. GFR  (AFRICAN AMERICAN): >60 ML/MIN/1.73 M^2
EST. GFR  (NON AFRICAN AMERICAN): >60 ML/MIN/1.73 M^2
GLUCOSE SERPL-MCNC: 93 MG/DL
HCT VFR BLD AUTO: 34.5 %
HGB BLD-MCNC: 10.8 G/DL
INR PPP: 1.2
LYMPHOCYTES # BLD AUTO: 1.7 K/UL
LYMPHOCYTES NFR BLD: 23.2 %
MAGNESIUM SERPL-MCNC: 2 MG/DL
MCH RBC QN AUTO: 29.7 PG
MCHC RBC AUTO-ENTMCNC: 31.3 G/DL
MCV RBC AUTO: 95 FL
MONOCYTES # BLD AUTO: 0.5 K/UL
MONOCYTES NFR BLD: 7.2 %
NEUTROPHILS # BLD AUTO: 4.9 K/UL
NEUTROPHILS NFR BLD: 67.2 %
PHOSPHATE SERPL-MCNC: 2.9 MG/DL
PLATELET # BLD AUTO: 184 K/UL
PMV BLD AUTO: 10.5 FL
POCT GLUCOSE: 115 MG/DL (ref 70–110)
POCT GLUCOSE: 98 MG/DL (ref 70–110)
POTASSIUM SERPL-SCNC: 3.8 MMOL/L
PROT SERPL-MCNC: 7.1 G/DL
PROTHROMBIN TIME: 12 SEC
RBC # BLD AUTO: 3.64 M/UL
SODIUM SERPL-SCNC: 140 MMOL/L
WBC # BLD AUTO: 7.33 K/UL

## 2019-02-02 PROCEDURE — 25000003 PHARM REV CODE 250: Performed by: STUDENT IN AN ORGANIZED HEALTH CARE EDUCATION/TRAINING PROGRAM

## 2019-02-02 PROCEDURE — 63600175 PHARM REV CODE 636 W HCPCS: Performed by: SURGERY

## 2019-02-02 PROCEDURE — 94761 N-INVAS EAR/PLS OXIMETRY MLT: CPT

## 2019-02-02 PROCEDURE — 27000221 HC OXYGEN, UP TO 24 HOURS

## 2019-02-02 PROCEDURE — C9113 INJ PANTOPRAZOLE SODIUM, VIA: HCPCS | Performed by: STUDENT IN AN ORGANIZED HEALTH CARE EDUCATION/TRAINING PROGRAM

## 2019-02-02 PROCEDURE — 94664 DEMO&/EVAL PT USE INHALER: CPT

## 2019-02-02 PROCEDURE — 25000003 PHARM REV CODE 250: Performed by: SURGERY

## 2019-02-02 PROCEDURE — 63600175 PHARM REV CODE 636 W HCPCS: Mod: JG | Performed by: STUDENT IN AN ORGANIZED HEALTH CARE EDUCATION/TRAINING PROGRAM

## 2019-02-02 PROCEDURE — 97530 THERAPEUTIC ACTIVITIES: CPT

## 2019-02-02 PROCEDURE — 80053 COMPREHEN METABOLIC PANEL: CPT

## 2019-02-02 PROCEDURE — 94660 CPAP INITIATION&MGMT: CPT

## 2019-02-02 PROCEDURE — 85025 COMPLETE CBC W/AUTO DIFF WBC: CPT

## 2019-02-02 PROCEDURE — 25000003 PHARM REV CODE 250: Performed by: ANESTHESIOLOGY

## 2019-02-02 PROCEDURE — 99900035 HC TECH TIME PER 15 MIN (STAT)

## 2019-02-02 PROCEDURE — 85730 THROMBOPLASTIN TIME PARTIAL: CPT

## 2019-02-02 PROCEDURE — 63600175 PHARM REV CODE 636 W HCPCS: Performed by: STUDENT IN AN ORGANIZED HEALTH CARE EDUCATION/TRAINING PROGRAM

## 2019-02-02 PROCEDURE — 85610 PROTHROMBIN TIME: CPT

## 2019-02-02 PROCEDURE — 11000001 HC ACUTE MED/SURG PRIVATE ROOM

## 2019-02-02 PROCEDURE — S0171 BUMETANIDE 0.5 MG: HCPCS | Performed by: SURGERY

## 2019-02-02 PROCEDURE — 63600175 PHARM REV CODE 636 W HCPCS: Performed by: INTERNAL MEDICINE

## 2019-02-02 PROCEDURE — 94799 UNLISTED PULMONARY SVC/PX: CPT

## 2019-02-02 PROCEDURE — 84100 ASSAY OF PHOSPHORUS: CPT

## 2019-02-02 PROCEDURE — 97110 THERAPEUTIC EXERCISES: CPT

## 2019-02-02 PROCEDURE — 83735 ASSAY OF MAGNESIUM: CPT

## 2019-02-02 RX ORDER — OXYCODONE HYDROCHLORIDE 5 MG/1
5 TABLET ORAL
Status: DISCONTINUED | OUTPATIENT
Start: 2019-02-02 | End: 2019-02-02

## 2019-02-02 RX ORDER — ONDANSETRON 2 MG/ML
4 INJECTION INTRAMUSCULAR; INTRAVENOUS DAILY PRN
Status: DISCONTINUED | OUTPATIENT
Start: 2019-02-02 | End: 2019-02-02

## 2019-02-02 RX ORDER — HYDROMORPHONE HYDROCHLORIDE 2 MG/ML
0.5 INJECTION, SOLUTION INTRAMUSCULAR; INTRAVENOUS; SUBCUTANEOUS EVERY 5 MIN PRN
Status: DISCONTINUED | OUTPATIENT
Start: 2019-02-02 | End: 2019-02-02

## 2019-02-02 RX ORDER — ONDANSETRON 2 MG/ML
8 INJECTION INTRAMUSCULAR; INTRAVENOUS ONCE
Status: COMPLETED | OUTPATIENT
Start: 2019-02-02 | End: 2019-02-02

## 2019-02-02 RX ADMIN — PANTOPRAZOLE SODIUM 40 MG: 40 INJECTION, POWDER, LYOPHILIZED, FOR SOLUTION INTRAVENOUS at 10:02

## 2019-02-02 RX ADMIN — CHLORHEXIDINE GLUCONATE 15 ML: 1.2 RINSE ORAL at 08:02

## 2019-02-02 RX ADMIN — LINEZOLID 600 MG: 600 INJECTION, SOLUTION INTRAVENOUS at 08:02

## 2019-02-02 RX ADMIN — BUMETANIDE 1 MG: 0.25 INJECTION INTRAMUSCULAR; INTRAVENOUS at 08:02

## 2019-02-02 RX ADMIN — AMPHOTERICIN B 300 MG: 50 INJECTION, POWDER, LYOPHILIZED, FOR SOLUTION INTRAVENOUS at 01:02

## 2019-02-02 RX ADMIN — CHLORHEXIDINE GLUCONATE 15 ML: 1.2 RINSE ORAL at 10:02

## 2019-02-02 RX ADMIN — PANTOPRAZOLE SODIUM 40 MG: 40 INJECTION, POWDER, LYOPHILIZED, FOR SOLUTION INTRAVENOUS at 08:02

## 2019-02-02 RX ADMIN — CYANOCOBALAMIN 1000 MCG: 1000 INJECTION, SOLUTION INTRAMUSCULAR at 08:02

## 2019-02-02 RX ADMIN — MEROPENEM 2 G: 1 INJECTION, POWDER, FOR SOLUTION INTRAVENOUS at 06:02

## 2019-02-02 RX ADMIN — ASCORBIC ACID, VITAMIN A PALMITATE, CHOLECALCIFEROL, THIAMINE HYDROCHLORIDE, RIBOFLAVIN-5 PHOSPHATE SODIUM, PYRIDOXINE HYDROCHLORIDE, NIACINAMIDE, DEXPANTHENOL, ALPHA-TOCOPHEROL ACETATE, VITAMIN K1, FOLIC ACID, BIOTIN, CYANOCOBALAMIN: 200; 3300; 200; 6; 3.6; 6; 40; 15; 10; 150; 600; 60; 5 INJECTION, SOLUTION INTRAVENOUS at 10:02

## 2019-02-02 RX ADMIN — MEROPENEM 2 G: 1 INJECTION, POWDER, FOR SOLUTION INTRAVENOUS at 03:02

## 2019-02-02 RX ADMIN — MEROPENEM 2 G: 1 INJECTION, POWDER, FOR SOLUTION INTRAVENOUS at 11:02

## 2019-02-02 RX ADMIN — ONDANSETRON 8 MG: 2 INJECTION INTRAMUSCULAR; INTRAVENOUS at 10:02

## 2019-02-02 RX ADMIN — LINEZOLID 600 MG: 600 INJECTION, SOLUTION INTRAVENOUS at 10:02

## 2019-02-02 NOTE — PLAN OF CARE
Progress notes reviewed. Morning round completed. Introduced self as VN for this shift. Educated pt on VN's role in pt care. Educated pt about VTE and fall precautions.  Opportunity given for pt's questions. No questions or concerns expressed at this time. Floor nurse at the bedside.

## 2019-02-02 NOTE — PT/OT/SLP PROGRESS
Occupational Therapy   Treatment    Name: Hoang Santos Jr.  MRN: [id number]  Admitting Diagnosis:  Aspiration pneumonia of right lower lobe  3 Days Post-Op    Recommendations:     Discharge Recommendations: (TBD)  Discharge Equipment Recommendations:  (TBD)  Barriers to discharge:  None    Assessment:     Hoang Santos Jr. is a 68 y.o. male with a medical diagnosis of Aspiration pneumonia of right lower lobe.  He presents with continued progress toward goals. C/o tongue feeling swollen and nurse notified.  Continue with OT POC.. Performance deficits affecting function are impaired self care skills, weakness, impaired balance, decreased safety awareness, gait instability, impaired functional mobilty, impaired endurance, decreased lower extremity function, decreased upper extremity function, impaired cardiopulmonary response to activity.     Rehab Prognosis:  Good; patient would benefit from acute skilled OT services to address these deficits and reach maximum level of function.       Plan:     Patient to be seen 5 x/week to address the above listed problems via self-care/home management, therapeutic exercises, therapeutic activities  · Plan of Care Expires: 02/27/19  · Plan of Care Reviewed with: patient, spouse    Subjective     Pain/Comfort:  · Pain Rating 1: 0/10    Objective:     Communicated with: nurse prior to session.  Patient found up in chair with telemetry, oxygen, JAY drain, central line(multiple drains and wall suction) upon OT entry to room.    General Precautions: Standard, NPO, fall, respiratory, aspiration   Orthopedic Precautions:N/A   Braces: N/A     Occupational Performance:     Functional Mobility/Transfers:  · Patient completed Sit <> Stand Transfer with contact guard assistance  with  rolling walker   · Patient completed Toilet Transfer Step Transfer technique with contact guard assistance with  rolling walker and bedside commode      Activities of Daily Living:  · Grooming: contact guard  assistance standing at sink with CGA for hand hygiene   · Toileting: minimum assistance clothes management      AMPAC 6 Click ADL: 14    Treatment & Education:  Role of OT and POC  Fx ambulation training with CGA using  RW to BSC , sink and BS chair next to bed with no LOB but slightly unsteady and multiple line /drains management.    Patient left up in chair with all lines intact, call button in reach, nurse notified and wife presentEducation:      GOALS:   Multidisciplinary Problems     Occupational Therapy Goals        Problem: Occupational Therapy Goal    Goal Priority Disciplines Outcome Interventions   Occupational Therapy Goal     OT, PT/OT Ongoing (interventions implemented as appropriate)    Description:  Goals to be met by: 02/19/2019    Patient will increase functional independence with ADLs by performing:    UE Dressing with Modified [place].  LE Dressing with Modified [place].  Grooming while standing with Modified [place].  Toileting from toilet with Modified [place] for hygiene and clothing management.   Toilet transfer to toilet with Modified [place].  Increased functional strength to WFL for self care.  Upper extremity exercise program x10 reps per handout, with independence.                      Time Tracking:     OT Date of Treatment: 02/01/19  OT Start Time: 1243  OT Stop Time: 1307  OT Total Time (min): 24 min    Billable Minutes:Self Care/Home Management 12  Therapeutic Activity 12  Total Time 24    Edith Darling OT  2/1/2019

## 2019-02-02 NOTE — PLAN OF CARE
Problem: Adult Inpatient Plan of Care  Goal: Plan of Care Review  Outcome: Ongoing (interventions implemented as appropriate)  Pt is AAOx3. No complaints of nausea, vomiting, or diarrhea. No complaints of pain. NPO due to unable to swallow. Suction at the bedside. TPN maintained. On 2L NC 02 and Bipap at night. G tube connected to low continuous wall suctions and draining dark red fluid. Biliary drain is draining dark green fluid. Drain connected to the beach is draining milky fluid. Turned from side to side in bed throughout the night. Safety precautions maintained. Tolerated all medications well.

## 2019-02-02 NOTE — PROGRESS NOTES
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 Patient tolerating CLD with no issues. Ambulating and out of bed to chair. Pain controlled.    Scheduled Meds:   conventional amphotericin B (FUNGIZONE) continuous irrigation  50 mg Irrigation Q12H    amphotericin B liposome (AMBISOME) IVPB  3 mg/kg (Dosing Weight) Intravenous Q24H    bumetanide  1 mg Intravenous Daily    chlorhexidine  15 mL Mouth/Throat BID    cyanocobalamin  1,000 mcg Subcutaneous Daily    linezolid 600mg/300ml  600 mg Intravenous Q12H    meropenem (MERREM) IVPB  2 g Intravenous Q8H    pantoprazole  40 mg Intravenous BID     Continuous Infusions:   Amino acid 5% - dextrose 15% (CLINIMIX-E) solution with additives (1L  provides 510 kcal/L dextrose, with 50 gm AA, 150 gm CHO, Na 35, K 30, Mg 5, Ca 4.5, Acetate 80, Cl 39, Phos 15) 50 mL/hr at 02/01/19 2220    Amino acid 5% - dextrose 15% (CLINIMIX-E) solution with additives (1L  provides 510 kcal/L dextrose, with 50 gm AA, 150 gm CHO, Na 35, K 30, Mg 5, Ca 4.5, Acetate 80, Cl 39, Phos 15)       PRN Meds:.sodium chloride, sodium chloride, sodium chloride, dextrose 50%, dextrose 50%, glucose, glucose, HYDROmorphone, HYDROmorphone, insulin aspart U-100, naloxone, ondansetron, promethazine (PHENERGAN) IVPB, sodium chloride 0.9%    Objective:      Temp:  [97.5 °F (36.4 °C)-99 °F (37.2 °C)] 99 °F (37.2 °C)  Pulse:  [] 93  Resp:   [18-20] 18  SpO2:  [94 %-97 %] 97 %  BP: (102-121)/(56-65) 102/58  Weight change: -0.3 kg (-10.6 oz)    Intake/Output Summary (Last 24 hours) at 2/2/2019 1304  Last data filed at 2/2/2019 0700  Gross per 24 hour   Intake 2538.34 ml   Output 1162 ml   Net 1376.34 ml       Drains R: 80, B: 322, Gastrostomy: 460 but 0 overnight    Physical Exam:  GEN: awake, alert, NAD  CV: RRR  PULM: normal work of breathing, on NC  ABD: S/NT/ND, incision c/d/i, tubes in good position  EXT: Peripheral pulses present and equal bilaterally    Recent Labs   Lab 02/02/19  0445      K 3.8   CO2 31*   CL 99   BUN 34*   CREATININE 1.1   CALCIUM 9.4   PROT 7.1   AST 79*   ALT 50*   ALKPHOS 115   BILITOT 1.8*     Recent Labs   Lab 02/02/19  0445   WBC 7.33   HGB 10.8*   HCT 34.5*          Significant Imaging: I have reviewed all pertinent imaging results/findings within the past 24 hours.    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 Will instill 30cc of saline into GT and clamp and then unclamp every 6 hours.  No xfuse in 3 days - now stable ~24 hr - will continue to monitor - cont blood tinged G tube output, on BID protonix  Cont to hold  lovenox/toradol may restart tomorrow possibly  OOB to chair  Encourage ambulation  Encourage IS and aggressive respiratory toilet  Dispo - stepped down to floor, cont inpt care        Jakob Adhikari MD    II  [phone]

## 2019-02-02 NOTE — PLAN OF CARE
Problem: Physical Therapy Goal  Goal: Physical Therapy Goal  Goals to be met by: 2019  Patient will increase functional independence with mobility by performin. Supine to sit with Modified [place]  2. Sit to stand transfer with Modified [place]  3. Gait  x 150 feet with Modified [place] using Rolling Walker.   4. Lower extremity exercise program x10 reps per handout, with independence      Outcome: Ongoing (interventions implemented as appropriate)  Pt able to complete standing activities and tolerated bedside chair sitting. Progress towards established PT goals.

## 2019-02-02 NOTE — PT/OT/SLP PROGRESS
Physical Therapy Treatment    Patient Name:  Hoang Santos Jr.   MRN:  [id number]    Recommendations:     Discharge Recommendations:  (ongoing assessment)   Discharge Equipment Recommendations: (TBD)   Barriers to discharge: Decreased caregiver support    Assessment:     Hoang Santos Jr. is a 68 y.o. male admitted with a medical diagnosis of Aspiration pneumonia of right lower lobe.  He presents with the following impairments/functional limitations:  weakness, impaired endurance, impaired self care skills, impaired functional mobilty, decreased coordination, pain, decreased safety awareness, impaired muscle length, impaired fine motor, impaired joint extensibility, impaired coordination, decreased lower extremity function, impaired balance, gait instability, decreased upper extremity function, decreased ROM. Pt able to perform standing activities today in RW, ambulation simulations and weight bearing activities in standing. Pt also able to tolerate sitting in bedside chair today w/ Nursing approval.     Rehab Prognosis: Good; patient would benefit from acute skilled PT services to address these deficits and reach maximum level of function.    Recent Surgery: Procedure(s) (LRB):  CHOLANGIOGRAM, PERCUTANEOUS, TRANSHEPATIC (Right) 4 Days Post-Op    Plan:     During this hospitalization, patient to be seen 6 x/week to address the identified rehab impairments via gait training, therapeutic exercises, therapeutic activities and progress toward the following goals:    · Plan of Care Expires:  02/17/19    Subjective     Chief Complaint: decreased mobility  Patient/Family Comments/goals: to improve mobility  Pain/Comfort:  · Pain Rating 1: 4/10  · Location - Side 1: Bilateral  · Location - Orientation 1: anterior  · Location 1: chest  · Pain Addressed 1: Distraction, Reposition      Objective:     Communicated with NSG (Radha) prior to session.  Patient found all lines intact, call button in reach and NSG notified  telemetry, SCD, oxygen, peripheral IV, JAY drain  upon PT entry to room.     General Precautions: Standard, NPO, fall   Orthopedic Precautions:N/A   Braces:       Functional Mobility:  · Bed Mobility:     · Rolling Right: minimum assistance  · Transfers:     · Sit to Stand:  minimum assistance with rolling walker      AM-PAC 6 CLICK MOBILITY  Turning over in bed (including adjusting bedclothes, sheets and blankets)?: 3  Sitting down on and standing up from a chair with arms (e.g., wheelchair, bedside commode, etc.): 3  Moving from lying on back to sitting on the side of the bed?: 3  Moving to and from a bed to a chair (including a wheelchair)?: 3  Need to walk in hospital room?: 3  Climbing 3-5 steps with a railing?: 3  Basic Mobility Total Score: 18       Therapeutic Activities and Exercises:   Standing 2 trials of: heel raises x 20, marching in RW 1 min durations, mini squats x10, standing balance tolerance FWB B LE's, reviewed postural awareness.  Pt then positioned in bedside chair with all lines intact and needs in reach.     Patient left up in chair with all lines intact, call button in reach and NSG notified..    GOALS:   Multidisciplinary Problems     Physical Therapy Goals        Problem: Physical Therapy Goal    Goal Priority Disciplines Outcome Goal Variances Interventions   Physical Therapy Goal     PT, PT/OT Ongoing (interventions implemented as appropriate)     Description:  Goals to be met by: 2019  Patient will increase functional independence with mobility by performin. Supine to sit with Modified [place]  2. Sit to stand transfer with Modified [place]  3. Gait  x 150 feet with Modified [place] using Rolling Walker.   4. Lower extremity exercise program x10 reps per handout, with independence                       Time Tracking:     PT Received On: 19  PT Start Time: 0850     PT Stop Time: 0945  PT Total Time (min): 55 min     Billable Minutes: Therapeutic Activity 25  and Therapeutic Exercise 30    Treatment Type: Treatment  PT/PTA: PTA     PTA Visit Number: 2     Micah Cui, PTA  02/02/2019

## 2019-02-02 NOTE — PLAN OF CARE
Problem: Occupational Therapy Goal  Goal: Occupational Therapy Goal  Goals to be met by: 02/19/2019    Patient will increase functional independence with ADLs by performing:    UE Dressing with Modified [place].  LE Dressing with Modified [place].  Grooming while standing with Modified [place].  Toileting from toilet with Modified [place] for hygiene and clothing management.   Toilet transfer to toilet with Modified [place].  Increased functional strength to WFL for self care.  Upper extremity exercise program x10 reps per handout, with independence.     Outcome: Ongoing (interventions implemented as appropriate)  Progressing toward goals.  Continue with OT POC.

## 2019-02-02 NOTE — PLAN OF CARE
Problem: Adult Inpatient Plan of Care  Goal: Plan of Care Review  Outcome: Ongoing (interventions implemented as appropriate)  Pt remains unable to swallow secretions and uses oral suction PRN. All drains patent and draining. . Using urinal, at times incontinent. Sat up in chair juliette 3 hours. Works with PT/OT. No complaints of pain.TPN infusing as ordered.K Phos rider infusing

## 2019-02-03 LAB
ALBUMIN SERPL BCP-MCNC: 3.1 G/DL
ALP SERPL-CCNC: 116 U/L
ALT SERPL W/O P-5'-P-CCNC: 56 U/L
ANION GAP SERPL CALC-SCNC: 9 MMOL/L
ANISOCYTOSIS BLD QL SMEAR: SLIGHT
APTT BLDCRRT: 29.3 SEC
AST SERPL-CCNC: 83 U/L
BASOPHILS # BLD AUTO: ABNORMAL K/UL
BASOPHILS NFR BLD: 0 %
BILIRUB SERPL-MCNC: 1.8 MG/DL
BUN SERPL-MCNC: 32 MG/DL
CALCIUM SERPL-MCNC: 9.5 MG/DL
CHLORIDE SERPL-SCNC: 98 MMOL/L
CO2 SERPL-SCNC: 33 MMOL/L
CREAT SERPL-MCNC: 1.1 MG/DL
DIFFERENTIAL METHOD: ABNORMAL
EOSINOPHIL # BLD AUTO: ABNORMAL K/UL
EOSINOPHIL NFR BLD: 2 %
ERYTHROCYTE [DISTWIDTH] IN BLOOD BY AUTOMATED COUNT: 15.5 %
EST. GFR  (AFRICAN AMERICAN): >60 ML/MIN/1.73 M^2
EST. GFR  (NON AFRICAN AMERICAN): >60 ML/MIN/1.73 M^2
GLUCOSE SERPL-MCNC: 101 MG/DL
HCT VFR BLD AUTO: 34.1 %
HGB BLD-MCNC: 10.7 G/DL
HYPOCHROMIA BLD QL SMEAR: ABNORMAL
INR PPP: 1.1
LYMPHOCYTES # BLD AUTO: ABNORMAL K/UL
LYMPHOCYTES NFR BLD: 10 %
MAGNESIUM SERPL-MCNC: 2 MG/DL
MCH RBC QN AUTO: 29.9 PG
MCHC RBC AUTO-ENTMCNC: 31.4 G/DL
MCV RBC AUTO: 95 FL
MONOCYTES # BLD AUTO: ABNORMAL K/UL
MONOCYTES NFR BLD: 11 %
NEUTROPHILS NFR BLD: 74 %
NEUTS BAND NFR BLD MANUAL: 3 %
OVALOCYTES BLD QL SMEAR: ABNORMAL
PHOSPHATE SERPL-MCNC: 3.1 MG/DL
PLATELET # BLD AUTO: 194 K/UL
PLATELET BLD QL SMEAR: ABNORMAL
PMV BLD AUTO: 10.5 FL
POIKILOCYTOSIS BLD QL SMEAR: SLIGHT
POTASSIUM SERPL-SCNC: 3.7 MMOL/L
PROT SERPL-MCNC: 7.2 G/DL
PROTHROMBIN TIME: 11.7 SEC
RBC # BLD AUTO: 3.58 M/UL
SODIUM SERPL-SCNC: 140 MMOL/L
STOMATOCYTES BLD QL SMEAR: PRESENT
WBC # BLD AUTO: 7.57 K/UL

## 2019-02-03 PROCEDURE — 84100 ASSAY OF PHOSPHORUS: CPT

## 2019-02-03 PROCEDURE — 63600175 PHARM REV CODE 636 W HCPCS: Performed by: SURGERY

## 2019-02-03 PROCEDURE — 25000003 PHARM REV CODE 250: Performed by: ANESTHESIOLOGY

## 2019-02-03 PROCEDURE — 63600175 PHARM REV CODE 636 W HCPCS: Mod: JG | Performed by: STUDENT IN AN ORGANIZED HEALTH CARE EDUCATION/TRAINING PROGRAM

## 2019-02-03 PROCEDURE — 63600175 PHARM REV CODE 636 W HCPCS: Performed by: STUDENT IN AN ORGANIZED HEALTH CARE EDUCATION/TRAINING PROGRAM

## 2019-02-03 PROCEDURE — 94664 DEMO&/EVAL PT USE INHALER: CPT

## 2019-02-03 PROCEDURE — 85610 PROTHROMBIN TIME: CPT

## 2019-02-03 PROCEDURE — C9113 INJ PANTOPRAZOLE SODIUM, VIA: HCPCS | Performed by: STUDENT IN AN ORGANIZED HEALTH CARE EDUCATION/TRAINING PROGRAM

## 2019-02-03 PROCEDURE — 25000003 PHARM REV CODE 250: Performed by: SURGERY

## 2019-02-03 PROCEDURE — 85730 THROMBOPLASTIN TIME PARTIAL: CPT

## 2019-02-03 PROCEDURE — 94761 N-INVAS EAR/PLS OXIMETRY MLT: CPT

## 2019-02-03 PROCEDURE — 99900035 HC TECH TIME PER 15 MIN (STAT)

## 2019-02-03 PROCEDURE — 85007 BL SMEAR W/DIFF WBC COUNT: CPT

## 2019-02-03 PROCEDURE — 85027 COMPLETE CBC AUTOMATED: CPT

## 2019-02-03 PROCEDURE — 25000003 PHARM REV CODE 250: Performed by: STUDENT IN AN ORGANIZED HEALTH CARE EDUCATION/TRAINING PROGRAM

## 2019-02-03 PROCEDURE — 83735 ASSAY OF MAGNESIUM: CPT

## 2019-02-03 PROCEDURE — 27000221 HC OXYGEN, UP TO 24 HOURS

## 2019-02-03 PROCEDURE — 94799 UNLISTED PULMONARY SVC/PX: CPT

## 2019-02-03 PROCEDURE — 11000001 HC ACUTE MED/SURG PRIVATE ROOM

## 2019-02-03 PROCEDURE — S0171 BUMETANIDE 0.5 MG: HCPCS | Performed by: SURGERY

## 2019-02-03 PROCEDURE — 80053 COMPREHEN METABOLIC PANEL: CPT

## 2019-02-03 RX ORDER — ENOXAPARIN SODIUM 100 MG/ML
40 INJECTION SUBCUTANEOUS EVERY 24 HOURS
Status: DISCONTINUED | OUTPATIENT
Start: 2019-02-03 | End: 2019-02-06

## 2019-02-03 RX ADMIN — CYANOCOBALAMIN 1000 MCG: 1000 INJECTION, SOLUTION INTRAMUSCULAR at 12:02

## 2019-02-03 RX ADMIN — PANTOPRAZOLE SODIUM 40 MG: 40 INJECTION, POWDER, LYOPHILIZED, FOR SOLUTION INTRAVENOUS at 09:02

## 2019-02-03 RX ADMIN — ENOXAPARIN SODIUM 40 MG: 100 INJECTION SUBCUTANEOUS at 04:02

## 2019-02-03 RX ADMIN — MEROPENEM 2 G: 1 INJECTION, POWDER, FOR SOLUTION INTRAVENOUS at 12:02

## 2019-02-03 RX ADMIN — AMPHOTERICIN B 300 MG: 50 INJECTION, POWDER, LYOPHILIZED, FOR SOLUTION INTRAVENOUS at 01:02

## 2019-02-03 RX ADMIN — ASCORBIC ACID, VITAMIN A PALMITATE, CHOLECALCIFEROL, THIAMINE HYDROCHLORIDE, RIBOFLAVIN-5 PHOSPHATE SODIUM, PYRIDOXINE HYDROCHLORIDE, NIACINAMIDE, DEXPANTHENOL, ALPHA-TOCOPHEROL ACETATE, VITAMIN K1, FOLIC ACID, BIOTIN, CYANOCOBALAMIN: 200; 3300; 200; 6; 3.6; 6; 40; 15; 10; 150; 600; 60; 5 INJECTION, SOLUTION INTRAVENOUS at 11:02

## 2019-02-03 RX ADMIN — PANTOPRAZOLE SODIUM 40 MG: 40 INJECTION, POWDER, LYOPHILIZED, FOR SOLUTION INTRAVENOUS at 12:02

## 2019-02-03 RX ADMIN — CHLORHEXIDINE GLUCONATE 15 ML: 1.2 RINSE ORAL at 12:02

## 2019-02-03 RX ADMIN — CHLORHEXIDINE GLUCONATE 15 ML: 1.2 RINSE ORAL at 09:02

## 2019-02-03 RX ADMIN — MEROPENEM 2 G: 1 INJECTION, POWDER, FOR SOLUTION INTRAVENOUS at 09:02

## 2019-02-03 RX ADMIN — BUMETANIDE 1 MG: 0.25 INJECTION INTRAMUSCULAR; INTRAVENOUS at 12:02

## 2019-02-03 NOTE — PLAN OF CARE
Progress notes reviewed. Morning round completed. Introduced self as VN for this shift. Educated pt on VN's role in pt care. Educated pt about VTE and fall precautions.  Opportunity given for pt's questions. No questions or concerns expressed at this time.

## 2019-02-03 NOTE — PROGRESS NOTES
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 Patient tolerating CLD with no issues. Ambulating and out of bed to chair. Pain controlled.    Scheduled Meds:   amphotericin B liposome (AMBISOME) IVPB  3 mg/kg (Dosing Weight) Intravenous Q24H    bumetanide  1 mg Intravenous Daily    chlorhexidine  15 mL Mouth/Throat BID    cyanocobalamin  1,000 mcg Subcutaneous Daily    meropenem (MERREM) IVPB  2 g Intravenous Q8H    pantoprazole  40 mg Intravenous BID     Continuous Infusions:   Amino acid 5% - dextrose 15% (CLINIMIX-E) solution with additives (1L  provides 510 kcal/L dextrose, with 50 gm AA, 150 gm CHO, Na 35, K 30, Mg 5, Ca 4.5, Acetate 80, Cl 39, Phos 15) 50 mL/hr at 02/02/19 2218     PRN Meds:.sodium chloride, dextrose 50%, dextrose 50%, glucose, glucose, HYDROmorphone, insulin aspart U-100, naloxone, ondansetron, promethazine (PHENERGAN) IVPB, sodium chloride 0.9%    Objective:      Temp:  [98.2 °F (36.8 °C)-98.9 °F (37.2 °C)] 98.4 °F (36.9 °C)  Pulse:  [] 104  Resp:  [18-20] 20  SpO2:  [93 %-98 %] 98 %  BP: (100-111)/(56-64) 100/59  Weight change: -3.3 kg (-4.4 oz)    Intake/Output Summary (Last 24 hours) at 2/3/2019 1019  Last data filed at 2/3/2019 0720  Gross per 24 hour   Intake 2641.67 ml   Output 2545 ml   Net 96.67 ml     UOP 1650  Drains R: 200, B: 330, Gastrostomy: 190    Physical Exam:  GEN: awake, alert, NAD  CV: RRR  PULM:  normal work of breathing, on NC  ABD: S/NT/ND, incision c/d/i, tubes in good position  EXT: Peripheral pulses present and equal bilaterally    Recent Labs   Lab 02/03/19  0539      K 3.7   CO2 33*   CL 98   BUN 32*   CREATININE 1.1   CALCIUM 9.5   PROT 7.2   AST 83*   ALT 56*   ALKPHOS 116   BILITOT 1.8*     Recent Labs   Lab 02/03/19  0539   WBC 7.57   HGB 10.7*   HCT 34.1*          Significant Imaging: I have reviewed all pertinent imaging results/findings within the past 24 hours.    Micro: Yeast Candida Albicans and Kluyvera ascorbata (sensitive to merrem, cipro, rocephin, cefepime, resistent to zosyn)  Resp Cx - many yeast, few GPC/GPR/GNR    Chest X-ray: Unchanged and stable.     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  Now c sepsis likely from aspiration RLL PNA  S/p IR drain of abdominal fluid collection  1/24/18 - Laparotomy, ASHLEIGH, enterotomy repair, EGD, GastroJ, Retroperitoneal peripancreatic abscess open drainage, Omental flap creation, Lori gastrostomy c 24 Fr [place]     Pain control prn  monitor UOP, trend Cr, try to avoid nephrotoxics - currently Cr wnl, UOP adequate  cont TPN/NPO, PPI --> Lytes replacing prn  Clamp Drain #1 biliary drain and flush with 20 cc of NS BID  Abd culture grew Kluyvera ascorbata on abx - BID Dacon solution irrigation (10cc) of RUQ drain #2 , Continue amphotericin IV, merrem IV  Tf held. Will instill 30cc of saline into GT (Drain #3) and clamp and check residuals every 6 hours  Continue to monitor H/H, on BID protonix  OOB to chair  Restart lovenox  Encourage ambulation  Encourage IS and aggressive respiratory toilet  Dispo - stepped down to floor, cont inpt care, plan on tube study tomorrow 2/4      Jakob Adhikari MD    II  [phone]

## 2019-02-03 NOTE — PLAN OF CARE
Problem: Adult Inpatient Plan of Care  Goal: Plan of Care Review  Outcome: Ongoing (interventions implemented as appropriate)  Pts safety maintained, meds given per MAR, TPN infusing. Central line precautions continues. GJ tube draining bloody fluid, flushed with 30 mLs of water and clamped. Bilary drain , and abscess drains CDI. Dressing on lower abdomen removed, as its healing, no discharge, incision CDI. Suction at bedside. No N/V, SOB, complains of pain. Vitals stable through the night.

## 2019-02-04 LAB
ALBUMIN SERPL BCP-MCNC: 3 G/DL
ALP SERPL-CCNC: 126 U/L
ALT SERPL W/O P-5'-P-CCNC: 58 U/L
AMYLASE, BODY FLUID: NORMAL U/L
ANION GAP SERPL CALC-SCNC: 9 MMOL/L
APTT BLDCRRT: 29.5 SEC
AST SERPL-CCNC: 90 U/L
BASOPHILS # BLD AUTO: 0.03 K/UL
BASOPHILS NFR BLD: 0.4 %
BILIRUB SERPL-MCNC: 1.8 MG/DL
BODY FLUID SOURCE AMYLASE: NORMAL
BUN SERPL-MCNC: 33 MG/DL
CALCIUM SERPL-MCNC: 9.7 MG/DL
CHLORIDE SERPL-SCNC: 98 MMOL/L
CO2 SERPL-SCNC: 35 MMOL/L
CREAT SERPL-MCNC: 1.1 MG/DL
DIFFERENTIAL METHOD: ABNORMAL
EOSINOPHIL # BLD AUTO: 0.1 K/UL
EOSINOPHIL NFR BLD: 0.8 %
ERYTHROCYTE [DISTWIDTH] IN BLOOD BY AUTOMATED COUNT: 15.6 %
EST. GFR  (AFRICAN AMERICAN): >60 ML/MIN/1.73 M^2
EST. GFR  (NON AFRICAN AMERICAN): >60 ML/MIN/1.73 M^2
GLUCOSE SERPL-MCNC: 110 MG/DL
HCT VFR BLD AUTO: 35.3 %
HGB BLD-MCNC: 10.9 G/DL
INR PPP: 1.1
LYMPHOCYTES # BLD AUTO: 2.5 K/UL
LYMPHOCYTES NFR BLD: 31.9 %
MAGNESIUM SERPL-MCNC: 2.1 MG/DL
MCH RBC QN AUTO: 29.3 PG
MCHC RBC AUTO-ENTMCNC: 30.9 G/DL
MCV RBC AUTO: 95 FL
MONOCYTES # BLD AUTO: 0.2 K/UL
MONOCYTES NFR BLD: 2 %
NEUTROPHILS # BLD AUTO: 5.1 K/UL
NEUTROPHILS NFR BLD: 64.5 %
PHOSPHATE SERPL-MCNC: 3.3 MG/DL
PLATELET # BLD AUTO: 202 K/UL
PMV BLD AUTO: 10.6 FL
POTASSIUM SERPL-SCNC: 3.8 MMOL/L
PROT SERPL-MCNC: 7.5 G/DL
PROTHROMBIN TIME: 11.5 SEC
RBC # BLD AUTO: 3.72 M/UL
SODIUM SERPL-SCNC: 142 MMOL/L
WBC # BLD AUTO: 7.9 K/UL

## 2019-02-04 PROCEDURE — 97110 THERAPEUTIC EXERCISES: CPT

## 2019-02-04 PROCEDURE — 63600175 PHARM REV CODE 636 W HCPCS: Performed by: STUDENT IN AN ORGANIZED HEALTH CARE EDUCATION/TRAINING PROGRAM

## 2019-02-04 PROCEDURE — 94664 DEMO&/EVAL PT USE INHALER: CPT

## 2019-02-04 PROCEDURE — 94761 N-INVAS EAR/PLS OXIMETRY MLT: CPT

## 2019-02-04 PROCEDURE — 85610 PROTHROMBIN TIME: CPT

## 2019-02-04 PROCEDURE — 25000003 PHARM REV CODE 250: Performed by: STUDENT IN AN ORGANIZED HEALTH CARE EDUCATION/TRAINING PROGRAM

## 2019-02-04 PROCEDURE — 80053 COMPREHEN METABOLIC PANEL: CPT

## 2019-02-04 PROCEDURE — 25500020 PHARM REV CODE 255: Performed by: STUDENT IN AN ORGANIZED HEALTH CARE EDUCATION/TRAINING PROGRAM

## 2019-02-04 PROCEDURE — 97530 THERAPEUTIC ACTIVITIES: CPT

## 2019-02-04 PROCEDURE — 99900035 HC TECH TIME PER 15 MIN (STAT)

## 2019-02-04 PROCEDURE — 84100 ASSAY OF PHOSPHORUS: CPT

## 2019-02-04 PROCEDURE — 11000001 HC ACUTE MED/SURG PRIVATE ROOM

## 2019-02-04 PROCEDURE — 94799 UNLISTED PULMONARY SVC/PX: CPT

## 2019-02-04 PROCEDURE — 25000003 PHARM REV CODE 250: Performed by: SURGERY

## 2019-02-04 PROCEDURE — 85730 THROMBOPLASTIN TIME PARTIAL: CPT

## 2019-02-04 PROCEDURE — 97116 GAIT TRAINING THERAPY: CPT

## 2019-02-04 PROCEDURE — 63600175 PHARM REV CODE 636 W HCPCS: Mod: JG | Performed by: STUDENT IN AN ORGANIZED HEALTH CARE EDUCATION/TRAINING PROGRAM

## 2019-02-04 PROCEDURE — 82150 ASSAY OF AMYLASE: CPT

## 2019-02-04 PROCEDURE — 85025 COMPLETE CBC W/AUTO DIFF WBC: CPT

## 2019-02-04 PROCEDURE — S0171 BUMETANIDE 0.5 MG: HCPCS | Performed by: SURGERY

## 2019-02-04 PROCEDURE — 63600175 PHARM REV CODE 636 W HCPCS: Performed by: SURGERY

## 2019-02-04 PROCEDURE — B4185 PARENTERAL SOL 10 GM LIPIDS: HCPCS | Performed by: SURGERY

## 2019-02-04 PROCEDURE — 83735 ASSAY OF MAGNESIUM: CPT

## 2019-02-04 PROCEDURE — 27000221 HC OXYGEN, UP TO 24 HOURS

## 2019-02-04 PROCEDURE — 25000003 PHARM REV CODE 250: Performed by: ANESTHESIOLOGY

## 2019-02-04 PROCEDURE — C9113 INJ PANTOPRAZOLE SODIUM, VIA: HCPCS | Performed by: STUDENT IN AN ORGANIZED HEALTH CARE EDUCATION/TRAINING PROGRAM

## 2019-02-04 PROCEDURE — 94660 CPAP INITIATION&MGMT: CPT

## 2019-02-04 RX ADMIN — PANTOPRAZOLE SODIUM 40 MG: 40 INJECTION, POWDER, LYOPHILIZED, FOR SOLUTION INTRAVENOUS at 09:02

## 2019-02-04 RX ADMIN — ENOXAPARIN SODIUM 40 MG: 100 INJECTION SUBCUTANEOUS at 06:02

## 2019-02-04 RX ADMIN — DIATRIZOATE MEGLUMINE AND DIATRIZOATE SODIUM 200 ML: 660; 100 LIQUID ORAL; RECTAL at 10:02

## 2019-02-04 RX ADMIN — CHLORHEXIDINE GLUCONATE 15 ML: 1.2 RINSE ORAL at 10:02

## 2019-02-04 RX ADMIN — CYANOCOBALAMIN 1000 MCG: 1000 INJECTION, SOLUTION INTRAMUSCULAR at 10:02

## 2019-02-04 RX ADMIN — AMPHOTERICIN B 300 MG: 50 INJECTION, POWDER, LYOPHILIZED, FOR SOLUTION INTRAVENOUS at 12:02

## 2019-02-04 RX ADMIN — MEROPENEM 2 G: 1 INJECTION, POWDER, FOR SOLUTION INTRAVENOUS at 06:02

## 2019-02-04 RX ADMIN — ASCORBIC ACID, VITAMIN A PALMITATE, CHOLECALCIFEROL, THIAMINE HYDROCHLORIDE, RIBOFLAVIN-5 PHOSPHATE SODIUM, PYRIDOXINE HYDROCHLORIDE, NIACINAMIDE, DEXPANTHENOL, ALPHA-TOCOPHEROL ACETATE, VITAMIN K1, FOLIC ACID, BIOTIN, CYANOCOBALAMIN: 200; 3300; 200; 6; 3.6; 6; 40; 15; 10; 150; 600; 60; 5 INJECTION, SOLUTION INTRAVENOUS at 10:02

## 2019-02-04 RX ADMIN — BUMETANIDE 1 MG: 0.25 INJECTION INTRAMUSCULAR; INTRAVENOUS at 10:02

## 2019-02-04 RX ADMIN — I.V. FAT EMULSION 250 ML: 20 EMULSION INTRAVENOUS at 10:02

## 2019-02-04 RX ADMIN — CHLORHEXIDINE GLUCONATE 15 ML: 1.2 RINSE ORAL at 08:02

## 2019-02-04 NOTE — PT/OT/SLP PROGRESS
Physical Therapy Treatment    Patient Name:  Hoang Santos Jr.   MRN:  [id number]    Recommendations:     Discharge Recommendations:  (ongoing assessment)   Discharge Equipment Recommendations: (TBD)   Barriers to discharge: Decreased caregiver support    Assessment:     Hoang Santos Jr. is a 68 y.o. male admitted with a medical diagnosis of Aspiration pneumonia of right lower lobe.  He presents with the following impairments/functional limitations:  weakness, impaired endurance, impaired self care skills, impaired functional mobilty, gait instability, impaired balance, decreased lower extremity function, decreased safety awareness, impaired muscle length, impaired cardiopulmonary response to activity, decreased ROM, decreased upper extremity function. Pt was on wall unit O2 @ 1lpm NC.  Nursing okay doffing O2 for gait.  Pt ambulated 145ft and 135ft with RW and CGA with assist for IV pole.  Pt demonstrated a few bouts of instability without LOB and ambulated with decreased fan.  Pt denied SOB during gait training. Pt would continue to benefit from P.T. To address impairments listed above.    Rehab Prognosis: Fair+; patient would benefit from acute skilled PT services to address these deficits and reach maximum level of function.    Recent Surgery: Procedure(s) (LRB):  CHOLANGIOGRAM, PERCUTANEOUS, TRANSHEPATIC (Right) 6 Days Post-Op    Plan:     During this hospitalization, patient to be seen 6 x/week to address the identified rehab impairments via gait training, therapeutic exercises, therapeutic activities and progress toward the following goals:    · Plan of Care Expires:  02/17/19    Subjective       Patient/Family Comments/goals: Pt agreed to tx.  Pain/Comfort:  · Pain Rating 1: 2/10  · Location - Side 1: Bilateral  · Location 1: chest  · Pain Addressed 1: Reposition, Distraction, Nurse notified  · Pain Rating Post-Intervention 1: 2/10      Objective:     Communicated with Rn (Sarah) prior to session.   Patient found all lines intact, call button in reach, chair alarm on and RN notified telemetry, peripheral IV, JAY drain, oxygen  upon PT entry to room.     General Precautions: Standard, NPO, fall   Orthopedic Precautions:N/A   Braces:       Functional Mobility:  · Bed Mobility:     · Scooting: supervision and to Edge of chair and back into bed.  · Sit to Supine: stand by assistance and bed flat  · Transfers:     · Sit to Stand:  contact guard assistance with rolling walker and 2x  · Gait: 145ft and 135ft with RW and CGA with assist for IV pole.  see assessment  · Balance: sitting good, standing RW fair+, gait RW fair      AM-PAC 6 CLICK MOBILITY  Turning over in bed (including adjusting bedclothes, sheets and blankets)?: 3  Sitting down on and standing up from a chair with arms (e.g., wheelchair, bedside commode, etc.): 3  Moving from lying on back to sitting on the side of the bed?: 3  Moving to and from a bed to a chair (including a wheelchair)?: 3  Need to walk in hospital room?: 3  Climbing 3-5 steps with a railing?: 3  Basic Mobility Total Score: 18       Therapeutic Activities and Exercises:   Seated BLE therex: AP, LAQ, hip flexion/ABD/ADD x 15 reps.  Brief rest break x 1.  Static standing with rW and SBA while assisting pt with donning gown and line management.  O2 sats after gait training was 87% and up to 95-96% within 30secs with a seated rest and instructed in pursed lip breathing. Wall unit O2 @ 1lpm was donned back on pt after.  Nsg notified of sats.    Patient left supine with all lines intact, call button in reach, bed alarm on and RN notified..    GOALS:   Multidisciplinary Problems     Physical Therapy Goals        Problem: Physical Therapy Goal    Goal Priority Disciplines Outcome Goal Variances Interventions   Physical Therapy Goal     PT, PT/OT Ongoing (interventions implemented as appropriate)     Description:  Goals to be met by: 2/17/2019  Patient will increase functional independence with  mobility by performin. Supine to sit with Modified [place]  2. Sit to stand transfer with Modified [place]  3. Gait  x 150 feet with Modified [place] using Rolling Walker.   4. Lower extremity exercise program x10 reps per handout, with independence                       Time Tracking:     PT Received On: 19  PT Start Time: 1156     PT Stop Time: 1234  PT Total Time (min): 38 min     Billable Minutes: Gait Training 20, Therapeutic Activity 10 and Therapeutic Exercise 8    Treatment Type: Treatment  PT/PTA: PTA     PTA Visit Number: 3     Anais Peralta PTA  2019

## 2019-02-04 NOTE — PLAN OF CARE
Cued into patient's room. Permission received per patient to turn camera to view patient. Introduced as VN for night shift that will be working with floor nurse and nursing assistant. Family member at bedside. Educated patient on VN's role in patient care. Plan of care reviewed with patient. Education per flowsheet. Opportunity given for questions and questions answered. Denies pain, n/v, and sob.  Voice still hoarse.  No complaints. Instructed to call for assistance. Will cont to monitor.

## 2019-02-04 NOTE — PROGRESS NOTES
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 Doing well. Tolerating ice chips. Ambulating and out of bed to chair. Pain controlled.    Scheduled Meds:   amphotericin B liposome (AMBISOME) IVPB  3 mg/kg (Dosing Weight) Intravenous Q24H    bumetanide  1 mg Intravenous Daily    chlorhexidine  15 mL Mouth/Throat BID    cyanocobalamin  1,000 mcg Subcutaneous Daily    enoxaparin  40 mg Subcutaneous Daily    meropenem (MERREM) IVPB  2 g Intravenous Q8H    pantoprazole  40 mg Intravenous BID     Continuous Infusions:   Amino acid 5% - dextrose 15% (CLINIMIX-E) solution with additives (1L  provides 510 kcal/L dextrose, with 50 gm AA, 150 gm CHO, Na 35, K 30, Mg 5, Ca 4.5, Acetate 80, Cl 39, Phos 15) 50 mL/hr at 02/03/19 2328     PRN Meds:.sodium chloride, dextrose 50%, dextrose 50%, glucose, glucose, HYDROmorphone, insulin aspart U-100, naloxone, ondansetron, promethazine (PHENERGAN) IVPB, sodium chloride 0.9%    Objective:      Temp:  [97.1 °F (36.2 °C)-98.7 °F (37.1 °C)] 97.1 °F (36.2 °C)  Pulse:  [103-107] 106  Resp:  [18-20] 18  SpO2:  [93 %-97 %] 93 %  BP: ()/(56-64) 104/56  Weight change: -0.8 kg (-12.2 oz)    Intake/Output Summary (Last 24 hours) at 2/4/2019 0921  Last data filed at 2/4/2019 0626  Gross per 24 hour   Intake 1980.83 ml   Output 1605 ml   Net 375.83 ml     UOP 1550  Drains R: 130, B: 100, Gastrostomy: 0    Physical Exam:  GEN: awake,  alert, NAD  CV: RRR  PULM: normal work of breathing, on NC  ABD: S/NT/ND, incision c/d/i, tubes in good position  EXT: Peripheral pulses present and equal bilaterally    Recent Labs   Lab 02/04/19  0636      K 3.8   CO2 35*   CL 98   BUN 33*   CREATININE 1.1   CALCIUM 9.7   PROT 7.5   AST 90*   ALT 58*   ALKPHOS 126   BILITOT 1.8*     Recent Labs   Lab 02/04/19  0636   WBC 7.90   HGB 10.9*   HCT 35.3*          Significant Imaging: I have reviewed all pertinent imaging results/findings within the past 24 hours.    Micro: Yeast Candida Albicans and Kluyvera ascorbata (sensitive to merrem, cipro, rocephin, cefepime, resistent to zosyn)  Resp Cx - many yeast, few GPC/GPR/GNR    Chest X-ray: Unchanged and stable.     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  Now c sepsis likely from aspiration RLL PNA  S/p IR drain of abdominal fluid collection  1/24/18 - Laparotomy, ASHLEIGH, enterotomy repair, EGD, GastroJ, Retroperitoneal peripancreatic abscess open drainage, Omental flap creation, Lori gastrostomy c 24 Fr [place]ot     Pain control prn  monitor UOP, trend Cr, try to avoid nephrotoxics - currently Cr wnl, UOP adequate  cont TPN/NPO, PPI --> Lytes replacing prn  Continue to clamp Drain #1 biliary drain and flush with 20 cc of NS BID  Abd culture grew Kluyvera ascorbata on abx - BID Dacon solution irrigation (10cc) of RUQ drain #2 , Continue amphotericin IV, merrem IV  Tf held. Will instill 30cc of gastrograffin into GT (Drain #3) and get KUB.  Continue to monitor H/H, on BID protonix  OOB to chair  Continue lovenox  Encourage ambulation  Encourage IS and aggressive respiratory toilet  Dispo - stepped down to floor, cont inpt care, plan on tube study today 2/4      Jakob Adhikari MD   HO II  [phone]

## 2019-02-04 NOTE — PROGRESS NOTES
Pt at risk for impaired skin integrity, related preventative orders are in place. Pt has small bruise on bridge of nose from breathing machine, skin intact, no open areas or drainage, black in color, appears to be in healing evidenced by fading color. No other areas of breakdown or bruising. Turned pt on right side, and provided Z-boots for both feet. Educated pt and wife on importance of turning and shifting weight at least every 2 hours.

## 2019-02-04 NOTE — PLAN OF CARE
Problem: Physical Therapy Goal  Goal: Physical Therapy Goal  Goals to be met by: 2019  Patient will increase functional independence with mobility by performin. Supine to sit with Modified [place]  2. Sit to stand transfer with Modified [place]  3. Gait  x 150 feet with Modified [place] using Rolling Walker.   4. Lower extremity exercise program x10 reps per handout, with independence      Outcome: Ongoing (interventions implemented as appropriate)  Continue working toward goals.

## 2019-02-04 NOTE — PLAN OF CARE
Morning round completed.  Patient denies any questions, concerns or needs at this time.  Pump is beeping, message sent to bedside nurse.  Will continue to monitor.

## 2019-02-04 NOTE — PROGRESS NOTES
Results for SOTO CHA JR. (MRN [id number]) as of 2/4/2019 11:53   Ref. Range 2/4/2019 06:36   Sodium Latest Ref Range: 136 - 145 mmol/L 142   Potassium Latest Ref Range: 3.5 - 5.1 mmol/L 3.8   Chloride Latest Ref Range: 95 - 110 mmol/L 98   CO2 Latest Ref Range: 23 - 29 mmol/L 35 (H)   Anion Gap Latest Ref Range: 8 - 16 mmol/L 9   BUN, Bld Latest Ref Range: 8 - 23 mg/dL 33 (H)   Creatinine Latest Ref Range: 0.5 - 1.4 mg/dL 1.1   eGFR if non African American Latest Ref Range: >60 mL/min/1.73 m^2 >60   eGFR if  Latest Ref Range: >60 mL/min/1.73 m^2 >60   Glucose Latest Ref Range: 70 - 110 mg/dL 110   Calcium Latest Ref Range: 8.7 - 10.5 mg/dL 9.7   Phosphorus Latest Ref Range: 2.7 - 4.5 mg/dL 3.3   Magnesium Latest Ref Range: 1.6 - 2.6 mg/dL 2.1   Alkaline Phosphatase Latest Ref Range: 55 - 135 U/L 126   Total Protein Latest Ref Range: 6.0 - 8.4 g/dL 7.5   Albumin Latest Ref Range: 3.5 - 5.2 g/dL 3.0 (L)   Total Bilirubin Latest Ref Range: 0.1 - 1.0 mg/dL 1.8 (H)   AST Latest Ref Range: 10 - 40 U/L 90 (H)   ALT Latest Ref Range: 10 - 44 U/L 58 (H)     Reorderd tpn and fats

## 2019-02-04 NOTE — PLAN OF CARE
Problem: Adult Inpatient Plan of Care  Goal: Plan of Care Review  Outcome: Ongoing (interventions implemented as appropriate)  Pts safety maintained, family at bedside. Meds given per MAR, TPN infusing. Central line precautions continues. GJ tube, residuals checked, flushed with 30 mLs of water and clamped. Bilary drain flashed with 20 mLs of sterile NS, no swelling, discomfort, and abscess drains flushed with 10 mLs of Dakins solution. Dressing on lower abdomen removed, incision CDI. Suction at bedside. No N/V, SOB, complains of pain. Vitals stable through the night.

## 2019-02-05 LAB
ALBUMIN SERPL BCP-MCNC: 3 G/DL
ALP SERPL-CCNC: 139 U/L
ALT SERPL W/O P-5'-P-CCNC: 65 U/L
ANION GAP SERPL CALC-SCNC: 10 MMOL/L
ANISOCYTOSIS BLD QL SMEAR: ABNORMAL
APTT BLDCRRT: 28.9 SEC
AST SERPL-CCNC: 90 U/L
BASOPHILS # BLD AUTO: ABNORMAL K/UL
BASOPHILS NFR BLD: 0 %
BILIRUB SERPL-MCNC: 1.4 MG/DL
BUN SERPL-MCNC: 37 MG/DL
CALCIUM SERPL-MCNC: 9.9 MG/DL
CHLORIDE SERPL-SCNC: 98 MMOL/L
CO2 SERPL-SCNC: 35 MMOL/L
CREAT SERPL-MCNC: 1.1 MG/DL
DACRYOCYTES BLD QL SMEAR: ABNORMAL
DIFFERENTIAL METHOD: ABNORMAL
EOSINOPHIL # BLD AUTO: ABNORMAL K/UL
EOSINOPHIL NFR BLD: 0 %
ERYTHROCYTE [DISTWIDTH] IN BLOOD BY AUTOMATED COUNT: 15.6 %
EST. GFR  (AFRICAN AMERICAN): >60 ML/MIN/1.73 M^2
EST. GFR  (NON AFRICAN AMERICAN): >60 ML/MIN/1.73 M^2
GLUCOSE SERPL-MCNC: 113 MG/DL
HCT VFR BLD AUTO: 35.1 %
HGB BLD-MCNC: 10.7 G/DL
HYPOCHROMIA BLD QL SMEAR: ABNORMAL
INR PPP: 1.1
LYMPHOCYTES # BLD AUTO: ABNORMAL K/UL
LYMPHOCYTES NFR BLD: 16 %
MAGNESIUM SERPL-MCNC: 2.2 MG/DL
MCH RBC QN AUTO: 29.2 PG
MCHC RBC AUTO-ENTMCNC: 30.5 G/DL
MCV RBC AUTO: 96 FL
MONOCYTES # BLD AUTO: ABNORMAL K/UL
MONOCYTES NFR BLD: 7 %
NEUTROPHILS NFR BLD: 72 %
NEUTS BAND NFR BLD MANUAL: 5 %
OVALOCYTES BLD QL SMEAR: ABNORMAL
PHOSPHATE SERPL-MCNC: 3.4 MG/DL
PLATELET # BLD AUTO: 203 K/UL
PLATELET BLD QL SMEAR: ABNORMAL
PMV BLD AUTO: 10.3 FL
POIKILOCYTOSIS BLD QL SMEAR: SLIGHT
POLYCHROMASIA BLD QL SMEAR: ABNORMAL
POTASSIUM SERPL-SCNC: 3.9 MMOL/L
PROT SERPL-MCNC: 7.6 G/DL
PROTHROMBIN TIME: 11.6 SEC
RBC # BLD AUTO: 3.67 M/UL
SODIUM SERPL-SCNC: 143 MMOL/L
TARGETS BLD QL SMEAR: ABNORMAL
WBC # BLD AUTO: 9.59 K/UL

## 2019-02-05 PROCEDURE — 97116 GAIT TRAINING THERAPY: CPT

## 2019-02-05 PROCEDURE — 97530 THERAPEUTIC ACTIVITIES: CPT

## 2019-02-05 PROCEDURE — 85730 THROMBOPLASTIN TIME PARTIAL: CPT

## 2019-02-05 PROCEDURE — 85027 COMPLETE CBC AUTOMATED: CPT

## 2019-02-05 PROCEDURE — 97535 SELF CARE MNGMENT TRAINING: CPT

## 2019-02-05 PROCEDURE — 94761 N-INVAS EAR/PLS OXIMETRY MLT: CPT

## 2019-02-05 PROCEDURE — 85007 BL SMEAR W/DIFF WBC COUNT: CPT

## 2019-02-05 PROCEDURE — 94664 DEMO&/EVAL PT USE INHALER: CPT

## 2019-02-05 PROCEDURE — 94660 CPAP INITIATION&MGMT: CPT

## 2019-02-05 PROCEDURE — S0171 BUMETANIDE 0.5 MG: HCPCS | Performed by: SURGERY

## 2019-02-05 PROCEDURE — 92610 EVALUATE SWALLOWING FUNCTION: CPT

## 2019-02-05 PROCEDURE — 25000003 PHARM REV CODE 250: Performed by: SURGERY

## 2019-02-05 PROCEDURE — 99900035 HC TECH TIME PER 15 MIN (STAT)

## 2019-02-05 PROCEDURE — 83735 ASSAY OF MAGNESIUM: CPT

## 2019-02-05 PROCEDURE — 63600175 PHARM REV CODE 636 W HCPCS: Performed by: SURGERY

## 2019-02-05 PROCEDURE — 27000221 HC OXYGEN, UP TO 24 HOURS

## 2019-02-05 PROCEDURE — 94799 UNLISTED PULMONARY SVC/PX: CPT

## 2019-02-05 PROCEDURE — 25000003 PHARM REV CODE 250: Performed by: ANESTHESIOLOGY

## 2019-02-05 PROCEDURE — 85610 PROTHROMBIN TIME: CPT

## 2019-02-05 PROCEDURE — 80053 COMPREHEN METABOLIC PANEL: CPT

## 2019-02-05 PROCEDURE — 11000001 HC ACUTE MED/SURG PRIVATE ROOM

## 2019-02-05 PROCEDURE — 84100 ASSAY OF PHOSPHORUS: CPT

## 2019-02-05 PROCEDURE — 63600175 PHARM REV CODE 636 W HCPCS: Performed by: STUDENT IN AN ORGANIZED HEALTH CARE EDUCATION/TRAINING PROGRAM

## 2019-02-05 PROCEDURE — C9113 INJ PANTOPRAZOLE SODIUM, VIA: HCPCS | Performed by: STUDENT IN AN ORGANIZED HEALTH CARE EDUCATION/TRAINING PROGRAM

## 2019-02-05 RX ORDER — FLUCONAZOLE 100 MG/1
100 TABLET ORAL DAILY
Status: DISCONTINUED | OUTPATIENT
Start: 2019-02-05 | End: 2019-02-08 | Stop reason: HOSPADM

## 2019-02-05 RX ORDER — CIPROFLOXACIN 500 MG/1
500 TABLET ORAL EVERY 12 HOURS
Status: DISCONTINUED | OUTPATIENT
Start: 2019-02-05 | End: 2019-02-08 | Stop reason: HOSPADM

## 2019-02-05 RX ORDER — PANTOPRAZOLE SODIUM 40 MG/1
40 TABLET, DELAYED RELEASE ORAL DAILY
Status: DISCONTINUED | OUTPATIENT
Start: 2019-02-05 | End: 2019-02-06

## 2019-02-05 RX ORDER — BUMETANIDE 0.25 MG/ML
0.5 INJECTION INTRAMUSCULAR; INTRAVENOUS DAILY
Status: DISCONTINUED | OUTPATIENT
Start: 2019-02-05 | End: 2019-02-08 | Stop reason: HOSPADM

## 2019-02-05 RX ADMIN — ASCORBIC ACID, VITAMIN A PALMITATE, CHOLECALCIFEROL, THIAMINE HYDROCHLORIDE, RIBOFLAVIN-5 PHOSPHATE SODIUM, PYRIDOXINE HYDROCHLORIDE, NIACINAMIDE, DEXPANTHENOL, ALPHA-TOCOPHEROL ACETATE, VITAMIN K1, FOLIC ACID, BIOTIN, CYANOCOBALAMIN: 200; 3300; 200; 6; 3.6; 6; 40; 15; 10; 150; 600; 60; 5 INJECTION, SOLUTION INTRAVENOUS at 10:02

## 2019-02-05 RX ADMIN — CHLORHEXIDINE GLUCONATE 15 ML: 1.2 RINSE ORAL at 09:02

## 2019-02-05 RX ADMIN — FLUCONAZOLE 100 MG: 100 TABLET ORAL at 09:02

## 2019-02-05 RX ADMIN — CIPROFLOXACIN HYDROCHLORIDE 500 MG: 500 TABLET, FILM COATED ORAL at 10:02

## 2019-02-05 RX ADMIN — ENOXAPARIN SODIUM 40 MG: 100 INJECTION SUBCUTANEOUS at 06:02

## 2019-02-05 RX ADMIN — PANTOPRAZOLE SODIUM 40 MG: 40 TABLET, DELAYED RELEASE ORAL at 03:02

## 2019-02-05 RX ADMIN — BUMETANIDE 0.5 MG: 0.25 INJECTION INTRAMUSCULAR; INTRAVENOUS at 09:02

## 2019-02-05 RX ADMIN — CIPROFLOXACIN HYDROCHLORIDE 500 MG: 500 TABLET, FILM COATED ORAL at 09:02

## 2019-02-05 RX ADMIN — PANTOPRAZOLE SODIUM 40 MG: 40 INJECTION, POWDER, LYOPHILIZED, FOR SOLUTION INTRAVENOUS at 09:02

## 2019-02-05 RX ADMIN — CYANOCOBALAMIN 1000 MCG: 1000 INJECTION, SOLUTION INTRAMUSCULAR at 09:02

## 2019-02-05 NOTE — PLAN OF CARE
Problem: Fall Injury Risk  Goal: Absence of Fall and Fall-Related Injury  Outcome: Ongoing (interventions implemented as appropriate)       Problem: Adult Inpatient Plan of Care  Goal: Plan of Care Review  Outcome: Ongoing (interventions implemented as appropriate)  Patient AAOx4. Patient in NAD. All 3 drains flushed per MD order. Patient receiving feeding per tube per MD order. Patient free from falls. Patient skin intact, no breakdowns. Family at bedside. VSS. Safety maintained, will continue to monitor.

## 2019-02-05 NOTE — PLAN OF CARE
Problem: Adult Inpatient Plan of Care  Goal: Plan of Care Review  Outcome: Ongoing (interventions implemented as appropriate)  Plan of care reviewed patient verbalized understanding. AAOx4, cardiac monitoring NSR. Medications administered. TPN infusing at 50 ml/hr. Blood glucose monitoring per sliding scale no coverage needed. Flushed tubes #1 20 ml/NS  #2 10 mL/dakins  #3 30 mL/NS residual of 35cc. Lab draw collected by RN. biPap qhs  Safety maintained bed in lowest position, call bell within reach, bed alarm on.

## 2019-02-05 NOTE — PROGRESS NOTES
.Pharmacy New Medication Education    Patient accepted medication education.    Pharmacy educated patient on name and purpose of medications and possible side effects, using the teach-back method.     Current Inpatient Medication Orders   0.9% NaCl infusion (for blood administration)   Amino acid 5% - dextrose 15% (CLINIMIX-E) solution with additives (1L provides 510 kcal/L dextrose, with 50 gm AA, 150 gm CHO, Na 35, K 30, Mg 5, Ca 4.5, Acetate 80, Cl 39, Phos 15)   bumetanide injection 0.5 mg   chlorhexidine 0.12 % solution 15 mL   ciprofloxacin HCl tablet 500 mg   cyanocobalamin injection 1,000 mcg   dextrose 50% injection 12.5 g   dextrose 50% injection 25 g   enoxaparin injection 40 mg   fat emulsion 20 % infusion 250 mL   fluconazole tablet 100 mg   glucose chewable tablet 16 g   glucose chewable tablet 24 g   hydromorphone (PF) injection 0.5 mg   insulin aspart U-100 pen 1-10 Units   naloxone 0.4 mg/mL injection 0.02 mg   ondansetron disintegrating tablet 8 mg   pantoprazole injection 40 mg   promethazine (PHENERGAN) 6.25 mg in dextrose 5 % 50 mL IVPB   sodium chloride 0.9% flush 5 mL       Learners of pharmacy medication education included:  Patient    Patient +/- learner response:  Verbalized Understanding, Teachback

## 2019-02-05 NOTE — PLAN OF CARE
Problem: SLP Goal  Goal: SLP Goal  Short Term Goals:   1. Pt will participate in BSS to determine least restrictive diet.- MET 1/31  2. Pt will participate in indirect dysphagia exercises to strengthen musculature for swallowing mechanism with mod-max effort.-ongoing  3. Pt will participate in re-assessment of swallowing mechanism to determine least restrictive PO diet- MET 2/5        Outcome: Ongoing (interventions implemented as appropriate)  2/5: Pt seen at bedside for re-assessment of swallowing mechanism. However, pt continues to demonstrate s/s of pharyngeal dysphagia and limited pharyngeal clearance c/b overt wet vocal quality prior to PO trials, as well as s/p swallow thin liquid trials, too. See note for full details. Recs: Pt should con't alternative means of nutrition/hydration/medication, as pt is not safe for an oral diet at this time 2/2 pt's dx oropharyngeal dysphagia, d/t dcr'd strength in oral and pharyngeal musculature, which is further impacted by pt's cervical osteophytes present at level of epiglottic-- cervical osteophytes constrict pt's bolus pathway and restrict pt's epiglottic inversion, resulting in laryngeal penetration and tracheal aspiration across all PO trials (thin liquids, nectar thick liquids, and honey thick liquids, as well as puree textures) during most recent MBSS 1/11/19. Pt would benefit from continued skilled ST services at next level of care for dysphagia remediation (to improve musculature involved in swallowing mechanism). SLP notified MD team of results/recs and dicussed POC. MD team in agreement with SLP recs.  MARY Ramírez., CCC-SLP  Speech-Language Pathologist

## 2019-02-05 NOTE — PROGRESS NOTES
Reordered Clinimix 5/15, per protocol. Labs WNL. No dose adjustments needed; no supplemental electrolytes added.      Debra Escalante, PharmD  [phone]

## 2019-02-05 NOTE — PLAN OF CARE
Problem: Adult Inpatient Plan of Care  Goal: Plan of Care Review  Outcome: Ongoing (interventions implemented as appropriate)  pts o2 sats 97%/1l-nc

## 2019-02-05 NOTE — PLAN OF CARE
Problem: Fall Injury Risk  Goal: Absence of Fall and Fall-Related Injury  Outcome: Ongoing (interventions implemented as appropriate)       Problem: Adult Inpatient Plan of Care  Goal: Plan of Care Review  Outcome: Ongoing (interventions implemented as appropriate)  Patient AAOx4. Family at bedside. All 3 Drains flushed per MD order. Patient free from falls. Patient tolerating activity, up in chair today and ambulated with PT. Skin intact. Patient remained NPO throughout shift. SCDs placed. Safety maintained, will continue to monitor.     Problem: Skin Injury Risk Increased  Goal: Skin Health and Integrity  Outcome: Ongoing (interventions implemented as appropriate)

## 2019-02-05 NOTE — PT/OT/SLP PROGRESS
Occupational Therapy      Patient Name:  Hoang Santos Jr.   MRN:  [id number]    Patient not seen today secondary to MDs and RT in room. Will follow-up as able.    Caitlyn Carrion OT  2/5/2019

## 2019-02-05 NOTE — NURSING
Charge nurse note- Drains # 1, 2 and 3 flushed as ordered under nursing communication with primary nurse, Westley.LPN. Residual from drain #3 checked with output of 35 ml of bloody output with clots. Clamped after residual checked as ordered

## 2019-02-05 NOTE — PROGRESS NOTES
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 Doing well. Tolerating ice chips. Ambulating and out of bed to chair. Pain controlled.    Scheduled Meds:   bumetanide  0.5 mg Intravenous Daily    chlorhexidine  15 mL Mouth/Throat BID    ciprofloxacin HCl  500 mg Per G Tube Q12H    cyanocobalamin  1,000 mcg Subcutaneous Daily    enoxaparin  40 mg Subcutaneous Daily    fat emulsion  250 mL Intravenous Daily    fluconazole  100 mg Per G Tube Daily    pantoprazole  40 mg Intravenous BID     Continuous Infusions:   Amino acid 5% - dextrose 15% (CLINIMIX-E) solution with additives (1L  provides 510 kcal/L dextrose, with 50 gm AA, 150 gm CHO, Na 35, K 30, Mg 5, Ca 4.5, Acetate 80, Cl 39, Phos 15) 50 mL/hr at 02/04/19 2236     PRN Meds:.sodium chloride, dextrose 50%, dextrose 50%, glucose, glucose, HYDROmorphone, insulin aspart U-100, naloxone, ondansetron, promethazine (PHENERGAN) IVPB, sodium chloride 0.9%    Objective:      Temp:  [97.6 °F (36.4 °C)-99 °F (37.2 °C)] 98.9 °F (37.2 °C)  Pulse:  [] 113  Resp:  [16-18] 18  SpO2:  [96 %-98 %] 96 %  BP: ()/(57-69) 109/64  Weight change: 0.2 kg (7.1 oz)    Intake/Output Summary (Last 24 hours) at 2/5/2019 0810  Last data filed at 2/5/2019 0600  Gross per 24 hour   Intake 776.96 ml   Output 1975 ml   Net -1198.04 ml     UOP 1550  Drains R: 130, B: 100, Gastrostomy: 0    Physical Exam:  GEN: awake, alert,  NAD  CV: RRR  PULM: normal work of breathing, on NC  ABD: S/NT/ND, incision c/d/i, tubes in good position  EXT: Peripheral pulses present and equal bilaterally    Recent Labs   Lab 02/05/19  0508      K 3.9   CO2 35*   CL 98   BUN 37*   CREATININE 1.1   CALCIUM 9.9   PROT 7.6   AST 90*   ALT 65*   ALKPHOS 139*   BILITOT 1.4*     Recent Labs   Lab 02/05/19  0508   WBC 9.59   HGB 10.7*   HCT 35.1*          Significant Imaging: I have reviewed all pertinent imaging results/findings within the past 24 hours.    Micro: Yeast Candida Albicans and Kluyvera ascorbata (sensitive to merrem, cipro, rocephin, cefepime, resistent to zosyn)  Resp Cx - many yeast, few GPC/GPR/GNR    Chest X-ray: Unchanged and stable.     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  Now c sepsis likely from aspiration RLL PNA  S/p IR drain of abdominal fluid collection  1/24/18 - Laparotomy, ASHLEIGH, enterotomy repair, EGD, GastroJ, Retroperitoneal peripancreatic abscess open drainage, Omental flap creation, Lori gastrostomy c 24 Fr [place]     Pain control prn  Monitor UOP, trend Cr, try to avoid nephrotoxics - currently Cr wnl, UOP adequate  cont TPN/NPO, PPI --> Lytes replacing prn  Continue to clamp Drain #1 biliary drain and flush with 20 cc of NS BID  Abd culture grew Kluyvera ascorbata on abx - BID Dacon solution irrigation (10cc) of RUQ drain #2 , Continue amphotericin IV  Start TF today  Continue to monitor H/H, on BID protonix  OOB to chair  Continue lovenox  Encourage ambulation  Encourage IS and aggressive respiratory toilet  Dispo - stepped down to floor, cont inpt care      Jakob Adhikari MD   HO [facility]  [phone]

## 2019-02-05 NOTE — PROGRESS NOTES
[facility]  Adult Nutrition  Consult Note    SUMMARY       Recommendations    Recommendation:   1. When able to utilize enteral access rec TF Isosource 1.5 initiated @ 10 ml/hr and advanced 10 ml q 4 hrs to goal rate of 60 ml/hr + fluid flushes of 185 mL q 4 hrs.  For bolus feeds (gastric feeds only) rec ~ 6 cans/day w/ 1100 ml Fluid via flushes per day (can initiate with 1 can feeds and advance to 1-2 can feeds as tolerated).   2. If TF rec not intitiated within 48 hours; to better meet estimated, advance TPN to goal rate of 90 mL/hr w/lipids 3 x week: To provide 1745 kcal, 108 gm pro, 2150 mL fluid,: GIR 2.27.   3. RD to monitor.    Goals:   1. Meet >85% EEN/EPN daily.     Nutrition Goal Status: goal not met  Communication of RD Recs: (POC)    Reason for Assessment    Reason For Assessment: consult(tube feeding, bolus feeding thrus G tube)  Diagnosis: pulmonary disease(aspiration pneumonia of right lower lobe)  Relevant Medical History: duodenal rsxn; HLD, NET duodenum, obesity  Interdisciplinary Rounds: did not attend  General Information Comments: Pt was alert and pleasent upon entering the room. Pt reports he has not eaten since his surgery on 1/4/19. States no N/V/D/C at this time. Pt reports UBW is 233 lbs and it has been a few months since he was his UBW. Pt states he is wating on SLP to evaluate him. Pt is recieving TPN Clinimix 5/15 @ 50 mL/hr. Pt chart shows TF regimen was canceled r/t potential GI bleed and potential leak. NFPE completed today 2/5/19 pt appears to have adequate/excess fat and adequate LBM.   Nutrition Discharge Planning: D/C on tube feed via pump    Nutrition Risk Screen    Nutrition Risk Screen: dysphagia or difficulty swallowing    Nutrition/Diet History    Patient Reported Diet/Restrictions/Preferences: (liquids)  Food Preferences: none identified at this time  Spiritual, Cultural Beliefs, Druze Practices, Values that Affect Care: no  Food Allergies:  "NKFA  Factors Affecting Nutritional Intake: difficulty/impaired swallowing    Anthropometrics    Temp: 98.9 °F (37.2 °C)  Height Method: Stated  Height: 5' 10" (177.8 cm)  Height (inches): 70 in  Weight Method: Bed Scale  Weight: 99 kg (218 lb 4.1 oz)  Weight (lb): 218.26 lb  Ideal Body Weight (IBW), Male: 166 lb  % Ideal Body Weight, Male (lb): 131.48 lb  BMI (Calculated): 31.4  BMI Grade: 30 - 34.9- obesity - grade I  Usual Body Weight (UBW), k.9 kg(pt reports about 3 months ago)  % Usual Body Weight: 93.68  % Weight Change From Usual Weight: -6.52 %     Lab/Procedures/Meds    Pertinent Labs Reviewed: reviewed  Pertinent Labs Comments: H/H 10.7/35.1, CO2 35, BUN 37, GLu 114, Alk Phos 139, Alb 30, Total Bili 1.4, AST 90, ALT 65, Chol 273, .8  Pertinent Medications Reviewed: reviewed  Pertinent Medications Comments: bumetanide, chlorhexidine, ciprofloaxacin, cyanocobalamin, enoxaparin, IFVE TID, flucaonazole, pantoprazole, Clinimix 5/15 @ 50 mL/hr    Estimated/Assessed Needs    Weight Used For Calorie Calculations: 99 kg (218 lb 4.1 oz)  Energy Calorie Requirements (kcal): 2125(1.2 PAL)  Energy Need Method: Grayson-St Jeor(x1.2 PAL)  Protein Requirements: 99(1 gm/kg)  Weight Used For Protein Calculations: 99 kg (218 lb 4.1 oz)     Estimated Fluid Requirement Method: RDA Method(or Per MD)  RDA Method (mL): 2125  CHO Requirement: 50%      Nutrition Prescription Ordered    Current Diet Order: NPO   Current Nutrition Support Formula Ordered: Clinimix E 5/15(Lipids TID)  Current Nutrition Support Rate Ordered: 50 (ml)(mL/hr)  Current Nutrition Support Frequency Ordered: mL/hr  Oral Nutrition Supplement: 0    Evaluation of Received Nutrient/Fluid Intake    Parenteral Calories (kcal): 852  Parenteral Protein (gm): 60  Parenteral Fluid (mL): 1200  Lipid Calories (kcals): 215 kcals  GIR (Glucose Infusion Rate) (mg/kg/min): 1.26 mg/kg/min  Total Calories (kcal): 1066  Total Calories (kcal/kg): 11  % Kcal Needs: " 52  Total Protein (gm): 60  Total Protein (gm/kg): 0.6  % Protein Needs: 61  IV Fluid (mL): 1200  I/O: 837/1975  Energy Calories Required: not meeting needs  Protein Required: not meeting needs  Fluid Required: not meeting needs  Total Fluid Intake (mL/kg): 1200  Comments: LBM 1/31/19  Tolerance: tolerating  % Intake of Estimated Energy Needs: 50 - 75 %  % Meal Intake: NPO    Nutrition Risk    Level of Risk/Frequency of Follow-up: (2 x/week)     Assessment and Plan    Nutrition Problem  Inadequate energy intake    Related to (etiology):   Dysphagia    Signs and Symptoms (as evidenced by):   NPO and TPN only providing 51% EEN.     Interventions (treatment strategy):  Collaboration with other providers    Nutrition Diagnosis Status:   Continues    Service: Nutrition     Monitor and Evaluation    Food and Nutrient Intake: energy intake, food and beverage intake, enteral nutrition intake, parenteral nutrition intake  Food and Nutrient Adminstration: diet order, enteral and parenteral nutrition administration  Knowledge/Beliefs/Attitudes: food and nutrition knowledge/skill  Physical Activity and Function: nutrition-related ADLs and IADLs  Anthropometric Measurements: weight, weight change, body mass index  Biochemical Data, Medical Tests and Procedures: electrolyte and renal panel, gastrointestinal profile, glucose/endocrine profile, inflammatory profile, lipid profile  Nutrition-Focused Physical Findings: overall appearance(pt appears nourished at this time)     Malnutrition Assessment      Orbital Region (Subcutaneous Fat Loss): well nourished  Upper Arm Region (Subcutaneous Fat Loss): well nourished  Thoracic and Lumbar Region: well nourished   Rastafari Region (Muscle Loss): well nourished  Clavicle Bone Region (Muscle Loss): well nourished  Clavicle and Acromion Bone Region (Muscle Loss): well nourished  Scapular Bone Region (Muscle Loss): well nourished  Dorsal Hand (Muscle Loss): well nourished  Patellar Region  (Muscle Loss): well nourished  Anterior Thigh Region (Muscle Loss): well nourished  Posterior Calf Region (Muscle Loss): well nourished       Nutrition Follow-Up    RD Follow-up?: Yes     Love [place] Dietetic Intern

## 2019-02-05 NOTE — PLAN OF CARE
Recommendations     Recommendation:   1. When able to utilize enteral access rec TF Isosource 1.5 initiated @ 10 ml/hr and advanced 10 ml q 4 hrs to goal rate of 60 ml/hr + fluid flushes of 185 mL q 4 hrs.  For bolus feeds (gastric feeds only) rec ~ 6 cans/day w/ 1100 ml Fluid via flushes per day (can initiate with 1 can feeds and advance to 1-2 can feeds as tolerated).   2. If TF rec not intitiated within 48 hours; to better meet estimated, advance TPN to goal rate of 90 mL/hr w/lipids 3 x week: To provide 1745 kcal, 108 gm pro, 2150 mL fluid,: GIR 2.27.   3. RD to monitor.     Goals:   1. Meet >85% EEN/EPN daily.      Nutrition Goal Status: goal not met  Communication of RD Recs: (POC)

## 2019-02-05 NOTE — PLAN OF CARE
Problem: Physical Therapy Goal  Goal: Physical Therapy Goal  Goals to be met by: 2019  Patient will increase functional independence with mobility by performin. Supine to sit with Modified [place]  2. Sit to stand transfer with Modified [place]  3. Gait  x 150 feet with Modified [place] using Rolling Walker.   4. Lower extremity exercise program x10 reps per handout, with independence      Outcome: Ongoing (interventions implemented as appropriate)  Continue working toward goals.

## 2019-02-05 NOTE — PT/OT/SLP PROGRESS
Physical Therapy Treatment    Patient Name:  Hoang Santos Jr.   MRN:  [id number]    Recommendations:     Discharge Recommendations:  (ongoing assessment)   Discharge Equipment Recommendations: (TBD)   Barriers to discharge: Decreased caregiver support    Assessment:     Hoang Santos Jr. is a 68 y.o. male admitted with a medical diagnosis of Aspiration pneumonia of right lower lobe.  He presents with the following impairments/functional limitations:  weakness, gait instability, impaired endurance, impaired functional mobilty, impaired self care skills, impaired balance, decreased lower extremity function, decreased upper extremity function, pain, impaired muscle length, decreased ROM.  Pt ambulated 145ft and 135ft with Rw and SBA/CGA with assist for IV pole. Pt ambulates with decreased fan and had a few bouts of unsteadiness without LOB during gait.  Pt ambulated without O2(okayed by nsg) and did not demonstrate SOB while ambulating. Pt was on wall unit O2 @ 1lpm NC, which was donned after gait.  Pt would continue to benefit from P.T. To address impairments listed above.    Rehab Prognosis: Fair+; patient would benefit from acute skilled PT services to address these deficits and reach maximum level of function.    Recent Surgery: Procedure(s) (LRB):  CHOLANGIOGRAM, PERCUTANEOUS, TRANSHEPATIC (Right) 7 Days Post-Op    Plan:     During this hospitalization, patient to be seen 6 x/week to address the identified rehab impairments via gait training, therapeutic exercises, therapeutic activities and progress toward the following goals:    · Plan of Care Expires:  02/17/19    Subjective     Patient/Family Comments/goals: Pt agreed to tx.  Pain/Comfort:  · Pain Rating 1: 0/10  · Pain Rating Post-Intervention 1: 0/10      Objective:     Communicated with RN (Sarah) prior to session.  Patient found all lines intact, call button in reach, bed alarm on and RN notified telemetry, peripheral IV, oxygen, JAY drain  upon  PT entry to room.     General Precautions: Standard, NPO, fall   Orthopedic Precautions:N/A   Braces:       Functional Mobility:  · Bed Mobility:     · Rolling Right: modified independence and supervision  · Scooting: supervision and to EOB  · Supine to Sit: stand by assistance and with HOB elevated ~30degrees and assist of bed rail using log roll technique  · Sit to Supine: stand by assistance  · Transfers:     · Sit to Stand:  stand by assistance and contact guard assistance with rolling walker and vc/tc's for hand placement  · Gait: 145ft x 135ft with Rw and CGA/SBA with assist for IV pole.  see assessment  · Balance: sitting good, standing RW fair+, gait RW fair      AM-PAC 6 CLICK MOBILITY  Turning over in bed (including adjusting bedclothes, sheets and blankets)?: 3  Sitting down on and standing up from a chair with arms (e.g., wheelchair, bedside commode, etc.): 3  Moving from lying on back to sitting on the side of the bed?: 3  Moving to and from a bed to a chair (including a wheelchair)?: 3  Need to walk in hospital room?: 3  Climbing 3-5 steps with a railing?: 3  Basic Mobility Total Score: 18       Therapeutic Activities and Exercises:   Seated BLE therex: AP, LAQ, hip flexion/ABD/ADD x 15 reps.  Standing marching in place x 12 reps with RW.    Patient left supine with all lines intact, call button in reach, bed alarm on and RN notified..    GOALS:   Multidisciplinary Problems     Physical Therapy Goals        Problem: Physical Therapy Goal    Goal Priority Disciplines Outcome Goal Variances Interventions   Physical Therapy Goal     PT, PT/OT Ongoing (interventions implemented as appropriate)     Description:  Goals to be met by: 2019  Patient will increase functional independence with mobility by performin. Supine to sit with Modified [place]  2. Sit to stand transfer with Modified [place]  3. Gait  x 150 feet with Modified [place] using Rolling Walker.   4. Lower extremity  exercise program x10 reps per handout, with independence                       Time Tracking:     PT Received On: 02/05/19  PT Start Time: 1155     PT Stop Time: 1222  PT Total Time (min): 27 min     Billable Minutes: Gait Training 19 and Therapeutic Activity 8    Treatment Type: Treatment  PT/PTA: PTA     PTA Visit Number: 4     Anais Peralta PTA  02/05/2019

## 2019-02-05 NOTE — PT/OT/SLP EVAL
Speech Language Pathology Evaluation  Bedside Swallow    Patient Name:  Hoang Santos Jr.   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    Prostatic hyperplasia     Sleep apnea        Past Surgical History:   Procedure Laterality Date    BIOPSY, LIVER  1/4/2019    Performed by Madeleine Alvarado MD at [facility] OR    CHOLANGIOGRAM, PERCUTANEOUS, TRANSHEPATIC Right 1/29/2019    Performed by YOHANNES Shelley MD at [facility] OR    CHOLECYSTECTOMY  1/4/2019    Performed by Madeleine Alvarado MD at [facility] OR    DUODENECTOMY N/A 1/4/2019    Performed by Madeleine [facility]ado MD at [facility] OR    EGD (ESOPHAGOGASTRODUODENOSCOPY) N/A 1/24/2019    Performed by Madeleine [facility]ado MD at [facility] OR    EGD (ESOPHAGOGASTRODUODENOSCOPY) N/A 1/4/2019    Performed by Madeleine Alvarado MD at [facility] OR    EGD (ESOPHAGOGASTRODUODENOSCOPY) N/A 9/24/2018    Performed by Salo Nuñez MD at [facility] ENDO (4TH FLR)    ESOPHAGOGASTRODUODENOSCOPY (EGD) N/A 1/21/2019    Performed by Jose Kahn MD at [facility] ENDO    FUNDOPLICATION N/A 1/4/2019    Performed by Madeleine Alvarado MD at [facility] OR    GASTROJEJUNOSTOMY Right 1/24/2019    Performed by Madeleine Alvarado MD at [facility] OR    GASTROJEJUNOSTOMY N/A 1/4/2019    Performed by Madeleine Alvarado MD at [facility] OR    LAPAROTOMY, EXPLORATORY N/A 1/24/2019    Performed by Madeleine Alvarado MD at [facility] OR    LYMPHADENECTOMY N/A 1/4/2019    Performed by Madeleine Alvarado MD at [facility]  "OR    LYSIS, ADHESIONS N/A 1/24/2019    Performed by Madeleine Alvarado MD at [facility] OR    PALATOPLASTY-(UPPP) N/A 2/14/2017    Performed by Bob Araujo III, MD at [facility] OR 2ND FLR    PH MONITORING, ESOPHAGUS, WIRELESS, (OFF REFLUX MEDS) N/A 9/24/2018    Performed by Salo Nuñez MD at [facility] ENDO (4TH FLR)    TONSILLECTOMY      TRANSPROSTATIC TISSUE RETRACTION N/A 6/28/2018    Performed by Kory Jack MD at [facility] OR    ULTRASOUND-ENDOSCOPIC-UPPER N/A 11/5/2018    Performed by Gorge Reyes MD at [facility] ENDO    UVULOPALATOPHARYNGOPLASTY         Prior Intubation HX:  1/25-1/30     Modified Barium Swallow 1/11/2018: Pt presents with mild oral dysphagia and severe pharyngeal dysphagia for all textures. Pt seen seated at 90* for all trials today. O2 in place, IVs hooked up, and Yanker suction present for pt to utilize as necessary. Prior to any PO trials, pt used Yanker suction to remove large amount of mucous like secretions from pharynx. No complaints of pain prior to MBSS; however, pt did state he felt "more swollen since coming down here" in regards to his throat. Pt consumed cup sips thin liquid x2 (5 cc each), which resulted in initial penetration, which led to eventual aspiration. Pt began to cough to clear residue, but required Yanker suction to remove residuals. Nectar-thick liquid trialed x1 (5 cc cup) and Honey-thick liquid trialed x1. Both nectar and honey consistencies resulted in immediate penetration and aspiration of bolus. Pt required 4+ swallows to clear residue from pharynx. Finally, a tsp bite of pudding in barium paste consumed x1, which led to stasis in the pharyngeal cavity and a surplus of residue. Pt again required multiple swallows and cuing to use effortful swallow to clear bolus. During PO trials, pt often regurgitated bolus from UES, which places him at risk for delayed aspiration. He is to remain NPO with STRICT aspiration precautions in place. CT of abdomen " "previously scheduled for today; however, pt note deemed appropriate for CT scan as he is unsafe for the thin liquid bolus. ST reviewed recs w/ pt, family, MARA Garcia, and the MD team. ST to continue to follow while inpatient for ongoing dysphagia tx. Upon d/c from hospital, pt will benefit from continued ST services at either outpatient or home health level.   Compared to previous MBSS completed in 07/2018, pt with notable decrease in swallow function and safety. Cervical osteophytes continue to be present which contributes to pt's dysphagia as bolus pathway becomes constricted. Per ENT's note yesterday, pt's laryngeal structures include: posterior pharyngeal wall with submucosal fullness,  omega-shaped epiglottis which rests against posterior pharyngeal wall during respiration, mild bilateral symmetric vocal fold atrophy, and moderate post-cricoid edema. There was significant pooling of secretions in the piriform sinus.       Chest X-Rays: Elevation of the right hemidiaphragm.  Left retrocardiac opacity is noted which could be related to atelectasis, aspiration or pneumonia.  The heart is normal in size.  Right-sided PICC catheter has its tip in the distal SVC.  Skeletal structures are intact.    Prior diet: NPO, tube feedings as primary source of nutrition since recent MBSS on 1/11/19      Subjective     SLP re-consulted for clinical swallow eval. SLP confirmed with RN prior to entry. Pt found in bed awake and agreeable to participate in re-assessment. Pt's wife entered room during session.    Patient goals: "I wanna go home" per pt     Pain/Comfort:  · Pain Rating 1: 0/10    Objective:     Oral Musculature Evaluation  · Oral Musculature: general weakness  · Dentition: present and adequate  · Secretion Management: coughing on secretions  · Mucosal Quality: sticky  · Mandibular Strength and Mobility: (reduced strength)  · Oral Labial Strength and Mobility: WFL  · Lingual Strength and Mobility: (reduced " strength)  · Buccal Strength and Mobility: (reduced strength)  · Volitional Cough: elicited; productive  · Volitional Swallow: (delayed trigger of swallow and dcr'd laryngeal elevation/excursion per hand palpation)  · Voice Prior to PO Intake: (mildly hoarse with wet vocal quality observed and required constant throat clearing to clear)    Bedside Swallow Eval: Pt seen at bedside for re-assessment of swallowing mechanism. However, pt continues to demonstrate s/s of pharyngeal dysphagia and limited pharyngeal clearance c/b overt wet vocal quality prior to PO trials, as well as s/p swallow thin liquid trials, too.      Consistencies Assessed:  · Thin liquids -via cup sips x2     Oral Phase:   · poor management of oral secretions-- required suctioning from SLP and cuing from SLP to perform suctioning throughout session  · Slow oral transit time    Pharyngeal Phase:   Across all trials of thin liquids (x2):  · decreased hyolaryngeal excursion to palpation  · delayed swallow initation  · multiple spontaneous swallows  · throat clearing  · wet vocal quality after swallow    Compensatory Strategies  · Multiple swallows  · Volitional cough/throat clear    Treatment/Education: SLP provided extensive education to pt and pt's wife re: pt's POC, specifically concerning pt's swallowing mechanism/dysphagia. SLP explained outcomes of today's re-assessment and purpose of con't strict NPO recs/aspiration precautions and reason for continuing alternative means of nutrition/hydration/medication via tube feeding. SLP also provided education, via visual aid on computer present in room--SLP demonstrated normal vs abnormal swallowing mechanism, specifically the pharyngeal phase of swallowing, and deficits observed during pt's most recent MBSS (1/11/19) and the impact of pt's cervical osteophytes. SLP answered all questions/concers, within SLP's scope of practice, presented by pt and pt's wife re: clarification on results from MBSS, NPO  recs/aspiration precautions and skilled ST services at next level of care. Pt and pt's wife verbalized understanding and appreciated all education provided--Pt will require reinforcement of education.    Assessment:     Hoang Santos Jr. is a 68 y.o. male with an SLP diagnosis of oropharyngeal Dysphagia.  He presents with difficulty managing secretions and overt s/s of pharyngeal dysphagia c/b overt wet vocal quality prior to PO trials and s/p swallow for limited trials of thin liquids. Pt is still at HIGH risk of aspiration. Recs: Pt should con't alternative means of nutrition/hydration/medication, as pt is not safe for an oral diet at this time 2/2 pt's dx oropharyngeal dysphagia, d/t dcr'd strength in oral and pharyngeal musculature, which is further impacted by pt's cervical osteophytes present at level of epiglottic-- cervical osteophytes constrict pt's bolus pathway and restrict pt's epiglottic inversion, resulting in laryngeal penetration and tracheal aspiration across all PO trials (thin liquids, nectar thick liquids, and honey thick liquids, as well as puree textures) during most recent MBSS 1/11/19. Pt would benefit from continued skilled ST services at next level of care for dysphagia remediation (to improve musculature involved in swallowing mechanism). SLP notified MD team of results/recs and dicussed POC. MD team in agreement with SLP recs.    Goals:   Multidisciplinary Problems     SLP Goals        Problem: SLP Goal    Goal Priority Disciplines Outcome   SLP Goal     SLP Ongoing (interventions implemented as appropriate)   Description:  Short Term Goals:   1. Pt will participate in BSS to determine least restrictive diet.- MET 1/31  2. Pt will participate in indirect dysphagia exercises to strengthen musculature for swallowing mechanism with mod-max effort.-ongoing  3. Pt will participate in re-assessment of swallowing mechanism to determine least restrictive PO diet- MET 2/5                           Plan:     · Patient to be seen:  3 x/week   · Plan of Care expires:  02/18/19  · Plan of Care reviewed with:  patient, spouse(MD Team)   · SLP Follow-Up:  Yes       Discharge recommendations:  (con't skilled ST services at next level of care)   Time Tracking:     SLP Treatment Date:   02/05/19  Speech Start Time:  1240  Speech Stop Time:  1305     Speech Total Time (min):  25 min    Billable Minutes: Treatment Swallowing Dysfunction 12 and Seld Care/Home Management Training 13    AJITH Ramírez, CCC-SLP  02/05/2019

## 2019-02-05 NOTE — PLAN OF CARE
VN cued into pt's room for introduction. Informed pt that VN would be working closely with floor RN, PCT and MD. Pt denies pain and nausea. POC and fall precautions reviewed with pt. Pt verbalized understanding. Pt requests assistance to use BSC. Informed pct. VN will cont to be avail and intervene prn.      02/05/19 1113   Type of Frequent Check   Type Other (see comments)  (VN round via telecommunication)   Safety/Activity   Patient Rounds visualized patient  (via telecommunication)   Safety Promotion/Fall Prevention Fall Risk reviewed with patient/family   Pain/Comfort/Sleep   Preferred Pain Scale number (Numeric Rating Pain Scale)   Pain Rating (0-10): Rest 0   Nausea/Vomiting   Nausea/Vomiting No Nausea and Vomiting   Assessments (Pre/Post)   Level of Consciousness (AVPU) alert

## 2019-02-06 LAB
ALBUMIN SERPL BCP-MCNC: 2.9 G/DL
ALP SERPL-CCNC: 145 U/L
ALT SERPL W/O P-5'-P-CCNC: 58 U/L
ANION GAP SERPL CALC-SCNC: 9 MMOL/L
APTT BLDCRRT: 29.2 SEC
AST SERPL-CCNC: 85 U/L
BASOPHILS # BLD AUTO: 0.04 K/UL
BASOPHILS NFR BLD: 0.4 %
BILIRUB SERPL-MCNC: 1.4 MG/DL
BUN SERPL-MCNC: 45 MG/DL
CALCIUM SERPL-MCNC: 9.8 MG/DL
CHLORIDE SERPL-SCNC: 99 MMOL/L
CO2 SERPL-SCNC: 33 MMOL/L
CREAT SERPL-MCNC: 1.3 MG/DL
DIFFERENTIAL METHOD: ABNORMAL
EOSINOPHIL # BLD AUTO: 0 K/UL
EOSINOPHIL NFR BLD: 0.4 %
ERYTHROCYTE [DISTWIDTH] IN BLOOD BY AUTOMATED COUNT: 15.6 %
EST. GFR  (AFRICAN AMERICAN): >60 ML/MIN/1.73 M^2
EST. GFR  (NON AFRICAN AMERICAN): 56 ML/MIN/1.73 M^2
GLUCOSE SERPL-MCNC: 121 MG/DL
HCT VFR BLD AUTO: 32.8 %
HGB BLD-MCNC: 9.9 G/DL
INR PPP: 1.1
LYMPHOCYTES # BLD AUTO: 1.6 K/UL
LYMPHOCYTES NFR BLD: 14.3 %
MAGNESIUM SERPL-MCNC: 2.4 MG/DL
MCH RBC QN AUTO: 29.1 PG
MCHC RBC AUTO-ENTMCNC: 30.2 G/DL
MCV RBC AUTO: 97 FL
MONOCYTES # BLD AUTO: 2.9 K/UL
MONOCYTES NFR BLD: 25.8 %
NEUTROPHILS # BLD AUTO: 6.7 K/UL
NEUTROPHILS NFR BLD: 59.1 %
PHOSPHATE SERPL-MCNC: 3.9 MG/DL
PLATELET # BLD AUTO: 206 K/UL
PMV BLD AUTO: 10.7 FL
POTASSIUM SERPL-SCNC: 3.9 MMOL/L
PROT SERPL-MCNC: 7.5 G/DL
PROTHROMBIN TIME: 11.4 SEC
RBC # BLD AUTO: 3.4 M/UL
SODIUM SERPL-SCNC: 141 MMOL/L
WBC # BLD AUTO: 11.28 K/UL

## 2019-02-06 PROCEDURE — 83735 ASSAY OF MAGNESIUM: CPT

## 2019-02-06 PROCEDURE — 97116 GAIT TRAINING THERAPY: CPT

## 2019-02-06 PROCEDURE — 80053 COMPREHEN METABOLIC PANEL: CPT

## 2019-02-06 PROCEDURE — 63600175 PHARM REV CODE 636 W HCPCS: Performed by: SURGERY

## 2019-02-06 PROCEDURE — B4185 PARENTERAL SOL 10 GM LIPIDS: HCPCS | Performed by: SURGERY

## 2019-02-06 PROCEDURE — 25000003 PHARM REV CODE 250: Performed by: SURGERY

## 2019-02-06 PROCEDURE — 94799 UNLISTED PULMONARY SVC/PX: CPT

## 2019-02-06 PROCEDURE — 97110 THERAPEUTIC EXERCISES: CPT

## 2019-02-06 PROCEDURE — 11000001 HC ACUTE MED/SURG PRIVATE ROOM

## 2019-02-06 PROCEDURE — 25000003 PHARM REV CODE 250: Performed by: STUDENT IN AN ORGANIZED HEALTH CARE EDUCATION/TRAINING PROGRAM

## 2019-02-06 PROCEDURE — 99900035 HC TECH TIME PER 15 MIN (STAT)

## 2019-02-06 PROCEDURE — 63600175 PHARM REV CODE 636 W HCPCS: Performed by: STUDENT IN AN ORGANIZED HEALTH CARE EDUCATION/TRAINING PROGRAM

## 2019-02-06 PROCEDURE — 94664 DEMO&/EVAL PT USE INHALER: CPT

## 2019-02-06 PROCEDURE — 27000646 HC AEROBIKA DEVICE

## 2019-02-06 PROCEDURE — 84100 ASSAY OF PHOSPHORUS: CPT

## 2019-02-06 PROCEDURE — 85610 PROTHROMBIN TIME: CPT

## 2019-02-06 PROCEDURE — 94761 N-INVAS EAR/PLS OXIMETRY MLT: CPT

## 2019-02-06 PROCEDURE — 25500020 PHARM REV CODE 255: Performed by: SURGERY

## 2019-02-06 PROCEDURE — S0171 BUMETANIDE 0.5 MG: HCPCS | Performed by: SURGERY

## 2019-02-06 PROCEDURE — 25000003 PHARM REV CODE 250: Performed by: ANESTHESIOLOGY

## 2019-02-06 PROCEDURE — 85025 COMPLETE CBC W/AUTO DIFF WBC: CPT

## 2019-02-06 PROCEDURE — 85730 THROMBOPLASTIN TIME PARTIAL: CPT

## 2019-02-06 RX ORDER — SODIUM CHLORIDE 9 MG/ML
INJECTION, SOLUTION INTRAVENOUS CONTINUOUS
Status: ACTIVE | OUTPATIENT
Start: 2019-02-06 | End: 2019-02-06

## 2019-02-06 RX ORDER — SODIUM CHLORIDE 9 MG/ML
INJECTION, SOLUTION INTRAVENOUS CONTINUOUS
Status: DISCONTINUED | OUTPATIENT
Start: 2019-02-06 | End: 2019-02-06

## 2019-02-06 RX ORDER — METOCLOPRAMIDE HYDROCHLORIDE 5 MG/ML
10 INJECTION INTRAMUSCULAR; INTRAVENOUS EVERY 6 HOURS
Status: DISCONTINUED | OUTPATIENT
Start: 2019-02-06 | End: 2019-02-08 | Stop reason: HOSPADM

## 2019-02-06 RX ORDER — ERYTHROMYCIN ETHYLSUCCINATE 400 MG/5ML
400 SUSPENSION ORAL
Status: DISCONTINUED | OUTPATIENT
Start: 2019-02-06 | End: 2019-02-07

## 2019-02-06 RX ORDER — PANTOPRAZOLE SODIUM 40 MG/1
40 TABLET, DELAYED RELEASE ORAL 2 TIMES DAILY
Status: DISCONTINUED | OUTPATIENT
Start: 2019-02-06 | End: 2019-02-08 | Stop reason: HOSPADM

## 2019-02-06 RX ADMIN — PANTOPRAZOLE SODIUM 40 MG: 40 TABLET, DELAYED RELEASE ORAL at 09:02

## 2019-02-06 RX ADMIN — CHLORHEXIDINE GLUCONATE 15 ML: 1.2 RINSE ORAL at 09:02

## 2019-02-06 RX ADMIN — METOCLOPRAMIDE 10 MG: 5 INJECTION, SOLUTION INTRAMUSCULAR; INTRAVENOUS at 11:02

## 2019-02-06 RX ADMIN — METOCLOPRAMIDE 10 MG: 5 INJECTION, SOLUTION INTRAMUSCULAR; INTRAVENOUS at 05:02

## 2019-02-06 RX ADMIN — ASCORBIC ACID, VITAMIN A PALMITATE, CHOLECALCIFEROL, THIAMINE HYDROCHLORIDE, RIBOFLAVIN-5 PHOSPHATE SODIUM, PYRIDOXINE HYDROCHLORIDE, NIACINAMIDE, DEXPANTHENOL, ALPHA-TOCOPHEROL ACETATE, VITAMIN K1, FOLIC ACID, BIOTIN, CYANOCOBALAMIN: 200; 3300; 200; 6; 3.6; 6; 40; 15; 10; 150; 600; 60; 5 INJECTION, SOLUTION INTRAVENOUS at 10:02

## 2019-02-06 RX ADMIN — FLUCONAZOLE 100 MG: 100 TABLET ORAL at 09:02

## 2019-02-06 RX ADMIN — ERYTHROMYCIN ETHYLSUCCINATE 400 MG: 400 SUSPENSION ORAL at 10:02

## 2019-02-06 RX ADMIN — CIPROFLOXACIN HYDROCHLORIDE 500 MG: 500 TABLET, FILM COATED ORAL at 09:02

## 2019-02-06 RX ADMIN — SODIUM CHLORIDE: 0.9 INJECTION, SOLUTION INTRAVENOUS at 11:02

## 2019-02-06 RX ADMIN — BUMETANIDE 0.5 MG: 0.25 INJECTION INTRAMUSCULAR; INTRAVENOUS at 09:02

## 2019-02-06 RX ADMIN — I.V. FAT EMULSION 250 ML: 20 EMULSION INTRAVENOUS at 10:02

## 2019-02-06 RX ADMIN — DIATRIZOATE MEGLUMINE AND DIATRIZOATE SODIUM 120 ML: 660; 100 LIQUID ORAL; RECTAL at 02:02

## 2019-02-06 RX ADMIN — CYANOCOBALAMIN 1000 MCG: 1000 INJECTION, SOLUTION INTRAMUSCULAR at 09:02

## 2019-02-06 NOTE — PROGRESS NOTES
was asked about restarting tube feedings and drain flushes, Dr. Adhikari stated to hold tube feedings at this time. Informed Dr. Adhikari that  stated to stop Dankin flush drain #2 and to flush both drains with NS.  Dr. Adhikari stated to do NS flushes also.  Will continue to monitor.

## 2019-02-06 NOTE — PLAN OF CARE
VN cued into pt's room for introduction. Informed pt that VN would be working closely with floor RN, PCT and MD. Pt denies pain and nausea. Pt c/o feeling cold. Denies shivering. Pt is afebrile. Bedside Rn informed. VN will cont to be avail and intervene prn.      02/06/19 1556   Type of Frequent Check   Type Other (see comments)  (VN round via telecommunication)   Safety/Activity   Patient Rounds visualized patient  (via telecommunication)   Pain/Comfort/Sleep   Preferred Pain Scale number (Numeric Rating Pain Scale)   Pain Rating (0-10): Rest 0   Nausea/Vomiting   Nausea/Vomiting No Vomiting   Assessments (Pre/Post)   Level of Consciousness (AVPU) alert

## 2019-02-06 NOTE — PT/OT/SLP PROGRESS
Speech Language Pathology      Hoang Santos Jr.  MRN: [id number]    Patient not seen today secondary to pt JUANITA for procedure. SLP will follow-up next date. SLP discussed pt's POC with MD Adhikari this PM. Pt will participate in another repeat MBSS next date to determine if pt demonstrates any functional change in swallowing mechanism.     AJITH Ramírez, CCC-SLP  2/6/2019

## 2019-02-06 NOTE — PROGRESS NOTES
Dr. Salvador stated to hold the tube feedings at this time until patient has procedure done today.  Drain care was discussed with  and Dr. Adhikari this morning, will clean up orders and clarify orders to be done involving drain care.  Drain labeled #1 to be clamped and flushed with NS 20 cc BID, drain #2  To beach bag is draining to gravity and was previously supposed to be flushed with Dankins but now per Dr. Salvador drain #2 is to be flushed with NS BID.  Drain #3 is clamped until it is OK to restart tube feedings, but medications are being crushed and flushed through this drain.  Orders are correct. Will pass on to next shift and continue correct orders.

## 2019-02-06 NOTE — PROGRESS NOTES
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 Doing well. Tolerating ice chips. Ambulating and out of bed to chair. Pain controlled. Some issues with feeding tube yesterday will try again today.    Scheduled Meds:   bumetanide  0.5 mg Intravenous Daily    chlorhexidine  15 mL Mouth/Throat BID    ciprofloxacin HCl  500 mg Per G Tube Q12H    cyanocobalamin  1,000 mcg Subcutaneous Daily    fluconazole  100 mg Per G Tube Daily    metoclopramide HCl  10 mg Intravenous Q6H    pantoprazole  40 mg Oral BID     Continuous Infusions:   Amino acid 5% - dextrose 15% (CLINIMIX-E) solution with additives (1L  provides 510 kcal/L dextrose, with 50 gm AA, 150 gm CHO, Na 35, K 30, Mg 5, Ca 4.5, Acetate 80, Cl 39, Phos 15) 50 mL/hr at 02/05/19 2214     PRN Meds:.sodium chloride, dextrose 50%, dextrose 50%, glucose, glucose, HYDROmorphone, insulin aspart U-100, naloxone, ondansetron, promethazine (PHENERGAN) IVPB, sodium chloride 0.9%    Objective:      Temp:  [98 °F (36.7 °C)-100.3 °F (37.9 °C)] 98.4 °F (36.9 °C)  Pulse:  [107-126] 116  Resp:  [17-20] 18  SpO2:  [94 %-98 %] 96 %  BP: ()/(53-70) 109/66  Weight change: 0 kg (0 lb)    Intake/Output Summary (Last 24 hours) at 2/6/2019 0936  Last data filed at 2/6/2019 0600  Gross per 24 hour   Intake 600 ml   Output 1515 ml   Net -915 ml     UOP 1L  Drains R:  10, B: 150, Gastrostomy: 0    Physical Exam:  GEN: awake, alert, NAD  CV: RRR  PULM: normal work of breathing, on NC  ABD: S/NT/ND, incision c/d/i, tubes in good position  EXT: Peripheral pulses present and equal bilaterally    Recent Labs   Lab 02/06/19  0550      K 3.9   CO2 33*   CL 99   BUN 45*   CREATININE 1.3   CALCIUM 9.8   PROT 7.5   AST 85*   ALT 58*   ALKPHOS 145*   BILITOT 1.4*     Recent Labs   Lab 02/06/19  0549   WBC 11.28   HGB 9.9*   HCT 32.8*          Significant Imaging: I have reviewed all pertinent imaging results/findings within the past 24 hours.    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  Now c sepsis likely from aspiration RLL PNA  S/p IR drain of abdominal fluid collection  1/24/18 - Laparotomy, ASHLEIGH, enterotomy repair, EGD, GastroJ, Retroperitoneal peripancreatic abscess open drainage, Omental flap creation, Lori gastrostomy c 24 Fr Malecot     Pain control prn  Monitor UOP, trend Cr, try to avoid nephrotoxics - currently Cr wnl, UOP adequate  cont TPN/NPO, PPI --> Lytes replacing prn  Continue to clamp Drain #1 biliary drain and flush with 20 cc of NS BID  Abd culture grew Kluyvera ascorbata on abx - BID Dacon solution irrigation (10cc) of RUQ drain #2 , Continue amphotericin IV  Restart TF today possibly after tube study: May begin with 60 mL bolus TF to INC every other feed by 60 mL until reach goal of 240 mL 5 x day  Add reglan 10mg q 6 hours  Continue to monitor H/H, on BID protonix  OOB to chair  DC lovenox  Encourage ambulation  Encourage IS and aggressive respiratory toilet  Dispo -Cont inpt care      Jakob Adhikari MD    II  [phone]

## 2019-02-06 NOTE — PT/OT/SLP PROGRESS
Occupational Therapy      Patient Name:  Hoang Santos Jr.   MRN:  [id number]    OT attempted tx 1:00 PM and 3:00 PM   Patient not seen today secondary to pt refusal. Will follow-up as able.    Caitlyn Carrion OT  2/6/2019

## 2019-02-06 NOTE — PT/OT/SLP PROGRESS
Physical Therapy Treatment    Patient Name:  Hoang Santos Jr.   MRN:  [id number]    Recommendations:     Discharge Recommendations:  ( PT/OT)   Discharge Equipment Recommendations: shower chair   Barriers to discharge: None    Assessment:     Hoang Santos Jr. is a 68 y.o. male admitted with a medical diagnosis of Aspiration pneumonia of right lower lobe.  He presents with the following impairments/functional limitations:  weakness, impaired endurance, impaired self care skills, impaired functional mobilty, gait instability, impaired balance, impaired skin. Pt making good progress with functional mobility and activity tolerance. Pt ambulated 220 ft with RW and supervision and 150 ft with no AD and CGA/SBA. Recommending  PT upon d/c.    Rehab Prognosis: Good; patient would benefit from acute skilled PT services to address these deficits and reach maximum level of function.    Recent Surgery: Procedure(s) (LRB):  CHOLANGIOGRAM, PERCUTANEOUS, TRANSHEPATIC (Right) 8 Days Post-Op    Plan:     During this hospitalization, patient to be seen 5 x/week to address the identified rehab impairments via gait training, therapeutic activities, therapeutic exercises and progress toward the following goals:    · Plan of Care Expires:  02/17/19    Subjective     Chief Complaint: none  Patient/Family Comments/goals: pt reporting he is feeling better today compared to yesterday  Pain/Comfort:  · Pain Rating 1: 0/10  · Pain Rating Post-Intervention 1: 0/10      Objective:     Communicated with MARA Gonzalez prior to session.  Patient found all lines intact telemetry, peripheral IV, oxygen, JAY drain  upon PT entry to room.     General Precautions: Standard, aspiration, fall, NPO   Orthopedic Precautions:N/A   Braces: N/A     Functional Mobility:  · Bed Mobility:     · Scooting: supervision  · Supine to Sit: supervision  · Transfers:     · Sit to Stand:  stand by assistance with no AD and rolling walker  · Bed to Chair: stand by  assistance with  no AD and rolling walker  using  Step Transfer  · Gait: 220 ft with RW and supervision, 150 ft with no AD and CGA initially then progressed to SBA. Pt on RA during session and SaO2 was 93-94% post gait.      AM-PAC 6 CLICK MOBILITY  Turning over in bed (including adjusting bedclothes, sheets and blankets)?: 4  Sitting down on and standing up from a chair with arms (e.g., wheelchair, bedside commode, etc.): 3  Moving from lying on back to sitting on the side of the bed?: 3  Moving to and from a bed to a chair (including a wheelchair)?: 3  Need to walk in hospital room?: 3  Climbing 3-5 steps with a railing?: 3  Basic Mobility Total Score: 19       Therapeutic Activities and Exercises:  Pt ambulated 2 bouts as reported above with seated rest break between bouts.  Completed BLE seated exercises x 10 reps: APs and LAQs.  Left up in recliner chair.    Patient left up in chair with all lines intact, call button in reach and RN and MD present..    GOALS:   Multidisciplinary Problems     Physical Therapy Goals        Problem: Physical Therapy Goal    Goal Priority Disciplines Outcome Goal Variances Interventions   Physical Therapy Goal     PT, PT/OT Ongoing (interventions implemented as appropriate)     Description:  Goals to be met by: 2019  Patient will increase functional independence with mobility by performin. Supine to sit with Modified [place]  2. Sit to stand transfer with Modified [place]  3. Gait  x 150 feet with Modified [place] using Rolling Walker.   4. Lower extremity exercise program x10 reps per handout, with independence                       Time Tracking:     PT Received On: 19  PT Start Time: 910     PT Stop Time: 936  PT Total Time (min): 26 min     Billable Minutes: Gait Training 16 and Therapeutic Exercise 10    Treatment Type: Treatment  PT/PTA: PT     PTA Visit Number: 0     Jeanine Zapata, PT  2019

## 2019-02-06 NOTE — PROGRESS NOTES
.Pharmacy New Medication Education    Patient accepted medication education.    Pharmacy educated patient on name and purpose of medications and possible side effects, using the teach-back method.     Current Inpatient Medication Orders   0.9% NaCl infusion (for blood administration)   0.9% NaCl infusion   Amino acid 5% - dextrose 15% (CLINIMIX-E) solution with additives (1L provides 510 kcal/L dextrose, with 50 gm AA, 150 gm CHO, Na 35, K 30, Mg 5, Ca 4.5, Acetate 80, Cl 39, Phos 15)   bumetanide injection 0.5 mg   chlorhexidine 0.12 % solution 15 mL   ciprofloxacin HCl tablet 500 mg   cyanocobalamin injection 1,000 mcg   dextrose 50% injection 12.5 g   dextrose 50% injection 25 g   fluconazole tablet 100 mg   glucose chewable tablet 16 g   glucose chewable tablet 24 g   hydromorphone (PF) injection 0.5 mg   insulin aspart U-100 pen 1-10 Units   metoclopramide HCl injection 10 mg   naloxone 0.4 mg/mL injection 0.02 mg   ondansetron disintegrating tablet 8 mg   pantoprazole EC tablet 40 mg   promethazine (PHENERGAN) 6.25 mg in dextrose 5 % 50 mL IVPB   sodium chloride 0.9% flush 5 mL       Learners of pharmacy medication education included:  Patient    Patient +/- learner response:  Verbalized Understanding, Teachback

## 2019-02-06 NOTE — CONSULTS
RE: TF Recommendations    Spoke with Dr Adhikari and Dr Steiner this morning. Patient with +emesis yesterday. Study for gastric emptying pending. Patient has PEG not G/J.     If patient medically able to restart TF, rec change to Nutren 2.0 5 cans per day to provide 2400 calories, 96 gm Pro, 843 mL water. May begin with 60 mL bolus TF to INC every other feed by 60 mL until reach goal of 240 mL 5 x day. Flush with 60 mL water before and after bolus TF. Patient will need 250 mL of additional water TID to meet fluid needs.     Tracie Weil Cavalier, NORMAN, LDN, [facility]  [phone]  [facility]

## 2019-02-06 NOTE — NURSING
"Pt sitting in bed HOB at 30 degrees. Pt stated his throat felt like it was irritated/burning. Residual on PEG tube checked 40cc pulled back and returned pt stated he felt no nausea. RUQ drain flushd with 10cc of normal saline solution pt stated "something doesn't feel right I feel too full" drain unclamped 10cc of bile drained. Biliary drain to beach bag flushed with 10cc of dakins. Pt vomitted 350cc of red brick colored emesis feeding stopped and suction provided. MD Adhikari called and informed of situation he stated to "discontinue tube feeding and clamp peg tube." verbal orders completed. Pt states no follow up nausea. Will continue to monitor.   "

## 2019-02-06 NOTE — PLAN OF CARE
Problem: Physical Therapy Goal  Goal: Physical Therapy Goal  Goals to be met by: 2019  Patient will increase functional independence with mobility by performin. Supine to sit with Modified [place]  2. Sit to stand transfer with Modified [place]  3. Gait  x 150 feet with Modified [place] using Rolling Walker.   4. Lower extremity exercise program x10 reps per handout, with independence      Outcome: Ongoing (interventions implemented as appropriate)  Pt making good progress with functional mobility and activity tolerance. Pt ambulated 220 ft with RW and supervision and 150 ft with no AD and CGA/SBA. Recommending  PT upon d/c.

## 2019-02-07 LAB
ALBUMIN SERPL BCP-MCNC: 2.8 G/DL
ALP SERPL-CCNC: 143 U/L
ALT SERPL W/O P-5'-P-CCNC: 53 U/L
ANION GAP SERPL CALC-SCNC: 10 MMOL/L
APTT BLDCRRT: 26.8 SEC
AST SERPL-CCNC: 69 U/L
BASOPHILS # BLD AUTO: 0.05 K/UL
BASOPHILS NFR BLD: 0.4 %
BILIRUB SERPL-MCNC: 1.1 MG/DL
BUN SERPL-MCNC: 42 MG/DL
CALCIUM SERPL-MCNC: 9.6 MG/DL
CHLORIDE SERPL-SCNC: 103 MMOL/L
CO2 SERPL-SCNC: 30 MMOL/L
CREAT SERPL-MCNC: 1.3 MG/DL
DIFFERENTIAL METHOD: ABNORMAL
EOSINOPHIL # BLD AUTO: 0 K/UL
EOSINOPHIL NFR BLD: 0.3 %
ERYTHROCYTE [DISTWIDTH] IN BLOOD BY AUTOMATED COUNT: 15.8 %
EST. GFR  (AFRICAN AMERICAN): >60 ML/MIN/1.73 M^2
EST. GFR  (NON AFRICAN AMERICAN): 56 ML/MIN/1.73 M^2
GLUCOSE SERPL-MCNC: 121 MG/DL
HCT VFR BLD AUTO: 31.5 %
HGB BLD-MCNC: 9.5 G/DL
INR PPP: 1.2
LYMPHOCYTES # BLD AUTO: 3.2 K/UL
LYMPHOCYTES NFR BLD: 26.5 %
MAGNESIUM SERPL-MCNC: 2.2 MG/DL
MCH RBC QN AUTO: 29.2 PG
MCHC RBC AUTO-ENTMCNC: 30.2 G/DL
MCV RBC AUTO: 97 FL
MONOCYTES # BLD AUTO: 0.8 K/UL
MONOCYTES NFR BLD: 6.5 %
NEUTROPHILS # BLD AUTO: 8.1 K/UL
NEUTROPHILS NFR BLD: 66.3 %
PHOSPHATE SERPL-MCNC: 3.9 MG/DL
PLATELET # BLD AUTO: 246 K/UL
PMV BLD AUTO: 10.8 FL
POTASSIUM SERPL-SCNC: 3.9 MMOL/L
PROT SERPL-MCNC: 7.6 G/DL
PROTHROMBIN TIME: 12.2 SEC
RBC # BLD AUTO: 3.25 M/UL
SODIUM SERPL-SCNC: 143 MMOL/L
WBC # BLD AUTO: 12.2 K/UL

## 2019-02-07 PROCEDURE — 25500020 PHARM REV CODE 255: Performed by: SURGERY

## 2019-02-07 PROCEDURE — 99900035 HC TECH TIME PER 15 MIN (STAT)

## 2019-02-07 PROCEDURE — 25000003 PHARM REV CODE 250: Performed by: ANESTHESIOLOGY

## 2019-02-07 PROCEDURE — 94799 UNLISTED PULMONARY SVC/PX: CPT

## 2019-02-07 PROCEDURE — 11000001 HC ACUTE MED/SURG PRIVATE ROOM

## 2019-02-07 PROCEDURE — 63600175 PHARM REV CODE 636 W HCPCS: Performed by: STUDENT IN AN ORGANIZED HEALTH CARE EDUCATION/TRAINING PROGRAM

## 2019-02-07 PROCEDURE — 94761 N-INVAS EAR/PLS OXIMETRY MLT: CPT

## 2019-02-07 PROCEDURE — 97116 GAIT TRAINING THERAPY: CPT

## 2019-02-07 PROCEDURE — 97803 MED NUTRITION INDIV SUBSEQ: CPT | Performed by: DIETITIAN, REGISTERED

## 2019-02-07 PROCEDURE — 85025 COMPLETE CBC W/AUTO DIFF WBC: CPT

## 2019-02-07 PROCEDURE — 97530 THERAPEUTIC ACTIVITIES: CPT

## 2019-02-07 PROCEDURE — 83735 ASSAY OF MAGNESIUM: CPT

## 2019-02-07 PROCEDURE — 80053 COMPREHEN METABOLIC PANEL: CPT

## 2019-02-07 PROCEDURE — 27000221 HC OXYGEN, UP TO 24 HOURS

## 2019-02-07 PROCEDURE — 84100 ASSAY OF PHOSPHORUS: CPT

## 2019-02-07 PROCEDURE — 92611 MOTION FLUOROSCOPY/SWALLOW: CPT

## 2019-02-07 PROCEDURE — 25000003 PHARM REV CODE 250: Performed by: STUDENT IN AN ORGANIZED HEALTH CARE EDUCATION/TRAINING PROGRAM

## 2019-02-07 PROCEDURE — S0171 BUMETANIDE 0.5 MG: HCPCS | Performed by: SURGERY

## 2019-02-07 PROCEDURE — 63600175 PHARM REV CODE 636 W HCPCS: Performed by: SURGERY

## 2019-02-07 PROCEDURE — 94664 DEMO&/EVAL PT USE INHALER: CPT

## 2019-02-07 PROCEDURE — 25000003 PHARM REV CODE 250: Performed by: SURGERY

## 2019-02-07 PROCEDURE — 85610 PROTHROMBIN TIME: CPT

## 2019-02-07 PROCEDURE — 85730 THROMBOPLASTIN TIME PARTIAL: CPT

## 2019-02-07 RX ORDER — SODIUM CHLORIDE 0.9 % (FLUSH) 0.9 %
10 SYRINGE (ML) INJECTION EVERY 6 HOURS
Status: DISCONTINUED | OUTPATIENT
Start: 2019-02-08 | End: 2019-02-08 | Stop reason: HOSPADM

## 2019-02-07 RX ORDER — SODIUM CHLORIDE 0.9 % (FLUSH) 0.9 %
10 SYRINGE (ML) INJECTION
Status: DISCONTINUED | OUTPATIENT
Start: 2019-02-07 | End: 2019-02-08 | Stop reason: HOSPADM

## 2019-02-07 RX ADMIN — METOCLOPRAMIDE 10 MG: 5 INJECTION, SOLUTION INTRAMUSCULAR; INTRAVENOUS at 06:02

## 2019-02-07 RX ADMIN — FLUCONAZOLE 100 MG: 100 TABLET ORAL at 10:02

## 2019-02-07 RX ADMIN — METOCLOPRAMIDE 10 MG: 5 INJECTION, SOLUTION INTRAMUSCULAR; INTRAVENOUS at 05:02

## 2019-02-07 RX ADMIN — CHLORHEXIDINE GLUCONATE 15 ML: 1.2 RINSE ORAL at 10:02

## 2019-02-07 RX ADMIN — IOHEXOL 100 ML: 350 INJECTION, SOLUTION INTRAVENOUS at 01:02

## 2019-02-07 RX ADMIN — CHLORHEXIDINE GLUCONATE 15 ML: 1.2 RINSE ORAL at 09:02

## 2019-02-07 RX ADMIN — CYANOCOBALAMIN 1000 MCG: 1000 INJECTION, SOLUTION INTRAMUSCULAR at 10:02

## 2019-02-07 RX ADMIN — METOCLOPRAMIDE 10 MG: 5 INJECTION, SOLUTION INTRAMUSCULAR; INTRAVENOUS at 11:02

## 2019-02-07 RX ADMIN — PANTOPRAZOLE SODIUM 40 MG: 40 TABLET, DELAYED RELEASE ORAL at 10:02

## 2019-02-07 RX ADMIN — METOCLOPRAMIDE 10 MG: 5 INJECTION, SOLUTION INTRAMUSCULAR; INTRAVENOUS at 12:02

## 2019-02-07 RX ADMIN — CIPROFLOXACIN HYDROCHLORIDE 500 MG: 500 TABLET, FILM COATED ORAL at 09:02

## 2019-02-07 RX ADMIN — ASCORBIC ACID, VITAMIN A PALMITATE, CHOLECALCIFEROL, THIAMINE HYDROCHLORIDE, RIBOFLAVIN-5 PHOSPHATE SODIUM, PYRIDOXINE HYDROCHLORIDE, NIACINAMIDE, DEXPANTHENOL, ALPHA-TOCOPHEROL ACETATE, VITAMIN K1, FOLIC ACID, BIOTIN, CYANOCOBALAMIN: 200; 3300; 200; 6; 3.6; 6; 40; 15; 10; 150; 600; 60; 5 INJECTION, SOLUTION INTRAVENOUS at 11:02

## 2019-02-07 RX ADMIN — ERYTHROMYCIN ETHYLSUCCINATE 400 MG: 400 SUSPENSION ORAL at 10:02

## 2019-02-07 RX ADMIN — CIPROFLOXACIN HYDROCHLORIDE 500 MG: 500 TABLET, FILM COATED ORAL at 10:02

## 2019-02-07 RX ADMIN — IOHEXOL 10 ML: 350 INJECTION, SOLUTION INTRAVENOUS at 01:02

## 2019-02-07 RX ADMIN — IOHEXOL 20 ML: 350 INJECTION, SOLUTION INTRAVENOUS at 01:02

## 2019-02-07 RX ADMIN — BUMETANIDE 0.5 MG: 0.25 INJECTION INTRAMUSCULAR; INTRAVENOUS at 10:02

## 2019-02-07 RX ADMIN — PANTOPRAZOLE SODIUM 40 MG: 40 TABLET, DELAYED RELEASE ORAL at 09:02

## 2019-02-07 NOTE — PLAN OF CARE
Problem: Parenteral Nutrition  Goal: Effective Intravenous Nutrition Therapy Delivery    Intervention: Optimize Intravenous Nutrition Delivery  Recommendation  1. Continue with current TPN and rate.    2. For discharge home, TPN to infuse x 12 hrs with 336 gm Dex, 120 gm Pro, 50 gm Fat to provide 2125 calories, 120 gm Pro, GIR 4.7 mg/kg/min.  Infuse company RD to adjust lytes and dextrose taper as warranted.      Goals: 1. Meet >85% EEN/EPN daily.   Nutrition Goal Status: goal not met  Communication of RD Recs: reviewed with physician     Nutrition Discharge Planning: Discharge home on nocturnal TPN

## 2019-02-07 NOTE — PLAN OF CARE
Problem: Physical Therapy Goal  Goal: Physical Therapy Goal  Goals to be met by: 2019  Patient will increase functional independence with mobility by performin. Supine to sit with Modified [place]  2. Sit to stand transfer with Modified [place]  3. Gait  x 150 feet with Modified [place] using Rolling Walker.   4. Lower extremity exercise program x10 reps per handout, with independence      Outcome: Ongoing (interventions implemented as appropriate)  Goals ongoing

## 2019-02-07 NOTE — PROCEDURES
Modified Barium Swallow    Patient Name:  Hoang Santos Jr.   MRN:  [id number]      Recommendations:     Recommendations:                General Recommendations:  Dysphagia therapy  Diet recommendations:  NPO, NPO   Aspiration Precautions: Frequent oral care and Strict aspiration precautions   General Precautions: Standard, aspiration, NPO, fall  Communication strategies:  none    Referral     Reason for Referral  Patient was referred for a Modified Barium Swallow Study to assess the efficiency of his/her swallow function, rule out aspiration and make recommendations regarding safe dietary consistencies, effective compensatory strategies, and safe eating environment.     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-C3, C3-C4, and C4-C5  · Supplement O2 in place via nasal cannula 2L    Oral Peripheral Examination  Oral Musculature: general weakness  Dentition: present and adequate  Secretion Management: coughing on secretions  Mucosal Quality: sticky  Mandibular Strength and Mobility: (reduced strength)  Oral Labial Strength and Mobility: WFL  Lingual Strength and Mobility: (reduced strength)  Buccal Strength and Mobility: (reduced strength)  Volitional Cough: elicited; productive  Volitional Swallow: (delayed trigger of swallow and dcr'd laryngeal elevation/excursion per hand palpation)  Voice Prior to PO Intake: (mildly hoarse with wet vocal quality observed and required constant throat clearing to clear)    Consistencies Assessed  THIN: 5cc, 10cc of thin liquids barium via cup, 15cc  of thin liquid barium via straw  NECTAR: 5cc, 10cc of nectar thick liquid barium via cup, 15 cc of nectar thick liquid barium via straw  HONEY: tsp amount of honey thick liquid barium  Solid consistencies not assessed 2/2 pt with overt aspiration on all given liquid consistencies     Oral Preparation/Oral Phase  Pt continues to present with mild oral dysphagia c/b:  · Poor bolus formation and control-- for   · Oral residue present post swallow-- trace-mild residuals s/p swallow across all trials  · Decreased base of tongue mobility/strength --across all consistencies      Pharyngeal Phase   -Pt continues to present with severe pharyngeal dysphagia c/b dcr'd laryngeal elevation/excursion      -Pt also demonstrates significantly reduced epiglottic inversion and reduced airway protection d/t cervical osteophytes located at level C4-C5, which limited pt's epiglottic inversion. Pt demonstrated deep laryngeal penetration (at level of vocal cords) during the swallow of thin liquids (5cc ) and with overt aspiration during the swallow for thin liquids (10cc), nectar thick liquids (5cc) and honey thick liquids (5cc).     -Pt also demonstrated reduced tongue base retraction and reduced pharyngeal constriction, resulting in moderate residuals in vallecula, pyriform sinuses and along pharyngeal wall s/p swallow. As a result, pt observed with aspiration after the swallow from attempting to clear moderate pharyngeal residuals within additional swallows for thin liquids (10cc), nectar thick liquids (5cc) and honey thick liquids (5c).   ? Pt with limited success in safely clearing pharyngeal residuals, as well as ejecting aspirated materials-- Pt with weak cough production  required further instruction from SLP to perform multiple throat clear and swallows to adequately clear lresiduals present.      Pt demonstrated one episode of deep laryngeal penetration for thin liquids (5cc) and consistent episodes of overt aspiration under  fluoro for thin liquids (10cc), nectar thick liquids (5cc), and honey thick liquids (5cc). Pt is NOT safe for an oral diet, at this time, and should continue alternative means of nutrition/hydration/medication 2/2 pt is still at HIGH RISK OF ASPIRATION.      Cervical Esophageal Phase  · UES appeared to accommodate all bolus types without stasis or retrograde movement observed     Assessment:     Impressions  ·  Pt presents with similar outcomes, as compared to most recent MBSS (1/11/19), and continues to demonstrate mild oral dysphagia and severe pharyngeal dysphagia c/b above detailed deficits-- please refer to above notes for further explanation.     Prognosis: Fair    Barriers:  · Fatigue    Plan  Pt will continue to participate in indirect dysphagia exercises to strengthen musculature for swallowing mechanism with mod-max effort.    Education  SLP educated pt and pt's wife (who was preset in fluoro room for MBSS) on role purpose of repeat MBSS/results, con't NPO recs/strict aspiration precautions and con't POC. SLP re-educated pt and pt's wife, via radiographic screen, on anatomy and physiology of normal swallowing mechanism vs. Pt's swallowing mechanism and deficits observed, as well as risks associated (aspiration PNA). Pt and pt's wife acknowledged and verbalized understanding-- both appreciative of education provided.   SLP also called MD Adhikari and educated MD on results from repeat MBSS and con't recs mentioned above. MD Adhikari acknowledeged and verbalized understanding and stated they will be sending pt home on TPN 2/2 pt not tolerating tube feedings at this time-- SLP presented concern d/t TPN is not a long term solution- MD stated long term goal is for pt to receive nutrition/hydration/medication via tube feedings, when appropriate.       Goals:   Multidisciplinary Problems     SLP Goals        Problem: SLP Goal    Goal Priority Disciplines Outcome   SLP Goal     SLP Ongoing (interventions implemented as  appropriate)   Description:  Short Term Goals:   1. Pt will participate in BSS to determine least restrictive diet.- MET 1/31  2. Pt will participate in indirect dysphagia exercises to strengthen musculature for swallowing mechanism with mod-max effort.-ongoing  3. Pt will participate in re-assessment of swallowing mechanism to determine least restrictive PO diet- MET 2/5  4. Pt will successfully participate in repeat Modified Barium Swallow study to radiographically assess swallow function, rule out aspiration and determine safest/least restrictive diet.- MET 2/7                             Plan:   · Patient to be seen:  Therapy Frequency: 3 x/week   · Plan of Care expires:  02/18/19  · Plan of Care reviewed with:  patient, spouse(MD Adhikari)        Discharge recommendations:  ( PT and ST services)   Barriers to Discharge:  None    Time Tracking:   SLP Treatment Date:   02/07/19  Speech Start Time:  1035  Speech Stop Time:  1050     Speech Total Time (min):  15 min    AJITH Ramírez, CCC-SLP  02/07/2019

## 2019-02-07 NOTE — PROGRESS NOTES
.Pharmacy New Medication Education    Patient accepted medication education.    Pharmacy educated patient on name and purpose of medications and possible side effects, using the teach-back method.     erythromycin 400 mg/5 mL suspension 400 mg    Learners of pharmacy medication education included:  Patient    Patient +/- learner response:  Verbalized Understanding, Teachback

## 2019-02-07 NOTE — NURSING
Pt in NAD. Residual checked per Gtube per MD order. Fluid pulled back is dark brick red/brown 180cc pulled back pt reports the feeling of fullness in abdomen. MD Negrete secure chatted with info. No new orders at this time. Will continue to monitor.

## 2019-02-07 NOTE — PROGRESS NOTES
Dr. Steiner called nurse.  Drain #2 that is connected to beach bag can be flushed with Dankins solution.  Drain #1 can be flushed with water.  Drain #3 can be flushed with water and residuals should be checked q6h and documented.  Will place order.

## 2019-02-07 NOTE — PLAN OF CARE
Problem: SLP Goal  Goal: SLP Goal  Short Term Goals:   1. Pt will participate in BSS to determine least restrictive diet.- MET 1/31  2. Pt will participate in indirect dysphagia exercises to strengthen musculature for swallowing mechanism with mod-max effort.-ongoing  3. Pt will participate in re-assessment of swallowing mechanism to determine least restrictive PO diet- MET 2/5  4. Pt will successfully participate in repeat Modified Barium Swallow study to radiographically assess swallow function, rule out aspiration and determine safest/least restrictive diet.- MET 2/7           Outcome: Ongoing (interventions implemented as appropriate)  2/7/19: Pt participated in repeat MBSS to objectively r/o aspiration. Pt continues to present with mild oral dysphagia and severe pharyngeal dysphagia-- See report for full details. Pt with several episodes of overt aspiration during and after the swallow for thin liquids, nectar thick liquids and honey thick liquids. SLP recs: Con't NPO with alternative means of nutrition/hydration/medication and con't with skilled ST services for remediation of dysphagia. SLP notified MD Adhikari of results/recs.  MARY Ramírez., CCC-SLP  Speech-Language Pathologist

## 2019-02-07 NOTE — PROGRESS NOTES
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 Made NPO. Tolerating ice chips. Ambulating and out of bed to chair. Pain controlled. Patient KUB performed yesterday did not show progression of contrast past the stomach.    Scheduled Meds:   bumetanide  0.5 mg Intravenous Daily    chlorhexidine  15 mL Mouth/Throat BID    ciprofloxacin HCl  500 mg Per G Tube Q12H    cyanocobalamin  1,000 mcg Subcutaneous Daily    erythromycin  400 mg Oral QID (WM & HS)    fat emulsion  250 mL Intravenous Daily    fluconazole  100 mg Per G Tube Daily    metoclopramide HCl  10 mg Intravenous Q6H    pantoprazole  40 mg Oral BID     Continuous Infusions:   Amino acid 5% - dextrose 15% (CLINIMIX-E) solution with additives (1L  provides 510 kcal/L dextrose, with 50 gm AA, 150 gm CHO, Na 35, K 30, Mg 5, Ca 4.5, Acetate 80, Cl 39, Phos 15) 50 mL/hr at 02/06/19 2208     PRN Meds:.sodium chloride, dextrose 50%, dextrose 50%, glucose, glucose, HYDROmorphone, insulin aspart U-100, naloxone, ondansetron, promethazine (PHENERGAN) IVPB, sodium chloride 0.9%    Objective:      Temp:  [97.9 °F (36.6 °C)-100.4 °F (38 °C)] 98.6 °F (37 °C)  Pulse:  [114-136] 114  Resp:  [18-20] 20  SpO2:  [90 %-98 %] 90 %  BP: (104-123)/(55-73) 104/60  Weight change: 0.8 kg (1 lb 12.2 oz)    Intake/Output Summary (Last 24 hours) at 2/7/2019 1114  Last  data filed at 2/7/2019 0800  Gross per 24 hour   Intake 600 ml   Output 930 ml   Net -330 ml     UOP 700L  Drains R: 350, B:5 , Gastrostomy: 70    Physical Exam:  GEN: awake, alert, NAD  CV: RRR  PULM: normal work of breathing, on NC  ABD: S/NT/ND, incision c/d/i, tubes in good position  EXT: Peripheral pulses present and equal bilaterally    Recent Labs   Lab 02/07/19  0620      K 3.9   CO2 30*      BUN 42*   CREATININE 1.3   CALCIUM 9.6   PROT 7.6   AST 69*   ALT 53*   ALKPHOS 143*   BILITOT 1.1*     Recent Labs   Lab 02/07/19  0619   WBC 12.20   HGB 9.5*   HCT 31.5*          Significant Imaging: I have reviewed all pertinent imaging results/findings within the past 24 hours.    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  Now c sepsis likely from aspiration RLL PNA  S/p IR drain of abdominal fluid collection  1/24/18 - Laparotomy, ASHLEIGH, enterotomy repair, EGD, GastroJ, Retroperitoneal peripancreatic abscess open drainage, Omental flap creation, Lori gastrostomy c 24 Fr [place]     Pain control prn  Monitor UOP, trend Cr, try to avoid nephrotoxics - currently Cr wnl, UOP adequate  cont TPN/NPO, PPI --> Lytes replacing prn  Continue to clamp Drain #1 biliary drain and flush with 20 cc of sterile water BID  Abd culture grew Kluyvera ascorbata on abx - BID free water irrigation (10cc) of RUQ drain #2 , Continue amphotericin IV  Hold TF for now.  Cholangiogram study today and afterwards a CT  Add reglan 10mg q 6 hours  Continue to monitor H/H, on BID protonix  OOB to chair  Encourage ambulation  Encourage IS and aggressive respiratory toilet  Dispo - Possible discharge home today or tomorrow pending coordination of home health for TPN 7pm - 7am      Jakob Adhikari MD    II  [id number]

## 2019-02-07 NOTE — PLAN OF CARE
Problem: Adult Inpatient Plan of Care  Goal: Plan of Care Review  Outcome: Ongoing (interventions implemented as appropriate)  Patient on oxygen with documented flow.  Will attempt to wean per O2 order protocol. No distress noted. IS 500cc. Aerobika done. Will continue to monitor.

## 2019-02-07 NOTE — PT/OT/SLP PROGRESS
Physical Therapy Treatment    Patient Name:  Hoang Santos Jr.   MRN:  [id number]    Recommendations:     Discharge Recommendations:  ( PT)   Discharge Equipment Recommendations: shower chair   Barriers to discharge: decreased mobility,strength and endurance    Assessment:     Hoang Santos Jr. is a 68 y.o. male admitted with a medical diagnosis of Aspiration pneumonia of right lower lobe.  He presents with the following impairments/functional limitations:  weakness, impaired endurance, impaired functional mobilty, gait instability, impaired balance, decreased lower extremity function, decreased ROM, impaired coordination, impaired skin,pt with improving status and progressing toward goals,pt remains with decreased balance and strength and will benefit from HH services upon discharge.    Rehab Prognosis: Good; patient would benefit from acute skilled PT services to address these deficits and reach maximum level of function.    Recent Surgery: Procedure(s) (LRB):  CHOLANGIOGRAM, PERCUTANEOUS, TRANSHEPATIC (Right) 9 Days Post-Op    Plan:     During this hospitalization, patient to be seen 5 x/week to address the identified rehab impairments via gait training, therapeutic activities, therapeutic exercises and progress toward the following goals:    · Plan of Care Expires:  02/17/19    Subjective     Chief Complaint: n/a  Patient/Family Comments/goals: pt may be leaving tomorrow  Pain/Comfort:  · Pain Rating 1: 0/10      Objective:     Communicated with nsg prior to session.  Patient found all lines intact, call button in reach, nsg notified and spouse present peripheral IV, telemetry  upon PT entry to room.     General Precautions: Standard, aspiration, fall, NPO   Orthopedic Precautions:N/A   Braces: N/A     Functional Mobility:  · Transfers:     · Sit to Stand:  supervision and stand by assistance with rolling walker  · Gait: amb ~225' x 1 with RW and S and ~225' X 1 w/o AD and SBA with IV in tow  · Balance: good  standing balance with RW      AM-PAC 6 CLICK MOBILITY  Turning over in bed (including adjusting bedclothes, sheets and blankets)?: 4  Sitting down on and standing up from a chair with arms (e.g., wheelchair, bedside commode, etc.): 3  Moving from lying on back to sitting on the side of the bed?: 3  Moving to and from a bed to a chair (including a wheelchair)?: 3  Need to walk in hospital room?: 3  Climbing 3-5 steps with a railing?: 3  Basic Mobility Total Score: 19       Therapeutic Activities and Exercises: pt's O2 sats at rest on rm air is 92%,post gait is 90%,with O2 donned 97%,1 seated rest between ambulation trials.       Patient left up in chair with all lines intact, call button in reach, nsg notified and spouse present..    GOALS: see general POC  Multidisciplinary Problems     Physical Therapy Goals        Problem: Physical Therapy Goal    Goal Priority Disciplines Outcome Goal Variances Interventions   Physical Therapy Goal     PT, PT/OT Ongoing (interventions implemented as appropriate)     Description:  Goals to be met by: 2019  Patient will increase functional independence with mobility by performin. Supine to sit with Modified [place]  2. Sit to stand transfer with Modified [place]  3. Gait  x 150 feet with Modified [place] using Rolling Walker.   4. Lower extremity exercise program x10 reps per handout, with independence                       Time Tracking:     PT Received On: 19  PT Start Time: 0804     PT Stop Time: 0830  PT Total Time (min): 26 min     Billable Minutes: Gait Training 16 and Therapeutic Activity 10    Treatment Type: Treatment  PT/PTA: PTA     PTA Visit Number: 1     Yousuf Wetzel PTA  2019

## 2019-02-07 NOTE — PROGRESS NOTES
[facility]  Adult Nutrition  Progress Note    SUMMARY       Recommendations    Recommendation  1. Continue with current TPN and rate.    2. For discharge home, TPN to infuse x 12 hrs with 336 gm Dex, 120 gm Pro, 50 gm Fat to provide 2125 calories, 120 gm Pro, GIR 4.7 mg/kg/min.  Infuse company RD to adjust lytes and dextrose taper as warranted.      Goals: 1. Meet >85% EEN/EPN daily.   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    Prostatic hyperplasia     Sleep apnea      Past Surgical History:   Procedure Laterality Date    BIOPSY, LIVER  1/4/2019    Performed by Madeleine Alvarado MD at [facility] OR    CHOLANGIOGRAM, PERCUTANEOUS, TRANSHEPATIC Right 1/29/2019    Performed by YOHANNES Shelley MD at [facility] OR    CHOLECYSTECTOMY  1/4/2019    Performed by Madeleine Alvarado MD at [facility] OR    DUODENECTOMY N/A 1/4/2019    Performed by Madeleine Alvarado MD at [facility] OR    EGD (ESOPHAGOGASTRODUODENOSCOPY) N/A 1/24/2019    Performed by Madeleine Alvarado MD at [facility] OR    EGD (ESOPHAGOGASTRODUODENOSCOPY) N/A 1/4/2019    Performed by Madeleine Alvarado MD at [facility] OR    EGD (ESOPHAGOGASTRODUODENOSCOPY) N/A 9/24/2018    Performed by Salo Nuñez MD at [facility] ENDO (4TH FLR)    ESOPHAGOGASTRODUODENOSCOPY (EGD) N/A 1/21/2019    Performed by Jose Kahn MD at [facility] ENDO    FUNDOPLICATION N/A 1/4/2019    Performed by Madeleine Alvarado MD at [facility] OR    GASTROJEJUNOSTOMY Right 1/24/2019    Performed by Madeleine Alvarado MD at [facility] OR    GASTROJEJUNOSTOMY N/A 1/4/2019     "Performed by Madeleine Alvarado MD at [facility] OR    LAPAROTOMY, EXPLORATORY N/A 2019    Performed by Madeleine Alvarado MD at [facility] OR    LYMPHADENECTOMY N/A 2019    Performed by Madeleine Alvarado MD at [facility] OR    LYSIS, ADHESIONS N/A 2019    Performed by Madeleine Alvarado MD at [facility] OR    PALATOPLASTY-(UPPP) N/A 2017    Performed by Marty JAN Araujo III, MD at [facility] OR 2ND FLR    PH MONITORING, ESOPHAGUS, WIRELESS, (OFF REFLUX MEDS) N/A 2018    Performed by Salo Nuñez MD at [facility] ENDO (4TH FLR)    TONSILLECTOMY      TRANSPROSTATIC TISSUE RETRACTION N/A 2018    Performed by [facility] Jack [facility] at [facility] OR    ULTRASOUND-ENDOSCOPIC-UPPER N/A 2018    Performed by Gorge Reyes MD at [facility] ENDO    UVULOPALATOPHARYNGOPLASTY         General Information Comments: NFPE not warranted due to adequate LBM/excess fat. TF stopped via G-tube due to + emesis with KUB results of Gastric Outlet Obstruction.       Nutrition Risk Screen    Nutrition Risk Screen: dysphagia or difficulty swallowing    Nutrition/Diet History    Patient Reported Diet/Restrictions/Preferences: (only liquids)  Typical Food/Fluid Intake: Decreased po intake at home  Food Preferences: none identified at this time  Spiritual, Cultural Beliefs, Advent Practices, Values that Affect Care: no  Food Allergies: NKFA  Factors Affecting Nutritional Intake: difficulty/impaired swallowing    Anthropometrics    Temp: 98.1 °F (36.7 °C)  Height Method: Stated  Height: 5' 10" (177.8 cm)  Height (inches): 70 in  Weight Method: Bed Scale  Weight: 99.8 kg (220 lb 0.3 oz)  Weight (lb): 220.02 lb  Ideal Body Weight (IBW), Male: 166 lb  % Ideal Body Weight, Male (lb): 132.54 lb  BMI (Calculated): 31.6  BMI Grade: 30 - 34.9- obesity - grade I  Usual Body Weight (UBW), k.9 kg(x 3 months ago)  Weight Change Amount: 13 lb  % Usual Body Weight: 94.44  % Weight Change From Usual Weight: -5.76 %   "     Lab/Procedures/Meds    Pertinent Labs Reviewed: reviewed  Recent Labs   Lab 02/07/19  0620      K 3.9      CO2 30*   BUN 42*   CREATININE 1.3   MG 2.2     Recent Labs   Lab 02/07/19 0619   WBC 12.20   RBC 3.25*   HGB 9.5*   HCT 31.5*      MCV 97   MCH 29.2   MCHC 30.2*     Recent Labs   Lab 02/07/19 0620   ALT 53*   AST 69*   ALKPHOS 143*   BILITOT 1.1*   PROT 7.6   ALBUMIN 2.8*       Pertinent Medications Reviewed: reviewed  Scheduled Meds:   bumetanide  0.5 mg Intravenous Daily    chlorhexidine  15 mL Mouth/Throat BID    ciprofloxacin HCl  500 mg Per G Tube Q12H    cyanocobalamin  1,000 mcg Subcutaneous Daily    fat emulsion  250 mL Intravenous Daily    fluconazole  100 mg Per G Tube Daily    metoclopramide HCl  10 mg Intravenous Q6H    pantoprazole  40 mg Oral BID     Continuous Infusions:   Amino acid 5% - dextrose 15% (CLINIMIX-E) solution with additives (1L  provides 510 kcal/L dextrose, with 50 gm AA, 150 gm CHO, Na 35, K 30, Mg 5, Ca 4.5, Acetate 80, Cl 39, Phos 15) 50 mL/hr at 02/06/19 2208    Amino acid 5% - dextrose 15% (CLINIMIX-E) solution with additives (1L  provides 510 kcal/L dextrose, with 50 gm AA, 150 gm CHO, Na 35, K 30, Mg 5, Ca 4.5, Acetate 80, Cl 39, Phos 15)       PRN Meds:.sodium chloride, dextrose 50%, dextrose 50%, glucose, glucose, HYDROmorphone, insulin aspart U-100, naloxone, ondansetron, promethazine (PHENERGAN) IVPB, sodium chloride 0.9%      Estimated/Assessed Needs    Weight Used For Calorie Calculations: 99 kg (218 lb 4.1 oz)  Energy Calorie Requirements (kcal): 2125(1.2 PAL)  Energy Need Method: [place]     Protein Requirements: (1.0-1.2 gm Pro/kg)  Weight Used For Protein Calculations: 99 kg (218 lb 4.1 oz)     Estimated Fluid Requirement Method: RDA Method(or Per MD)  RDA Method (mL): 2125     CHO Requirement: 50%      Nutrition Prescription Ordered    Current Diet Order: NPO   Current Nutrition Support Formula Ordered: Clinimix E  5/15(with 20% Lipids 250 mL MWF)  Current Nutrition Support Rate Ordered: 50 (ml)(mL/hr)  Current Nutrition Support Frequency Ordered: mL/hr  Oral Nutrition Supplement: 0    Evaluation of Received Nutrient/Fluid Intake    Parenteral Calories (kcal): 852  Parenteral Protein (gm): 60  Parenteral Fluid (mL): 1200  Lipid Calories (kcals): 215 kcals  GIR (Glucose Infusion Rate) (mg/kg/min): 1.26 mg/kg/min  Total Calories (kcal): 1066  Total Calories (kcal/kg): 11  % Kcal Needs: 52  Total Protein (gm): 60  Total Protein (gm/kg): 0.6  % Protein Needs: 61  IV Fluid (mL): 1200  I/O: reviewed  Energy Calories Required: not meeting needs  Protein Required: not meeting needs  Fluid Required: not meeting needs  Total Fluid Intake (mL/kg): 1200  Comments: LBM 2/6/19  Tolerance: tolerating     % Intake of Estimated Energy Needs: 25 - 50 %  % Meal Intake: NPO    Nutrition Risk    Level of Risk/Frequency of Follow-up: (2 x/week)     Assessment and Plan    Gastric outlet obstruction    Nutrition Problem  Altered GI Function     Related to (etiology):   Gastric Outlet Obstruction    Signs and Symptoms (as evidenced by):   Intolerance to TF   Need for home TPN     Interventions/Recommendations (treatment strategy):  Collaboration with other providers    Nutrition Diagnosis Status:   New              Monitor and Evaluation    Food and Nutrient Intake: energy intake, food and beverage intake, enteral nutrition intake, parenteral nutrition intake  Food and Nutrient Adminstration: diet order, enteral and parenteral nutrition administration  Knowledge/Beliefs/Attitudes: food and nutrition knowledge/skill  Physical Activity and Function: nutrition-related ADLs and IADLs  Anthropometric Measurements: weight, weight change, body mass index  Biochemical Data, Medical Tests and Procedures: electrolyte and renal panel, gastrointestinal profile, glucose/endocrine profile, inflammatory profile, lipid profile  Nutrition-Focused Physical Findings:  overall appearance(pt appears nourished at this time)     Malnutrition Assessment     Orbital Region (Subcutaneous Fat Loss): well nourished  Upper Arm Region (Subcutaneous Fat Loss): well nourished  Thoracic and Lumbar Region: well nourished   [place] Region (Muscle Loss): well nourished  Clavicle Bone Region (Muscle Loss): well nourished  Clavicle and Acromion Bone Region (Muscle Loss): well nourished  Scapular Bone Region (Muscle Loss): well nourished  Dorsal Hand (Muscle Loss): well nourished  Patellar Region (Muscle Loss): well nourished  Anterior Thigh Region (Muscle Loss): well nourished  Posterior Calf Region (Muscle Loss): well nourished     Nutrition Follow-Up    RD Follow-up?: Yes

## 2019-02-07 NOTE — PROGRESS NOTES
Cued into patient's room.  Permission received per patient to turn camera to view patient.  Introduced as VN for night shift that will be working with floor nurse and nursing assistant.  Family member at bedside.  Educated patient on VN's role in patient care. Plan of care reviewed with patient. Education per flowsheet.  Opportunity given for questions and questions answered.  Requesting assistance getting comfortable in bed using pillows.  Notified Laura SOLANO.  Instructed to call for assistance.  Will cont to monitor.

## 2019-02-07 NOTE — PLAN OF CARE
VN cued into pt's room for introduction. VN informed pt that VN would be working along side bedside nurse and PCT throughout shift. Level of present pain assessed. At present no distress noted. Discussed with patient High fall risk protocol and interventions that have been initiated and cont be in place for safety. Patient verbalized clear understanding and cooperation using teach back method. Bed alarm presently activated and in use. Physical therapist at bedside getting ready to work with patient and ambulate in halls with walker. Patient very eager to become mobile. Will cont to be available to patient and intervene prn.

## 2019-02-07 NOTE — NURSING
"Pt in NAD. Drain #1 and #2 flushed with normal saline per MD order. Pt stated "I feel fine" with five minutes pt began vomiting brick red emesis. VN qued to room and made aware of situation MD galvan called and left phone message. Unable to reach MD at this time. Pt states he is no longer nauseated after drains opened to gravity.   "

## 2019-02-07 NOTE — PLAN OF CARE
Problem: Occupational Therapy Goal  Goal: Occupational Therapy Goal  Goals to be met by: 02/19/2019    Patient will increase functional independence with ADLs by performing:    UE Dressing with Modified [place].  LE Dressing with Modified [place].  Grooming while standing with Modified [place].  Toileting from toilet with Modified [place] for hygiene and clothing management.   Toilet transfer to toilet with Modified [place].  Increased functional strength to WFL for self care.  Upper extremity exercise program x10 reps per handout, with independence.     Outcome: Ongoing (interventions implemented as appropriate)  Pt progressing well towards goals.

## 2019-02-07 NOTE — PLAN OF CARE
02/07/19 1551   Medicare Message   Important Message from Medicare regarding Discharge Appeal Rights Given to patient/caregiver;Signed/date by patient/caregiver;Explained to patient/caregiver   Date IMM was signed 02/07/19   Time IMM was signed 0810

## 2019-02-07 NOTE — PT/OT/SLP PROGRESS
Occupational Therapy   Treatment    Name: Hoang Santos Jr.  MRN: [id number]  Admitting Diagnosis:  Aspiration pneumonia of right lower lobe  9 Days Post-Op    Recommendations:     Discharge Recommendations: (HHOT/PT and ST)  Discharge Equipment Recommendations:  shower chair  Barriers to discharge:  None    Assessment:     Hoang Santos Jr. is a 68 y.o. male with a medical diagnosis of Aspiration pneumonia of right lower lobe.  He presents with deconditioning. Performance deficits affecting function are weakness, gait instability, decreased upper extremity function, impaired functional mobilty, impaired self care skills, decreased lower extremity function, impaired endurance.     Rehab Prognosis:  Good; patient would benefit from acute skilled OT services to address these deficits and reach maximum level of function.       Plan:     Patient to be seen 5 x/week to address the above listed problems via self-care/home management, therapeutic activities, therapeutic exercises  · Plan of Care Expires: 02/27/19  · Plan of Care Reviewed with: patient, spouse, daughter    Subjective     Pain/Comfort:  · Pain Rating 1: 0/10    Objective:     Communicated with: nsg prior to session.  Patient found on toilet with family assitance with peripheral IV, telemetry upon OT entry to room.    General Precautions: Standard, aspiration, fall, NPO   Orthopedic Precautions:N/A   Braces: N/A     Occupational Performance:     Functional Mobility/Transfers:  · Patient completed Sit <> Stand Transfer with stand by assistance  with  hand-held assist   · Patient completed Bed <> Chair Transfer using Step Transfer technique with stand by assistance with rolling walker  · Patient completed Toilet Transfer Step Transfer technique with stand by assistance with  hand-held assist and rolling walker  · Functional Mobility: Pt with fair + dynamic standing balance. Ambulated ~200ft CGA to SBA with RW    Activities of Daily Living:  · Grooming: stand  by assistance to manage cough secretions; Independent with use of suction   · Toileting: stand by assistance at toilet with family assistance       [facility] 6 Click ADL: 14    Treatment & Education:  Pt performing skills as above. Pt with O2 in room upon OT arrival but declined use of O2 to ambulate in hallway. Pt with no LOB and demonstrated good endurance in task. Pt with frothy sputum after coughing and required SBA for retrieval of grooming items but able to complete with use of suction.    Patient left up in chair with all lines intact, call button in reach, nsg notified and family presentEducation:      GOALS:   Multidisciplinary Problems     Occupational Therapy Goals        Problem: Occupational Therapy Goal    Goal Priority Disciplines Outcome Interventions   Occupational Therapy Goal     OT, PT/OT Ongoing (interventions implemented as appropriate)    Description:  Goals to be met by: 02/19/2019    Patient will increase functional independence with ADLs by performing:    UE Dressing with Modified [place].  LE Dressing with Modified [place].  Grooming while standing with Modified [place].  Toileting from toilet with Modified [place] for hygiene and clothing management.   Toilet transfer to toilet with Modified [place].  Increased functional strength to WFL for self care.  Upper extremity exercise program x10 reps per handout, with independence.                      Time Tracking:     OT Date of Treatment: 02/07/19  OT Start Time: 1455  OT Stop Time: 1510  OT Total Time (min): 15 min    Billable Minutes:Therapeutic Activity 15    Caitlyn Carrion OT  2/7/2019

## 2019-02-08 VITALS
RESPIRATION RATE: 20 BRPM | SYSTOLIC BLOOD PRESSURE: 106 MMHG | TEMPERATURE: 98 F | OXYGEN SATURATION: 92 % | HEART RATE: 119 BPM | DIASTOLIC BLOOD PRESSURE: 59 MMHG | BODY MASS INDEX: 31.02 KG/M2 | WEIGHT: 216.69 LBS | HEIGHT: 70 IN

## 2019-02-08 LAB
ALBUMIN SERPL BCP-MCNC: 2.8 G/DL
ALP SERPL-CCNC: 151 U/L
ALT SERPL W/O P-5'-P-CCNC: 56 U/L
ANION GAP SERPL CALC-SCNC: 9 MMOL/L
ANISOCYTOSIS BLD QL SMEAR: SLIGHT
APTT BLDCRRT: 29.8 SEC
AST SERPL-CCNC: 75 U/L
BASOPHILS # BLD AUTO: 0.05 K/UL
BASOPHILS NFR BLD: 0.5 %
BILIRUB SERPL-MCNC: 1.3 MG/DL
BUN SERPL-MCNC: 40 MG/DL
CALCIUM SERPL-MCNC: 10 MG/DL
CHLORIDE SERPL-SCNC: 104 MMOL/L
CO2 SERPL-SCNC: 32 MMOL/L
CREAT SERPL-MCNC: 1.3 MG/DL
DACRYOCYTES BLD QL SMEAR: ABNORMAL
DIFFERENTIAL METHOD: ABNORMAL
EOSINOPHIL # BLD AUTO: 0.1 K/UL
EOSINOPHIL NFR BLD: 0.5 %
ERYTHROCYTE [DISTWIDTH] IN BLOOD BY AUTOMATED COUNT: 15.7 %
EST. GFR  (AFRICAN AMERICAN): >60 ML/MIN/1.73 M^2
EST. GFR  (NON AFRICAN AMERICAN): 56 ML/MIN/1.73 M^2
GLUCOSE SERPL-MCNC: 120 MG/DL
HCT VFR BLD AUTO: 29.4 %
HGB BLD-MCNC: 9 G/DL
HYPOCHROMIA BLD QL SMEAR: ABNORMAL
INR PPP: 1.1
LYMPHOCYTES # BLD AUTO: 2.8 K/UL
LYMPHOCYTES NFR BLD: 26 %
MAGNESIUM SERPL-MCNC: 2.1 MG/DL
MCH RBC QN AUTO: 29.2 PG
MCHC RBC AUTO-ENTMCNC: 30.6 G/DL
MCV RBC AUTO: 96 FL
MONOCYTES # BLD AUTO: 0.8 K/UL
MONOCYTES NFR BLD: 7 %
NEUTROPHILS # BLD AUTO: 7.1 K/UL
NEUTROPHILS NFR BLD: 66 %
OVALOCYTES BLD QL SMEAR: ABNORMAL
PHOSPHATE SERPL-MCNC: 4.3 MG/DL
PLATELET # BLD AUTO: 285 K/UL
PLATELET BLD QL SMEAR: ABNORMAL
PMV BLD AUTO: 10.6 FL
POIKILOCYTOSIS BLD QL SMEAR: SLIGHT
POLYCHROMASIA BLD QL SMEAR: ABNORMAL
POTASSIUM SERPL-SCNC: 3.8 MMOL/L
PROT SERPL-MCNC: 7.6 G/DL
PROTHROMBIN TIME: 11.4 SEC
RBC # BLD AUTO: 3.08 M/UL
SODIUM SERPL-SCNC: 145 MMOL/L
TARGETS BLD QL SMEAR: ABNORMAL
WBC # BLD AUTO: 10.75 K/UL

## 2019-02-08 PROCEDURE — 25000003 PHARM REV CODE 250: Performed by: STUDENT IN AN ORGANIZED HEALTH CARE EDUCATION/TRAINING PROGRAM

## 2019-02-08 PROCEDURE — 80053 COMPREHEN METABOLIC PANEL: CPT

## 2019-02-08 PROCEDURE — 63600175 PHARM REV CODE 636 W HCPCS: Performed by: SURGERY

## 2019-02-08 PROCEDURE — 85730 THROMBOPLASTIN TIME PARTIAL: CPT

## 2019-02-08 PROCEDURE — 99900035 HC TECH TIME PER 15 MIN (STAT)

## 2019-02-08 PROCEDURE — 27000190 HC CPAP FULL FACE MASK W/VALVE

## 2019-02-08 PROCEDURE — A4216 STERILE WATER/SALINE, 10 ML: HCPCS | Performed by: SURGERY

## 2019-02-08 PROCEDURE — 94761 N-INVAS EAR/PLS OXIMETRY MLT: CPT

## 2019-02-08 PROCEDURE — 85610 PROTHROMBIN TIME: CPT

## 2019-02-08 PROCEDURE — 94799 UNLISTED PULMONARY SVC/PX: CPT

## 2019-02-08 PROCEDURE — 25000003 PHARM REV CODE 250: Performed by: SURGERY

## 2019-02-08 PROCEDURE — 25000003 PHARM REV CODE 250: Performed by: ANESTHESIOLOGY

## 2019-02-08 PROCEDURE — 97535 SELF CARE MNGMENT TRAINING: CPT

## 2019-02-08 PROCEDURE — S0171 BUMETANIDE 0.5 MG: HCPCS | Performed by: SURGERY

## 2019-02-08 PROCEDURE — 97110 THERAPEUTIC EXERCISES: CPT

## 2019-02-08 PROCEDURE — 92526 ORAL FUNCTION THERAPY: CPT

## 2019-02-08 PROCEDURE — 84100 ASSAY OF PHOSPHORUS: CPT

## 2019-02-08 PROCEDURE — 27000221 HC OXYGEN, UP TO 24 HOURS

## 2019-02-08 PROCEDURE — 63600175 PHARM REV CODE 636 W HCPCS: Performed by: STUDENT IN AN ORGANIZED HEALTH CARE EDUCATION/TRAINING PROGRAM

## 2019-02-08 PROCEDURE — 97116 GAIT TRAINING THERAPY: CPT

## 2019-02-08 PROCEDURE — 83735 ASSAY OF MAGNESIUM: CPT

## 2019-02-08 PROCEDURE — 94664 DEMO&/EVAL PT USE INHALER: CPT

## 2019-02-08 PROCEDURE — 94660 CPAP INITIATION&MGMT: CPT

## 2019-02-08 RX ORDER — PANTOPRAZOLE SODIUM 40 MG/1
40 TABLET, DELAYED RELEASE ORAL 2 TIMES DAILY
Qty: 60 TABLET | Refills: 11 | Status: SHIPPED | OUTPATIENT
Start: 2019-02-08 | End: 2019-08-12

## 2019-02-08 RX ORDER — ONDANSETRON HYDROCHLORIDE 4 MG/5ML
4 SOLUTION ORAL 2 TIMES DAILY PRN
Qty: 50 ML | Refills: 0 | Status: SHIPPED | OUTPATIENT
Start: 2019-02-08 | End: 2019-07-30

## 2019-02-08 RX ORDER — PANTOPRAZOLE SODIUM 40 MG/1
40 TABLET, DELAYED RELEASE ORAL 2 TIMES DAILY
Qty: 60 TABLET | Refills: 11 | Status: SHIPPED | OUTPATIENT
Start: 2019-02-08 | End: 2019-02-08

## 2019-02-08 RX ORDER — ATORVASTATIN CALCIUM 40 MG/1
40 TABLET, FILM COATED ORAL DAILY
Qty: 90 TABLET | Refills: 3 | Status: SHIPPED | OUTPATIENT
Start: 2019-02-08 | End: 2019-02-12 | Stop reason: SDUPTHER

## 2019-02-08 RX ORDER — FLUCONAZOLE 100 MG/1
100 TABLET ORAL DAILY
Qty: 30 TABLET | Refills: 0 | Status: SHIPPED | OUTPATIENT
Start: 2019-02-09 | End: 2019-02-21 | Stop reason: SDUPTHER

## 2019-02-08 RX ORDER — FLUCONAZOLE 100 MG/1
100 TABLET ORAL DAILY
Qty: 30 TABLET | Refills: 0 | Status: SHIPPED | OUTPATIENT
Start: 2019-02-09 | End: 2019-02-08

## 2019-02-08 RX ORDER — ONDANSETRON 8 MG/1
8 TABLET, ORALLY DISINTEGRATING ORAL EVERY 6 HOURS PRN
Qty: 24 TABLET | Refills: 0 | Status: SHIPPED | OUTPATIENT
Start: 2019-02-08 | End: 2019-02-08 | Stop reason: HOSPADM

## 2019-02-08 RX ORDER — CIPROFLOXACIN 500 MG/1
500 TABLET ORAL EVERY 12 HOURS
Qty: 14 TABLET | Refills: 0 | Status: SHIPPED | OUTPATIENT
Start: 2019-02-08 | End: 2019-05-10

## 2019-02-08 RX ADMIN — FLUCONAZOLE 100 MG: 100 TABLET ORAL at 09:02

## 2019-02-08 RX ADMIN — Medication 10 ML: at 05:02

## 2019-02-08 RX ADMIN — METOCLOPRAMIDE 10 MG: 5 INJECTION, SOLUTION INTRAMUSCULAR; INTRAVENOUS at 01:02

## 2019-02-08 RX ADMIN — METOCLOPRAMIDE 10 MG: 5 INJECTION, SOLUTION INTRAMUSCULAR; INTRAVENOUS at 05:02

## 2019-02-08 RX ADMIN — Medication 10 ML: at 01:02

## 2019-02-08 RX ADMIN — CHLORHEXIDINE GLUCONATE 15 ML: 1.2 RINSE ORAL at 09:02

## 2019-02-08 RX ADMIN — CYANOCOBALAMIN 1000 MCG: 1000 INJECTION, SOLUTION INTRAMUSCULAR at 09:02

## 2019-02-08 RX ADMIN — BUMETANIDE 0.5 MG: 0.25 INJECTION INTRAMUSCULAR; INTRAVENOUS at 09:02

## 2019-02-08 RX ADMIN — CIPROFLOXACIN HYDROCHLORIDE 500 MG: 500 TABLET, FILM COATED ORAL at 09:02

## 2019-02-08 RX ADMIN — PANTOPRAZOLE SODIUM 40 MG: 40 TABLET, DELAYED RELEASE ORAL at 09:02

## 2019-02-08 NOTE — PLAN OF CARE
Problem: Adult Inpatient Plan of Care  Goal: Plan of Care Review  Outcome: Ongoing (interventions implemented as appropriate)  Pts safety maintained, central line precautions continued. TPN infusing, meds given per MAR. G tube ,30 mLs of dark bloody residual, flushed with water. Bile drain clamped, flushed with sterile water, and abscess drains flushed with Dakins. Continues to caught through the night, but refuses CPAP. No compalisn of pain, SOB, distress. Vitals stable through the night.

## 2019-02-08 NOTE — PLAN OF CARE
Problem: Physical Therapy Goal  Goal: Physical Therapy Goal  Goals to be met by: 2019  Patient will increase functional independence with mobility by performin. Supine to sit with Modified [place]  2. Sit to stand transfer with Modified [place]  3. Gait  x 150 feet with Modified [place] using Rolling Walker.   4. Lower extremity exercise program x10 reps per handout, with independence  MET 19      Outcome: Ongoing (interventions implemented as appropriate)  Goal 4 met

## 2019-02-08 NOTE — PLAN OF CARE
TN met with pt and wife prior to d/c   Future Appointments   Date Time Provider Department Center   2/11/2019  1:00 PM Salo Nuñez MD [facility] GASTRO [address]   2/14/2019 12:00 PM Madeleine Alvarado MD [facility] TUMOR [facility]   2/15/2019  2:00 PM Lee Kay MD [facility] IM [facility]   3/7/2019 10:15 AM Kory Jack MD [facility] UROLOGY [facility]     Aki with CPP to meet with wife/pt to review TPN administration   He will call [facility] to give report   Discharge rounds on patient. Discussed followup appointments, blue discharge folder, discharge nurse will go over home medications and reasons for medications and final discharge instructions. All patient/caregiver questions answered. Patient verbalized understanding.  TN confirmed with Aki - pt's wife has undergone review -- TPN will be delivered to pt's home.  Aki will call [facility] .              02/08/19 3737   Final Note   Assessment Type Final Discharge Note   Anticipated Discharge Disposition Home-Health  (TPN per [facility]; HH per [facility] )   What phone number can be called within the next 1-3 days to see how you are doing after discharge? [phone]   Hospital Follow Up  Appt(s) scheduled? Yes   Discharge plans and expectations educations in teach back method with documentation complete? Yes   Right Care Referral Info   Post Acute Recommendation Other   Referral Type (home care )   Facility Name ([facility] )

## 2019-02-08 NOTE — PLAN OF CARE
Problem: Occupational Therapy Goal  Goal: Occupational Therapy Goal  Goals to be met by: 02/19/2019    Patient will increase functional independence with ADLs by performing:    UE Dressing with Modified [place].  LE Dressing with Modified [place].  Grooming while standing with Modified [place].  Toileting from toilet with Modified [place] for hygiene and clothing management.   Toilet transfer to toilet with Modified [place].  Increased functional strength to WFL for self care.  Upper extremity exercise program x10 reps per handout, with independence.     Outcome: Ongoing (interventions implemented as appropriate)  Pt performed UE therex with yellow theraband while seated in chair with supervision; unable to complete full set of exercises 2/2 poor ax tolerance.   Pt progressing towards goals, cont POC

## 2019-02-08 NOTE — PLAN OF CARE
Problem: Fall Injury Risk  Goal: Absence of Fall and Fall-Related Injury  Outcome: Outcome(s) achieved Date Met: 02/08/19       Problem: Adult Inpatient Plan of Care  Goal: Plan of Care Review  Outcome: Ongoing (interventions implemented as appropriate)  Patient AAOx4. VSS. Patient tolerating activity, up in chair. Family at bedside. PICC line 1 & line 2 flushed per MD order. Biliary drain (#1) flushed with 10cc sterile water. Closed/suction drain 12 FR. Flushed with 10cc dakins per MD order. G tube checked for residual, flushed with water per MD order.     Problem: Skin Injury Risk Increased  Goal: Skin Health and Integrity  Outcome: Ongoing (interventions implemented as appropriate)

## 2019-02-08 NOTE — PT/OT/SLP PROGRESS
Physical Therapy Treatment    Patient Name:  Hoang Santos Jr.   MRN:  [id number]    Recommendations:     Discharge Recommendations:  ( PT)   Discharge Equipment Recommendations: shower chair   Barriers to discharge: decreased mobility,strength and endurance    Assessment:     Hoang Santos Jr. is a 68 y.o. male admitted with a medical diagnosis of Aspiration pneumonia of right lower lobe.  He presents with the following impairments/functional limitations:  weakness, impaired endurance, impaired functional mobilty, gait instability, impaired balance, decreased lower extremity function, decreased ROM, impaired coordination, impaired skin, impaired cardiopulmonary response to activity,pt with good participation and requires S/SBA with mobility for safety and will benefit from HH services upon discharge.    Rehab Prognosis: Good; patient would benefit from acute skilled PT services to address these deficits and reach maximum level of function.    Recent Surgery: Procedure(s) (LRB):  CHOLANGIOGRAM, PERCUTANEOUS, TRANSHEPATIC (Right) 10 Days Post-Op    Plan:     During this hospitalization, patient to be seen 5 x/week to address the identified rehab impairments via gait training, therapeutic activities, therapeutic exercises and progress toward the following goals:    · Plan of Care Expires:  02/17/19    Subjective     Chief Complaint: n/a  Patient/Family Comments/goals: pt agreeable to up in chair  Pain/Comfort:  · Pain Rating 1: (no c/o's)      Objective:     Communicated with nsg prior to session.  Patient found all lines intact, call button in reach, nsg notified and spouse present peripheral IV, telemetry, oxygen, JAY drain  upon PT entry to room.     General Precautions: Standard, aspiration, fall, respiratory   Orthopedic Precautions:N/A   Braces: N/A     Functional Mobility:  · Bed Mobility:     · Supine to Sit: contact guard assistance  · Transfers:     · Sit to Stand:  supervision and stand by assistance with  rolling walker  · Bed to Chair: stand by assistance with  rolling walker  using  ambulation  · Gait: amb ~225' X 2 with RW and S with O2/2L X 1 seated rest  · Balance: good standing balance with RW      AM-PAC 6 CLICK MOBILITY  Turning over in bed (including adjusting bedclothes, sheets and blankets)?: 3  Sitting down on and standing up from a chair with arms (e.g., wheelchair, bedside commode, etc.): 3  Moving from lying on back to sitting on the side of the bed?: 3  Moving to and from a bed to a chair (including a wheelchair)?: 3  Need to walk in hospital room?: 3  Climbing 3-5 steps with a railing?: 3  Basic Mobility Total Score: 18       Therapeutic Activities and Exercises: le seated ex's x 10-12 reps inc: ap,laq,hip flex,abd/add       Patient left up in chair with all lines intact, call button in reach, nsg notified and spouse present..    GOALS: see general POC  Multidisciplinary Problems     Physical Therapy Goals        Problem: Physical Therapy Goal    Goal Priority Disciplines Outcome Goal Variances Interventions   Physical Therapy Goal     PT, PT/OT Ongoing (interventions implemented as appropriate)     Description:  Goals to be met by: 2019  Patient will increase functional independence with mobility by performin. Supine to sit with Modified [place]  2. Sit to stand transfer with Modified [place]  3. Gait  x 150 feet with Modified [place] using Rolling Walker.   4. Lower extremity exercise program x10 reps per handout, with independence  MET 19                        Time Tracking:     PT Received On: 19  PT Start Time: 858     PT Stop Time: 925  PT Total Time (min): 27 min     Billable Minutes: Gait Training 17 and Therapeutic Exercise 10    Treatment Type: Treatment  PT/PTA: PTA     PTA Visit Number: 2     Yousuf Wetzel, PTA  2019

## 2019-02-08 NOTE — DISCHARGE INSTRUCTIONS
Ondansetron oral dissolving tablet (English) View Edit Remove  Pantoprazole tablets (English) View Edit Remove  Caring for Your Central Line, Discharge Instructions (English) View Edit Remove  Flushing Your PICC Line at Home (English) View Edit Remove  Aspiration from Dysphagia, Understanding (English) View Edit Remove

## 2019-02-08 NOTE — PLAN OF CARE
Problem: Adult Inpatient Plan of Care  Goal: Plan of Care Review  Outcome: Ongoing (interventions implemented as appropriate)  Patient on oxygen with documented flow. Will attempt to wean per protocol.  Cpap on sb.

## 2019-02-08 NOTE — PROGRESS NOTES
RE: Home TPN Discharge Orders with Electrolytes    1440mL over 12hrs/night:  120gm AA  230gm dextrose  50gm lipids    Na CL 40 mEq  Na phos 18 mM  K CL 40 mEq  Mg sulf 6mEq  Ca Gluc  5.4mEq  10 mL MVI  1 mL MTE-5 conc    Weekly Labs: CBC with diff, CMP with Mg & Phos & Trig   Obtain base line Pre Albumin, then monthly thereafter       Tracie Weil Cavalier, NORMAN, LDN, [facility]   [phone]

## 2019-02-08 NOTE — PLAN OF CARE
Problem: Adult Inpatient Plan of Care  Goal: Plan of Care Review  Outcome: Ongoing (interventions implemented as appropriate)  Pt is AAOx3 with no complaints of pain. Pt with nausea when pushing meds/fluid thru G tube. Other 2 drains being flushed as ordered per Dr. Steiner. Pt up in chair for most of day and worked with PT walking the halls with a walker. Pt receiving IV TPN. Pt remains NPO.

## 2019-02-08 NOTE — PT/OT/SLP PROGRESS
"Speech Language Pathology Treatment    Patient Name:  Hoang Santos Jr.   MRN:  [id number]  Admitting Diagnosis: Aspiration pneumonia of right lower lobe    Recommendations:                 General Recommendations:  Dysphagia therapy  Diet recommendations:  NPO, Liquid Diet Level: NPO   Aspiration Precautions: Continue alternate means of nutrition and Strict aspiration precautions   General Precautions: Standard, aspiration, respiratory, NPO, fall  Communication strategies:  none    Subjective     Pt seen at the bedside for ongoing dysphagia tx. Pt seated w/ wife present for tx. He was awake/alert and willing to participate.   Patient goals: Pt stated, "You can come be my home health therapist?" when discussing ST needs at d/c.      Pain/Comfort:  · Pain Rating 1: 0/10    Objective:     Has the patient been evaluated by SLP for swallowing?   Yes  Keep patient NPO? Yes   Current Respiratory Status: nasal cannula      Pt seated upright for tx this date with nasal cannula in place. Voice appeared wet/gurgly prior to tx, cleared with hard swallow. Using teachback method, pt recalled OMEs with 90% acc ind, required min A to require exact details. He completed velar phonemes /kuh/ and /guh/ in reps of 15 each to improve TB strength and mobility, "ah-ee" pitch glide exercise x15 to improve vocal chord adduction, and Shaker exercise with stress ball x30 up and down and 1 min hold to improve laryngeal/pharyngeal strength. Pt instructed in Suprasuperglottic maneuver x3 to increase glottic pressure and strength of the swallow. Hard swallow produced x3. Pt continues to exhibit difficulty managing own secretions and requires reminders from ST to swallow saliva. Breaks needed during exercises to manage secretions.  Education provided to pt and wife re: MBSS results, swallow precautions, OMEs, and aspiration risks. Pt and wife verbalized understanding. Call light within reach and no further needs at this time.     Assessment: "     Hoang Santos Jr. is a 68 y.o. male with an SLP diagnosis of Dysphagia.  He continues to present with weak/delayed swallow, poor ability to manage secretions, and weak pharyngeal musculature resulting in wet vocal quality and coughing s/p swallow. He will benefit from continued indirect dysphagia tx to improve laryngeal/pharyngeal strength and tongue base retraction and mobility. He is to remain NPO with strict aspiration precautions and will require continued ST at the next level of care. ST to f/u.    Goals:   Multidisciplinary Problems     SLP Goals        Problem: SLP Goal    Goal Priority Disciplines Outcome   SLP Goal     SLP Ongoing (interventions implemented as appropriate)   Description:  Short Term Goals:   1. Pt will participate in BSS to determine least restrictive diet.- MET 1/31  2. Pt will participate in indirect dysphagia exercises to strengthen musculature for swallowing mechanism with mod-max effort.-ongoing  3. Pt will participate in re-assessment of swallowing mechanism to determine least restrictive PO diet- MET 2/5  4. Pt will successfully participate in repeat Modified Barium Swallow study to radiographically assess swallow function, rule out aspiration and determine safest/least restrictive diet.- MET 2/7                             Plan:     · Patient to be seen:  3 x/week   · Plan of Care expires:  02/18/19  · Plan of Care reviewed with:  patient, spouse   · SLP Follow-Up:  Yes       Discharge recommendations:  other (see comments)(Home health ST at D/c)   Barriers to Discharge:  None    Time Tracking:     SLP Treatment Date:   02/08/19  Speech Start Time:  1135  Speech Stop Time:  1158     Speech Total Time (min):  23 min    Billable Minutes: Treatment Swallowing Dysfunction 13 and Seld Care/Home Management Training 10    Yissel Huang CCC-SLP  02/08/2019

## 2019-02-08 NOTE — PT/OT/SLP PROGRESS
Occupational Therapy   Treatment    Name: Hoang Santos Jr.  MRN: [id number]  Admitting Diagnosis:  Aspiration pneumonia of right lower lobe  10 Days Post-Op    Recommendations:     Discharge Recommendations: (HH)  Discharge Equipment Recommendations:  shower chair  Barriers to discharge:  None    Assessment:     Hoang Santos Jr. is a 68 y.o. male with a medical diagnosis of Aspiration pneumonia of right lower lobe. Performance deficits affecting function are weakness, impaired endurance, impaired self care skills, gait instability, impaired functional mobilty, decreased upper extremity function, impaired cardiopulmonary response to activity, impaired skin, decreased coordination.     Rehab Prognosis:  Good; patient would benefit from acute skilled OT services to address these deficits and reach maximum level of function.       Plan:     Patient to be seen 5 x/week to address the above listed problems via self-care/home management, therapeutic activities, therapeutic exercises  · Plan of Care Expires: 02/27/19  · Plan of Care Reviewed with: patient, spouse    Subjective     Pain/Comfort:  · Pain Rating 1: 0/10    Objective:     Communicated with: Nurse prior to session.  Patient found up in chair with oxygen, peripheral IV, telemetry, JAY drain upon OT entry to room. Wife present.     General Precautions: Standard, aspiration, fall, respiratory   Orthopedic Precautions:N/A   Braces: N/A     Occupational Performance:     [facility] 6 Click ADL: 14    Treatment & Education:  Pt declined to ambulate during tx session 2/2 fatigue from PT session. Pt and spouse re-educated on pursed lip breathing techniques, both verbalized understanding but pt with poor carryover. Pt educated on UE therex home program with yellow theraband with pt retuning demonstration for 3 exercises with rest breaks PRN: horiz abduction (3x10), elbow flexion (3x10 per each UE), and shoulder flexion (2x10 per each UE). Pt with poor ax tolerance and  requesting to be done with therex 2/2 fatigue. Pt and wife provided with UE therex home program handout with instructions for grading up and down PRN.     Patient left up in chair with all lines intact, call button in reach, nurse notified and wife presentEducation:      GOALS:   Multidisciplinary Problems     Occupational Therapy Goals        Problem: Occupational Therapy Goal    Goal Priority Disciplines Outcome Interventions   Occupational Therapy Goal     OT, PT/OT Ongoing (interventions implemented as appropriate)    Description:  Goals to be met by: 02/19/2019    Patient will increase functional independence with ADLs by performing:    UE Dressing with Modified Benson.  LE Dressing with Modified Benson.  Grooming while standing with Modified Benson.  Toileting from toilet with Modified Benson for hygiene and clothing management.   Toilet transfer to toilet with Modified Benson.  Increased functional strength to WFL for self care.  Upper extremity exercise program x10 reps per handout, with independence.                      Time Tracking:     OT Date of Treatment: 02/08/19  OT Start Time: 0934  OT Stop Time: 0951  OT Total Time (min): 17 min    Billable Minutes:Therapeutic Exercise 17    ADDIE Aaron  2/8/2019     I certify that I was present in the room directing the student in service delivery and guiding them using my skilled judgment. As the co-signing therapist I have reviewed the students documentation and am responsible for the treatment, assessment, and plan.

## 2019-02-08 NOTE — PLAN OF CARE
Cued into patient's room.  Permission received per patient to turn camera to view patient.  Introduced as VN for night shift that will be working with floor nurse and nursing assistant.  Meagan RN at bedside.  Educated patient on VN's role in patient care. Plan of care reviewed with patient. Education per flowsheet.  Opportunity given for questions and questions answered.  No complaints.  Instructed to call for assistance.  Will cont to monitor.

## 2019-02-08 NOTE — NURSING
VN rounding: VN cued into patient's room to complete evening rounding. Patient resting in bed with HOB up approx. 30 degrees. At this time patient denies any questions, concerns and needs. Will cont to be available to patient and intervene prn.

## 2019-02-08 NOTE — PROGRESS NOTES
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 Patient tolerated small volume flushes better. Passing flatus and bowel movement. Ambulating okay.    Scheduled Meds:   bumetanide  0.5 mg Intravenous Daily    chlorhexidine  15 mL Mouth/Throat BID    ciprofloxacin HCl  500 mg Per G Tube Q12H    cyanocobalamin  1,000 mcg Subcutaneous Daily    fluconazole  100 mg Per G Tube Daily    metoclopramide HCl  10 mg Intravenous Q6H    pantoprazole  40 mg Oral BID    sodium chloride 0.9%  10 mL Intravenous Q6H     Continuous Infusions:   Amino acid 5% - dextrose 15% (CLINIMIX-E) solution with additives (1L  provides 510 kcal/L dextrose, with 50 gm AA, 150 gm CHO, Na 35, K 30, Mg 5, Ca 4.5, Acetate 80, Cl 39, Phos 15) 50 mL/hr at 02/07/19 2341     PRN Meds:.sodium chloride, dextrose 50%, dextrose 50%, glucose, glucose, HYDROmorphone, insulin aspart U-100, naloxone, ondansetron, promethazine (PHENERGAN) IVPB, Flushing PICC Protocol **AND** sodium chloride 0.9% **AND** sodium chloride 0.9%, sodium chloride 0.9%    Objective:      Temp:  [98 °F (36.7 °C)-99.1 °F (37.3 °C)] 98 °F (36.7 °C)  Pulse:  [113-135] 113  Resp:  [20] 20  SpO2:  [88 %-98 %] 93 %  BP: (109-124)/(61-70) 110/67  Weight change: 0 kg (0 lb)    Intake/Output Summary (Last 24 hours) at 2/8/2019 0838  Last data filed at 2/8/2019 0600  Gross per 24 hour   Intake 1155.83 ml   Output 1120 ml   Net 35.83 ml      UOP 1200L  Drains R: 100, B:0 , Gastrostomy: 0    Physical Exam:  GEN: awake, alert, NAD  CV: RRR  PULM: normal work of breathing, on NC  ABD: S/NT/ND, incision c/d/i, tubes in good position  EXT: Peripheral pulses present and equal bilaterally    Recent Labs   Lab 02/08/19  0625      K 3.8   CO2 32*      BUN 40*   CREATININE 1.3   CALCIUM 10.0   PROT 7.6   AST 75*   ALT 56*   ALKPHOS 151*   BILITOT 1.3*     Recent Labs   Lab 02/08/19  0625   WBC 10.75   HGB 9.0*   HCT 29.4*          Significant Imaging: I have reviewed all pertinent imaging results/findings within the past 24 hours.    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  Now c sepsis likely from aspiration RLL PNA  S/p IR drain of abdominal fluid collection  1/24/18 - Laparotomy, ASHLEIGH, enterotomy repair, EGD, GastroJ, Retroperitoneal peripancreatic abscess open drainage, Omental flap creation, Lori gastrostomy c 24 Fr Malecot     Pain control prn  Monitor UOP, trend Cr, try to avoid nephrotoxics - currently Cr wnl, UOP adequate  cont TPN/NPO, PPI --> Lytes replacing prn  Continue to clamp Drain #1 biliary drain and flush with 20 cc of sterile water BID  Abd culture grew Kluyvera ascorbata on abx - BID free water irrigation (10cc) of RUQ drain #2 , Continue amphotericin IV  Hold TF for now.  continue reglan 10mg q 6 hours  Continue to monitor H/H, on BID protonix  OOB to chair  Encourage ambulation  Encourage IS and aggressive respiratory toilet  Dispo - Possible discharge home today pending coordination of home health for TPN 7pm - 7am      Jakob Adhikari MD    II  [phone]

## 2019-02-08 NOTE — PLAN OF CARE
VN cued into pt's room for introduction. VN informed pt that VN would be working along side bedside nurse and PCT throughout shift. Level of present pain assessed. At present no distress noted. Discussed today's plan of care with patient. Patient resting comfortably in bed stating that he is ready for nap since finished working with therapy. Discussed with patient High fall risk protocol and interventions that have been initiated and cont be in place for safety. Patient verbalized clear understanding and cooperation using teach back method. Bed alarm presently activated and in use. Will cont to be available to patient and intervene prn.

## 2019-02-08 NOTE — PLAN OF CARE
Problem: SLP Goal  Goal: SLP Goal  Short Term Goals:   1. Pt will participate in BSS to determine least restrictive diet.- MET 1/31  2. Pt will participate in indirect dysphagia exercises to strengthen musculature for swallowing mechanism with mod-max effort.-ongoing  3. Pt will participate in re-assessment of swallowing mechanism to determine least restrictive PO diet- MET 2/5  4. Pt will successfully participate in repeat Modified Barium Swallow study to radiographically assess swallow function, rule out aspiration and determine safest/least restrictive diet.- MET 2/7            Outcome: Ongoing (interventions implemented as appropriate)  2/8: ongoing swallow tx. OMEs completed with fair-good effort targeting BOT strength and mobility, laryngeal lift/excursion, and vocal fold adduction. Education provided re: ST needs at d/c, NPO status, and swallowing precautions.

## 2019-02-09 NOTE — PROGRESS NOTES
PICC dressing changed before discharged. O2 sat 97% on room air, notified Dr. Adhikari before discharge.

## 2019-02-09 NOTE — NURSING
AVS and educational attachments shared with patient and wife via [employer]. Discussed thoroughly. Patient and wife verbalized clear understanding using teach back method. Notified bedside nurse of completion of education. At present no distress noted. Patient to be discharged via w/c with escort service and family with all of patient's belonings. Will cont to be available to patient and family and intervene prn.

## 2019-02-10 NOTE — DISCHARGE SUMMARY
[facility]  General Surgery  Discharge Summary      Patient Name: Hoang Santos Jr.  MRN: [id number]  Admission Date: 1/18/2019  Hospital Length of Stay: 21 days  Discharge Date and Time: 2/8/2019  6:50 PM  Attending Physician: No att. 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 He was admitted and started on IV antibiotics and TPN for malnutrition. BY hospital day 2 patient was becoming delirious and septic. By hospital day 3 the patients pain was controlled and he had undergone an IR procedure for placement of intraabdominal drains. He was started on broad spectrum antibiotics, and he underwent an EGD and bronch to further evaluate his upper airway. 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 On hospital day 6 the patient underwent Laparotomy, ASHLEIGH, enterotomy repair, EGD, GastroJ, Retroperitoneal peripancreatic abscess open drainage, Omental flap creation, Lori gastrostomy c 24 Fr Malecot which he tolerated well. By POD 1 1/26 patient was extubated but kept NPO with TPN and the NGT to LIWS. By 1/31 the patient had been extubated but he continued to progress slowly. Although feeds were intiated the patient had a large volume emesis on 2/6. Patient underwent a study on 2/5 which demonstrated that although  contrast was not extravasating outside of the gastric lumen it indeed was not progressing past the duodenum. A CT performed on 2/6 revealed extensive inflammatory changes near the pylorous which cause compression of the gastric outlet and therefore creating issues of gastric emptying and continue nausea and emesis. We prescribed gut motility medication and the patient was discharged on 2/8 after he had met all appropriate discharge criteria. Patient was instructed to flush all the drains with 10cc of sterile water at least BID. He was told he could have pleasure feds through the gtube if need be. However his main source of nutrition was to be the TPN. While in the hospital he did not pass a swallow study and therefore was discharged home with speech therapy. Patient also will need home health for PT/OT and for drain care. He was also discharged with oral antifungals and oral pain medication which he was instructed to instill into his gtube. Once the tpn had been arranged and set up the patient was discharged after he had had all his questions answered.        Consults:   RD: TF, rec change to Nutren 2.0 5 cans per day to provide 2400 calories, 96 gm Pro, 843 mL water. May begin with 60 mL bolus TF to INC every other feed by 60 mL until reach goal of 240 mL 5 x day. Flush with 60 mL water before and after bolus TF. Patient will need 250 mL of additional water TID to meet fluid needs.   Pulm: - continued aspiration due to swallowing issues.   - needs continued speech therapy involvement  - there is nothing from a pulmonary standpoint that can be done to prevent aspiration other than NPO with typical aspiration precautions.   - we do not treat aspiration with prophylactic antibiotics  - I do not think that this is the source of his infection currently, especially with cultures positive from his abdomen.   He does NOT need a tracheostomy. He is not having any issues with oxygenation on minimal supplemental oxygen.  continue bipap at night for GEMMA  - will need bipap when discharged home for hypercapnea  GI: EGD revealed compromised upper GI lumen potentially related to abscess s/p drain. New dysphagia most consistent with OP dysphagia.  Consults (From admission, onward)        Status Ordering Provider     Inpatient consult to Gastroenterology-[facility]  Once     Provider:  Harpreet Dixon MD    Completed MOON BARRON     Inpatient consult to Interventional Radiology  Once     Provider:  Nathaniel Hylton MD    Completed SOLO MARSH     Inpatient consult to Interventional Radiology  Once     Provider:  Nathaniel Hylton MD    Completed JAIRO SHAWNA     Inpatient consult to Interventional Radiology  Once     Provider:  Augustin Kendall II, MD    Completed JOSE ALEJANDRO GILLCHARAN     Inpatient consult to Interventional Radiology  Once     Provider:  Augustin Kendall II, MD    Completed JAIRO SHAWNA     Inpatient consult to Pulmonology  Once     Provider:  (Not yet assigned)    Completed MOON BARRON     Inpatient consult to Pulmonology  Once     Provider:  Haylee Leija MD    Completed SHAWNA GILL     Inpatient consult to Pulmonology  Once     Provider:  Haylee Leija MD    Completed JAIRO SHAWNA     Inpatient consult to Registered Dietitian/Nutritionist  Once     Provider:  (Not yet assigned)    Completed MOON BARRON     Inpatient consult to Registered Dietitian/Nutritionist  Once     Provider:  (Not yet assigned)    Completed SHAWNA GILL     Inpatient consult to Registered Dietitian/Nutritionist  Once     Provider:  (Not yet assigned)    Completed BRYAN COREAS          Significant Diagnostic Studies: CT abdomen 2/8:   Impression:       Stable position of biliary drainage catheter.  No evidence of intrahepatic biliary dilatation.    Right-sided abdominal drainage catheter in place with an adjacent small air/fluid collection.  The findings may  represent a resolving abscess.    Stable appearance of right-sided predominate fluid subhepatic collection measuring 4.5 x 2.9 cm.  No evidence of a new drainable collection in the abdomen or pelvis.    Phlegmonous changes in the region of the proximal duodenum/distal stomach.  Follow-up is suggested to exclude an evolving abscess in this region.    Postoperative changes involving the stomach with irregularity involving the greater curvature of the stomach.  Continued short-term follow-up is suggested.    Bibasilar airspace opacities, concerning for pneumonia.    Decrease in bilateral pleural effusions.    Additional findings as above.    There is minimal high attenuation material adjacent to the anterior   subhepatic collection.  The findings may represent a small biliary leak   (image 75 of series 2).  Attention to this region on follow-up examination   is recommended.     Electronically signed by: Sim Fernandez MD Date: 02/08/2019 Time: 16:39       Cholangiogram 2/7: Previous identified common bile duct leak is not seen today.    KUB: 1. No evidence of enteric contrast at or distal to the expected location of the gastrojejunostomy.    UGI: Gastrografin was injected into the patient's PEG tube.  Appropriate opacification of the gastric lumen identified.  Enteric contrast was noted within the stomach lumen and progressed cranially into the esophagus without movement through the patient's gastrojejunostomy.  Presume percutaneous biliary drainage catheter in stable position.  Additional drainage catheter projects over the right upper quadrant.           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    Specialty:  Home Health Services  Why:  Infusion  Contact information:  [address]  [place] [address]  [phone]             [facility].    Specialty:  Home Health Services  Why:  Home Health  Contact information:  [address]  [address]  [facility] [address]  [phone]             Salo Nuñez MD On 2/11/2019.    Specialty:  Gastroenterology  Why:  1:00 pm - previously booked   Contact information:  [address] [address]  [phone]             Lee Kay MD On 2/15/2019.    Specialty:  Internal Medicine  Why:  2:00 pm     Contact information:  [address] [facility]  [address]  [facility] [address]  [phone]             Madeleine Alvarado MD On 2/14/2019.    Specialty:  General Surgery  Why:  12N      Contact information:  [address]  [address]  [facility] [address]  [phone]                 Patient Instructions:      Diet NPO     Notify your health care provider if you experience any of the following:  temperature >100.4     Notify your health care provider if you experience any of the following:  persistent nausea and vomiting or diarrhea     Notify your health care provider if you experience any of the following:  severe uncontrolled pain     Notify your health care provider if you experience any of the following:  redness, tenderness, or signs of  infection (pain, swelling, redness, odor or green/yellow discharge around incision site)     Notify your health care provider if you experience any of the following:  difficulty breathing or increased cough     Notify your health care provider if you experience any of the following:  severe persistent headache     Notify your health care provider if you experience any of the following:  worsening rash     Notify your health care provider if you experience any of the following:  persistent dizziness, light-headedness, or visual disturbances     Notify your health care provider if you experience any of the following:  increased confusion or weakness     Activity as tolerated     Medications:  Reconciled Home Medications:      Medication List      START taking these medications    ciprofloxacin HCl 500 MG tablet  Commonly known as:  CIPRO  1 tablet (500 mg total) by Per G Tube route every 12 (twelve) hours.     esomeprazole 20 mg Grps  Commonly known as:  NexIUM Packet  Take 20 mg by mouth 2 (two) times daily.     fluconazole 100 MG tablet  Commonly known as:  DIFLUCAN  1 tablet (100 mg total) by Per G Tube route once daily.     pantoprazole 40 MG tablet  Commonly known as:  PROTONIX  Take 1 tablet (40 mg total) by mouth 2 (two) times daily.        CHANGE how you take these medications    * ondansetron 4 MG tablet  Commonly known as:  ZOFRAN  Take 1 tablet (4 mg total) by mouth every 8 (eight) hours as needed for Nausea.  What changed:  Another medication with the same name was added. Make sure you understand how and when to take each.     * ondansetron 4 mg/5 mL solution  Commonly known as:  ZOFRAN  Take 5 mLs (4 mg total) by mouth 2 (two) times daily as needed for Nausea.  What changed:  You were already taking a medication with the same name, and this prescription was added. Make sure you understand how and when to take each.         * This list has 2 medication(s) that are the same as other medications prescribed  for you. Read the directions carefully, and ask your doctor or other care provider to review them with you.            CONTINUE taking these medications    atorvastatin 40 MG tablet  Commonly known as:  LIPITOR  Take 1 tablet (40 mg total) by mouth once daily.     bisacodyl 10 mg Supp  Commonly known as:  DULCOLAX  Place 1 suppository (10 mg total) rectally daily as needed.     cholecalciferol (vitamin D3) 50,000 unit capsule  Take 1 capsule (50,000 Units total) by mouth once a week.     HYDROcodone-acetaminophen 5-325 mg per tablet  Commonly known as:  NORCO  Take 1 tablet by mouth every 6 (six) hours as needed for Pain.     meclizine 25 mg tablet  Commonly known as:  ANTIVERT  Take 1 tablet (25 mg total) by mouth 3 (three) times daily as needed for Dizziness.     * VIAGRA 100 MG tablet  Generic drug:  sildenafil  Take 1 tablet (100 mg total) by mouth once daily. Brand name only medication.     * sildenafil 100 MG tablet  Commonly known as:  VIAGRA  Take 1 tablet (100 mg total) by mouth daily as needed for Erectile Dysfunction.         * This list has 2 medication(s) that are the same as other medications prescribed for you. Read the directions carefully, and ask your doctor or other care provider to review them with you.            STOP taking these medications    levoFLOXacin 250 mg/10 mL Soln  Commonly known as:  EVELYN Adhikari MD    II [id number]  General Surgery  [facility]

## 2019-02-11 ENCOUNTER — LAB VISIT (OUTPATIENT)
Dept: LAB | Facility: HOSPITAL | Age: 69
End: 2019-02-11
Attending: SURGERY
Payer: MEDICARE

## 2019-02-11 ENCOUNTER — PATIENT OUTREACH (OUTPATIENT)
Dept: ADMINISTRATIVE | Facility: CLINIC | Age: 69
End: 2019-02-11

## 2019-02-11 DIAGNOSIS — J69.0 ASPIRATION PNEUMONIA OF RIGHT LOWER LOBE, UNSPECIFIED ASPIRATION PNEUMONIA TYPE: Primary | ICD-10-CM

## 2019-02-11 DIAGNOSIS — C7A.010 MALIGNANT CARCINOID TUMOR OF THE DUODENUM: Primary | ICD-10-CM

## 2019-02-11 LAB
ALBUMIN SERPL BCP-MCNC: 2.8 G/DL
ALP SERPL-CCNC: 147 U/L
ALT SERPL W/O P-5'-P-CCNC: 44 U/L
ANION GAP SERPL CALC-SCNC: 12 MMOL/L
AST SERPL-CCNC: 57 U/L
BILIRUB SERPL-MCNC: 1.1 MG/DL
BUN SERPL-MCNC: 48 MG/DL
CALCIUM SERPL-MCNC: 9.8 MG/DL
CHLORIDE SERPL-SCNC: 106 MMOL/L
CO2 SERPL-SCNC: 28 MMOL/L
CREAT SERPL-MCNC: 1.4 MG/DL
ERYTHROCYTE [DISTWIDTH] IN BLOOD BY AUTOMATED COUNT: 15.8 %
EST. GFR  (AFRICAN AMERICAN): 59.7 ML/MIN/1.73 M^2
EST. GFR  (NON AFRICAN AMERICAN): 51.6 ML/MIN/1.73 M^2
GLUCOSE SERPL-MCNC: 93 MG/DL
HCT VFR BLD AUTO: 34.3 %
HGB BLD-MCNC: 10.2 G/DL
MCH RBC QN AUTO: 29.5 PG
MCHC RBC AUTO-ENTMCNC: 29.7 G/DL
MCV RBC AUTO: 99 FL
PHOSPHATE SERPL-MCNC: 4.4 MG/DL
PLATELET # BLD AUTO: 395 K/UL
PLATELET BLD QL SMEAR: ABNORMAL
PMV BLD AUTO: 11.5 FL
POTASSIUM SERPL-SCNC: 3.9 MMOL/L
PROT SERPL-MCNC: 8.1 G/DL
RBC # BLD AUTO: 3.46 M/UL
SODIUM SERPL-SCNC: 146 MMOL/L
TRIGL SERPL-MCNC: 88 MG/DL
WBC # BLD AUTO: 9.36 K/UL

## 2019-02-11 PROCEDURE — 80053 COMPREHEN METABOLIC PANEL: CPT

## 2019-02-11 PROCEDURE — 84134 ASSAY OF PREALBUMIN: CPT

## 2019-02-11 PROCEDURE — 84100 ASSAY OF PHOSPHORUS: CPT

## 2019-02-11 PROCEDURE — 84478 ASSAY OF TRIGLYCERIDES: CPT

## 2019-02-11 PROCEDURE — 85027 COMPLETE CBC AUTOMATED: CPT

## 2019-02-11 PROCEDURE — 83735 ASSAY OF MAGNESIUM: CPT

## 2019-02-11 RX ORDER — HYDROCODONE BITARTRATE AND ACETAMINOPHEN 7.5; 325 MG/15ML; MG/15ML
15 SOLUTION ORAL EVERY 6 HOURS PRN
Qty: 300 ML | Refills: 0 | Status: SHIPPED | OUTPATIENT
Start: 2019-02-11 | End: 2019-07-30

## 2019-02-11 NOTE — PATIENT INSTRUCTIONS
Sepsis     To treat sepsis, antibiotics and fluids may by given through an intravenous (IV) line.     Sepsis happens when your body responds with widespread inflammation to a bad infection or bacteremia--the presence of bacteria in your bloodstream. Sepsis can be deadly. Blood pressure may drop and the lungs and kidneys may start to fail. Emergency care for sepsis is crucial.  Risk factors  Those most at risk for sepsis are:  · Infants or older adults  · People who have an illness that weakens their immune system, such as cancer, AIDS, or diabetes  · People being treated with chemotherapy medicines or radiation, which weakens the immune system  · People who have had a transplant  · People with a very severe infection such as pneumonia, meningitis, or a urinary tract infection  When to go to the emergency department (ED)  Sepsis is an emergency. Go to the nearest ED if you have a fever with any of these symptoms:  · Chills and shaking  · Rapid heartbeat and breathing  · Trouble breathing  · Severe nausea or uncontrolled vomiting  · Confusion, disorientation, drowsiness, or dizziness  · Decreased urination  · Severe pain, including in the back or joints   What to expect in the ED  · Blood and urine tests are done to look for bacteria. They also check for organ failure.  · Blood, urine, or sputum cultures may be taken. The samples are sent to a lab. They are placed in a special container. Any bacteria should grow in 24 hours.  · X-rays or other imaging tests may be done.  A person with sepsis will be admitted to the hospital and treated with antibiotics. Treatment may also include oxygen and intravenous fluids.  Date Last Reviewed: 10/1/2016  © 7214-0423 [employer]. [address] [place] [address], [place] [address]. All rights reserved. This information is not intended as a substitute for professional medical care. Always follow your healthcare professional's instructions.

## 2019-02-11 NOTE — PROGRESS NOTES
Wife states that having trouble getting nexium d\t rx being written over quantity allowed. Protonix has do not crush and needs to be given per tube. Wife also wants to know what can be taken to help get up mucus. Secure message routed to d\c provider Collins Adhikari and Dr. Hernandez.

## 2019-02-12 ENCOUNTER — TELEPHONE (OUTPATIENT)
Dept: NEUROLOGY | Facility: HOSPITAL | Age: 69
End: 2019-02-12

## 2019-02-12 DIAGNOSIS — E78.5 HYPERLIPIDEMIA, UNSPECIFIED HYPERLIPIDEMIA TYPE: ICD-10-CM

## 2019-02-12 LAB
FUNGUS SPEC CULT: NORMAL
MAGNESIUM SERPL-MCNC: 1.9 MG/DL
PREALB SERPL-MCNC: 6 MG/DL

## 2019-02-12 NOTE — TELEPHONE ENCOUNTER
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    Prostatic hyperplasia     Sleep apnea        Previous Surgical History:  Past Surgical History:   Procedure Laterality Date    BIOPSY, LIVER  1/4/2019    Performed by [facility] at [facility] OR    CHOLANGIOGRAM, PERCUTANEOUS, TRANSHEPATIC Right 1/29/2019    Performed by YOHANNES Shelley MD at [facility] OR    CHOLECYSTECTOMY  1/4/2019    [facility] by [facility] at [facility]    DUODENECTOMY N/A 1/4/2019    [facility] by [facility] at [facility]    EGD (ESOPHAGOGASTRODUODENOSCOPY) N/A 1/24/2019    Performed by [facility] at [facility]    EGD (ESOPHAGOGASTRODUODENOSCOPY) N/A 1/4/2019    Performed by [facility] at [facility]    EGD (ESOPHAGOGASTRODUODENOSCOPY) N/A 9/24/2018    Performed by Salo Nu[facility]ez MD at [facility]O (4TH FLR)    ESOPHAGOGASTRODUODENOSCOPY (EGD) N/A 1/21/2019    Performed by Jose Kahn MD at [facility] ENDO    FUNDOPLICATION N/A 1/4/2019    Performed by [facility]ado [facility] at [facility]    GASTROJEJUNOSTOMY Right 1/24/2019    Performed by [facility] at [facility]    GASTROJEJUNOSTOMY N/A 1/4/2019    Performed by [facility]ado [facility] at [facility]    LAPAROTOMY, EXPLORATORY N/A 1/24/2019    Performed by [facility]ado [facility] at [facility] OR    LYMPHADENECTOMY N/A 1/4/2019    Performed by [facility]ado [facility] at [facility]    LYSIS, ADHESIONS N/A 1/24/2019    Performed by [facility]ado MD at [facility] OR    PALATOPLASTY-(UPPP) N/A 2/14/2017    Performed by Bob Araujo III, MD at [facility] OR 2ND FLR    PH MONITORING, ESOPHAGUS, WIRELESS, (OFF REFLUX MEDS) N/A 9/24/2018    [facility] ([facility])    TONSILLECTOMY       TRANSPROSTATIC TISSUE RETRACTION N/A 6/28/2018    [facility] by K[facility] Jack [facility] at [facility] OR    ULTRASOUND-ENDOSCOPIC-UPPER N/A 11/5/2018    Performed by Gorge Reyes MD at [facility] ENDO    UVULOPALATOPHARYNGOPLASTY         Medication:    atorvastatin (LIPITOR) 40 MG tablet, Take 1 tablet (40 mg total) by mouth once daily., Disp: 90 tablet, Rfl: 3    bisacodyl (DULCOLAX) 10 mg Supp, Place 1 suppository (10 mg total) rectally daily as needed., Disp: , Rfl: 0    ciprofloxacin HCl (CIPRO) 500 MG tablet, 1 tablet (500 mg total) by Per G Tube route every 12 (twelve) hours., Disp: 14 tablet, Rfl: 0    esomeprazole (NEXIUM PACKET) 20 mg GrPS, Take 20 mg by mouth 2 (two) times daily., Disp: 1200 mg, Rfl: 11    fluconazole (DIFLUCAN) 100 MG tablet, 1 tablet (100 mg total) by Per G Tube route once daily., Disp: 30 tablet, Rfl: 0    hydrocodone-acetaminophen (HYCET) solution 7.5-325 mg/15mL, Take 15 mLs by mouth every 6 (six) hours as needed for Pain., Disp: 300 mL, Rfl: 0    meclizine (ANTIVERT) 25 mg tablet, Take 1 tablet (25 mg total) by mouth 3 (three) times daily as needed for Dizziness., Disp: 14 tablet, Rfl: 0    ondansetron (ZOFRAN) 4 MG tablet, Take 1 tablet (4 mg total) by mouth every 8 (eight) hours as needed for Nausea., Disp: 30 tablet, Rfl: 2    ondansetron (ZOFRAN) 4 mg/5 mL solution, Take 5 mLs (4 mg total) by mouth 2 (two) times daily as needed for Nausea., Disp: 50 mL, Rfl: 0    pantoprazole (PROTONIX) 40 MG tablet, Take 1 tablet (40 mg total) by mouth 2 (two) times daily., Disp: 60 tablet, Rfl: 11    sildenafil (VIAGRA) 100 MG tablet, Take 1 tablet (100 mg total) by mouth daily as needed for Erectile Dysfunction., Disp: 30 tablet, Rfl: 3    VIAGRA 100 mg tablet, Take 1 tablet (100 mg total) by mouth once daily. Brand name only medication., Disp: 10 tablet, Rfl: 11      Vitamin/Supplements/Herbs:     cholecalciferol, vitamin D3, 50,000 unit capsule, Take 1 capsule (50,000 Units  "total) by mouth once a week., Disp: 12 capsule, Rfl: 3       Allergies:  Review of patient's allergies indicates:   Allergen Reactions    Epinephrine      Neuroendocrine Tumor patient    Toradol [ketorolac] Other (See Comments)     Creatinine rises even with sub therapeutic dose       Labs: reviewed. Faxed to [facility]    : 1950  Age: 68 y.o.    Anthropometrics  Weight: 98.3 kg (216 lb 11.4 oz)  Height: 5' 10" (1.778 m)    Estimated body mass index is 31.09 kg/m²     BMI Weight Status   Below 18.5 Underweight   18.6-24.9 Normal/Healthy   25.0-29.9 Overweight   30.0-34.9 Obesity Class I   35.0-39.9 Obesity Class II   >40 Extreme Obesity   Class III     Ideal Weight Range for Your Height: 5'10" = 132 - 173 lbs    Weight History:  Weight has decreased 13 pounds over last 5 months which is not significant for time frame.     Wt Readings from Last 12 Encounters:   19 98.3 kg (216 lb 11.4 oz)   19 97 kg (213 lb 13.5 oz)   19 104.3 kg (230 lb)   19 101.9 kg (224 lb 12.1 oz)   18 106 kg (233 lb 11 oz)   18 105.9 kg (233 lb 6.4 oz)   18 105.9 kg (233 lb 7.5 oz)   18 105.9 kg (233 lb 7.5 oz)   18 104.3 kg (230 lb)   18 107 kg (235 lb 14.3 oz)   10/15/18 107.7 kg (237 lb 7 oz)   18 104.3 kg (230 lb)       Estimated Nutrition Needs:   Energy Needs:  2300 (1761 MSJ x 1.3 PAL)  Protein Needs: 99 to 118 (1.0-1.2 gm Protein/kg)  Fluid Needs: 2300 (1 mL/calorie)      Malnutrition Assessment:  Telephone Consult      Nutrition History    Meal Pattern:  Patient is NPO with PEG tube for hydration. TPN for nutrition.   Beverage Intake: via PEG water and Ensure    Dentition: normal dentition for age  Difficulty chewing or swallowing? yes  Exercise: Type of physical activity- sedentary      Motivation to Change: Expect good compliance to nutrition recommendations.       Nutrition Diagnosis  Nutrition Problem  Altered GI function      Related to (etiology):   Gastric " Outlet Obstruction   NPO due to Dysphagia    Signs and Symptoms (as evidenced by):   Need for TPN   Gradual volume increase via PEG.     Interventions:  Enteral Nutrition  Composition, Concentration, Rate, Volume, Schedule, Route and Feeding tube flush  Purpose of the nutrition education, Recommended modifications and Result interpretation      Nutrition Diagnosis Status:   Continues        Recommendations:  1. Start TPN 1440 mL x 12 hr at night. Composition adjustment per Labs and RD. See Order on 2/12/19.    2. Administer 60 mL water every 2 hrs during daytime 6am-9pm (15 hrs) via PEG to provide ~450 mL of water/day  3. Administer with water flushes 30 mL of Ensure Plus, to total 1 bottle a day to provide 350 calories, 9 gm Protein. Patient to recieve 90 mL per bolus if able to YAJAIRA volume.   4. RD contact information provided.  RD added to Care Team.       Goals:   1. Adequate nutrition to prevent dehydration  2. Adequate nutrition via TPN to meet >75% of calorie and protein needs to prevent malnutrition      Routed to Madeleine Alvarado MD       Consultation Time: 10 minutes.      Follow Up: 1 months.

## 2019-02-12 NOTE — TELEPHONE ENCOUNTER
----- Message from John Kern sent at 2/11/2019  3:42 PM CST -----  Contact: [facility]  Name of Who is Calling: [facility]       What is the request in detail: States that Pt needs a new prescription for medication atorvastatin (LIPITOR) 40 MG tablet. If possible something that can go through a G-Tube. NPO. Pt can not take anything orally.        Can the clinic reply by [facility]: No        What Number to Call Back if not in [facility]:

## 2019-02-13 RX ORDER — ATORVASTATIN CALCIUM 40 MG/1
40 TABLET, FILM COATED ORAL DAILY
Qty: 90 TABLET | Refills: 3 | Status: SHIPPED | OUTPATIENT
Start: 2019-02-13 | End: 2019-12-04 | Stop reason: SDUPTHER

## 2019-02-14 ENCOUNTER — LAB VISIT (OUTPATIENT)
Dept: LAB | Facility: HOSPITAL | Age: 69
End: 2019-02-14
Attending: SURGERY
Payer: MEDICARE

## 2019-02-14 ENCOUNTER — OFFICE VISIT (OUTPATIENT)
Dept: NEUROLOGY | Facility: HOSPITAL | Age: 69
End: 2019-02-14
Attending: SURGERY
Payer: MEDICARE

## 2019-02-14 VITALS
SYSTOLIC BLOOD PRESSURE: 102 MMHG | DIASTOLIC BLOOD PRESSURE: 66 MMHG | HEIGHT: 66 IN | HEART RATE: 107 BPM | BODY MASS INDEX: 32.72 KG/M2 | WEIGHT: 203.63 LBS | TEMPERATURE: 100 F

## 2019-02-14 DIAGNOSIS — K86.1 CHRONIC PANCREATITIS, UNSPECIFIED PANCREATITIS TYPE: ICD-10-CM

## 2019-02-14 DIAGNOSIS — K86.1 CHRONIC PANCREATITIS, UNSPECIFIED PANCREATITIS TYPE: Primary | ICD-10-CM

## 2019-02-14 LAB
AMYLASE, BODY FLUID: 3686 U/L
BODY FLUID SOURCE AMYLASE: NORMAL

## 2019-02-14 PROCEDURE — 99215 OFFICE O/P EST HI 40 MIN: CPT | Performed by: SURGERY

## 2019-02-14 PROCEDURE — 82150 ASSAY OF AMYLASE: CPT

## 2019-02-14 PROCEDURE — 84478 ASSAY OF TRIGLYCERIDES: CPT

## 2019-02-14 RX ORDER — OMEPRAZOLE 40 MG/1
CAPSULE, DELAYED RELEASE ORAL
COMMUNITY
Start: 2019-01-30 | End: 2019-05-10

## 2019-02-14 NOTE — PROGRESS NOTES
S/p discharge last week  Feeling ok walking ambulating sleweping having BM    abd soft  RUQ drain ? purtulent versus chyle leak    abd soft no tenderness  No pain abdomen  No fever       Results for STARLA SOTOZOILA EDEN JR. (MRN [id number]) as of 2/14/2019 12:42   Ref. Range 2/8/2019 06:25 2/11/2019 13:56   WBC Latest Ref Range: 3.90 - 12.70 K/uL 10.75 9.36   RBC Latest Ref Range: 4.60 - 6.20 M/uL 3.08 (L) 3.46 (L)   Hemoglobin Latest Ref Range: 14.0 - 18.0 g/dL 9.0 (L) 10.2 (L)   Hematocrit Latest Ref Range: 40.0 - 54.0 % 29.4 (L) 34.3 (L)   MCV Latest Ref Range: 82 - 98 fL 96 99 (H)   MCH Latest Ref Range: 27.0 - 31.0 pg 29.2 29.5   MCHC Latest Ref Range: 32.0 - 36.0 g/dL 30.6 (L) 29.7 (L)   RDW Latest Ref Range: 11.5 - 14.5 % 15.7 (H) 15.8 (H)   Platelets Latest Ref Range: 150 - 350 K/uL 285 395 (H)   MPV Latest Ref Range: 9.2 - 12.9 fL 10.6 11.5   Gran% Latest Ref Range: 38.0 - 73.0 % 66.0    Gran # (ANC) Latest Ref Range: 1.8 - 7.7 K/uL 7.1    Lymph% Latest Ref Range: 18.0 - 48.0 % 26.0    Lymph # Latest Ref Range: 1.0 - 4.8 K/uL 2.8    Mono% Latest Ref Range: 4.0 - 15.0 % 7.0    Mono # Latest Ref Range: 0.3 - 1.0 K/uL 0.8    Eosinophil% Latest Ref Range: 0.0 - 8.0 % 0.5    Eos # Latest Ref Range: 0.0 - 0.5 K/uL 0.1    Basophil% Latest Ref Range: 0.0 - 1.9 % 0.5    Baso # Latest Ref Range: 0.00 - 0.20 K/uL 0.05    Ovalocytes Unknown Occasional    Aniso Unknown Slight    Poik Unknown Slight    Poly Unknown Occasional    Hypo Unknown Occasional    Platelet Estimate Unknown Appears normal Increased (A)   Target Cells Unknown Occasional    Tear Drop Cells Unknown Occasional    Differential Method Unknown AUTOMATED    Protime Latest Ref Range: 9.0 - 12.5 sec 11.4    Coumadin Monitoring INR Latest Ref Range: 0.8 - 1.2  1.1    aPTT Latest Ref Range: 21.0 - 32.0 sec 29.8    Sodium Latest Ref Range: 136 - 145 mmol/L 145 146 (H)   Potassium Latest Ref Range: 3.5 - 5.1 mmol/L 3.8 3.9   Chloride Latest Ref Range: 95 - 110 mmol/L  104 106   CO2 Latest Ref Range: 23 - 29 mmol/L 32 (H) 28   Anion Gap Latest Ref Range: 8 - 16 mmol/L 9 12   BUN, Bld Latest Ref Range: 8 - 23 mg/dL 40 (H) 48 (H)   Creatinine Latest Ref Range: 0.5 - 1.4 mg/dL 1.3 1.4   eGFR if non African American Latest Ref Range: >60 mL/min/1.73 m^2 56 (A) 51.6 (A)   eGFR if African American Latest Ref Range: >60 mL/min/1.73 m^2 >60 59.7 (A)   Glucose Latest Ref Range: 70 - 110 mg/dL 120 (H) 93   Calcium Latest Ref Range: 8.7 - 10.5 mg/dL 10.0 9.8   Phosphorus Latest Ref Range: 2.7 - 4.5 mg/dL 4.3 4.4   Magnesium Latest Ref Range: 1.6 - 2.6 mg/dL 2.1 1.9   Alkaline Phosphatase Latest Ref Range: 55 - 135 U/L 151 (H) 147 (H)   Total Protein Latest Ref Range: 6.0 - 8.4 g/dL 7.6 8.1   Albumin Latest Ref Range: 3.5 - 5.2 g/dL 2.8 (L) 2.8 (L)   Prealbumin Latest Ref Range: 20 - 43 mg/dL  6 (L)   Total Bilirubin Latest Ref Range: 0.1 - 1.0 mg/dL 1.3 (H) 1.1 (H)   AST Latest Ref Range: 10 - 40 U/L 75 (H) 57 (H)   ALT Latest Ref Range: 10 - 44 U/L 56 (H) 44   Triglycerides Latest Ref Range: 30 - 150 mg/dL  88       No fever, no increase in WBC  If no TGL then it is purulent  Will require CT abd    Continue TPN, TF    toleratuing 120cc of tube feeding  aday including water flush, meds

## 2019-02-17 LAB
SPECIMEN SOURCE: NORMAL
TRIGL FLD-MCNC: 250 MG/DL

## 2019-02-18 ENCOUNTER — TELEPHONE (OUTPATIENT)
Dept: NEUROLOGY | Facility: HOSPITAL | Age: 69
End: 2019-02-18

## 2019-02-18 NOTE — TELEPHONE ENCOUNTER
----- Message from Ermelinda Barrios sent at 2/18/2019 10:05 AM CST -----  Contact: Pauly with [facility]  JOSE ALEJANDRO- Pauly with [facility] called to inform the nurse that the  added labs for Monday will be drawn on Tuesday 2/19/2019 . Pauly can be reached at [phone] if needed

## 2019-02-19 ENCOUNTER — TELEPHONE (OUTPATIENT)
Dept: NEUROLOGY | Facility: HOSPITAL | Age: 69
End: 2019-02-19

## 2019-02-19 ENCOUNTER — LAB VISIT (OUTPATIENT)
Dept: LAB | Facility: HOSPITAL | Age: 69
End: 2019-02-19
Attending: SURGERY
Payer: MEDICARE

## 2019-02-19 DIAGNOSIS — C7A.010 MALIGNANT CARCINOID TUMOR OF THE DUODENUM: Primary | ICD-10-CM

## 2019-02-19 DIAGNOSIS — K86.1 OTHER CHRONIC PANCREATITIS: Primary | ICD-10-CM

## 2019-02-19 DIAGNOSIS — N52.9 IMPOTENCE OF ORGANIC ORIGIN: ICD-10-CM

## 2019-02-19 LAB
ALBUMIN SERPL BCP-MCNC: 2.6 G/DL
ALP SERPL-CCNC: 172 U/L
ALT SERPL W/O P-5'-P-CCNC: 38 U/L
ANION GAP SERPL CALC-SCNC: 8 MMOL/L
AST SERPL-CCNC: 64 U/L
BILIRUB SERPL-MCNC: 0.6 MG/DL
BUN SERPL-MCNC: 36 MG/DL
CALCIUM SERPL-MCNC: 9.7 MG/DL
CHLORIDE SERPL-SCNC: 109 MMOL/L
CO2 SERPL-SCNC: 25 MMOL/L
CREAT SERPL-MCNC: 0.9 MG/DL
ERYTHROCYTE [DISTWIDTH] IN BLOOD BY AUTOMATED COUNT: 16.1 %
EST. GFR  (AFRICAN AMERICAN): >60 ML/MIN/1.73 M^2
EST. GFR  (NON AFRICAN AMERICAN): >60 ML/MIN/1.73 M^2
GLUCOSE SERPL-MCNC: 95 MG/DL
HCT VFR BLD AUTO: 34.1 %
HGB BLD-MCNC: 10.4 G/DL
MAGNESIUM SERPL-MCNC: 1.8 MG/DL
MCH RBC QN AUTO: 29.2 PG
MCHC RBC AUTO-ENTMCNC: 30.5 G/DL
MCV RBC AUTO: 96 FL
PHOSPHATE SERPL-MCNC: 3.9 MG/DL
PLATELET # BLD AUTO: 279 K/UL
PMV BLD AUTO: 11.8 FL
POTASSIUM SERPL-SCNC: 4.1 MMOL/L
PROT SERPL-MCNC: 8.2 G/DL
RBC # BLD AUTO: 3.56 M/UL
SODIUM SERPL-SCNC: 142 MMOL/L
TRIGL SERPL-MCNC: 83 MG/DL
WBC # BLD AUTO: 14.78 K/UL

## 2019-02-19 PROCEDURE — 83735 ASSAY OF MAGNESIUM: CPT

## 2019-02-19 PROCEDURE — 84100 ASSAY OF PHOSPHORUS: CPT

## 2019-02-19 PROCEDURE — 84478 ASSAY OF TRIGLYCERIDES: CPT

## 2019-02-19 PROCEDURE — 84134 ASSAY OF PREALBUMIN: CPT

## 2019-02-19 PROCEDURE — 85027 COMPLETE CBC AUTOMATED: CPT

## 2019-02-19 PROCEDURE — 80053 COMPREHEN METABOLIC PANEL: CPT

## 2019-02-19 NOTE — TELEPHONE ENCOUNTER
----- Message from Jessica Simms LPN sent at 2/18/2019  4:29 PM CST -----  CT IV contrast Thursday AM

## 2019-02-20 ENCOUNTER — TELEPHONE (OUTPATIENT)
Dept: NEUROLOGY | Facility: HOSPITAL | Age: 69
End: 2019-02-20

## 2019-02-20 LAB
FUNGUS SPEC CULT: NORMAL
PREALB SERPL-MCNC: 15 MG/DL

## 2019-02-20 NOTE — TELEPHONE ENCOUNTER
----- Message from Starr Barkleynoe sent at 2/20/2019  9:57 AM CST -----  Contact: [facility]  JOSE LAEJANDRO:  Viv with [facility] called - states patient vomited a small amount of bile Saturday and Monday.  She does not believe it has happened since.  She wanted to let us know.  She can be reached at [phone].  He is scheduled for another home visit today, PT tomorrow, and home health again on Friday.    Thank you  abc

## 2019-02-21 ENCOUNTER — OFFICE VISIT (OUTPATIENT)
Dept: NEUROLOGY | Facility: HOSPITAL | Age: 69
End: 2019-02-21
Attending: SURGERY
Payer: MEDICARE

## 2019-02-21 ENCOUNTER — HOSPITAL ENCOUNTER (OUTPATIENT)
Facility: HOSPITAL | Age: 69
Discharge: HOME | End: 2019-02-21
Attending: RADIOLOGY | Admitting: RADIOLOGY
Payer: MEDICARE

## 2019-02-21 ENCOUNTER — HOSPITAL ENCOUNTER (OUTPATIENT)
Dept: INTERVENTIONAL RADIOLOGY/VASCULAR | Facility: HOSPITAL | Age: 69
Discharge: HOME | End: 2019-02-21
Attending: SURGERY
Payer: MEDICARE

## 2019-02-21 ENCOUNTER — HOSPITAL ENCOUNTER (OUTPATIENT)
Dept: RADIOLOGY | Facility: HOSPITAL | Age: 69
Discharge: HOME | End: 2019-02-21
Attending: SURGERY
Payer: MEDICARE

## 2019-02-21 VITALS
SYSTOLIC BLOOD PRESSURE: 122 MMHG | HEIGHT: 66 IN | RESPIRATION RATE: 17 BRPM | OXYGEN SATURATION: 99 % | HEART RATE: 105 BPM | WEIGHT: 197 LBS | DIASTOLIC BLOOD PRESSURE: 61 MMHG | TEMPERATURE: 98 F | BODY MASS INDEX: 31.66 KG/M2

## 2019-02-21 VITALS
HEART RATE: 124 BPM | BODY MASS INDEX: 31.73 KG/M2 | TEMPERATURE: 98 F | WEIGHT: 197.44 LBS | DIASTOLIC BLOOD PRESSURE: 75 MMHG | SYSTOLIC BLOOD PRESSURE: 109 MMHG | HEIGHT: 66 IN

## 2019-02-21 DIAGNOSIS — L02.91 ABSCESS: ICD-10-CM

## 2019-02-21 DIAGNOSIS — K86.1 OTHER CHRONIC PANCREATITIS: ICD-10-CM

## 2019-02-21 DIAGNOSIS — L02.91 ABSCESS: Primary | ICD-10-CM

## 2019-02-21 LAB
GRAM STN SPEC: NORMAL

## 2019-02-21 PROCEDURE — 74177 CT ABD & PELVIS W/CONTRAST: CPT | Mod: TC

## 2019-02-21 PROCEDURE — 99215 OFFICE O/P EST HI 40 MIN: CPT | Mod: 25 | Performed by: SURGERY

## 2019-02-21 PROCEDURE — 87205 SMEAR GRAM STAIN: CPT

## 2019-02-21 PROCEDURE — 47531 INJECTION FOR CHOLANGIOGRAM: CPT

## 2019-02-21 PROCEDURE — 49423 EXCHANGE DRAINAGE CATHETER: CPT | Mod: ,,, | Performed by: RADIOLOGY

## 2019-02-21 PROCEDURE — 87077 CULTURE AEROBIC IDENTIFY: CPT

## 2019-02-21 PROCEDURE — 75984 XRAY CONTROL CATHETER CHANGE: CPT | Mod: TC

## 2019-02-21 PROCEDURE — 74177 CT ABD & PELVIS W/CONTRAST: CPT | Mod: 26,,, | Performed by: RADIOLOGY

## 2019-02-21 PROCEDURE — 49423 EXCHANGE DRAINAGE CATHETER: CPT

## 2019-02-21 PROCEDURE — 25000003 PHARM REV CODE 250: Performed by: RADIOLOGY

## 2019-02-21 PROCEDURE — 75984 PR  CHANGE PERCUT TUBE/DRAIN CATH W CONTRAST MONIT: ICD-10-PCS | Mod: 26,,, | Performed by: RADIOLOGY

## 2019-02-21 PROCEDURE — 87075 CULTR BACTERIA EXCEPT BLOOD: CPT

## 2019-02-21 PROCEDURE — 47531 INJECTION FOR CHOLANGIOGRAM: CPT | Mod: 51,RT,, | Performed by: RADIOLOGY

## 2019-02-21 PROCEDURE — 63600175 PHARM REV CODE 636 W HCPCS: Performed by: SURGERY

## 2019-02-21 PROCEDURE — 49423 PR DRAINAGE CATHETER EXCHANGE: ICD-10-PCS | Mod: ,,, | Performed by: RADIOLOGY

## 2019-02-21 PROCEDURE — 25500020 PHARM REV CODE 255: Performed by: SURGERY

## 2019-02-21 PROCEDURE — 87015 SPECIMEN INFECT AGNT CONCNTJ: CPT

## 2019-02-21 PROCEDURE — 74177 CT ABDOMEN PELVIS WITH CONTRAST: ICD-10-PCS | Mod: 26,,, | Performed by: RADIOLOGY

## 2019-02-21 PROCEDURE — 87070 CULTURE OTHR SPECIMN AEROBIC: CPT

## 2019-02-21 PROCEDURE — 47531 IR CHOLANGIOGRAM THROUGH EXISTING CATH (XPD): ICD-10-PCS | Mod: 51,RT,, | Performed by: RADIOLOGY

## 2019-02-21 PROCEDURE — 87116 MYCOBACTERIA CULTURE: CPT

## 2019-02-21 PROCEDURE — 87186 SC STD MICRODIL/AGAR DIL: CPT | Mod: 59

## 2019-02-21 PROCEDURE — 87102 FUNGUS ISOLATION CULTURE: CPT

## 2019-02-21 PROCEDURE — 27201090 IR CHOLANGIOGRAM THROUGH EXISTING CATH (XPD)

## 2019-02-21 PROCEDURE — 75984 XRAY CONTROL CATHETER CHANGE: CPT | Mod: 26,,, | Performed by: RADIOLOGY

## 2019-02-21 PROCEDURE — 87206 SMEAR FLUORESCENT/ACID STAI: CPT | Mod: 91

## 2019-02-21 RX ORDER — METOCLOPRAMIDE 10 MG/1
10 TABLET ORAL 4 TIMES DAILY
Qty: 60 TABLET | Refills: 3 | Status: ON HOLD | OUTPATIENT
Start: 2019-02-21 | End: 2019-02-21

## 2019-02-21 RX ORDER — FLUCONAZOLE 200 MG/1
200 TABLET ORAL DAILY
Qty: 30 TABLET | Refills: 0 | Status: SHIPPED | OUTPATIENT
Start: 2019-02-21 | End: 2019-03-23

## 2019-02-21 RX ORDER — METOCLOPRAMIDE HYDROCHLORIDE 5 MG/5ML
10 SOLUTION ORAL 3 TIMES DAILY
Qty: 600 ML | Refills: 1 | Status: SHIPPED | OUTPATIENT
Start: 2019-02-21 | End: 2019-05-10

## 2019-02-21 RX ORDER — FLUCONAZOLE 100 MG/1
100 TABLET ORAL DAILY
Qty: 30 TABLET | Refills: 0 | Status: SHIPPED | OUTPATIENT
Start: 2019-02-21 | End: 2019-03-23

## 2019-02-21 RX ORDER — LIDOCAINE HYDROCHLORIDE 10 MG/ML
INJECTION INFILTRATION; PERINEURAL CODE/TRAUMA/SEDATION MEDICATION
Status: COMPLETED | OUTPATIENT
Start: 2019-02-21 | End: 2019-02-21

## 2019-02-21 RX ORDER — FAMOTIDINE 40 MG/5ML
40 POWDER, FOR SUSPENSION ORAL DAILY
Qty: 100 ML | Refills: 5 | Status: SHIPPED | OUTPATIENT
Start: 2019-02-21 | End: 2019-05-10

## 2019-02-21 RX ADMIN — IOHEXOL 100 ML: 350 INJECTION, SOLUTION INTRAVENOUS at 11:02

## 2019-02-21 RX ADMIN — CEFTRIAXONE 2 G: 2 INJECTION, SOLUTION INTRAVENOUS at 03:02

## 2019-02-21 RX ADMIN — LIDOCAINE HYDROCHLORIDE 5 ML: 10 INJECTION, SOLUTION INFILTRATION; PERINEURAL at 03:02

## 2019-02-21 NOTE — PROGRESS NOTES
Ct reviewed  Labs looks ok    Overall feeling okay.  He is concerned about increase in heart rate however I reviewed his labs from yesterday all his numbers look good his prealbumin is increased from 6-12 his abdomen remains the same his renal functions have improved hemoglobin stable and LFTs stable    His right upper quadrant drain is not working much and I reviewed the CT scan he has a fluid collection which needs to be drained.    Discussed with interventional radiology Plan for aspiration of the right upper quadrant fluid collection possible revision of the existing right upper quadrant pigtail catheter    Added Reglan, Pepcid increase the dosage of Diflucan from 100-200 a day and add multivitamin set up. The plan will be to do an outpatient drainage of the fluid collection.  As long as he is stable afebrile he can be observed    He is apparently improving with his speech therapy and he is ambulating better.  He is staying at home has improved his overall condition mentally as well as physically.  Will aspirate and see whether we can manage as an outpatient    Follow-up will be in 2-3 weeks time during which time we will arrange for removal of the PTC catheter

## 2019-02-21 NOTE — H&P
Radiology History & Physical      SUBJECTIVE:     Chief Complaint: RUQ abscess    History of Present Illness:  Hoang Santos Jr. is a 68 y.o. male with NET s/p resection, abscess drain placement, and biliary drain placement who presents for cholangiogram as well as abscessogram and drain upsize.    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    Prostatic hyperplasia     Sleep apnea      Past Surgical History:   Procedure Laterality Date    BIOPSY, LIVER  1/4/2019    Performed by Madeleine Alvarado MD at [facility] [place]    CHOLANGIOGRAM, PERCUTANEOUS, TRANSHEPATIC Right 1/29/2019    Performed by YOHANNES Shelley MD at [facility] [place]    CHOLECYSTECTOMY  1/4/2019    Performed by Madeleine Al[facility]ado MD at [facility] [place]    DUODENECTOMY N/A 1/4/2019    Performed by Madeleine Alvarado MD at [facility] [place]    EGD (ESOPHAGOGASTRODUODENOSCOPY) N/A 1/24/2019    Performed by Madeleine Al[facility]ado MD at [facility] [place]    EGD (ESOPHAGOGASTRODUODENOSCOPY) N/A 1/4/2019    Performed by Madeleine Alvarado MD at [facility] [place]    EGD (ESOPHAGOGASTRODUODENOSCOPY) N/A 9/24/2018    Performed by Salo Nuñez MD at [facility] ENDO (4TH FLR)    ESOPHAGOGASTRODUODENOSCOPY (EGD) N/A 1/21/2019    Performed by Jose Kahn MD at [facility] ENDO    FUNDOPLICATION N/A 1/4/2019    Performed by Madeleine Alvarado MD at [facility] [place]    GASTROJEJUNOSTOMY Right 1/24/2019    Performed by Madeleine Alvarado MD at [facility]    GASTROJEJUNOSTOMY N/A 1/4/2019    Performed by Madeleine Alvarado MD at [facility] [place]    LAPAROTOMY, EXPLORATORY N/A 1/24/2019    Performed by Madeleine Alvarado MD at [facility] [place]    LYMPHADENECTOMY N/A 1/4/2019    Performed by Madeleine Alvarado MD at [facility] [place]    LYSIS, ADHESIONS N/A 1/24/2019    Performed by Madeleine Alvarado MD at [facility] [place]    PALATOPLASTY-(UPPP) N/A 2/14/2017    Performed by Bob Araujo III, MD at [facility] OR 2ND FLR    PH  MONITORING, ESOPHAGUS, WIRELESS, (OFF REFLUX MEDS) N/A 9/24/2018    Performed by Salo Nu[facility]ez MD at [facility] ENDO (4TH FLR)    TONSILLECTOMY      TRANSPROSTATIC TISSUE RETRACTION N/A 6/28/2018    Performed by Kory Jack MD at [facility] [place]    ULTRASOUND-ENDOSCOPIC-UPPER N/A 11/5/2018    Performed by Gorge Reyes MD at [facility] ENDO    UVULOPALATOPHARYNGOPLASTY         Home Meds:   Prior to Admission medications    Medication Sig Start Date End Date Taking? Authorizing Provider   cholecalciferol, vitamin D3, 50,000 unit capsule Take 1 capsule (50,000 Units total) by mouth once a week. 6/21/17  Yes Lee Kay MD   ciprofloxacin HCl (CIPRO) 500 MG tablet 1 tablet (500 mg total) by Per G Tube route every 12 (twelve) hours. 2/8/19  Yes Jakob Adhikari MD   fluconazole (DIFLUCAN) 100 MG tablet 1 tablet (100 mg total) by Per G Tube route once daily. 2/21/19 3/23/19 Yes Madeleine Alvarado MD   ranitidine (ZANTAC) 15 mg/mL syrup  2/13/19  Yes Historical Provider, MD   atorvastatin (LIPITOR) 40 MG tablet Take 1 tablet (40 mg total) by mouth once daily. 2/13/19 2/13/20  Lee Kay MD   bisacodyl (DULCOLAX) 10 mg Supp Place 1 suppository (10 mg total) rectally daily as needed. 1/16/19   Jose Lance MD   esomeprazole (NEXIUM PACKET) 20 mg GrPS Take 20 mg by mouth 2 (two) times daily. 1/19/19 1/19/20  Madeleine Alvarado MD   famotidine (PEPCID) 40 mg/5 mL (8 mg/mL) suspension Take 5 mLs (40 mg total) by mouth once daily. 2/21/19 2/21/20  Madeleine Alvarado MD   fluconazole (DIFLUCAN) 200 MG Tab Take 1 tablet (200 mg total) by mouth once daily. 2/21/19 3/23/19  Madeleine Alvarado MD   hydrocodone-acetaminophen (HYCET) solution 7.5-325 mg/15mL Take 15 mLs by mouth every 6 (six) hours as needed for Pain. 2/11/19   Madeleine Alvarado MD   meclizine (ANTIVERT) 25 mg tablet Take 1 tablet (25 mg total) by mouth 3 (three) times daily as needed for Dizziness. 1/16/19   oJse Zapata  MD Natalio   metoclopramide HCl (REGLAN) 10 MG tablet Take 1 tablet (10 mg total) by mouth 4 (four) times daily. 2/21/19   Madeleine Alvarado MD   omeprazole (PRILOSEC) 40 MG capsule  1/30/19   Arabella Dobson MD   ondansetron (ZOFRAN) 4 MG tablet Take 1 tablet (4 mg total) by mouth every 8 (eight) hours as needed for Nausea. 1/16/19   Jose Lance MD   ondansetron (ZOFRAN) 4 mg/5 mL solution Take 5 mLs (4 mg total) by mouth 2 (two) times daily as needed for Nausea. 2/8/19   Jakob Adhikari MD   pantoprazole (PROTONIX) 40 MG tablet Take 1 tablet (40 mg total) by mouth 2 (two) times daily. 2/8/19 2/8/20  Jakob Adhikari MD   sildenafil (VIAGRA) 100 MG tablet Take 1 tablet (100 mg total) by mouth daily as needed for Erectile Dysfunction. 11/21/18 11/21/19  Darya Hernandez MD   VIAGRA 100 mg tablet Take 1 tablet (100 mg total) by mouth once daily. Brand name only medication. 4/17/18   Lee Kay MD   fluconazole (DIFLUCAN) 100 MG tablet 1 tablet (100 mg total) by Per G Tube route once daily. 2/9/19 2/21/19  Jakob Adhikari MD     Anticoagulants/Antiplatelets: no anticoagulation    Allergies:   Review of patient's allergies indicates:   Allergen Reactions    Epinephrine      Neuroendocrine Tumor patient      Toradol [ketorolac] Other (See Comments)     Creatinine rises even with sub therapeutic dose     Sedation History:  no adverse reactions    Review of Systems:   Hematological: no known coagulopathies  Respiratory: no shortness of breath  Cardiovascular: no chest pain  Gastrointestinal: no abdominal pain  Genito-Urinary: no dysuria  Musculoskeletal: negative  Neurological: no TIA or stroke symptoms         OBJECTIVE:     Vital Signs (Most Recent)       Physical Exam:  ASA: 3  Mallampati: 2    General: no acute distress  Mental Status: alert and oriented to person, place and time  HEENT: normocephalic, atraumatic  Chest: unlabored breathing  Heart: regular heart  rate  Abdomen: nondistended  Extremity: moves all extremities    Laboratory  Lab Results   Component Value Date    INR 1.1 02/08/2019       Lab Results   Component Value Date    WBC 14.78 (H) 02/19/2019    HGB 10.4 (L) 02/19/2019    HCT 34.1 (L) 02/19/2019    MCV 96 02/19/2019     02/19/2019      Lab Results   Component Value Date    GLU 95 02/19/2019     02/19/2019    K 4.1 02/19/2019     02/19/2019    CO2 25 02/19/2019    BUN 36 (H) 02/19/2019    CREATININE 0.9 02/19/2019    CALCIUM 9.7 02/19/2019    MG 1.8 02/19/2019    ALT 38 02/19/2019    AST 64 (H) 02/19/2019    ALBUMIN 2.6 (L) 02/19/2019    BILITOT 0.6 02/19/2019       ASSESSMENT/PLAN:     Sedation Plan: Moderate.  Patient will undergo cholangiogram as well as abscessogram and drain upsize.    Cornelius Dunbar MD  Diagnostic and Interventional Radiologist  Department of Radiology  Pager: [phone]

## 2019-02-21 NOTE — PROCEDURES
Radiology Post-Procedure Note    Pre Op Diagnosis: RUQ abscess  Post Op Diagnosis: Same    Procedure: Cholangiogram, Abscessogram and drain upsize    Procedure performed by: Cornelius Dunbar MD    Written Informed Consent Obtained: Yes  Specimen Removed: YES 20 mL purulent fluid from abscess cavity.  1 mL fluid from biliary system  Estimated Blood Loss: Minimal    Findings:   Cholangiogram performed, demonstrating no evidence of biliary leak.  Fluid requested for culture.  Therefore, 1 mL obtained.    Abscessogram performed, demonstrating large abscess cavity.  Drain repositioned and upsized.  14F drain in place.    Patient tolerated procedure well.    Cornelius Dunbar MD  Diagnostic and Interventional Radiologist  Department of Radiology  Pager: [phone]

## 2019-02-21 NOTE — NURSING
Discharge instructions reviewed with patient. Questions answered. Verbalized understanding.  Instructed pt's wife to call Dr Salvador's office tomorrow to see if she still needs to flush catheters now that bag has been placed. Procedure site is CDI. VSS. No acute distress noted. Staff at bedside to help pt change. Wheeled to [place] area by staff. Home with family in private vehicle.

## 2019-02-24 LAB
BACTERIA SPEC AEROBE CULT: NORMAL
BACTERIA SPEC AEROBE CULT: NORMAL

## 2019-02-25 ENCOUNTER — OFFICE VISIT (OUTPATIENT)
Dept: NEUROLOGY | Facility: HOSPITAL | Age: 69
End: 2019-02-25
Attending: SURGERY
Payer: MEDICARE

## 2019-02-25 ENCOUNTER — HOSPITAL ENCOUNTER (OUTPATIENT)
Facility: HOSPITAL | Age: 69
Discharge: HOME | End: 2019-02-25
Attending: SURGERY | Admitting: SURGERY
Payer: MEDICARE

## 2019-02-25 VITALS
HEIGHT: 66 IN | DIASTOLIC BLOOD PRESSURE: 70 MMHG | HEART RATE: 117 BPM | TEMPERATURE: 97 F | BODY MASS INDEX: 31 KG/M2 | WEIGHT: 192.88 LBS | SYSTOLIC BLOOD PRESSURE: 93 MMHG

## 2019-02-25 DIAGNOSIS — L02.91 ABSCESS: ICD-10-CM

## 2019-02-25 DIAGNOSIS — L02.91 ABSCESS: Primary | ICD-10-CM

## 2019-02-25 LAB
BACTERIA SPEC ANAEROBE CULT: NORMAL
BACTERIA SPEC ANAEROBE CULT: NORMAL

## 2019-02-25 PROCEDURE — 99215 OFFICE O/P EST HI 40 MIN: CPT | Mod: 25 | Performed by: SURGERY

## 2019-02-25 RX ORDER — GUAIFENESIN/DEXTROMETHORPHAN 100-10MG/5
5 SYRUP ORAL EVERY 8 HOURS PRN
Qty: 354 ML | Refills: 0 | Status: SHIPPED | OUTPATIENT
Start: 2019-02-25 | End: 2019-03-07

## 2019-02-25 NOTE — DISCHARGE INSTRUCTIONS
Flushing your drainage catheter  Some patients will need to flush the tube to make sure it stays open. Your radiologist has instructed you to flush your catheter once a day. Flush the catheter as follows:    Turn the stopcock off to the drainage bag and on to the drainage catheter (note arrow).    Remove cap from stopcock.    Use an alcohol prep pad to cleanse the port.    Attach a 10 ml syringe of normal saline to the stopcock and flush the drainage tube. Do not pull fluid back from the tube into the syringe.    Turn the stopcock off to the syringe port. Disconnect the syringe and replace the cap on the stopcock.

## 2019-02-25 NOTE — PROGRESS NOTES
IR Progress Note    Pt sent to radiology for eval of RUQ APD catheter with decreased output. Flushed w 10 mL sterile NS and got back ~40 mL frankly purulent material. Catheter flushed two additional times, with good return of fluid. Also flushed and capped Rt internal-external biliary drain per request of Dr. Salvador.    Recommend flushing both the RUQ APD catheter and the Rt internal-external biliary drain w 10 mL sterile NS q day. I taught the patient's wife how to do this. She verbalized understanding and demonstrated her ability to flush the drains. I also gave her instructions to open the biliary drain to bag if the patient experiences worsening abd pain/fatigue, F/C, N/V, and/or jaundice.      Augustin Kendall MD  [facility]  Pager [phone]

## 2019-02-26 ENCOUNTER — LAB VISIT (OUTPATIENT)
Dept: LAB | Facility: HOSPITAL | Age: 69
End: 2019-02-26
Attending: SURGERY
Payer: MEDICARE

## 2019-02-26 DIAGNOSIS — E78.5 HYPERLIPEMIA: ICD-10-CM

## 2019-02-26 DIAGNOSIS — C7A.010 MALIGNANT CARCINOID TUMOR OF THE DUODENUM: Primary | ICD-10-CM

## 2019-02-26 LAB
ALBUMIN SERPL BCP-MCNC: 2.6 G/DL
ALP SERPL-CCNC: 201 U/L
ALT SERPL W/O P-5'-P-CCNC: 60 U/L
ANION GAP SERPL CALC-SCNC: 8 MMOL/L
AST SERPL-CCNC: 70 U/L
BILIRUB SERPL-MCNC: 0.5 MG/DL
BUN SERPL-MCNC: 49 MG/DL
CALCIUM SERPL-MCNC: 10.3 MG/DL
CHLORIDE SERPL-SCNC: 110 MMOL/L
CO2 SERPL-SCNC: 24 MMOL/L
CREAT SERPL-MCNC: 1 MG/DL
ERYTHROCYTE [DISTWIDTH] IN BLOOD BY AUTOMATED COUNT: 16.3 %
EST. GFR  (AFRICAN AMERICAN): >60 ML/MIN/1.73 M^2
EST. GFR  (NON AFRICAN AMERICAN): >60 ML/MIN/1.73 M^2
FUNGUS SPEC CULT: NORMAL
GLUCOSE SERPL-MCNC: 95 MG/DL
HCT VFR BLD AUTO: 36.8 %
HGB BLD-MCNC: 11.2 G/DL
MAGNESIUM SERPL-MCNC: 2.1 MG/DL
MCH RBC QN AUTO: 28.9 PG
MCHC RBC AUTO-ENTMCNC: 30.4 G/DL
MCV RBC AUTO: 95 FL
PHOSPHATE SERPL-MCNC: 4.1 MG/DL
PLATELET # BLD AUTO: 222 K/UL
PMV BLD AUTO: 12.7 FL
POTASSIUM SERPL-SCNC: 4.2 MMOL/L
PROT SERPL-MCNC: 8.9 G/DL
RBC # BLD AUTO: 3.87 M/UL
SODIUM SERPL-SCNC: 142 MMOL/L
TRIGL SERPL-MCNC: 106 MG/DL
WBC # BLD AUTO: 9.88 K/UL

## 2019-02-26 PROCEDURE — 85027 COMPLETE CBC AUTOMATED: CPT

## 2019-02-26 PROCEDURE — 83735 ASSAY OF MAGNESIUM: CPT

## 2019-02-26 PROCEDURE — 84100 ASSAY OF PHOSPHORUS: CPT

## 2019-02-26 PROCEDURE — 36415 COLL VENOUS BLD VENIPUNCTURE: CPT

## 2019-02-26 PROCEDURE — 84478 ASSAY OF TRIGLYCERIDES: CPT

## 2019-02-26 PROCEDURE — 80053 COMPREHEN METABOLIC PANEL: CPT

## 2019-02-26 NOTE — DISCHARGE SUMMARY
[facility]  General Surgery  Discharge Summary      Patient Name: Hoang Santos Jr.  MRN: [id number]  Admission Date: 2/25/2019  Hospital Length of Stay: 0 days  Discharge Date and Time: 2/25/2019  Attending Physician: Madeleine Alvarado MD   Discharging Provider: Madeleine Alvarado MD  Primary Care Provider: Lee Kay MD     HPI: for drainage revision    Procedure(s) (LRB):  DRAINAGE-GUIDED CT ABSCESS (N/A)     Hospital Course: stable post procedure    Consults:     Significant Diagnostic Studies:     Pending Diagnostic Studies:     Procedure Component Value Units Date/Time    CT Guided Abscess Drainage With Catheter [[id number]]     Order Status:  Sent Lab Status:  No result         There are no hospital problems to display for this patient.     Discharged Condition: stable    Disposition:     Follow Up:    Patient Instructions:   No discharge procedures on file.  Medications:  Reconciled Home Medications:      Medication List      ASK your doctor about these medications    atorvastatin 40 MG tablet  Commonly known as:  LIPITOR  Take 1 tablet (40 mg total) by mouth once daily.     bisacodyl 10 mg Supp  Commonly known as:  DULCOLAX  Place 1 suppository (10 mg total) rectally daily as needed.     cholecalciferol (vitamin D3) 50,000 unit capsule  Take 1 capsule (50,000 Units total) by mouth once a week.     ciprofloxacin HCl 500 MG tablet  Commonly known as:  CIPRO  1 tablet (500 mg total) by Per G Tube route every 12 (twelve) hours.     codeine-guaifenesin 6.3-100 mg/5 mL Liqd  Take 5 mLs by mouth every 8 (eight) hours as needed.     dextromethorphan-guaifenesin  mg/5 ml  mg/5 mL liquid  Commonly known as:  ROBITUSSIN-DM  Take 5 mLs by mouth every 8 (eight) hours as needed.     esomeprazole 20 mg Grps  Commonly known as:  NexIUM Packet  Take 20 mg by mouth 2 (two) times daily.     famotidine 40 mg/5 mL (8 mg/mL) suspension  Commonly known as:  PEPCID  Take 5 mLs (40 mg  total) by mouth once daily.     * fluconazole 200 MG Tab  Commonly known as:  DIFLUCAN  Take 1 tablet (200 mg total) by mouth once daily.     * fluconazole 100 MG tablet  Commonly known as:  DIFLUCAN  1 tablet (100 mg total) by Per G Tube route once daily.     hydrocodone-apap 7.5-325 MG/15 ML oral solution  Commonly known as:  HYCET  Take 15 mLs by mouth every 6 (six) hours as needed for Pain.     meclizine 25 mg tablet  Commonly known as:  ANTIVERT  Take 1 tablet (25 mg total) by mouth 3 (three) times daily as needed for Dizziness.     metoclopramide HCl 5 mg/5 mL Soln  Commonly known as:  REGLAN  Take 10 mLs (10 mg total) by mouth 3 (three) times daily.     multivitamin Liqd  Take 5 mLs by mouth once daily.     omeprazole 40 MG capsule  Commonly known as:  PRILOSEC     * ondansetron 4 MG tablet  Commonly known as:  ZOFRAN  Take 1 tablet (4 mg total) by mouth every 8 (eight) hours as needed for Nausea.     * ondansetron 4 mg/5 mL solution  Commonly known as:  ZOFRAN  Take 5 mLs (4 mg total) by mouth 2 (two) times daily as needed for Nausea.     pantoprazole 40 MG tablet  Commonly known as:  PROTONIX  Take 1 tablet (40 mg total) by mouth 2 (two) times daily.     ranitidine 15 mg/mL syrup  Commonly known as:  ZANTAC     * VIAGRA 100 MG tablet  Generic drug:  sildenafil  Take 1 tablet (100 mg total) by mouth once daily. Brand name only medication.     * sildenafil 100 MG tablet  Commonly known as:  VIAGRA  Take 1 tablet (100 mg total) by mouth daily as needed for Erectile Dysfunction.         * This list has 6 medication(s) that are the same as other medications prescribed for you. Read the directions carefully, and ask your doctor or other care provider to review them with you.                Madeleine Alvarado MD  General Surgery  [facility]

## 2019-02-26 NOTE — H&P
[facility]  General Surgery  History & Physical    Patient Name: Hoang Santos Jr.  MRN: [id number]  Admission Date: (Not on file)  Attending Physician:   Primary Care Provider: Lee Kay MD    Patient information was obtained from patient    Subjective:     Chief Complaint/Reason for Admission:  Revision of the right upper quadrant drainage catheter    History of Present Illness:  Patient is a 68 y.o. male status post ex lap duodenectomy high developed her significant issues with swallowing for which he required gastroenterostomy and G-tube placement.  He is getting physical therapy at home for speech as well as tube feeding with the IV nutrition.  He was seen by me today his right upper quadrant catheter was not draining much so the possibility would be to reposition the tube if necessary    Current Outpatient Medications on File Prior to Visit   Medication Sig    bisacodyl (DULCOLAX) 10 mg Supp Place 1 suppository (10 mg total) rectally daily as needed.    cholecalciferol, vitamin D3, 50,000 unit capsule Take 1 capsule (50,000 Units total) by mouth once a week.    famotidine (PEPCID) 40 mg/5 mL (8 mg/mL) suspension Take 5 mLs (40 mg total) by mouth once daily.    fluconazole (DIFLUCAN) 200 MG Tab Take 1 tablet (200 mg total) by mouth once daily.    hydrocodone-acetaminophen (HYCET) solution 7.5-325 mg/15mL Take 15 mLs by mouth every 6 (six) hours as needed for Pain.    meclizine (ANTIVERT) 25 mg tablet Take 1 tablet (25 mg total) by mouth 3 (three) times daily as needed for Dizziness.    metoclopramide HCl (REGLAN) 5 mg/5 mL Soln Take 10 mLs (10 mg total) by mouth 3 (three) times daily.    ranitidine (ZANTAC) 15 mg/mL syrup     atorvastatin (LIPITOR) 40 MG tablet Take 1 tablet (40 mg total) by mouth once daily.    ciprofloxacin HCl (CIPRO) 500 MG tablet 1 tablet (500 mg total) by Per G Tube route every 12 (twelve) hours.    esomeprazole (NEXIUM PACKET) 20 mg GrPS Take 20 mg  by mouth 2 (two) times daily.    fluconazole (DIFLUCAN) 100 MG tablet 1 tablet (100 mg total) by Per G Tube route once daily.    multivitamin Liqd Take 5 mLs by mouth once daily.    omeprazole (PRILOSEC) 40 MG capsule     ondansetron (ZOFRAN) 4 MG tablet Take 1 tablet (4 mg total) by mouth every 8 (eight) hours as needed for Nausea.    ondansetron (ZOFRAN) 4 mg/5 mL solution Take 5 mLs (4 mg total) by mouth 2 (two) times daily as needed for Nausea.    pantoprazole (PROTONIX) 40 MG tablet Take 1 tablet (40 mg total) by mouth 2 (two) times daily.    sildenafil (VIAGRA) 100 MG tablet Take 1 tablet (100 mg total) by mouth daily as needed for Erectile Dysfunction.    VIAGRA 100 mg tablet Take 1 tablet (100 mg total) by mouth once daily. Brand name only medication.     No current facility-administered medications on file prior to visit.        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    Prostatic hyperplasia     Sleep apnea      Past Surgical History:   Procedure Laterality Date    BIOPSY, LIVER  1/4/2019    Performed by Madeleine Alvarado MD at [facility] OR    CHOLANGIOGRAM, PERCUTANEOUS, TRANSHEPATIC Right 1/29/2019    Performed by YOHANNES Shelley MD at [facility] OR    CHOLECYSTECTOMY  1/4/2019    Performed by [facility]leine [facility]ado MD at [facility] OR    DUODENECTOMY N/A 1/4/2019    Performed by [facility]leine [facility]ado MD at [facility] OR    EGD (ESOPHAGOGASTRODUODENOSCOPY) N/A 1/24/2019    Performed by Madeleine [facility]ado MD at [facility] OR    EGD (ESOPHAGOGASTRODUODENOSCOPY) N/A 1/4/2019    Performed by [facility]leine [facility]ado MD at [facility] OR    EGD (ESOPHAGOGASTRODUODENOSCOPY) N/A 9/24/2018    Performed by Salo Nuñez,  MD at [facility] ENDO (4TH FLR)    ESOPHAGOGASTRODUODENOSCOPY (EGD) N/A 1/21/2019    Performed by Jose Kahn MD at [facility] ENDO    FUNDOPLICATION N/A 1/4/2019    Performed by Madeleine [facility]ado MD at [facility] OR    GASTROJEJUNOSTOMY Right 1/24/2019    Performed by [facility]ado MD at [facility] OR    GASTROJEJUNOSTOMY N/A 1/4/2019    Performed by Madeleine [facility]ado MD at [facility] OR    LAPAROTOMY, EXPLORATORY N/A 1/24/2019    Performed by Madeleine [facility]ado MD at [facility] OR    LYMPHADENECTOMY N/A 1/4/2019    Performed by Madeleine [facility]ado MD at [facility] OR    LYSIS, ADHESIONS N/A 1/24/2019    Performed by Madeleine [facility]ado MD at [facility] OR    PALATOPLASTY-(UPPP) N/A 2/14/2017    Performed by Palisadesjanet Araujo III, MD at [facility] OR 2ND FLR    PH MONITORING, ESOPHAGUS, WIRELESS, (OFF REFLUX MEDS) N/A 9/24/2018    Performed by Salo Nuñez MD at [facility] END[facility] (4TH FLR)    TONSILLECTOMY      TRANSPROSTATIC TISSUE RETRACTION N/A 6/28/2018    Performed by Kory Jack MD at [facility] [place] OR    ULTRASOUND-ENDOSCOPIC-UPPER N/A 11/5/2018    Performed by Gorge Reyes MD at [facility] ENDO    UVULOPALATOPHARYNGOPLASTY       Family History     Problem Relation (Age of Onset)    COPD Sister    Cancer Sister    Diabetes Mother    Heart disease Mother    Stroke Father        Tobacco Use    Smoking status: Never Smoker    Smokeless tobacco: Never Used   Substance and Sexual Activity    Alcohol use: No     Alcohol/week: 1.2 oz     Types: 2 Cans of beer per week     Frequency: Never    Drug use: No    Sexual activity: Not Currently     Review of Systems  Objective:     Vital Signs (Most Recent):  Temp: 97.2 °F (36.2 °C) (02/25/19 1200)  Pulse: (!) 117 (02/25/19 1200)  BP: 93/70 (02/25/19 1200) Vital Signs (24h Range):  [unfilled]     Weight: 87.5 kg (192 lb 14.4 oz)  Body mass index is 31.14 kg/m².    Physical Exam   Constitutional: He is oriented to person, place, and time.   HENT:   Head: Normocephalic.   Right  Ear: External ear normal.   Left Ear: External ear normal.   Eyes: Pupils are equal, round, and reactive to light.   Neck: Normal range of motion.   Cardiovascular: Regular rhythm.   Pulmonary/Chest: Effort normal and breath sounds normal.   Abdominal: Soft. Bowel sounds are normal.   Scar completely healed  G-tube intact   Musculoskeletal: Normal range of motion.   Neurological: He is alert and oriented to person, place, and time.       Significant Labs:  Pending  :      Assessment/Plan:     There are no hospital problems to display for this patient.    VTE Risk Mitigation (From admission, onward)    None        Status post ex lap duodenectomy with postop swallowing issue requiring a gastrojejunostomy in G-tube placement with status post PTC had a right upper quadrant catheter placed.  It is not draining at all for the last couple of days since the revision was done on Thursday last week the plan will be to revise the catheter this was discussed with the radiologist  Madeleine Alvarado MD  General Surgery  [facility]

## 2019-02-28 ENCOUNTER — PATIENT MESSAGE (OUTPATIENT)
Dept: NEUROLOGY | Facility: HOSPITAL | Age: 69
End: 2019-02-28

## 2019-02-28 RX ORDER — SULFAMETHOXAZOLE AND TRIMETHOPRIM 200; 40 MG/5ML; MG/5ML
20 SUSPENSION ORAL EVERY 12 HOURS
Qty: 280 ML | Refills: 0 | Status: SHIPPED | OUTPATIENT
Start: 2019-02-28 | End: 2019-03-07

## 2019-03-01 LAB — 1,3 BETA GLUCAN SPEC-MCNC: <31 PG/ML

## 2019-03-04 DIAGNOSIS — E46 MALNUTRITION, UNSPECIFIED TYPE: Primary | ICD-10-CM

## 2019-03-04 RX ORDER — CLOTRIMAZOLE 10 MG/1
10 LOZENGE ORAL; TOPICAL
Qty: 150 TABLET | Refills: 0 | Status: SHIPPED | OUTPATIENT
Start: 2019-03-04 | End: 2019-04-03

## 2019-03-04 NOTE — TELEPHONE ENCOUNTER
Referring Physician: Madeleine Alvarado MD    Reason for Call: Nutrition Consult for TF recommendations. TPN to wean soon.     Pertinent Social History: Patient is limited in activities of daily living. His wife is able to assist with needs. Pt remains NPO due to dysphagia. He will continue with TPN for ~ 1 more week while transitioning to TF. Patient continues to meet with SLP for dysphagia. Per wife patient is not able swallow any food or liquids. This has been ongoing since his hospitalization.   Ms Hoover, pts wife, reports he takes in 3 bottles/cans of Ensure Plus a day via PEG. She administers  mL per feeding until patient receives 3 can/bottles per day. Discussed changing to calorie dense formula, Two Min HN. Patient may begin with same provision of 75 -120 mL formula per feed until reaches 3 cans per day. If patient experiences good YAJAIRA to formula change of 3 cans per day, will need to increase to goal of 4 cans per day.       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    Prostatic hyperplasia     Sleep apnea        Previous Surgical History:  Past Surgical History:   Procedure Laterality Date    BIOPSY, LIVER  1/4/2019    Performed by [facility]ado [facility] at [facility] OR    CHOLANGIOGRAM, PERCUTANEOUS, TRANSHEPATIC Right 1/29/2019    Performed by YOHANNES Shelley MD at [facility] OR    CHOLECYSTECTOMY  1/4/2019    Performed by [facility]leine [facility]ado MD at [facility] OR    DUODENECTOMY N/A 1/4/2019    Performed by [facility]ado MD at [facility] OR    EGD (ESOPHAGOGASTRODUODENOSCOPY) N/A 1/24/2019    Performed by [facility]leine [facility]ado MD at [facility] OR    EGD (ESOPHAGOGASTRODUODENOSCOPY) N/A 1/4/2019    Performed by [facility]leine [facility]ado MD at [facility] OR    EGD (ESOPHAGOGASTRODUODENOSCOPY) N/A 9/24/2018    Performed by [facility] MD at [facility] (4TH FLR)    ESOPHAGOGASTRODUODENOSCOPY  (EGD) N/A 1/21/2019    Performed by Jose Kahn MD at [facility] ENDO    FUNDOPLICATION N/A 1/4/2019    Performed by [facility]leine [facility]ado MD at [facility] OR    GASTROJEJUNOSTOMY Right 1/24/2019    Performed by [facility]ado MD at [facility] OR    GASTROJEJUNOSTOMY N/A 1/4/2019    Performed by Madeleine [facility]ado MD at [facility] OR    LAPAROTOMY, EXPLORATORY N/A 1/24/2019    Performed by [facility]leine [facility]ado MD at [facility] OR    LYMPHADENECTOMY N/A 1/4/2019    Performed by [facility]leine [facility]ado MD at [facility] OR    LYSIS, ADHESIONS N/A 1/24/2019    Performed by [facility]leine [facility]ado MD at [facility] OR    PALATOPLASTY-(UPPP) N/A 2/14/2017    Performed by Bob Araujo III, MD at [facility] OR 2ND FLR    PH MONITORING, ESOPHAGUS, WIRELESS, (OFF REFLUX MEDS) N/A 9/24/2018    Performed by Salo Nuñez MD at [facility] ENDO (4TH FLR)    TONSILLECTOMY      TRANSPROSTATIC TISSUE RETRACTION N/A 6/28/2018    [facility]formed by [facility] at [facility]    ULTRASOUND-ENDOSCOPIC-UPPER N/A 11/5/2018    Performed by Gorge Reyes MD at [facility] ENDO    UVULOPALATOPHARYNGOPLASTY         Medication:    atorvastatin (LIPITOR) 40 MG tablet, Take 1 tablet (40 mg total) by mouth once daily., Disp: 90 tablet, Rfl: 3    ciprofloxacin HCl (CIPRO) 500 MG tablet, 1 tablet (500 mg total) by Per G Tube route every 12 (twelve) hours., Disp: 14 tablet, Rfl: 0    codeine-guaifenesin 6.3-100 mg/5 mL Liqd, Take 5 mLs by mouth every 8 (eight) hours as needed., Disp: 473 mL, Rfl: 0    dextromethorphan-guaifenesin  mg/5 ml (ROBITUSSIN-DM)  mg/5 mL liquid, Take 5 mLs by mouth every 8 (eight) hours as needed., Disp: 354 mL, Rfl: 0    esomeprazole (NEXIUM PACKET) 20 mg GrPS, Take 20 mg by mouth 2 (two) times daily., Disp: 1200 mg, Rfl: 11    famotidine (PEPCID) 40 mg/5 mL (8 mg/mL) suspension, Take 5 mLs (40 mg total) by mouth once daily., Disp: 100 mL, Rfl: 5    fluconazole (DIFLUCAN) 100 MG tablet, 1 tablet (100 mg total) by Per G  Tube route once daily., Disp: 30 tablet, Rfl: 0    fluconazole (DIFLUCAN) 200 MG Tab, Take 1 tablet (200 mg total) by mouth once daily., Disp: 30 tablet, Rfl: 0    hydrocodone-acetaminophen (HYCET) solution 7.5-325 mg/15mL, Take 15 mLs by mouth every 6 (six) hours as needed for Pain., Disp: 300 mL, Rfl: 0    meclizine (ANTIVERT) 25 mg tablet, Take 1 tablet (25 mg total) by mouth 3 (three) times daily as needed for Dizziness., Disp: 14 tablet, Rfl: 0    metoclopramide HCl (REGLAN) 5 mg/5 mL Soln, Take 10 mLs (10 mg total) by mouth 3 (three) times daily., Disp: 600 mL, Rfl: 1    omeprazole (PRILOSEC) 40 MG capsule, , Disp: , Rfl:     ondansetron (ZOFRAN) 4 MG tablet, Take 1 tablet (4 mg total) by mouth every 8 (eight) hours as needed for Nausea., Disp: 30 tablet, Rfl: 2    ondansetron (ZOFRAN) 4 mg/5 mL solution, Take 5 mLs (4 mg total) by mouth 2 (two) times daily as needed for Nausea., Disp: 50 mL, Rfl: 0    pantoprazole (PROTONIX) 40 MG tablet, Take 1 tablet (40 mg total) by mouth 2 (two) times daily., Disp: 60 tablet, Rfl: 11    ranitidine (ZANTAC) 15 mg/mL syrup, , Disp: , Rfl:     sildenafil (VIAGRA) 100 MG tablet, Take 1 tablet (100 mg total) by mouth daily as needed for Erectile Dysfunction., Disp: 30 tablet, Rfl: 3    sulfamethoxazole-trimethoprim 200-40 mg/5 ml (BACTRIM,SEPTRA) 200-40 mg/5 mL Susp, Take 20 mLs by mouth every 12 (twelve) hours. for 7 days, Disp: 280 mL, Rfl: 0    VIAGRA 100 mg tablet, Take 1 tablet (100 mg total) by mouth once daily. Brand name only medication., Disp: 10 tablet, Rfl: 11      Vitamin/Supplements/Herbs:     bisacodyl (DULCOLAX) 10 mg Supp, Place 1 suppository (10 mg total) rectally daily as needed., Disp: , Rfl: 0    cholecalciferol, vitamin D3, 50,000 unit capsule, Take 1 capsule (50,000 Units total) by mouth once a week., Disp: 12 capsule, Rfl: 3    multivitamin Liqd, Take 5 mLs by mouth once daily., Disp: 150 mL, Rfl: 11         Allergies:  Review of patient's  "allergies indicates:   Allergen Reactions    Epinephrine      Neuroendocrine Tumor patient    Toradol [ketorolac] Other (See Comments)     Creatinine rises even with sub therapeutic dose       Labs: reviewed    : 1950  Age: 68 y.o.    Anthropometrics  Weight: 87.5 kg (192 lb 14.4 oz)  Height: 5' 6" (1.676 m)    Estimated body mass index is 31.14 kg/m²     BMI Weight Status   Below 18.5 Underweight   18.6-24.9 Normal/Healthy   25.0-29.9 Overweight   30.0-34.9 Obesity Class I   35.0-39.9 Obesity Class II   >40 Extreme Obesity   Class III     Ideal Weight Range for Your Height: 5'6" = 118 - 154 lbs    Weight History:  Weight has decreased 40 pounds over last 4 months which is significant at 17% for time frame.     Wt Readings from Last 12 Encounters:   19 87.5 kg (192 lb 14.4 oz)   19 89.4 kg (197 lb)   19 89.5 kg (197 lb 6.8 oz)   19 92.4 kg (203 lb 9.5 oz)   19 98.3 kg (216 lb 11.4 oz)   19 97 kg (213 lb 13.5 oz)   19 104.3 kg (230 lb)   19 101.9 kg (224 lb 12.1 oz)   18 106 kg (233 lb 11 oz)   18 105.9 kg (233 lb 6.4 oz)   18 105.9 kg (233 lb 7.5 oz)   18 105.9 kg (233 lb 7.5 oz)       Estimated Nutrition Needs:   Energy Needs:   (1593 MSJ x 1.3 PAL)  Protein Needs: 88 (1.0 gm Protein/kg)  Fluid Needs:  0 (1 mL/calorie)    Malnutrition Assessment: n/a Telephone Consult    Gastrointestinal Habits: normal Described as Type 3-5 on Dunn Stool Form Scale.     Dentition: normal dentition for age Difficulty chewing or swallowing? yes  Exercise: Type of physical activity- moderately active/ADL      Motivation to Change: Expect good compliance to TF changes as patient motivated to stop TPN.       Nutrition Diagnosis  Nutrition Problem  Inadequate oral intake      Related to (etiology):   Dysphagia    Signs and Symptoms (as evidenced by):   NPO with PEG tube and TPN for supplemental nutrition    Interventions:  Enteral Nutrition  " Composition, Concentration, Rate, Volume and Route   Parenteral Nutrition  Composition  and Rate        Nutrition Diagnosis Status:   Continues        Recommendations:  1. Change TF from Ensure Plus to TwoCal  mL 4 x day to provide 1920 calorie, 80 gm Pro, 672 mL water.   2. Administer  mL of Two Min HN during waking hours or until a total of 3 cans have been administered.   3. Administer 48 oz of water through out the day with TF and medication administration to meet fluid needs.   4. RD contact information provided for questions. RD added to Care Team.     Goals:   1. Adequate nutrition to wean TPN  2. Adequate nutrition to maintain weight and prevent further weight loss   3. TF tolerance to goal of 4 cans per day  4. Adequate hydration to prevent dehydration.      Routed to  Madeleine Alvarado MD      Consultation Time: 30 minutes.      Follow Up: >1 months.

## 2019-03-05 ENCOUNTER — LAB VISIT (OUTPATIENT)
Dept: LAB | Facility: HOSPITAL | Age: 69
End: 2019-03-05
Attending: SURGERY
Payer: MEDICARE

## 2019-03-05 DIAGNOSIS — C7A.010 MALIGNANT CARCINOID TUMOR OF THE DUODENUM: Primary | ICD-10-CM

## 2019-03-05 LAB
ALBUMIN SERPL BCP-MCNC: 2.6 G/DL
ALP SERPL-CCNC: 190 U/L
ALT SERPL W/O P-5'-P-CCNC: 60 U/L
ANION GAP SERPL CALC-SCNC: 9 MMOL/L
AST SERPL-CCNC: 63 U/L
BILIRUB SERPL-MCNC: 0.3 MG/DL
BUN SERPL-MCNC: 45 MG/DL
CALCIUM SERPL-MCNC: 10.1 MG/DL
CHLORIDE SERPL-SCNC: 110 MMOL/L
CO2 SERPL-SCNC: 23 MMOL/L
CREAT SERPL-MCNC: 1 MG/DL
EST. GFR  (AFRICAN AMERICAN): >60 ML/MIN/1.73 M^2
EST. GFR  (NON AFRICAN AMERICAN): >60 ML/MIN/1.73 M^2
GLUCOSE SERPL-MCNC: 100 MG/DL
MAGNESIUM SERPL-MCNC: 2.1 MG/DL
PHOSPHATE SERPL-MCNC: 3.9 MG/DL
POTASSIUM SERPL-SCNC: 4.5 MMOL/L
PREALB SERPL-MCNC: 25 MG/DL
PROT SERPL-MCNC: 8 G/DL
SODIUM SERPL-SCNC: 142 MMOL/L
TRIGL SERPL-MCNC: 91 MG/DL

## 2019-03-05 PROCEDURE — 83735 ASSAY OF MAGNESIUM: CPT

## 2019-03-05 PROCEDURE — 84478 ASSAY OF TRIGLYCERIDES: CPT

## 2019-03-05 PROCEDURE — 84100 ASSAY OF PHOSPHORUS: CPT

## 2019-03-05 PROCEDURE — 80053 COMPREHEN METABOLIC PANEL: CPT

## 2019-03-05 PROCEDURE — 84134 ASSAY OF PREALBUMIN: CPT

## 2019-03-11 ENCOUNTER — HOSPITAL ENCOUNTER (OUTPATIENT)
Facility: HOSPITAL | Age: 69
Discharge: HOME | End: 2019-03-11
Attending: SURGERY
Payer: MEDICARE

## 2019-03-11 ENCOUNTER — CLINICAL SUPPORT (OUTPATIENT)
Dept: NEUROLOGY | Facility: HOSPITAL | Age: 69
End: 2019-03-11
Attending: SURGERY
Payer: MEDICARE

## 2019-03-11 ENCOUNTER — OFFICE VISIT (OUTPATIENT)
Dept: NEUROLOGY | Facility: HOSPITAL | Age: 69
End: 2019-03-11
Attending: SURGERY
Payer: MEDICARE

## 2019-03-11 VITALS
HEART RATE: 107 BPM | OXYGEN SATURATION: 99 % | BODY MASS INDEX: 30.86 KG/M2 | DIASTOLIC BLOOD PRESSURE: 69 MMHG | RESPIRATION RATE: 18 BRPM | HEIGHT: 66 IN | WEIGHT: 192 LBS | TEMPERATURE: 98 F | SYSTOLIC BLOOD PRESSURE: 121 MMHG

## 2019-03-11 VITALS
SYSTOLIC BLOOD PRESSURE: 106 MMHG | DIASTOLIC BLOOD PRESSURE: 80 MMHG | TEMPERATURE: 99 F | HEART RATE: 112 BPM | BODY MASS INDEX: 31.06 KG/M2 | WEIGHT: 192.44 LBS

## 2019-03-11 DIAGNOSIS — E46 MALNUTRITION, UNSPECIFIED TYPE: ICD-10-CM

## 2019-03-11 DIAGNOSIS — R10.84 GENERALIZED ABDOMINAL PAIN: ICD-10-CM

## 2019-03-11 DIAGNOSIS — K31.84 GASTROPARESIS: ICD-10-CM

## 2019-03-11 DIAGNOSIS — E46 MALNUTRITION, UNSPECIFIED TYPE: Primary | ICD-10-CM

## 2019-03-11 DIAGNOSIS — L02.91 ABSCESS: ICD-10-CM

## 2019-03-11 DIAGNOSIS — Z09 POSTOP CHECK: Primary | ICD-10-CM

## 2019-03-11 PROCEDURE — 99211 OFF/OP EST MAY X REQ PHY/QHP: CPT | Mod: 27,25

## 2019-03-11 PROCEDURE — 25000003 PHARM REV CODE 250: Performed by: RADIOLOGY

## 2019-03-11 PROCEDURE — 63600175 PHARM REV CODE 636 W HCPCS: Performed by: RADIOLOGY

## 2019-03-11 PROCEDURE — 25500020 PHARM REV CODE 255: Performed by: SURGERY

## 2019-03-11 PROCEDURE — 99215 OFFICE O/P EST HI 40 MIN: CPT | Mod: 25 | Performed by: SURGERY

## 2019-03-11 RX ORDER — HYDROMORPHONE HYDROCHLORIDE 2 MG/ML
1 INJECTION, SOLUTION INTRAMUSCULAR; INTRAVENOUS; SUBCUTANEOUS ONCE
Status: COMPLETED | OUTPATIENT
Start: 2019-03-11 | End: 2019-03-11

## 2019-03-11 RX ORDER — HYDROMORPHONE HYDROCHLORIDE 2 MG/ML
1 INJECTION, SOLUTION INTRAMUSCULAR; INTRAVENOUS; SUBCUTANEOUS ONCE
Status: CANCELLED | OUTPATIENT
Start: 2019-03-11

## 2019-03-11 RX ADMIN — SODIUM CHLORIDE 3 G: 9 INJECTION, SOLUTION INTRAVENOUS at 12:03

## 2019-03-11 RX ADMIN — HYDROMORPHONE HYDROCHLORIDE 1 MG: 2 INJECTION, SOLUTION INTRAMUSCULAR; INTRAVENOUS; SUBCUTANEOUS at 12:03

## 2019-03-11 RX ADMIN — IOHEXOL 25 ML: 350 INJECTION, SOLUTION INTRAVENOUS at 01:03

## 2019-03-11 NOTE — H&P
Interventional Radiology Pre-Procedure History & Physical      Chief Complaint/Reason for Referral: Bile leak    History of Present Illness:  Hoang Santos Jr. is a 68 y.o. male who presents with bile leak for CBD s/p internal-external drainage. Referred back to IR for tube check and possible removal. D/w Dr. Salvador.    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    Prostatic hyperplasia     Sleep apnea      Past Surgical History:   Procedure Laterality Date    BIOPSY, LIVER  1/4/2019    Performed by Madeleine [facility]ado MD at [facility] OR    CHOLANGIOGRAM, PERCUTANEOUS, TRANSHEPATIC Right 1/29/2019    Performed by YOHANNES Shelley MD at [facility] OR    CHOLECYSTECTOMY  1/4/2019    Performed by Madeleine [facility]ado MD at [facility] OR    DUODENECTOMY N/A 1/4/2019    Performed by Madeleine [facility]ado MD at [facility] OR    EGD (ESOPHAGOGASTRODUODENOSCOPY) N/A 1/24/2019    Performed by Madeleine Al[facility]ado MD at [facility] OR    EGD (ESOPHAGOGASTRODUODENOSCOPY) N/A 1/4/2019    Performed by Madeleine Al[facility]ado MD at [facility] OR    EGD (ESOPHAGOGASTRODUODENOSCOPY) N/A 9/24/2018    Performed by Salo Nuñez MD at [facility] ENDO (4TH FLR)    ESOPHAGOGASTRODUODENOSCOPY (EGD) N/A 1/21/2019    Performed by Jose Kahn MD at [facility] ENDO    FUNDOPLICATION N/A 1/4/2019    Performed by Madeleine Alvarado MD at [facility] OR    GASTROJEJUNOSTOMY Right 1/24/2019    Performed by Madeleine Al[facility]ado MD at [facility] OR    GASTROJEJUNOSTOMY N/A 1/4/2019    Performed by Madeleine Al[facility]ado MD at [facility] OR    LAPAROTOMY, EXPLORATORY N/A 1/24/2019    Performed by Madeleine Al[facility]ado MD at [facility] OR    LYMPHADENECTOMY N/A 1/4/2019    Performed by Madeleine Al[facility]ado MD at [facility] OR    LYSIS, ADHESIONS N/A 1/24/2019    Performed by Madeleine Al[facility]ado MD at [facility] OR    PALATOPLASTY-(UPPP) N/A 2/14/2017    Performed by Bob Araujo III, MD at [facility]  OR 2ND FLR    PH MONITORING, ESOPHAGUS, WIRELESS, (OFF REFLUX MEDS) N/A 9/24/2018    Performed by Salo Nuñez MD at [facility] ENDO (4TH FLR)    TONSILLECTOMY      TRANSPROSTATIC TISSUE RETRACTION N/A 6/28/2018    Performed by Kory Jack MD at [facility] OR    ULTRASOUND-ENDOSCOPIC-UPPER N/A 11/5/2018    Performed by Gorge [facility]yes MD at [facility] ENDO    UVULOPALATOPHARYNGOPLASTY         Allergies:   Review of patient's allergies indicates:   Allergen Reactions    Epinephrine      Neuroendocrine Tumor patient      Toradol [ketorolac] Other (See Comments)     Creatinine rises even with sub therapeutic dose        Home Meds:   Prior to Admission medications    Medication Sig Start Date End Date Taking? Authorizing Provider   cholecalciferol, vitamin D3, 50,000 unit capsule Take 1 capsule (50,000 Units total) by mouth once a week. 6/21/17  Yes Lee Kay MD   clotrimazole (MYCELEX) 10 mg beatriz Take 1 tablet (10 mg total) by mouth 5 (five) times daily. 3/4/19 4/3/19 Yes Madeleine Alvarado MD   esomeprazole (NEXIUM PACKET) 20 mg GrPS Take 20 mg by mouth 2 (two) times daily. 1/19/19 1/19/20 Yes Madeleine Alvarado MD   famotidine (PEPCID) 40 mg/5 mL (8 mg/mL) suspension Take 5 mLs (40 mg total) by mouth once daily. 2/21/19 2/21/20 Yes Madeleine Alvarado MD   fluconazole (DIFLUCAN) 200 MG Tab Take 1 tablet (200 mg total) by mouth once daily. 2/21/19 3/23/19 Yes Madeleine Alvarado MD   metoclopramide HCl (REGLAN) 5 mg/5 mL Soln Take 10 mLs (10 mg total) by mouth 3 (three) times daily. 2/21/19  Yes Madeleine Alvarado MD   multivitamin Liqd Take 5 mLs by mouth once daily. 2/21/19  Yes Madeleine Alvarado MD   ondansetron (ZOFRAN) 4 mg/5 mL solution Take 5 mLs (4 mg total) by mouth 2 (two) times daily as needed for Nausea. 2/8/19  Yes Jakob Adhikari MD   ranitidine (ZANTAC) 15 mg/mL syrup  2/13/19  Yes Historical Provider, MD   atorvastatin (LIPITOR) 40 MG tablet Take 1 tablet (40 mg  total) by mouth once daily. 2/13/19 2/13/20  Lee Kay MD   bisacodyl (DULCOLAX) 10 mg Supp Place 1 suppository (10 mg total) rectally daily as needed. 1/16/19   Jose Lance MD   ciprofloxacin HCl (CIPRO) 500 MG tablet 1 tablet (500 mg total) by Per G Tube route every 12 (twelve) hours. 2/8/19   Jakob Adhikari MD   fluconazole (DIFLUCAN) 100 MG tablet 1 tablet (100 mg total) by Per G Tube route once daily. 2/21/19 3/23/19  Madeleine Alvarado MD   hydrocodone-acetaminophen (HYCET) solution 7.5-325 mg/15mL Take 15 mLs by mouth every 6 (six) hours as needed for Pain. 2/11/19   Madeleine Alvarado MD   meclizine (ANTIVERT) 25 mg tablet Take 1 tablet (25 mg total) by mouth 3 (three) times daily as needed for Dizziness. 1/16/19   Jose Lance MD   omeprazole (PRILOSEC) 40 MG capsule  1/30/19   Historical Provider, MD   ondansetron (ZOFRAN) 4 MG tablet Take 1 tablet (4 mg total) by mouth every 8 (eight) hours as needed for Nausea. 1/16/19   Jose Lance MD   pantoprazole (PROTONIX) 40 MG tablet Take 1 tablet (40 mg total) by mouth 2 (two) times daily. 2/8/19 2/8/20  Jakob Adhikari MD   sildenafil (VIAGRA) 100 MG tablet Take 1 tablet (100 mg total) by mouth daily as needed for Erectile Dysfunction. 11/21/18 11/21/19  Darya Hernandez MD   VIAGRA 100 mg tablet Take 1 tablet (100 mg total) by mouth once daily. Brand name only medication. 4/17/18   Lee Kay MD       Anticoagulation/Antiplatelet Meds: no anticoagulation    Review of Systems:   Hematological: no known coagulopathies  Respiratory: no shortness of breath  Cardiovascular: no chest pain  Gastrointestinal: no abdominal pain  Genitourinary: no dysuria  Musculoskeletal: negative  Neurological: no TIA or stroke symptoms     Physical Exam:  Temp: 98 °F (36.7 °C) (03/11/19 1151)  Pulse: 107 (03/11/19 1151)  Resp: 18 (03/11/19 1151)  BP: 121/69 (03/11/19 1154)  SpO2: 99 % (03/11/19 1151)    General: WNWD,  NAD  HEENT: Normocephalic, sclera anicteric, oropharynx clear  Neck: Supple, no palpable lymphadenopathy  Heart: RRR  Lungs: Symmetric excursions, breathing unlabored  Abd: NTND, soft, RUQ drains x2  Extremities: MEZA  Neuro: Gross nonfocal    Laboratory:  Lab Results   Component Value Date    INR 1.1 02/08/2019       Lab Results   Component Value Date    WBC 9.88 02/26/2019    HGB 11.2 (L) 02/26/2019    HCT 36.8 (L) 02/26/2019    MCV 95 02/26/2019     02/26/2019      Lab Results   Component Value Date     03/05/2019     03/05/2019    K 4.5 03/05/2019     03/05/2019    CO2 23 03/05/2019    BUN 45 (H) 03/05/2019    CREATININE 1.0 03/05/2019    CALCIUM 10.1 03/05/2019    MG 2.1 03/05/2019    ALT 60 (H) 03/05/2019    AST 63 (H) 03/05/2019    ALBUMIN 2.6 (L) 03/05/2019    BILITOT 0.3 03/05/2019     Assessment/Plan:  68 y.o. male with bile leak for cholangiogram and possible biliary drain removal today. Will exchange drain if tube cannot be removed.    Sedation:  Sedation history: have not been any systemic reactions  ASA: 3 / Mallampati: 2  Sedation plan: Moderate (Versed, fentanyl)     Risks (including, but not limited to, pain, bleeding, infection, damage to nearby structures, procedure failur, and the need for additional procedures), benefits, and alternatives were discussed with the patient. All questions were answered to the best of my abilities. The patient wishes to proceed with tube check/removal/exchange. Written informed consent was obtained.      Augustin Kendall MD  [facility]  Pager [phone]

## 2019-03-11 NOTE — PROCEDURES
Interventional Radiology Post-Procedure Note    Pre Op Diagnosis: Bile leak s/p drain  Post Op Diagnosis: Same    Procedure: Cholangiogram, tractogram, drain removal    Procedure performed by: Faiza    Written Informed Consent Obtained: Yes  Specimen Removed: No  Estimated Blood Loss: Minimal    Findings:   Patent non-dilated bile ducts. No leak identified from the CBD. Tract well-formed. Internal-external drain removed.    No immediate complications. Patient tolerated procedure well. Please see full dictated procedure report for additional details.      Augustin Kendall MD  [facility]  Pager [phone]

## 2019-03-11 NOTE — PROGRESS NOTES
Referring Physician:  Madeleine Alvarado MD    Reason for Visit: Nutrition Consult for Tubefeedings    Pertinent Social History: Patient is independent with activities of daily living. He remains on strict NPO precautions but working with SLP for swallowing. His wife is available to assist with needs and TF provision.     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    Prostatic hyperplasia     Sleep apnea        Previous Surgical History:  Past Surgical History:   Procedure Laterality Date    BIOPSY, LIVER  1/4/2019    Performed by Madeleine Alvarado MD at [facility] OR    CHOLANGIOGRAM, PERCUTANEOUS, TRANSHEPATIC Right 1/29/2019    Performed by YOHANNES Shelley MD at [facility] OR    CHOLECYSTECTOMY  1/4/2019    Performed by Madeleine [facility]ado MD at [facility] OR    DUODENECTOMY N/A 1/4/2019    Performed by [facility]leine [facility]ado MD at [facility] OR    EGD (ESOPHAGOGASTRODUODENOSCOPY) N/A 1/24/2019    Performed by Madeleine [facility]ado MD at [facility] OR    EGD (ESOPHAGOGASTRODUODENOSCOPY) N/A 1/4/2019    Performed by Madeleine Al[facility]ado MD at [facility] OR    EGD (ESOPHAGOGASTRODUODENOSCOPY) N/A 9/24/2018    Performed by Salo Nu[facility]ez MD at [facility] ENDO (4TH FLR)    ESOPHAGOGASTRODUODENOSCOPY (EGD) N/A 1/21/2019    Performed by Jose Kahn MD at [facility] ENDO    FUNDOPLICATION N/A 1/4/2019    Performed by Madeleine Al[facility]ado MD at [facility] OR    GASTROJEJUNOSTOMY Right 1/24/2019    Performed by [facility]leine [facility]ado MD at [facility] OR    GASTROJEJUNOSTOMY N/A 1/4/2019    Performed by Madeleine [facility]ado MD at [facility] OR    LAPAROTOMY, EXPLORATORY N/A 1/24/2019    Performed by Madeleine Al[facility]ado MD at [facility] OR    LYMPHADENECTOMY N/A 1/4/2019    Performed by Madeleine [facility]ado MD at [facility] OR    LYSIS, ADHESIONS N/A 1/24/2019    Performed by Madeleine [facility]ado MD at [facility] OR    PALATOPLASTY-(UPPP) N/A  2/14/2017    Performed by Dixon JAN Araujo III, MD at [facility] OR 2ND FLR    PH MONITORING, ESOPHAGUS, WIRELESS, (OFF REFLUX MEDS) N/A 9/24/2018    Performed by Salo Nu[facility]ez MD at [facility] ENDO (4TH FLR)    TONSILLECTOMY      TRANSPROSTATIC TISSUE RETRACTION N/A 6/28/2018    Performed by K[facility] Jack MD at [facility] OR    ULTRASOUND-ENDOSCOPIC-UPPER N/A 11/5/2018    Performed by Gorge Reyes MD at [facility] ENDO    UVULOPALATOPHARYNGOPLASTY         Medication:    Current Outpatient Medications on File Prior to Visit   Medication Sig Dispense Refill    atorvastatin (LIPITOR) 40 MG tablet Take 1 tablet (40 mg total) by mouth once daily. 90 tablet 3    bisacodyl (DULCOLAX) 10 mg Supp Place 1 suppository (10 mg total) rectally daily as needed.  0    cholecalciferol, vitamin D3, 50,000 unit capsule Take 1 capsule (50,000 Units total) by mouth once a week. 12 capsule 3    ciprofloxacin HCl (CIPRO) 500 MG tablet 1 tablet (500 mg total) by Per G Tube route every 12 (twelve) hours. 14 tablet 0    clotrimazole (MYCELEX) 10 mg beatriz Take 1 tablet (10 mg total) by mouth 5 (five) times daily. 150 tablet 0    esomeprazole (NEXIUM PACKET) 20 mg GrPS Take 20 mg by mouth 2 (two) times daily. 1200 mg 11    famotidine (PEPCID) 40 mg/5 mL (8 mg/mL) suspension Take 5 mLs (40 mg total) by mouth once daily. 100 mL 5    fluconazole (DIFLUCAN) 100 MG tablet 1 tablet (100 mg total) by Per G Tube route once daily. 30 tablet 0    fluconazole (DIFLUCAN) 200 MG Tab Take 1 tablet (200 mg total) by mouth once daily. 30 tablet 0    hydrocodone-acetaminophen (HYCET) solution 7.5-325 mg/15mL Take 15 mLs by mouth every 6 (six) hours as needed for Pain. 300 mL 0    meclizine (ANTIVERT) 25 mg tablet Take 1 tablet (25 mg total) by mouth 3 (three) times daily as needed for Dizziness. 14 tablet 0    metoclopramide HCl (REGLAN) 5 mg/5 mL Soln Take 10 mLs (10 mg total) by mouth 3 (three) times daily. 600 mL 1    multivitamin Liqd Take 5 mLs  "by mouth once daily. 150 mL 11    omeprazole (PRILOSEC) 40 MG capsule       ondansetron (ZOFRAN) 4 MG tablet Take 1 tablet (4 mg total) by mouth every 8 (eight) hours as needed for Nausea. 30 tablet 2    ondansetron (ZOFRAN) 4 mg/5 mL solution Take 5 mLs (4 mg total) by mouth 2 (two) times daily as needed for Nausea. 50 mL 0    pantoprazole (PROTONIX) 40 MG tablet Take 1 tablet (40 mg total) by mouth 2 (two) times daily. 60 tablet 11    ranitidine (ZANTAC) 15 mg/mL syrup       sildenafil (VIAGRA) 100 MG tablet Take 1 tablet (100 mg total) by mouth daily as needed for Erectile Dysfunction. 30 tablet 3    VIAGRA 100 mg tablet Take 1 tablet (100 mg total) by mouth once daily. Brand name only medication. 10 tablet 11       Vitamin/Supplements/Herbs:   See above medications     Allergies:  Review of patient's allergies indicates:   Allergen Reactions    Epinephrine      Neuroendocrine Tumor patient    Toradol [ketorolac] Other (See Comments)     Creatinine rises even with sub therapeutic dose       Labs: pending    : 1950  Age: 68 y.o.    Anthropometrics  Weight: 87.1 kg (192 lb)  Height: 5' 6" (1.676 m)    Estimated body mass index is 30.99 kg/m²     BMI Weight Status   Below 18.5 Underweight   18.6-24.9 Normal/Healthy   25.0-29.9 Overweight   30.0-34.9 Obesity Class I   35.0-39.9 Obesity Class II   >40 Extreme Obesity   Class III     Ideal Weight Range for Your Height: 5'6" = 118 - 154 lbs    Weight History:  Weight has decreased 40 pounds over last 4 months which is significant at 17% for time frame.     Wt Readings from Last 12 Encounters:   19 87.1 kg (192 lb)   19 87.3 kg (192 lb 7.4 oz)   19 87.5 kg (192 lb 14.4 oz)   19 89.4 kg (197 lb)   19 89.5 kg (197 lb 6.8 oz)   19 92.4 kg (203 lb 9.5 oz)   19 98.3 kg (216 lb 11.4 oz)   19 97 kg (213 lb 13.5 oz)   19 104.3 kg (230 lb)   19 101.9 kg (224 lb 12.1 oz)   18 106 kg (233 lb 11 oz) "   11/26/18 105.9 kg (233 lb 6.4 oz)       Estimated Nutrition Needs:   Energy Needs: 2065 (1589 MSJ x 1.3 PAL)  Protein Needs:  87 to 105 (1.0 to 1.2 gm Protein/kg)  Fluid Needs: 2065 (1 mL/calorie)    Malnutrition Assessment:  Energy Intake: TPN and TF  of estimated energy requirement for 4 months  Body Fat Depletion: mild depletion of orbitals, triceps and thoracic and lumbar region   Muscle Mass Depletion: moderate depletion of temples, clavicle region and scapular region   Weight Loss: 17% x <4 months   Fluid Accumulation: none    Gastrointestinal Habits: loose, oily, floating and mucousy Described as Type 5-7  on Clarke Stool Form Scale. Yellow in color 3 x a day.    Nutrition Symptoms: dysphagia, heartburn and weight loss    Nutrition History    Meal Pattern: None  Food Intolerance: n/a    Dentition: normal dentition for age           Difficulty chewing or swallowing? yes  Exercise: Type of physical activity-not active      Motivation to Change: Expect good compliance to diet recommendations.    Nutrition Diagnosis  Nutrition Problem  Altered GI function      Related to (etiology):   Duodenal Net     Signs and Symptoms (as evidenced by):   NPO due to dysphagia  Need for supplemental TPN-PICC line pulled today  Need for TF for nutrition    Interventions:  Enteral Nutrition  Composition      Nutrition Diagnosis Status:   New      Recommendations:  1. PICC line pulled on 3/11/19. TPN discontinued  2. Pt to start Two Min HN once delivered by home infusion company. Goal is 4 cans per day to provide 1920 calories, 80 gm Pro, 672 mL water. Fluid Needs ~2100 mL day  3. Begin with 6 small feeds, administer, 120 mL Two Min HN, add 60 mL water per bolus. Flush with 30 mL before and after bolus TF to provide 240 calories and 120 mL water per feed.     4. Update on 3/12/19, per wife patient did not YAJAIRA morning bolus, pt complained of fullness. Discussed changing to Gravity feed bags. Wife agreeable. She may start with  current provision of 120 mL Two Min with 120 mL water, 6 small feeds per day over 30 minute time frame. If patient able to YAJAIRA gravity feeds, will begin slow transition to goal of 240 mL Two Min HN with 240 mL water over 1 hour 4 x day.    5. RD contact information provided for questions. RD added to Care Team.       Goals:   1. Patient to take in 4 cans of Two Min HN with ~48 oz water each day.        Routed to Madeleine Alvarado MD      Consultation Time: 15 minutes.      Follow Up: 1 months.

## 2019-03-11 NOTE — PROGRESS NOTES
S/p ex lap   With postop  Issues with swallowing problem    Feeling ok  No fever    Ab osft    Will need tube feed for more than 90 days transitioning from TPN to tube feeding  Still malnouirished  Not able to eat because of swallowing issues        Will dc PICC line  For cholangiogram and possible removal    Will check for swallow study and ct scan next week    Will do labs  Today, cbc, cmp, CRP and prealbumin

## 2019-03-11 NOTE — DISCHARGE SUMMARY
Interventional Radiology Discharge Summary      Hospital Course: No complications    Admit Date: 3/11/2019  Discharge Date: 03/11/2019     Instructions Given to Patient: Yes  Diet: Resume prior diet  Activity: Activity as tolerated and no driving for today    Description of Condition on Discharge: Stable  Vital Signs (Most Recent): Temp: 98 °F (36.7 °C) (03/11/19 1151)  Pulse: 107 (03/11/19 1151)  Resp: 18 (03/11/19 1151)  BP: 121/69 (03/11/19 1154)  SpO2: 99 % (03/11/19 1151)    Discharge Disposition: Home    Discharge Diagnosis: Bile leak, resolved     Follow-up: With Dr. Madeleine Kendall MD  [facility]  Pager [phone]

## 2019-03-12 ENCOUNTER — TELEPHONE (OUTPATIENT)
Dept: NEUROLOGY | Facility: HOSPITAL | Age: 69
End: 2019-03-12

## 2019-03-12 NOTE — TELEPHONE ENCOUNTER
Call returned. See RD clinic note for details.     ----- Message from Ermelinda Barrios sent at 3/12/2019  8:40 AM CDT -----  Contact: Patients wife  Patients wife would like a call back at [phone].

## 2019-03-13 ENCOUNTER — TELEPHONE (OUTPATIENT)
Dept: NEUROLOGY | Facility: HOSPITAL | Age: 69
End: 2019-03-13

## 2019-03-13 DIAGNOSIS — K31.84 GASTROPARESIS: Primary | ICD-10-CM

## 2019-03-13 DIAGNOSIS — R13.10 DYSPHAGIA, UNSPECIFIED TYPE: Primary | ICD-10-CM

## 2019-03-13 NOTE — TELEPHONE ENCOUNTER
----- Message from Starr Sin sent at 3/13/2019  9:05 AM CDT -----  Contact: Patient  JOSE ALEJANDRO:  Patient's wife calling to see if any progress has been made for scheduling a swallow test for Monday.  She can be reached at [phone].  Thank you  abc

## 2019-03-13 NOTE — TELEPHONE ENCOUNTER
Returned pts wife call, notified of swallow study and CT scheduled to follow. Wife verbalizes understanding.

## 2019-03-14 DIAGNOSIS — R13.10 DYSPHAGIA, UNSPECIFIED TYPE: Primary | ICD-10-CM

## 2019-03-15 ENCOUNTER — TELEPHONE (OUTPATIENT)
Dept: NEUROLOGY | Facility: HOSPITAL | Age: 69
End: 2019-03-15

## 2019-03-15 LAB
ACID FAST MOD KINY STN SPEC: NORMAL
MYCOBACTERIUM SPEC QL CULT: NORMAL

## 2019-03-15 NOTE — TELEPHONE ENCOUNTER
Follow up call regarding YAJAIRA after TF change to Two Min HN. Pt wife reports x2 episodes of emesis, bile consistency yesterday but improvements with diarrhea. Gravity feed bags working well for patient and wife. Discussed ability to change back to Ensure Plus but wife and patient would like to continue with Two Min HN due to calorie and protein content. Patient is receiving 1/2 can 5 x day at this time. She reports will continue to work him up to 6 1/2 can feeds through out the day. RD number provided for questions. Infusion RD notified. MD updated regarding emesis.

## 2019-03-19 ENCOUNTER — PATIENT MESSAGE (OUTPATIENT)
Dept: NEUROLOGY | Facility: HOSPITAL | Age: 69
End: 2019-03-19

## 2019-03-19 ENCOUNTER — HOSPITAL ENCOUNTER (OUTPATIENT)
Dept: RADIOLOGY | Facility: HOSPITAL | Age: 69
Discharge: HOME | End: 2019-03-19
Attending: SURGERY
Payer: MEDICARE

## 2019-03-19 ENCOUNTER — CLINICAL SUPPORT (OUTPATIENT)
Dept: SPEECH THERAPY | Facility: HOSPITAL | Age: 69
End: 2019-03-19
Attending: SURGERY
Payer: MEDICARE

## 2019-03-19 ENCOUNTER — CLINICAL SUPPORT (OUTPATIENT)
Dept: NEUROLOGY | Facility: HOSPITAL | Age: 69
End: 2019-03-19
Attending: SURGERY
Payer: MEDICARE

## 2019-03-19 ENCOUNTER — OFFICE VISIT (OUTPATIENT)
Dept: NEUROLOGY | Facility: HOSPITAL | Age: 69
End: 2019-03-19
Attending: SURGERY
Payer: MEDICARE

## 2019-03-19 ENCOUNTER — HOSPITAL ENCOUNTER (EMERGENCY)
Facility: HOSPITAL | Age: 69
Discharge: HOME | End: 2019-03-19
Attending: EMERGENCY MEDICINE
Payer: MEDICARE

## 2019-03-19 VITALS
WEIGHT: 189 LBS | RESPIRATION RATE: 16 BRPM | BODY MASS INDEX: 30.37 KG/M2 | HEART RATE: 95 BPM | HEIGHT: 66 IN | DIASTOLIC BLOOD PRESSURE: 72 MMHG | SYSTOLIC BLOOD PRESSURE: 118 MMHG

## 2019-03-19 VITALS
BODY MASS INDEX: 30.53 KG/M2 | HEIGHT: 66 IN | WEIGHT: 189.94 LBS | TEMPERATURE: 98 F | DIASTOLIC BLOOD PRESSURE: 66 MMHG | HEART RATE: 105 BPM | SYSTOLIC BLOOD PRESSURE: 95 MMHG | OXYGEN SATURATION: 98 %

## 2019-03-19 DIAGNOSIS — K31.84 GASTROPARESIS: ICD-10-CM

## 2019-03-19 DIAGNOSIS — R13.10 DYSPHAGIA, UNSPECIFIED TYPE: ICD-10-CM

## 2019-03-19 DIAGNOSIS — Z09 POSTOP CHECK: Primary | ICD-10-CM

## 2019-03-19 DIAGNOSIS — E86.0 DEHYDRATION: Primary | ICD-10-CM

## 2019-03-19 LAB
ALBUMIN SERPL BCP-MCNC: 2.6 G/DL
ALP SERPL-CCNC: 177 U/L
ALT SERPL W/O P-5'-P-CCNC: 61 U/L
ANION GAP SERPL CALC-SCNC: 9 MMOL/L
AST SERPL-CCNC: 56 U/L
BASOPHILS # BLD AUTO: 0.01 K/UL
BASOPHILS NFR BLD: 0.2 %
BILIRUB SERPL-MCNC: 0.6 MG/DL
BUN SERPL-MCNC: 20 MG/DL
CALCIUM SERPL-MCNC: 9.4 MG/DL
CHLORIDE SERPL-SCNC: 100 MMOL/L
CO2 SERPL-SCNC: 28 MMOL/L
CREAT SERPL-MCNC: 1 MG/DL
DIFFERENTIAL METHOD: ABNORMAL
EOSINOPHIL # BLD AUTO: 0 K/UL
EOSINOPHIL NFR BLD: 0.5 %
ERYTHROCYTE [DISTWIDTH] IN BLOOD BY AUTOMATED COUNT: 16.5 %
EST. GFR  (AFRICAN AMERICAN): >60 ML/MIN/1.73 M^2
EST. GFR  (NON AFRICAN AMERICAN): >60 ML/MIN/1.73 M^2
GLUCOSE SERPL-MCNC: 91 MG/DL
HCT VFR BLD AUTO: 34.1 %
HGB BLD-MCNC: 10.8 G/DL
LYMPHOCYTES # BLD AUTO: 1 K/UL
LYMPHOCYTES NFR BLD: 16.2 %
MCH RBC QN AUTO: 29.8 PG
MCHC RBC AUTO-ENTMCNC: 31.7 G/DL
MCV RBC AUTO: 94 FL
MONOCYTES # BLD AUTO: 0.7 K/UL
MONOCYTES NFR BLD: 12.3 %
NEUTROPHILS # BLD AUTO: 4.2 K/UL
NEUTROPHILS NFR BLD: 70.6 %
PLATELET # BLD AUTO: 210 K/UL
PMV BLD AUTO: 9.6 FL
POTASSIUM SERPL-SCNC: 4 MMOL/L
PROT SERPL-MCNC: 7.2 G/DL
RBC # BLD AUTO: 3.62 M/UL
SODIUM SERPL-SCNC: 137 MMOL/L
WBC # BLD AUTO: 5.92 K/UL

## 2019-03-19 PROCEDURE — 74230 FL MODIFIED BARIUM SWALLOW SPEECH STUDY: ICD-10-PCS | Mod: 26,,, | Performed by: RADIOLOGY

## 2019-03-19 PROCEDURE — 74176 CT ABDOMEN PELVIS WITHOUT CONTRAST: ICD-10-PCS | Mod: 26,,, | Performed by: RADIOLOGY

## 2019-03-19 PROCEDURE — 96360 HYDRATION IV INFUSION INIT: CPT

## 2019-03-19 PROCEDURE — 99211 OFF/OP EST MAY X REQ PHY/QHP: CPT | Mod: 25,27

## 2019-03-19 PROCEDURE — 92611 MOTION FLUOROSCOPY/SWALLOW: CPT

## 2019-03-19 PROCEDURE — 99215 OFFICE O/P EST HI 40 MIN: CPT | Mod: 25 | Performed by: SURGERY

## 2019-03-19 PROCEDURE — 25000003 PHARM REV CODE 250: Performed by: EMERGENCY MEDICINE

## 2019-03-19 PROCEDURE — 74230 X-RAY XM SWLNG FUNCJ C+: CPT | Mod: TC

## 2019-03-19 PROCEDURE — 99284 EMERGENCY DEPT VISIT MOD MDM: CPT | Mod: 25,27

## 2019-03-19 PROCEDURE — 74230 X-RAY XM SWLNG FUNCJ C+: CPT | Mod: 26,,, | Performed by: RADIOLOGY

## 2019-03-19 PROCEDURE — 74176 CT ABD & PELVIS W/O CONTRAST: CPT | Mod: 26,,, | Performed by: RADIOLOGY

## 2019-03-19 PROCEDURE — 74176 CT ABD & PELVIS W/O CONTRAST: CPT | Mod: TC

## 2019-03-19 PROCEDURE — 85025 COMPLETE CBC W/AUTO DIFF WBC: CPT

## 2019-03-19 PROCEDURE — 80053 COMPREHEN METABOLIC PANEL: CPT

## 2019-03-19 RX ADMIN — SODIUM CHLORIDE, SODIUM LACTATE, POTASSIUM CHLORIDE, AND CALCIUM CHLORIDE 1000 ML: .6; .31; .03; .02 INJECTION, SOLUTION INTRAVENOUS at 03:03

## 2019-03-19 RX ADMIN — SODIUM CHLORIDE, SODIUM LACTATE, POTASSIUM CHLORIDE, AND CALCIUM CHLORIDE 1000 ML: .6; .31; .03; .02 INJECTION, SOLUTION INTRAVENOUS at 02:03

## 2019-03-19 NOTE — PROGRESS NOTES
Referring Physician:  Madeleine Alvarado MD    Reason for Visit: Follow Up Nutrition Consult for TF    Pertinent Social History: Patient is independent with activities of daily living but he does endorse fatigue. Patient here today for follow up visit regarding change in TF to Two Min HN. He reports he did not pass his swallow test this morning. Patient with complaints of reflux and reglan restarted with improvements. Bowel movements are 1x per day liquid in consistency.     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    Prostatic hyperplasia     Sleep apnea        Previous Surgical History:  Past Surgical History:   Procedure Laterality Date    BIOPSY, LIVER  1/4/2019    Performed by [facility]leine [facility]ado MD at [facility] OR    CHOLANGIOGRAM, PERCUTANEOUS, TRANSHEPATIC Right 1/29/2019    Performed by YOHANNES Shelley MD at [facility] OR    CHOLECYSTECTOMY  1/4/2019    Performed by [facility]leine [facility]ado MD at [facility] OR    DUODENECTOMY N/A 1/4/2019    Performed by [facility]ado MD at [facility] OR    EGD (ESOPHAGOGASTRODUODENOSCOPY) N/A 1/24/2019    Performed by [facility]ado MD at [facility] OR    EGD (ESOPHAGOGASTRODUODENOSCOPY) N/A 1/4/2019    Performed by Madeleine [facility]ado MD at [facility] OR    EGD (ESOPHAGOGASTRODUODENOSCOPY) N/A 9/24/2018    Performed by Salo Nuñez MD at [facility] ENDO (4TH FLR)    ESOPHAGOGASTRODUODENOSCOPY (EGD) N/A 1/21/2019    Performed by Jose Kahn MD at [facility] ENDO    FUNDOPLICATION N/A 1/4/2019    Performed by Madeleine [facility]ado MD at [facility] OR    GASTROJEJUNOSTOMY Right 1/24/2019    Performed by Madeleine [facility]ado MD at [facility] OR    GASTROJEJUNOSTOMY N/A 1/4/2019    Performed by Madeleine [facility]ado MD at [facility] OR    LAPAROTOMY, EXPLORATORY N/A 1/24/2019    Performed by [facility]leine [facility]ado MD at [facility] OR    LYMPHADENECTOMY N/A 1/4/2019    Performed  by [facility]ado [facility] at [facility] OR    LYSIS, ADHESIONS N/A 1/24/2019    Performed by [facility]ado MD at [facility] OR    PALATOPLASTY-(UPPP) N/A 2/14/2017    Performed by Magnoliajanet Araujo III, MD at [facility] OR 2ND FLR    PH MONITORING, ESOPHAGUS, WIRELESS, (OFF REFLUX MEDS) N/A 9/24/2018    Performed by Salo Nuñez MD at [facility] ENDO (4TH FLR)    TONSILLECTOMY      TRANSPROSTATIC TISSUE RETRACTION N/A 6/28/2018    Performed by Kory Jack [facility] at [facility] OR    ULTRASOUND-ENDOSCOPIC-UPPER N/A 11/5/2018    Performed by Gorge Reyes MD at [facility] ENDO    UVULOPALATOPHARYNGOPLASTY         Medication:    atorvastatin (LIPITOR) 40 MG tablet, Take 1 tablet (40 mg total) by mouth once daily., Disp: 90 tablet, Rfl: 3    bisacodyl (DULCOLAX) 10 mg Supp, Place 1 suppository (10 mg total) rectally daily as needed., Disp: , Rfl: 0    ciprofloxacin HCl (CIPRO) 500 MG tablet, 1 tablet (500 mg total) by Per G Tube route every 12 (twelve) hours., Disp: 14 tablet, Rfl: 0    clotrimazole (MYCELEX) 10 mg beatriz, Take 1 tablet (10 mg total) by mouth 5 (five) times daily., Disp: 150 tablet, Rfl: 0    esomeprazole (NEXIUM PACKET) 20 mg GrPS, Take 20 mg by mouth 2 (two) times daily., Disp: 1200 mg, Rfl: 11    famotidine (PEPCID) 40 mg/5 mL (8 mg/mL) suspension, Take 5 mLs (40 mg total) by mouth once daily., Disp: 100 mL, Rfl: 5    fluconazole (DIFLUCAN) 100 MG tablet, 1 tablet (100 mg total) by Per G Tube route once daily., Disp: 30 tablet, Rfl: 0    fluconazole (DIFLUCAN) 200 MG Tab, Take 1 tablet (200 mg total) by mouth once daily., Disp: 30 tablet, Rfl: 0    hydrocodone-acetaminophen (HYCET) solution 7.5-325 mg/15mL, Take 15 mLs by mouth every 6 (six) hours as needed for Pain., Disp: 300 mL, Rfl: 0    meclizine (ANTIVERT) 25 mg tablet, Take 1 tablet (25 mg total) by mouth 3 (three) times daily as needed for Dizziness., Disp: 14 tablet, Rfl: 0    metoclopramide HCl (REGLAN) 5 mg/5 mL Soln, Take 10 mLs (10 mg  "total) by mouth 3 (three) times daily. (Patient taking differently: Take 10 mg by mouth 2 (two) times daily. ), Disp: 600 mL, Rfl: 1    omeprazole (PRILOSEC) 40 MG capsule, , Disp: , Rfl:     ondansetron (ZOFRAN) 4 MG tablet, Take 1 tablet (4 mg total) by mouth every 8 (eight) hours as needed for Nausea., Disp: 30 tablet, Rfl: 2    ondansetron (ZOFRAN) 4 mg/5 mL solution, Take 5 mLs (4 mg total) by mouth 2 (two) times daily as needed for Nausea., Disp: 50 mL, Rfl: 0    pantoprazole (PROTONIX) 40 MG tablet, Take 1 tablet (40 mg total) by mouth 2 (two) times daily., Disp: 60 tablet, Rfl: 11    ranitidine (ZANTAC) 15 mg/mL syrup, Pt not taking/ put on hold, Disp: , Rfl:     sildenafil (VIAGRA) 100 MG tablet, Take 1 tablet (100 mg total) by mouth daily as needed for Erectile Dysfunction., Disp: 30 tablet, Rfl: 3    VIAGRA 100 mg tablet, Take 1 tablet (100 mg total) by mouth once daily. Brand name only medication., Disp: 10 tablet, Rfl: 11      Vitamin/Supplements/Herbs:     cholecalciferol, vitamin D3, 50,000 unit capsule, Take 1 capsule (50,000 Units total) by mouth once a week., Disp: 12 capsule, Rfl: 3    multivitamin Liqd, Take 5 mLs by mouth once daily., Disp: 150 mL, Rfl: 11       Allergies:  Review of patient's allergies indicates:   Allergen Reactions    Epinephrine      Neuroendocrine Tumor patient    Toradol [ketorolac] Other (See Comments)     Creatinine rises even with sub therapeutic dose         Labs: reviewed    : 1950  Age: 68 y.o.    Anthropometrics  Weight: 85.7 kg (189 lb).  Height: 5' 6" (1.676 m)    Estimated body mass index is 30.51 kg/m²     BMI Weight Status   Below 18.5 Underweight   18.6-24.9 Normal/Healthy   25.0-29.9 Overweight   30.0-34.9 Obesity Class I   35.0-39.9 Obesity Class II   >40 Extreme Obesity   Class III     Ideal Weight Range for Your Height: 5'6" = 118 - 154 lbs    Weight History:  Weight has decreased 35 pounds over last 3 months  Wt Readings from Last 12 " Encounters:   03/19/19 85.7 kg (189 lb)   03/19/19 86.1 kg (189 lb 14.8 oz)   03/11/19 87.1 kg (192 lb)   03/11/19 87.3 kg (192 lb 7.4 oz)   02/25/19 87.5 kg (192 lb 14.4 oz)   02/21/19 89.4 kg (197 lb)   02/21/19 89.5 kg (197 lb 6.8 oz)   02/14/19 92.4 kg (203 lb 9.5 oz)   02/08/19 98.3 kg (216 lb 11.4 oz)   01/16/19 97 kg (213 lb 13.5 oz)   01/03/19 104.3 kg (230 lb)   01/03/19 101.9 kg (224 lb 12.1 oz)       Estimated Nutrition Needs:   Energy Needs: 2052 (1579 MSJ x 1.3 PAL)  Protein Needs: 103 (1.2 gm Protein/kg)  Fluid Needs: 2050 (1 mL/calorie)    Malnutrition Assessment:  Energy Intake: <50% of estimated energy requirement for < 1 month  Body Fat Depletion: moderate depletion of orbitals, triceps and thoracic and lumbar region   Muscle Mass Depletion: mild depletion of temples, clavicle region and scapular region   Weight Loss: 15% x 3 months is significant for time frame   Fluid Accumulation: none    Gastrointestinal Habits: loose and floating Described as Type 5 on [place] Stool Form Scale.     Nutrition Symptoms: dysphagia, weight loss, fatigue and feel full quickly    Nutrition History    Dentition: normal dentition for age         Difficulty chewing or swallowing? yes  Exercise: Receives assitance with intake. Patient on TF -limited active      Motivation to Change: Expect good compliance to TF recommendations at home as patient and wife verbalize willingness to continue with current TF regimen       Nutrition Diagnosis  Nutrition Problem  Swallowing difficulty      Related to (etiology):   cervical osteophytes located at level C4-C5, which limit pt's epiglottic inversion thus dec airway protection for adequate glottic closure and adequate hyolaryngeal elevation/excursion.      Signs and Symptoms (as evidenced by):   NPO status with PEG tube for nutrition    Interventions:  Enteral Nutrition  Volume        Nutrition Diagnosis Status:   Continues        Recommendations:  1. Patient is tolerating TF via  Gravity Feed Bags of 120 mL Two Min HN with 120 mL Water (1 hr provision) up to 4 x per day, providing 960 calories and 40 gm Protein (~50% of calorie and protein needs)  2. REC to increase Two Min HN by 60 mL each feed work towards goal of 4 cans per day. Wife and patient verbalize understanding. May increase drip rate of gravity feed bags to decrease time of feeds.   3. Continue with Reglan 5 mL before every feed per MD  4. Start Cholestyramine per MD direction  5. RD contact to contact patient on Friday, March 22, 2019.      Goals:   1. Adequate nutrition via TF to meet >50% of needs      Routed to Madeleine Alvarado MD      Consultation Time: 45 minutes.      Follow Up: 1 months.

## 2019-03-19 NOTE — ED PROVIDER NOTES
Encounter Date: 3/19/2019       History     Chief Complaint   Patient presents with    Dehydration     pt c/o inability to take in enough fluids due to difficulty swallowing.     68-year-old male sent to the emergency department by Dr. Salvador, who was kind enough to call ahead of time and informed me about this patient.  Patient recently had surgery and was seeing Dr. Salvador for a follow-up appointment today.  Patient has difficulty swallowing, and after his CT, was sent to the emergency department for some IV fluid because he seemed somewhat dehydrated, per Dr. Salvador.  On arrival, patient denies any complaints.          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    Prostatic hyperplasia     Sleep apnea      Past Surgical History:   Procedure Laterality Date    BIOPSY, LIVER  1/4/2019    [facility] by [facility] at [facility] OR    CHOLANGIOGRAM, PERCUTANEOUS, TRANSHEPATIC Right 1/29/2019    Performed by YOHANNES Shelley [facility] at [facility] OR    CHOLECYSTECTOMY  1/4/2019    [facility] by [facility] OR    DUODENECTOMY N/A 1/4/2019    [facility] by [facility] OR    EGD (ESOPHAGOGASTRODUODENOSCOPY) N/A 1/24/2019    Performed by [facility] OR    EGD (ESOPHAGOGASTRODUODENOSCOPY) N/A 1/4/2019    [facility]formed by [facility] OR    EGD (ESOPHAGOGASTRODUODENOSCOPY) N/A 9/24/2018    [facility] by [facility] at [facility]O (4TH FLR)    ESOPHAGOGASTRODUODENOSCOPY (EGD) N/A 1/21/2019    Performed by Jose Kahn MD at [facility] ENDO    FUNDOPLICATION N/A 1/4/2019    Performed by [facility] OR     GASTROJEJUNOSTOMY Right 1/24/2019    [facility]formed [facility] at [facility] OR    GASTROJEJUNOSTOMY N/A 1/4/2019    Performed by [facility] at [facility]    LAPAROTOMY, EXPLORATORY N/A 1/24/2019    Performed by [facility] at [facility] OR    LYMPHADENECTOMY N/A 1/4/2019    Performed by [facility]ado [facility] at [facility] OR    LYSIS, ADHESIONS N/A 1/24/2019    Performed by [facility]ado [facility] at [facility] OR    PALATOPLASTY-(UPPP) N/A 2/14/2017    Performed by Bob Araujo III, MD at [facility] OR 2ND FLR    PH MONITORING, ESOPHAGUS, WIRELESS, (OFF REFLUX MEDS) N/A 9/24/2018    Performed by Salo [facility]ez MD at [facility] ENDO (4TH FLR)    TONSILLECTOMY      TRANSPROSTATIC TISSUE RETRACTION N/A 6/28/2018    [facility]    ULTRASOUND-ENDOSCOPIC-UPPER N/A 11/5/2018    Performed by Gorge Reyes MD at [facility] ENDO    UVULOPALATOPHARYNGOPLASTY       Family History   Problem Relation Age of Onset    Heart disease Mother     Diabetes Mother     Stroke Father     Cancer Sister         pancreatitic    COPD Sister     Prostate cancer Neg Hx     Kidney disease Neg Hx     Thyroid disease Neg Hx     Retinal detachment Neg Hx     Macular degeneration Neg Hx     Hypertension Neg Hx     Glaucoma Neg Hx      Social History     Tobacco Use    Smoking status: Never Smoker    Smokeless tobacco: Never Used   Substance Use Topics    Alcohol use: No     Alcohol/week: 1.2 oz     Types: 2 Cans of beer per week     Frequency: Never    Drug use: No     Review of Systems   Constitutional: Negative for fatigue and fever.   Respiratory: Negative for cough, choking and shortness of breath.    Cardiovascular: Negative for chest pain, palpitations and leg swelling.   Gastrointestinal: Negative for abdominal distention, diarrhea and vomiting.   Neurological: Negative for seizures, speech difficulty, light-headedness, numbness and headaches.       Physical Exam      Initial Vitals [03/19/19 1352]   BP Pulse Resp Temp SpO2   118/72 95 16 -- --      MAP       --         Physical Exam    Nursing note and vitals reviewed.  Constitutional: He appears well-developed and well-nourished. No distress.   HENT:   Head: Normocephalic and atraumatic.   Oropharynx clear; dry MM    Eyes: Conjunctivae and EOM are normal. Pupils are equal, round, and reactive to light.   Neck: Normal range of motion. Neck supple. No tracheal deviation present.   Cardiovascular: Normal rate, regular rhythm, normal heart sounds and intact distal pulses.   Pulmonary/Chest: Breath sounds normal. No respiratory distress. He has no wheezes. He has no rhonchi. He has no rales.   Abdominal: Soft. Bowel sounds are normal. He exhibits no distension.   Musculoskeletal: Normal range of motion.   Neurological: He is alert and oriented to person, place, and time. He has normal strength. No cranial nerve deficit.   Skin: Skin is warm and dry.         ED Course   Procedures  Labs Reviewed   CBC W/ AUTO DIFFERENTIAL - Abnormal; Notable for the following components:       Result Value    RBC 3.62 (*)     Hemoglobin 10.8 (*)     Hematocrit 34.1 (*)     MCHC 31.7 (*)     RDW 16.5 (*)     Lymph% 16.2 (*)     All other components within normal limits   COMPREHENSIVE METABOLIC PANEL - Abnormal; Notable for the following components:    Albumin 2.6 (*)     Alkaline Phosphatase 177 (*)     AST 56 (*)     ALT 61 (*)     All other components within normal limits          Imaging Results    None          Medical Decision Making:   Initial Assessment:   68-year-old male sent to the emergency department for dehydration  Differential Diagnosis:   Dehydration, electrolyte dyscrasias, hyperglycemia  Clinical Tests:   Lab Tests: Reviewed       <> Summary of Lab: Benign  ED Management:  Patient given 2 L of IV fluid.  Vital signs stable. Dr. Salvador has seen the patient in the ED as well, agrees with plan to discharge. He will arrange for  outpatient follow up. Patient is comfortable with discharge at this time.                       Clinical Impression:       ICD-10-CM ICD-9-CM   1. Dehydration E86.0 276.51         Disposition:   Disposition: Discharged  Condition: Stable                        Eric Hill MD  03/19/19 1706

## 2019-03-19 NOTE — PROGRESS NOTES
Known pt of mine  Had modified swallow which h still shoes aspiration and hence not eligible  To take jonnathan  Ct scan stable, percutaneous drain remove    And soft wound looks good  Dietician talked to family extensively  Tube feeding changes made    Will need fluids and labs    Will send him to ER

## 2019-03-19 NOTE — PROGRESS NOTES
TIME RECORD    Date: 03/19/2019    Start Time:  11:33  Stop Time:  11:55    PROCEDURES:    TIMED  Procedure Time Min.    Start:  Stop:     Start:  Stop:     Start:  Stop:     Start:  Stop:          UNTIMED  Procedure Time Min.   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 He was recently dc'd from  PT/OT  Functional Deficits Leading to Referral:  Pt with new onset of dysphagia and vocal dysphonia while inpatient at [facility].   Last MBS performed while inpatient acute: 2/7/19:    Pharyngeal Phase   -Pt continues to present with severe pharyngeal dysphagia c/b dcr'd laryngeal elevation/excursion      -Pt also demonstrates significantly reduced epiglottic inversion and reduced airway protection d/t cervical osteophytes located at level C4-C5, which limited pt's epiglottic inversion. Pt demonstrated deep laryngeal penetration (at level of vocal cords) during the swallow of thin liquids (5cc ) and with overt aspiration during the swallow for thin liquids (10cc), nectar thick liquids (5cc) and honey thick liquids (5cc).      -Pt also demonstrated reduced tongue base retraction and reduced pharyngeal constriction, resulting  in moderate residuals in vallecula, pyriform sinuses and along pharyngeal wall s/p swallow. As a result, pt observed with aspiration after the swallow from attempting to clear moderate pharyngeal residuals within additional swallows for thin liquids (10cc), nectar thick liquids (5cc) and honey thick liquids (5c).   ? Pt with limited success in safely clearing pharyngeal residuals, as well as ejecting aspirated materials-- Pt with weak cough production  required further instruction from SLP to perform multiple throat clear and swallows to adequately clear lresiduals present.      Pt demonstrated one episode of deep laryngeal penetration for thin liquids (5cc) and consistent episodes of overt aspiration under fluoro for thin liquids (10cc), nectar thick liquids (5cc), and honey thick liquids (5cc). Pt is NOT safe for an oral diet, at this time, and should continue alternative means of nutrition/hydration/medication 2/2 pt is still at HIGH RISK OF ASPIRATION.     Social Cultural:  Lives with wife at home.   Nutrition:  Pt is peg tube dependent at this time, remains NPO,  Signs of Abuse:  No  Medication:  See EMR  Alertness/Attention:  Alert, cooperative, follows commands, able to converse  Oral Motor: face is symmetrical at rest and during smile. Labial and lingual strength and ROM are intact. Vocal quality is weak in intensity, diplophonia is present, hoarse voicing is audible. Speech is 90% intelligible despite vocal quality.   Pt's dentition is intact     Patient Position:  Sitting  View:  Lateral  Consistencies Administered:  5cc, 10cc via cup rim swallows, 5cc, 10cc of nectar thick via cup swallow,  tsp bites of barium pudding x1 only; Further trials deferred due to degree of pharyngeal stasis present.       Oral Preparation / Oral Phase  Pt demonstrated adequate oral phase of the swallow c/b fair oral motor skills, slow a/p transport with dcr'd lingual propulsion and dcr'd lingual retraction with thickener  textures noted (nectar and pureed). Pt noted with mild lingual stasis along increased time needed for mastication of diced solids and hard/dry solids. Pt with oral stasis along tongue and tongue base post swallow with reduced sensation of material.      Pharyngeal Phase  SEVERE pharyngeal dysphagia c/b delayed swallow response (at least 4-5 seconds delayed) resulting in prespillage of liquids (thin and nectar thick, pureed) into vallecular space, dcr'd tongue base retraction, impaired hyolaryngeal lift/excursion, impaired epiglottic inversion resulting in vallecular residue after the swallow (ranging from mild with liquids, worsened with viscous textures like nectar and pureed textures in calibrated amts). Pt demonstrated wet/gurgly voice and reduced sensation of material in pharynx.   Pt demonstrated significant drop down aspiration during and after the swallow across consistencies from material overflowing in vallecula space as he initiated hard swallows. Pt also presents with cervical osteophytes located at level C4-C5, which limit pt's epiglottic inversion thus dcr'd airway protection for adequate glottic closure and adequate hyolaryngeal elevation/excursion.    Pt did follow verbal cues like hard swallow/cough techniques, chin tuck posture and head turn to both side (R and L side) to determine ability to perform compensatory postures to protect airway. None of the above postures reduced his aspiration risk during study.    Per Rosenbeck's 8-Point Penetration- Aspiration Scale:   (7) Material enters the airway, passes below the vocal folds, and is not ejected from the trachea despite effort.  No evidence of backflow from upper cervical esophagus into the pharynx      Impressions  Oral phase of the swallow is fair. Pt continues to present with SEVERE pharyngeal dysphagia as stated above with continued aspiration risk across all calibrated amts presented to him. Pt not appropriate for oral diet at this time.  Recommend patient be NPO with continued long term source of nutrition via peg tube.      Prognosis/Plan/Education  Fair at this time given degree and severity of swallow deficits. Pt with good family support and wife did verbalize understanding of swallow precautions. Pt labile and upset of about NPO recs.   Continue to recommend skilled ST to address dysphagia and initiate Neuromuscular Electrical Stimulation (intensive ST therapy to address dysphagia as next plan)  SLP discussed all results with pt/wife and SLP called treating SLP in HH setting to discuss next POC goals and further ST needs.     Plan:    1. SLP to send report via [employer] system to Dr. Jatin JARQUIN and recommend aggressive ST intervention via Outpatient Setting  2. Refer pt for OP SLP services to address dysphagia  3. Continue NPO for now with daily oral care and ice chips only with SLP in therapy only.         Therapist's Name: AJITH Ley, CCC-SLP  Speech Pathologist 3/19/2019      Therapist's Signature: ___________________________________  Date: 03/19/2019    I CERTIFY THE NEED FOR THESE SERVICES FURNISHED UNDER THIS PLAN OF TREATMENT AND WHILE UNDER MY CARE    Physician's comments: ________________________________________________________________________________________________________________________________________________      Physician's Name: ___________________________________

## 2019-03-20 RX ORDER — CHOLESTYRAMINE
4 POWDER (GRAM) MISCELLANEOUS
Qty: 500 G | Refills: 11 | Status: SHIPPED | OUTPATIENT
Start: 2019-03-20 | End: 2019-07-30

## 2019-03-22 ENCOUNTER — TELEPHONE (OUTPATIENT)
Dept: ADMINISTRATIVE | Facility: CLINIC | Age: 69
End: 2019-03-22

## 2019-03-22 ENCOUNTER — TELEPHONE (OUTPATIENT)
Dept: NEUROLOGY | Facility: HOSPITAL | Age: 69
End: 2019-03-22

## 2019-03-22 NOTE — TELEPHONE ENCOUNTER
Spoke with pt's wife who reported pt was able to YAJAIRA increase in Two Min HN to 180 mL with 120 mL water 4 x day. She also received Cholestyramine in bulk container and administered 1/4 scoop with 2 oz water. Patient did have 1 formed stool prior to administering cholestyramine. Will call wife back early next week to discuss increasing to goal of 240 mL Two Min HN with 120 mL water 4 x day. RD number provided for questions.

## 2019-03-24 LAB
ACID FAST MOD KINY STN SPEC: NORMAL
MYCOBACTERIUM SPEC QL CULT: NORMAL

## 2019-03-27 ENCOUNTER — PATIENT MESSAGE (OUTPATIENT)
Dept: NEUROLOGY | Facility: HOSPITAL | Age: 69
End: 2019-03-27

## 2019-03-27 ENCOUNTER — TELEPHONE (OUTPATIENT)
Dept: NEUROLOGY | Facility: HOSPITAL | Age: 69
End: 2019-03-27

## 2019-03-27 DIAGNOSIS — R13.19 ESOPHAGEAL DYSPHAGIA: Primary | ICD-10-CM

## 2019-03-27 LAB
FUNGUS SPEC CULT: NORMAL
FUNGUS SPEC CULT: NORMAL

## 2019-03-27 NOTE — TELEPHONE ENCOUNTER
----- Message from Lester Ruiz sent at 3/26/2019 12:48 PM CDT -----  Contact: [facility]/[phone]  They need to get the orders for the speech therapy.

## 2019-03-27 NOTE — TELEPHONE ENCOUNTER
----- Message from Karina Burgos sent at 3/25/2019  9:12 AM CDT -----  Contact: [place] with [facility] -[phone]  Patient needs out patient speech therapy as well as physical therapy orders sent to the Fax number below. Please advise     Phone Number - [phone]    Fax- [phone]

## 2019-03-27 NOTE — TELEPHONE ENCOUNTER
----- Message from Alysa Pope sent at 3/27/2019  9:09 AM CDT -----  Contact: Wife [phone]  JOSE ALEJANDRO- Patient is waiting on speech therapy orders and it has been a week. Please call back to assist at [phone].

## 2019-03-29 ENCOUNTER — PATIENT MESSAGE (OUTPATIENT)
Dept: NEUROLOGY | Facility: HOSPITAL | Age: 69
End: 2019-03-29

## 2019-03-29 ENCOUNTER — TELEPHONE (OUTPATIENT)
Dept: NEUROLOGY | Facility: HOSPITAL | Age: 69
End: 2019-03-29

## 2019-03-29 NOTE — TELEPHONE ENCOUNTER
Follow up Call for TF Tolerance.    Pt reports good tolerance to TF and has increased the TF to 3.5 cans per day. He does endorse thrist and reflux. Discussed increasing water in TF if feeling thirsty. He may continue with using Pepcid as directed and Reglan. Dr Steiner made aware of above. Pt reports to start Speech Therapy on Tuesday. Encouraged pt and wife to call with questions.

## 2019-04-02 ENCOUNTER — CLINICAL SUPPORT (OUTPATIENT)
Dept: REHABILITATION | Facility: HOSPITAL | Age: 69
End: 2019-04-02
Payer: MEDICARE

## 2019-04-02 ENCOUNTER — PATIENT MESSAGE (OUTPATIENT)
Dept: NEUROLOGY | Facility: HOSPITAL | Age: 69
End: 2019-04-02

## 2019-04-02 DIAGNOSIS — R49.0 DYSPHONIA: ICD-10-CM

## 2019-04-02 DIAGNOSIS — R13.14 PHARYNGOESOPHAGEAL DYSPHAGIA: ICD-10-CM

## 2019-04-02 PROCEDURE — G8997 SWALLOW GOAL STATUS: HCPCS | Mod: CM,PO

## 2019-04-02 PROCEDURE — G8996 SWALLOW CURRENT STATUS: HCPCS | Mod: CN,PO

## 2019-04-02 PROCEDURE — 92610 EVALUATE SWALLOWING FUNCTION: CPT | Mod: PO

## 2019-04-02 NOTE — PLAN OF CARE
"Outpatient Neurological Rehabilitation  Speech and Language Therapy Evaluation    Date: 4/2/2019     Name: Hoang Santos Jr.   MRN: [id number]    Therapy Diagnosis:   Encounter Diagnoses   Name Primary?    Pharyngoesophageal dysphagia     Dysphonia     Physician: Madeleine Alvarado,*  Physician Orders: Ambulatory Referral to Speech Therapy  Medical Diagnosis: R13.10 (ICD-10-CM) - Esophageal dysphagia    Visit # / Visits Authorized:  1 / 20   Date of Evaluation:  4/2/2019   Insurance Authorization Period: 4/2/19 to 12/31/19  Plan of Care Certification:    4/2/2019 to 5/31/19     Time In:1345   Time Out: 1430   Total Billable Time: 45  Procedure Min.   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 He was intubated during recovery because he "was having trouble coming out of the anesthesia". His difficulty with swallowing appeared following this. A PEG tube was placed for primary means of nutrition.  Past Medical History: Hoang Santos Jr.  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  Hoang Santos Jr.  has a past surgical history that includes Tonsillectomy; Uvulopalatopharyngoplasty; Cystoscopy with insertion of minimally invasive implant to enlarge prostatic urethra (N/A, 6/28/2018); Esophagogastroduodenoscopy (N/A, 9/24/2018); Endoscopic ultrasound of upper gastrointestinal tract (N/A, 11/5/2018); Duodenectomy (N/A, 1/4/2019); Gastrojejunostomy (N/A, 1/4/2019); Gastric fundoplication (N/A, 1/4/2019); Esophagogastroduodenoscopy (N/A, 1/4/2019); Cholecystectomy (1/4/2019); Liver biopsy (1/4/2019); Esophagogastroduodenoscopy (N/A, 1/21/2019); " "Esophagogastroduodenoscopy (N/A, 1/24/2019); Gastrojejunostomy (Right, 1/24/2019); Lysis of adhesions (N/A, 1/24/2019); LAPAROTOMY, EXPLORATORY (N/A, 1/24/2019); and Percutaneous transhepatic cholangiography (Right, 1/29/2019).  Medical Hx and Allergies: Hoang has a current medication list which includes the following prescription(s): atorvastatin, bisacodyl, cholecalciferol (vitamin d3), cholestyramine (bulk), ciprofloxacin hcl, clotrimazole, esomeprazole, famotidine, hydrocodone-apap 7.5-325 mg/15 ml, meclizine, metoclopramide hcl, multivitamin, omeprazole, ondansetron, ondansetron, pantoprazole, ranitidine, sildenafil, and viagra.   Review of patient's allergies indicates:   Allergen Reactions    Epinephrine      Neuroendocrine Tumor patient      Toradol [ketorolac] Other (See Comments)     Creatinine rises even with sub therapeutic dose     Imaging: Modified Barium Swallow Study Radiology Report (3/13/19): "Persistent laryngeal penetration and tracheal aspiration of orally ingested liquid various consistencies.  Please see the official speech pathologist report for further details."  MBSS Report (3/19/19): "Oral phase of the swallow is fair. Pt continues to present with SEVERE pharyngeal dysphagia as stated above with continued aspiration risk across all calibrated amts presented to him. Pt not appropriate for oral diet at this time. Recommend patient be NPO with continued long term source of nutrition via peg tube." See report for full details.  Prior Therapy: Pt received Home Health ST, OT, and PT  Social History:  Pt lives at home with his wife.   Occupation: Pt is a [employer] . He is currently on medical leave, but wants to return as soon as he can.  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"  Objective "   Formal Assessment:    Clinical Swallow Exam/ Jean-Paul Mechanism Exam:  Oral Motor Exam:  Oral Musculature: WFL  Structure Abnormalities: none  Dentition: WFL  Secretion Management: drooling reported, dry mouth reported  Mucosal Quality: thick white film on tongue  Mandibular Strength and Mobility: WFL   Rest: WFL   Lateralization: WFL  Protrusion: WFL  Retraction:  WFL  Involuntary Movement: absent   Oral Labial Strength and Mobility: WFL  Rest: WFL   Pucker: WFL  Retraction: WFL  Alternating Pucker / Retraction: WFL  Involuntary Movement: absent   Lingual Strength and Mobility: WFL   Rest: WFL  Protrusion: WFL  Retraction: WFL  Lateralization: WFL  Involuntary Movement: absent   Velar Elevation: mild reduced strength and ROM  Rest: WFL  Symmetry: WFL  Elevation: mild reduced strength and ROM  Sustained Elevation: Mild Reduced strength and ROM  Involuntary Movement: absent   Buccal Strength and Mobility: mild reduced strength and ROM  Volitional Cough: weak  Volitional Swallow: weak  Voice Prior to PO Intake: wet, gurgly    Trigeminal Nerve (CN V): WFL    Facial Nerve (CN VII): WFL    Glossopharyngeal Nerve (IX) and Vagus (X): Mild reduced velar muscle strength and ROM, indicative of possible nerve impairment    Hypoglossal Nerve (XII): WFL    Facial, Glossopharyngeal, and Vagus Nerves: see above    [place] Swallow Protocol/Clinical Swallow Examination:   Pt presented with:   THIN:- ice chip x1, 1/4 teaspoon sip of water x2, 1/2 teaspoon sip of water x2    PUREE:- tsp bites of applesauce x2    DENTAL SOFT:- DNT    SOLID: DNT    DESCRIPTION: Oral phase: adequate lip seal with liquid and around spoon with no anterior spillage or residue on spoon. Adequate mastication of ice chip. Adequate A-P oral transit time. No residue in oral cavity after applesauce intake. Pharyngeal phase: Reduced hyolaryngeal elevation palpated after a delayed swallow. Vocal quality noted to sound wet and gurgly after swallow. Hoang needed swallow  "x3 after intake of all consistencies presented. Will follow recommendations from recent MBSS.     Hearing: Appeared to be within functional limits in conversation. Will monitor and refer as necessary.    Vision: Appeared to be within functional limits. Will monitor and refer as necessary.    [place] National Outcome Measures System (NOMS):   Swallowing  Current status:  FCM: "LEVEL 1: Individual is not able to swallow anything safely by mouth. All nutrition and hydration is received through non-oral means (e.g., nasogastric tube, PEG). -  % impaired, limited or restricted  Projected status:  FCM:  LEVEL 3: Alternative method of feeding required as individual takes less than 50% of nutrition and hydration by mouth, and/or swallowing is safe with consistent use of moderate cues to use compensatory strategies and/or requires maximum diet restriction.  - CL at least 60% < 80% impaired, limited or restricted    Treatment   Education: {Plan of Care, role of SLP in care, aspiration precautions , vocal hygeine, scheduling/ cancellation policy and insurance limitations / visit limit  were discussed with pt. Patient and his wife expressed understanding.     Home Program: continue exercises provided  Assessment     Hoang presents to [facility] s/p medical diagnosis of pharyngoesophageal dysphagia.  Demonstrates impairments including limitations as described in the problem list.He presents with pharyngoesophageal dysphagia c/b reduced hyolaryngeal elevation, delayed swallow, multiple swallows after intake, wet vocal quality. Based on recent MBSS report, pt is not safe for oral diet. Continue PEG tube for primary means of nutrition. Positive prognostic factors include pt motivation and family support. Negative prognostic factors include complex medical history. Barriers to therapy include complex medical history Patient will benefit from skilled, outpatient neurological rehabilitation " speech therapy.    Rehab Potential: good  Pt's spiritual, cultural and educational needs considered and pt agreeable to plan of care and goals.    Short Term Goals (4 weeks):   1. Pt will complete Mendelson exercises (with or without NMES/Vital Stim) 30-45x to improve hyolaryngeal lift and excursion.  2.  Pt will complete effortful swallow (with or without NMES/Vital Stim) 45-60x to improve movement and strength of tongue base.   3. Pt will perform CTAR (chin tuck against resistance) 30-45x to improve muscle strength.  4. Pt will complete PO trials during session with SLP.  5. Pt will complete tongue pullbacks 30-45x to improve tongue base strength.      Long Term Goals (8 weeks):   1. Pt will maintain adequate hydration/nutrition with optimum safety and efficiency of swallowing function on PO intake without every s/s of aspiration for the highest appropriate diet level.   2. Pt will utilize compensatory strategies with optimum safety and efficiency of swallowing function on PO intake without overt s/s of aspiration for the highest diet level.       Plan     Plan of Care Certification Period: 4/2/2019  to 5/31/19    Recommended Treatment Plan:  Patient will participate in the [facility] neurological rehabilitation program for speech therapy 4 times per week to address his  Swallow deficits, to educate patient and their family, and to participate in a home exercise program.    Other Recommendations: none at this time    Therapist's Name:   MEHRDAD Alvarez  [facility] SLP  Clinician    I certify that I was present in the room directing the student in service delivery and guiding them using my skilled judgment. As the co-signing therapist I have reviewed the students documentation and am responsible for the treatment, assessment, and plan.     MARY Tolentino., CCC-SLP, CBIS  Speech-Language Pathologist  4/2/2019

## 2019-04-05 ENCOUNTER — TELEPHONE (OUTPATIENT)
Dept: NEUROLOGY | Facility: HOSPITAL | Age: 69
End: 2019-04-05

## 2019-04-05 NOTE — TELEPHONE ENCOUNTER
RE: Follow up for Tube Feeding    Spoke with patient and wife. Pt continues with Two Min HN but only takes in 3.5 cans per day (840 mL) providing 1680 calories and 70 gm protein. He reports current weight is 191.4 lbs which reflects a 1 lb weight gain. He reports he will be starting speech therapy on Monday. Per wife, she has decreased Reglan to 5 mL BID due to diarrhea. She has increased use of Cholestyramine. She feels his reflux has not been controled with the current medication. Patient has an appointment on 4/11/19 with Dr Steiner. Encouraged her to discuss with him at next appointment.     Wt Readings from Last 6 Encounters:   03/19/19 85.7 kg (189 lb)   03/19/19 86.1 kg (189 lb 14.8 oz)   03/11/19 87.1 kg (192 lb)   03/11/19 87.3 kg (192 lb 7.4 oz)   02/25/19 87.5 kg (192 lb 14.4 oz)   02/21/19 89.4 kg (197 lb)        RE: routed to Dr Steiner

## 2019-04-07 ENCOUNTER — PATIENT MESSAGE (OUTPATIENT)
Dept: NEUROLOGY | Facility: HOSPITAL | Age: 69
End: 2019-04-07

## 2019-04-08 ENCOUNTER — CLINICAL SUPPORT (OUTPATIENT)
Dept: REHABILITATION | Facility: HOSPITAL | Age: 69
End: 2019-04-08
Payer: MEDICARE

## 2019-04-08 DIAGNOSIS — R13.14 PHARYNGOESOPHAGEAL DYSPHAGIA: ICD-10-CM

## 2019-04-08 DIAGNOSIS — R49.0 DYSPHONIA: ICD-10-CM

## 2019-04-08 PROCEDURE — 92526 ORAL FUNCTION THERAPY: CPT | Mod: PO

## 2019-04-08 NOTE — PROGRESS NOTES
"Outpatient Neurological Rehabilitation   Speech and Language Therapy Daily Note  Date:  4/8/2019     Name: Hoang Santos Jr.   MRN: [id number]   Therapy Diagnosis:   Encounter Diagnoses   Name Primary?    Pharyngoesophageal dysphagia     Dysphonia    Physician: Madeleine Alvarado,*  Physician Orders: Ambulatory Referral to Speech Therapy  Medical Diagnosis: R13.10 (ICD-10-CM) - Esophageal dysphagia      Visit #/ Visits Authorized: 2/ 20  Date of Evaluation:  4/2/2019  Insurance Authorization Period: 4/2/19 to 12/31/19  Plan of Care Certification:    4/2/2019 to 5/31/19  Extended POC:    Visits Cancelled: 0  Visits No Show: 0    Time In:  1300  Time Out:  1345  Total Billable Time: 45     G-Code 2 / 10   Eval CN/CL   Update        Precautions: Standard and Aspiration    Subjective:   Pt reports: He is very motivated to start eating again. Wet vocal quality at baseline with pt constantly expectorating secretions.   He was compliant to home exercise program.   Response to previous treatment: "I thought my swallowing was good last time I was here"   Pain Scale:  0/10 on VAS currently.   Pain Location: No pain reported today.   Objective:     UNTIMED  Procedure Min.   Dysphagia Therapy  45     Total Untimed Units: 1  Charges Billed/# of units: 1    Short Term Goals: (4 weeks) Current Progress:   1. Pt will complete Mendelson exercises (with or without NMES/Vital Stim) 30-45x to improve hyolaryngeal lift and excursion. Mendhelson: x20 with min verbal cues to ensure proper completion.    Pt with mild difficulty holding swallow; but demonstrated good exercise mechanics in his teach back to SLP.    2.  Pt will complete effortful swallow (with or without NMES/Vital Stim) 45-60x to improve movement and strength of tongue base.      Progressing/ Not Met 4/8/2019   Effortful swallow (without NMES) x20 with min verbal cues to ensure proper completion.   3. Pt will perform CTAR (chin tuck against resistance) 30-45x to improve " muscle strength.    Progressing/ Not Met 4/8/2019   CTAR x30 completed    4. Pt will complete PO trials during session with SLP.    Progressing/ Not Met 4/8/2019   Overall, pt required 2 swallows on thin liquids. Pt did appear to throat clear less when using a prepatory set of 3 seconds prior to swallow with both tsp and cup sips. Inconsistent throat clearing after each swallow; no coughing on thin liquids.     Puree: 2-3 swallows per bolus of applesauce. Voice did not appear to sound different than baseline, which is wet.    5. Pt will complete tongue pullbacks 30-45x to improve tongue base strength.    Progressing/ Not Met 4/8/2019   Not formally addressed          Patient Education/Response:   Provided a list of exercises for patient to complete twice a day. Education on continuing NPO with ice chips only throughout day as well as the importance of oral care. Pt verbalized understanding.     Written Home Exercises Provided: Worksheet with dysphagia exercises provided.   Exercises were reviewed and Hoang was able to demonstrate them prior to the end of the session.  Hoang demonstrated good  understanding of the education provided.       Assessment:   Hoang is progressing well towards his goals. Vocal quality is wet at baseline making it greater to assess changes post swallow. There were no severely overt s/sx of aspiration on trialed PO consistencies today. Current goals remain appropriate. Goals to be updated as necessary.   Pt prognosis is Good. Pt will continue to benefit from skilled outpatient speech and language therapy to address the deficits listed in the problem list on initial evaluation, provide pt/family education and to maximize pt's level of independence in the home and community environment.     Medical necessity is demonstrated by the following IMPAIRMENTS:  Dysphagia negatively impacts the efficiency and effectiveness of patient's swallow yielding high aspiration risk.    Barriers to Therapy:  Complex medical history is a negative prognostic factor.  Pt's spiritual, cultural and educational needs considered and pt agreeable to plan of care and goals.    Plan:   Continue POC with focus on strengthening swallow musculature.    Jenae Castellano CCC-SLP   4/8/2019

## 2019-04-09 ENCOUNTER — CLINICAL SUPPORT (OUTPATIENT)
Dept: REHABILITATION | Facility: HOSPITAL | Age: 69
End: 2019-04-09
Payer: MEDICARE

## 2019-04-09 ENCOUNTER — TELEPHONE (OUTPATIENT)
Dept: NEUROLOGY | Facility: HOSPITAL | Age: 69
End: 2019-04-09

## 2019-04-09 DIAGNOSIS — R13.14 PHARYNGOESOPHAGEAL DYSPHAGIA: ICD-10-CM

## 2019-04-09 DIAGNOSIS — R49.0 DYSPHONIA: ICD-10-CM

## 2019-04-09 PROCEDURE — 92526 ORAL FUNCTION THERAPY: CPT | Mod: PO

## 2019-04-09 NOTE — PROGRESS NOTES
"Outpatient Neurological Rehabilitation   Speech and Language Therapy Daily Note  Date:  4/9/2019     Name: Hoang Santos Jr.   MRN: [id number]   Therapy Diagnosis:   No diagnosis found.Physician: Madeleine Alvarado,*  Physician Orders: Ambulatory Referral to Speech Therapy  Medical Diagnosis: R13.10 (ICD-10-CM) - Esophageal dysphagia      Visit #/ Visits Authorized: 3/ 20  Date of Evaluation:  4/2/2019  Insurance Authorization Period: 4/2/19 to 12/31/19  Plan of Care Certification:    4/2/2019 to 5/31/19  Extended POC:    Visits Cancelled: 0  Visits No Show: 0    Time In:  1030  Time Out:  1115  Total Billable Time: 45     G-Code 3 / 10   Eval CN/CL   Update        Precautions: Standard and Aspiration    Subjective:   Pt reports: He practiced exercises at home and felt that they went "pretty good".   He was compliant to home exercise program.   Response to previous treatment: "I thought my swallowing was good last time I was here"   Pain Scale:  0/10 on VAS currently.   Pain Location: No pain reported today.   Objective:     UNTIMED  Procedure Min.   Dysphagia Therapy  45     Total Untimed Units: 1  Charges Billed/# of units: 1    Short Term Goals: (4 weeks) Current Progress:   1. Pt will complete Mendelson exercises (with or without NMES/Vital Stim) 30-45x to improve hyolaryngeal lift and excursion. Mendhelson: x20    Pt had more difficulty with later trials, he stated that it got harder to do the exercise.    2.  Pt will complete effortful swallow (with or without NMES/Vital Stim) 45-60x to improve movement and strength of tongue base.      Progressing/ Not Met 4/9/2019   Effortful swallow (without NMES) x45 with min verbal cues to ensure proper completion.    Pt used effortful swallow with PO trials with consistent cues   3. Pt will perform CTAR (chin tuck against resistance) 30-45x to improve muscle strength.    Progressing/ Not Met 4/9/2019   CTAR x60 completed with min cues for proper completion.    4. Pt " will complete PO trials during session with SLP.    Progressing/ Not Met 4/9/2019   Thins:  -tsp - x6 without overt s/s aspiration.   Cup sips - x6 (x2/6 throat clears post swallow, x1/6 delayed cough post swallow)  *Pt consisently cuesto complete a prepatory set of three seconds to assist with bolus control    Puree: 2-3 swallows per bolus of applesauce. Voice did not appear to sound different than baseline, which is wet.     Soft: Peaches x3 - Pt needed 2-5 swallows to clear all food in mouth when eating peaches. He had a piecemeal swallow on all trials.  -liquid removed from each trial    5. Pt will complete tongue pullbacks 30-45x to improve tongue base strength.    Progressing/ Not Met 4/9/2019   x20 completed with min cues to correct tongue placement.          Patient Education/Response:   Introduced tongue pullback exercises. Instructed to continue exercises at home. Pt verbalized understanding.     Written Home Exercises Provided: Worksheet with dysphagia exercises provided.   Exercises were reviewed and Hoang was able to demonstrate them prior to the end of the session.  Hoang demonstrated good  understanding of the education provided.       Assessment:   Hoang is progressing well towards his goals. Vocal quality still at baseline. Inconsistent but frequent throat clears with puree and soft. Pt stated that exercises were harder to do today. Current goals remain appropriate. Goals to be updated as necessary.     Pt prognosis is Good. Pt will continue to benefit from skilled outpatient speech and language therapy to address the deficits listed in the problem list on initial evaluation, provide pt/family education and to maximize pt's level of independence in the home and community environment.     Medical necessity is demonstrated by the following IMPAIRMENTS:  Dysphagia negatively impacts the efficiency and effectiveness of patient's swallow yielding high aspiration risk.    Barriers to Therapy: Complex  medical history is a negative prognostic factor.  Pt's spiritual, cultural and educational needs considered and pt agreeable to plan of care and goals.    Plan:   Continue POC with focus on strengthening swallow musculature.    TATI Alvarez.  FRANCESCA SLP  Clinician

## 2019-04-09 NOTE — TELEPHONE ENCOUNTER
Clinic appt rescheduled with Sneha, wife, on April 11, 2019 at 130pm.  Sneha repeated date and time correctly.

## 2019-04-10 NOTE — PROGRESS NOTES
Outpatient Neurological Rehabilitation   Speech and Language Therapy Daily Note  Date:  4/11/2019     Name: Hoang Santos Jr.   MRN: [id number]   Therapy Diagnosis:   Encounter Diagnoses   Name Primary?    Pharyngoesophageal dysphagia     Dysphonia    Physician: Madeleine Alvarado,*  Physician Orders: Ambulatory Referral to Speech Therapy  Medical Diagnosis: R13.10 (ICD-10-CM) - Esophageal dysphagia      Visit #/ Visits Authorized: 4/ 20  Date of Evaluation:  4/2/2019  Insurance Authorization Period: 4/2/19 to 12/31/19  Plan of Care Certification:    4/2/2019 to 5/31/19  Extended POC:    Visits Cancelled: 0  Visits No Show: 0    Time In:  0900  Time Out:  0945  Total Billable Time: 45     G-Code 4 / 10   Eval Swallowing 4/2/19  CN/CL   Update        Precautions: Standard and Aspiration    Subjective:   Pt reports:that he is tired today. Enjoys therapy. Has been doing his exercises at home. Asked multiple questions regarding prognosis. Details of answers in education section.  Complains of vocal fatigue. Would like to see Dr. Casas again. Reports that he had been taking clotrimazole for oral thrush which is taking a full hour to dissolve. He has self-discontinued this. Complains of increased mucous throughout the day.   Pain Scale:  0/10 on VAS currently.   Pain Location: No pain reported today.   Objective:   UNTIMED  Procedure Min.   Dysphagia Therapy  45   Total Untimed Units: 1  Charges Billed/# of units: 1    Short Term Goals: (4 weeks) Current Progress:   1. Pt will complete Mendelson exercises (with or without NMES/Vital Stim) 30-45x to improve hyolaryngeal lift and excursion.  Progressing/ Not Met 4/11/2019   Mendhelson with saliva: x 15   Pt reported fatigue with this exercise.   2.  Pt will complete effortful swallow (with or without NMES/Vital Stim) 45-60x to improve movement and strength of tongue base.  Progressing/ Not Met 4/11/2019   Effortful swallow (without NMES) with 3mL of thin liquids and  double swallow x 37 with moderate cues required for appropriate form.   X 5 with 2 mL of thin liquids with supervision  Throat clear x4  Belch x 2   3. Pt will perform CTAR (chin tuck against resistance) 30-45x to improve muscle strength.    Progressing/ Not Met 4/11/2019   CTAR x60 completed with min cues for proper completion.   CTAR hold x30 seconds x2   4. Pt will complete PO trials during session with SLP.  Progressing/ Not Met 4/11/2019   Thin liquids trialed with exercises today only.    5. Pt will complete tongue pullbacks 30-45x to improve tongue base strength.  Progressing/ Not Met 4/11/2019   Not formally addressed       Patient Education/Response:   Swallow physiology, purpose of each exercise reviewed today. Pt with questions regarding prognosis. Reviewed interdependence of esophageal dysphagia and pharyngeal dysphagia. Discussed importance of completing home exercise program as part of prognosis. Discussed scheduling MBSS after 4 weeks of therapy to monitor progress toward PO diet and examine swallow physiology. Pt verbalized understanding to all discussed.    See patient instruction section for exercises for home exercise program for 4/11/19. No updates to home exercise program today. Reviewed completing exercises at least 3-5x daily but completing more if able.   Assessment:   Hoang is progressing well towards his goals. Fatigue rated on a scale of 1 to 10 throughout session today. Pt self-reported fatigue increased as he required more cues to swallow or multiple swallows to clear liquids. Current goals remain appropriate. Goals to be updated as necessary.     Pt prognosis is Good. Pt will continue to benefit from skilled outpatient speech and language therapy to address the deficits listed in the problem list on initial evaluation, provide pt/family education and to maximize pt's level of independence in the home and community environment.     Medical necessity is demonstrated by the following  IMPAIRMENTS:  Dysphagia negatively impacts the efficiency and effectiveness of patient's swallow yielding high aspiration risk.  Barriers to Therapy: Complex medical history is a negative prognostic factor.  Pt's spiritual, cultural and educational needs considered and pt agreeable to plan of care and goals.  Plan:   Continue POC with focus on strengthening swallow musculature.    AJITH Teague, CCC-SLP  Speech Language Pathologist   4/11/2019

## 2019-04-11 ENCOUNTER — CLINICAL SUPPORT (OUTPATIENT)
Dept: REHABILITATION | Facility: HOSPITAL | Age: 69
End: 2019-04-11
Payer: MEDICARE

## 2019-04-11 ENCOUNTER — OFFICE VISIT (OUTPATIENT)
Dept: NEUROLOGY | Facility: HOSPITAL | Age: 69
End: 2019-04-11
Attending: SURGERY
Payer: MEDICARE

## 2019-04-11 VITALS
HEART RATE: 113 BPM | DIASTOLIC BLOOD PRESSURE: 70 MMHG | BODY MASS INDEX: 31.54 KG/M2 | SYSTOLIC BLOOD PRESSURE: 106 MMHG | TEMPERATURE: 97 F | WEIGHT: 195.44 LBS

## 2019-04-11 DIAGNOSIS — R49.0 DYSPHONIA: ICD-10-CM

## 2019-04-11 DIAGNOSIS — R13.14 PHARYNGOESOPHAGEAL DYSPHAGIA: ICD-10-CM

## 2019-04-11 DIAGNOSIS — Z09 POSTOP CHECK: Primary | ICD-10-CM

## 2019-04-11 PROCEDURE — 99213 OFFICE O/P EST LOW 20 MIN: CPT | Performed by: SURGERY

## 2019-04-11 PROCEDURE — 92526 ORAL FUNCTION THERAPY: CPT | Mod: PO

## 2019-04-11 NOTE — Clinical Note
Dr. Alvarado, I saw Mr. Bolton this morning for his dysphagia therapy. He had two complains that I wanted to bring to your attention. First, he has self-discontinued the medication prescribed for his oral thrush because it is taking too long to dissolve. Second, he is complaining of a stringy mucous that he is not able to clear without using a tissue to remove from his oral cavity. He reports he cannot swallow the mucous and that it is different than his saliva which he is able to clear. Please let me know if you have any further questions. Thank You,Hope AJITH Taylor, CCC-SLPSpeech Language Pathologist

## 2019-04-11 NOTE — PROGRESS NOTES
Overall imporving  abd soft wound looks good  G tube site cleaned    Continue seech tehrapy    Instructed diet , exerccise overall  Will f/u

## 2019-04-11 NOTE — PATIENT INSTRUCTIONS
Swallow Exercises:   Radha:  Goal: The goal of this activity is to keep the opening of your esophagus open longer by holding your larynx, or voice box, in a raised position.     Directions  · Place your finger on your larynx.  · Swallow normally, and feel your larynx rise up during the swallow.  · On your next swallow, feel your larynx rise up and hold it at its highest point for 3 seconds. Release and repeat as instructed by your therapist.  · Keep your larynx in a raised position by squeezing the muscles of your throat and tongue.  ·   Explanation: During the swallowing process, your larynx rises up while your epiglottis folds down to keep food or fluid moving toward the esophagus. During this activity, the larynx rises up to keep the epiglottis closed over the entrance to your airway. When you hold your larynx in a raised position, it allows the esophagus to stay open longer to let food or fluid pass through to the stomach.    Effortful Swallow:  Goal: The goal of this activity is to keep food or fluid from getting stuck in your pharynx, or throat, by improving the force and timing of your swallow.    Directions:  · Swallow normally, but tightly squeeze your tongue and throat muscles throughout the swallow. Push tongue against roof of mouth to start swallow.   · Try to swallow with as much effort as you can.  · Repeat as instructed by your therapist.    Explanation: The muscles of your tongue and pharynx work together to help you swallow properly. By swallowing with as much effort as possible, you can keep food from getting stuck in your throat.    Chin Tuck Against Resistance (CTAR):  Goal: to strengthen the suprahyoid muscles  Directions:   · Place small resistance ball between chin and chest  · Press chin into ball on chest for 30 repetitions  · Press chin into ball on chest and hold for 30 seconds    Complete each exercise 15x unless otherwise stated 3-5x per day.     Exercise descriptions from:   Home  Exercise Program. (n.d.). Retrieved October 10, 2017, from [email or web address]

## 2019-04-15 ENCOUNTER — CLINICAL SUPPORT (OUTPATIENT)
Dept: REHABILITATION | Facility: HOSPITAL | Age: 69
End: 2019-04-15
Payer: MEDICARE

## 2019-04-15 DIAGNOSIS — R49.0 DYSPHONIA: ICD-10-CM

## 2019-04-15 DIAGNOSIS — R13.14 PHARYNGOESOPHAGEAL DYSPHAGIA: ICD-10-CM

## 2019-04-15 PROCEDURE — 92526 ORAL FUNCTION THERAPY: CPT | Mod: PO

## 2019-04-15 NOTE — PROGRESS NOTES
[facility]   Speech and Language Therapy Daily Note  Date:  4/15/2019     Name: Hoang Santos Jr.   MRN: [id number]   Therapy Diagnosis:   Encounter Diagnoses   Name Primary?    Pharyngoesophageal dysphagia     Dysphonia    Physician: Madeleine Alvarado,*  Physician Orders: Ambulatory Referral to Speech Therapy  Medical Diagnosis: R13.10 (ICD-10-CM) - Esophageal dysphagia      Visit #/ Visits Authorized: 4/ 20  Date of Evaluation:  4/2/2019  Insurance Authorization Period: 4/2/19 to 12/31/19  Plan of Care Certification:    4/2/2019 to 5/31/19  Extended POC:    Visits Cancelled: 0  Visits No Show: 0    Time In:  0900  Time Out:  0945  Total Billable Time: 45     G-Code 5 / 10   Eval Swallowing 4/2/19  CN/CL   Update        Precautions: Standard and Aspiration    Subjective:   Pt reports: my voice is in and out. I had ice chips and peaches last week. HE reported feeling more hunger pains this weekend.   Pain Scale:  0/10 on VAS currently.   Pain Location: No pain reported today.   Objective:   UNTIMED  Procedure Min.   Dysphagia Therapy  45   Total Untimed Units: 1  Charges Billed/# of units: 1    Short Term Goals: (4 weeks) Current Progress:   1. Pt will complete Mendelson exercises (with or without NMES/Vital Stim) 30-45x to improve hyolaryngeal lift and excursion.  Progressing/ Not Met 4/15/2019   Mendhelson (with NMES 10.5 mA 3 b placement) with saliva: x 15  Pt again reported fatigue with this exercise.   2.  Pt will complete effortful swallow (with or without NMES/Vital Stim) 45-60x to improve movement and strength of tongue base.  Progressing/ Not Met 4/15/2019   Effortful swallow (with NMES 10.5 mA 3 b placement) with 1/2 tsp of thin liquids and double swallow x 30 with min cues required for appropriate form.   X 15 with 1 tsp of thin liquids with supervision  Throat clear x8  Belch x 10   3. Pt will perform CTAR (chin tuck against resistance) 30-45x to improve muscle  strength.    Progressing/ Not Met 4/15/2019   Not formally addressed    4. Pt will complete PO trials during session with SLP.  Progressing/ Not Met 4/15/2019   Thin liquids trialed with exercises today only.    5. Pt will complete tongue pullbacks 30-45x to improve tongue base strength.  Progressing/ Not Met 4/15/2019   Introduced and demonstrated for pt to perform at home. Written instructions provided.      Patient Education/Response:   Discussed importance of completing home exercise program as part of prognosis. Reviewed how to perform exercises and the frequency to perform daily at home. Pt and his wife verbalized understanding to all discussed.    See patient instruction section for exercises for home exercise program for 4/11/19 and today. No updates to home exercise program today. Reviewed completing exercises at least 3-5x daily but completing more if able.   Assessment:   Hoang is progressing well towards his goals. Fatigue reported when completing exercises with multiple swallows throughout session today. Current goals remain appropriate. Goals to be updated as necessary.     Pt prognosis is Good. Pt will continue to benefit from skilled outpatient speech and language therapy to address the deficits listed in the problem list on initial evaluation, provide pt/family education and to maximize pt's level of independence in the home and community environment.     Medical necessity is demonstrated by the following IMPAIRMENTS:  Dysphagia negatively impacts the efficiency and effectiveness of patient's swallow yielding high aspiration risk.  Barriers to Therapy: Complex medical history is a negative prognostic factor.  Pt's spiritual, cultural and educational needs considered and pt agreeable to plan of care and goals.  Plan:   Continue POC with focus on strengthening swallow musculature and trial po trials.     MARY Toelntino., CCC-SLP, CBIS  Speech-Language Pathologist  4/15/2019

## 2019-04-15 NOTE — PATIENT INSTRUCTIONS
Tongue Pull Backs / Yawn-Sign  Goal: to strengthen the base of tongue muscles  Directions:   · Pull tongue back as far as possible with tongue tip down  · Sigh immediately after pulling tongue back      Complete each exercise 15x unless otherwise stated 3-5x per day.     Exercise descriptions from:   Home Exercise Program. (n.d.). Retrieved October 10, 2017, from [email or web address]

## 2019-04-16 ENCOUNTER — CLINICAL SUPPORT (OUTPATIENT)
Dept: REHABILITATION | Facility: HOSPITAL | Age: 69
End: 2019-04-16
Payer: MEDICARE

## 2019-04-16 DIAGNOSIS — R49.0 DYSPHONIA: ICD-10-CM

## 2019-04-16 DIAGNOSIS — R13.14 PHARYNGOESOPHAGEAL DYSPHAGIA: ICD-10-CM

## 2019-04-16 PROCEDURE — 92526 ORAL FUNCTION THERAPY: CPT | Mod: PO

## 2019-04-16 NOTE — PROGRESS NOTES
"Outpatient [facility]   Speech and Language Therapy Daily Note  Date:  4/16/2019     Name: Hoang Santos Jr.   MRN: [id number]   Therapy Diagnosis:   Encounter Diagnoses   Name Primary?    Pharyngoesophageal dysphagia     Dysphonia    Physician: Madeleine Alvarado,*  Physician Orders: Ambulatory Referral to Speech Therapy  Medical Diagnosis: R13.10 (ICD-10-CM) - Esophageal dysphagia      Visit #/ Visits Authorized: 5/ 20  Date of Evaluation:  4/2/2019  Insurance Authorization Period: 4/2/19 to 12/31/19  Plan of Care Certification:    4/2/2019 to 5/31/19  Extended POC:    Visits Cancelled: 0  Visits No Show: 0    Time In:  1305  Time Out:  1350  Total Billable Time: 45 minutes     G-Code 6 / 10   Eval Swallowing 4/2/19  CN/CL   Update        Precautions: Standard and Aspiration    Subjective:   Pt reports: Yesterday was his first extensive session with the electrodes. Reports improvement with voice but needs to keep throat clearing to keep "natural" voice.   Pain Scale:  0/10 on VAS currently.   Pain Location: No pain reported today.   Objective:   UNTIMED  Procedure Min.   Dysphagia Therapy  45   Total Untimed Units: 1  Charges Billed/# of units: 1    Short Term Goals: (4 weeks) Current Progress:   1. Pt will complete Mendelson exercises (with or without NMES/Vital Stim) 30-45x to improve hyolaryngeal lift and excursion.  Progressing/ Not Met 4/16/2019   Mendhelson with saliva: x 45  Pt reported fatigue during the last round of Mendelson exercises.   2.  Pt will complete effortful swallow (with or without NMES/Vital Stim) 45-60x to improve movement and strength of tongue base.  Progressing/ Not Met 4/16/2019   Effortful swallow with 2 ccs of thin liquids and double swallow x 70 with min cues required for appropriate form.     Throat clear x10  Belch x 6    Effortful swallow with tsp bites of puree and double swallow x 29    Throat clear x  10  Belch x 9   3. Pt will perform CTAR (chin tuck " against resistance) 30-45x to improve muscle strength.    Progressing/ Not Met 4/16/2019   Patient performed CTAR exercises X's 90 with 30 s hold x's 3.    4. Pt will complete PO trials during session with SLP.  Progressing/ Not Met 4/16/2019   Thin liquids and puree consistencies trialed with exercises today only.    5. Pt will complete tongue pullbacks 30-45x to improve tongue base strength.  Progressing/ Not Met 4/16/2019   Not formally addressed.      Patient Education/Response:   Discussed importance of completing home exercise program. Clinician stated that if he prefers, he can swallow 2 ccs of water at time instead of ice. Following each swallow, he is to cough and swallow remaining residue. SLP also told pt that he can swish flavored water around in his mouth. SLP also explained the importance of brushing his teeth thoroughly throughout the day, especially after swishing flavored water around in mouth.  Pt and his wife verbalized understanding to all discussed.     See patient instruction section for exercises for home exercise program for 4/11/19 and today. No updates to home exercise program today. Reviewed completing exercises at least 3-5x daily but completing more if able.   Assessment:   Hoang is progressing well towards his goals. Pt with decreased fatigue during today's session; however, he did experience fatigue during the third round of exercises. Improved gurgly vocal quality noted when clinician cued client to swallow multiple times and cough/reswallow. Current goals remain appropriate. Goals to be updated as necessary.  Pt prognosis is Good. Pt will continue to benefit from skilled outpatient speech and language therapy to address the deficits listed in the problem list on initial evaluation, provide pt/family education and to maximize pt's level of independence in the home and community environment.     Medical necessity is demonstrated by the following IMPAIRMENTS:  Dysphagia negatively  impacts the efficiency and effectiveness of patient's swallow yielding high aspiration risk.  Barriers to Therapy: Complex medical history is a negative prognostic factor.  Pt's spiritual, cultural and educational needs considered and pt agreeable to plan of care and goals.  Plan:   Continue POC with focus on strengthening swallow musculature and trial po trials.    HARIS Voss  Student Speech Language Pathologist

## 2019-04-17 ENCOUNTER — CLINICAL SUPPORT (OUTPATIENT)
Dept: REHABILITATION | Facility: HOSPITAL | Age: 69
End: 2019-04-17
Payer: MEDICARE

## 2019-04-17 DIAGNOSIS — R13.14 PHARYNGOESOPHAGEAL DYSPHAGIA: ICD-10-CM

## 2019-04-17 DIAGNOSIS — R49.0 DYSPHONIA: ICD-10-CM

## 2019-04-17 PROCEDURE — 92526 ORAL FUNCTION THERAPY: CPT | Mod: PO

## 2019-04-17 NOTE — PROGRESS NOTES
[facility]patient [facility]   Speech and Language Therapy Daily Note  Date:  4/17/2019     Name: Hoang Santos Jr.   MRN: [id number]   Therapy Diagnosis:   No diagnosis found.Physician: Madeleine Alvarado,*  Physician Orders: Ambulatory Referral to Speech Therapy  Medical Diagnosis: R13.10 (ICD-10-CM) - Esophageal dysphagia      Visit #/ Visits Authorized: 6/ 20  Date of Evaluation:  4/2/2019  Insurance Authorization Period: 4/2/19 to 12/31/19  Plan of Care Certification:    4/2/2019 to 5/31/19  Extended POC:    Visits Cancelled: 0  Visits No Show: 0    Time In:  1355  Time Out:  1430  Total Billable Time: 35 minutes    G-Code 6 / 10   Eval Swallowing 4/2/19  CN/CL   Update        Precautions: Standard and Aspiration    Subjective:   Pt reports: I've been feeling pretty good  Pain Scale:  0/10 on VAS currently.   Pain Location: No pain reported today.   Objective:   UNTIMED  Procedure Min.   Dysphagia Therapy  35   Total Untimed Units: 1  Charges Billed/# of units: 1    Short Term Goals: (4 weeks) Current Progress:   1. Pt will complete Mendelson exercises (with or without NMES/Vital Stim) 30-45x to improve hyolaryngeal lift and excursion.  Progressing/ Not Met 4/17/2019   Mendhelson (with NMES 12.5 mA 3 A placement) with saliva: x30  Pt again reported fatigue with this exercise.  Throat clear x4   2.  Pt will complete effortful swallow (with or without NMES/Vital Stim) 45-60x to improve movement and strength of tongue base.  Progressing/ Not Met 4/17/2019   Effortful swallow (with NMES 12.5 mA 3 A placement) with 1 tsp (5mL) of thin liquids and double swallow x 30 with min cues required for appropriate form.   x4 single piece of peach without juice  Throat clear x13  Belch x 10   3. Pt will perform CTAR (chin tuck against resistance) 30-45x to improve muscle strength.    Progressing/ Not Met 4/17/2019   Not formally addressed    4. Pt will complete PO trials during session with SLP.  Progressing/ Not  Met 4/17/2019   Thin liquids and peaches without juices trialed with exercises today   5. Pt will complete tongue pullbacks 30-45x to improve tongue base strength.  Progressing/ Not Met 4/17/2019   Not formally addressed        Patient Education/Response:   Discussed importance of completing home exercise program as part of prognosis. Reviewed how to perform exercises and the frequency to perform daily at home. Pt and his wife verbalized understanding to all discussed.    See patient instruction section for exercises for home exercise program for 4/11/19 and today. No updates to home exercise program today. Reviewed completing exercises at least 3-5x daily but completing more if able.   Assessment:   Hoang is progressing well towards his goals. Fatigue reported when completing exercises with multiple swallows throughout session today. Pt tolerated NMES/vital stim well. Current goals remain appropriate. Goals to be updated as necessary.     Pt prognosis is Good. Pt will continue to benefit from skilled outpatient speech and language therapy to address the deficits listed in the problem list on initial evaluation, provide pt/family education and to maximize pt's level of independence in the home and community environment.     Medical necessity is demonstrated by the following IMPAIRMENTS:  Dysphagia negatively impacts the efficiency and effectiveness of patient's swallow yielding high aspiration risk.  Barriers to Therapy: Complex medical history is a negative prognostic factor.  Pt's spiritual, cultural and educational needs considered and pt agreeable to plan of care and goals.  Plan:   Continue POC with focus on strengthening swallow musculature and trial po trials.     TATI Alvarez.  FRANCESCA SLP  Clinician

## 2019-04-18 ENCOUNTER — CLINICAL SUPPORT (OUTPATIENT)
Dept: REHABILITATION | Facility: HOSPITAL | Age: 69
End: 2019-04-18
Payer: MEDICARE

## 2019-04-18 DIAGNOSIS — R13.14 PHARYNGOESOPHAGEAL DYSPHAGIA: ICD-10-CM

## 2019-04-18 DIAGNOSIS — R49.0 DYSPHONIA: ICD-10-CM

## 2019-04-18 PROCEDURE — 92526 ORAL FUNCTION THERAPY: CPT | Mod: PO

## 2019-04-18 NOTE — PROGRESS NOTES
[facility]patient [facility]   Speech and Language Therapy Daily Note  Date:  4/18/2019     Name: Hoang Santos Jr.   MRN: [id number]   Therapy Diagnosis:   Encounter Diagnoses   Name Primary?    Pharyngoesophageal dysphagia     Dysphonia    Physician: Madeleine Alvarado,*  Physician Orders: Ambulatory Referral to Speech Therapy  Medical Diagnosis: R13.10 (ICD-10-CM) - Esophageal dysphagia      Visit #/ Visits Authorized: 7/ 20  Date of Evaluation:  4/2/2019  Insurance Authorization Period: 4/2/19 to 12/31/19  Plan of Care Certification:    4/2/2019 to 5/31/19  Extended POC:    Visits Cancelled: 0  Visits No Show: 0    Time In:  1120  Time Out:  1205  Total Billable Time: 35 minutes    G-Code 7 / 10   Eval Swallowing 4/2/19  CN/CL   Update        Precautions: Standard and Aspiration    Subjective:   Pt reports: I've been feeling pretty good. He has performing his swallowing ex's at home.   Pain Scale:  0/10 on VAS currently.   Pain Location: No pain reported today.   Objective:   UNTIMED  Procedure Min.   Dysphagia Therapy  45   Total Untimed Units: 1  Charges Billed/# of units: 1    Short Term Goals: (4 weeks) Current Progress:   1. Pt will complete Mendelson exercises (with or without NMES/Vital Stim) 30-45x to improve hyolaryngeal lift and excursion.  Progressing/ Not Met 4/18/2019   Mendhelson (with NMES 12.5 mA 3 A placement) with saliva: x30    Throat clear x1   2.  Pt will complete effortful swallow (with or without NMES/Vital Stim) 45-60x to improve movement and strength of tongue base.  Progressing/ Not Met 4/18/2019   Effortful swallow (with NMES 3 A placement at 12.5 mA initially, then moved 16.0 then 20.5 after 30 mins) with 1 tsp (5mL) of thin liquids and double swallow x 45 given min reminders to swallow hard and twice. Audible swallows noted.   x10 single piece of peach without juice  Throat clear x 8  Belch x 3   3. Pt will perform CTAR (chin tuck against resistance) 30-45x to improve  muscle strength.    Progressing/ Not Met 4/18/2019   Pt performed 30 x during the session   4. Pt will complete PO trials during session with SLP.  Progressing/ Not Met 4/18/2019   Thin liquids and peaches without juice trialed with exercises today   5. Pt will complete tongue pullbacks 30-45x to improve tongue base strength.  Progressing/ Not Met 4/18/2019   Pt performed 15 x during the session.        Patient Education/Response:   Progress and reminders of swallowing techniques.  Pt verbalized understanding to all discussed.    See patient instruction section for exercises for home exercise program for 4/11/19 and today. No updates to home exercise program today. Reviewed completing exercises at least 3-5x daily but completing more if able.   Assessment:   Hoang is progressing well towards his goals. Pt tolerated NMES/vital stim well at higher voltage. Throat clearing present with increased mucus reported.  Current goals remain appropriate. Goals to be updated as necessary.     Pt prognosis is Good. Pt will continue to benefit from skilled outpatient speech and language therapy to address the deficits listed in the problem list on initial evaluation, provide pt/family education and to maximize pt's level of independence in the home and community environment.     Medical necessity is demonstrated by the following IMPAIRMENTS:  Dysphagia negatively impacts the efficiency and effectiveness of patient's swallow yielding high aspiration risk.  Barriers to Therapy: Complex medical history is a negative prognostic factor.  Pt's spiritual, cultural and educational needs considered and pt agreeable to plan of care and goals.  Plan:   Continue POC with focus on strengthening swallow musculature and trial po trials.     MARY Tolentino., CCC-SLP, CBIS  Speech-Language Pathologist  4/18/2019

## 2019-04-22 ENCOUNTER — CLINICAL SUPPORT (OUTPATIENT)
Dept: REHABILITATION | Facility: HOSPITAL | Age: 69
End: 2019-04-22
Payer: MEDICARE

## 2019-04-22 DIAGNOSIS — R13.14 PHARYNGOESOPHAGEAL DYSPHAGIA: ICD-10-CM

## 2019-04-22 DIAGNOSIS — R49.0 DYSPHONIA: ICD-10-CM

## 2019-04-22 PROCEDURE — 92526 ORAL FUNCTION THERAPY: CPT | Mod: PO

## 2019-04-22 NOTE — PROGRESS NOTES
"Outpatient [facility]   Speech and Language Therapy Daily Note  Date:  4/22/2019     Name: Honag Santos Jr.   MRN: [id number]   Therapy Diagnosis:   Encounter Diagnoses   Name Primary?    Pharyngoesophageal dysphagia     Dysphonia    Physician: Madeleine Alvarado,*  Physician Orders: Ambulatory Referral to Speech Therapy  Medical Diagnosis: R13.10 (ICD-10-CM) - Esophageal dysphagia      Visit #/ Visits Authorized: 8/ 20  Date of Evaluation:  4/2/2019  Insurance Authorization Period: 4/2/19 to 12/31/19  Plan of Care Certification:    4/2/2019 to 5/31/19  Extended POC:    Visits Cancelled: 0  Visits No Show: 0    Time In:  1345  Time Out:  1430  Total Billable Time: 45 minutes    G-Code 8 / 10   Eval Swallowing 4/2/19  CN/CL   Update        Precautions: Standard and Aspiration    Subjective:   Pt reports: Had family over for the holiday, everyone else was eating food and he was eating his ice chips. "I can't wait to get this tube out of my stomach"  Pain Scale:  0/10 on VAS currently.   Pain Location: No pain reported today.   Objective:   UNTIMED  Procedure Min.   Dysphagia Therapy  45   Total Untimed Units: 1  Charges Billed/# of units: 1    Short Term Goals: (4 weeks) Current Progress:   1. Pt will complete Mendelson exercises (with or without NMES/Vital Stim) 30-45x to improve hyolaryngeal lift and excursion.  Progressing/ Not Met 4/22/2019   Mendhelson (with NMES 12.5 mA 3 A placement) with saliva: x40    Throat clear x1   2.  Pt will complete effortful swallow (with or without NMES/Vital Stim) 45-60x to improve movement and strength of tongue base.  Progressing/ Not Met 4/22/2019   Effortful swallow (with NMES 3 A placement at 12.5 mA) with 1 tsp (5mL) of thin liquids and double swallow x 51 given min reminders to swallow hard and twice. Audible swallows noted.   x10 single piece of peach without juice  Throat clear x 9 (water), x 3 (peaches)  Belch x 5 (water)  Cough: x 3 (water)   3. Pt " will perform CTAR (chin tuck against resistance) 30-45x to improve muscle strength.    Progressing/ Not Met 4/22/2019   Pt performed 35 x during the session   4. Pt will complete PO trials during session with SLP.  Progressing/ Not Met 4/22/2019   Thin liquids and peaches without juice trialed with exercises today   5. Pt will complete tongue pullbacks 30-45x to improve tongue base strength.  Progressing/ Not Met 4/22/2019   Not formally addressed          Patient Education/Response:   Progress and reminders of swallowing techniques.  Pt verbalized understanding to all discussed.    See patient instruction section for exercises for home exercise program for 4/11/19 and today. No updates to home exercise program today. Reviewed completing exercises at least 3-5x daily but completing more if able.   Assessment:   Hoang is progressing well towards his goals. Pt tolerated NMES/vital stim well today. Increased coughing with all consistencies today. Current goals remain appropriate. Goals to be updated as necessary.     Pt prognosis is Good. Pt will continue to benefit from skilled outpatient speech and language therapy to address the deficits listed in the problem list on initial evaluation, provide pt/family education and to maximize pt's level of independence in the home and community environment.     Medical necessity is demonstrated by the following IMPAIRMENTS:  Dysphagia negatively impacts the efficiency and effectiveness of patient's swallow yielding high aspiration risk.  Barriers to Therapy: Complex medical history is a negative prognostic factor.  Pt's spiritual, cultural and educational needs considered and pt agreeable to plan of care and goals.  Plan:   Continue POC with focus on strengthening swallow musculature and trial po trials.     TATI Alvarez.  FRANCESCA SLP  Clinician

## 2019-04-23 ENCOUNTER — CLINICAL SUPPORT (OUTPATIENT)
Dept: REHABILITATION | Facility: HOSPITAL | Age: 69
End: 2019-04-23
Payer: MEDICARE

## 2019-04-23 ENCOUNTER — TELEPHONE (OUTPATIENT)
Dept: NEUROLOGY | Facility: HOSPITAL | Age: 69
End: 2019-04-23

## 2019-04-23 DIAGNOSIS — R49.0 DYSPHONIA: ICD-10-CM

## 2019-04-23 DIAGNOSIS — R13.14 PHARYNGOESOPHAGEAL DYSPHAGIA: ICD-10-CM

## 2019-04-23 PROCEDURE — 92526 ORAL FUNCTION THERAPY: CPT | Mod: PO

## 2019-04-23 NOTE — PROGRESS NOTES
"[facility]  Department of Otorhinolaryngology and Communication Sciences    Subjective:      Hoang Santos Jr. is a 68 y.o. male who presents for follow-up.  We were following him primarily for  vocal fold atrophy/scar and hyperfunctional dysphonia, which responded well to voice therapy. We also had discussed some dysphagia symptoms and identified some cervical osteophytes. He had a prior MBSS 7/2/2018.    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 After surgery, he had to be reintubated for a short time. Since then, his voice has sounded a little "gurgly." If he speaks louder, he finds it comes out clearer. He is strictly NPO, with everything via PEG since surgery. He has been working with an SLP.     He has another MBSS scheduled early May.    The patient's medications, allergies, past medical, surgical, social and family histories were reviewed and updated as appropriate.    A detailed review of systems was obtained with pertinent positives as per the above HPI, and otherwise negative.      Objective:     /70 (Patient Position: Sitting)   Pulse 107      Constitutional: comfortable, well dressed, obese  Psychiatric: appropriate affect  Respiratory: comfortably breathing, symmetric chest rise, no stridor  Voice: mild-moderate variable roughness; mild variable wet quality; stimulable via PhoRTE strategies  Head: normocephalic  Eyes: conjunctivae and sclerae clear  OC/OP: post-surgical UPPP changes; no masses  Indirect laryngoscopy is limited due to gag    Procedure  Flexible Laryngeal Videostroboscopy (55347): Laryngeal videostroboscopy is indicated to assess laryngeal vibratory biomechanics and vocal fold oscillation, which cannot be assessed with a plain light examination. This was carried out transnasally with a distal chip videoendoscope. After verbal consent was obtained, the patient was positioned and the nose was topically decongested with 1% phenylephrine and topically " anesthetized with 4% lidocaine. The endoscope was passed through the most patent nasal cavity and positioned to image the nasopharynx, larynx, and hypopharynx in detail. The following features were examined: nasopharyngeal, laryngeal, hypopharyngeal masses; velopharyngeal strength, closure, and symmetry of motion; vocal fold range and symmetry of motion; laryngeal mucosal edema, erythema, inflammation, and hydration; salivary pooling; and gross laryngeal sensation. During phonation, the vocal folds were assessed for glottal closure; mucosal wave; vocal fold lesions; vibratory periodicity, amplitude, and phase symmetry; and vertical height match. The equipment was removed. The patient tolerated the procedure well without complication. 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 male with dysphonia and PEG-dependent dysphagia following intubation for abdominal surgery 1/2019.    His larynx exam is essentially stable since my initial evaluation in 6/2017.    Plan:     Reassurance was provided. I recommended he continue with his SLP treatment sessions. His clinician(s) may wish to incorporate some voice/PhoRTE strategies, which have been beneficial to him in the past.    As he had known swallowing dysfunction even prior to surgery (see MBSS note from 7/2018), it is my hope that after his upcoming interval MBSS he will be able to resume PO intake. Nevertheless, I defer to his treating SLP clinician for definitive diet recommendations/swallowing strategies.    He will follow up with me in 2-3 months, or sooner if needed.    All questions  were answered, and the patient is in agreement with the plan.    Juaquin Casas M.D.  [facility]  Department of Otorhinolaryngology and Communication Sciences

## 2019-04-23 NOTE — TELEPHONE ENCOUNTER
RE: Follow up call for TF     Patient continues with TF via PEG. He takes in 3 cans of Two Min HN in afternoon and 1 can of Ensure Enlive in morning providing 1790 calories and 80 gm Protein. He is participating in speech therapy but has not progressed with oral intake.       Wt Readings from Last 4 Encounters:   04/11/19 88.6 kg (195 lb 7 oz)   03/19/19 85.7 kg (189 lb)   03/19/19 86.1 kg (189 lb 14.8 oz)   03/11/19 87.1 kg (192 lb)       Recommendation:   1. Take 1 Bottle of Ensure Enlive (no equivalent substitutions allowed)  every morning to provide 350 calories and 20 gm Pro  2. Take 3 cans of Two Min HN in the afternoon to provide 1440 calories and 60 gm Pro.  3. Continue with current water administration.     Routed to [employer].       ----- Message from Marija Martínez sent at 4/23/2019 10:41 AM CDT -----  Contact: Mrs. Santos/ [phone]  Wife called in to speak with you about the patient.    Please call.

## 2019-04-23 NOTE — PROGRESS NOTES
Outpatient Neurological Rehabilitation   Speech and Language Therapy Daily Note  Date:  4/23/2019     Name: Hoang Santos Jr.   MRN: [id number]   Therapy Diagnosis:   Encounter Diagnoses   Name Primary?    Pharyngoesophageal dysphagia     Dysphonia    Physician: Madeleine Alvarado,*  Physician Orders: Ambulatory Referral to Speech Therapy  Medical Diagnosis: R13.10 (ICD-10-CM) - Esophageal dysphagia      Visit #/ Visits Authorized: 9/ 20  Date of Evaluation:  4/2/2019  Insurance Authorization Period: 4/2/19 to 12/31/19  Plan of Care Certification:    4/2/2019 to 5/31/19  Extended POC:    Visits Cancelled: 0  Visits No Show: 0    Time In:  1300  Time Out:  1345  Total Billable Time: 45 minutes    G-Code 9 / 10   Eval Swallowing 4/2/19  CN/CL   Update        Precautions: Standard and Aspiration    Subjective:   Pt reports: he's feeling good today  Pain Scale:  0/10 on VAS currently.   Pain Location: No pain reported today.   Objective:   UNTIMED  Procedure Min.   Dysphagia Therapy  45   Total Untimed Units: 1  Charges Billed/# of units: 1    Short Term Goals: (4 weeks) Current Progress:   1. Pt will complete Mendelson exercises (with or without NMES/Vital Stim) 30-45x to improve hyolaryngeal lift and excursion.  Progressing/ Not Met 4/23/2019   Mendhelson (with NMES 13.5 mA 3 A placement) with saliva: x30    Throat clear x1   2.  Pt will complete effortful swallow (with or without NMES/Vital Stim) 45-60x to improve movement and strength of tongue base.  Progressing/ Not Met 4/23/2019   Effortful swallow (with NMES 3 A placement at 13.5 mA) with 1 tsp (5mL) of thin liquids and double swallow x 79 given min reminders to swallow hard and twice. Audible swallows noted.   x10 single piece of peach without juice  Throat clear x 14 (water), x 6 (peaches)  Belch x 3 (water)  Cough: x 1 (water)   3. Pt will perform CTAR (chin tuck against resistance) 30-45x to improve muscle strength.    Progressing/ Not Met 4/23/2019    Pt performed x25 during the session   4. Pt will complete PO trials during session with SLP.  Progressing/ Not Met 4/23/2019   Thin liquids and peaches without juice trialed with exercises today   5. Pt will complete tongue pullbacks 30-45x to improve tongue base strength.  Progressing/ Not Met 4/23/2019   Pt performed x30 during the session       Patient Education/Response:   Progress and reminders of swallowing techniques.  Pt verbalized understanding to all discussed.    See patient instruction section for exercises for home exercise program for 4/11/19 and today. No updates to home exercise program today. Reviewed completing exercises at least 3-5x daily but completing more if able.   Assessment:   Hoang is progressing well towards his goals. Pt tolerated higher voltage NMES/vital stim well today. Increased throat clearing with all consistencies today, even after multiple swallows Current goals remain appropriate. Goals to be updated as necessary.     Pt prognosis is Good. Pt will continue to benefit from skilled outpatient speech and language therapy to address the deficits listed in the problem list on initial evaluation, provide pt/family education and to maximize pt's level of independence in the home and community environment.     Medical necessity is demonstrated by the following IMPAIRMENTS:  Dysphagia negatively impacts the efficiency and effectiveness of patient's swallow yielding high aspiration risk.  Barriers to Therapy: Complex medical history is a negative prognostic factor.  Pt's spiritual, cultural and educational needs considered and pt agreeable to plan of care and goals.  Plan:   Continue POC with focus on strengthening swallow musculature and trial po trials.     TATI Alvarez.  FRANCESCA SLP  Clinician

## 2019-04-24 ENCOUNTER — TELEPHONE (OUTPATIENT)
Dept: SPEECH THERAPY | Facility: HOSPITAL | Age: 69
End: 2019-04-24

## 2019-04-24 ENCOUNTER — PES CALL (OUTPATIENT)
Dept: ADMINISTRATIVE | Facility: CLINIC | Age: 69
End: 2019-04-24

## 2019-04-24 ENCOUNTER — OFFICE VISIT (OUTPATIENT)
Dept: OTOLARYNGOLOGY | Facility: CLINIC | Age: 69
End: 2019-04-24
Payer: MEDICARE

## 2019-04-24 ENCOUNTER — CLINICAL SUPPORT (OUTPATIENT)
Dept: REHABILITATION | Facility: HOSPITAL | Age: 69
End: 2019-04-24
Payer: MEDICARE

## 2019-04-24 VITALS — DIASTOLIC BLOOD PRESSURE: 70 MMHG | SYSTOLIC BLOOD PRESSURE: 110 MMHG | HEART RATE: 107 BPM

## 2019-04-24 DIAGNOSIS — J38.3 VOCAL FOLD ATROPHY: ICD-10-CM

## 2019-04-24 DIAGNOSIS — R13.14 PHARYNGOESOPHAGEAL DYSPHAGIA: ICD-10-CM

## 2019-04-24 DIAGNOSIS — R49.0 DYSPHONIA: ICD-10-CM

## 2019-04-24 DIAGNOSIS — M25.78 CERVICAL OSTEOPHYTE: ICD-10-CM

## 2019-04-24 DIAGNOSIS — R13.12 OROPHARYNGEAL DYSPHAGIA: Primary | ICD-10-CM

## 2019-04-24 DIAGNOSIS — R49.9 VOICE DISTURBANCE: Primary | ICD-10-CM

## 2019-04-24 DIAGNOSIS — J38.3 VOCAL FOLD SCAR: ICD-10-CM

## 2019-04-24 PROCEDURE — 31579 PR LARYNGOSCOPY, FLEX/RIGID TELESCOPIC, W/STROBOSCOPY: ICD-10-PCS | Mod: S$PBB,,, | Performed by: OTOLARYNGOLOGY

## 2019-04-24 PROCEDURE — 99213 OFFICE O/P EST LOW 20 MIN: CPT | Mod: PBBFAC,25 | Performed by: OTOLARYNGOLOGY

## 2019-04-24 PROCEDURE — 31579 LARYNGOSCOPY TELESCOPIC: CPT | Mod: S$PBB,,, | Performed by: OTOLARYNGOLOGY

## 2019-04-24 PROCEDURE — 92526 ORAL FUNCTION THERAPY: CPT | Mod: PO

## 2019-04-24 PROCEDURE — 99213 PR OFFICE/OUTPT VISIT, EST, LEVL III, 20-29 MIN: ICD-10-PCS | Mod: 25,S$PBB,, | Performed by: OTOLARYNGOLOGY

## 2019-04-24 PROCEDURE — 31579 LARYNGOSCOPY TELESCOPIC: CPT | Mod: PBBFAC | Performed by: OTOLARYNGOLOGY

## 2019-04-24 PROCEDURE — 99999 PR PBB SHADOW E&M-EST. PATIENT-LVL III: ICD-10-PCS | Mod: PBBFAC,,, | Performed by: OTOLARYNGOLOGY

## 2019-04-24 PROCEDURE — 99999 PR PBB SHADOW E&M-EST. PATIENT-LVL III: CPT | Mod: PBBFAC,,, | Performed by: OTOLARYNGOLOGY

## 2019-04-24 PROCEDURE — 99213 OFFICE O/P EST LOW 20 MIN: CPT | Mod: 25,S$PBB,, | Performed by: OTOLARYNGOLOGY

## 2019-04-24 NOTE — PROGRESS NOTES
"Outpatient Neurological Rehabilitation   Speech and Language Therapy Daily Note  Date:  4/24/2019     Name: Hoang Santos Jr.   MRN: [id number]   Therapy Diagnosis:   Encounter Diagnoses   Name Primary?    Pharyngoesophageal dysphagia     Dysphonia    Physician: Madeleine Alvarado,*  Physician Orders: Ambulatory Referral to Speech Therapy  Medical Diagnosis: R13.10 (ICD-10-CM) - Esophageal dysphagia      Visit #/ Visits Authorized: 10/ 20  Date of Evaluation:  4/2/2019  Insurance Authorization Period: 4/2/19 to 12/31/19  Plan of Care Certification:    4/2/2019 to 5/31/19  Extended POC:    Visits Cancelled: 0  Visits No Show: 0    Time In:  1300  Time Out:  1345  Total Billable Time: 45 minutes      Precautions: Standard and Aspiration    Subjective:   Pt reports: He saw  this AM. Pt reports that "everything looks good" per doctor's report.   Pain Scale:  0/10 on VAS currently.   Pain Location: No pain reported today.   Objective:   UNTIMED  Procedure Min.   Dysphagia Therapy  45   Total Untimed Units: 1  Charges Billed/# of units: 1    Short Term Goals: (4 weeks) Current Progress:   1. Pt will complete Mendelson exercises (with or without NMES/Vital Stim) 30-45x to improve hyolaryngeal lift and excursion.  Progressing/ Not Met 4/24/2019   Mendhelson (without NMESt) with saliva: x30    Throat clear x1  Cough x1    2.  Pt will complete effortful swallow (with or without NMES/Vital Stim) 45-60x to improve movement and strength of tongue base.  Progressing/ Not Met 4/24/2019   Effortful swallow (without NMES) x20 with saliva.  Cough x1 following 10th repetition on 2nd set.    With controlled cup sips x12  With applesauce x5  With Peaches (liquid drained from spoon): x8       3. Pt will perform CTAR (chin tuck against resistance) 30-45x to improve muscle strength.    Progressing/ Not Met 4/24/2019   Pt performed x60 during the session   4. Pt will complete PO trials during session with SLP.  Progressing/ " Not Met 4/24/2019   PO trials as noted above during effortful swallow. Pt with 2-3 swallows per sip/bite. Vocal quality still wet post swallow; this remains unchanged from baseline wet vocal quality.    5. Pt will complete tongue pullbacks 30-45x to improve tongue base strength.  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  Pt verbalized understanding to all discussed.    See patient instruction section for exercises for home exercise program for 4/11/19 and today. No updates to home exercise program today. Reviewed completing exercises at least 3-5x daily but completing more if able.   Assessment:   Hoang is progressing well towards his goals. Pt continues to complete all exercises as demonstrated and cued. Baseline vocal quality remains wet and unchanged post swallow. There were no episodes of throat clearing or coughing with PO today. Repeat MBSS is warranted. Current goals remain appropriate. Goals to be updated as necessary.     Pt prognosis is Good. Pt will continue to benefit from skilled outpatient speech and language therapy to address the deficits listed in the problem list on initial evaluation, provide pt/family education and to maximize pt's level of independence in the home and community environment.     Medical necessity is demonstrated by the following IMPAIRMENTS:  Dysphagia negatively impacts the efficiency and effectiveness of patient's swallow yielding high aspiration risk.  Barriers to Therapy:  Complex medical history is a negative prognostic factor.  Pt's spiritual, cultural and educational needs considered and pt agreeable to plan of care and goals.  Plan:   Continue POC with focus on strengthening swallow musculature and trial po trials. Schedule patient for a repeat MBSS.    Jenae Castellano M.S. CCC-SLP  Speech Language Pathologist   4/24/2019

## 2019-04-25 ENCOUNTER — CLINICAL SUPPORT (OUTPATIENT)
Dept: REHABILITATION | Facility: HOSPITAL | Age: 69
End: 2019-04-25
Payer: MEDICARE

## 2019-04-25 DIAGNOSIS — R49.0 DYSPHONIA: ICD-10-CM

## 2019-04-25 DIAGNOSIS — R13.14 PHARYNGOESOPHAGEAL DYSPHAGIA: ICD-10-CM

## 2019-04-25 PROCEDURE — 92526 ORAL FUNCTION THERAPY: CPT | Mod: PO

## 2019-04-25 NOTE — PROGRESS NOTES
Outpatient Neurological Rehabilitation   Speech and Language Therapy Daily Note  Date:  4/25/2019     Name: Hoang Santos Jr.   MRN: [id number]   Therapy Diagnosis:   Encounter Diagnoses   Name Primary?    Pharyngoesophageal dysphagia     Dysphonia    Physician: Madeleine Alvarado,*  Physician Orders: Ambulatory Referral to Speech Therapy  Medical Diagnosis: R13.10 (ICD-10-CM) - Esophageal dysphagia      Visit #/ Visits Authorized: 10/ 20  Date of Evaluation:  4/2/2019  Insurance Authorization Period: 4/2/19 to 12/31/19  Plan of Care Certification:    4/2/2019 to 5/31/19  Extended POC:    Visits Cancelled: 0  Visits No Show: 0    Time In:  1430  Time Out:  1510  Total Billable Time: 40 minutes      Precautions: Standard and Aspiration    Subjective:   Pt reports: he feels that there is nothing wrong with his swallow.   Pain Scale:  0/10 on VAS currently.   Pain Location: No pain reported today.   Objective:   UNTIMED  Procedure Min.   Dysphagia Therapy  40   Total Untimed Units: 1  Charges Billed/# of units: 1    Short Term Goals: (4 weeks) Current Progress:   1. Pt will complete Mendelson exercises (with or without NMES/Vital Stim) 30-45x to improve hyolaryngeal lift and excursion.  Progressing/ Not Met 4/25/2019   Mendhelson (without NMESt) with saliva: x15 (hold for 3 seconds)  x15 (hold for 4 seconds)    Throat clear x0  Cough x0   2.  Pt will complete effortful swallow (with or without NMES/Vital Stim) 45-60x to improve movement and strength of tongue base.  Progressing/ Not Met 4/25/2019   Effortful swallow (without NMES) x32 with water (with rapid second swallow)  Cough x5    With Peaches (liquid drained): x22  Throat clear: x5  Cough x1       3. Pt will perform CTAR (chin tuck against resistance) 30-45x to improve muscle strength.    Progressing/ Not Met 4/25/2019   Not formally addressed      4. Pt will complete PO trials during session with SLP.  Progressing/ Not Met 4/25/2019   PO trials: water and  peaches (without juice).   5. Pt will complete tongue pullbacks 30-45x to improve tongue base strength.  Progressing/ Not Met 4/25/2019   Not formally addressed          Patient Education/Response:   Progress and findings from ENT.  Pt verbalized understanding to all discussed.      Assessment:   Hoang is progressing well towards his goals. Decreased fatigue during session today. Current goals remain appropriate. Goals to be updated as necessary.     Pt prognosis is Good. Pt will continue to benefit from skilled outpatient speech and language therapy to address the deficits listed in the problem list on initial evaluation, provide pt/family education and to maximize pt's level of independence in the home and community environment.     Medical necessity is demonstrated by the following IMPAIRMENTS:  Dysphagia negatively impacts the efficiency and effectiveness of patient's swallow yielding high aspiration risk.  Barriers to Therapy: Complex medical history is a negative prognostic factor.  Pt's spiritual, cultural and educational needs considered and pt agreeable to plan of care and goals.  Plan:   Continue POC with focus on strengthening swallow musculature and trial po trials.     TATI Alvarez.  FRANCESCA SLP  Clinician

## 2019-04-26 LAB
ACID FAST MOD KINY STN SPEC: NORMAL
ACID FAST MOD KINY STN SPEC: NORMAL
MYCOBACTERIUM SPEC QL CULT: NORMAL
MYCOBACTERIUM SPEC QL CULT: NORMAL

## 2019-04-29 ENCOUNTER — PATIENT MESSAGE (OUTPATIENT)
Dept: NEUROLOGY | Facility: HOSPITAL | Age: 69
End: 2019-04-29

## 2019-04-29 ENCOUNTER — CLINICAL SUPPORT (OUTPATIENT)
Dept: REHABILITATION | Facility: HOSPITAL | Age: 69
End: 2019-04-29
Payer: MEDICARE

## 2019-04-29 DIAGNOSIS — R13.14 PHARYNGOESOPHAGEAL DYSPHAGIA: ICD-10-CM

## 2019-04-29 DIAGNOSIS — R49.0 DYSPHONIA: ICD-10-CM

## 2019-04-29 PROCEDURE — 92526 ORAL FUNCTION THERAPY: CPT | Mod: PO

## 2019-04-29 NOTE — PROGRESS NOTES
"Outpatient Neurological Rehabilitation   Speech and Language Therapy Daily Note  Date:  4/29/2019     Name: Hoang Santos Jr.   MRN: [id number]   Therapy Diagnosis:   Encounter Diagnoses   Name Primary?    Pharyngoesophageal dysphagia     Dysphonia    Physician: Madeleine Alvarado,*  Physician Orders: Ambulatory Referral to Speech Therapy  Medical Diagnosis: R13.10 (ICD-10-CM) - Esophageal dysphagia      Visit #/ Visits Authorized: 13/ 20  Date of Evaluation:  4/2/2019  Insurance Authorization Period: 4/2/19 to 12/31/19  Plan of Care Certification:    4/2/2019 to 5/31/19  Extended POC:    Visits Cancelled: 0  Visits No Show: 0    Time In:  0900  Time Out:  0945  Total Billable Time: 45 minutes      Precautions: Standard and Aspiration    Subjective:   Pt reports: he feels that there is nothing wrong with his swallow. He reported that over the weekend, he had an "excessive amount of mucus and saliva" build up.  Pain Scale:  0/10 on VAS currently.   Pain Location: No pain reported today.   Objective:   UNTIMED  Procedure Min.   Dysphagia Therapy  45   Total Untimed Units: 1  Charges Billed/# of units: 1    Short Term Goals: (4 weeks) Current Progress:   1. Pt will complete Mendelson exercises (with or without NMES/Vital Stim) 30-45x to improve hyolaryngeal lift and excursion.  Progressing/ Not Met 4/29/2019   Mendhelson (without NMESt) with saliva:   x15 (hold for 4 seconds)    Throat clear x0  Cough x0   2.  Pt will complete effortful swallow (with or without NMES/Vital Stim) 45-60x to improve movement and strength of tongue base.  Progressing/ Not Met 4/29/2019   Effortful swallow (without NMES) x172 alternating water and peaches (with rapid second swallow)    Throat clear (with water) x4    Throat clear (with peaches) x3       3. Pt will perform CTAR (chin tuck against resistance) 30-45x to improve muscle strength.    Progressing/ Not Met 4/29/2019   CTAR x40     4. Pt will complete PO trials during session " with SLP.  Progressing/ Not Met 4/29/2019   PO trials: water and peaches (with juice).   5. Pt will complete tongue pullbacks 30-45x to improve tongue base strength.  Progressing/ Not Met 4/29/2019   Not formally addressed          Patient Education/Response:   Upcoming MBSS.  Pt verbalized understanding to all discussed.      Assessment:   Hoang is progressing well towards his goals. Decreased fatigue during session today. Decreased throat clearing with all consistencies. Current goals remain appropriate. Goals to be updated as necessary.     Pt prognosis is Good. Pt will continue to benefit from skilled outpatient speech and language therapy to address the deficits listed in the problem list on initial evaluation, provide pt/family education and to maximize pt's level of independence in the home and community environment.     Medical necessity is demonstrated by the following IMPAIRMENTS:  Dysphagia negatively impacts the efficiency and effectiveness of patient's swallow yielding high aspiration risk.  Barriers to Therapy: Complex medical history is a negative prognostic factor.  Pt's spiritual, cultural and educational needs considered and pt agreeable to plan of care and goals.  Plan:   Continue POC with focus on strengthening swallow musculature and trial po trials.     MEHRDAD Alvarez SLP  Clinician

## 2019-04-30 ENCOUNTER — CLINICAL SUPPORT (OUTPATIENT)
Dept: REHABILITATION | Facility: HOSPITAL | Age: 69
End: 2019-04-30
Payer: MEDICARE

## 2019-04-30 DIAGNOSIS — R13.14 PHARYNGOESOPHAGEAL DYSPHAGIA: ICD-10-CM

## 2019-04-30 DIAGNOSIS — R49.0 DYSPHONIA: ICD-10-CM

## 2019-04-30 PROCEDURE — 92526 ORAL FUNCTION THERAPY: CPT | Mod: PO

## 2019-04-30 NOTE — PROGRESS NOTES
"Outpatient Neurological Rehabilitation   Speech and Language Therapy Daily Note  Date:  4/30/2019     Name: Hoang Santos Jr.   MRN: [id number]   Therapy Diagnosis:   Encounter Diagnoses   Name Primary?    Pharyngoesophageal dysphagia     Dysphonia    Physician: Madeleine Alvarado,*  Physician Orders: Ambulatory Referral to Speech Therapy  Medical Diagnosis: R13.10 (ICD-10-CM) - Esophageal dysphagia      Visit #/ Visits Authorized: 14/ 20  Date of Evaluation:  4/2/2019  Insurance Authorization Period: 4/2/19 to 12/31/19  Plan of Care Certification:    4/2/2019 to 5/31/19  Extended POC:    Visits Cancelled: 0  Visits No Show: 0    Time In:  1306  Time Out:  1346  Total Billable Time: 40 minutes      Precautions: Standard and Aspiration    Subjective:   Pt reports: he is looking forward to his swallow study on Friday. He reports that his PEG tube has put a "damper" on his wife's schedule as she returns home during her day to assist him with a lunch feed.  Pain Scale:  0/10 on VAS currently.   Pain Location: No pain reported today.   Objective:   UNTIMED  Procedure Min.   Dysphagia Therapy  40   Total Untimed Units: 1  Charges Billed/# of units: 1    Short Term Goals: (4 weeks) Current Progress:   1. Pt will complete Mendelson exercises (with or without NMES/Vital Stim) 30-45x to improve hyolaryngeal lift and excursion.  Progressing/ Not Met 4/30/2019   Mendhelson (without NMESt) with saliva:   x30 (hold for 4 seconds)    Throat clear x0  Cough x1   2.  Pt will complete effortful swallow (with or without NMES/Vital Stim) 45-60x to improve movement and strength of tongue base.  Progressing/ Not Met 4/30/2019   Effortful swallow (without NMES) x132 on both saliva and PO (with rapid second swallow)    Throat clear (with water) x3    Throat clear (with peaches) x0    Throat clear (with yogurt) x2       3. Pt will perform CTAR (chin tuck against resistance) 30-45x to improve muscle strength.    Progressing/ Not Met " "4/30/2019   CTAR x60     4. Pt will complete PO trials during session with SLP.  Progressing/ Not Met 4/30/2019   PO trials: water, yougrt and peaches (with juice). Throat clearing as described above. x1 cough with peaches today.   5. Pt will complete tongue pullbacks 30-45x to improve tongue base strength.  Progressing/ Not Met 4/30/2019   Not formally addressed          Patient Education/Response:   Upcoming MBSS.  Pt verbalized understanding to all discussed.      Assessment:   Hoang is progressing well towards his goals. Pt with mildly greater difficulty completing effortful swallow on saliva today; pt reported feeling "more dry". Continues to double swallow on each PO trial. Decreased throat clearing with all consistencies. Current goals remain appropriate. Goals to be updated as necessary.     Pt prognosis is Good. Pt will continue to benefit from skilled outpatient speech and language therapy to address the deficits listed in the problem list on initial evaluation, provide pt/family education and to maximize pt's level of independence in the home and community environment.     Medical necessity is demonstrated by the following IMPAIRMENTS:  Dysphagia negatively impacts the efficiency and effectiveness of patient's swallow yielding high aspiration risk.  Barriers to Therapy: Complex medical history is a negative prognostic factor.  Pt's spiritual, cultural and educational needs considered and pt agreeable to plan of care and goals.  Plan:   Continue POC with focus on strengthening swallow musculature and PO trials    Jenae Castellano M.S. CCC-SLP  Speech Language Pathologist   4/30/2019      "

## 2019-05-01 ENCOUNTER — CLINICAL SUPPORT (OUTPATIENT)
Dept: REHABILITATION | Facility: HOSPITAL | Age: 69
End: 2019-05-01
Payer: MEDICARE

## 2019-05-01 DIAGNOSIS — R49.0 DYSPHONIA: ICD-10-CM

## 2019-05-01 DIAGNOSIS — R13.14 PHARYNGOESOPHAGEAL DYSPHAGIA: ICD-10-CM

## 2019-05-01 PROCEDURE — 92526 ORAL FUNCTION THERAPY: CPT | Mod: PO

## 2019-05-01 NOTE — PROGRESS NOTES
"Outpatient Neurological Rehabilitation   Speech and Language Therapy Daily Note  Date:  5/1/2019     Name: Hoang Santos Jr.   MRN: [id number]   Therapy Diagnosis:   Encounter Diagnoses   Name Primary?    Pharyngoesophageal dysphagia     Dysphonia    Physician: Madeleine Alvarado,*  Physician Orders: Ambulatory Referral to Speech Therapy  Medical Diagnosis: R13.10 (ICD-10-CM) - Esophageal dysphagia      Visit #/ Visits Authorized: 15/ 20  Date of Evaluation:  4/2/2019  Insurance Authorization Period: 4/2/19 to 12/31/19  Plan of Care Certification:    4/2/2019 to 5/31/19  Extended POC:    Visits Cancelled: 0  Visits No Show: 0    Time In:  1117  Time Out:  1200  Total Billable Time: 43 minutes      Precautions: Standard and Aspiration    Subjective:   Pt reports: He is less dry today compared to yesterday. Pt is hopeful that he is cleared for any type of diet; "just let me eat something".  Pain Scale:  0/10 on VAS currently.   Pain Location: No pain reported today.   Objective:   UNTIMED  Procedure Min.   Dysphagia Therapy  45   Total Untimed Units: 1  Charges Billed/# of units: 1    Short Term Goals: (4 weeks) Current Progress:   1. Pt will complete Mendelson exercises (with or without NMES/Vital Stim) 30-45x to improve hyolaryngeal lift and excursion.  Progressing/ Not Met 5/1/2019   Mendhelson (without NMESt) with saliva:   x30 (hold for 4 seconds)    Throat clear x0  Cough x1   2.  Pt will complete effortful swallow (with or without NMES/Vital Stim) 45-60x to improve movement and strength of tongue base.  Progressing/ Not Met 5/1/2019   Effortful swallow (without NMES) x90 (across saliva and POtrials- with rapid second swallow)       3. Pt will perform CTAR (chin tuck against resistance) 30-45x to improve muscle strength.    Progressing/ Not Met 5/1/2019   CTAR x60     4. Pt will complete PO trials during session with SLP.  Progressing/ Not Met 5/1/2019   PO trials: water, peaches (with juice), nena " crackers    Intermittent throat clearing throughout session present due to mucus. Pt with baseline wet vocal quality.    Throat clears post sips of water :x2  Throat clears post peaches: x3  Throat clears post nena crackers: x5       5. Pt will complete tongue pullbacks 30-45x to improve tongue base strength.  Progressing/ Not Met 5/1/2019   Not formally addressed          Patient Education/Response:   Upcoming MBSS.  Pt verbalized understanding to all discussed.      Assessment:   Hoang is progressing well towards his goals. MBSS on Friday. Patient benefits from supervision with exercises, but demonstrates good knowledge on how to complete each exercise. Supervision cues for PO intake to ensure full clearance between bites. Continues to double swallow on each PO trial. Decreased throat clearing with all consistencies compared to initiation of care. Current goals remain appropriate. Goals to be updated as necessary.     Pt prognosis is Good. Pt will continue to benefit from skilled outpatient speech and language therapy to address the deficits listed in the problem list on initial evaluation, provide pt/family education and to maximize pt's level of independence in the home and community environment.     Medical necessity is demonstrated by the following IMPAIRMENTS:  Dysphagia negatively impacts the efficiency and effectiveness of patient's swallow yielding high aspiration risk.  Barriers to Therapy: Complex medical history is a negative prognostic factor.  Pt's spiritual, cultural and educational needs considered and pt agreeable to plan of care and goals.  Plan:   Continue POC with focus on strengthening swallow musculature and PO trials    Jenae Castellano M.S. CCC-SLP  Speech Language Pathologist   5/1/2019

## 2019-05-02 ENCOUNTER — TELEPHONE (OUTPATIENT)
Dept: RADIOLOGY | Facility: HOSPITAL | Age: 69
End: 2019-05-02

## 2019-05-02 ENCOUNTER — CLINICAL SUPPORT (OUTPATIENT)
Dept: REHABILITATION | Facility: HOSPITAL | Age: 69
End: 2019-05-02
Payer: MEDICARE

## 2019-05-02 DIAGNOSIS — R13.14 PHARYNGOESOPHAGEAL DYSPHAGIA: ICD-10-CM

## 2019-05-02 DIAGNOSIS — R49.0 DYSPHONIA: ICD-10-CM

## 2019-05-02 PROCEDURE — 92526 ORAL FUNCTION THERAPY: CPT | Mod: PO

## 2019-05-02 NOTE — PROGRESS NOTES
Outpatient Neurological Rehabilitation   Speech and Language Therapy Daily Note  Date:  5/2/2019     Name: Hoang Santos Jr.   MRN: [id number]   Therapy Diagnosis:   No diagnosis found.Physician: Madeleine Alvarado,*  Physician Orders: Ambulatory Referral to Speech Therapy  Medical Diagnosis: R13.10 (ICD-10-CM) - Esophageal dysphagia      Visit #/ Visits Authorized: 16/ 20  Date of Evaluation:  4/2/2019  Insurance Authorization Period: 4/2/19 to 12/31/19  Plan of Care Certification:    4/2/2019 to 5/31/19  Extended POC:    Visits Cancelled: 0  Visits No Show: 0    Time In:  0946  Time Out:  1030  Total Billable Time: 44 minutes      Precautions: Standard and Aspiration    Subjective:   Pt reports: He is nervous about his MBSS scheduled tomorrow.  Pain Scale:  0/10 on VAS currently.   Pain Location: No pain reported today.   Objective:   UNTIMED  Procedure Min.   Dysphagia Therapy  45   Total Untimed Units: 1  Charges Billed/# of units: 1    Short Term Goals: (4 weeks) Current Progress:   1. Pt will complete Mendelson exercises (with or without NMES/Vital Stim) 30-45x to improve hyolaryngeal lift and excursion.  Progressing/ Not Met 5/2/2019   Mendhelson (without NMESt) with saliva:   x20 (hold for 4 seconds)    Throat clear x0  Cough x0   2.  Pt will complete effortful swallow (with or without NMES/Vital Stim) 45-60x to improve movement and strength of tongue base.  Progressing/ Not Met 5/2/2019   Effortful swallow (without NMES) x90 (across saliva and POtrials- with rapid second swallow)       3. Pt will perform CTAR (chin tuck against resistance) 30-45x to improve muscle strength.    Progressing/ Not Met 5/2/2019   CTAR:     Isokinetic:x30 (1 set)  Isometric: x60 seconds (1set)     4. Pt will complete PO trials during session with SLP.  Progressing/ Not Met 5/2/2019   PO trials:   water via self fed cup sips in isolation and as liquids wash following soft solids in mixed consistency  peaches (with juice) via  tsp one at a time  nena crackers - 1/4 piece    Intermittent throat clearing throughout session.. Pt with mild baseline wet vocal quality.    Throat clears post sips of water :x1  Throat clears post peaches: x3  Throat clears post nena crackers: x2; cough x2    Pt completed supraglottic swallows x5   5. Pt will complete tongue pullbacks 30-45x to improve tongue base strength.  Progressing/ Not Met 5/2/2019   Not formally addressed          Patient Education/Response:   Upcoming MBSS.  Continue dysphagia therapy.  Pt verbalized understanding to all discussed.      Assessment:   Hoang is progressing well towards his goals. MBSS on Friday. Patient benefits from supervision with exercises, but demonstrates good knowledge on how to complete each exercise. Supervision cues for PO intake to ensure full clearance between bites. Continues to double swallow on each PO trial and denied globus sensation. Decreased throat clearing with all consistencies compared to initiation of care. Current goals remain appropriate. Goals to be updated as necessary.     Pt prognosis is Good. Pt will continue to benefit from skilled outpatient speech and language therapy to address the deficits listed in the problem list on initial evaluation, provide pt/family education and to maximize pt's level of independence in the home and community environment.     Medical necessity is demonstrated by the following IMPAIRMENTS:  Dysphagia negatively impacts the efficiency and effectiveness of patient's swallow yielding high aspiration risk.  Barriers to Therapy: Complex medical history is a negative prognostic factor.  Pt's spiritual, cultural and educational needs considered and pt agreeable to plan of care and goals.  Plan:   Continue POC with focus on strengthening swallow musculature and PO trials. MBSS planned for Friday.    AJITH Soria, CCC-SLP  Speech Language Pathologist   5/2/2019

## 2019-05-03 ENCOUNTER — HOSPITAL ENCOUNTER (OUTPATIENT)
Dept: RADIOLOGY | Facility: HOSPITAL | Age: 69
Discharge: HOME | End: 2019-05-03
Attending: OTOLARYNGOLOGY
Payer: MEDICARE

## 2019-05-03 ENCOUNTER — CLINICAL SUPPORT (OUTPATIENT)
Dept: SPEECH THERAPY | Facility: HOSPITAL | Age: 69
End: 2019-05-03
Attending: OTOLARYNGOLOGY
Payer: MEDICARE

## 2019-05-03 DIAGNOSIS — R13.12 OROPHARYNGEAL DYSPHAGIA: ICD-10-CM

## 2019-05-03 PROCEDURE — 74230 X-RAY XM SWLNG FUNCJ C+: CPT | Mod: TC

## 2019-05-03 PROCEDURE — 74230 X-RAY XM SWLNG FUNCJ C+: CPT | Mod: 26,,, | Performed by: RADIOLOGY

## 2019-05-03 PROCEDURE — 74230 FL MODIFIED BARIUM SWALLOW SPEECH STUDY: ICD-10-PCS | Mod: 26,,, | Performed by: RADIOLOGY

## 2019-05-03 PROCEDURE — 92611 MOTION FLUOROSCOPY/SWALLOW: CPT | Mod: GN | Performed by: SPEECH-LANGUAGE PATHOLOGIST

## 2019-05-03 NOTE — PROGRESS NOTES
"Referring provider: Dr. Juaquin Casas  Reason for visit:  Modified barium swallow study (CPT 82812)    Report Summary   --Date: 05/03/2019  --Purpose: to evaluate anatomy and physiology of the oropharyngeal swallow, to determine effectiveness of rehabilitation strategies, and to determine diet consistency and intervention recommendations.   --Diet recommendations: mechanical soft/regular liquids as tolerated    Subjective / History    Hoang Santos Jr. is a 68 y.o. male referred by Dr. Casas for Modified Barium Swallow Study (30190).  He is currently NPO with PEG feeds.  Patient reports a several year history of difficulty swallowing tough/hard solids, but was able to manage with a full PO diet.  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  He has undergone several MBSS since that time; recs/results below.  He has engaged in intensive swallowing therapy.  He is otherwise healthy at this point.      - He had a few MBSS in 2018 prior to this admission.  His study today is similar to those, during which recs were to continue with a PO diet.      - MBSS 1/11/19: "Prior to any PO trials, pt used Yanker suction to remove large amount of mucous like secretions from pharynx. No complaints of pain prior to MBSS; however, pt did state he felt "more swollen since coming down here" in regards to his throat. Pt consumed cup sips thin liquid x2 (5 cc each), which resulted in initial penetration, which led to eventual aspiration. Pt began to cough to clear residue, but required Yanker suction to remove residuals. Nectar-thick liquid trialed x1 (5 cc cup) and Honey-thick liquid trialed x1. Both nectar and honey consistencies resulted in immediate penetration and aspiration of bolus. Pt required 4+ swallows to clear residue from pharynx. Finally, a tsp bite of pudding in barium paste consumed x1, which led to stasis in the pharyngeal cavity and a surplus of residue. Pt " "again required multiple swallows and cuing to use effortful swallow to clear bolus. During PO trials, pt often regurgitated bolus from UES, which places him at risk for delayed aspiration. He is to remain NPO with STRICT aspiration precautions in place. "    - MBSS 2/7/19: "Pt demonstrated one episode of deep laryngeal penetration for thin liquids (5cc) and consistent episodes of overt aspiration under fluoro for thin liquids (10cc), nectar thick liquids (5cc), and honey thick liquids (5cc). Pt is NOT safe for an oral diet, at this time, and should continue alternative means of nutrition/hydration/medication 2/2 pt is still at HIGH RISK OF ASPIRATION."    - MBSS 3/19/19: "SEVERE pharyngeal dysphagia c/b delayed swallow response (at least 4-5 seconds delayed) resulting in prespillage of liquids (thin and nectar thick, pureed) into vallecular space, dcr'd tongue base retraction, impaired hyolaryngeal lift/excursion, impaired epiglottic inversion resulting in vallecular residue after the swallow (ranging from mild with liquids, worsened with viscous textures like nectar and pureed textures in calibrated amts). Pt demonstrated wet/gurgly voice and reduced sensation of material in pharynx.   Pt demonstrated significant drop down aspiration during and after the swallow across consistencies from material overflowing in vallecula space as he initiated hard swallows. Pt also presents with cervical osteophytes located at level C4-C5, which limit pt's epiglottic inversion thus dcr'd airway protection for adequate glottic closure and adequate hyolaryngeal elevation/excursion.  Pt did follow verbal cues like hard swallow/cough techniques, chin tuck posture and head turn to both side (R and L side) to determine ability to perform compensatory postures to protect airway. None of the above postures reduced his aspiration risk during study. No evidence of backflow from upper cervical esophagus into the pharynx. 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 600 mL 4    atorvastatin (LIPITOR) 40 MG tablet Take 1 tablet (40 mg total) by mouth once daily. 90 tablet 3    bisacodyl (DULCOLAX) 10 mg Supp Place 1 suppository (10 mg total) rectally daily as needed.  0    cholecalciferol, vitamin D3, 50,000 unit capsule Take 1 capsule (50,000 Units total) by mouth once a week. 12 capsule 3    cholestyramine, bulk, Powd Take 4 g by mouth 3 (three) times daily with meals. 500 g 11    ciprofloxacin HCl (CIPRO) 500 MG tablet 1 tablet (500 mg total) by Per G Tube route every 12 (twelve) hours. 14 tablet 0    esomeprazole (NEXIUM PACKET) 20 mg GrPS Take 20 mg by mouth 2 (two) times daily. 1200 mg 11    famotidine (PEPCID) 40 mg/5 mL (8 mg/mL) suspension Take 5 mLs (40 mg total) by mouth once daily. 100 mL 5    hydrocodone-acetaminophen (HYCET) solution 7.5-325 mg/15mL Take 15 mLs by mouth every 6 (six) hours as needed for Pain. 300 mL 0    meclizine (ANTIVERT) 25 mg tablet Take 1 tablet (25 mg total) by mouth 3 (three) times daily as needed for Dizziness. 14 tablet 0    metoclopramide HCl (REGLAN) 5 mg/5 mL Soln Take 10 mLs (10 mg total) by mouth 3 (three) times daily. (Patient taking differently: Take 10 mg by mouth 2 (two) times daily. ) 600 mL 1    multivitamin Liqd Take 5 mLs by mouth once daily. 150 mL 11    nystatin (MYCOSTATIN) 100,000 unit/mL suspension SWISH AND SPIT 4ML FOUR TIMES A DAY FOR 21 DAYS 336 mL 0 "    omeprazole (PRILOSEC) 40 MG capsule       ondansetron (ZOFRAN) 4 MG tablet Take 1 tablet (4 mg total) by mouth every 8 (eight) hours as needed for Nausea. 30 tablet 2    ondansetron (ZOFRAN) 4 mg/5 mL solution Take 5 mLs (4 mg total) by mouth 2 (two) times daily as needed for Nausea. 50 mL 0    pantoprazole (PROTONIX) 40 MG tablet Take 1 tablet (40 mg total) by mouth 2 (two) times daily. 60 tablet 11    ranitidine (ZANTAC) 15 mg/mL syrup Pt not taking/ put on hold      sildenafil (VIAGRA) 100 MG tablet Take 1 tablet (100 mg total) by mouth daily as needed for Erectile Dysfunction. 30 tablet 3    VIAGRA 100 mg tablet Take 1 tablet (100 mg total) by mouth once daily. Brand name only medication. 10 tablet 11     Current Facility-Administered Medications on File Prior to Visit   Medication Dose Route Frequency Provider Last Rate Last Dose    [COMPLETED] ez paque ba sulfate 4 mL  4 mL Oral ONCE PRN Juaquin Casas MD   4 mL at 05/03/19 1410    [COMPLETED] varibar pudding Ba Sulfate 4 mL  4 mL Oral ONCE PRN Juaquin Casas MD   4 mL at 05/03/19 1410    [COMPLETED] varibar thin liquid ba sulfate 90 mL  90 mL Oral ONCE PRN Juaquin Casas MD   90 mL at 05/03/19 1410       Objective   Lateral videofluorographic views were obtained. The radiologist and speech pathologist were present for the entire procedure.     Consistencies assessed / method of administration  Thin liquid/4 mL  Thin liquid/6 mL  Thin liquid/cup sip  Thin liquid/sequential cup sips  Applesauce/tsp  Pudding/tsp  Cracker  Barium tablet w/ water    Oral phase  - Grossly WNL with adequate/timely lip closure, tongue control during bolus hold, bolus preparation, and bolus transport/lingual motion.  Little to no oral residue.      Pharyngeal phase  - Delayed initiation: to valleculae  - Adequate soft palate elevation  - Slightly reduced laryngeal elevation and anterior hyoid excursion  - Adequate tongue base retraction  - Reduced epiglottic  movement  - Reduced laryngeal vestibular closure: intermittent penetration of thin liquids/thin liquid residue; cleared with pt's automatic repeat swallows and intermittently needed a throat clear to clear.   - Reduced pharyngeal stripping wave  - Mild-mod pharyngeal residue: valleculae, pyriforms.  Pt used a repeat swallow technique x2-3 on each swallow which reduced residue.    - Unobstructed PE segment opening    Strategies/therapeutic interventions attempted  multiple swallows per bolus; throat clear.  Strategies were effective in decreasing/eliminating risk for reducing pharyngeal residue and penetration.     Rosenbek Penetration-Aspiration Scale (Rosenbek et al 1996)  Best Trial: 1. Contrast does not enter airway.   Worst Trial: 4. Contrast contacts TVFs, no residue.     Functional goals: continue per treating therapist  Length Status Goal   Long term In progress  Patient will maintain adequate hydration/nutrition with optimum safety and efficiency of swallowing function on least restrictive PO diet level without overt signs and symptoms of aspiration.    Long term In progress  Patient will utilize compensatory strategies with optimum safety and efficiency of swallowing function on least restrictive PO diet level without overt signs and symptoms of aspiration.    Short term In progress  Patient will demonstrate diet upgrade trials without s/s of aspiration with 10/10 trials.    Short term In progress  Patient will independently demonstrate adequate use of throat clear technique to eliminate s/s of aspiration with consistency on 8/10 trials.   Short term In progress  Patient will correctly demonstrate pharyngeal swallow strengthening exercises on 10/10 trials.   Short term In progress   Patient will independently demonstrate adequate use of repeat swallow compensatory strategy to eliminate s/s of aspiration with consistency on 8/10 trials.    Short term In progress   Patient will demonstrate the ability to  adequately self-monitor swallowing skills and perform appropriate compensatory techniques to reduce s/s of aspiration.        Assessment / Impressions   The results of this MBSS indicate that patient demonstrates mild-moderate swallowing dysfunction c/b silent penetration of thin liquid consistencies during/after the swallow as well as mild-mod pharyngeal residue.  He has made progress towards goals in swallowing therapy.  Prognosis for improvement of swallowing with therapy is good.      Recommendations / POC   -Diet: recommend mechanical soft with thin liquids as tolerated; pt may take very small bites of tougher foods if desired   -Therapeutic technique: recommend small bites/sips, intermittent throat clear, effortful swallow, alternate solid with liquid wash and multiple swallows per bolus   -Dysphagia therapy, for 2-6 more sessions over the course of 2-4 weeks, in order to increase pharyngeal contraction, increase laryngeal excursion and airway closure, increase swallow safety by implementing therapeutic maneuvers and decrease dependence on PEG and increase oral intake of food/liquid.  Would like patient to review safe swallow strategies with treating therapists and establish dysphagia therapy home program.    -Contact clinician or referring provider for repeat MBSS if swallowing status changes or worsens  -Spent 5-10 minutes following MBSS discussing recs with patient and his wife, which included the following in addition to the diet recommendations noted above:   - Pristine oral care   - Intermittent throat clear with thin liquids   - Small bites of solid foods   - Remaining active as tolerated   - Encouraged gradual wean from PEG feeds to ensure PO diet tolerance   - Discussed typical protocol for PEG tube removal; pt should be tolerating a full PO diet w/o any PEG use for 1 month prior to removing PEG tube with MD who placed it.     - Encouraged pt to go to the ED if he has a fever or is doing a lot of  coughing.

## 2019-05-05 ENCOUNTER — PATIENT MESSAGE (OUTPATIENT)
Dept: NEUROLOGY | Facility: HOSPITAL | Age: 69
End: 2019-05-05

## 2019-05-06 ENCOUNTER — CLINICAL SUPPORT (OUTPATIENT)
Dept: REHABILITATION | Facility: HOSPITAL | Age: 69
End: 2019-05-06
Payer: MEDICARE

## 2019-05-06 DIAGNOSIS — R13.14 PHARYNGOESOPHAGEAL DYSPHAGIA: ICD-10-CM

## 2019-05-06 DIAGNOSIS — R49.0 DYSPHONIA: ICD-10-CM

## 2019-05-06 PROCEDURE — 92507 TX SP LANG VOICE COMM INDIV: CPT | Mod: PO

## 2019-05-06 PROCEDURE — 92526 ORAL FUNCTION THERAPY: CPT | Mod: PO

## 2019-05-06 NOTE — PROGRESS NOTES
Outpatient Neurological Rehabilitation   Speech and Language Therapy Daily Note  Date:  5/6/2019     Name: Hoang Santos Jr.   MRN: [id number]   Therapy Diagnosis:   No diagnosis found.Physician: Madeleine Alvarado,*  Physician Orders: Ambulatory Referral to Speech Therapy  Medical Diagnosis: R13.10 (ICD-10-CM) - Esophageal dysphagia      Visit #/ Visits Authorized: 17/ 20  Date of Evaluation:  4/2/2019  Insurance Authorization Period: 4/2/19 to 12/31/19  Plan of Care Certification:    4/2/2019 to 5/31/19  Extended POC:    Visits Cancelled: 0  Visits No Show: 0    Time In:  1347  Time Out:  1430  Total Billable Time: 43 minutes      Precautions: Standard and Aspiration    Subjective:   Pt reports: He is very pleased with results of MBSS study last Friday. He asked to review results as he had some pending questions regarding terminology in the report.   Pain Scale:  0/10 on VAS currently.   Pain Location: No pain reported today.   Objective:   UNTIMED  Procedure Min.   Speech-Language-Voice Therapy  30   Dysphagia Therapy 15   Total Untimed Units: 2  Charges Billed/# of units: 2    Short Term Goals: (4 weeks) Current Progress:   1. Pt will complete Mendelson exercises (with or without NMES/Vital Stim) 30-45x to improve hyolaryngeal lift and excursion.  Progressing/ Not Met 5/6/2019   Mendhelson (without NMESt) with saliva:   x10 (hold for 3 seconds)       2.  Pt will complete effortful swallow (with or without NMES/Vital Stim) 45-60x to improve movement and strength of tongue base.  Progressing/ Not Met 5/6/2019   Not formally addressed      3. Pt will perform CTAR (chin tuck against resistance) 30-45x to improve muscle strength.    Progressing/ Not Met 5/6/2019   Not formally addressed        4. Pt will complete PO trials during session with SLP.  Progressing/ Not Met 5/6/2019   Pt took cup sips of water throughout session today. No coughing noted. Pt with intermittent throat clears.    5. Pt will complete tongue  pullbacks 30-45x to improve tongue base strength.  Progressing/ Not Met 5/6/2019     Not formally addressed        New Goal 5/6/19    6. Pt will verbalize swallow precautions ind'ly across x3 sessions. Education on swallow precautions based on MBSS outcome. Pt verbalized understanding to all. At end of session, pt named approximately 50% of precautions ind'ly.    New Goal 5/6/19    7. Pt will complete x3-5 sets of PhoRTE exercises using proper technique ind'ly. Voice Exercises:  Loud, long, and energized ahhhh (5)  Loud and energized pitch glide up on ahhh (5)  Loud and energized pitch glide down on ahhh (5)    Phrases using loud and high voice (over the fence) (2)  Phrases in a loud and low voice (authority) (2)    Phrases:  Pt to create a list of phrases for next therapy session    *Difficulty with pitch glides up and down         MBSS 5/3/19 Results:    Oral phase  - Grossly WNL with adequate/timely lip closure, tongue control during bolus hold, bolus preparation, and bolus transport/lingual motion.  Little to no oral residue.       Pharyngeal phase  - Delayed initiation: to valleculae  - Adequate soft palate elevation  - Slightly reduced laryngeal elevation and anterior hyoid excursion  - Adequate tongue base retraction  - Reduced epiglottic movement  - Reduced laryngeal vestibular closure: intermittent penetration of thin liquids/thin liquid residue; cleared with pt's automatic repeat swallows and intermittently needed a throat clear to clear.   - Reduced pharyngeal stripping wave  - Mild-mod pharyngeal residue: valleculae, pyriforms.  Pt used a repeat swallow technique x2-3 on each swallow which reduced residue.    - Unobstructed PE segment opening     Strategies/therapeutic interventions attempted  multiple swallows per bolus; throat clear.  Strategies were effective in decreasing/eliminating risk for reducing pharyngeal residue and penetration.      Rosenbek Penetration-Aspiration Scale (Rosenbek  et al 1996)  Best Trial: 1. Contrast does not enter airway.   Worst Trial: 4. Contrast contacts TVFs, no residue.     Assessment:  The results of this MBSS indicate that patient demonstrates mild-moderate swallowing dysfunction c/b silent penetration of thin liquid consistencies during/after the swallow as well as mild-mod pharyngeal residue.  He has made progress towards goals in swallowing therapy.  Prognosis for improvement of swallowing with therapy is good.          Patient Education/Response:   Education regarding voice therapy, swallow precautions, continuing dysphagia therapy and HEP.  Pt verbalized understanding to all discussed.      Assessment:   Hoang is progressing well towards his goals.Initiated PhoRTE exercises; pt demonstrates good understanding of completing exercises with good vocal intensity during exercises. New goals initiated today and ongoing goals remain appropriate. Goals to be updated as necessary.     Pt prognosis is Good. Pt will continue to benefit from skilled outpatient speech and language therapy to address the deficits listed in the problem list on initial evaluation, provide pt/family education and to maximize pt's level of independence in the home and community environment.     Medical necessity is demonstrated by the following IMPAIRMENTS:  Dysphagia negatively impacts the efficiency and effectiveness of patient's swallow yielding high aspiration risk.  Barriers to Therapy: Complex medical history is a negative prognostic factor.  Pt's spiritual, cultural and educational needs considered and pt agreeable to plan of care and goals.  Plan:   Continue POC with focus on strengthening swallow musculature and increasing vocal intensity.    Jenae Castellano CCC-SLP  Speech Language Pathologist   5/6/2019

## 2019-05-08 ENCOUNTER — CLINICAL SUPPORT (OUTPATIENT)
Dept: REHABILITATION | Facility: HOSPITAL | Age: 69
End: 2019-05-08
Payer: MEDICARE

## 2019-05-08 ENCOUNTER — TELEPHONE (OUTPATIENT)
Dept: NEUROLOGY | Facility: HOSPITAL | Age: 69
End: 2019-05-08

## 2019-05-08 DIAGNOSIS — R13.14 PHARYNGOESOPHAGEAL DYSPHAGIA: ICD-10-CM

## 2019-05-08 DIAGNOSIS — R49.0 DYSPHONIA: ICD-10-CM

## 2019-05-08 DIAGNOSIS — R53.81 PHYSICAL DECONDITIONING: ICD-10-CM

## 2019-05-08 PROCEDURE — 92507 TX SP LANG VOICE COMM INDIV: CPT | Mod: PO

## 2019-05-08 PROCEDURE — 92526 ORAL FUNCTION THERAPY: CPT | Mod: PO

## 2019-05-08 PROCEDURE — 97161 PT EVAL LOW COMPLEX 20 MIN: CPT | Mod: PO

## 2019-05-08 NOTE — TELEPHONE ENCOUNTER
RE: Follow up Call for TF    Patient reports he has passed his swallow test and is able to eat soft foods. SLP note reviewed from 5/6/19.  He denies weight loss and reports weight stability. Asking when feeding tube will be removed. Dr Steiner to determine time frame for Tube removal.  He continues to take in Ensure Plus up to 1 bottle a day.

## 2019-05-08 NOTE — PLAN OF CARE
[facility]  Physical Therapy Neurological Rehabilitation Initial Evaluation    Name: Hoang Santos Jr.  Clinic Number: [id number]    Therapy Diagnosis:   Encounter Diagnosis   Name Primary?    Physical deconditioning      Physician: Madeleine Alvarado,*    Physician Orders: PT Eval and Treat Mr. Santos would benefit from outpatient neuro physical therapy. For ambulatory physical therapy at [facility] location     Medical Diagnosis from Referral: R13.10 (ICD-10-CM) - Dysphagia, unspecified type     Evaluation Date: 5/8/2019  Authorization Period Expiration: 12/31/19  Plan of Care Expiration: EVAL ONLY  Visit # / Visits authorized: 1/ 10    Time In: 13:45  Time Out: 14:20  Total Billable Time: 35 minutes    Precautions: Standard and PEG (weening off)    Subjective   Date of onset:  Tumor resection Jan 2019.    History of current condition - Hoang reports: Had significant weakness after 1 month long hospital stay s/p intestinal tumor resection. Feels he is back to about 95% of his prior strength. Notices that remaining 5% weakness is mostly related to endurance training.       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  has a past surgical history that includes Tonsillectomy; Uvulopalatopharyngoplasty; Cystoscopy with insertion of minimally invasive implant to enlarge prostatic urethra (N/A, 6/28/2018); Esophagogastroduodenoscopy (N/A, 9/24/2018); Endoscopic ultrasound of upper gastrointestinal tract (N/A, 11/5/2018); Duodenectomy (N/A, 1/4/2019); Gastrojejunostomy (N/A, 1/4/2019); Gastric fundoplication (N/A, 1/4/2019); Esophagogastroduodenoscopy (N/A, 1/4/2019); Cholecystectomy (1/4/2019); Liver biopsy (1/4/2019); Esophagogastroduodenoscopy (N/A, 1/21/2019);  Esophagogastroduodenoscopy (N/A, 1/24/2019); Gastrojejunostomy (Right, 1/24/2019); Lysis of adhesions (N/A, 1/24/2019); LAPAROTOMY, EXPLORATORY (N/A, 1/24/2019); and Percutaneous transhepatic cholangiography (Right, 1/29/2019).    Medications:   Hoang has a current medication list which includes the following prescription(s): amphotericin b, atorvastatin, bisacodyl, cholecalciferol (vitamin d3), cholestyramine (bulk), ciprofloxacin hcl, esomeprazole, famotidine, hydrocodone-apap 7.5-325 mg/15 ml, meclizine, metoclopramide hcl, multivitamin, nystatin, omeprazole, ondansetron, ondansetron, pantoprazole, ranitidine, sildenafil, and viagra.    Allergies:   Review of patient's allergies indicates:   Allergen Reactions    Epinephrine      Neuroendocrine Tumor patient      Toradol [ketorolac] Other (See Comments)     Creatinine rises even with sub therapeutic dose      Prior Therapy: acute care and HHOT.   Social History:  lives with their spouse  Falls: None   DME: used RW for about 3 days, that's all.     Home Environment: 2 story - no difficulty now    Exercise Routine / History: none   Family Present at time of Eval: none   Occupation: Preacher   Prior Level of Function: independent   Current Level of Function: Ron     Pain:  Current 0/10  Location: n/a    Pts goals: To assess if PT is needed or not.     Objective     Patient's mobility presenting to therapy evaluation: walks    Mental status: alert, oriented to person, place, and time, normal mood, behavior, dress, motor activity, and thought processes  Appearance: Well groomed  Behavior:  calm and cooperative  Attention Span and Concentration:  Normal  Follows commands: 100% of time   Speech: no deficits     Dominant hand:  left     Posture Alignment in sitting: WNL    Posture Alignment in standing: WNL    Sensation: Light Touch: Intact         Tone: normal  Limbs/muscles affected: n/a    Visual/Auditory: denies changes     Coordination:   - fine motor:   Intact  - UE coordination:  Intact  - LE coordination:   Intact    ROM:   UPPER EXTREMITY--AROM/PROM  (R) UE: WNLs  (L) UE: WNLs         RANGE OF MOTION--LOWER EXTREMITIES  (R) LE Hip: normal   Knee: normal   Ankle: normal    (L) LE: Hip: normal   Knee: normal   Ankle: normal    Strength: manual muscle test grades below   Upper Extremity Strength  (R) UE  (L) UE    Shoulder Flexion: 5/5 Shoulder Flexion: 5/5   Shoulder Abduction: 5/5 Shoulder Abduction: 5/5   Shoulder Extension: 5/5 Shoulder Extension:   5/5   Elbow Flexion: 5/5 Elbow Flexion: 5/5   Elbow Extension: 5/5 Elbow Extension: 5/5   Wrist Flexion: 5/5 Wrist Flexion: 5/5   Wrist Extension: 5/5 Wrist Extension: 5/5   : 5/5 : 5/5     Lower Extremity Strength  Right LE  Left LE    Hip Flexion: 5/5 Hip Flexion: 5/5   Hip Extension:  5/5 Hip Extension: 5/5   Hip Abduction: 5/5 Hip Abduction: 5/5   Hip Adduction: 5/5 Hip Adduction 5/5   Knee Extension: 5/5 Knee Extension: 5/5   Knee Flexion: 5/5 Knee Flexion: 5/5   Ankle Dorsiflexion: 5/5 Ankle Dorsiflexion: 5/5   Ankle Plantarflexion: 5/5 Ankle Plantarflexion: 5/5     Flexibility: intact     Evaluation 05/08/2019   30 second Chair Rise  (normative values to maintain physical independence: 16 reps) 17 completed with no arms     5 times sit-stand  Fall risk cutoff scores by population:  · General >12s  · PD >16s  · Vestibular/balance over 65 years >/= 15s  · CVA >/=  12s   8 seconds with no arms       Balance Testing    Evaluation 05/08/2019   Tandem Stance R LE forward, eyes open 30s  (<30 sec = Increased FALL RISK)   Tandem Stance L LE forward, eyes open 30s  (<30 sec = Increased FALL RISK)   Single Limb Stance R LE  eyes open 10s  (<10 sec = HIGH FALL RISK)   Single Limb Stance L LE  eyes open 10s  (<10 sec = HIGH FALL RISK)       Postural control: MCTSIB: Evaluation 05/08/2019   1. Eyes Open/feet together/Firm:  30 seconds   2. Eyes Closed/feet together/Firm:  30 seconds   3. Eyes Open/feet  together/Foam:  30 seconds   4. Eyes Closed/feet together/Foam:  30 seconds       GAIT ASSESSMENT  - AD used: No Assistive Device  - Assistance: independence  - Distance: community distances    Observed Gait Deviations:  Hoang displays the following deviations with ambulation:  Normal, does not require assistive devices, decreased fan      Evaluation 05/08/2019   Self Selected Walking Speed 0.86 m/sec (6m/7s) with  No Assistive Device   Fast Walking Speed 1.5 m/sec (6m/4s) with  No Assistive Device     Endurance Deficit: mild    Functional Mobility (Bed mobility, transfers)  Bed mobility:  I  Supine to sit:  I  Sit to supine:  I  Sit to stand:   I  Stand pivot:    I  Transfers to bed:  I  Transfers to toilet: I  Transfers to shower / tub:   I  Car transfers:   I - driving.   Wheelchair mobility:  n/a    ADL's:  Feeding: weening off PEG   Grooming: I  Hygiene: I  UB Dressing: I  LB Dressing: I  Toileting: I  Bathing: I      CMS Impairment/Limitation/Restriction for FOTO Survey    Therapist reviewed FOTO scores for Hoang Santos  on 5/8/2019.   FOTO documents entered into [employer] - see Media section.    Limitation Score: 23%       TREATMENT   No treatment provided today. All time spent on evaluation.       Home Exercises and Patient Education Provided    Education provided: POC, no treatment warranted; Encouraged continued general activity and walking at home.     Written Home Exercises Provided: no.     Assessment   Hoang is a 68 y.o. male referred to outpatient Physical Therapy with a medical diagnosis of s/p intestinal tumor resection. Pt presents with reports of generalized weakness and deconditioning s/p 1 month long hospital stay. He presents today stating that the weakness is resolved, he is gradually improving and feels back to 95% of baseline. His only remaining complaint is slight decrease in endurance for long community distances. Despite endurance deficits, he is back to all normal work, IADL and  ADL activities. He was agreeable that he does not feel he needs PT at this point. I encouraged continued general daily activity and feel he is a low fall risk. He is safe to start a fitness routine on his own if he would like. No further skilled PT is warranted at this time.     Pt prognosis is Excellent.      Plan of care discussed with patient: Yes  Pt's spiritual, cultural and educational needs considered and patient is agreeable to the plan of care and goals as stated below:     Anticipated Barriers for therapy: No deficits.     Medical Necessity is demonstrated by the following  History  Co-morbidities and personal factors that may impact the plan of care Co-morbidities:   advanced age    Personal Factors:   no deficits     low   Examination  Body Structures and Functions, activity limitations and participation restrictions that may impact the plan of care Body Regions:   trunk    Activity limitations:   Learning and applying knowledge  no deficits    General Tasks and Commands  no deficits    Communication  no deficits    Mobility  no deficits    Self care  no deficits    Domestic Life  no deficits    Interactions/Relationships  no deficits    Life Areas  no deficits    Community and Social Life  no deficits         low   Clinical Presentation stable and uncomplicated low   Decision Making/ Complexity Score: low       Plan   Plan of care Certification: EVAL ONLY. No treatment is warranted.     Sarahy Castorena, PT

## 2019-05-08 NOTE — PATIENT INSTRUCTIONS
"Voice Exercises:  Straw Phonation: create light seal around straw with lips. Place straw in front of teeth not between teeth. Maintain a low, open vowel sound. Complete 5 times. Try to maintain this "buzzing resonance" while speaking immediately after using straw.     PhoRTE Exercises: for loud voice  Loud, long, and energized ahhhh (5)  Loud and energized pitch glide up on ahhh (5)  Loud and energized pitch glide down on ahhh (5)    Phrases using loud and high voice (over the fence) (2)  Phrases in a loud and low voice (authority) (2)    Phrases:  1. Have a bless day  2. May God bless you.  3. How can I help you?  4. What are we having for dinner today?  5. Do you have any plans for today?  6. Did you get the mail out the box?  7. Do you need anything from the store?  8. what's your schedule for tomorrow?  9. I'm going to bed.  10. Let me check my schedule.     Complete all exercises for designated number of trials, 1 time per day.  "

## 2019-05-08 NOTE — PROGRESS NOTES
Outpatient Neurological Rehabilitation   Speech and Language Therapy Daily Note  Date:  5/8/2019     Name: Hoang Santos Jr.   MRN: [id number]   Therapy Diagnosis:   Encounter Diagnoses   Name Primary?    Pharyngoesophageal dysphagia     Dysphonia    Physician: Madeleine Alvarado,*  Physician Orders: Ambulatory Referral to Speech Therapy  Medical Diagnosis: R13.10 (ICD-10-CM) - Esophageal dysphagia      Visit #/ Visits Authorized: 18/ 20  Date of Evaluation:  4/2/2019  Insurance Authorization Period: 4/2/19 to 12/31/19  Plan of Care Certification:    4/2/2019 to 5/31/19  Extended POC:    Visits Cancelled: 0  Visits No Show: 0    Time In:  1300   Time Out: 1340   Total Billable Time: 40 minutes    Precautions: Standard and Aspiration    Subjective:   Pt reports: that he is eating mainly fish, yogurt, peaches, and creamed spinach at home. Reports early satiety. Discussed eating multiple small meals to maintain sufficient calorie intake to maintain weight.   Pain Scale:  0/10 on VAS currently.   Pain Location: No pain reported today.   Objective:   UNTIMED  Procedure Min.   Speech-Language-Voice Therapy  20   Dysphagia Therapy 20   Total Untimed Units: 2  Charges Billed/# of units: 2    Short Term Goals: (4 weeks) Current Progress:   1. Pt will complete Mendelson exercises (with or without NMES/Vital Stim) 30-45x to improve hyolaryngeal lift and excursion.  Progressing/ Not Met 5/8/2019   Mendhelson (without NMESt) with saliva:   X 50 (hold for 3 seconds)       2.  Pt will complete effortful swallow (with or without NMES/Vital Stim) 45-60x to improve movement and strength of tongue base.  Progressing/ Not Met 5/8/2019   Not formally addressed      3. Pt will perform CTAR (chin tuck against resistance) 30-45x to improve muscle strength.    Progressing/ Not Met 5/8/2019   1 minute 30 second hold x 2   Repetitions x 30 x2    4. Pt will complete PO trials during session with SLP.  Progressing/ Not Met 5/8/2019   Pt  took cup sips of water throughout session today. No coughing noted. Pt with intermittent throat clears.    5. Pt will complete tongue pullbacks 30-45x to improve tongue base strength.  Progressing/ Not Met 5/8/2019     Not formally addressed        6. Pt will verbalize swallow precautions ind'ly across x3 sessions.  Progressing/ Not Met 5/8/2019   Pt reports that he is swallowing multiple times per bite, avoiding tough foods, and using a liquid rinse when he needs to.     7. Pt will complete x3-5 sets of PhoRTE exercises using proper technique ind'ly.  Progressing/ Not Met 5/8/2019   Pt completed PhoRTE exercises x 2 with cues for use of resonant voice. Introduced straw phonation. Pt demonstrated good mastery of straw phonation       Patient Education/Response:   Reviewed MBSS.  Pt verbalized understanding to all discussed.      Assessment:   Hoang is progressing well towards his goals. Improved vocal quality today with straw phonation prior to PhoRTE exercises. CTAR and Mendhelsohn prioritized today to focus on hyolaryngeal elevation and excursion. Goals to be updated as necessary.     Pt prognosis is Good. Pt will continue to benefit from skilled outpatient speech and language therapy to address the deficits listed in the problem list on initial evaluation, provide pt/family education and to maximize pt's level of independence in the home and community environment.     Medical necessity is demonstrated by the following IMPAIRMENTS:  Dysphagia negatively impacts the efficiency and effectiveness of patient's swallow yielding high aspiration risk.  Barriers to Therapy: Complex medical history is a negative prognostic factor.  Pt's spiritual, cultural and educational needs considered and pt agreeable to plan of care and goals.  Plan:   Continue POC with focus on strengthening swallow musculature and increasing vocal intensity.    AJITH Meléndez, CCC-SLP  Speech Language Pathologist   5/8/2019

## 2019-05-10 ENCOUNTER — LAB VISIT (OUTPATIENT)
Dept: LAB | Facility: OTHER | Age: 69
End: 2019-05-10
Attending: INTERNAL MEDICINE
Payer: MEDICARE

## 2019-05-10 ENCOUNTER — TELEPHONE (OUTPATIENT)
Dept: NEUROLOGY | Facility: HOSPITAL | Age: 69
End: 2019-05-10

## 2019-05-10 ENCOUNTER — OFFICE VISIT (OUTPATIENT)
Dept: INTERNAL MEDICINE | Facility: CLINIC | Age: 69
End: 2019-05-10
Attending: INTERNAL MEDICINE
Payer: MEDICARE

## 2019-05-10 VITALS
OXYGEN SATURATION: 97 % | SYSTOLIC BLOOD PRESSURE: 115 MMHG | HEIGHT: 66 IN | DIASTOLIC BLOOD PRESSURE: 72 MMHG | WEIGHT: 193.81 LBS | HEART RATE: 107 BPM | BODY MASS INDEX: 31.15 KG/M2

## 2019-05-10 DIAGNOSIS — N40.1 BENIGN NON-NODULAR PROSTATIC HYPERPLASIA WITH LOWER URINARY TRACT SYMPTOMS: ICD-10-CM

## 2019-05-10 DIAGNOSIS — Z91.89 AT RISK FOR DEHYDRATION: ICD-10-CM

## 2019-05-10 DIAGNOSIS — G47.33 OSA (OBSTRUCTIVE SLEEP APNEA): Primary | ICD-10-CM

## 2019-05-10 DIAGNOSIS — C7A.010 MALIGNANT CARCINOID TUMOR OF DUODENUM: ICD-10-CM

## 2019-05-10 LAB
ANION GAP SERPL CALC-SCNC: 8 MMOL/L (ref 8–16)
BUN SERPL-MCNC: 13 MG/DL (ref 8–23)
CALCIUM SERPL-MCNC: 10 MG/DL (ref 8.7–10.5)
CHLORIDE SERPL-SCNC: 102 MMOL/L (ref 95–110)
CO2 SERPL-SCNC: 29 MMOL/L (ref 23–29)
CREAT SERPL-MCNC: 0.9 MG/DL (ref 0.5–1.4)
EST. GFR  (AFRICAN AMERICAN): >60 ML/MIN/1.73 M^2
EST. GFR  (NON AFRICAN AMERICAN): >60 ML/MIN/1.73 M^2
GLUCOSE SERPL-MCNC: 93 MG/DL (ref 70–110)
POTASSIUM SERPL-SCNC: 4.1 MMOL/L (ref 3.5–5.1)
SODIUM SERPL-SCNC: 139 MMOL/L (ref 136–145)

## 2019-05-10 PROCEDURE — 99214 OFFICE O/P EST MOD 30 MIN: CPT | Mod: S$PBB,,, | Performed by: INTERNAL MEDICINE

## 2019-05-10 PROCEDURE — 99215 OFFICE O/P EST HI 40 MIN: CPT | Mod: PBBFAC | Performed by: INTERNAL MEDICINE

## 2019-05-10 PROCEDURE — 80048 BASIC METABOLIC PNL TOTAL CA: CPT

## 2019-05-10 PROCEDURE — 36415 COLL VENOUS BLD VENIPUNCTURE: CPT

## 2019-05-10 PROCEDURE — 99999 PR PBB SHADOW E&M-EST. PATIENT-LVL V: CPT | Mod: PBBFAC,,, | Performed by: INTERNAL MEDICINE

## 2019-05-10 PROCEDURE — 99214 PR OFFICE/OUTPT VISIT, EST, LEVL IV, 30-39 MIN: ICD-10-PCS | Mod: S$PBB,,, | Performed by: INTERNAL MEDICINE

## 2019-05-10 PROCEDURE — 99999 PR PBB SHADOW E&M-EST. PATIENT-LVL V: ICD-10-PCS | Mod: PBBFAC,,, | Performed by: INTERNAL MEDICINE

## 2019-05-10 NOTE — PROGRESS NOTES
"Subjective:       Patient ID: Hoang Santos Jr. is a 68 y.o. male.    Chief Complaint: Annual Exam; Procedure; and Follow-up    Here for annual exam  Last seen over 1.5 years prior. Unfortunately pt has been busy battling neuroendocrine CA of duodenum. Course complicated by  spiration pneumonia and also found to have intra-abdominal abscess. EGD revealed congestion in gastric and antrum that was felt to be related to abscess. He had PEG tube plaved and has been advancing diet. Just started on solids. PEG tube in place but not being used any longer. Has f/u to discuss timing of removal. No pain at PEG site. His energy levels and activity are both improving. Weight is improving and tolerating liquids but is nervous about his kidney function. No abd pain or swelling, no SOB, CP, dizziness, BRBPR, BUNCH. CPAP adherance could be better.          Review of Systems    Objective:      Vitals:    05/10/19 1003   BP: 115/72   Pulse: 107   SpO2: 97%   Weight: 87.9 kg (193 lb 12.6 oz)   Height: 5' 6" (1.676 m)      Physical Exam   Constitutional: He is oriented to person, place, and time. He appears well-developed and well-nourished. No distress.   HENT:   Head: Normocephalic and atraumatic.   Mouth/Throat: Oropharynx is clear and moist. No oropharyngeal exudate.   Eyes: Pupils are equal, round, and reactive to light. Conjunctivae and EOM are normal. No scleral icterus.   Neck: No thyromegaly present.   Cardiovascular: Normal rate, regular rhythm and normal heart sounds.   No murmur heard.  Pulmonary/Chest: Effort normal and breath sounds normal. He has no wheezes. He has no rales.   Abdominal: Soft. He exhibits no distension. There is no tenderness.   Musculoskeletal: He exhibits no edema or tenderness.   Lymphadenopathy:     He has no cervical adenopathy.   Neurological: He is alert and oriented to person, place, and time.   Skin: Skin is warm and dry.   Psychiatric: He has a normal mood and affect. His behavior is normal.    "    Assessment:       1. GEMMA (obstructive sleep apnea)    2. At risk for dehydration    3. Vitamin D deficiency    4. Benign non-nodular prostatic hyperplasia with lower urinary tract symptoms    5. Malignant carcinoid tumor of duodenum        Plan:       Hoang was seen today for annual exam, procedure and follow-up.    Diagnoses and all orders for this visit:    GEMMA (obstructive sleep apnea)  -     Ambulatory referral to Sleep Disorders    At risk for dehydration  -     Basic metabolic panel; Future    Vitamin D deficiency    Benign non-nodular prostatic hyperplasia with lower urinary tract symptoms    Malignant carcinoid tumor of duodenum                 Side effects of medication(s) were discussed in detail and patient voiced understanding.  Patient will call back for any issues or complications.

## 2019-05-10 NOTE — TELEPHONE ENCOUNTER
Follow up appt scheduled with pt on Monday, May 13, 2019 at 915am.  Pt repeated date and time correctly.

## 2019-05-13 ENCOUNTER — OFFICE VISIT (OUTPATIENT)
Dept: NEUROLOGY | Facility: HOSPITAL | Age: 69
End: 2019-05-13
Attending: SURGERY
Payer: MEDICARE

## 2019-05-13 ENCOUNTER — IMMUNIZATION (OUTPATIENT)
Dept: PHARMACY | Facility: CLINIC | Age: 69
End: 2019-05-13
Payer: MEDICARE

## 2019-05-13 VITALS
WEIGHT: 193.56 LBS | TEMPERATURE: 97 F | SYSTOLIC BLOOD PRESSURE: 112 MMHG | DIASTOLIC BLOOD PRESSURE: 70 MMHG | HEART RATE: 92 BPM | BODY MASS INDEX: 31.24 KG/M2

## 2019-05-13 DIAGNOSIS — K31.89 DYSMOTILITY OF STOMACH: ICD-10-CM

## 2019-05-13 DIAGNOSIS — C7A.010 MALIGNANT CARCINOID TUMOR OF DUODENUM: Primary | ICD-10-CM

## 2019-05-13 PROCEDURE — 99214 OFFICE O/P EST MOD 30 MIN: CPT | Performed by: SURGERY

## 2019-05-13 NOTE — PROGRESS NOTES
NOLANETS:  [facility]  A collaboration between [facility] and [facility]      PATIENT: Hoang Santos Jr.  MRN: [id number]  DATE: 5/13/2019    Subjective:      Chief Complaint: Follow-up (peg tube removal)  feeling  Good     Eating regular food.  Not using feeding tube out all the last 1 week.  Able to take liquids as well as solids  Having normal bm  Overall strength improving  Back to [employer]  For discourses although not completely          Vitals:   Vitals:    05/13/19 0933   BP: 112/70   BP Location: Left arm   Patient Position: Sitting   BP Method: Medium (Automatic)   Pulse: 92   Temp: 97 °F (36.1 °C)   TempSrc: Oral   Weight: 87.8 kg (193 lb 9 oz)        Karnofsky Score:   Karnofsky Score    Karnofsky Score:  90% - Able to Carry on Normal Activity: Minor Symptoms of Disease         Diagnosis: No diagnosis found.     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    Prostatic hyperplasia     Sleep apnea        Past Surgical History:  Past Surgical History:   Procedure Laterality Date    BIOPSY, LIVER  1/4/2019    Performed by [facility]leine [facility]ado [facility] at [facility] OR    CHOLANGIOGRAM, PERCUTANEOUS, TRANSHEPATIC Right 1/29/2019    Performed by YOHANNES Shelley MD at [facility] OR    CHOLECYSTECTOMY  1/4/2019    Performed by [facility]ado [facility] at [facility] OR    DUODENECTOMY N/A 1/4/2019    Performed by [facility]ado [facility] at [facility] OR    EGD (ESOPHAGOGASTRODUODENOSCOPY) N/A 1/24/2019    Performed by [facility]ado [facility] at [facility] OR    EGD (ESOPHAGOGASTRODUODENOSCOPY) N/A 1/4/2019    Performed by [facility]ado MD at [facility] OR    EGD (ESOPHAGOGASTRODUODENOSCOPY) N/A 9/24/2018    Per[facility] END[facility] (4TH FLR)     ESOPHAGOGASTRODUODENOSCOPY (EGD) N/A 1/21/2019    Performed by Jose Kahn MD at [facility] ENDO    FUNDOPLICATION N/A 1/4/2019    Performed by [facility]leine [facility]ado MD at [facility] OR    GASTROJEJUNOSTOMY Right 1/24/2019    Performed by [facility]ado [facility] at [facility] OR    GASTROJEJUNOSTOMY N/A 1/4/2019    Performed by Madeleine Alvarado MD at [facility] OR    LAPAROTOMY, EXPLORATORY N/A 1/24/2019    Performed by Madeleine [facility]ado MD at [facility] OR    LYMPHADENECTOMY N/A 1/4/2019    Performed by [facility]leine [facility]ado MD at [facility] OR    LYSIS, ADHESIONS N/A 1/24/2019    Performed by [facility]ado MD at [facility] OR    PALATOPLASTY-(UPPP) N/A 2/14/2017    Performed by Bob Araujo III, MD at [facility] OR 2ND FLR    PH MONITORING, ESOPHAGUS, WIRELESS, (OFF REFLUX MEDS) N/A 9/24/2018    Performed by Salo Nuñez MD at [facility] ENDO (4TH FLR)    TONSILLECTOMY      TRANSPROSTATIC TISSUE RETRACTION N/A 6/28/2018    Performed by Kory Jack [facility] at [facility] OR    ULTRASOUND-ENDOSCOPIC-UPPER N/A 11/5/2018    Performed by Gorge Reyes MD at [facility] ENDO    UVULOPALATOPHARYNGOPLASTY         Family History:  Family History   Problem Relation Age of Onset    Heart disease Mother     Diabetes Mother     Stroke Father     Cancer Sister         pancreatitic    COPD Sister     Prostate cancer Neg Hx     Kidney disease Neg Hx     Thyroid disease Neg Hx     Retinal detachment Neg Hx     Macular degeneration Neg Hx     Hypertension Neg Hx     Glaucoma Neg Hx        Allergies:  Review of patient's allergies indicates:   Allergen Reactions    Epinephrine      Neuroendocrine Tumor patient      Toradol [ketorolac] Other (See Comments)     Creatinine rises even with sub therapeutic dose       Medications:  Current Outpatient Medications   Medication Sig Dispense Refill    cholestyramine, bulk, Powd Take 4 g by mouth 3 (three) times daily with meals. 500 g 11    amphotericin b 100 mg/5 mL Soln oral solution  Swish and swallow 5 mLs (100 mg total) 4 (four) times daily. 600 mL 4    atorvastatin (LIPITOR) 40 MG tablet Take 1 tablet (40 mg total) by mouth once daily. 90 tablet 3    cholecalciferol, vitamin D3, 50,000 unit capsule Take 1 capsule (50,000 Units total) by mouth once a week. 12 capsule 3    hydrocodone-acetaminophen (HYCET) solution 7.5-325 mg/15mL Take 15 mLs by mouth every 6 (six) hours as needed for Pain. 300 mL 0    meclizine (ANTIVERT) 25 mg tablet Take 1 tablet (25 mg total) by mouth 3 (three) times daily as needed for Dizziness. 14 tablet 0    multivitamin Liqd Take 5 mLs by mouth once daily. 150 mL 11    nystatin (MYCOSTATIN) 100,000 unit/mL suspension SWISH AND SPIT 4ML FOUR TIMES A DAY FOR 21 DAYS 336 mL 0    ondansetron (ZOFRAN) 4 MG tablet Take 1 tablet (4 mg total) by mouth every 8 (eight) hours as needed for Nausea. 30 tablet 2    ondansetron (ZOFRAN) 4 mg/5 mL solution Take 5 mLs (4 mg total) by mouth 2 (two) times daily as needed for Nausea. 50 mL 0    pantoprazole (PROTONIX) 40 MG tablet Take 1 tablet (40 mg total) by mouth 2 (two) times daily. 60 tablet 11    sildenafil (VIAGRA) 100 MG tablet Take 1 tablet (100 mg total) by mouth daily as needed for Erectile Dysfunction. 30 tablet 3    VIAGRA 100 mg tablet Take 1 tablet (100 mg total) by mouth once daily. Brand name only medication. 10 tablet 11     No current facility-administered medications for this visit.        Review of Systems   Constitutional: Positive for unexpected weight change. Negative for chills, diaphoresis, fatigue and fever.   HENT: Negative for congestion, dental problem, drooling, ear discharge, ear pain, facial swelling, hearing loss, mouth sores, nosebleeds, postnasal drip, rhinorrhea, sinus pain, sneezing, sore throat, tinnitus and trouble swallowing.    Eyes: Negative for photophobia, pain, redness and itching.   Respiratory: Negative for apnea, cough, chest tightness, shortness of breath, wheezing and stridor.     Cardiovascular: Negative for palpitations and leg swelling.   Gastrointestinal: Negative for diarrhea, nausea and vomiting.   Endocrine: Negative.    Musculoskeletal: Negative for gait problem, myalgias, neck pain and neck stiffness.   Skin: Negative.    Allergic/Immunologic: Negative.    Neurological: Negative for dizziness, seizures, syncope, facial asymmetry, speech difficulty and headaches.   Hematological: Negative.    Psychiatric/Behavioral: Negative.       Objective:      Physical Exam   Constitutional: He is oriented to person, place, and time. He appears well-developed and well-nourished.   HENT:   Head: Normocephalic and atraumatic.   Right Ear: External ear normal.   Left Ear: External ear normal.   Eyes: Pupils are equal, round, and reactive to light. Conjunctivae and EOM are normal.   Neck: Normal range of motion.   Cardiovascular: Normal rate and regular rhythm.   Pulmonary/Chest: Effort normal and breath sounds normal.   Abdominal: Soft. Bowel sounds are normal. He exhibits no distension and no mass. There is no tenderness. There is no rebound and no guarding. No hernia.   G tube in position wound looks good   Musculoskeletal: Normal range of motion.   Neurological: He is alert and oriented to person, place, and time.   Skin: Skin is warm.   Psychiatric: He has a normal mood and affect. His behavior is normal.      Assessment:       No diagnosis found.    Laboratory Data:    Results for SOTO CHA JR. (MRN [id number]) as of 5/13/2019 09:43   Ref. Range 3/19/2019 11:49 3/19/2019 14:09 5/3/2019 14:13 5/10/2019 11:32   WBC Latest Ref Range: 3.90 - 12.70 K/uL  5.92     RBC Latest Ref Range: 4.60 - 6.20 M/uL  3.62 (L)     Hemoglobin Latest Ref Range: 14.0 - 18.0 g/dL  10.8 (L)     Hematocrit Latest Ref Range: 40.0 - 54.0 %  34.1 (L)     MCV Latest Ref Range: 82 - 98 fL  94     MCH Latest Ref Range: 27.0 - 31.0 pg  29.8     MCHC Latest Ref Range: 32.0 - 36.0 g/dL  31.7 (L)     RDW Latest Ref Range:  11.5 - 14.5 %  16.5 (H)     Platelets Latest Ref Range: 150 - 350 K/uL  210     MPV Latest Ref Range: 9.2 - 12.9 fL  9.6     Gran% Latest Ref Range: 38.0 - 73.0 %  70.6     Gran # (ANC) Latest Ref Range: 1.8 - 7.7 K/uL  4.2     Lymph% Latest Ref Range: 18.0 - 48.0 %  16.2 (L)     Lymph # Latest Ref Range: 1.0 - 4.8 K/uL  1.0     Mono% Latest Ref Range: 4.0 - 15.0 %  12.3     Mono # Latest Ref Range: 0.3 - 1.0 K/uL  0.7     Eosinophil% Latest Ref Range: 0.0 - 8.0 %  0.5     Eos # Latest Ref Range: 0.0 - 0.5 K/uL  0.0     Basophil% Latest Ref Range: 0.0 - 1.9 %  0.2     Baso # Latest Ref Range: 0.00 - 0.20 K/uL  0.01     Differential Method Unknown  Automated     Sodium Latest Ref Range: 136 - 145 mmol/L  137  139   Potassium Latest Ref Range: 3.5 - 5.1 mmol/L  4.0  4.1   Chloride Latest Ref Range: 95 - 110 mmol/L  100  102   CO2 Latest Ref Range: 23 - 29 mmol/L  28  29   Anion Gap Latest Ref Range: 8 - 16 mmol/L  9  8   BUN, Bld Latest Ref Range: 8 - 23 mg/dL  20  13   Creatinine Latest Ref Range: 0.5 - 1.4 mg/dL  1.0  0.9   eGFR if non African American Latest Ref Range: >60 mL/min/1.73 m^2  >60  >60   eGFR if  Latest Ref Range: >60 mL/min/1.73 m^2  >60  >60   Glucose Latest Ref Range: 70 - 110 mg/dL  91  93   Calcium Latest Ref Range: 8.7 - 10.5 mg/dL  9.4  10.0   Alkaline Phosphatase Latest Ref Range: 55 - 135 U/L  177 (H)     PROTEIN TOTAL Latest Ref Range: 6.0 - 8.4 g/dL  7.2     Albumin Latest Ref Range: 3.5 - 5.2 g/dL  2.6 (L)     BILIRUBIN TOTAL Latest Ref Range: 0.1 - 1.0 mg/dL  0.6     AST Latest Ref Range: 10 - 40 U/L  56 (H)     ALT Latest Ref Range: 10 - 44 U/L  61 (H)     FL MODIFIED BARIUM SWALLOW SPEECH STUDY Unknown Rpt  Rpt        Impression: doing welll overall  Eating now  Gaining weight    Will cjheck LFt and albumin  If looks go will dc G tube  Plan:         Labs next week  F/u next week    Discussed plan for over 30 mts  Discussed about removing G tube           FALLON Salvador   MD, FACS   Associate Professor of Surgery, [facility] [place]   Neuroendocrine Surgery, Hepatic/Pancreatic & General Surgery   [address], [address]   [place] [address]   ph. [phone]; [phone]   fax. [phone]

## 2019-05-13 NOTE — PATIENT INSTRUCTIONS
Return to clinic on Monday, May 20, 2019 at 915am, labs prior to appt.    Follow up with Dr. Steiner and Senthil on June 24, 2019 at 930.  You will be notified on Appt time with Dr. Ndiaye.    MRI and Gallium scheduled on Friday, June 21, 2019 at 930am.    Tumor Markers 1 month prior to appt in June.

## 2019-05-14 ENCOUNTER — CLINICAL SUPPORT (OUTPATIENT)
Dept: REHABILITATION | Facility: HOSPITAL | Age: 69
End: 2019-05-14
Payer: MEDICARE

## 2019-05-14 DIAGNOSIS — R49.0 DYSPHONIA: ICD-10-CM

## 2019-05-14 DIAGNOSIS — R13.14 PHARYNGOESOPHAGEAL DYSPHAGIA: ICD-10-CM

## 2019-05-14 PROCEDURE — 92526 ORAL FUNCTION THERAPY: CPT | Mod: PO

## 2019-05-14 NOTE — PROGRESS NOTES
Outpatient Neurological Rehabilitation   Speech and Language Therapy Daily Note  Date:  5/14/2019     Name: Hoang Santos Jr.   MRN: [id number]   Therapy Diagnosis:   Encounter Diagnoses   Name Primary?    Pharyngoesophageal dysphagia     Dysphonia    Physician: Madeleine Alvarado,*  Physician Orders: Ambulatory Referral to Speech Therapy  Medical Diagnosis: R13.10 (ICD-10-CM) - Esophageal dysphagia    Visit #/ Visits Authorized: 19/ 20  Date of Evaluation:  4/2/2019  Insurance Authorization Period: 4/2/19 to 12/31/19  Plan of Care Certification:    4/2/2019 to 5/31/19  Extended POC:    Visits Cancelled: 0  Visits No Show: 0    Time In:  1300   Time Out: 1345  Total Billable Time: 45 minutes    Precautions: Standard and Aspiration    Subjective:   Pt reports: that he is eating everything. He still wants to work on his voice.   Pain Scale:  0/10 on VAS currently.   Pain Location: No pain reported today.   Objective:   UNTIMED  Procedure Min.   Speech-Language-Voice Therapy     Dysphagia Therapy 45   Total Untimed Units: 1  Charges Billed/# of units: 1    Short Term Goals: (4 weeks) Current Progress:   1. Pt will complete Mendelson exercises (with or without NMES/Vital Stim) 30-45x to improve hyolaryngeal lift and excursion.  Progressing/ Not Met 5/14/2019   Mendhelson (without NMESt) with saliva:   X 20 (hold for 3 seconds)       2.  Pt will complete effortful swallow (with or without NMES/Vital Stim) 45-60x to improve movement and strength of tongue base.  Progressing/ Not Met 5/14/2019   Not formally addressed      3. Pt will perform CTAR (chin tuck against resistance) 30-45x to improve muscle strength.    Progressing/ Not Met 5/14/2019   1 minute 30 second hold x 2   Repetitions x 25 x1   4. Pt will complete PO trials during session with SLP.  Progressing/ Not Met 5/14/2019   Pt took cup sips of water throughout session today. No coughing noted. Pt with intermittent throat clears.    5. Pt will complete  tongue pullbacks 30-45x to improve tongue base strength.  Progressing/ Not Met 5/14/2019     Not formally addressed        6. Pt will verbalize swallow precautions ind'ly across x3 sessions.  Progressing/ Met x 2  5/14/2019   Pt reports that he is swallowing multiple times per bite, avoiding tough foods, and using a liquid rinse when he needs to.     7. Pt will complete x3-5 sets of PhoRTE exercises using proper technique ind'ly.  Progressing/ Not Met 5/14/2019   Not formally addressed          Patient Education/Response:   Reviewed swallowing precautions and exercises. Reviewed importance to continue doing ex's to improve muscle strength.  Pt verbalized understanding to all discussed.      Assessment:   Hoang is progressing well towards his goals. CTAR and Mendhelsohn performed today to focus on hyolaryngeal elevation and excursion. HE was only able to do 25 CTAR and 20 Mendhelsohn today. Goals to be updated as necessary.     Pt prognosis is Good. Pt will continue to benefit from skilled outpatient speech and language therapy to address the deficits listed in the problem list on initial evaluation, provide pt/family education and to maximize pt's level of independence in the home and community environment.     Medical necessity is demonstrated by the following IMPAIRMENTS:  Dysphagia negatively impacts the efficiency and effectiveness of patient's swallow yielding high aspiration risk.  Barriers to Therapy: Complex medical history is a negative prognostic factor.  Pt's spiritual, cultural and educational needs considered and pt agreeable to plan of care and goals.  Plan:   Continue POC with focus on strengthening swallow musculature and increasing vocal intensity.    AJITH Mohan, CCC-SLP  Speech Language Pathologist   5/14/2019

## 2019-05-16 ENCOUNTER — CLINICAL SUPPORT (OUTPATIENT)
Dept: REHABILITATION | Facility: HOSPITAL | Age: 69
End: 2019-05-16
Payer: MEDICARE

## 2019-05-16 DIAGNOSIS — R49.0 DYSPHONIA: ICD-10-CM

## 2019-05-16 DIAGNOSIS — R13.14 PHARYNGOESOPHAGEAL DYSPHAGIA: ICD-10-CM

## 2019-05-16 PROCEDURE — 92526 ORAL FUNCTION THERAPY: CPT | Mod: PO

## 2019-05-16 PROCEDURE — 92507 TX SP LANG VOICE COMM INDIV: CPT | Mod: PO

## 2019-05-16 NOTE — PROGRESS NOTES
Outpatient Neurological Rehabilitation   Speech and Language Therapy Daily Note  Date:  5/16/2019     Name: Hoang Santos Jr.   MRN: [id number]   Therapy Diagnosis:   Encounter Diagnoses   Name Primary?    Pharyngoesophageal dysphagia     Dysphonia    Physician: Madeleine Alvarado,*  Physician Orders: Ambulatory Referral to Speech Therapy  Medical Diagnosis: R13.10 (ICD-10-CM) - Esophageal dysphagia    Visit #/ Visits Authorized: 20/ 20  Date of Evaluation:  4/2/2019  Insurance Authorization Period: 4/2/19 to 12/31/19  Plan of Care Certification:    4/2/2019 to 5/31/19  Extended POC:    Visits Cancelled: 0  Visits No Show: 0    Time In:  1300   Time Out: 1345  Total Billable Time: 45 minutes    Precautions: Standard and Aspiration    Subjective:   Pt reports: he has been practicing his ex's at home.  Pain Scale:  0/10 on VAS currently.   Pain Location: No pain reported today.   Objective:   UNTIMED  Procedure Min.   Speech-Language-Voice Therapy  15   Dysphagia Therapy 30   Total Untimed Units: 2  Charges Billed/# of units: 2    Short Term Goals: (4 weeks) Current Progress:   1. Pt will complete Mendelson exercises (with or without NMES/Vital Stim) 30-45x to improve hyolaryngeal lift and excursion.  Progressing/ Not Met 5/16/2019   Mendhelson (without NMESt) with saliva:   X 30 (hold for 3 seconds)    It takes the pt a long time to complete.    2.  Pt will complete effortful swallow (with or without NMES/Vital Stim) 45-60x to improve movement and strength of tongue base.  Progressing/ Not Met 5/16/2019   Not formally addressed      3. Pt will perform CTAR (chin tuck against resistance) 30-45x to improve muscle strength.    Progressing/ Not Met 5/16/2019   1 minute 30 second hold x 15   It takes the pt a long time to complete.     4. Pt will complete PO trials during session with SLP.  Progressing/ Not Met 5/16/2019   Pt took cup sips of water throughout session today. No coughing noted. Pt with intermittent  throat clears 2/2 secretions.   5. Pt will complete tongue pullbacks 30-45x to improve tongue base strength.  Progressing/ Not Met 5/16/2019     Not formally addressed        6. Pt will verbalize swallow precautions ind'ly across x3 sessions.  Goal met/discontinue  5/16/2019   Pt reports that he is swallowing multiple times per bite, avoiding tough foods, and using a liquid rinse when he needs to.     7. Pt will complete x3-5 sets of PhoRTE exercises using proper technique ind'ly.  Progressing/ Not Met 5/16/2019   Phorte ex's performed x 1 to ensure he was performing correctly at home. Pt performed correctly.         Patient Education/Response:   Reviewed progress.   Pt verbalized understanding to all discussed. Goals to be updated as necessary.    Pt prognosis is Good. Pt will continue to benefit from skilled outpatient speech and language therapy to address the deficits listed in the problem list on initial evaluation, provide pt/family education and to maximize pt's level of independence in the home and community environment.  Assessment:   Hoang is progressing well towards his goals. CTAR and Mendhelsohn performed today to continue focus on hyolaryngeal elevation and excursion. Increased vocal strength and loudness noted during PHORTE ex's and in conversation. Goals to be updated as necessary.     Pt prognosis is Good. Pt will continue to benefit from skilled outpatient speech and language therapy to address the deficits listed in the problem list on initial evaluation, provide pt/family education and to maximize pt's level of independence in the home and community environment.     Medical necessity is demonstrated by the following IMPAIRMENTS:  Dysphagia negatively impacts the efficiency and effectiveness of patient's swallow yielding high aspiration risk.  Barriers to Therapy: Complex medical history is a negative prognostic factor.  Pt's spiritual, cultural and educational needs considered and pt agreeable  to plan of care and goals.     Plan:   Continue POC with focus on strengthening swallow musculature and increasing vocal intensity.    AJITH Mohan, CCC-SLP  Speech Language Pathologist   5/16/2019

## 2019-05-20 ENCOUNTER — OFFICE VISIT (OUTPATIENT)
Dept: NEUROLOGY | Facility: HOSPITAL | Age: 69
End: 2019-05-20
Attending: SURGERY
Payer: MEDICARE

## 2019-05-20 VITALS
SYSTOLIC BLOOD PRESSURE: 112 MMHG | DIASTOLIC BLOOD PRESSURE: 75 MMHG | TEMPERATURE: 98 F | HEIGHT: 66 IN | HEART RATE: 92 BPM | WEIGHT: 193.88 LBS | BODY MASS INDEX: 31.16 KG/M2

## 2019-05-20 DIAGNOSIS — C7A.010 MALIGNANT CARCINOID TUMOR OF DUODENUM: Primary | ICD-10-CM

## 2019-05-20 LAB
EXT 24 HR UR METANEPHRINE: ABNORMAL
EXT 24 HR UR NORMETANEPHRINE: ABNORMAL
EXT 24 HR UR NORMETANEPHRINE: ABNORMAL
EXT 25 HYDROXY VIT D2: ABNORMAL
EXT 25 HYDROXY VIT D3: ABNORMAL
EXT 5 HIAA 24 HR URINE: ABNORMAL
EXT 5 HIAA BLOOD: 22 NG/ML (ref 0–22)
EXT ACTH: ABNORMAL
EXT AFP: ABNORMAL
EXT ALBUMIN: 3.7 G/DL (ref 3.6–5.1)
EXT ALKALINE PHOSPHATASE: 68 U/L (ref 40–115)
EXT ALT: 22 U/L (ref 9–46)
EXT AMYLASE: ABNORMAL
EXT ANTI ISLET CELL AB: ABNORMAL
EXT ANTI PARIETAL CELL AB: ABNORMAL
EXT ANTI THYROID AB: ABNORMAL
EXT AST: 34 U/L (ref 10–35)
EXT BILIRUBIN DIRECT: ABNORMAL
EXT BILIRUBIN TOTAL: 0.4 MG/DL (ref 0.2–1.2)
EXT BK VIRUS DNA QN PCR: ABNORMAL
EXT BUN: 9 MG/DL (ref 7–25)
EXT C PEPTIDE: ABNORMAL
EXT CA 125: ABNORMAL
EXT CA 19-9: ABNORMAL
EXT CA 27-29: ABNORMAL
EXT CALCITONIN: ABNORMAL
EXT CALCIUM: 9.5 MG/DL (ref 8.6–10.3)
EXT CEA: ABNORMAL
EXT CHLORIDE: 105 MMOL/L (ref 98–110)
EXT CHOLESTEROL: ABNORMAL
EXT CHROMOGRANIN A: ABNORMAL
EXT CO2: 31 MMOL/L (ref 20–32)
EXT CREATININE UA: ABNORMAL
EXT CREATININE: 0.85 MG/DL (ref 0.7–1.25)
EXT CYCLOSPORONE LEVEL: ABNORMAL
EXT DOPAMINE: ABNORMAL
EXT EBV DNA BY PCR: ABNORMAL
EXT EPINEPHRINE: ABNORMAL
EXT FOLATE: ABNORMAL
EXT FREE T3: ABNORMAL
EXT FREE T4: ABNORMAL
EXT FSH: ABNORMAL
EXT GASTRIN RELEASING PEPTIDE: ABNORMAL
EXT GASTRIN RELEASING PEPTIDE: ABNORMAL
EXT GASTRIN: ABNORMAL
EXT GGT: ABNORMAL
EXT GHRELIN: ABNORMAL
EXT GLUCAGON: ABNORMAL
EXT GLUCOSE: 94 MG/DL (ref 65–99)
EXT GROWTH HORMONE: ABNORMAL
EXT HCV RNA QUANT PCR: ABNORMAL
EXT HDL: ABNORMAL
EXT HEMATOCRIT: 39.8 % (ref 38.5–50)
EXT HEMOGLOBIN A1C: ABNORMAL %
EXT HEMOGLOBIN: 12.5 G/DL (ref 13.2–17.1)
EXT HISTAMINE 24 HR URINE: ABNORMAL
EXT HISTAMINE: ABNORMAL
EXT IGF-1: ABNORMAL
EXT IMMUNKNOW (NON-STIMULATED): ABNORMAL
EXT IMMUNKNOW (STIMULATED): ABNORMAL
EXT INR: ABNORMAL
EXT INSULIN: ABNORMAL
EXT LANREOTIDE LEVEL: ABNORMAL
EXT LDH, TOTAL: ABNORMAL
EXT LDL CHOLESTEROL: ABNORMAL
EXT LIPASE: ABNORMAL
EXT MAGNESIUM: ABNORMAL
EXT METANEPHRINE FREE PLASMA: ABNORMAL
EXT MOTILIN: ABNORMAL
EXT NEUROKININ A CAMB: ABNORMAL
EXT NEUROKININ A ISI: 15 PG/ML (ref 0–40)
EXT NEUROTENSIN: ABNORMAL
EXT NOREPINEPHRINE: ABNORMAL
EXT NORMETANEPHRINE: ABNORMAL
EXT NSE: ABNORMAL
EXT OCTREOTIDE LEVEL: ABNORMAL
EXT PANCREASTATIN CAMB: ABNORMAL
EXT PANCREASTATIN ISI: 46 PG/ML (ref 10–135)
EXT PANCREATIC POLYPEPTIDE: ABNORMAL
EXT PHOSPHORUS: ABNORMAL
EXT PLATELETS: 222 1000/UL (ref 140–400)
EXT POTASSIUM: 3.8 MMOL/L (ref 3.5–5.3)
EXT PROGRAF LEVEL: ABNORMAL
EXT PROLACTIN: ABNORMAL
EXT PROTEIN TOTAL: 7.3 G/DL (ref 6.1–8.1)
EXT PROTEIN UA: ABNORMAL
EXT PT: ABNORMAL
EXT PTH, INTACT: ABNORMAL
EXT PTT: ABNORMAL
EXT RAPAMUNE LEVEL: ABNORMAL
EXT SEROTONIN: 99 NG/ML (ref 56–244)
EXT SODIUM: 142 MMOL/L (ref 135–145)
EXT SOMATOSTATIN: ABNORMAL
EXT SUBSTANCE P: ABNORMAL
EXT TRIGLYCERIDES: ABNORMAL
EXT TRYPTASE: ABNORMAL
EXT TSH: ABNORMAL
EXT URIC ACID: ABNORMAL
EXT URINE AMYLASE U/HR: ABNORMAL
EXT URINE AMYLASE U/L: ABNORMAL
EXT VASOACTIVE INTESTINAL POLYPEPTIDE: ABNORMAL
EXT VITAMIN B12: ABNORMAL
EXT VMA 24 HR URINE: ABNORMAL
EXT WBC: 3.3 1000/UL (ref 3.8–10.8)
NEURON SPECIFIC ENOLASE: ABNORMAL

## 2019-05-20 PROCEDURE — 99215 OFFICE O/P EST HI 40 MIN: CPT | Performed by: SURGERY

## 2019-05-20 NOTE — PROGRESS NOTES
"NOLANETS:  [facility]  A collaboration between [facility] and [facility]      PATIENT: Hoang Santos Jr.  MRN: [id number]  DATE: 5/20/2019    Subjective:      Chief Complaint: Follow-up (1 week)   He is doing well continue to eat well notable in swallowing does not have any reflux problem improving physical activity, ambulation    Normal bowel movements    Vitals:   Vitals:    05/20/19 0905   BP: 112/75   Pulse: 92   Temp: 98.1 °F (36.7 °C)   TempSrc: Oral   Weight: 87.9 kg (193 lb 14.3 oz)   Height: 5' 6" (1.676 m)        Karnofsky Score:     Diagnosis: No diagnosis found.     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    Prostatic hyperplasia     Sleep apnea        Past Surgical History:  Past Surgical History:   Procedure Laterality Date    BIOPSY, LIVER  1/4/2019    Performed by Madeleine Alvarado MD at [facility] OR    CHOLANGIOGRAM, PERCUTANEOUS, TRANSHEPATIC Right 1/29/2019    Performed by YOHANNES Shelley MD at [facility] OR    CHOLECYSTECTOMY  1/4/2019    Performed by [facility]leine [facility]ado MD at [facility] OR    DUODENECTOMY N/A 1/4/2019    Performed by Madeleine Al[facility]ado MD at [facility] OR    EGD (ESOPHAGOGASTRODUODENOSCOPY) N/A 1/24/2019    Performed by Madeleine Al[facility]ado MD at [facility] OR    EGD (ESOPHAGOGASTRODUODENOSCOPY) N/A 1/4/2019    Performed by Madeleine Alvarado MD at [facility] OR    EGD (ESOPHAGOGASTRODUODENOSCOPY) N/A 9/24/2018    Performed by Salo Nuñez MD at [facility] ENDO (4TH FLR)    ESOPHAGOGASTRODUODENOSCOPY (EGD) N/A 1/21/2019    Performed by Jose Kahn MD at [facility] ENDO    FUNDOPLICATION N/A 1/4/2019    Performed by Madeleine Alvarado MD at [facility] OR    GASTROJEJUNOSTOMY Right 1/24/2019    Performed by Madeleine Alvraado MD at [facility] " OR    GASTROJEJUNOSTOMY N/A 1/4/2019    Performed by Madeleine Alvarado MD at [facility] OR    LAPAROTOMY, EXPLORATORY N/A 1/24/2019    Performed by Madeleine Alvarado MD at [facility] OR    LYMPHADENECTOMY N/A 1/4/2019    Performed by Madeleine Alvarado MD at [facility] OR    LYSIS, ADHESIONS N/A 1/24/2019    Performed by Madeleine [facility]ado MD at [facility] OR    PALATOPLASTY-(UPPP) N/A 2/14/2017    Performed by Bob Araujo III, MD at [facility] OR 2ND FLR    PH MONITORING, ESOPHAGUS, WIRELESS, (OFF REFLUX MEDS) N/A 9/24/2018    Performed by Salo Nuñez MD at [facility] ENDO (4TH FLR)    TONSILLECTOMY      TRANSPROSTATIC TISSUE RETRACTION N/A 6/28/2018    Performed by Kory Jack MD at [facility] OR    ULTRASOUND-ENDOSCOPIC-UPPER N/A 11/5/2018    Performed by Gorge Reyes MD at [facility] ENDO    UVULOPALATOPHARYNGOPLASTY         Family History:  Family History   Problem Relation Age of Onset    Heart disease Mother     Diabetes Mother     Stroke Father     Cancer Sister         pancreatitic    COPD Sister     Prostate cancer Neg Hx     Kidney disease Neg Hx     Thyroid disease Neg Hx     Retinal detachment Neg Hx     Macular degeneration Neg Hx     Hypertension Neg Hx     Glaucoma Neg Hx        Allergies:  Review of patient's allergies indicates:   Allergen Reactions    Epinephrine      Neuroendocrine Tumor patient      Toradol [ketorolac] Other (See Comments)     Creatinine rises even with sub therapeutic dose       Medications:  Current Outpatient Medications   Medication Sig Dispense Refill    amphotericin b 100 mg/5 mL Soln oral solution Swish and swallow 5 mLs (100 mg total) 4 (four) times daily. 600 mL 4    atorvastatin (LIPITOR) 40 MG tablet Take 1 tablet (40 mg total) by mouth once daily. 90 tablet 3    cholecalciferol, vitamin D3, 50,000 unit capsule Take 1 capsule (50,000 Units total) by mouth once a week. 12 capsule 3    cholestyramine, bulk, Powd Take 4 g by mouth 3 (three) times  daily with meals. 500 g 11    hydrocodone-acetaminophen (HYCET) solution 7.5-325 mg/15mL Take 15 mLs by mouth every 6 (six) hours as needed for Pain. 300 mL 0    meclizine (ANTIVERT) 25 mg tablet Take 1 tablet (25 mg total) by mouth 3 (three) times daily as needed for Dizziness. 14 tablet 0    multivitamin Liqd Take 5 mLs by mouth once daily. 150 mL 11    nystatin (MYCOSTATIN) 100,000 unit/mL suspension SWISH AND SPIT 4ML FOUR TIMES A DAY FOR 21 DAYS 336 mL 0    ondansetron (ZOFRAN) 4 MG tablet Take 1 tablet (4 mg total) by mouth every 8 (eight) hours as needed for Nausea. 30 tablet 2    ondansetron (ZOFRAN) 4 mg/5 mL solution Take 5 mLs (4 mg total) by mouth 2 (two) times daily as needed for Nausea. 50 mL 0    pantoprazole (PROTONIX) 40 MG tablet Take 1 tablet (40 mg total) by mouth 2 (two) times daily. 60 tablet 11    sildenafil (VIAGRA) 100 MG tablet Take 1 tablet (100 mg total) by mouth daily as needed for Erectile Dysfunction. 30 tablet 3    VIAGRA 100 mg tablet Take 1 tablet (100 mg total) by mouth once daily. Brand name only medication. 10 tablet 11     No current facility-administered medications for this visit.        Review of Systems   Constitutional: Positive for fatigue. Negative for appetite change, chills, diaphoresis and unexpected weight change.   HENT: Negative for dental problem, drooling, ear discharge, ear pain, hearing loss, mouth sores, nosebleeds, postnasal drip, rhinorrhea, sinus pain, sneezing, sore throat, tinnitus and trouble swallowing.    Eyes: Negative for photophobia, pain and visual disturbance.   Respiratory: Negative for choking, chest tightness, shortness of breath, wheezing and stridor.    Cardiovascular: Negative for palpitations and leg swelling.   Gastrointestinal: Negative for abdominal pain, anal bleeding and blood in stool.   Endocrine: Negative.    Genitourinary: Negative.    Musculoskeletal: Negative.    Allergic/Immunologic: Negative.    Neurological: Negative  for seizures, syncope, facial asymmetry, light-headedness and headaches.   Hematological: Negative.    Psychiatric/Behavioral: Negative.       Objective:      Physical Exam   Constitutional: He is oriented to person, place, and time. No distress.   HENT:   Head: Normocephalic and atraumatic.   Right Ear: External ear normal.   Left Ear: External ear normal.   Eyes: Pupils are equal, round, and reactive to light. EOM are normal.   Neck: Normal range of motion.   Cardiovascular: Normal rate and regular rhythm.   Pulmonary/Chest: Effort normal and breath sounds normal.   Abdominal: Soft. Bowel sounds are normal.   G-tube in place  The stitch holding the G-tube was removed and the G-tube was pulled out and pressure dressing was applied to the G-tube site   Musculoskeletal: Normal range of motion.   Neurological: He is alert and oriented to person, place, and time.   Skin: Skin is warm and dry. He is not diaphoretic.   Psychiatric: He has a normal mood and affect. His behavior is normal.      Assessment:       No diagnosis found.    Laboratory Data:   Results for SOTO CHA JR. (MRN [id number]) as of 5/20/2019 12:58   Ref. Range 3/19/2019 11:49 3/19/2019 14:09 5/3/2019 14:13 5/10/2019 11:32   WBC Latest Ref Range: 3.90 - 12.70 K/uL  5.92     RBC Latest Ref Range: 4.60 - 6.20 M/uL  3.62 (L)     Hemoglobin Latest Ref Range: 14.0 - 18.0 g/dL  10.8 (L)     Hematocrit Latest Ref Range: 40.0 - 54.0 %  34.1 (L)     MCV Latest Ref Range: 82 - 98 fL  94     MCH Latest Ref Range: 27.0 - 31.0 pg  29.8     MCHC Latest Ref Range: 32.0 - 36.0 g/dL  31.7 (L)     RDW Latest Ref Range: 11.5 - 14.5 %  16.5 (H)     Platelets Latest Ref Range: 150 - 350 K/uL  210     MPV Latest Ref Range: 9.2 - 12.9 fL  9.6     Gran% Latest Ref Range: 38.0 - 73.0 %  70.6     Gran # (ANC) Latest Ref Range: 1.8 - 7.7 K/uL  4.2     Lymph% Latest Ref Range: 18.0 - 48.0 %  16.2 (L)     Lymph # Latest Ref Range: 1.0 - 4.8 K/uL  1.0     Mono% Latest Ref Range:  4.0 - 15.0 %  12.3     Mono # Latest Ref Range: 0.3 - 1.0 K/uL  0.7     Eosinophil% Latest Ref Range: 0.0 - 8.0 %  0.5     Eos # Latest Ref Range: 0.0 - 0.5 K/uL  0.0     Basophil% Latest Ref Range: 0.0 - 1.9 %  0.2     Baso # Latest Ref Range: 0.00 - 0.20 K/uL  0.01     Differential Method Unknown  Automated     Sodium Latest Ref Range: 136 - 145 mmol/L  137  139   Potassium Latest Ref Range: 3.5 - 5.1 mmol/L  4.0  4.1   Chloride Latest Ref Range: 95 - 110 mmol/L  100  102   CO2 Latest Ref Range: 23 - 29 mmol/L  28  29   Anion Gap Latest Ref Range: 8 - 16 mmol/L  9  8   BUN, Bld Latest Ref Range: 8 - 23 mg/dL  20  13   Creatinine Latest Ref Range: 0.5 - 1.4 mg/dL  1.0  0.9   eGFR if non African American Latest Ref Range: >60 mL/min/1.73 m^2  >60  >60   eGFR if  Latest Ref Range: >60 mL/min/1.73 m^2  >60  >60   Glucose Latest Ref Range: 70 - 110 mg/dL  91  93   Calcium Latest Ref Range: 8.7 - 10.5 mg/dL  9.4  10.0   Alkaline Phosphatase Latest Ref Range: 55 - 135 U/L  177 (H)     PROTEIN TOTAL Latest Ref Range: 6.0 - 8.4 g/dL  7.2     Albumin Latest Ref Range: 3.5 - 5.2 g/dL  2.6 (L)     BILIRUBIN TOTAL Latest Ref Range: 0.1 - 1.0 mg/dL  0.6     AST Latest Ref Range: 10 - 40 U/L  56 (H)     ALT Latest Ref Range: 10 - 44 U/L  61 (H)     FL MODIFIED BARIUM SWALLOW SPEECH STUDY Unknown Rpt  Rpt        Impression:  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 Now he passed the swallowing test and is able to tolerate diet he has gradually increased on the diet doing very well with no swallowing issues.  His GI functions are back to normal.    He also underwent physical therapy he continues to stay active at home takes part in the [employer] activities  Plan:         G-tube pulled out without any problem pressure dressing applied. Labs obtained will await for the results.  Will let him wait for 3 months before we  could any scan and follow-up appointments S    Strongly recommended that he see a neurosurgeon for his cervical osteophyte which was the reason why he had all swallowing issues    MRI gallium scan in 3 months time follow-up with me in about 3 months time    Follow-up with primary care              FALLON Salvador MD, FACS   Associate Professor of Surgery, [facility]   Neuroendocrine Surgery, Hepatic/Pancreatic & General Surgery   [address], [address]   [place] [address]   ph. [phone]; [phone]   fax. [phone]

## 2019-05-21 ENCOUNTER — CLINICAL SUPPORT (OUTPATIENT)
Dept: REHABILITATION | Facility: HOSPITAL | Age: 69
End: 2019-05-21
Payer: MEDICARE

## 2019-05-21 DIAGNOSIS — R13.14 PHARYNGOESOPHAGEAL DYSPHAGIA: ICD-10-CM

## 2019-05-21 DIAGNOSIS — R49.0 DYSPHONIA: ICD-10-CM

## 2019-05-21 PROCEDURE — 92526 ORAL FUNCTION THERAPY: CPT | Mod: PO

## 2019-05-21 PROCEDURE — 92507 TX SP LANG VOICE COMM INDIV: CPT | Mod: PO

## 2019-05-21 NOTE — PROGRESS NOTES
Outpatient Neurological Rehabilitation   Speech and Language Therapy Daily Note  Date:  5/21/2019     Name: Hoang Santos Jr.   MRN: [id number]   Therapy Diagnosis:   Encounter Diagnoses   Name Primary?   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  Pain Scale:  0/10 on VAS currently.   Pain Location: No pain reported today.   Objective:   UNTIMED  Procedure Min.   Speech-Language-Voice Therapy  20   Dysphagia Therapy 25   Total Untimed Units: 2  Charges Billed/# of units: 2    Short Term Goals: (4 weeks) Current Progress:   1. Pt will complete Mendelson exercises (with or without NMES/Vital Stim) 30-45x to improve hyolaryngeal lift and excursion.  Progressing/ Not Met 5/21/2019   Mendhelson (without NMESt) with saliva:   X 30 (hold for 3 seconds)    It takes the pt a long time to complete.    2.  Pt will complete effortful swallow (with or without NMES/Vital Stim) 45-60x to improve movement and strength of tongue base.  Progressing/ Not Met 5/21/2019   Effortful swallow performed x 15     3. Pt will perform CTAR (chin tuck against resistance) 30-45x to improve muscle strength.    Progressing/ Not Met 5/21/2019   1 minute 30 second hold x 10   It takes the pt a long time to complete.     4. Pt will complete PO trials during session with SLP.  Goal met/discontinue 5/21/2019   Pt took cup sips of water throughout session today. No  coughing noted. Pt with intermittent throat clears 2/2 secretions.   5. Pt will complete tongue pullbacks 30-45x to improve tongue base strength.  Discontinue 5/21/2019     Not formally addressed/discontinue       6. Pt will verbalize swallow precautions ind'ly across x3 sessions.  Goal met/discontinue  5/21/2019   Pt reports that he is swallowing multiple times per bite, avoiding tough foods, and using a liquid rinse when he needs to.     7. Pt will complete x3-5 sets of PhoRTE exercises using proper technique ind'ly.  Progressing/ Met 5/21/2019   Not formally addressed. 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   Pt verbalized understanding to all discussed. Goals to be updated as necessary.    Pt prognosis is Good. Pt will continue to benefit from skilled outpatient speech and language therapy to address the deficits listed in the problem list on initial evaluation, provide pt/family education and to maximize pt's level of independence in the home and community environment.  Assessment:   Hoang is progressing well towards his goals. Increased swallowing function and pt is tolerating regular consistency diet and thin liquids. Goals to be updated as necessary.     Pt prognosis is Good. Pt will continue to benefit from skilled outpatient speech and language therapy to address the deficits listed in the problem list on initial evaluation, provide pt/family education and to maximize pt's level of independence in the home and community environment.     Medical necessity is demonstrated by the following IMPAIRMENTS:  Dysphagia negatively impacts the efficiency and effectiveness of patient's swallow yielding high aspiration risk.  Barriers to Therapy: Complex medical history is a negative prognostic factor.  Pt's spiritual, cultural and educational needs considered and pt  agreeable to plan of care and goals.     Plan:   Continue POC with focus on strengthening swallow musculature and increasing vocal intensity. D/C next visit.    AJITH Mohan, CCC-SLP  Speech Language Pathologist   5/21/2019

## 2019-05-23 ENCOUNTER — CLINICAL SUPPORT (OUTPATIENT)
Dept: REHABILITATION | Facility: HOSPITAL | Age: 69
End: 2019-05-23
Payer: MEDICARE

## 2019-05-23 DIAGNOSIS — R13.14 PHARYNGOESOPHAGEAL DYSPHAGIA: ICD-10-CM

## 2019-05-23 DIAGNOSIS — R49.0 DYSPHONIA: ICD-10-CM

## 2019-05-23 PROCEDURE — 92526 ORAL FUNCTION THERAPY: CPT | Mod: PO

## 2019-05-23 NOTE — PROGRESS NOTES
Outpatient Neurological Rehabilitation   Speech and Language Therapy Daily Note  Date:  5/23/2019     Name: Hoang Santos Jr.   MRN: [id number]   Therapy Diagnosis:   Encounter Diagnoses   Name Primary?   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 every now and then he feels like his swallow isnt perfect but this is usually when he has taken a large bite of food.     Pain Scale:  0/10 on VAS currently.   Pain Location: No pain reported today.   Objective:   UNTIMED  Procedure Min.   Speech-Language-Voice Therapy  0    Dysphagia Therapy 33    Total Untimed Units: 1   Charges Billed/# of units: 1     Short Term Goals: (4 weeks) Current Progress:   1. Pt will complete Mendelson exercises (with or without NMES/Vital Stim) 30-45x to improve hyolaryngeal lift and excursion. Mendelsohn with thin liquids :   X 30 (hold for 3 seconds)    Goal MEt 5/23/19 / Discontinue    2.  Pt will complete effortful swallow (with or without NMES/Vital Stim) 45-60x to improve movement and strength of tongue base. Effortful swallow performed x 45     Goal MEt 5/23/19 / Discontinue    3. Pt will perform CTAR (chin tuck against resistance) 30-45x to improve muscle strength.   1 minute 30 second hold x 2  Repetitions x40 x2    Goal Met 5/23/19 / Discontinue   4. Pt will complete PO trials during session with SLP.  Goal Met 5/21/19/  Discontinue    5. Pt will complete tongue pullbacks 30-45x to improve tongue base strength. Goal Not Addressed / Discontinued 5/21/19   6. Pt will verbalize swallow precautions ind'ly across x3 sessions. Goal Met 5/21/19/ Discontinue    7. Pt will complete x3-5 sets of PhoRTE exercises using proper technique ind'ly. Completed in home environment.   Goal Met 5/23/19/ Discontinue        Patient Education/Response:   Reviewed each exercise, to continue using effortful swallow with double swallow with meals and to continue PhoRTE exercises.     Assessment:   Hoang is progressing well towards his goals. Pt  indly performs all exercises and has transitioned to full oral intake. Dysphagia is considered resolved and swallow physiology is no longer impacting oral intake. Pt should consider neurosurgery consult as recommended by GI for evaluation of osteophyte.    Pt prognosis is Good.   Pt's spiritual, cultural and educational needs considered and pt agreeable to plan of care and goals.     Plan:   D/c AJITH Solano, CCC-SLP  Speech Language Pathologist   5/23/2019

## 2019-05-23 NOTE — PLAN OF CARE
Outpatient Therapy Discharge Summary     Name: Hoang Santos Jr.  Clinic Number: [id number]    Therapy Diagnosis:   Encounter Diagnoses   Name Primary?    Pharyngoesophageal dysphagia     Dysphonia      Physician: Madeleine Alvarado,*    Physician Orders: AMB referral to   Medical Diagnosis: esophageal dysphagia   Evaluation Date: 4/2/19      Date of Last visit: 5/23/2019   Total Visits Received: 21  Cancelled Visits: 0  No Show Visits: 0    Assessment    Pt  indly performs all exercises and has transitioned to full oral intake. Dysphagia is considered resolved and swallow physiology is no longer impacting oral intake. Pt should consider neurosurgery consult as recommended by GI for evaluation of osteophyte.    EAT-10 Score: 6     Short Term Goals: (4 weeks) Current Progress:   1. Pt will complete Mendelson exercises (with or without NMES/Vital Stim) 30-45x to improve hyolaryngeal lift and excursion. Mendelsohn with thin liquids :   X 30 (hold for 3 seconds)    Goal MEt 5/23/19 / Discontinue    2.  Pt will complete effortful swallow (with or without NMES/Vital Stim) 45-60x to improve movement and strength of tongue base. Effortful swallow performed x 45     Goal MEt 5/23/19 / Discontinue    3. Pt will perform CTAR (chin tuck against resistance) 30-45x to improve muscle strength.   1 minute 30 second hold x 2  Repetitions x40 x2    Goal Met 5/23/19 / Discontinue   4. Pt will complete PO trials during session with SLP.  Goal Met 5/21/19/ Discontinue    5. Pt will complete tongue pullbacks 30-45x to improve tongue base strength. Goal Not Addressed / Discontinued 5/21/19   6. Pt will verbalize swallow precautions ind'ly across x3 sessions. Goal Met 5/21/19/ Discontinue    7. Pt will complete x3-5 sets of PhoRTE exercises using proper technique ind'ly. Completed in home environment.   Goal Met 5/23/19/ Discontinue        Long Term Goals (8 weeks):   1. Pt will maintain adequate hydration/nutrition with optimum  safety and efficiency of swallowing function on PO intake without every s/s of aspiration for the highest appropriate diet level. Goal Met / Discontinue    2. Pt will utilize compensatory strategies with optimum safety and efficiency of swallowing function on PO intake without overt s/s of aspiration for the highest diet level. Goal Met / Discontinue      CHELSEA NOMS (National Outcome Measure System)  NOMS Swallowing  LEVEL 6: Swallowing is safe, and the individual eats and drinks independently and may rarely require minimal cueing. The individual usually self-cues when difficulty occurs. May need to avoid specific food items (e.g., popcorn and nuts), or require additional time (due to dysphagia).     Discharge reason: Patient has reached the maximum rehab potential for the present time    Plan   This patient is discharged from Speech Therapy      Hope AJITH Taylor, CCC-SLP  Speech Language Pathologist

## 2019-06-06 ENCOUNTER — OFFICE VISIT (OUTPATIENT)
Dept: OTOLARYNGOLOGY | Facility: CLINIC | Age: 69
End: 2019-06-06
Payer: MEDICARE

## 2019-06-06 VITALS
HEART RATE: 88 BPM | BODY MASS INDEX: 31.42 KG/M2 | WEIGHT: 194.69 LBS | DIASTOLIC BLOOD PRESSURE: 75 MMHG | SYSTOLIC BLOOD PRESSURE: 116 MMHG | TEMPERATURE: 97 F

## 2019-06-06 DIAGNOSIS — R49.9 VOICE DISTURBANCE: ICD-10-CM

## 2019-06-06 DIAGNOSIS — J38.3 VOCAL FOLD ATROPHY: Primary | ICD-10-CM

## 2019-06-06 DIAGNOSIS — R13.14 DYSPHAGIA, PHARYNGOESOPHAGEAL: ICD-10-CM

## 2019-06-06 DIAGNOSIS — J38.3 VOCAL FOLD SCAR: ICD-10-CM

## 2019-06-06 PROCEDURE — 99999 PR PBB SHADOW E&M-EST. PATIENT-LVL III: ICD-10-PCS | Mod: PBBFAC,,, | Performed by: OTOLARYNGOLOGY

## 2019-06-06 PROCEDURE — 99213 PR OFFICE/OUTPT VISIT, EST, LEVL III, 20-29 MIN: ICD-10-PCS | Mod: S$PBB,,, | Performed by: OTOLARYNGOLOGY

## 2019-06-06 PROCEDURE — 99999 PR PBB SHADOW E&M-EST. PATIENT-LVL III: CPT | Mod: PBBFAC,,, | Performed by: OTOLARYNGOLOGY

## 2019-06-06 PROCEDURE — 99213 OFFICE O/P EST LOW 20 MIN: CPT | Mod: S$PBB,,, | Performed by: OTOLARYNGOLOGY

## 2019-06-06 PROCEDURE — 99213 OFFICE O/P EST LOW 20 MIN: CPT | Mod: PBBFAC | Performed by: OTOLARYNGOLOGY

## 2019-06-06 NOTE — PROGRESS NOTES
[facility]  Department of Otorhinolaryngology and Communication Sciences    Subjective:      Hoang Santos Jr. is a 68 y.o. male who presents for follow-up.  We were following him primarily for vocal fold atrophy/scar and hyperfunctional dysphonia, which responded well to voice therapy. We also had discussed some dysphagia symptoms and identified some cervical osteophytes. He had a prior MBSS 7/2/2018.    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 After surgery, he had to be re-intubated for a short time. After that time, he required being NPO/PEG dependent. Had another MBSS 5/3/2019. He was cleared for PO intake. Has transitioned back to 100% PO. PEG has been removed. Pleased with his progress. He has resumed his voice exercsies, which have been helpful.    VHI-10 = 6  RSI = 17  EAT-10 = 15    The patient's medications, allergies, past medical, surgical, social and family histories were reviewed and updated as appropriate.    A detailed review of systems was obtained with pertinent positives as per the above HPI, and otherwise negative.      Objective:     /75 (BP Location: Right arm, Patient Position: Sitting, BP Method: Medium (Automatic))   Pulse 88   Temp 97.3 °F (36.3 °C)   Wt 88.3 kg (194 lb 10.7 oz)   BMI 31.42 kg/m²      Constitutional: comfortable, well dressed, obese  Psychiatric: appropriate affect  Respiratory: comfortably breathing, symmetric chest rise, no stridor  Voice: mild-moderate variable roughness; stimulable via PhoRTE strategies  Head: normocephalic  Eyes: conjunctivae and sclerae clear  OC/OP: post-surgical UPPP changes; no masses  Indirect laryngoscopy is limited due to gag      Assessment:     Hoang Santos Jr. is a 68 y.o. male with post-surgical dysphagia which has restored to its baseline, in addition to ocal fold atrophy/scar and hyperfunctional dysphonia.     Plan:     Reassurance was provided. I recommended he continue with his SLP voice  therapy exercises, which have been helpful to him in the past. He will follow up with me in the future as needed.    All questions were answered, and the patient is in agreement with the plan.    Juaquin Casas M.D.  [facility]  Department of Otorhinolaryngology and Communication Sciences

## 2019-07-09 ENCOUNTER — OFFICE VISIT (OUTPATIENT)
Dept: SLEEP MEDICINE | Facility: CLINIC | Age: 69
End: 2019-07-09
Attending: INTERNAL MEDICINE
Payer: MEDICARE

## 2019-07-09 VITALS
WEIGHT: 190.5 LBS | HEIGHT: 66 IN | HEART RATE: 79 BPM | BODY MASS INDEX: 30.62 KG/M2 | SYSTOLIC BLOOD PRESSURE: 107 MMHG | DIASTOLIC BLOOD PRESSURE: 74 MMHG

## 2019-07-09 DIAGNOSIS — G47.33 OSA (OBSTRUCTIVE SLEEP APNEA): Primary | ICD-10-CM

## 2019-07-09 PROCEDURE — 99999 PR PBB SHADOW E&M-EST. PATIENT-LVL III: CPT | Mod: PBBFAC,,, | Performed by: INTERNAL MEDICINE

## 2019-07-09 PROCEDURE — 99999 PR PBB SHADOW E&M-EST. PATIENT-LVL III: ICD-10-PCS | Mod: PBBFAC,,, | Performed by: INTERNAL MEDICINE

## 2019-07-09 PROCEDURE — 99214 OFFICE O/P EST MOD 30 MIN: CPT | Mod: S$PBB,,, | Performed by: INTERNAL MEDICINE

## 2019-07-09 PROCEDURE — 99213 OFFICE O/P EST LOW 20 MIN: CPT | Mod: PBBFAC | Performed by: INTERNAL MEDICINE

## 2019-07-09 PROCEDURE — 99214 PR OFFICE/OUTPT VISIT, EST, LEVL IV, 30-39 MIN: ICD-10-PCS | Mod: S$PBB,,, | Performed by: INTERNAL MEDICINE

## 2019-07-09 NOTE — PROGRESS NOTES
07/09/2019      Pt is following in regards to the GEMMA has hx of severe GEMMA AHI- 93 was on CPAP 12- 20  CM h20 stopped using the PAP therapy and has lost weight since last study. He does have dry mouth on waking, one of the reason of stopping PAP therapy was unable to tolerate the CPAP and oral dryness. He does snore and unclear about the witnessed apnea. He denies any headache on waking up. He denies any memory issues and mode . He does have nocturia. He denies drowsy driving. He denies any heartburn and palpation.     EPWORTH SLEEPINESS SCALE 7/8/2019   Sitting and reading 1   Watching TV 1   Sitting, inactive in a public place (e.g. a theatre or a meeting) 0   As a passenger in a car for an hour without a break 0   Lying down to rest in the afternoon when circumstances permit 1   Sitting and talking to someone 0   Sitting quietly after a lunch without alcohol 0   In a car, while stopped for a few minutes in traffic 0   Total score 3       12/11/2014 split night satudy  Respiratory: Mild to moderate snoring was present. There was severe GEMMA (obstructive sleep apnea)  based on AHI (apnea hypopnea index) criteria. The overall AHI was 93.3 with an oxygen david of 85.0%.    IMPRESSION:  1. Severe GEMMA (327.23) based on AHI criteria  2. Effective control of sleep disordered breathing was not achieved.  RECOMMENDATION:  1. Dedicated full night CPAP titration.  2. Alternatively, consider auto-titrating CPAP at 8-18 cm H2O to determine therapeutic pressure,  mask of patients choice, chin strap, and heated humidification, which is essential in conjunction  with PAP to prevent airway drying and irritation. The patient will need close follow up for further  pressure adjustments.      Hoang Santos Jr. was seen as a new patient, referred by Dr. Alegre, for the evaluation of obstructive sleep apnea.     CHIEF COMPLAINT: CPAP monitoring    HISTORY OF PRESENT ILLNESS:Hoang Santos Jr. a 68 y.o.  male presents for the  evaluation of obstructive sleep apnea. He was diagnosed with severe GEMMA by split -night PSG done 12/11/14. He was setup with apap 8-18cm but never had adherence monitoring. He was only able to keep mask on for 2-3hr before pulling it off at night. +oral drying problematic. He has stopped using it for some time due to this. Feels he sleeps well. Snoring w/o mask on.His wife uses her cpap faithfully. He is using FFM. Was told not to adjust the humidity button. Nocturia 2x. Denies sinus congestion. Denies am headaches. Air gasping w/o mask. We reviewed his study results in detail today.     Denies symptoms of restless legs or kicking during sleep.     On todays Cropsey Sleepiness Scale the patient scores a 0/24.     PSG AHI 93.3/low sat 85%, improvement at cpap 8cm during supine NREM.An effective supine REM pressure not determined. Used nasal mask/chin strap      SOCIAL HISTORY: .Retired govt/ current preacher. No tobacco.   FH: Denies known sleep disorders, wife/daughter use CPAP    REVIEW OF SYSTEMS:  Sleep related symptoms as per HPI;         Vitals:    07/09/19 0859   BP: 107/74   Pulse: 79         GENERAL: Well groomed; Normally developed;  Physical Exam  Neck size: 17 inch  Oral: mcfadden IV, high arch, mild over bite. Absent - uvuela and tonsils.   Nose: mild nasal congestion b/l  CVS: S1+S2 ,negative murmur/ gallop, regularly regular  Lungs: CTA B/L  Abdomen: BS+, no guarding, - rigidity.  Ext: negative pedal edema B/L LE           ASSESSMENT:     Obstructive sleep apnea (GEMMA), previously diagnosed severe. AHI 93.3 was on PAP therapy currently not on any treatment due to intolrance to CPAP and dry mouth  has lost weight since last study He has medical comorbidities of obesity, GERD and has hx of severe GEMMA AHI- 93.3 . Will suggest following .     PLAN:   GEMMA:  Discussed with the patient will do the split night study to reqaulify for the treatment for the GEMMA Split night AHI> 10 WITH 3 HRS OF RECORDING  BEFORE SPLITTING. START WITH CPAP but pt had issues with CPAP and can switch to Bilevel for pt comfort early on if having issue with the CPAP and suggested to use the heated tubing. Suggested to avoid sleeping supine  Except the test day and avoid any drowsy driving.  Suggested to use the nasal saline three times a day and Flonase at bed time 1-2 squirt and Claritin 10 mg daily.       Thank you for allowing me the opportunity to participate in the care of your patient.       Follow up: 2 months          Jonathan Brandt MD, FACP  Phone: [phone]  Fax: [phone]

## 2019-07-09 NOTE — PATIENT INSTRUCTIONS
Sleep Hygiene Practices    1. Try going to bed only when you are drowsy.    ?    2. If you are unable to fall asleep or stay asleep, leave your bedroom and engage in a quiet activity elsewhere. Do not permit yourself to fall asleep outside the bedroom. Return to bed when and only when you are sleepy. Repeat this process as often as necessary throughout night.    3. Maintain regular wake-up time, even on days off work & weekends    4. Use your bedroom for sleep and sex    5. Avoid napping during the daytime. If daytime sleepiness becomes overwhelming, limit nap time to a single nap of less than 1hr, no later than 3pm.    6. Distract your mind. Avoid clock watching. Lying in bed unable to sleep and frustrated needs to be avoided. Try reading or watching a videotape or listening to books on tape. It may be necessary to go into another room to do these.    7. Avoid caffeine within 4-6hrs of bedtime    8. Avoid use of nicotine close to bedtime    9. do not drink alcoholic beverages within 4-6hrs of bedtime    10. While a light snack before bedtime can help promote sound sleep, avoid large meals.    11. Obtain regular exercise, but avoid strenuous exercise within 4hrs of bedtime    12. Minimize light, noise, and extremes in temperature in the bedroom.    13. Precautions: The patient was advised to abstain from driving should they feel sleepy or drowsy.  ?   Suggested to use the nasal saline three times a day and Flonase at bed time 1-2 squirt and Claritin 10 mg daily.       * This information is provided had been directly obtained from the American Academy of [employer] wellness booklet on sleep hygiene. ([facility], [address] [place] [address]

## 2019-07-09 NOTE — LETTER
July 9, 2019      Lee Kay MD  [address]  [address]  [facility] [address]           [facility] [place] [address]  [address]  [facility] [address]  Phone: [phone]          Patient: Hoang Santos Jr.   MR Number: [id number]   YOB: 1950   Date of Visit: 7/9/2019       Dear Dr. Lee Kay:    Thank you for referring Hoang Santos to me for evaluation. Attached you will find relevant portions of my assessment and plan of care.    If you have questions, please do not hesitate to call me. I look forward to following Hoang Santos along with you.    Sincerely,    Jonathan Brandt MD    Enclosure  CC:  No Recipients    If you would like to receive this communication electronically, please contact [email or web address] or [phone] to request more information on [facility] access.    For providers and/or their staff who would like to refer a patient to [facility], please contact us through our one-stop-shop provider referral line, [facility], at [phone].    If you feel you have received this communication in error or would no longer like to receive these types of communications, please e-mail [email or web address]

## 2019-07-11 ENCOUNTER — TELEPHONE (OUTPATIENT)
Dept: SLEEP MEDICINE | Facility: OTHER | Age: 69
End: 2019-07-11

## 2019-07-30 ENCOUNTER — TELEPHONE (OUTPATIENT)
Dept: NEUROLOGY | Facility: HOSPITAL | Age: 69
End: 2019-07-30

## 2019-07-30 ENCOUNTER — HOSPITAL ENCOUNTER (EMERGENCY)
Facility: OTHER | Age: 69
Discharge: HOME | End: 2019-07-30
Attending: EMERGENCY MEDICINE
Payer: MEDICARE

## 2019-07-30 VITALS
BODY MASS INDEX: 29.89 KG/M2 | HEART RATE: 94 BPM | RESPIRATION RATE: 16 BRPM | SYSTOLIC BLOOD PRESSURE: 150 MMHG | WEIGHT: 186 LBS | HEIGHT: 66 IN | DIASTOLIC BLOOD PRESSURE: 83 MMHG | OXYGEN SATURATION: 99 % | TEMPERATURE: 98 F

## 2019-07-30 DIAGNOSIS — Z85.9 H/O MALIGNANT CARCINOID TUMOR: ICD-10-CM

## 2019-07-30 DIAGNOSIS — R10.84 GENERALIZED ABDOMINAL PAIN: ICD-10-CM

## 2019-07-30 DIAGNOSIS — K52.9 GASTROENTERITIS: Primary | ICD-10-CM

## 2019-07-30 LAB
ALBUMIN SERPL BCP-MCNC: 4.1 G/DL (ref 3.5–5.2)
ALP SERPL-CCNC: 95 U/L (ref 55–135)
ALT SERPL W/O P-5'-P-CCNC: 29 U/L (ref 10–44)
ANION GAP SERPL CALC-SCNC: 16 MMOL/L (ref 8–16)
AST SERPL-CCNC: 46 U/L (ref 10–40)
BASOPHILS # BLD AUTO: 0.02 K/UL (ref 0–0.2)
BASOPHILS NFR BLD: 0.3 % (ref 0–1.9)
BILIRUB SERPL-MCNC: 0.6 MG/DL (ref 0.1–1)
BILIRUB UR QL STRIP: NEGATIVE
BUN SERPL-MCNC: 11 MG/DL (ref 8–23)
CALCIUM SERPL-MCNC: 9.7 MG/DL (ref 8.7–10.5)
CHLORIDE SERPL-SCNC: 105 MMOL/L (ref 95–110)
CLARITY UR: CLEAR
CO2 SERPL-SCNC: 20 MMOL/L (ref 23–29)
COLOR UR: YELLOW
CREAT SERPL-MCNC: 0.8 MG/DL (ref 0.5–1.4)
DIFFERENTIAL METHOD: ABNORMAL
EOSINOPHIL # BLD AUTO: 0.1 K/UL (ref 0–0.5)
EOSINOPHIL NFR BLD: 0.8 % (ref 0–8)
ERYTHROCYTE [DISTWIDTH] IN BLOOD BY AUTOMATED COUNT: 13.5 % (ref 11.5–14.5)
EST. GFR  (AFRICAN AMERICAN): >60 ML/MIN/1.73 M^2
EST. GFR  (NON AFRICAN AMERICAN): >60 ML/MIN/1.73 M^2
GLUCOSE SERPL-MCNC: 117 MG/DL (ref 70–110)
GLUCOSE UR QL STRIP: NEGATIVE
HCT VFR BLD AUTO: 43.5 % (ref 40–54)
HGB BLD-MCNC: 13.8 G/DL (ref 14–18)
HGB UR QL STRIP: NEGATIVE
IMM GRANULOCYTES # BLD AUTO: 0.01 K/UL (ref 0–0.04)
IMM GRANULOCYTES NFR BLD AUTO: 0.1 % (ref 0–0.5)
KETONES UR QL STRIP: NEGATIVE
LEUKOCYTE ESTERASE UR QL STRIP: NEGATIVE
LIPASE SERPL-CCNC: 49 U/L (ref 4–60)
LYMPHOCYTES # BLD AUTO: 0.7 K/UL (ref 1–4.8)
LYMPHOCYTES NFR BLD: 8.9 % (ref 18–48)
MCH RBC QN AUTO: 29.1 PG (ref 27–31)
MCHC RBC AUTO-ENTMCNC: 31.7 G/DL (ref 32–36)
MCV RBC AUTO: 92 FL (ref 82–98)
MONOCYTES # BLD AUTO: 0.6 K/UL (ref 0.3–1)
MONOCYTES NFR BLD: 7.7 % (ref 4–15)
NEUTROPHILS # BLD AUTO: 6.2 K/UL (ref 1.8–7.7)
NEUTROPHILS NFR BLD: 82.2 % (ref 38–73)
NITRITE UR QL STRIP: NEGATIVE
NRBC BLD-RTO: 0 /100 WBC
PH UR STRIP: 6 [PH] (ref 5–8)
PLATELET # BLD AUTO: 191 K/UL (ref 150–350)
PMV BLD AUTO: 10.1 FL (ref 9.2–12.9)
POTASSIUM SERPL-SCNC: 3.5 MMOL/L (ref 3.5–5.1)
PROT SERPL-MCNC: 8.1 G/DL (ref 6–8.4)
PROT UR QL STRIP: ABNORMAL
RBC # BLD AUTO: 4.74 M/UL (ref 4.6–6.2)
SODIUM SERPL-SCNC: 141 MMOL/L (ref 136–145)
SP GR UR STRIP: 1.02 (ref 1–1.03)
URN SPEC COLLECT METH UR: ABNORMAL
UROBILINOGEN UR STRIP-ACNC: NEGATIVE EU/DL
WBC # BLD AUTO: 7.55 K/UL (ref 3.9–12.7)

## 2019-07-30 PROCEDURE — 81003 URINALYSIS AUTO W/O SCOPE: CPT

## 2019-07-30 PROCEDURE — 25000003 PHARM REV CODE 250: Performed by: EMERGENCY MEDICINE

## 2019-07-30 PROCEDURE — 80053 COMPREHEN METABOLIC PANEL: CPT

## 2019-07-30 PROCEDURE — 99285 EMERGENCY DEPT VISIT HI MDM: CPT | Mod: 25

## 2019-07-30 PROCEDURE — 96375 TX/PRO/DX INJ NEW DRUG ADDON: CPT | Mod: 59

## 2019-07-30 PROCEDURE — 96361 HYDRATE IV INFUSION ADD-ON: CPT

## 2019-07-30 PROCEDURE — S0028 INJECTION, FAMOTIDINE, 20 MG: HCPCS | Performed by: EMERGENCY MEDICINE

## 2019-07-30 PROCEDURE — 96374 THER/PROPH/DIAG INJ IV PUSH: CPT | Mod: 59

## 2019-07-30 PROCEDURE — 36415 COLL VENOUS BLD VENIPUNCTURE: CPT

## 2019-07-30 PROCEDURE — 85025 COMPLETE CBC W/AUTO DIFF WBC: CPT

## 2019-07-30 PROCEDURE — 83690 ASSAY OF LIPASE: CPT

## 2019-07-30 PROCEDURE — 63600175 PHARM REV CODE 636 W HCPCS: Performed by: EMERGENCY MEDICINE

## 2019-07-30 PROCEDURE — 96372 THER/PROPH/DIAG INJ SC/IM: CPT | Mod: 59

## 2019-07-30 PROCEDURE — 25500020 PHARM REV CODE 255: Performed by: EMERGENCY MEDICINE

## 2019-07-30 RX ORDER — HYDROCODONE BITARTRATE AND ACETAMINOPHEN 5; 325 MG/1; MG/1
1 TABLET ORAL EVERY 4 HOURS PRN
Qty: 12 TABLET | Refills: 0 | Status: SHIPPED | OUTPATIENT
Start: 2019-07-30 | End: 2020-03-25

## 2019-07-30 RX ORDER — ONDANSETRON 4 MG/1
4 TABLET, ORALLY DISINTEGRATING ORAL EVERY 8 HOURS PRN
Qty: 30 TABLET | Refills: 0 | Status: SHIPPED | OUTPATIENT
Start: 2019-07-30 | End: 2020-03-25

## 2019-07-30 RX ORDER — SODIUM CHLORIDE 9 MG/ML
1000 INJECTION, SOLUTION INTRAVENOUS
Status: COMPLETED | OUTPATIENT
Start: 2019-07-30 | End: 2019-07-30

## 2019-07-30 RX ORDER — DICYCLOMINE HYDROCHLORIDE 10 MG/ML
20 INJECTION INTRAMUSCULAR
Status: COMPLETED | OUTPATIENT
Start: 2019-07-30 | End: 2019-07-30

## 2019-07-30 RX ORDER — FAMOTIDINE 10 MG/ML
20 INJECTION INTRAVENOUS
Status: COMPLETED | OUTPATIENT
Start: 2019-07-30 | End: 2019-07-30

## 2019-07-30 RX ORDER — ONDANSETRON 2 MG/ML
4 INJECTION INTRAMUSCULAR; INTRAVENOUS
Status: COMPLETED | OUTPATIENT
Start: 2019-07-30 | End: 2019-07-30

## 2019-07-30 RX ADMIN — LIDOCAINE HYDROCHLORIDE: 20 SOLUTION ORAL; TOPICAL at 08:07

## 2019-07-30 RX ADMIN — ONDANSETRON 4 MG: 2 INJECTION INTRAMUSCULAR; INTRAVENOUS at 05:07

## 2019-07-30 RX ADMIN — FAMOTIDINE 20 MG: 10 INJECTION, SOLUTION INTRAVENOUS at 05:07

## 2019-07-30 RX ADMIN — DICYCLOMINE HYDROCHLORIDE 20 MG: 20 INJECTION, SOLUTION INTRAMUSCULAR at 06:07

## 2019-07-30 RX ADMIN — IOHEXOL 100 ML: 350 INJECTION, SOLUTION INTRAVENOUS at 07:07

## 2019-07-30 RX ADMIN — SODIUM CHLORIDE 1000 ML: 0.9 INJECTION, SOLUTION INTRAVENOUS at 05:07

## 2019-07-30 NOTE — ED NOTES
Hourly rounding completed. Pt resting in ED stretcher quietly. Pt rates pain as a 6 on numerical pain scale. Pt AAO x 4, calm, cooperative and appropriate at this time. Pt remains on cardiac and Bp monitoring. No acute distress. Will continue to monitor.

## 2019-07-30 NOTE — ED NOTES
Assumed care of pt at this time. Rounding on pt performed. No active vomiting at this time. Reports improvement in symptoms post med admin. Monitoring continues.

## 2019-07-30 NOTE — ED PROVIDER NOTES
Encounter Date: 7/30/2019    SCRIBE #1 NOTE: Chiquita LEONE am scribing for, and in the presence of, Dr. Fitch.       History     Chief Complaint   Patient presents with    Abdominal Pain     mid abdominal pain with vomiting.      Time seen by provider: 6:19 AM    This is a 68 y.o. male with hx of carcinoid tumor of duodenum and HLD who presents with complaint of lower abdominal pain for about six hours. Pain is constant and non radiating, he reports no identifying, alleviating, or exacerbating factors. He reports associated N/V/D. Pt states that the sx kept him from sleeping. He states that he ate chicken and macaroni last night, states that food was not suspicious. He reports no sick contacts. He does also report rhinorrhea and nasal congestion, currently being treated with flonase. Pt states that he had major abdominal surgery 1/2019 to remove a tumor, he states that he was hospitalized for a month, complicated course including respiratory failure, sepsis, required GJ tube for nutritional support. Pt states that he lost 50 lbs during this time, his weight has been stable. Pt is not undergoing chemotherapy. He denies any fever, chills, blood in vomitus, blood in stool, and urinary sx.    The history is provided by the patient.     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    DUODENECTOMY N/A 1/4/2019    Performed by [facility]ado [facility] at [facility] OR    EGD (ESOPHAGOGASTRODUODENOSCOPY) N/A 1/24/2019    Performed by [facility]ado [facility] at [facility] OR    EGD (ESOPHAGOGASTRODUODENOSCOPY) N/A 1/4/2019    Performed by [facility]ado [facility] at [facility] OR    EGD (ESOPHAGOGASTRODUODENOSCOPY) N/A 9/24/2018    Performed by Salo Nuñez MD at [facility] ENDO (4TH FLR)    ESOPHAGOGASTRODUODENOSCOPY (EGD) N/A 1/21/2019    Performed by Jose Kahn MD at [facility] ENDO    FUNDOPLICATION N/A 1/4/2019    Performed by [facility]leine [facility]ado [facility] at [facility] OR    GASTROJEJUNOSTOMY Right 1/24/2019    Performed by [facility]ado [facility] at [facility] OR    GASTROJEJUNOSTOMY N/A 1/4/2019    Performed by [facility]ado [facility] at [facility] OR    LAPAROTOMY, EXPLORATORY N/A 1/24/2019    Performed by Madeleine [facility]ado MD at [facility]    LYMPHADENECTOMY N/A 1/4/2019    Performed by Madeleine Al[facility]ado MD at [facility]    LYSIS, ADHESIONS N/A 1/24/2019    Performed by [facility]ado [facility] at [facility]    PALATOPLASTY-(UPPP) N/A 2/14/2017    Performed by Bob Araujo III, MD at [facility] OR 2ND FLR    PH MONITORING, ESOPHAGUS, WIRELESS, (OFF REFLUX MEDS) N/A 9/24/2018    Performed by Salo Nuñez MD at [facility] ENDO (4TH FLR)    TONSILLECTOMY      TRANSPROSTATIC TISSUE RETRACTION N/A 6/28/2018    Performed by [facility] Jack [facility] at [facility]    ULTRASOUND-ENDOSCOPIC-UPPER N/A 11/5/2018    Performed by Gorge Reyes MD at [facility] ENDO    UVULOPALATOPHARYNGOPLASTY       Family History   Problem Relation Age of Onset    Heart disease Mother     Diabetes Mother     Stroke Father     Cancer Sister         pancreatitic    COPD Sister     Prostate cancer Neg Hx     Kidney disease Neg Hx     Thyroid disease Neg Hx     Retinal detachment Neg Hx     Macular degeneration Neg Hx     Hypertension Neg Hx     Glaucoma Neg Hx      Social History     Tobacco Use     Smoking status: Never Smoker    Smokeless tobacco: Never Used   Substance Use Topics    Alcohol use: No     Alcohol/week: 1.2 oz     Types: 2 Cans of beer per week     Frequency: Monthly or less     Drinks per session: Patient refused     Binge frequency: Never    Drug use: No     Review of Systems   Constitutional: Negative for fever.   HENT: Positive for congestion and rhinorrhea. Negative for sore throat.    Respiratory: Negative for shortness of breath.    Cardiovascular: Negative for chest pain.   Gastrointestinal: Positive for abdominal pain, diarrhea, nausea and vomiting. Negative for blood in stool.        Negative for blood in vomitus.   Genitourinary: Negative for difficulty urinating, dysuria, frequency and hematuria.   Musculoskeletal: Negative for back pain.   Skin: Negative for rash.   Neurological: Negative for weakness.   Hematological: Does not bruise/bleed easily.   All other systems reviewed and are negative.      Physical Exam     Initial Vitals [07/30/19 0532]   BP Pulse Resp Temp SpO2   139/84 92 16 98.1 °F (36.7 °C) 98 %      MAP       --         Physical Exam    Nursing note and vitals reviewed.  Constitutional: He appears well-developed and well-nourished.   HENT:   Head: Normocephalic and atraumatic.   Eyes: Conjunctivae and EOM are normal. Pupils are equal, round, and reactive to light.   Neck: Normal range of motion. Neck supple.   Cardiovascular: Normal rate, regular rhythm, normal heart sounds and intact distal pulses. Exam reveals no gallop and no friction rub.    No murmur heard.  Pulmonary/Chest: Breath sounds normal. No stridor. No respiratory distress. He has no wheezes. He has no rhonchi. He has no rales. He exhibits no tenderness.   Abdominal: Soft. Bowel sounds are normal. He exhibits no distension. There is generalized tenderness. There is no rebound and no guarding.   Mild periumbilical tenderness bilaterally. Extensive scarring from prior surgery.   Musculoskeletal: Normal  range of motion.   Neurological: He is alert and oriented to person, place, and time. He has normal strength. No cranial nerve deficit. GCS score is 15. GCS eye subscore is 4. GCS verbal subscore is 5. GCS motor subscore is 6.   Skin: Skin is warm and dry. Capillary refill takes less than 2 seconds.   Psychiatric: He has a normal mood and affect.         ED Course   Procedures  Labs Reviewed   CBC W/ AUTO DIFFERENTIAL - Abnormal; Notable for the following components:       Result Value    Hemoglobin 13.8 (*)     Mean Corpuscular Hemoglobin Conc 31.7 (*)     Lymph # 0.7 (*)     Gran% 82.2 (*)     Lymph% 8.9 (*)     All other components within normal limits   COMPREHENSIVE METABOLIC PANEL - Abnormal; Notable for the following components:    CO2 20 (*)     Glucose 117 (*)     AST 46 (*)     All other components within normal limits   URINALYSIS, REFLEX TO URINE CULTURE - Abnormal; Notable for the following components:    Protein, UA Trace (*)     All other components within normal limits    Narrative:     Preferred Collection Type->Urine, Clean Catch   LIPASE          Imaging Results          CT Abdomen Pelvis With Contrast (Final result)  Result time 07/30/19 07:55:07    Final result by Dixon Mcclain MD (07/30/19 07:55:07)                 Impression:      Gastric wall and fold thickening is noted this may relate to an inflammatory process such as gastritis, clinical and historical correlation is needed.    Mild wall and fold thickening of small bowel loops with mild prominence, as well as mild wall and fold thickening of the colon, significant surrounding inflammatory change is not seen however correlation for enteritis/colitis is needed.    Diminished attenuation along the portal venous triads and edema seen along the ning hepatis and adjacent to the pancreatic head and duodenum, nonspecific, not thought to represent pancreatitis however correlation for pancreatitis is needed, alternatively correlation for  hepatitis or cholangitis is needed.  There is no abnormal biliary dilatation.      Electronically signed by: Dixon Mcclain  Date:    07/30/2019  Time:    07:55             Narrative:    EXAMINATION:  CT ABDOMEN PELVIS WITH CONTRAST    CLINICAL HISTORY:  Infection, abdomen-pelvis;LLQ pain, suspect diverticulitis;Nausea, vomiting, diarrhea;    TECHNIQUE:  Low dose axial images, sagittal and coronal reformations were obtained from the lung bases to the pubic symphysis following the IV administration of 100 mL of Omnipaque 350 .  Oral contrast was not given.    COMPARISON:  March 19, 2019    FINDINGS:  Single-phase CT examination of the abdomen and pelvis was performed.    The lung bases demonstrate mild motion artifact and atelectatic change, there is elevation of the left hemidiaphragm noted.  There is postoperative change of the stomach noted, there is appearance of gastric wall and fold thickening, this may relate to an inflammatory process such as gastritis, clinical and historical correlation is needed.    There is mild free fluid adjacent to the liver, there is appearance of diminished attenuation along the portal venous triads and ning hepatis, and extending adjacent to the duodenum and pancreatic head, this may relate to edema or fluid tracking, a well-formed fluid collection typical of abscess is not seen.  Correlation for hepatitis or cholangitis is needed.  There is no abnormal biliary dilatation.  There is no primary pancreatic inflammatory change, given the aforementioned correlation for pregnant hiatus is needed although the appearance is not typical for pancreatitis.  The gallbladder is surgically absent.  Previously identified percutaneous drainage catheter has been removed in the interim.  Mild density near its previous location may relate to postoperative change.    There is no evidence for acute process of the spleen or adrenal glands.  Small renal hypodensities are noted too small for  characterization.  There is no evidence for hydronephrosis or obstructive uropathy bilaterally.  There is appearance of stenosis at the origin of the celiac artery without occlusion.  The abdominal aorta demonstrates appropriate opacification.  The urinary bladder is incompletely distended appearing unremarkable for lack of distention.  Postoperative change of the anterior abdominal wall is noted.    There is no evidence for small bowel obstructive process there are some mildly prominent small bowel loops and appearance of mild wall and fold thickening this may relate to enteritis.  The appendix is mildly prominent however does not appear inflamed and the appendiceal prominence may relate to colonic findings, as there is mild prominence of the right colon with fluid and air, mild fluid and air throughout the colon as well as mild stool, there is also appearance of mild wall and fold thickening throughout the colon without a large degree of pericolonic inflammatory change however correlation for colitis is needed.  There is no free intraperitoneal air.  The osseous structures demonstrate chronic change.                               X-Ray Abdomen Flat And Erect (Final result)  Result time 07/30/19 06:13:04    Final result by Dixon Mcclain MD (07/30/19 06:13:04)                 Impression:      Nonspecific bowel gas pattern, as above.      Electronically signed by: Dixon Mcclain  Date:    07/30/2019  Time:    06:13             Narrative:    EXAMINATION:  XR ABDOMEN FLAT AND ERECT    CLINICAL HISTORY:  Unspecified abdominal pain    TECHNIQUE:  Flat and erect AP views of the abdomen were performed.    COMPARISON:  None    FINDINGS:  Abdominal radiographic examination is submitted.  Surgical clips of the right upper abdomen are noted.  The bowel gas pattern is nonspecific abnormal bowel distention is not appreciated there are few air-fluid levels however nonspecific appearance.  The osseous structures demonstrate  chronic change, surgical clips of the lower pelvis are noted.                              X-Rays:   Independently Interpreted Readings:   Abdomen:   Flat and Erect of Abdomen - Surgical clips in the right upper abdomen. Surgical clips in the pelvis. Air fluid levels on the right side. Paucity of bowel gas.      Medical Decision Making:   Initial Assessment:   68 y.o. Male, hx of carcinoid tumor and complex surgery 1/2019, presents with periumbilical pain x 6 hours, associated with nausea, vomiting, and diarrhea. Suspect possible viral gastroenteritis or food borne illness. However, given complex hx plan labs, antiemetics, as well as CT scan.  Differential Diagnosis:   GERD, intestinal spasm, gastroenteritis, gastritis, ulcer, cholecystitis, gallstones, pancreatitis, ileus, small bowel obstruction, appendicitis, constipation, intestinal gas pain.  Independently Interpreted Test(s):   I have ordered and independently interpreted X-rays - see prior notes.  Clinical Tests:   Lab Tests: Ordered and Reviewed  Radiological Study: Ordered and Reviewed  ED Management:  - labs  - IVF  - antiemetics  - bentyl  - CT abd pelvis    CT scan of the abdomen and pelvis shows some nonspecific gastric wall thickening as well as mild diffuse bowel edema concerning for infectious or inflammatory change.  No focal abscess or surgical problem identified.      Patient improved with treatment in the emergency department and comfortable going home. Discussed reasons to return and importance of followup.  Patient understands that the emergency visit today is primarily to address immediate concerns and to rule out emergent cause of symptoms and that they may require further workup and evaluation as an outpatient. All questions addressed and patient given discharge instructions and followup information.               Scribe Attestation:   Scribe #1: I performed the above scribed service and the documentation accurately describes the services I  performed. I attest to the accuracy of the note.    Attending Attestation:           Physician Attestation for Scribe:  Physician Attestation Statement for Scribe #1: I, Dr. Fitch, reviewed documentation, as scribed by Chiquita Gabriel in my presence, and it is both accurate and complete.                 ED Course as of Jul 30 0907   Tue Jul 30, 2019   0852 Pt feels better overall. He is still complaining of mild epigastric pain, but nausea is resolved.  Will d/c with short course of analgesics and antiemetics.  Return precautions discussed.     [CA]      ED Course User Index  [CA] Chiquita Gabriel     Clinical Impression:     1. Gastroenteritis    2. Generalized abdominal pain    3. H/O malignant carcinoid tumor                                 Erika Fitch MD  07/30/19 0924

## 2019-07-30 NOTE — TELEPHONE ENCOUNTER
Patient was calling to report that he had gone to the ED at [facility] for treatment of severe stomach pain and nausea. There he was given meds to calm his stomach and had some testing (CT, Labs) no follow up was recommended to the patient at this time but he is already scheduled for an appointment with Dr. Steiner on 8/19/19 w/ scans. He will keep those appointments and if his symptoms worsen within the week or fail to resolve, he will call us back to possibly schedule a sooner appointment.     Patient was instructed to call back for further assistance if needed and/or if symptoms persists. Verbalized understanding. See medications listing for new meds administered and ordered at his ED encounter.

## 2019-07-30 NOTE — TELEPHONE ENCOUNTER
----- Message from Marija Martínez sent at 7/30/2019 10:37 AM CDT -----  Contact: Self/ [phone]  JOSE ALEJANDRO: Patient asked to speak with you or nurse.    Please call.

## 2019-07-30 NOTE — ED NOTES
Rounding on pt performed. Pt reports improvement in symptoms. Bed in low, locked position with side rails up x 2. Ambulatory to bathroom with steady gait. Call light within reach. Monitoring continues.

## 2019-07-31 ENCOUNTER — TELEPHONE (OUTPATIENT)
Dept: INTERNAL MEDICINE | Facility: CLINIC | Age: 69
End: 2019-07-31

## 2019-07-31 NOTE — TELEPHONE ENCOUNTER
Spoke to Mr. Santos to inform him that his urine from yesterday was normal.  Instructed to drink plenty of fluids and monitor.  Instructed to call the office if persist.

## 2019-07-31 NOTE — TELEPHONE ENCOUNTER
Urine from yesterday was normal. First void of day does not count as we concentrate over night due to not drinking fluids while we sleep. Drink plenty of fluids and monitor. Let us know if persist

## 2019-08-02 ENCOUNTER — OFFICE VISIT (OUTPATIENT)
Dept: OTOLARYNGOLOGY | Facility: CLINIC | Age: 69
End: 2019-08-02
Payer: MEDICARE

## 2019-08-02 VITALS
HEART RATE: 93 BPM | DIASTOLIC BLOOD PRESSURE: 76 MMHG | BODY MASS INDEX: 29.39 KG/M2 | TEMPERATURE: 98 F | SYSTOLIC BLOOD PRESSURE: 106 MMHG | WEIGHT: 182.13 LBS

## 2019-08-02 DIAGNOSIS — J38.3 VOCAL FOLD SCAR: ICD-10-CM

## 2019-08-02 DIAGNOSIS — R49.0 DYSPHONIA: Primary | ICD-10-CM

## 2019-08-02 DIAGNOSIS — J38.3 VOCAL FOLD ATROPHY: ICD-10-CM

## 2019-08-02 DIAGNOSIS — J38.5 LARYNGEAL SPASM: ICD-10-CM

## 2019-08-02 DIAGNOSIS — J38.02 BILATERAL PARTIAL VOCAL CORD PARALYSIS: ICD-10-CM

## 2019-08-02 PROCEDURE — 99213 OFFICE O/P EST LOW 20 MIN: CPT | Mod: 25,S$PBB,, | Performed by: OTOLARYNGOLOGY

## 2019-08-02 PROCEDURE — 31579 LARYNGOSCOPY TELESCOPIC: CPT | Mod: PBBFAC | Performed by: OTOLARYNGOLOGY

## 2019-08-02 PROCEDURE — 31579 LARYNGOSCOPY TELESCOPIC: CPT | Mod: S$PBB,,, | Performed by: OTOLARYNGOLOGY

## 2019-08-02 PROCEDURE — 99999 PR PBB SHADOW E&M-EST. PATIENT-LVL III: ICD-10-PCS | Mod: PBBFAC,,, | Performed by: OTOLARYNGOLOGY

## 2019-08-02 PROCEDURE — 99999 PR PBB SHADOW E&M-EST. PATIENT-LVL III: CPT | Mod: PBBFAC,,, | Performed by: OTOLARYNGOLOGY

## 2019-08-02 PROCEDURE — 99213 OFFICE O/P EST LOW 20 MIN: CPT | Mod: PBBFAC | Performed by: OTOLARYNGOLOGY

## 2019-08-02 PROCEDURE — 99213 PR OFFICE/OUTPT VISIT, EST, LEVL III, 20-29 MIN: ICD-10-PCS | Mod: 25,S$PBB,, | Performed by: OTOLARYNGOLOGY

## 2019-08-02 PROCEDURE — 31579 PR LARYNGOSCOPY, FLEX/RIGID TELESCOPIC, W/STROBOSCOPY: ICD-10-PCS | Mod: S$PBB,,, | Performed by: OTOLARYNGOLOGY

## 2019-08-02 NOTE — PROGRESS NOTES
[facility]  Department of Otorhinolaryngology and Communication Sciences    Subjective:      Hoang Santos Jr. is a 68 y.o. male who presents for follow-up.  We were following him primarily for vocal fold atrophy/scar and hyperfunctional dysphonia, which responded well to voice therapy. We also had discussed some dysphagia symptoms and identified some cervical osteophytes. He had a prior MBSS 7/2/2018.    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 After surgery, he had to be re-intubated for a short time. After that time, he required being NPO/PEG dependent. Had another MBSS 5/3/2019. He was cleared for PO intake. Has transitioned back to 100% PO. PEG has been removed. Pleased with his progress.     Still with some mild limitations swallowing large solid food boluses, but overall doing well. He has been doing his exercises from time to time. His voice is still hoarse from time to time. He continues to have a high vocal load at his [employer].    VHI-10 = 3  RSI = 13  EAT-10 = 16    The patient's medications, allergies, past medical, surgical, social and family histories were reviewed and updated as appropriate.    A detailed review of systems was obtained with pertinent positives as per the above HPI, and otherwise negative.      Objective:     /76   Pulse 93   Temp 97.7 °F (36.5 °C)   Wt 82.6 kg (182 lb 1.6 oz)   BMI 29.39 kg/m²      Constitutional: comfortable, well dressed, obese  Psychiatric: appropriate affect  Respiratory: comfortably breathing, symmetric chest rise, no stridor  Voice: mild variable roughness/strain  Head: normocephalic  Eyes: conjunctivae and sclerae clear  OC/OP: post-surgical UPPP changes; no masses  Indirect laryngoscopy is limited due to gag    Procedure  Flexible Laryngeal Videostroboscopy (61687): Laryngeal videostroboscopy is indicated to assess laryngeal vibratory biomechanics and vocal fold oscillation, which cannot be assessed with a plain light  examination. This was carried out transnasally with a distal chip videoendoscope. After verbal consent was obtained, the patient was positioned and the nose was topically decongested with 1% phenylephrine and topically anesthetized with 4% lidocaine. The endoscope was passed through the most patent nasal cavity and positioned to image the nasopharynx, larynx, and hypopharynx in detail. The following featured were examined: nasopharyngeal, laryngeal, hypopharyngeal masses; velopharyngeal strength, closure, and symmetry of motion; vocal fold range and symmetry of motion; laryngeal mucosal edema, erythema, inflammation, and hydration; salivary pooling; and gross laryngeal sensation. During phonation, the vocal folds were assesses for glottal closure; mucosal wave; vocal fold lesions; vibratory periodicity, amplitude, and phase symmetry; and vertical height match. The equipment was removed. The patient tolerated the procedure well without complication. All findings were normal except:  - posterior pharyngeal wall with submucosal fullness  - omega-shaped epiglottis which rests against posterior pharyngeal wall during respiration  - mild bilateral symmetric vocal fold atrophy with decreased pliability  - trace glottal insufficiency only at uppermost register  - supraglottic laryngeal hyperfunction      Assessment:     Hoang Santos Jr. is a 68 y.o. male with post-surgical dysphagia which has restored to its baseline, in addition to vocal fold atrophy/scar and hyperfunctional dysphonia.     Plan:     Reassurance was provided. SLP voice re-evaluation and subsequent therapy sessions are medically necessary for restoration of laryngeal function. We will arrange for [facility] in the coming weeks.    He will follow up with me in the future as needed.    All questions were answered, and the patient is in agreement with the plan.    Juaquin Casas M.D.  [facility]  Department of  Otorhinolaryngology and Communication Sciences

## 2019-08-06 ENCOUNTER — HOSPITAL ENCOUNTER (OUTPATIENT)
Dept: SLEEP MEDICINE | Facility: OTHER | Age: 69
Discharge: HOME | End: 2019-08-06
Attending: INTERNAL MEDICINE
Payer: MEDICARE

## 2019-08-06 DIAGNOSIS — G47.33 OSA (OBSTRUCTIVE SLEEP APNEA): ICD-10-CM

## 2019-08-06 PROCEDURE — 95811 POLYSOM 6/>YRS CPAP 4/> PARM: CPT

## 2019-08-06 PROCEDURE — 95811 POLYSOM 6/>YRS CPAP 4/> PARM: CPT | Mod: 26,,, | Performed by: INTERNAL MEDICINE

## 2019-08-06 PROCEDURE — 95811 PR POLYSOMNOGRAPHY W/CPAP: ICD-10-PCS | Mod: 26,,, | Performed by: INTERNAL MEDICINE

## 2019-08-07 NOTE — PROGRESS NOTES
"Baseline PSG study was performed on Hoang Santos Jr. The following was explained to the pt either prior to or during the set-up procedure: the set-up procedure (what each wire is for), time to bed, time of waking in morning, reasons tech might be needing to enter room during the night, and the possibility of meeting criteria for a split -night study with CPAP mask. It was also explained to the pt that the physician will analyze the study and the results will be sent to their PCP. A "Thank You" letter was also given to the pt upon leaving the lab that thoroughly explains the next steps in communication regarding this study and of treatment, if needed. Pt was asked to fill out a patient survey form to give their feedback and submit it via electronically by email or regular mail.    EKG:  apparent A-V Block? ( Snapshots at 11:47 and 12:46 PM ); Bradycardia?; Rare PVC and Ventricular Trigeminy?      Difficulties:   TIR Trimmed stomach belt for loss of signal;       Mask:    Large Simplus FFM  Humidity:   1-3  Cflex: 3  Pressure range explored:    6cm in CPAP mode and 8/4cm to 20/16cm in Bilevel mode  Switched to Bilevel per doctor order if pt stated the CPAP is uncomfortable early on in treatment.   Optimal pressure:   18/14cm    Pt response to CPAP:  Pt stated that the mask was very uncomfortable. "The mask tends to wake me up". "I don't think I slept much"    "

## 2019-08-12 ENCOUNTER — OFFICE VISIT (OUTPATIENT)
Dept: INTERNAL MEDICINE | Facility: CLINIC | Age: 69
End: 2019-08-12
Attending: INTERNAL MEDICINE
Payer: MEDICARE

## 2019-08-12 VITALS
BODY MASS INDEX: 30.04 KG/M2 | OXYGEN SATURATION: 96 % | SYSTOLIC BLOOD PRESSURE: 102 MMHG | HEART RATE: 86 BPM | DIASTOLIC BLOOD PRESSURE: 60 MMHG | HEIGHT: 66 IN | WEIGHT: 186.94 LBS

## 2019-08-12 DIAGNOSIS — K52.9 GASTROENTERITIS: Primary | ICD-10-CM

## 2019-08-12 DIAGNOSIS — C7A.010 MALIGNANT CARCINOID TUMOR OF DUODENUM: ICD-10-CM

## 2019-08-12 PROBLEM — Z01.810 PRE-OPERATIVE CARDIOVASCULAR EXAMINATION: Status: RESOLVED | Noted: 2018-11-26 | Resolved: 2019-08-12

## 2019-08-12 PROBLEM — A41.9 SEPSIS: Status: RESOLVED | Noted: 2019-01-18 | Resolved: 2019-08-12

## 2019-08-12 PROCEDURE — 99213 OFFICE O/P EST LOW 20 MIN: CPT | Mod: PBBFAC | Performed by: INTERNAL MEDICINE

## 2019-08-12 PROCEDURE — 99999 PR PBB SHADOW E&M-EST. PATIENT-LVL III: CPT | Mod: PBBFAC,,, | Performed by: INTERNAL MEDICINE

## 2019-08-12 PROCEDURE — 99213 OFFICE O/P EST LOW 20 MIN: CPT | Mod: S$PBB,,, | Performed by: INTERNAL MEDICINE

## 2019-08-12 PROCEDURE — 99213 PR OFFICE/OUTPT VISIT, EST, LEVL III, 20-29 MIN: ICD-10-PCS | Mod: S$PBB,,, | Performed by: INTERNAL MEDICINE

## 2019-08-12 PROCEDURE — 99999 PR PBB SHADOW E&M-EST. PATIENT-LVL III: ICD-10-PCS | Mod: PBBFAC,,, | Performed by: INTERNAL MEDICINE

## 2019-08-13 ENCOUNTER — IMMUNIZATION (OUTPATIENT)
Dept: PHARMACY | Facility: CLINIC | Age: 69
End: 2019-08-13
Payer: MEDICARE

## 2019-08-14 ENCOUNTER — CLINICAL SUPPORT (OUTPATIENT)
Dept: SPEECH THERAPY | Facility: HOSPITAL | Age: 69
End: 2019-08-14
Attending: OTOLARYNGOLOGY
Payer: MEDICARE

## 2019-08-14 DIAGNOSIS — J38.5 LARYNGEAL SPASM: ICD-10-CM

## 2019-08-14 DIAGNOSIS — J38.3 VOCAL FOLD SCAR: ICD-10-CM

## 2019-08-14 DIAGNOSIS — J38.3 VOCAL FOLD ATROPHY: ICD-10-CM

## 2019-08-14 DIAGNOSIS — R49.0 DYSPHONIA: ICD-10-CM

## 2019-08-14 DIAGNOSIS — J38.02 BILATERAL PARTIAL VOCAL CORD PARALYSIS: ICD-10-CM

## 2019-08-14 DIAGNOSIS — J38.02 BILATERAL PARTIAL VOCAL FOLD PARALYSIS: Primary | ICD-10-CM

## 2019-08-14 PROCEDURE — 92524 BEHAVRAL QUALIT ANALYS VOICE: CPT | Mod: GN | Performed by: SPEECH-LANGUAGE PATHOLOGIST

## 2019-08-14 NOTE — PROGRESS NOTES
Referring provider: Dr. Juaquin Casas  Reason for visit:  Behavioral and qualitative analysis of voice and resonance (CPT 49513)    Subjective / History    Hoang Santos Jr. is a 68 y.o. male referred for voice evaluation (CPT 13824) by Dr. Casas.  He presents with complaints of hoarseness which began several years ago, but acutely over the last few months.  The patient also endorses: voice worse in afternoon/evening, fatigue with use and reduced volume.  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   He was seen previously for voice therapy for a similar problem in 2017 and made good progress, but has forgotten the exercises.  This pt was treated by documenting therapist as well as [facility].   Swallowing: known difficulty swallowing, but pt currently tolerating a regular diet   Breathing: no c/o    Smokin packs/day     Stroboscopy findings (per Dr. Casas on 19)  - posterior pharyngeal wall with submucosal fullness  - omega-shaped epiglottis which rests against posterior pharyngeal wall during respiration  - mild bilateral symmetric vocal fold atrophy with decreased pliability  - trace glottal insufficiency only at uppermost register  - supraglottic laryngeal hyperfunction     Past Medical History:   Diagnosis Date    Hyperlipidemia     Primary malignant neuroendocrine neoplasm of duodenum 2018    Prostatic hyperplasia     Sleep apnea      Current Outpatient Medications on File Prior to Visit   Medication Sig Dispense Refill    atorvastatin (LIPITOR) 40 MG tablet Take 1 tablet (40 mg total) by mouth once daily. 90 tablet 3    HYDROcodone-acetaminophen (NORCO) 5-325 mg per tablet Take 1 tablet by mouth every 4 (four) hours as needed for Pain. 12 tablet 0    ondansetron (ZOFRAN-ODT) 4 MG TbDL Take 1 tablet (4 mg total) by mouth every 8 (eight) hours as needed (nausea). 30 tablet 0     "[] varicella-zoster [id number], PF, (SHINGRIX, PF,) 50 mcg/0.5 mL injection Inject 0.5 mLs into the muscle once. for 1 dose 0.5 mL 0     No current facility-administered medications on file prior to visit.        Objective    Perceptual/behavioral assessment  -CAPE-V Overall Score: 25  -Quality: moderate but intermittent roughness  -Volume: decreased projection  -Pitch: appropriate for age and gender identity  -Flexibility: appropriate for age and gender norms  -Habitual respiratory pattern: chest/clavicular    Acoustic/aerodynamic assessment  Multi-Dimensional Voice Program (MDVP) Analysis (sustained "ah")   Post-trial tx Gender-matched norms (M)   Average fundamental frequency (Fo) 132.118 Hz Norm/SD: 145.2 / 23.41     Relative average perturbation 0.178% Norm/SD: 0.345 / 0.33     Shimmer percent 2.396% Norm/SD: 2.52 / 0.997     Noise to harmonic ratio 0.134 Norm/SD: 0.12 / 0.014     Voice turbulence index 0.05 Norm/SD: 0.05 / 0.016       Education / Stimulability Trials   Discussed importance of vocal hygiene including: reducing throat clearing, coughing, other phonotraumatic behaviors. Patient was stimulable for improved voice using SOVT/PhoRTE exercises.  Instructed on PhoRTE protocol while using SOVT straw phonation in place of /a/ vowel. (Sustained pitch with straw, pitch glides with straw, projected phases as described in protocol.) Encouraged practicing exercises several times daily in isolation and into short phrases to solidify muscle memory patterns and reduce extralaryngeal tension during speech tasks.  He was amenable to all suggestions.     Functional goals  Length Status Goal   Long term Initiated Patient and clinician will facilitate changes in vocal function in order to restore functional use of voice for daily occupational, social, and emotional demands.    Long term Initiated Patient will re-establish phonation with adequate balance of airflow and resonance with decreased muscle tension.  "   Long term Initiated Patient will improve coordination of respiration and phonation for efficient vocal production at a conversational level.    Short term Initiated Patient will coordinate vocal subsystems in hierarchical speech tasks by producing sound in an efficient manner yielding improved or normal voice quality and vocal endurance in the presence of existing laryngeal disorder with 90% accuracy independently.   Short term Initiated Patient will complete PhoRTE 2x daily to strengthen and balance the intrinsic laryngeal musculature and maximize glottic closure without medial hyperfunction.   Short term Initiated Patient will improve the quality, efficiency, and ease of phonation through resonant and/or airflow exercises from the syllable to conversation level with 80% accuracy.   Short term Initiated Patient will identify the sensations associated with muscle relaxation in the abdominal, thoracic, neck and facial areas during efficient phonation with minimal clinician cue.    Short term Initiated Patient will demonstrate the ability to increase awareness of voicing behavior through self-monitoring to facilitate generalization in functional speaking situations with 80% accuracy.        Assessment     Hoang Santos Jr. presents with mild dysphonia.  Diagnoses of Dysphonia, Bilateral partial vocal cord paralysis, Vocal fold atrophy, Vocal fold scar, and Laryngeal spasm were pertinent to this visit.  Prognosis for continued improvement is good.     Recommendations / POC    Recommend 2-4 sessions of voice/speech therapy over 4-6 weeks with a speech-language pathologist to optimize glottal postures for improved vocal function, vocal efficiency, and ease of phonation.  He should continue the exercises as discussed in session and should contact me with any further questions.

## 2019-08-16 ENCOUNTER — HOSPITAL ENCOUNTER (OUTPATIENT)
Dept: RADIOLOGY | Facility: HOSPITAL | Age: 69
Discharge: HOME | End: 2019-08-16
Attending: SURGERY
Payer: MEDICARE

## 2019-08-16 DIAGNOSIS — C7A.010 MALIGNANT CARCINOID TUMOR OF DUODENUM: ICD-10-CM

## 2019-08-16 PROCEDURE — A9587 GALLIUM GA-68: HCPCS | Mod: TB

## 2019-08-16 PROCEDURE — 78815 NM PET 68GA DOTATATE WHOLE BODY: ICD-10-PCS | Mod: 26,PS,, | Performed by: RADIOLOGY

## 2019-08-16 PROCEDURE — 78815 PET IMAGE W/CT SKULL-THIGH: CPT | Mod: TC

## 2019-08-16 PROCEDURE — 78815 PET IMAGE W/CT SKULL-THIGH: CPT | Mod: 26,PS,, | Performed by: RADIOLOGY

## 2019-08-19 ENCOUNTER — OFFICE VISIT (OUTPATIENT)
Dept: NEUROLOGY | Facility: HOSPITAL | Age: 69
End: 2019-08-19
Attending: SURGERY
Payer: MEDICARE

## 2019-08-19 VITALS
HEIGHT: 66 IN | DIASTOLIC BLOOD PRESSURE: 66 MMHG | TEMPERATURE: 97 F | SYSTOLIC BLOOD PRESSURE: 100 MMHG | BODY MASS INDEX: 29.58 KG/M2 | WEIGHT: 184.06 LBS | HEART RATE: 80 BPM

## 2019-08-19 DIAGNOSIS — Z98.890 S/P LAPAROTOMY: ICD-10-CM

## 2019-08-19 DIAGNOSIS — C7A.010 MALIGNANT CARCINOID TUMOR OF DUODENUM: Primary | ICD-10-CM

## 2019-08-19 PROCEDURE — 99213 OFFICE O/P EST LOW 20 MIN: CPT | Performed by: SURGERY

## 2019-08-19 NOTE — PATIENT INSTRUCTIONS
EUS: needed --- will have the GI dept call you to schedule this procedure.    Return To Clinic: 4 weeks with Dr. Salvador --- appointment made

## 2019-08-19 NOTE — PROGRESS NOTES
"NOLANETS:  [facility]  A collaboration between [facility] and [facility]      PATIENT: Hoang Santos Jr.  MRN: [id number]  DATE: 8/19/2019    Subjective:      Chief Complaint: Follow-up (x 3 months )    Mr. 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  He also developed pancreatitis.  He required G-tube feeding for a while until his swallowing improved.  Gradually his swallowing has improved now is able to tolerate regular diet his wife's also has improved and he is able to perform his duties at [employer] he is a     Feels full fast. Eating only 2 times a day no appetite   activitites back to 90%  Back to [employer] addressing gatherings  Still need therapy for voice    Vitals:   Vitals:    08/19/19 0905   BP: 100/66   BP Location: Left arm   Patient Position: Sitting   BP Method: Medium (Automatic)   Pulse: 80   Temp: 96.8 °F (36 °C)   TempSrc: Oral   Weight: 83.5 kg (184 lb 1.4 oz)   Height: 5' 6" (1.676 m)        ECOG Score:     Diagnosis: No diagnosis found.     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    Prostatic hyperplasia     Sleep apnea        Past Surgical History:  Past Surgical History:   Procedure Laterality Date    BIOPSY, LIVER  1/4/2019    Performed by [facility]leine [facility]ado [facility] at [facility] OR    CHOLANGIOGRAM, PERCUTANEOUS, TRANSHEPATIC Right 1/29/2019    Performed by YOHANNES Shelley MD at [facility] OR    CHOLECYSTECTOMY  1/4/2019    Performed by [facility]leine [facility]ado MD at [facility] OR    DUODENECTOMY N/A 1/4/2019    Performed by [facility]leine [facility]ado MD at [facility] OR    EGD (ESOPHAGOGASTRODUODENOSCOPY) N/A 1/24/2019 "    Performed by Madeleine [facility]ado MD at [facility] OR    EGD (ESOPHAGOGASTRODUODENOSCOPY) N/A 1/4/2019    Performed by Madeleine Alvarado MD at [facility] OR    EGD (ESOPHAGOGASTRODUODENOSCOPY) N/A 9/24/2018    Performed by Salo Nuñez MD at [facility] ENDO (4TH FLR)    ESOPHAGOGASTRODUODENOSCOPY (EGD) N/A 1/21/2019    Performed by Jose Kahn MD at [facility] ENDO    FUNDOPLICATION N/A 1/4/2019    Performed by Madeleine Alvarado MD at [facility] OR    GASTROJEJUNOSTOMY Right 1/24/2019    Performed by Madeleine Alvarado MD at [facility] OR    GASTROJEJUNOSTOMY N/A 1/4/2019    Performed by Madeleine Alvarado MD at [facility] OR    LAPAROTOMY, EXPLORATORY N/A 1/24/2019    Performed by Madeleine Alvarado MD at [facility] OR    LYMPHADENECTOMY N/A 1/4/2019    Performed by Madeleine Alvarado MD at [facility] OR    LYSIS, ADHESIONS N/A 1/24/2019    Performed by Madeleine Alvarado MD at [facility] OR    PALATOPLASTY-(UPPP) N/A 2/14/2017    Performed by Bob Araujo III, MD at [facility] OR 2ND FLR    PH MONITORING, ESOPHAGUS, WIRELESS, (OFF REFLUX MEDS) N/A 9/24/2018    Performed by Salo Nuñez MD at [facility] ENDO (4TH FLR)    TONSILLECTOMY      TRANSPROSTATIC TISSUE RETRACTION N/A 6/28/2018    Performed by Kory Jack [facility] at [facility] OR    ULTRASOUND-ENDOSCOPIC-UPPER N/A 11/5/2018    Performed by Gorge Reyes MD at [facility] ENDO    UVULOPALATOPHARYNGOPLASTY         Family History:  Family History   Problem Relation Age of Onset    Heart disease Mother     Diabetes Mother     Stroke Father     Cancer Sister         pancreatitic    COPD Sister     Prostate cancer Neg Hx     Kidney disease Neg Hx     Thyroid disease Neg Hx     Retinal detachment Neg Hx     Macular degeneration Neg Hx     Hypertension Neg Hx     Glaucoma Neg Hx        Allergies:  Review of patient's allergies indicates:   Allergen Reactions    Epinephrine      Neuroendocrine Tumor patient      Toradol [ketorolac] Other (See Comments)      Creatinine rises even with sub therapeutic dose       Medications:   Current Outpatient Medications   Medication Sig    atorvastatin (LIPITOR) 40 MG tablet Take 1 tablet (40 mg total) by mouth once daily.    ondansetron (ZOFRAN-ODT) 4 MG TbDL Take 1 tablet (4 mg total) by mouth every 8 (eight) hours as needed (nausea).    HYDROcodone-acetaminophen (NORCO) 5-325 mg per tablet Take 1 tablet by mouth every 4 (four) hours as needed for Pain.     No current facility-administered medications for this visit.         Review of Systems   Constitutional: Positive for appetite change. Negative for chills, fatigue, fever and unexpected weight change.   HENT: Positive for trouble swallowing and voice change. Negative for drooling, ear pain, facial swelling, hearing loss, nosebleeds, postnasal drip, rhinorrhea, sore throat and tinnitus.    Eyes: Negative for photophobia, discharge, redness, itching and visual disturbance.   Respiratory: Negative for cough, choking, wheezing and stridor.    Cardiovascular: Negative for chest pain, palpitations and leg swelling.   Gastrointestinal: Negative for abdominal pain, anal bleeding, constipation, nausea, rectal pain and vomiting.   Endocrine: Negative.    Genitourinary: Negative.    Musculoskeletal: Negative for arthralgias, back pain, gait problem, myalgias, neck pain and neck stiffness.   Skin: Negative for pallor, rash and wound.   Allergic/Immunologic: Negative.    Neurological: Negative for dizziness, seizures, facial asymmetry, speech difficulty, light-headedness and headaches.   Hematological: Negative for adenopathy. Does not bruise/bleed easily.   Psychiatric/Behavioral: Negative for confusion, decreased concentration and dysphoric mood.      Objective:      Physical Exam   Constitutional: He is oriented to person, place, and time. No distress.   HENT:   Head: Normocephalic and atraumatic.   Right Ear: External ear normal.   Left Ear: External ear normal.   Eyes: Pupils are  equal, round, and reactive to light. Conjunctivae and EOM are normal.   Neck: Normal range of motion.   Cardiovascular: Normal rate and regular rhythm.   Pulmonary/Chest: Effort normal and breath sounds normal.   Abdominal: Soft. Bowel sounds are normal. There is no tenderness. No hernia.   Midline incision looks good   Musculoskeletal: Normal range of motion.   Neurological: He is alert and oriented to person, place, and time.   Skin: Skin is warm. He is not diaphoretic.   Psychiatric: He has a normal mood and affect. His behavior is normal.      Assessment:       No diagnosis found.    Laboratory Data:   Results for orders placed or performed during the hospital encounter of 07/30/19   CBC auto differential   Result Value Ref Range    WBC 7.55 3.90 - 12.70 K/uL    RBC 4.74 4.60 - 6.20 M/uL    Hemoglobin 13.8 (L) 14.0 - 18.0 g/dL    Hematocrit 43.5 40.0 - 54.0 %    Mean Corpuscular Volume 92 82 - 98 fL    Mean Corpuscular Hemoglobin 29.1 27.0 - 31.0 pg    Mean Corpuscular Hemoglobin Conc 31.7 (L) 32.0 - 36.0 g/dL    RDW 13.5 11.5 - 14.5 %    Platelets 191 150 - 350 K/uL    MPV 10.1 9.2 - 12.9 fL    Immature Granulocytes 0.1 0.0 - 0.5 %    Gran # (ANC) 6.2 1.8 - 7.7 K/uL    Immature Grans (Abs) 0.01 0.00 - 0.04 K/uL    Lymph # 0.7 (L) 1.0 - 4.8 K/uL    Mono # 0.6 0.3 - 1.0 K/uL    Eos # 0.1 0.0 - 0.5 K/uL    Baso # 0.02 0.00 - 0.20 K/uL    nRBC 0 0 /100 WBC    Gran% 82.2 (H) 38.0 - 73.0 %    Lymph% 8.9 (L) 18.0 - 48.0 %    Mono% 7.7 4.0 - 15.0 %    Eosinophil% 0.8 0.0 - 8.0 %    Basophil% 0.3 0.0 - 1.9 %    Differential Method Automated    Comprehensive metabolic panel   Result Value Ref Range    Sodium 141 136 - 145 mmol/L    Potassium 3.5 3.5 - 5.1 mmol/L    Chloride 105 95 - 110 mmol/L    CO2 20 (L) 23 - 29 mmol/L    Glucose 117 (H) 70 - 110 mg/dL    BUN, Bld 11 8 - 23 mg/dL    Creatinine 0.8 0.5 - 1.4 mg/dL    Calcium 9.7 8.7 - 10.5 mg/dL    Total Protein 8.1 6.0 - 8.4 g/dL    Albumin 4.1 3.5 - 5.2 g/dL    Total  Bilirubin 0.6 0.1 - 1.0 mg/dL    Alkaline Phosphatase 95 55 - 135 U/L    AST 46 (H) 10 - 40 U/L    ALT 29 10 - 44 U/L    Anion Gap 16 8 - 16 mmol/L    eGFR if African American >60 >60 mL/min/1.73 m^2    eGFR if non African American >60 >60 mL/min/1.73 m^2   Lipase   Result Value Ref Range    Lipase 49 4 - 60 U/L   Urinalysis, Reflex to Urine Culture Urine, Clean Catch   Result Value Ref Range    Specimen UA Urine, Clean Catch     Color, UA Yellow Yellow, Straw, Starr    Appearance, UA Clear Clear    pH, UA 6.0 5.0 - 8.0    Specific Gravity, UA 1.025 1.005 - 1.030    Protein, UA Trace (A) Negative    Glucose, UA Negative Negative    Ketones, UA Negative Negative    Bilirubin (UA) Negative Negative    Occult Blood UA Negative Negative    Nitrite, UA Negative Negative    Urobilinogen, UA Negative <2.0 EU/dL    Leukocytes, UA Negative Negative      MD Christiano on 8/16/2019 15:21   NM PET Ga68 Dotatate   Order: [id number]   Status:  Final result   Visible to patient:  Yes (Patient Portal) Next appt:  08/28/2019 at 10:00 AM in Speech Therapy (Grace Contreras, CCC-SLP) Dx:  Malignant carcinoid tumor of duodenum   Details     Reading Physician Reading Date Result Priority   Harpreet Zapata MD 8/16/2019       Narrative     EXAMINATION:  NM PET 68GA DOTATATE WHOLE BODY    CLINICAL HISTORY:  neoplasm; Malignant carcinoid tumor of the duodenum    TECHNIQUE:  5.8 mCi of GA68 Dotatate was administered intravenously in the right antecubital fossa. After an approximately 60 min distribution time, PET/CT images were acquired from the skull vertex to the mid thigh. Transmission images were acquired to correct for attenuation using a whole body low-dose CT scan without contrast with the arms positioned above the head.    COMPARISON:  Gallium 68 NETSpot PET-CT scan 11/01/2018, CT of the abdomen and pelvis 07/30/2019    FINDINGS:  Quality of the study: Adequate.    No abnormal foci of increased tracer uptake are present.  A  previous tracer avid focus in the right upper quadrant is no longer appreciated.  There is no discrete abnormal uptake associated with the stomach or pancreas to correspond with recent CT findings.  A 7 mm cardiophrenic angle lymph node demonstrates no abnormal uptake.    Physiologic distribution of the tracer is seen within the pituitary, salivary, and thyroid glands, stomach, liver, pancreas uncinate process, spleen, adrenal glands, kidneys and urinary bladder, prostate, and bowel.    Incidental CT findings: None.      Impression       1.  No PET/CT findings to suggest somatostatin receptor avid disease.  Previous tracer avid right upper quadrant focus is no longer appreciated.    2.  Subcentimeter cardiophrenic angle lymph node without uptake.  Attention on follow-up.      Electronically signed by: Harpreet Zapata  Date: 08/16/2019  Time: 14:15             Last Resulted: 08/16/19 14:15 Order Details View Encounter Lab and Collection Details Routing Result History            External Result Report  [employer] Impax Viewer      Show images for CT Abdomen Pelvis With Contrast   PACS Images for ViTAL [place] Viewer (Incl. [facility], [facility], [place], [facility], and [facility])      Show images for CT Abdomen Pelvis With Contrast   CT Abdomen Pelvis With Contrast   Order: [id number]   Status:  Final result   Visible to patient:  Yes (Patient Portal) Next appt:  08/28/2019 at 10:00 AM in Speech Therapy (Grace Contreras, CCC-SLP)   Details     Reading Physician Reading Date Result Priority   Dixon Mcclain MD 7/30/2019       Narrative     EXAMINATION:  CT ABDOMEN PELVIS WITH CONTRAST    CLINICAL HISTORY:  Infection, abdomen-pelvis;LLQ pain, suspect diverticulitis;Nausea, vomiting, diarrhea;    TECHNIQUE:  Low dose axial images, sagittal and coronal reformations were obtained from the lung bases to the pubic symphysis following the IV administration of 100 mL of Omnipaque 350 .  Oral contrast was not  given.    COMPARISON:  March 19, 2019    FINDINGS:  Single-phase CT examination of the abdomen and pelvis was performed.    The lung bases demonstrate mild motion artifact and atelectatic change, there is elevation of the left hemidiaphragm noted.  There is postoperative change of the stomach noted, there is appearance of gastric wall and fold thickening, this may relate to an inflammatory process such as gastritis, clinical and historical correlation is needed.    There is mild free fluid adjacent to the liver, there is appearance of diminished attenuation along the portal venous triads and ning hepatis, and extending adjacent to the duodenum and pancreatic head, this may relate to edema or fluid tracking, a well-formed fluid collection typical of abscess is not seen.  Correlation for hepatitis or cholangitis is needed.  There is no abnormal biliary dilatation.  There is no primary pancreatic inflammatory change, given the aforementioned correlation for pregnant hiatus is needed although the appearance is not typical for pancreatitis.  The gallbladder is surgically absent.  Previously identified percutaneous drainage catheter has been removed in the interim.  Mild density near its previous location may relate to postoperative change.    There is no evidence for acute process of the spleen or adrenal glands.  Small renal hypodensities are noted too small for characterization.  There is no evidence for hydronephrosis or obstructive uropathy bilaterally.  There is appearance of stenosis at the origin of the celiac artery without occlusion.  The abdominal aorta demonstrates appropriate opacification.  The urinary bladder is incompletely distended appearing unremarkable for lack of distention.  Postoperative change of the anterior abdominal wall is noted.    There is no evidence for small bowel obstructive process there are some mildly prominent small bowel loops and appearance of mild wall and fold thickening this may  relate to enteritis.  The appendix is mildly prominent however does not appear inflamed and the appendiceal prominence may relate to colonic findings, as there is mild prominence of the right colon with fluid and air, mild fluid and air throughout the colon as well as mild stool, there is also appearance of mild wall and fold thickening throughout the colon without a large degree of pericolonic inflammatory change however correlation for colitis is needed.  There is no free intraperitoneal air.  The osseous structures demonstrate chronic change.      Impression       Gastric wall and fold thickening is noted this may relate to an inflammatory process such as gastritis, clinical and historical correlation is needed.    Mild wall and fold thickening of small bowel loops with mild prominence, as well as mild wall and fold thickening of the colon, significant surrounding inflammatory change is not seen however correlation for enteritis/colitis is needed.    Diminished attenuation along the portal venous triads and edema seen along the ning hepatis and adjacent to the pancreatic head and duodenum, nonspecific, not thought to represent pancreatitis however correlation for pancreatitis is needed, alternatively correlation for hepatitis or cholangitis is needed.  There is no abnormal biliary dilatation.      Electronically signed by: Dixon Mcclain  Date: 07/30/2019  Time: 07:55             Last Resulted: 07/30/19 07:55 Order Details View Encounter Lab and Collection Details Routing Result History            Other Results from 7/30/2019     Contains abnormal data Urinalysis, Reflex to Urine         Impression:  Duodenal carcinoid status post resection with prolonged hospitalization for cervical dysphagia resulting NG tube feeding for a long time he has completely recovered from that swallowing issues.  His swallowing and  Phonation have improved  Is carcinoid markers are normal range his CT scan and gallium scan show no  recurrence of the tumor.  A currently he is tumor free.  However he has symptoms of pain after eating he could have gastritis.  We will also obtain they endoscopic ultrasound to assess the stomach, the gastrojejunal anastomosis on the duodenum.    Follow-up with me in 4 weeks time    Prescriptions for Zantac given.  Recommendations to take Maalox, Mylanta, Pepto-Bismol p.r.n.    Plan:     return to see me in 4 weeks time after endoscopic ultrasound is done    Is currently a no has no evidence of tumor and may require follow-up in about 6 months time    Is still undergoing physical therapy for his vocal cord dysfunction.             FALLON Salvador MD, FACS   Associate Professor of Surgery, [facility]   Neuroendocrine Surgery, Hepatic/Pancreatic & General Surgery   [address]   [place] [address]   ph. [phone]; [phone]   fax. [phone]

## 2019-08-20 ENCOUNTER — TELEPHONE (OUTPATIENT)
Dept: SLEEP MEDICINE | Facility: CLINIC | Age: 69
End: 2019-08-20

## 2019-08-20 DIAGNOSIS — I45.5 SINUS PAUSE: Primary | ICD-10-CM

## 2019-08-20 DIAGNOSIS — G47.33 OSA (OBSTRUCTIVE SLEEP APNEA): Primary | ICD-10-CM

## 2019-08-20 NOTE — TELEPHONE ENCOUNTER
Called pt and discussed the results of the Sleep study and informed of having severe GEMMA and long with multiple sinus pauses, would benefit from Cardiology evaluation, pt understands and script for bilevel send to the [facility] DME. And suggested to have > 4 hrs compliance and to have follow up visit with in 90 days.       Thanks,          Jonathan Brandt MD, FACP  Phone: [phone]  Fax: [phone]

## 2019-08-21 ENCOUNTER — TELEPHONE (OUTPATIENT)
Dept: GASTROENTEROLOGY | Facility: CLINIC | Age: 69
End: 2019-08-21

## 2019-08-21 DIAGNOSIS — C7A.010 MALIGNANT CARCINOID TUMOR OF THE DUODENUM: Primary | ICD-10-CM

## 2019-08-21 NOTE — TELEPHONE ENCOUNTER
----- Message from Jose Kahn MD sent at 8/20/2019 11:05 AM CDT -----  Ok. EGD and EUS    ----- Message -----  From: Madeleine Alvarado MD  Sent: 8/19/2019   8:17 PM  To: Jose Kahn MD    Hi  This gentleman had duodenectomy, had lot of issues rom cricophrygeal dysfuction. He also hasd pancreatitis. Had to do gastrojej. His duodenem may be distorted. He needs EUS to assess duodenem and aslo c/o pain after he eats.    thanks

## 2019-08-28 ENCOUNTER — CLINICAL SUPPORT (OUTPATIENT)
Dept: SPEECH THERAPY | Facility: HOSPITAL | Age: 69
End: 2019-08-28
Attending: OTOLARYNGOLOGY
Payer: MEDICARE

## 2019-08-28 DIAGNOSIS — R49.0 DYSPHONIA: ICD-10-CM

## 2019-08-28 DIAGNOSIS — J38.3 VOCAL FOLD ATROPHY: ICD-10-CM

## 2019-08-28 DIAGNOSIS — J38.02 BILATERAL PARTIAL VOCAL FOLD PARALYSIS: ICD-10-CM

## 2019-08-28 DIAGNOSIS — J38.5 LARYNGEAL SPASM: ICD-10-CM

## 2019-08-28 PROCEDURE — 92507 TX SP LANG VOICE COMM INDIV: CPT | Mod: GN

## 2019-08-28 NOTE — PROGRESS NOTES
"Referring provider: Dr. Juaquin Casas  Reason for visit:  Voice treatment (CPT 06469)  Session #1    History / Subjective   I had the pleasure of seeing Hoang Santos JrScott for his first treatment session following complete voice evaluation on 8/14/2019.  During that time, improvements were noted on SOVT and PhoRTE voice exercises. He reports practicing his exercises four times in the past few week.He reports that work is not as busy now and that he is now able to practice more.  Since evaluation, improvements have been noted on sustaining conversation for longer periods of time. In addition, feels that there is less of a "gargling" sound when he speaks. He reports that last weekend, he was throwing up thick mucous. He contacted his doctor who instructed him to take Mucinex. Since then, he has not regurgitated any mucous.     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 90 tablet 3    HYDROcodone-acetaminophen (NORCO) 5-325 mg per tablet Take 1 tablet by mouth every 4 (four) hours as needed for Pain. 12 tablet 0    ondansetron (ZOFRAN-ODT) 4 MG TbDL Take 1 tablet (4 mg total) by mouth every 8 (eight) hours as needed (nausea). 30 tablet 0    ranitidine (ZANTAC) 150 MG tablet Take 1 tablet (150 mg total) by mouth 2 (two) times daily. 60 tablet 11     No current facility-administered medications on file prior to visit.      Objective   The primary goal of todays session was to review and modify treatment plan. This was targeted using straw phonation and cup bubble with straw SOVT exercises as well as exuberant treatment modalities.    Perceptual/behavioral assessment  -CAPE-V Overall Score: 18  -Quality: breathy, " "rough, pitch breaks at upper register  -Volume: decreased projection  -Pitch: appropriate for age and gender  -Flexibility: diminished  -Habitual respiratory pattern: chest/clavicular     Acoustic/aerodynamic assessment  Multi-Dimensional Voice Program (MDVP) Analysis (sustained "ah")    Post-trial tx Gender-matched norms (M)   Average fundamental frequency (Fo) 112.096 Hz Norm/SD: 145.2 / 23.41      Relative average perturbation 0.284% Norm/SD: 0.345 / 0.33      Shimmer percent 2.681% Norm/SD: 2.52 / 0.997      Noise to harmonic ratio 0.165 Norm/SD: 0.12 / 0.014      Voice turbulence index 0.054 Norm/SD: 0.05 / 0.016     Treatment  Patient was instructed to increase water intake to help alleviate symptoms of globus sensation/mucous in throat. Patient was modestly stimulable for improved voice using a combination of straw phonation SOVT/PhoRTE exercises. Patient was most stimulable for improved voice using a combination of cup bubble with straw phonation SOVT/PhoRTE exercises. Instructed patient to complete PhoRTE protocol using SOVT cup bubble with straw in place of /a/ vowel. Encouraged completing phrases in PhoRTE protocol with and without SOVT cup bubble with straw. For home practice, clinician reviewed home exercises as practiced during the session with the patient. Recommended patient practice exercises 2 times per day for 1-2 minutes at a time to strengthen and balance intrinsic laryngeal musculature. Patient amenable to all suggestions.    Functional goals  Length Status Goal   Long term Initiated Patient and clinician will facilitate changes in vocal function in order to restore functional use of voice for daily occupational, social, and emotional demands.    Long term Initiated Patient will re-establish phonation with adequate balance of airflow and resonance with decreased muscle tension.    Long term Initiated Patient will improve coordination of respiration and phonation for efficient vocal production " at a conversational level.    Short term Initiated Patient will coordinate vocal subsystems in hierarchical speech tasks by producing sound in an efficient manner yielding improved or normal voice quality and vocal endurance in the presence of existing laryngeal disorder with 90% accuracy independently.   Short term Initiated Patient will complete PhoRTE 2x daily to strengthen and balance the intrinsic laryngeal musculature and maximize glottic closure without medial hyperfunction.   Short term Initiated Patient will improve the quality, efficiency, and ease of phonation through resonant and/or airflow exercises from the syllable to conversation level with 80% accuracy.   Short term Initiated Patient will identify the sensations associated with muscle relaxation in the abdominal, thoracic, neck and facial areas during efficient phonation with minimal clinician cue.    Short term Initiated Patient will demonstrate the ability to increase awareness of voicing behavior through self-monitoring to facilitate generalization in functional speaking situations with 80% accuracy.      Assessment     Hoang Santos Jr. presents with mild dysphonia.  Diagnoses of Dysphonia, Bilateral partial vocal cord paralysis, Vocal fold atrophy, Vocal fold scar, and Laryngeal spasm were pertinent to this visit. Patient demonstrates progress toward goals.  Prognosis for continued improvement is good.      Recommendations / POC    Recommend 2-4 sessions of voice/speech therapy over 4-6 weeks with a speech-language pathologist to optimize glottal postures for improved vocal function, vocal efficiency, and ease of phonation.  He should continue the exercises as discussed in session and should contact me with any further questions.

## 2019-08-30 ENCOUNTER — TELEPHONE (OUTPATIENT)
Dept: GASTROENTEROLOGY | Facility: CLINIC | Age: 69
End: 2019-08-30

## 2019-08-30 ENCOUNTER — OFFICE VISIT (OUTPATIENT)
Dept: CARDIOLOGY | Facility: CLINIC | Age: 69
End: 2019-08-30
Payer: MEDICARE

## 2019-08-30 VITALS
HEIGHT: 66 IN | SYSTOLIC BLOOD PRESSURE: 122 MMHG | WEIGHT: 184.75 LBS | DIASTOLIC BLOOD PRESSURE: 70 MMHG | BODY MASS INDEX: 29.69 KG/M2 | HEART RATE: 80 BPM

## 2019-08-30 DIAGNOSIS — C7A.010 MALIGNANT CARCINOID TUMOR OF THE DUODENUM: ICD-10-CM

## 2019-08-30 DIAGNOSIS — G47.33 OSA ON CPAP: ICD-10-CM

## 2019-08-30 DIAGNOSIS — I45.5 SINUS PAUSE: Primary | ICD-10-CM

## 2019-08-30 PROCEDURE — 99204 PR OFFICE/OUTPT VISIT, NEW, LEVL IV, 45-59 MIN: ICD-10-PCS | Mod: S$PBB,,, | Performed by: INTERNAL MEDICINE

## 2019-08-30 PROCEDURE — 99999 PR PBB SHADOW E&M-EST. PATIENT-LVL III: ICD-10-PCS | Mod: PBBFAC,,, | Performed by: INTERNAL MEDICINE

## 2019-08-30 PROCEDURE — 99213 OFFICE O/P EST LOW 20 MIN: CPT | Mod: PBBFAC | Performed by: INTERNAL MEDICINE

## 2019-08-30 PROCEDURE — 99999 PR PBB SHADOW E&M-EST. PATIENT-LVL III: CPT | Mod: PBBFAC,,, | Performed by: INTERNAL MEDICINE

## 2019-08-30 PROCEDURE — 99204 OFFICE O/P NEW MOD 45 MIN: CPT | Mod: S$PBB,,, | Performed by: INTERNAL MEDICINE

## 2019-08-30 NOTE — PROGRESS NOTES
Subjective:    Patient ID:  Hoang Santos Jr. is a 68 y.o. male who presents for evaluation of Sinus pause      HPI     The patient is a 68 year old male recently under went a sleep study and sinus pauses were noted. The study was positive of GEMMA. He was referred by Dr Brandt for CV evaluation. He is post carcinoid tumor surger in [facility]. He has no no history of hypertension, diabetes, non smoker and no history of CAD. He has no history of light headedness or syncope. Echo and EKG were normal in January .      Conclusion 11/8/18    · Challenging study  · Left ventricle ejection fraction is normal at 65%  · Grade I (mild) left ventricular diastolic dysfunction consistent with impaired relaxation.  · Left ventricle shows concentric hypertrophy.  · RV difficul to assess due to patient body habitus. RV systolic function appears normal, however, it appears the apex is hypokinetic          Lab Results   Component Value Date     07/30/2019    K 3.5 07/30/2019     07/30/2019    CO2 20 (L) 07/30/2019    BUN 11 07/30/2019    CREATININE 0.8 07/30/2019     (H) 07/30/2019    HGBA1C 5.5 06/19/2017    MG 2.1 03/05/2019    AST 46 (H) 07/30/2019    ALT 29 07/30/2019    ALBUMIN 4.1 07/30/2019    ALBUMIN 4.2 12/07/2015    PROT 8.1 07/30/2019    BILITOT 0.6 07/30/2019    WBC 7.55 07/30/2019    HGB 13.8 (L) 07/30/2019    HCT 43.5 07/30/2019    HCT 34 (L) 01/04/2019    MCV 92 07/30/2019     07/30/2019    INR 1.1 02/08/2019    PSA 1.2 06/19/2017    TSH 0.591 03/10/2017         Lab Results   Component Value Date    CHOL 273 (H) 01/16/2018    CHOL 273 (H) 01/16/2018    HDL 64 01/16/2018    HDL 64 01/16/2018    TRIG 91 03/05/2019       Lab Results   Component Value Date    LDLCALC 184.8 (H) 01/16/2018    LDLCALC 184.8 (H) 01/16/2018       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    Prostatic hyperplasia     Sleep apnea         Current Outpatient Medications:     atorvastatin (LIPITOR) 40 MG tablet, Take 1 tablet (40 mg total) by mouth once daily., Disp: 90 tablet, Rfl: 3    HYDROcodone-acetaminophen (NORCO) 5-325 mg per tablet, Take 1 tablet by mouth every 4 (four) hours as needed for Pain., Disp: 12 tablet, Rfl: 0    ondansetron (ZOFRAN-ODT) 4 MG TbDL, Take 1 tablet (4 mg total) by mouth every 8 (eight) hours as needed (nausea)., Disp: 30 tablet, Rfl: 0    ranitidine (ZANTAC) 150 MG tablet, Take 1 tablet (150 mg total) by mouth 2 (two) times daily., Disp: 60 tablet, Rfl: 11          Review of Systems   Constitution: Negative for decreased appetite, diaphoresis, fever, malaise/fatigue, weight gain and weight loss.   HENT: Negative for congestion, ear discharge, ear pain and nosebleeds.    Eyes: Negative for blurred vision, double vision and visual disturbance.   Cardiovascular: Negative for chest pain, claudication, cyanosis, dyspnea on exertion, irregular heartbeat, leg swelling, near-syncope, orthopnea, palpitations, paroxysmal nocturnal dyspnea and syncope.   Respiratory: Negative for cough, hemoptysis, shortness of breath, sleep disturbances due to breathing, snoring, sputum production and wheezing.    Endocrine: Negative for polydipsia, polyphagia and polyuria.   Hematologic/Lymphatic: Negative for adenopathy and bleeding problem. Does not bruise/bleed easily.   Skin: Negative for color change, nail changes, poor wound healing and rash.   Musculoskeletal: Negative for muscle cramps and muscle weakness.   Gastrointestinal: Negative for abdominal pain, anorexia, change in bowel habit, hematochezia, nausea and vomiting.   Genitourinary: Negative for dysuria, frequency and hematuria.   Neurological: Negative for brief paralysis, difficulty with concentration, excessive daytime sleepiness, dizziness, focal weakness, headaches, light-headedness, seizures, vertigo and weakness.   Psychiatric/Behavioral: Negative for altered mental  "status and depression.   Allergic/Immunologic: Negative for persistent infections.        Objective:/70 (BP Location: Left arm, Patient Position: Sitting, BP Method: Large (Automatic))   Pulse 80   Ht 5' 6" (1.676 m)   Wt 83.8 kg (184 lb 11.9 oz)   BMI 29.82 kg/m²             Physical Exam   Constitutional: He is oriented to person, place, and time. He appears well-developed and well-nourished.   HENT:   Head: Normocephalic.   Right Ear: External ear normal.   Left Ear: External ear normal.   Nose: Nose normal.   Inspection of lips, teeth and gums normal   Eyes: Pupils are equal, round, and reactive to light. EOM are normal. No scleral icterus.   Neck: Normal range of motion. Neck supple. No JVD present. No tracheal deviation present. No thyromegaly present.   Cardiovascular: Normal rate, regular rhythm and intact distal pulses. Exam reveals no gallop and no friction rub.   No murmur heard.  Pulses:       Carotid pulses are 2+ on the right side, and 2+ on the left side.       Dorsalis pedis pulses are 2+ on the right side, and 2+ on the left side.        Posterior tibial pulses are 2+ on the right side, and 2+ on the left side.   Pulmonary/Chest: Effort normal and breath sounds normal.   Abdominal: Bowel sounds are normal. He exhibits no distension. There is no hepatosplenomegaly. There is no tenderness. There is no guarding.       Musculoskeletal: Normal range of motion. He exhibits no edema or tenderness.   Lymphadenopathy:   Palpation of neck and groin lymph nodes normal   Neurological: He is alert and oriented to person, place, and time. No cranial nerve deficit. He exhibits normal muscle tone. Coordination normal.   Skin: Skin is dry.   Palpation of skin normal   Psychiatric: His behavior is normal. Judgment and thought content normal.         Assessment:       1. Sinus pause ocur at sleep. symptonmatic   2. GEMMA on CPAP    3. Malignant carcinoid tumor of the duodenum         Plan:       Hoang was " seen today for sinus pause.    Diagnoses and all orders for this visit:    Sinus pause    GEMMA on CPAP    Malignant carcinoid tumor of the duodenum

## 2019-08-30 NOTE — LETTER
August 30, 2019      Jonathan Brandt MD  [address]  [facility] [address]           [facility]  [address]  [facility] [address]  Phone: [phone]          Patient: Hoang Santos Jr.   MR Number: [id number]   YOB: 1950   Date of Visit: 8/30/2019       Dear Dr. Jonathan Brandt:    Thank you for referring Hoang Santos to me for evaluation. Attached you will find relevant portions of my assessment and plan of care.    If you have questions, please do not hesitate to call me. I look forward to following Hoang Santos along with you.    Sincerely,    Jose Brown MD    Enclosure  CC:  No Recipients    If you would like to receive this communication electronically, please contact [email or web address] or [phone] to request more information on [employer] Link access.    For providers and/or their staff who would like to refer a patient to [facility], please contact us through our one-stop-shop provider referral line, [facility], at [phone].    If you feel you have received this communication in error or would no longer like to receive these types of communications, please e-mail [email or web address]         
Initial (On Arrival)

## 2019-09-04 ENCOUNTER — PATIENT MESSAGE (OUTPATIENT)
Dept: NEUROLOGY | Facility: HOSPITAL | Age: 69
End: 2019-09-04

## 2019-09-06 ENCOUNTER — TELEPHONE (OUTPATIENT)
Dept: NEUROLOGY | Facility: HOSPITAL | Age: 69
End: 2019-09-06

## 2019-09-06 NOTE — TELEPHONE ENCOUNTER
----- Message from Laya García sent at 9/6/2019 10:49 AM CDT -----  Contact: Patient/ [phone]  Patient is requesting a callback In regards to wanting to know if the appointment on 09/16/2019 is still valid due to not having to conduct a Endoscope procedure.    Please call.

## 2019-09-10 ENCOUNTER — TELEPHONE (OUTPATIENT)
Dept: NEUROLOGY | Facility: HOSPITAL | Age: 69
End: 2019-09-10

## 2019-09-10 NOTE — TELEPHONE ENCOUNTER
----- Message from Karuna Michaels sent at 9/6/2019 12:18 PM CDT -----  Contact: self [phone]  JOSE ALEJANDRO - Patient is returning your call. Please call

## 2019-09-13 NOTE — TELEPHONE ENCOUNTER
Your Upper EGD/ EUS is scheduled for 09/24/2019 at 7:30am  at [facility] which is located at [address] [place] [address].  You will check in at the Hospital Admit Desk located on the 1st floor of the hospital. Please contact  the office two days before procedure date to reschedule if needed  [phone]    Upper Endoscopic Ultrasound    Endoscopic ultrasound(EUS) is a procedure used to image the digestive tract, including pancreas, lesions in esophagus and stomach.  It is used to diagnose and stage cancers of the digestive tract.  If necessary, your doctor may need to take samples during the procedure.    A responsible adult (family member or friend) must come with you and transport you home.  You are not allowed to drive, take a taxi or bus or leave the Endoscopy Center alone.  If you do not have a responsible adult with you to take you home, your exam will be cancelled.     If you have questions about the cost of your procedure, you should contact your insurance company as soon as possible.  Please bring a picture ID and your insurance card.  You will sign  treatment authorization forms at check in.  It is necessary for you to sign these forms again even if you recently signed these at the time of your clinic visit.    Please follow instructions of  if you are taking anticoagulant/blood thinning medications such as Aggrenox, aspirin, Brilinta, Effient, Eliquis, Lovenox, Plavix, Pletal, Pradaxa, Ticilid, Xarelto or Coumadin.     Take blood pressure, heart, anti-rejection and or seizure medication at 600 am the morning of the procedure.    Skip your morning dose of insulin or other oral medications for diabetes the morning of the procedure unless instructed otherwise by your doctor.      Day Before The Procedure    Eat a light evening meal and eat no solid food after 7 pm.  You may drink clear liquids including:    Water, Coffee or decaffeinated coffee (no milk or cream)  Tea, Herbal  tea  Carbonated beverages (soft drinks), regular and sugar free  Gelatin  Apple Juice, grape juice, white cranberry juice  Gatorade, Power Aid, Crystal Light, Buzz Aid  Lemonade and Limeade  Bouillon, clear consomme'  Snowball, popsicles  DO NOT DRINK ANY LIQUIDS CONTAINING RED DYE  DO NOT DRINK ANY LIQUIDS NOT SPECIFICALLY LISTED  DO NOT DRINK ALCOHOL  NOTHING BY MOUTH AFTER MIDNIGHT    Day of Procedure    600 am take last dose of any medications you are allowed to take with a small amount of clear liquid

## 2019-09-16 ENCOUNTER — OFFICE VISIT (OUTPATIENT)
Dept: NEUROLOGY | Facility: HOSPITAL | Age: 69
End: 2019-09-16
Attending: SURGERY
Payer: MEDICARE

## 2019-09-16 VITALS
HEART RATE: 80 BPM | WEIGHT: 184.31 LBS | BODY MASS INDEX: 29.62 KG/M2 | HEIGHT: 66 IN | DIASTOLIC BLOOD PRESSURE: 75 MMHG | TEMPERATURE: 98 F | SYSTOLIC BLOOD PRESSURE: 108 MMHG

## 2019-09-16 DIAGNOSIS — Z09 POSTOP CHECK: Primary | ICD-10-CM

## 2019-09-16 PROCEDURE — 99213 OFFICE O/P EST LOW 20 MIN: CPT | Performed by: SURGERY

## 2019-09-16 NOTE — PROGRESS NOTES
EUS not done    Will see after EUS done    He was calling for appt was put on hold several times    Will postpone visit until EUS done

## 2019-09-17 ENCOUNTER — CLINICAL SUPPORT (OUTPATIENT)
Dept: SPEECH THERAPY | Facility: HOSPITAL | Age: 69
End: 2019-09-17
Payer: MEDICARE

## 2019-09-17 DIAGNOSIS — J38.02 BILATERAL PARTIAL VOCAL CORD PARALYSIS: ICD-10-CM

## 2019-09-17 DIAGNOSIS — J38.3 VOCAL FOLD ATROPHY: ICD-10-CM

## 2019-09-17 DIAGNOSIS — J38.5 LARYNGEAL SPASM: ICD-10-CM

## 2019-09-17 DIAGNOSIS — R49.0 DYSPHONIA: Primary | ICD-10-CM

## 2019-09-17 DIAGNOSIS — J38.3 VOCAL FOLD SCAR: ICD-10-CM

## 2019-09-17 PROCEDURE — 92507 TX SP LANG VOICE COMM INDIV: CPT

## 2019-09-17 NOTE — PROGRESS NOTES
"Referring provider: Dr. Juaquin Casas  Reason for visit:  Voice treatment (CPT 86478)  Session #2    History / Subjective   I had the pleasure of seeing Hoang Santos  for his second treatment session following complete voice evaluation on 8/14/2019.  During that time, improvements were noted on straw phonation SOVT exercises. Has been practicing SOVT exercises with normal conversational volume and noted improvement with his voice. Has increased water intake and noted this has decreased his mucous production. Notices his voice has been "less gargly" for longer periods of time after using the exercises.    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 90 tablet 3    HYDROcodone-acetaminophen (NORCO) 5-325 mg per tablet Take 1 tablet by mouth every 4 (four) hours as needed for Pain. 12 tablet 0    ondansetron (ZOFRAN-ODT) 4 MG TbDL Take 1 tablet (4 mg total) by mouth every 8 (eight) hours as needed (nausea). 30 tablet 0    ranitidine (ZANTAC) 150 MG tablet Take 1 tablet (150 mg total) by mouth 2 (two) times daily. 60 tablet 11     No current facility-administered medications on file prior to visit.      Objective   The primary goal of todays session was to review and modify treatment plan. This was targeted using straw phonation SOVT exercises, exuberant treatment modalities, and cuing patient to vary intonation patterns.    Perceptual/behavioral assessment  -CAPE-V Overall Score: 14  -Quality: breathy, rough, pitch breaks at upper register  -Volume: decreased projection  -Pitch: appropriate for age and gender  -Flexibility: diminished  -Habitual respiratory pattern: chest/clavicular     Treatment   Patient " stimulable for improved vocal quality using a combination of straw phonation SOVT exercises, PhoRTE exercises, and patient varying intonation patterns. Due to patient preference, straw phonation SOVT exercises were mainly utilized to improve vocal quality. Practiced modal pitch /u/, pitch glides on /u/, phrases, and structured reading passages with and without straw phonation. Patient with carryover of improved vocal quality and forward- focused resonance to modal pitch, pitch glides, and phrases. Patient with intermittent carryover to structured reading passages. Cued patient to vary intonation pattern to decrease back-focused resonance. Following this, patient noted decreased effort and improved vocal quality. For home practice, clinician reviewed home exercises as practiced during the session with the patient. Recommended patient practice exercises 2 times per day for 1-2 minutes at a time to strengthen and balance intrinsic laryngeal musculature. Patient amenable to all suggestions.    Functional goals  Length Status Goal   Long term Ongoing Patient and clinician will facilitate changes in vocal function in order to restore functional use of voice for daily occupational, social, and emotional demands.    Long term Met Patient will re-establish phonation with adequate balance of airflow and resonance with decreased muscle tension.    Long term Ongoing Patient will improve coordination of respiration and phonation for efficient vocal production at a conversational level.    Short term Ongoing Patient will coordinate vocal subsystems in hierarchical speech tasks by producing sound in an efficient manner yielding improved or normal voice quality and vocal endurance in the presence of existing laryngeal disorder with 90% accuracy independently.   Short term Discontinued Patient will complete PhoRTE 2x daily to strengthen and balance the intrinsic laryngeal musculature and maximize glottic closure without medial  hyperfunction.   Short term Ongoing Patient will improve the quality, efficiency, and ease of phonation through resonant and/or airflow exercises from the syllable to conversation level with 80% accuracy.   Short term Met Patient will identify the sensations associated with muscle relaxation in the abdominal, thoracic, neck and facial areas during efficient phonation with minimal clinician cue.    Short term Ongoing Patient will demonstrate the ability to increase awareness of voicing behavior through self-monitoring to facilitate generalization in functional speaking situations with 80% accuracy.      Assessment     Hoang Santos Jr. presents with mild dysphonia.  Diagnoses of Dysphonia, Bilateral partial vocal cord paralysis, Vocal fold atrophy, Vocal fold scar, and Laryngeal spasm were pertinent to this visit. Patient demonstrates progress toward goals.  Prognosis for continued improvement is good.      Recommendations / POC    Recommend 2-4 sessions of voice/speech therapy over 4-6 weeks with a speech-language pathologist to optimize glottal postures for improved vocal function, vocal efficiency, and ease of phonation.  He should continue the exercises as discussed in session and should contact me with any further questions.

## 2019-09-20 ENCOUNTER — TELEPHONE (OUTPATIENT)
Dept: ENDOSCOPY | Facility: HOSPITAL | Age: 69
End: 2019-09-20

## 2019-09-20 NOTE — TELEPHONE ENCOUNTER
Spoke with patient about arrival time @ 0800    NPO status reviewed: Patient may eat until 7:00pm.  After 7pm, pt may have CLEAR liquids ONLY until completely NPO at Midnight.    Medications: Do not take Insulin or oral diabetic medications the day of the procedure.    Take as prescribed: heart, seizure and blood pressure medication in the morning with a sip of water (less than an ounce).  Take any breathing medications and bring inhalers to hospital with you.     Leave all valuables and jewelry at home. Wear comfortable clothes to procedure to change into hospital gown.   You cannot drive for 24 hours after your procedure because you will receive sedation for your procedure to make you comfortable.    A ride must be provided at discharge.

## 2019-09-24 ENCOUNTER — ANESTHESIA (OUTPATIENT)
Dept: ENDOSCOPY | Facility: HOSPITAL | Age: 69
End: 2019-09-24
Payer: MEDICARE

## 2019-09-24 ENCOUNTER — HOSPITAL ENCOUNTER (OUTPATIENT)
Facility: HOSPITAL | Age: 69
Discharge: HOME | End: 2019-09-24
Attending: INTERNAL MEDICINE | Admitting: INTERNAL MEDICINE
Payer: MEDICARE

## 2019-09-24 ENCOUNTER — ANESTHESIA EVENT (OUTPATIENT)
Dept: ENDOSCOPY | Facility: HOSPITAL | Age: 69
End: 2019-09-24
Payer: MEDICARE

## 2019-09-24 VITALS
OXYGEN SATURATION: 100 % | HEIGHT: 66 IN | SYSTOLIC BLOOD PRESSURE: 125 MMHG | WEIGHT: 181 LBS | HEART RATE: 86 BPM | DIASTOLIC BLOOD PRESSURE: 77 MMHG | TEMPERATURE: 99 F | RESPIRATION RATE: 18 BRPM | BODY MASS INDEX: 29.09 KG/M2

## 2019-09-24 DIAGNOSIS — E34.0 DUODENAL CARCINOID SYNDROME: ICD-10-CM

## 2019-09-24 DIAGNOSIS — C7A.010 MALIGNANT CARCINOID TUMOR OF THE DUODENUM: Primary | ICD-10-CM

## 2019-09-24 PROBLEM — E34.09 DUODENAL CARCINOID SYNDROME: Status: ACTIVE | Noted: 2019-09-24

## 2019-09-24 PROCEDURE — 63600175 PHARM REV CODE 636 W HCPCS: Performed by: INTERNAL MEDICINE

## 2019-09-24 PROCEDURE — 63600175 PHARM REV CODE 636 W HCPCS: Performed by: NURSE ANESTHETIST, CERTIFIED REGISTERED

## 2019-09-24 PROCEDURE — 37000008 HC ANESTHESIA 1ST 15 MINUTES: Performed by: INTERNAL MEDICINE

## 2019-09-24 PROCEDURE — 43259 PR ENDOSCOPIC ULTRASOUND EXAM: ICD-10-PCS | Mod: ,,, | Performed by: INTERNAL MEDICINE

## 2019-09-24 PROCEDURE — 43237 ENDOSCOPIC US EXAM ESOPH: CPT | Performed by: INTERNAL MEDICINE

## 2019-09-24 PROCEDURE — 37000009 HC ANESTHESIA EA ADD 15 MINS: Performed by: INTERNAL MEDICINE

## 2019-09-24 PROCEDURE — 43259 EGD US EXAM DUODENUM/JEJUNUM: CPT | Mod: ,,, | Performed by: INTERNAL MEDICINE

## 2019-09-24 PROCEDURE — 25000003 PHARM REV CODE 250: Performed by: NURSE ANESTHETIST, CERTIFIED REGISTERED

## 2019-09-24 PROCEDURE — 43259 EGD US EXAM DUODENUM/JEJUNUM: CPT | Performed by: INTERNAL MEDICINE

## 2019-09-24 RX ORDER — PROPOFOL 10 MG/ML
VIAL (ML) INTRAVENOUS CONTINUOUS PRN
Status: DISCONTINUED | OUTPATIENT
Start: 2019-09-24 | End: 2019-09-24

## 2019-09-24 RX ORDER — PROPOFOL 10 MG/ML
VIAL (ML) INTRAVENOUS
Status: DISCONTINUED | OUTPATIENT
Start: 2019-09-24 | End: 2019-09-24

## 2019-09-24 RX ORDER — SODIUM CHLORIDE 9 MG/ML
INJECTION, SOLUTION INTRAVENOUS CONTINUOUS
Status: DISCONTINUED | OUTPATIENT
Start: 2019-09-24 | End: 2019-09-24 | Stop reason: HOSPADM

## 2019-09-24 RX ORDER — SODIUM CHLORIDE 0.9 % (FLUSH) 0.9 %
10 SYRINGE (ML) INJECTION
Status: DISCONTINUED | OUTPATIENT
Start: 2019-09-24 | End: 2019-09-24 | Stop reason: HOSPADM

## 2019-09-24 RX ORDER — LIDOCAINE HCL/PF 100 MG/5ML
SYRINGE (ML) INTRAVENOUS
Status: DISCONTINUED | OUTPATIENT
Start: 2019-09-24 | End: 2019-09-24

## 2019-09-24 RX ADMIN — PROPOFOL 150 MCG/KG/MIN: 10 INJECTION, EMULSION INTRAVENOUS at 09:09

## 2019-09-24 RX ADMIN — LIDOCAINE HYDROCHLORIDE 100 MG: 20 INJECTION, SOLUTION INTRAVENOUS at 09:09

## 2019-09-24 RX ADMIN — SODIUM CHLORIDE: 0.9 INJECTION, SOLUTION INTRAVENOUS at 08:09

## 2019-09-24 RX ADMIN — PROPOFOL 60 MG: 10 INJECTION, EMULSION INTRAVENOUS at 09:09

## 2019-09-24 RX ADMIN — TOPICAL ANESTHETIC 1 EACH: 200 SPRAY DENTAL; PERIODONTAL at 08:09

## 2019-09-24 NOTE — ANESTHESIA PREPROCEDURE EVALUATION
09/24/2019  Hoang Santos Jr. is a 69 y.o., male having upper EUS for eval duodenal tumor.  OBSTRUCTIVE SLEEP APNEA.    Anesthesia Evaluation    I have reviewed the Patient Summary Reports.    I have reviewed the Nursing Notes.   I have reviewed the Medications.     Review of Systems  Anesthesia Hx:  No problems with previous Anesthesia   Denies Personal Hx of Anesthesia complications.   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  TM Distance: 4 - 6 cm  Jaw/Neck Findings:  Neck ROM: Extension Decreased, Mild       Chest/Lungs:  Chest/Lungs Findings: Clear to auscultation, Normal Respiratory Rate     Heart/Vascular:  Heart Findings: Rate: Normal  Rhythm: Regular Rhythm  Sounds: Normal        Mental Status:  Mental Status Findings:  Alert and Oriented         Anesthesia Plan  Type of Anesthesia, risks & benefits discussed:  Anesthesia Type:  MAC  Patient's Preference:   Intra-op Monitoring Plan:   Intra-op Monitoring Plan Comments:   Post Op Pain Control Plan:   Post Op Pain Control Plan Comments:   Induction:   IV  Beta Blocker:         Informed Consent: Patient understands risks and agrees with Anesthesia plan.  Questions answered. Anesthesia consent signed with patient.  ASA Score: 3     Day of Surgery Review of History & Physical: I have interviewed and examined the patient. I have reviewed the patient's H&P dated:            Ready For Surgery From Anesthesia Perspective.

## 2019-09-24 NOTE — H&P
Short Stay Endoscopy History and Physical    PCP - Lee Kay MD  Referring Physician - Madeleine Alvarado MD  [address]  [address]  [place] [address]    Procedure - EGD/EUS  ASA - per anesthesia  Mallampati - per anesthesia  History of Anesthesia problems - no  Family history Anesthesia problems -  no   Plan of anesthesia - General    HPI:  This is a 69 y.o. male here for evaluation of: duodenal carcinoid    Reflux - no  Dysphagia - no  Abdominal pain - no  Diarrhea - no    ROS:  Constitutional: No fevers, chills, No weight loss  CV: No chest pain  Pulm: No cough, No shortness of breath  GI: see HPI    Medical History:  has a past medical history of Hyperlipidemia, Primary malignant neuroendocrine neoplasm of duodenum (09/2018), Prostatic hyperplasia, and Sleep apnea.    Surgical History:  has a past surgical history that includes Tonsillectomy; Uvulopalatopharyngoplasty; Cystoscopy with insertion of minimally invasive implant to enlarge prostatic urethra (N/A, 6/28/2018); Esophagogastroduodenoscopy (N/A, 9/24/2018); Endoscopic ultrasound of upper gastrointestinal tract (N/A, 11/5/2018); Duodenectomy (N/A, 1/4/2019); Gastrojejunostomy (N/A, 1/4/2019); Gastric fundoplication (N/A, 1/4/2019); Esophagogastroduodenoscopy (N/A, 1/4/2019); Cholecystectomy (1/4/2019); Liver biopsy (1/4/2019); Esophagogastroduodenoscopy (N/A, 1/21/2019); Esophagogastroduodenoscopy (N/A, 1/24/2019); Gastrojejunostomy (Right, 1/24/2019); Lysis of adhesions (N/A, 1/24/2019); LAPAROTOMY, EXPLORATORY (N/A, 1/24/2019); and Percutaneous transhepatic cholangiography (Right, 1/29/2019).    Family History: family history includes COPD in his sister; Cancer in his sister; Diabetes in his mother; Heart disease in his mother; Hyperlipidemia in his sister; Stroke in his father..    Social History:  reports that he has never smoked. He has never used smokeless tobacco. He reports that he does not drink alcohol or use  drugs.    Review of patient's allergies indicates:   Allergen Reactions    Epinephrine      Neuroendocrine Tumor patient      Toradol [ketorolac] Other (See Comments)     Creatinine rises even with sub therapeutic dose       Medications:   Medications Prior to Admission   Medication Sig Dispense Refill Last Dose    atorvastatin (LIPITOR) 40 MG tablet Take 1 tablet (40 mg total) by mouth once daily. 90 tablet 3 Taking    HYDROcodone-acetaminophen (NORCO) 5-325 mg per tablet Take 1 tablet by mouth every 4 (four) hours as needed for Pain. 12 tablet 0 Not Taking    ondansetron (ZOFRAN-ODT) 4 MG TbDL Take 1 tablet (4 mg total) by mouth every 8 (eight) hours as needed (nausea). 30 tablet 0 Not Taking    ranitidine (ZANTAC) 150 MG tablet Take 1 tablet (150 mg total) by mouth 2 (two) times daily. 60 tablet 11 Not Taking       Physical Exam:    Vital Signs: There were no vitals filed for this visit.    General Appearance: Well appearing in no acute distress  Eyes:    No scleral icterus  Lungs: CTA anteriorly  Heart:  Regular  Abdomen: non tender    Labs:  Lab Results   Component Value Date    WBC 7.55 07/30/2019    HGB 13.8 (L) 07/30/2019    HCT 43.5 07/30/2019     07/30/2019    CHOL 273 (H) 01/16/2018    CHOL 273 (H) 01/16/2018    TRIG 91 03/05/2019    HDL 64 01/16/2018    HDL 64 01/16/2018    ALT 29 07/30/2019    AST 46 (H) 07/30/2019     07/30/2019    K 3.5 07/30/2019     07/30/2019    CREATININE 0.8 07/30/2019    BUN 11 07/30/2019    CO2 20 (L) 07/30/2019    TSH 0.591 03/10/2017    PSA 1.2 06/19/2017    INR 1.1 02/08/2019    HGBA1C 5.5 06/19/2017       I have explained the risks and benefits of this endoscopic procedure to the patient including but not limited to bleeding, inflammation, infection, perforation, and death.      David Roberson MD

## 2019-09-24 NOTE — TRANSFER OF CARE
"Anesthesia Transfer of Care Note    Patient: Hoang Santos Jr.    Procedure(s) Performed: Procedure(s) (LRB):  ULTRASOUND-ENDOSCOPIC-UPPER (N/A)  EGD (ESOPHAGOGASTRODUODENOSCOPY) (N/A)    Patient location: GI    Anesthesia Type: MAC    Transport from OR: Transported from OR on room air with adequate spontaneous ventilation    Post pain: adequate analgesia    Post assessment: no apparent anesthetic complications and tolerated procedure well    Post vital signs: stable    Level of consciousness: responds to stimulation and sedated    Nausea/Vomiting: no nausea/vomiting    Complications: none    Transfer of care protocol was followed      Last vitals:   Visit Vitals  /66 (BP Location: Left arm, Patient Position: Lying)   Pulse 69   Temp 37.2 °C (99 °F) (Tympanic)   Resp 17   Ht 5' 6" (1.676 m)   Wt 82.1 kg (181 lb)   SpO2 99%   BMI 29.21 kg/m²     "

## 2019-09-24 NOTE — PROVATION PATIENT INSTRUCTIONS
Discharge Summary/Instructions after an Endoscopic Procedure  Patient Name: Hoang Santos  Patient MRN: [id number]  Patient YOB: 1950 Tuesday, September 24, 2019  David Roberson MD  RESTRICTIONS:  During your procedure today, you received medications for sedation.  These   medications may affect your judgment, balance and coordination.  Therefore,   for 24 hours, you have the following restrictions:   - DO NOT drive a car, operate machinery, make legal/financial decisions,   sign important papers or drink alcohol.    ACTIVITY:  Today: no heavy lifting, straining or running due to procedural   sedation/anesthesia.  The following day: return to full activity including work.  DIET:  Eat and drink normally unless instructed otherwise.     TREATMENT FOR COMMON SIDE EFFECTS:  - Mild abdominal pain, nausea, belching, bloating or excessive gas:  rest,   eat lightly and use a heating pad.  - Sore Throat: treat with throat lozenges and/or gargle with warm salt   water.  - Because air was used during the procedure, expelling large amounts of air   from your rectum or belching is normal.  - If a bowel prep was taken, you may not have a bowel movement for 1-3 days.    This is normal.  SYMPTOMS TO WATCH FOR AND REPORT TO YOUR PHYSICIAN:  1. Abdominal pain or bloating, other than gas cramps.  2. Chest pain.  3. Back pain.  4. Signs of infection such as: chills or fever occurring within 24 hours   after the procedure.  5. Rectal bleeding, which would show as bright red, maroon, or black stools.   (A tablespoon of blood from the rectum is not serious, especially if   hemorrhoids are present.)  6. Vomiting.  7. Weakness or dizziness.  GO DIRECTLY TO THE NEAREST EMERGENCY ROOM IF YOU HAVE ANY OF THE FOLLOWING:      Difficulty breathing              Chills and/or fever over 101 F   Persistent vomiting and/or vomiting blood   Severe abdominal pain   Severe chest pain   Black, tarry stools   Bleeding- more than one  tablespoon   Any other symptom or condition that you feel may need urgent attention  Your doctor recommends these additional instructions:  If any biopsies were taken, your doctors clinic will contact you in 1 to 2   weeks with any results.  - Discharge patient to home (ambulatory).   - Patient has a contact number available for emergencies.  The signs and   symptoms of potential delayed complications were discussed with the   patient.  Return to normal activities tomorrow.  Written discharge   instructions were provided to the patient.   - Resume previous diet.   - Return to referring physician as previously scheduled.   - Surveillance endoscopy/EUS as per neuroendocrine tumor clinic.  For questions, problems or results please call your physician - David Roberson MD at Work:  [phone].  EMERGENCY PHONE NUMBER: [phone],  LAB RESULTS: [phone]  IF A COMPLICATION OR EMERGENCY SITUATION ARISES AND YOU ARE UNABLE TO REACH   YOUR PHYSICIAN - GO DIRECTLY TO THE EMERGENCY ROOM.  David Roberson MD  9/24/2019 9:54:25 AM  This report has been verified and signed electronically.  [employer]

## 2019-09-24 NOTE — ANESTHESIA POSTPROCEDURE EVALUATION
Anesthesia Post Evaluation    Patient: Hoang Santos Jr.    Procedure(s) Performed: Procedure(s) (LRB):  ULTRASOUND-ENDOSCOPIC-UPPER (N/A)  EGD (ESOPHAGOGASTRODUODENOSCOPY) (N/A)    Final Anesthesia Type: MAC  Patient location during evaluation: GI PACU  Patient participation: Yes- Able to Participate  Level of consciousness: awake and alert  Post-procedure vital signs: reviewed and stable  Pain management: adequate  Airway patency: patent  PONV status at discharge: No PONV  Anesthetic complications: no      Cardiovascular status: hemodynamically stable  Respiratory status: unassisted, spontaneous ventilation and room air  Hydration status: euvolemic  Follow-up not needed.          Vitals Value Taken Time   /66 9/24/2019  8:09 AM   Temp 37.2 °C (99 °F) 9/24/2019  8:09 AM   Pulse 69 9/24/2019  8:09 AM   Resp 17 9/24/2019  8:09 AM   SpO2 99 % 9/24/2019  8:09 AM         No case tracking events are documented in the log.      Pain/Perla Score: No data recorded

## 2019-10-03 ENCOUNTER — PATIENT MESSAGE (OUTPATIENT)
Dept: NEUROLOGY | Facility: HOSPITAL | Age: 69
End: 2019-10-03

## 2019-10-15 ENCOUNTER — CLINICAL SUPPORT (OUTPATIENT)
Dept: SPEECH THERAPY | Facility: HOSPITAL | Age: 69
End: 2019-10-15
Payer: MEDICARE

## 2019-10-15 ENCOUNTER — PATIENT MESSAGE (OUTPATIENT)
Dept: NEUROLOGY | Facility: HOSPITAL | Age: 69
End: 2019-10-15

## 2019-10-15 DIAGNOSIS — J38.3 VOCAL FOLD SCAR: ICD-10-CM

## 2019-10-15 DIAGNOSIS — J38.3 VOCAL FOLD ATROPHY: ICD-10-CM

## 2019-10-15 DIAGNOSIS — J38.5 LARYNGEAL SPASM: ICD-10-CM

## 2019-10-15 DIAGNOSIS — J38.02 BILATERAL VOCAL CORD PARALYSIS: ICD-10-CM

## 2019-10-15 DIAGNOSIS — R49.0 DYSPHONIA: Primary | ICD-10-CM

## 2019-10-15 PROCEDURE — 92507 TX SP LANG VOICE COMM INDIV: CPT | Mod: GN

## 2019-10-15 NOTE — PROGRESS NOTES
"Referring provider: Dr. Juaquin Casas  Reason for visit:  Voice treatment (CPT 56322)  Session #3    History / Subjective   I had the pleasure of seeing Hoang Santos  for his third treatment session following complete voice evaluation on 8/14/2019. During his last treatment session on 9/17, he was stimulable for decreased roughness and increased forward-focused resonance using straw phonation on /u/ SOVT exercises as well as using varied intonation patterns.   He reports that he has practiced his voice exercises minimally since last session. He rates his voice as an 8.5 today and reported that he has more "good voice days." Prior to initiating voice therapy, he reports his voice was "gargly sounding 90% of the time." He reports that his throat is dry and is taking biotene, which he finds helpful.   He reports that he has difficulty "opening up" his ears and long-standing hearing difficulty.  It was recommended that he schedule an appointment with an ENT to address this.     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 90 tablet 3    HYDROcodone-acetaminophen (NORCO) 5-325 mg per tablet Take 1 tablet by mouth every 4 (four) hours as needed for Pain. 12 tablet 0    ondansetron (ZOFRAN-ODT) 4 MG TbDL Take 1 tablet (4 mg total) by mouth every 8 (eight) hours as needed (nausea). 30 tablet 0    ranitidine (ZANTAC) 150 MG tablet Take 1 tablet (150 mg total) by mouth 2 (two) times daily. 60 tablet 11     No current facility-administered medications on file prior to visit.      Objective   The primary goal of todays session was to review and modify treatment plan. This was targeted using straw phonation SOVT " exercises and cuing patient to vary intonation patterns.    Perceptual/behavioral assessment  -CAPE-V Overall Score: 18  -Quality: intermittently rough, breathy, and pitch breaks at upper register  -Volume: decreased projection  -Pitch: appropriate for age and gender  -Flexibility: diminished  -Habitual respiratory pattern: chest/clavicular     Treatment  Mr. Santos was stimulable for decreased rough vocal quality using a combination of straw phonation SOVT exercises and patient varying intonation patterns. Good carryover of decreased rough vocal quality and forward- focused resonance to modal pitch, pitch glides, phrases, structured reading passages, and conversation. For home practice, clinician reviewed home exercises as practiced during the session with the patient. Recommended patient practice exercises 3-4 times per day for 1-2 minutes at a time to strengthen and balance intrinsic laryngeal musculature. Patient amenable to all suggestions.    Functional goals  Length Status Goal   Long term Ongoing Patient and clinician will facilitate changes in vocal function in order to restore functional use of voice for daily occupational, social, and emotional demands.    Long term Met Patient will re-establish phonation with adequate balance of airflow and resonance with decreased muscle tension.    Long term Met Patient will improve coordination of respiration and phonation for efficient vocal production at a conversational level.    Short term Ongoing Patient will coordinate vocal subsystems in hierarchical speech tasks by producing sound in an efficient manner yielding improved or normal voice quality and vocal endurance in the presence of existing laryngeal disorder with 90% accuracy independently.   Short term Discontinued Patient will complete PhoRTE 2x daily to strengthen and balance the intrinsic laryngeal musculature and maximize glottic closure without medial hyperfunction.   Short term Ongoing Patient will  improve the quality, efficiency, and ease of phonation through resonant and/or airflow exercises from the syllable to conversation level with 80% accuracy.   Short term Met Patient will identify the sensations associated with muscle relaxation in the abdominal, thoracic, neck and facial areas during efficient phonation with minimal clinician cue.    Short term Met Patient will demonstrate the ability to increase awareness of voicing behavior through self-monitoring to facilitate generalization in functional speaking situations with 80% accuracy.      Assessment     Hoang Santos Jr. presents with mild dysphonia.  Diagnoses of Dysphonia, Bilateral partial vocal cord paralysis, Vocal fold atrophy, Vocal fold scar, and Laryngeal spasm were pertinent to this visit. Patient demonstrates progress toward goals. Prognosis for continued improvement is good.      Recommendations / POC    Patient would benefit from 1 further voice therapy session in the next 4-8 weeks to review SOVT exercises and ensure generalization of home program. He should continue the exercises as discussed in session and should contact me with any further questions.

## 2019-10-15 NOTE — TELEPHONE ENCOUNTER
Ms Santos the concern was only [facility]. Our oncologist in our office is still ordering it for his patients. But, if you still have concerns he can use Pepcid 40 mg.

## 2019-10-16 ENCOUNTER — PATIENT MESSAGE (OUTPATIENT)
Dept: NEUROLOGY | Facility: HOSPITAL | Age: 69
End: 2019-10-16

## 2019-10-28 ENCOUNTER — HOSPITAL ENCOUNTER (EMERGENCY)
Facility: OTHER | Age: 69
Discharge: HOME | End: 2019-10-28
Attending: EMERGENCY MEDICINE
Payer: MEDICARE

## 2019-10-28 VITALS
WEIGHT: 175 LBS | DIASTOLIC BLOOD PRESSURE: 71 MMHG | HEART RATE: 68 BPM | RESPIRATION RATE: 18 BRPM | TEMPERATURE: 98 F | BODY MASS INDEX: 28.12 KG/M2 | HEIGHT: 66 IN | OXYGEN SATURATION: 100 % | SYSTOLIC BLOOD PRESSURE: 115 MMHG

## 2019-10-28 DIAGNOSIS — R51.9 FRONTAL HEADACHE: Primary | ICD-10-CM

## 2019-10-28 LAB
ANION GAP SERPL CALC-SCNC: 7 MMOL/L (ref 8–16)
BASOPHILS # BLD AUTO: 0.03 K/UL (ref 0–0.2)
BASOPHILS NFR BLD: 0.8 % (ref 0–1.9)
BUN SERPL-MCNC: 11 MG/DL (ref 8–23)
CALCIUM SERPL-MCNC: 9.6 MG/DL (ref 8.7–10.5)
CHLORIDE SERPL-SCNC: 108 MMOL/L (ref 95–110)
CO2 SERPL-SCNC: 29 MMOL/L (ref 23–29)
CREAT SERPL-MCNC: 0.8 MG/DL (ref 0.5–1.4)
DIFFERENTIAL METHOD: ABNORMAL
EOSINOPHIL # BLD AUTO: 0.1 K/UL (ref 0–0.5)
EOSINOPHIL NFR BLD: 2.2 % (ref 0–8)
ERYTHROCYTE [DISTWIDTH] IN BLOOD BY AUTOMATED COUNT: 14.1 % (ref 11.5–14.5)
EST. GFR  (AFRICAN AMERICAN): >60 ML/MIN/1.73 M^2
EST. GFR  (NON AFRICAN AMERICAN): >60 ML/MIN/1.73 M^2
GLUCOSE SERPL-MCNC: 78 MG/DL (ref 70–110)
HCT VFR BLD AUTO: 41.1 % (ref 40–54)
HGB BLD-MCNC: 12.9 G/DL (ref 14–18)
IMM GRANULOCYTES # BLD AUTO: 0.01 K/UL (ref 0–0.04)
IMM GRANULOCYTES NFR BLD AUTO: 0.3 % (ref 0–0.5)
LYMPHOCYTES # BLD AUTO: 1 K/UL (ref 1–4.8)
LYMPHOCYTES NFR BLD: 28.8 % (ref 18–48)
MCH RBC QN AUTO: 29.3 PG (ref 27–31)
MCHC RBC AUTO-ENTMCNC: 31.4 G/DL (ref 32–36)
MCV RBC AUTO: 93 FL (ref 82–98)
MONOCYTES # BLD AUTO: 0.5 K/UL (ref 0.3–1)
MONOCYTES NFR BLD: 12.5 % (ref 4–15)
NEUTROPHILS # BLD AUTO: 2 K/UL (ref 1.8–7.7)
NEUTROPHILS NFR BLD: 55.4 % (ref 38–73)
NRBC BLD-RTO: 0 /100 WBC
PLATELET # BLD AUTO: 174 K/UL (ref 150–350)
PMV BLD AUTO: 10.2 FL (ref 9.2–12.9)
POTASSIUM SERPL-SCNC: 4.6 MMOL/L (ref 3.5–5.1)
RBC # BLD AUTO: 4.41 M/UL (ref 4.6–6.2)
SODIUM SERPL-SCNC: 144 MMOL/L (ref 136–145)
WBC # BLD AUTO: 3.61 K/UL (ref 3.9–12.7)

## 2019-10-28 PROCEDURE — 85025 COMPLETE CBC W/AUTO DIFF WBC: CPT

## 2019-10-28 PROCEDURE — 80048 BASIC METABOLIC PNL TOTAL CA: CPT

## 2019-10-28 PROCEDURE — 99284 EMERGENCY DEPT VISIT MOD MDM: CPT | Mod: 25

## 2019-10-28 RX ORDER — CETIRIZINE HYDROCHLORIDE 10 MG/1
10 TABLET ORAL DAILY
Qty: 10 TABLET | Refills: 0 | Status: SHIPPED | OUTPATIENT
Start: 2019-10-28 | End: 2019-11-19 | Stop reason: SDUPTHER

## 2019-10-28 RX ORDER — FLUTICASONE PROPIONATE 50 MCG
1 SPRAY, SUSPENSION (ML) NASAL 2 TIMES DAILY
Qty: 15 G | Refills: 0 | Status: SHIPPED | OUTPATIENT
Start: 2019-10-28 | End: 2023-09-12

## 2019-10-28 NOTE — ED PROVIDER NOTES
Encounter Date: 10/28/2019    SCRIBE #1 NOTE: IFilemon , am scribing for, and in the presence of, Dr. Calvert.       History     Chief Complaint   Patient presents with    Headache     Intermittent headache for the past week. Pt states he was seen at a hospital in [place] for the same s/s last week.      Time seen by provider: 2:06 PM    This is a 69 y.o. male who presents with complaint of an intermittent headache with gradual worsening. He notes the onset of his symptoms to be last week. Pt reports that the pain is located at the front of his forehead and is characterized as a pressure/throbbing. He reports the HA to have a severity of 3-4/10. He notes an alleviating and aggravating factor to be lying down and driving respectively. He reports congestion, but denies any neck pain, numbness, tingling, weakness, cough and cold Sx, urinary symptoms, or chest pain. Pt notes that he has a Hx of vertigo . He denies the use of tobacco or ETOH. He states his PCP to be Dr. Mayes. Pt denies any other emergent health concerns at this time.    The history is provided by the patient.     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 OR;  Service: General;;    CYSTOSCOPY WITH INSERTION OF MINIMALLY INVASIVE IMPLANT TO ENLARGE PROSTATIC URETHRA N/A 6/28/2018    Procedure: TRANSPROSTATIC TISSUE RETRACTION;  Surgeon: Kory Jack MD;  Location: [facility] OR;  Service: Urology;  Laterality: N/A;    DUODENECTOMY N/A  1/4/2019    Procedure: DUODENECTOMY;  Surgeon: Madeleine Alvarado MD;  Location: [facility] OR;  Service: General;  Laterality: N/A;    ENDOSCOPIC ULTRASOUND OF UPPER GASTROINTESTINAL TRACT N/A 11/5/2018    Procedure: ULTRASOUND-ENDOSCOPIC-UPPER;  Surgeon: Gorge Reyes MD;  Location: [facility] ENDO;  Service: Endoscopy;  Laterality: N/A;  Carcinoid diagnosis    ENDOSCOPIC ULTRASOUND OF UPPER GASTROINTESTINAL TRACT N/A 9/24/2019    Procedure: ULTRASOUND-ENDOSCOPIC-UPPER;  Surgeon: David Roberson MD;  Location: [facility] ENDO;  Service: Endoscopy;  Laterality: N/A;  Carcinoid Diagnosis  Gastric Ph    ESOPHAGOGASTRODUODENOSCOPY N/A 9/24/2018    Procedure: EGD (ESOPHAGOGASTRODUODENOSCOPY);  Surgeon: Salo Nuñez MD;  Location: [address]);  Service: Endoscopy;  Laterality: N/A;    ESOPHAGOGASTRODUODENOSCOPY N/A 1/21/2019    Procedure: ESOPHAGOGASTRODUODENOSCOPY (EGD);  Surgeon: Jose Kahn MD;  Location: [facility];  Service: Endoscopy;  Laterality: N/A;    ESOPHAGOGASTRODUODENOSCOPY N/A 1/24/2019    Procedure: EGD (ESOPHAGOGASTRODUODENOSCOPY);  Surgeon: Madeleine Alvarado MD;  Location: [facility];  Service: General;  Laterality: N/A;  spoke to belkis in [facility] for egd, told her it was add on case could go anywhere from 2pm KB    ESOPHAGOGASTRODUODENOSCOPY N/A 9/24/2019    Procedure: EGD (ESOPHAGOGASTRODUODENOSCOPY);  Surgeon: David Roberson MD;  Location: [facility];  Service: Endoscopy;  Laterality: N/A;    ESOPHAGOGASTRODUODENOSCOPY N/A 1/4/2019    Procedure: EGD (ESOPHAGOGASTRODUODENOSCOPY);  Surgeon: Madeleine Alvarado MD;  Location: [facility] OR;  Service: General;  Laterality: N/A;  spoke to Anna in endo for egd set up for 1/4/19 on 1/3/19 0814 KB    GASTRIC FUNDOPLICATION N/A 1/4/2019    Procedure: FUNDOPLICATION;  Surgeon: Madeleine Alvarado MD;  Location: [facility];  Service: General;  Laterality: N/A;    GASTROJEJUNOSTOMY Right 1/24/2019    Procedure: GASTROJEJUNOSTOMY;  Surgeon:  Madeleine Alvarado MD;  Location: [facility] OR;  Service: General;  Laterality: Right;    GASTROJEJUNOSTOMY N/A 1/4/2019    Procedure: GASTROJEJUNOSTOMY;  Surgeon: Madeleine Alvarado MD;  Location: [facility] OR;  Service: General;  Laterality: N/A;    LAPAROTOMY, EXPLORATORY N/A 1/24/2019    Procedure: LAPAROTOMY, EXPLORATORY;  Surgeon: Madeleine Alvarado MD;  Location: [facility] OR;  Service: General;  Laterality: N/A;    LIVER BIOPSY  1/4/2019    Procedure: BIOPSY, LIVER;  Surgeon: Madeleine Alvarado MD;  Location: [facility] OR;  Service: General;;    LYSIS OF ADHESIONS N/A 1/24/2019    Procedure: LYSIS, ADHESIONS;  Surgeon: Madeleine Alvarado MD;  Location: [facility] OR;  Service: General;  Laterality: N/A;    PERCUTANEOUS TRANSHEPATIC CHOLANGIOGRAPHY Right 1/29/2019    Procedure: CHOLANGIOGRAM, PERCUTANEOUS, TRANSHEPATIC;  Surgeon: YOHANNES Shelley MD;  Location: [facility];  Service: General;  Laterality: Right;    TONSILLECTOMY      UVULOPALATOPHARYNGOPLASTY       Family History   Problem Relation Age of Onset    Heart disease Mother     Diabetes Mother     Stroke Father     Cancer Sister         pancreatitic    COPD Sister     Hyperlipidemia Sister     Prostate cancer Neg Hx     Kidney disease Neg Hx     Thyroid disease Neg Hx     Retinal detachment Neg Hx     Macular degeneration Neg Hx     Hypertension Neg Hx     Glaucoma Neg Hx      Social History     Tobacco Use    Smoking status: Never Smoker    Smokeless tobacco: Never Used   Substance Use Topics    Alcohol use: No     Alcohol/week: 2.0 standard drinks     Types: 2 Cans of beer per week     Frequency: Monthly or less     Drinks per session: Patient refused     Binge frequency: Never    Drug use: No     Review of Systems   Constitutional: Negative for chills and fever.   HENT: Positive for congestion. Negative for ear pain, rhinorrhea and sore throat.    Respiratory: Negative for cough, shortness of breath and wheezing.     Cardiovascular: Negative for chest pain and palpitations.   Gastrointestinal: Negative for abdominal pain, diarrhea, nausea and vomiting.   Genitourinary: Negative for dysuria and hematuria.   Musculoskeletal: Negative for back pain, myalgias and neck pain.   Skin: Negative for rash.   Neurological: Positive for headaches. Negative for dizziness, weakness and light-headedness.   Psychiatric/Behavioral: Negative for confusion.       Physical Exam     Initial Vitals [10/28/19 1212]   BP Pulse Resp Temp SpO2   115/71 68 18 98.1 °F (36.7 °C) 100 %      MAP       --         Physical Exam    Constitutional: He appears well-developed and well-nourished.   HENT:   Head: Normocephalic and atraumatic.   Left Ear: A middle ear effusion is present.   Nose: Nose normal.   Mouth/Throat: Oropharynx is clear and moist.   Diminshed light refex to TM bilaterally  Clear effusion on the left    Eyes: Conjunctivae and EOM are normal. Pupils are equal, round, and reactive to light.   Neck: Neck supple.   Cardiovascular: Normal rate and regular rhythm. Exam reveals no gallop and no friction rub.    No murmur heard.  Pulmonary/Chest: Breath sounds normal. No respiratory distress. He has no wheezes. He has no rales.   Abdominal: Soft. Normal appearance and bowel sounds are normal. There is no tenderness. There is no rebound and no guarding.   Musculoskeletal: He exhibits no edema or tenderness.        Right shoulder: He exhibits normal range of motion and no deformity.   Neurological: He is alert and oriented to person, place, and time. He has normal strength. No cranial nerve deficit or sensory deficit. Gait normal. GCS score is 15. GCS eye subscore is 4. GCS verbal subscore is 5. GCS motor subscore is 6.   Skin: Skin is warm and dry. No rash noted.   Psychiatric: He has a normal mood and affect. His speech is normal and behavior is normal.         ED Course   Procedures  Labs Reviewed   CBC W/ AUTO DIFFERENTIAL - Abnormal; Notable for  the following components:       Result Value    WBC 3.61 (*)     RBC 4.41 (*)     Hemoglobin 12.9 (*)     Mean Corpuscular Hemoglobin Conc 31.4 (*)     All other components within normal limits   BASIC METABOLIC PANEL - Abnormal; Notable for the following components:    Anion Gap 7 (*)     All other components within normal limits          Imaging Results          CT Orbits Sella Post Fossa Without Cont (Final result)  Result time 10/28/19 14:10:57    Final result by Levi May DO (10/28/19 14:10:57)                 Impression:      Question bilateral globe proptosis.  Otherwise unremarkable noncontrast CT orbits as detailed above specifically without evidence for acute fracture.      Electronically signed by: Levi May DO  Date:    10/28/2019  Time:    14:10             Narrative:    EXAMINATION:  CT ORBITS SELLA POST FOSSA WITHOUT CONT    CLINICAL HISTORY:  Headache, basilar or orbital;    TECHNIQUE:  1.5 mm axial images of the orbits without contrast with coronal reformatted imaging from the axial acquisition    COMPARISON:  Concomitant CT head    FINDINGS:  No evidence for acute fracture of the bony orbits.  The globes and extraocular muscles are relatively symmetric.  Question bilateral globe proptosis clinical correlation advised.  No significant paranasal sinus opacification.                               CT Head Without Contrast (Final result)  Result time 10/28/19 14:06:50    Final result by Levi May DO (10/28/19 14:06:50)                 Impression:      Unremarkable noncontrast CT head specifically without evidence for acute intracranial hemorrhage.  Clinical correlation and further evaluation as warranted.      Electronically signed by: Levi May DO  Date:    10/28/2019  Time:    14:06             Narrative:    EXAMINATION:  CT HEAD WITHOUT CONTRAST    CLINICAL HISTORY:  Headache, acute, norm neuro exam;    TECHNIQUE:  Multiple sequential 5 mm axial images of the head without  contrast.  Coronal and sagittal reformatted imaging from the axial acquisition.    COMPARISON:  None    FINDINGS:  There is no evidence for acute intracranial hemorrhage or sulcal effacement.  The ventricles are normal in size without hydrocephalus.  There is no midline shift or mass effect.  Subtle deformity in the right frontal soft tissues which may be sequela of remote trauma.  There is no evidence for underlying calvarial fracture.  Visualized paranasal sinuses and mastoid air cells are clear.                                 Medical Decision Making:   ED Management:  Urgent evaluation of 69-year-old male with complaint of intermittent headache x2 weeks.  Headache is frontal, throbbing, and he admits to associated congestion.  Vital signs are benign, afebrile.  Physical exam reveals no neurologic deficit.  Labs and imaging were ordered by [facility] - and reviewed by me.  CT of the head shows no acute process.  CT of the orbits shows questionable proptosis, this is not evident on physical exam.  Labs are benign.  I suspect ultimately sinus headache as he has a clear effusion in the left ear, and complains of congestion.  He is advised to use Flonase twice a day, Zyrtec daily, and follow up with his PCP next week for symptom recheck.  I do not suspect a stroke, meningitis, or immediately life-threatening cause for his pain.              Attending Attestation:           Physician Attestation for Scribe:  Physician Attestation Statement for Scribe #1: I, Dr. Calvert , reviewed documentation, as scribed by Filemon Oviedo  in my presence, and it is both accurate and complete.                    Clinical Impression:     1. Frontal headache                                   No Calvert MD  10/28/19 4846

## 2019-10-28 NOTE — ED TRIAGE NOTES
Patient presents to ER c/o intermittent frontal headache for about two weeks.  Pain 5/10.  Pt also reports some nasal congestion.  Pt states he went to a hospital in [place] on 10/23 and was dx with Fioricet.  Patient reports taking the medication as prescribed with no relieve.  Pt denies blurry vision.

## 2019-10-28 NOTE — DISCHARGE INSTRUCTIONS
Use Flonase twice a day for 2 weeks.  Take a 10 day course of Zyrtec.  During that time you can still use ibuprofen or Tylenol over-the-counter as needed for headache pain.  Follow up with your doctor next week for recheck.

## 2019-10-28 NOTE — ED NOTES
Pt reporting frontal HA x several days; was seen at other facility, was given Fioricet PO, reporting minimal relief in HA pain since having medication. Pt AAOx4 and appropriate at this time. Respirations even and unlabored. No acute distress noted.  Denies nausea, vomiting, SOB, blurred vision, dizziness, fever, chills.

## 2019-11-12 ENCOUNTER — PATIENT OUTREACH (OUTPATIENT)
Dept: ADMINISTRATIVE | Facility: OTHER | Age: 69
End: 2019-11-12

## 2019-11-13 ENCOUNTER — TELEPHONE (OUTPATIENT)
Dept: INTERNAL MEDICINE | Facility: CLINIC | Age: 69
End: 2019-11-13

## 2019-11-13 NOTE — TELEPHONE ENCOUNTER
Spoke to Mr. Santos to confirm date and time of appt.  Patient states understanding with no further questions or concerns.

## 2019-11-13 NOTE — TELEPHONE ENCOUNTER
----- Message from Ann Peck sent at 11/13/2019  9:55 AM CST -----  Contact: [phone]  Appointment Request From: Hoang Santos Jr.    With Provider: Lee Kay MD [[facility] [address]]    Preferred Date Range: 11/19/2019 - 11/22/2019    Preferred Times: Any time    Reason for visit: follow-up- minor headaches    Comments:  minor headaches- Zoster shot- part 2

## 2019-11-13 NOTE — TELEPHONE ENCOUNTER
----- Message from Ann Peck sent at 11/13/2019  9:55 AM CST -----  Contact: [phone]  Appointment Request From: Hoang Santos Jr.    With Provider: Lee Kay MD [[facility] [address]]    Preferred Date Range: 11/19/2019 - 11/22/2019    Preferred Times: Any time    Reason for visit: follow-up- minor headaches    Comments:  minor headaches- Zoster shot- part 2

## 2019-11-15 ENCOUNTER — PATIENT MESSAGE (OUTPATIENT)
Dept: SLEEP MEDICINE | Facility: CLINIC | Age: 69
End: 2019-11-15

## 2019-11-18 ENCOUNTER — OFFICE VISIT (OUTPATIENT)
Dept: SLEEP MEDICINE | Facility: CLINIC | Age: 69
End: 2019-11-18
Payer: MEDICARE

## 2019-11-18 ENCOUNTER — PATIENT OUTREACH (OUTPATIENT)
Dept: ADMINISTRATIVE | Facility: OTHER | Age: 69
End: 2019-11-18

## 2019-11-18 VITALS
DIASTOLIC BLOOD PRESSURE: 72 MMHG | BODY MASS INDEX: 28.25 KG/M2 | HEART RATE: 70 BPM | HEIGHT: 66 IN | SYSTOLIC BLOOD PRESSURE: 121 MMHG

## 2019-11-18 DIAGNOSIS — R09.81 NASAL CONGESTION: ICD-10-CM

## 2019-11-18 DIAGNOSIS — Z78.9 DIFFICULTY USING CONTINUOUS POSITIVE AIRWAY PRESSURE (CPAP) DEVICE: ICD-10-CM

## 2019-11-18 DIAGNOSIS — F45.8 AEROPHAGIA: ICD-10-CM

## 2019-11-18 DIAGNOSIS — G47.33 OSA (OBSTRUCTIVE SLEEP APNEA): Primary | ICD-10-CM

## 2019-11-18 PROCEDURE — 99999 PR PBB SHADOW E&M-EST. PATIENT-LVL III: ICD-10-PCS | Mod: PBBFAC,,, | Performed by: NURSE PRACTITIONER

## 2019-11-18 PROCEDURE — 99999 PR PBB SHADOW E&M-EST. PATIENT-LVL III: CPT | Mod: PBBFAC,,, | Performed by: NURSE PRACTITIONER

## 2019-11-18 PROCEDURE — 99214 OFFICE O/P EST MOD 30 MIN: CPT | Mod: S$PBB,,, | Performed by: NURSE PRACTITIONER

## 2019-11-18 PROCEDURE — 99213 OFFICE O/P EST LOW 20 MIN: CPT | Mod: PBBFAC | Performed by: NURSE PRACTITIONER

## 2019-11-18 PROCEDURE — 99214 PR OFFICE/OUTPT VISIT, EST, LEVL IV, 30-39 MIN: ICD-10-PCS | Mod: S$PBB,,, | Performed by: NURSE PRACTITIONER

## 2019-11-18 NOTE — PATIENT INSTRUCTIONS
Start taking Flonase 2 sprays in each nostril daily every single day coupled with Zyrtec one tablet by mouth every single day    Please continue efforts with using BPAP machine. Minimum of 4 hours nightly, but ultimate goal is hourly use.     Remember that Medicare guidelines for adherence to therapy defined as use of PAP = 4 hours per night on 70% of nights during a consecutive 30-day period anytime during the first 3 months of initial usage.

## 2019-11-18 NOTE — PROGRESS NOTES
"Hoang Santos Jr. 69 y.o. year-old male returns for management of obstructive sleep apnea and PAP equipment check. Last seen in clinic by Dr. Brandt 07/09/2019. This is his initial visit with me.     Weight loss reported during last visit, repeat sleep testing recommended.   Discussed sleep study results, despite 56-lb weight loss from 2014 sleep study still has severe GEMMA   Dr. Brandt also called pt with results and ordered BPAP  BPAP set up 09/26/2019   Continues to have snoring and unrefreshing sleep because BPAP use limited due to nasal congestion and aerophagia with AirFit F30.   Aerophagia relieved by belching in the mornings. Denies n/v, abdominal pain, or cramping.   Pt has Flonase and Zyrtec meds, but only uses prn   ESS 2     BPAP Interrogation: Dreamstation BPAP 18/14, Therapy Hours: 25.8, Blower Hours: 67.9     08/06/2018 Split  lb. The overall AHI was 71.1 with an oxygen david of 85.0%. Adequate effective control of GEMMA achieved with BPAP 18/14.   12/11/2014 Split  lb. The overall AHI was 93.3 with an oxygen david of 85.0%. Effective control of sleep disordered breathing was not achieved. Recommendation: dedicated titration vs APAP 8 - 18 cm     Review of Systems: Sleep related symptoms as per HPI. Otherwise, a balance of 10 systems reviewed is negative.    Physical Exam:   /72   Pulse 70   Ht 5' 6" (1.676 m)   BMI 28.25 kg/m²   GENERAL: Well groomed    Assessment:     Obstructive sleep apnea, severe by AHI criteria. The patient symptomatically has snoring and unrefreshing sleep.  Having difficulty using BPAP machine due to pressure intolerance, nasal congestion, and aerophagia. Medical co-morbidities: obesity. This warrants continued treatment for GEMMA.     Plan:     Treatment:  -start daily use of Flonase and Zyrtec  -Reviewed Ramp feature to temporarily reduce pressure  -Decreased BPAP settings to 14/10 from 18/14 with plans on increasing to titrated pressure as tolerated "   -Elevate HOB to improve nasal breathing by adding extra pillow   -RTC 4 weeks, no later than 12/26/2019     Education: During our discussion today, we talked about the etiology of obstructive sleep apnea as well as the potential ramifications of untreated sleep apnea, which could include daytime sleepiness, hypertension, heart disease and/or stroke. We discussed potential treatment options, which could include weight loss, body positioning, continuous positive airway pressure (CPAP), or referral for surgical consideration.     Behavior modification which includes losing weight, exercising, changing the sleep position, abstaining from alcohol, and avoiding certain medications    Precautions: The patient was advised to abstain from driving should they feel sleepy or drowsy

## 2019-11-19 ENCOUNTER — LAB VISIT (OUTPATIENT)
Dept: LAB | Facility: OTHER | Age: 69
End: 2019-11-19
Attending: INTERNAL MEDICINE
Payer: MEDICARE

## 2019-11-19 ENCOUNTER — OFFICE VISIT (OUTPATIENT)
Dept: INTERNAL MEDICINE | Facility: CLINIC | Age: 69
End: 2019-11-19
Attending: INTERNAL MEDICINE
Payer: MEDICARE

## 2019-11-19 VITALS
BODY MASS INDEX: 30.01 KG/M2 | WEIGHT: 186.75 LBS | HEART RATE: 96 BPM | DIASTOLIC BLOOD PRESSURE: 60 MMHG | HEIGHT: 66 IN | SYSTOLIC BLOOD PRESSURE: 110 MMHG | OXYGEN SATURATION: 96 %

## 2019-11-19 DIAGNOSIS — Z98.84 HISTORY OF GASTRIC BYPASS: ICD-10-CM

## 2019-11-19 DIAGNOSIS — E78.5 HYPERLIPIDEMIA, UNSPECIFIED HYPERLIPIDEMIA TYPE: ICD-10-CM

## 2019-11-19 DIAGNOSIS — R79.9 ABNORMAL FINDING OF BLOOD CHEMISTRY, UNSPECIFIED: ICD-10-CM

## 2019-11-19 DIAGNOSIS — E55.9 VITAMIN D DEFICIENCY: ICD-10-CM

## 2019-11-19 DIAGNOSIS — Z12.5 PROSTATE CANCER SCREENING: ICD-10-CM

## 2019-11-19 DIAGNOSIS — R20.0 ANESTHESIA OF SKIN: ICD-10-CM

## 2019-11-19 DIAGNOSIS — R51.9 SINUS HEADACHE: Primary | ICD-10-CM

## 2019-11-19 LAB
ALBUMIN SERPL BCP-MCNC: 4 G/DL (ref 3.5–5.2)
ALP SERPL-CCNC: 81 U/L (ref 55–135)
ALT SERPL W/O P-5'-P-CCNC: 40 U/L (ref 10–44)
ANION GAP SERPL CALC-SCNC: 10 MMOL/L (ref 8–16)
AST SERPL-CCNC: 41 U/L (ref 10–40)
BASOPHILS # BLD AUTO: 0.05 K/UL (ref 0–0.2)
BASOPHILS NFR BLD: 1.4 % (ref 0–1.9)
BILIRUB SERPL-MCNC: 0.6 MG/DL (ref 0.1–1)
BUN SERPL-MCNC: 13 MG/DL (ref 8–23)
CALCIUM SERPL-MCNC: 9.9 MG/DL (ref 8.7–10.5)
CHLORIDE SERPL-SCNC: 105 MMOL/L (ref 95–110)
CHOLEST SERPL-MCNC: 233 MG/DL (ref 120–199)
CHOLEST/HDLC SERPL: 2.9 {RATIO} (ref 2–5)
CO2 SERPL-SCNC: 29 MMOL/L (ref 23–29)
CREAT SERPL-MCNC: 0.9 MG/DL (ref 0.5–1.4)
DIFFERENTIAL METHOD: ABNORMAL
EOSINOPHIL # BLD AUTO: 0.1 K/UL (ref 0–0.5)
EOSINOPHIL NFR BLD: 2.3 % (ref 0–8)
ERYTHROCYTE [DISTWIDTH] IN BLOOD BY AUTOMATED COUNT: 13.9 % (ref 11.5–14.5)
EST. GFR  (AFRICAN AMERICAN): >60 ML/MIN/1.73 M^2
EST. GFR  (NON AFRICAN AMERICAN): >60 ML/MIN/1.73 M^2
FERRITIN SERPL-MCNC: 650 NG/ML (ref 20–300)
FOLATE SERPL-MCNC: 8.1 NG/ML (ref 4–24)
GLUCOSE SERPL-MCNC: 84 MG/DL (ref 70–110)
HCT VFR BLD AUTO: 42.8 % (ref 40–54)
HDLC SERPL-MCNC: 80 MG/DL (ref 40–75)
HDLC SERPL: 34.3 % (ref 20–50)
HGB BLD-MCNC: 13.4 G/DL (ref 14–18)
IMM GRANULOCYTES # BLD AUTO: 0.01 K/UL (ref 0–0.04)
IMM GRANULOCYTES NFR BLD AUTO: 0.3 % (ref 0–0.5)
IRON SERPL-MCNC: 120 UG/DL (ref 45–160)
IRON SERPL-MCNC: 120 UG/DL (ref 45–160)
LDLC SERPL CALC-MCNC: 134 MG/DL (ref 63–159)
LYMPHOCYTES # BLD AUTO: 1 K/UL (ref 1–4.8)
LYMPHOCYTES NFR BLD: 27.7 % (ref 18–48)
MCH RBC QN AUTO: 29.3 PG (ref 27–31)
MCHC RBC AUTO-ENTMCNC: 31.3 G/DL (ref 32–36)
MCV RBC AUTO: 94 FL (ref 82–98)
MONOCYTES # BLD AUTO: 0.4 K/UL (ref 0.3–1)
MONOCYTES NFR BLD: 11.8 % (ref 4–15)
NEUTROPHILS # BLD AUTO: 2 K/UL (ref 1.8–7.7)
NEUTROPHILS NFR BLD: 56.5 % (ref 38–73)
NONHDLC SERPL-MCNC: 153 MG/DL
NRBC BLD-RTO: 0 /100 WBC
PLATELET # BLD AUTO: 171 K/UL (ref 150–350)
PMV BLD AUTO: 10.8 FL (ref 9.2–12.9)
POTASSIUM SERPL-SCNC: 4.4 MMOL/L (ref 3.5–5.1)
PROT SERPL-MCNC: 7.9 G/DL (ref 6–8.4)
RBC # BLD AUTO: 4.57 M/UL (ref 4.6–6.2)
SATURATED IRON: 36 % (ref 20–50)
SODIUM SERPL-SCNC: 144 MMOL/L (ref 136–145)
TOTAL IRON BINDING CAPACITY: 330 UG/DL (ref 250–450)
TRANSFERRIN SERPL-MCNC: 223 MG/DL (ref 200–375)
TRIGL SERPL-MCNC: 95 MG/DL (ref 30–150)
VIT B12 SERPL-MCNC: 458 PG/ML (ref 210–950)
WBC # BLD AUTO: 3.46 K/UL (ref 3.9–12.7)

## 2019-11-19 PROCEDURE — 99214 OFFICE O/P EST MOD 30 MIN: CPT | Mod: S$PBB,,, | Performed by: INTERNAL MEDICINE

## 2019-11-19 PROCEDURE — 82746 ASSAY OF FOLIC ACID SERUM: CPT

## 2019-11-19 PROCEDURE — 83540 ASSAY OF IRON: CPT

## 2019-11-19 PROCEDURE — 1126F AMNT PAIN NOTED NONE PRSNT: CPT | Mod: ,,, | Performed by: INTERNAL MEDICINE

## 2019-11-19 PROCEDURE — 1159F MED LIST DOCD IN RCRD: CPT | Mod: ,,, | Performed by: INTERNAL MEDICINE

## 2019-11-19 PROCEDURE — 80053 COMPREHEN METABOLIC PANEL: CPT

## 2019-11-19 PROCEDURE — 82607 VITAMIN B-12: CPT

## 2019-11-19 PROCEDURE — 36415 COLL VENOUS BLD VENIPUNCTURE: CPT

## 2019-11-19 PROCEDURE — 1126F PR PAIN SEVERITY QUANTIFIED, NO PAIN PRESENT: ICD-10-PCS | Mod: ,,, | Performed by: INTERNAL MEDICINE

## 2019-11-19 PROCEDURE — 82728 ASSAY OF FERRITIN: CPT

## 2019-11-19 PROCEDURE — 99999 PR PBB SHADOW E&M-EST. PATIENT-LVL III: ICD-10-PCS | Mod: PBBFAC,,, | Performed by: INTERNAL MEDICINE

## 2019-11-19 PROCEDURE — 99999 PR PBB SHADOW E&M-EST. PATIENT-LVL III: CPT | Mod: PBBFAC,,, | Performed by: INTERNAL MEDICINE

## 2019-11-19 PROCEDURE — 1159F PR MEDICATION LIST DOCUMENTED IN MEDICAL RECORD: ICD-10-PCS | Mod: ,,, | Performed by: INTERNAL MEDICINE

## 2019-11-19 PROCEDURE — 99214 PR OFFICE/OUTPT VISIT, EST, LEVL IV, 30-39 MIN: ICD-10-PCS | Mod: S$PBB,,, | Performed by: INTERNAL MEDICINE

## 2019-11-19 PROCEDURE — 80061 LIPID PANEL: CPT

## 2019-11-19 PROCEDURE — 85025 COMPLETE CBC W/AUTO DIFF WBC: CPT

## 2019-11-19 PROCEDURE — 99213 OFFICE O/P EST LOW 20 MIN: CPT | Mod: PBBFAC | Performed by: INTERNAL MEDICINE

## 2019-11-19 RX ORDER — CETIRIZINE HYDROCHLORIDE 10 MG/1
10 TABLET ORAL DAILY
Qty: 90 TABLET | Refills: 2 | Status: SHIPPED | OUTPATIENT
Start: 2019-11-19 | End: 2021-09-14

## 2019-11-19 NOTE — PROGRESS NOTES
"Subjective:       Patient ID: Hoang Santos Jr. is a 69 y.o. male.    Chief Complaint: No chief complaint on file.    Here for an urgent visit    3 weeks ago developed HA. They range in intensity from 2-8/10, frontal, dull ache, non radiating, worse with standing up, better lying down. Was seen in ED in [place] and then here at [facility]. With his Hx of duodenal CA and new onset had imaging and CT head normal. HA are occurring every other day. Responds to OTC meds but returns. Had not been wearing his CPAP until yesterday. Drinks only 2-3 bottle of water a day. Has occompanying nasal congestion and sinsus pressure. Started taking floanse.       While standing up taling to [facility] the houses started spinnign around him. Prior he suffered an episode of vertigfo      Review of Systems   Constitutional: Negative for activity change and unexpected weight change.   HENT: Positive for rhinorrhea. Negative for hearing loss and trouble swallowing.    Eyes: Negative for discharge and visual disturbance.   Respiratory: Negative for chest tightness and wheezing.    Cardiovascular: Negative for chest pain and palpitations.   Gastrointestinal: Negative for blood in stool, constipation, diarrhea and vomiting.   Endocrine: Negative for polydipsia and polyuria.   Genitourinary: Negative for difficulty urinating, hematuria and urgency.   Musculoskeletal: Negative for arthralgias, joint swelling and neck pain.   Neurological: Positive for headaches. Negative for weakness.   Psychiatric/Behavioral: Negative for confusion and dysphoric mood.       Objective:      Vitals:    11/19/19 0803   BP: 110/60   Pulse: 96   SpO2: 96%   Weight: 84.7 kg (186 lb 11.7 oz)   Height: 5' 6" (1.676 m)      Physical Exam   Constitutional: He is oriented to person, place, and time. He appears well-developed and well-nourished. No distress.   HENT:   Head: Normocephalic and atraumatic.   Mouth/Throat: Oropharynx is clear and moist. No oropharyngeal " exudate.   Eyes: Pupils are equal, round, and reactive to light. Conjunctivae and EOM are normal. No scleral icterus.   Neck: No thyromegaly present.   Cardiovascular: Normal rate, regular rhythm and normal heart sounds.   No murmur heard.  Pulmonary/Chest: Effort normal and breath sounds normal. He has no wheezes. He has no rales.   Abdominal: Soft. He exhibits no distension. There is no tenderness.   Musculoskeletal: He exhibits no edema or tenderness.   Lymphadenopathy:     He has no cervical adenopathy.   Neurological: He is alert and oriented to person, place, and time.   Skin: Skin is warm and dry.   Psychiatric: He has a normal mood and affect. His behavior is normal.       Assessment:       1. Sinus headache    2. Vitamin D deficiency    3. History of gastric bypass    4. Hyperlipidemia, unspecified hyperlipidemia type    5. Abnormal finding of blood chemistry, unspecified     6. Anesthesia of skin     7. Prostate cancer screening        Plan:       Diagnoses and all orders for this visit:    Sinus headache   Continue CPAP and daily flonase and antihistamine. Office and Emergency Department prompts discussed.   -     cetirizine (ZYRTEC) 10 MG tablet; Take 1 tablet (10 mg total) by mouth once daily.  Vitamin D deficiency    History of gastric bypass  -     CBC auto differential; Future  -     Comprehensive metabolic panel; Future  -     Vitamin B12; Future  -     Ferritin; Future  -     Iron; Future  -     Iron and TIBC; Future  -     Folate; Future    Hyperlipidemia, unspecified hyperlipidemia type  -     Lipid panel; Future    Abnormal finding of blood chemistry, unspecified   -     CBC auto differential; Future  -     Ferritin; Future  -     Iron; Future  -     Iron and TIBC; Future    Prostate cancer screening  -     PSA, Screening; Future      RTC in 6 months or sooner sravanthi Lindsey MD  Internal Medicine-[facility]        Side effects of medication(s) were discussed in detail and  patient voiced understanding.  Patient will call back for any issues or complications.

## 2019-11-19 NOTE — PATIENT INSTRUCTIONS
1)Antihistamines(Allegra, Claritin, Xzyal, Zyrtec)  2)Nasal Steroids (Nasocort, Rhinocort, Flonase)  3)Distilled salt water sinus rinses via neti pots or products such as Mat Med Sinus Rinse or Sinugator. Must wash container or device and use bottled water to avoid introducing infection.   You can can use (as directed) any combination of these three things every day of your life if needed in order to treat or control your symptoms. Brand name use of medications is not necessary

## 2019-11-20 ENCOUNTER — IMMUNIZATION (OUTPATIENT)
Dept: PHARMACY | Facility: CLINIC | Age: 69
End: 2019-11-20
Payer: MEDICARE

## 2019-11-20 ENCOUNTER — IMMUNIZATION (OUTPATIENT)
Dept: PHARMACY | Facility: CLINIC | Age: 69
End: 2019-11-20

## 2019-12-03 ENCOUNTER — OFFICE VISIT (OUTPATIENT)
Dept: INTERNAL MEDICINE | Facility: CLINIC | Age: 69
End: 2019-12-03
Payer: MEDICARE

## 2019-12-03 ENCOUNTER — PATIENT MESSAGE (OUTPATIENT)
Dept: INTERNAL MEDICINE | Facility: CLINIC | Age: 69
End: 2019-12-03

## 2019-12-03 VITALS
WEIGHT: 185.88 LBS | HEART RATE: 67 BPM | SYSTOLIC BLOOD PRESSURE: 98 MMHG | BODY MASS INDEX: 29.17 KG/M2 | DIASTOLIC BLOOD PRESSURE: 62 MMHG | HEIGHT: 67 IN | OXYGEN SATURATION: 97 %

## 2019-12-03 DIAGNOSIS — E78.2 MIXED HYPERLIPIDEMIA: ICD-10-CM

## 2019-12-03 DIAGNOSIS — G47.33 OSA ON CPAP: ICD-10-CM

## 2019-12-03 DIAGNOSIS — N52.9 ERECTILE DYSFUNCTION, UNSPECIFIED ERECTILE DYSFUNCTION TYPE: ICD-10-CM

## 2019-12-03 DIAGNOSIS — R42 VERTIGO: ICD-10-CM

## 2019-12-03 DIAGNOSIS — K31.1 GASTRIC OUTLET OBSTRUCTION: ICD-10-CM

## 2019-12-03 DIAGNOSIS — R49.9 VOICE DISTURBANCE: ICD-10-CM

## 2019-12-03 DIAGNOSIS — G47.33 OSA (OBSTRUCTIVE SLEEP APNEA): ICD-10-CM

## 2019-12-03 DIAGNOSIS — E78.5 HYPERLIPIDEMIA, UNSPECIFIED HYPERLIPIDEMIA TYPE: ICD-10-CM

## 2019-12-03 DIAGNOSIS — R13.10 DYSPHAGIA, UNSPECIFIED TYPE: ICD-10-CM

## 2019-12-03 DIAGNOSIS — R05.3 CHRONIC COUGH: ICD-10-CM

## 2019-12-03 DIAGNOSIS — H91.90 HEARING LOSS, UNSPECIFIED HEARING LOSS TYPE, UNSPECIFIED LATERALITY: ICD-10-CM

## 2019-12-03 DIAGNOSIS — I45.5 SINUS PAUSE: ICD-10-CM

## 2019-12-03 DIAGNOSIS — R00.0 TACHYCARDIA: ICD-10-CM

## 2019-12-03 DIAGNOSIS — Z00.00 ENCOUNTER FOR PREVENTIVE HEALTH EXAMINATION: Primary | ICD-10-CM

## 2019-12-03 DIAGNOSIS — C7A.010 MALIGNANT CARCINOID TUMOR OF THE DUODENUM: ICD-10-CM

## 2019-12-03 DIAGNOSIS — J38.3 VOCAL FOLD SCAR: ICD-10-CM

## 2019-12-03 DIAGNOSIS — J90 PLEURAL EFFUSION: ICD-10-CM

## 2019-12-03 DIAGNOSIS — N40.1 BENIGN NON-NODULAR PROSTATIC HYPERPLASIA WITH LOWER URINARY TRACT SYMPTOMS: ICD-10-CM

## 2019-12-03 DIAGNOSIS — Z01.811 PREOPERATIVE RESPIRATORY EXAMINATION: ICD-10-CM

## 2019-12-03 DIAGNOSIS — K85.92: ICD-10-CM

## 2019-12-03 DIAGNOSIS — E34.0 DUODENAL CARCINOID SYNDROME: ICD-10-CM

## 2019-12-03 DIAGNOSIS — M25.78 CERVICAL OSTEOPHYTE: ICD-10-CM

## 2019-12-03 DIAGNOSIS — J38.3 VOCAL FOLD ATROPHY: ICD-10-CM

## 2019-12-03 DIAGNOSIS — I77.1 TORTUOUS AORTA: ICD-10-CM

## 2019-12-03 PROCEDURE — 99214 OFFICE O/P EST MOD 30 MIN: CPT | Mod: PBBFAC | Performed by: NURSE PRACTITIONER

## 2019-12-03 PROCEDURE — G0439 PPPS, SUBSEQ VISIT: HCPCS | Mod: S$GLB,,, | Performed by: NURSE PRACTITIONER

## 2019-12-03 PROCEDURE — 99999 PR PBB SHADOW E&M-EST. PATIENT-LVL IV: ICD-10-PCS | Mod: PBBFAC,,, | Performed by: NURSE PRACTITIONER

## 2019-12-03 PROCEDURE — 99999 PR PBB SHADOW E&M-EST. PATIENT-LVL IV: CPT | Mod: PBBFAC,,, | Performed by: NURSE PRACTITIONER

## 2019-12-03 PROCEDURE — G0439 PR MEDICARE ANNUAL WELLNESS SUBSEQUENT VISIT: ICD-10-PCS | Mod: S$GLB,,, | Performed by: NURSE PRACTITIONER

## 2019-12-03 NOTE — PATIENT INSTRUCTIONS
Counseling and Referral of Other Preventative  (Italic type indicates deductible and co-insurance are waived)    Patient Name: Hoang Santos  Today's Date: 12/3/2019    Health Maintenance       Date Due Completion Date    PROSTATE-SPECIFIC ANTIGEN 06/19/2018 6/19/2017    Colonoscopy 06/17/2021 6/17/2011    Lipid Panel 11/19/2024 11/19/2019    TETANUS VACCINE 06/16/2027 6/16/2017        No orders of the defined types were placed in this encounter.    The following information is provided to all patients.  This information is to help you find resources for any of the problems found today that may be affecting your health:                Living healthy guide: [email or web address].[email or web address]      Understanding Diabetes: www.diabetes.org      Eating healthy: www.cdc.gov/healthyweight      CDC home safety checklist: www.cdc.gov/steadi/patient.html      Agency on Aging: www.goea.[place].[facility]      Alcoholics anonymous (AA): www.aa.org      Physical Activity: www.carolin.nih.gov/nv8zbkz      Tobacco use: www.quitwithusla.org

## 2019-12-03 NOTE — PROGRESS NOTES
"[facility]  Adult Nutrition  Progress Note    SUMMARY       Recommendations    Recommendation   1. Diet advance as medically able to goal diet of GI Soft.   2. 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 NFPE completed on 1/5. Pt reports YAJAIRA ice chips and a few bites of applesauce. SLP rec pending.       Nutrition Risk Screen    Nutrition Risk Screen: tube feeding or parenteral nutrition    Nutrition/Diet History    Patient Reported Diet/Restrictions/Preferences: general  Food Preferences: no Denominational or cultural food prefs identified  Spiritual, Cultural Beliefs, Bahai Practices, Values that Affect Care: no  Vitamin/Mineral/Herbal Supplements: Iron, B12  Food Allergies: NKFA  Factors Affecting Nutritional Intake: altered gastrointestinal function, NPO    Anthropometrics    Temp: 97.9 °F (36.6 °C)  Height Method: Stated  Height: 5' 6" (167.6 cm)  Height (inches): 66 in  Weight Method: Bed Scale  Weight: 102.1 kg (225 lb 1.4 oz)  Weight (lb): 225.09 lb  Ideal Body Weight (IBW), Male: 142 lb  % Ideal Body Weight, Male (lb): 158.51 lb  BMI (Calculated): 36.4  BMI Grade: 35 - 39.9 - obesity - grade II       Lab/Procedures/Meds    Pertinent Labs Reviewed: reviewed  Recent Labs   Lab 01/10/19  0408   CALCIUM 9.1   PROT 6.8      K 3.5   CO2 36* " Patient Seen in: [facility] Emergency Department      History   Patient presents with:  Abdomen/Flank Pain (GI/)    Stated Complaint: abd pain     HPI    31-year-old male complaining of epigastric abdominal pain that started about 4 days ago has b   CL 96   BUN 25*   CREATININE 0.9   ALKPHOS 78   ALT 50*   AST 43*   BILITOT 1.4*       Pertinent Medications Reviewed: reviewed  No current facility-administered medications on file prior to encounter.      Current Outpatient Medications on File Prior to Encounter   Medication Sig Dispense Refill    atorvastatin (LIPITOR) 40 MG tablet Take 1 tablet (40 mg total) by mouth once daily. 90 tablet 3    cholecalciferol, vitamin D3, 50,000 unit capsule Take 1 capsule (50,000 Units total) by mouth once a week. 12 capsule 3    influenza (FLUZONE HIGH-DOSE) 180 mcg/0.5 mL vaccine Inject 0.5 mLs into the muscle. 0.5 mL 0    VIAGRA 100 mg tablet Take 1 tablet (100 mg total) by mouth once daily. Brand name only medication. 10 tablet 11       Estimated/Assessed Needs    Weight Used For Calorie Calculations: 103.9 kg (229 lb 0.9 oz)  Energy Calorie Requirements (kcal): 2283  Energy Need Method: [place] Jeor(x1.3)     Protein Requirements: 84g (1.3g/kg)  Weight Used For Protein Calculations: 64.5 kg (142 lb 3.2 oz)(IBW)     Estimated Fluid Requirement Method: RDA Method  RDA Method (mL): 2283  CHO Requirement: 45%      Nutrition Prescription Ordered    Current Diet Order: NPO  Current Nutrition Support Formula Ordered: Clinimix E 5/15  Current Nutrition Support Rate Ordered: 50 (ml)  Current Nutrition Support Frequency Ordered: ml/hr  Oral Nutrition Supplement: IVFE 3x/week    Evaluation of Received Nutrient/Fluid Intake    Parenteral Calories (kcal): 852  Parenteral Protein (gm): 60  Parenteral Fluid (mL): 1200  Lipid Calories (kcals): 214 kcals  GIR (Glucose Infusion Rate) (mg/kg/min): 1.2 mg/kg/min  Other Calories (kcal): 0  Total Calories (kcal): 1066  % Kcal Needs: 47  % Protein Needs: 45  I/O: reviewed  Energy Calories Required: not meeting needs  Protein Required: not meeting needs  Fluid Required: not meeting needs  Comments: LBM 1/3/19  Tolerance: not tolerating     % Intake of Estimated Energy Needs: 25 - 50 %  %  murmurs. Abdomen is soft and mild epigastric tenderness without rebound or guarding. Extremities no clubbing cyanosis or edema. Skin there is no rash. Neurologic exam is within normal limits no focal deficits.     ED Course     Labs Reviewed   COMP META Meal Intake: NPO    Nutrition Risk    Level of Risk/Frequency of Follow-up: (2xweekly)     Assessment and Plan    Malignant carcinoid tumor of the duodenum    Contributing Nutrition Diagnosis  Altered GI Function    Related to (etiology):   Cancer s/p surgery    Signs and Symptoms (as evidenced by):   NPO/NG tube    Interventions:  Collaboration with other providers    Nutrition Diagnosis Status:   Continues            Monitor and Evaluation    Food and Nutrient Intake: energy intake  Food and Nutrient Adminstration: diet order, enteral and parenteral nutrition administration  Physical Activity and Function: nutrition-related ADLs and IADLs  Anthropometric Measurements: weight  Biochemical Data, Medical Tests and Procedures: electrolyte and renal panel  Nutrition-Focused Physical Findings: overall appearance     Malnutrition Assessment  See General Comments.             Nutrition Follow-Up    RD Follow-up?: Yes

## 2019-12-03 NOTE — PROGRESS NOTES
"Hoang Santos presented for a  Medicare AWV and comprehensive Health Risk Assessment today. The following components were reviewed and updated:    · Medical history  · Family History  · Social history  · Allergies and Current Medications  · Health Risk Assessment  · Health Maintenance  · Care Team     ** See Completed Assessments for Annual Wellness Visit within the encounter summary.**       The following assessments were completed:  · Living Situation  · CAGE  · Depression Screening  · Timed Get Up and Go  · Whisper Test  · Cognitive Function Screening  · Nutrition Screening  · ADL Screening  · PAQ Screening      Vitals:    12/03/19 1231   BP: 98/62   BP Location: Left arm   Patient Position: Sitting   Pulse: 67   SpO2: 97%   Weight: 84.3 kg (185 lb 13.6 oz)   Height: 5' 7" (1.702 m)     Body mass index is 29.11 kg/m².     Physical Exam   Constitutional: He is oriented to person, place, and time. He appears well-developed and well-nourished.   HENT:   Head: Normocephalic and atraumatic. Not macrocephalic and not microcephalic. Head is without raccoon's eyes, without Saab's sign, without abrasion, without contusion, without laceration, without right periorbital erythema and without left periorbital erythema. Hair is normal.   Right Ear: No lacerations. No drainage, swelling or tenderness. No foreign bodies. No mastoid tenderness. Tympanic membrane is not injected, not scarred, not perforated, not erythematous, not retracted and not bulging. Tympanic membrane mobility is normal. No middle ear effusion. No hemotympanum. No decreased hearing is noted.   Left Ear: No lacerations. No drainage, swelling or tenderness. No foreign bodies. No mastoid tenderness. Tympanic membrane is not injected, not scarred, not perforated, not erythematous, not retracted and not bulging. Tympanic membrane mobility is normal.  No middle ear effusion. No hemotympanum. No decreased hearing is noted.   Nose: Nose normal. No mucosal edema, " rhinorrhea, nose lacerations, sinus tenderness or nasal deformity.   Mouth/Throat: Uvula is midline.   Eyes: Conjunctivae and lids are normal. No scleral icterus.   Neck: Trachea normal. Neck supple. No spinous process tenderness and no muscular tenderness present. No neck rigidity. No edema, no erythema and normal range of motion present. No thyroid mass and no thyromegaly present.   Cardiovascular: Normal rate, regular rhythm, normal heart sounds and intact distal pulses. Exam reveals no gallop and no friction rub.   No murmur heard.  Pulmonary/Chest: Effort normal and breath sounds normal. No stridor. No respiratory distress. He has no wheezes. He has no rales. He exhibits no tenderness.   Abdominal: Soft. Bowel sounds are normal.   Musculoskeletal: Normal range of motion.   Lymphadenopathy:        Head (right side): No submental, no submandibular, no tonsillar, no preauricular and no posterior auricular adenopathy present.        Head (left side): No submental, no submandibular, no tonsillar, no preauricular, no posterior auricular and no occipital adenopathy present.   Neurological: He is alert and oriented to person, place, and time.   Skin: Skin is warm and dry.   Psychiatric: He has a normal mood and affect. His behavior is normal. Judgment and thought content normal.   Vitals reviewed.      Diagnoses and health risks identified today and associated recommendations/orders:    1. Encounter for preventive health examination  Annual Health Risk Assessment (HRA) visit today.  Counseling and referral of health maintenance and preventative health measures performed.  Patient given annual wellness paperwork to take home.  Encouraged to return in 1 year for subsequent HRA visit.     2. Tortuous aorta  Chronic. Stable. Noted on CXR from 12/24/18. Continue current treatment plan as previously prescribed by PCP.    3. Mixed hyperlipidemia  Chronic. Stable. Continue current treatment plan as previously prescribed by  PCP.    4. Tachycardia  Chronic. Stable. Continue current treatment plan as previously prescribed by PCP.    5. Malignant carcinoid tumor of the duodenum  Chronic. Stable. Continue current treatment plan as previously prescribed by PCP.    6. Duodenal carcinoid syndrome  Chronic. Stable. Continue current treatment plan as previously prescribed by PCP.    7. Hearing loss, unspecified hearing loss type, unspecified laterality  - Ambulatory Referral to ENT    8. Erectile dysfunction, unspecified erectile dysfunction type  Chronic. Stable. Continue current treatment plan as previously prescribed by PCP.    9. Benign non-nodular prostatic hyperplasia with lower urinary tract symptoms  Chronic. Stable. Continue current treatment plan as previously prescribed by PCP.    10. Sinus pause  Chronic. Stable. Continue current treatment plan as previously prescribed by PCP.    11. Pleural effusion  Chronic. Stable. Continue current treatment plan as previously prescribed by PCP.    12. Chronic cough  Chronic. Stable. Continue current treatment plan as previously prescribed by PCP.    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 Continue current treatment plan as previously prescribed by PCP.    15. Voice disturbance  Chronic. Stable. Continue current treatment plan as previously prescribed by PCP.    16. Vertigo  Chronic. Stable. Continue current treatment plan as previously prescribed by PCP.    17. Gastric outlet obstruction  Chronic. Stable. Continue current treatment plan as previously prescribed by PCP.    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 Continue current treatment plan as previously prescribed by PCP.    19. Dysphagia, unspecified type  Chronic. Stable. Continue current treatment plan as previously prescribed by PCP.    20. GEMMA on CPAP  Chronic. Stable. Continue current treatment plan as previously  prescribed by PCP.    21. GEMMA (obstructive sleep apnea)  Chronic. Stable. Continue current treatment plan as previously prescribed by PCP.    22. Cervical osteophyte  Chronic. Stable. Continue current treatment plan as previously prescribed by PCP.        Provided Hoang with a 5-10 year written screening schedule and personal prevention plan. Recommendations were developed using the USPSTF age appropriate recommendations. Education, counseling, and referrals were provided as needed. After Visit Summary printed and given to patient which includes a list of additional screenings\tests needed.    Follow up in about 1 year (around 12/3/2020).    Bharat Naik NP  I offered to discuss end of life issues, including information on how to make advance directives that the patient could use to name someone who would make medical decisions on their behalf if they became too ill to make themselves.    ___Patient declined  _X_Patient is interested, I provided paper work and offered to discuss.

## 2019-12-04 RX ORDER — ATORVASTATIN CALCIUM 40 MG/1
40 TABLET, FILM COATED ORAL DAILY
Qty: 90 TABLET | Refills: 2 | Status: SHIPPED | OUTPATIENT
Start: 2019-12-04 | End: 2020-09-07

## 2019-12-12 PROBLEM — I77.1 TORTUOUS AORTA: Status: ACTIVE | Noted: 2019-12-12

## 2019-12-16 ENCOUNTER — CLINICAL SUPPORT (OUTPATIENT)
Dept: SPEECH THERAPY | Facility: HOSPITAL | Age: 69
End: 2019-12-16
Payer: MEDICARE

## 2019-12-16 DIAGNOSIS — J38.3 VOCAL FOLD SCAR: ICD-10-CM

## 2019-12-16 DIAGNOSIS — R49.0 DYSPHONIA: Primary | ICD-10-CM

## 2019-12-16 DIAGNOSIS — J38.02 BILATERAL PARTIAL VOCAL CORD PARALYSIS: ICD-10-CM

## 2019-12-16 DIAGNOSIS — J38.5 LARYNGEAL SPASM: ICD-10-CM

## 2019-12-16 DIAGNOSIS — J38.3 VOCAL FOLD ATROPHY: ICD-10-CM

## 2019-12-16 PROCEDURE — 92507 TX SP LANG VOICE COMM INDIV: CPT | Mod: GN

## 2019-12-16 NOTE — PROGRESS NOTES
"Referring provider: Dr. Juaquin Casas  Reason for visit:  Voice treatment (CPT 98147)  Session #4    History / Subjective   I had the pleasure of seeing Hoang Santos  for his fourth treatment session following complete voice evaluation on 8/14/2019. During last session on 10/15/2019,improvements were noted on straw phonation on /u/ SOVT exercises as well as using varied intonation patterns. Since last session, he feels that his voice is more "gargly" sounding and is weaker during conversation. 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 He reports that he has not practiced with the exercises since last treatment session.     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 90 tablet 3    HYDROcodone-acetaminophen (NORCO) 5-325 mg per tablet Take 1 tablet by mouth every 4 (four) hours as needed for Pain. 12 tablet 0    ondansetron (ZOFRAN-ODT) 4 MG TbDL Take 1 tablet (4 mg total) by mouth every 8 (eight) hours as needed (nausea). 30 tablet 0    ranitidine (ZANTAC) 150 MG tablet Take 1 tablet (150 mg total) by mouth 2 (two) times daily. 60 tablet 11     No current facility-administered medications on file prior to visit.      Objective   The primary goal of todays session was to review and modify treatment plan. This was targeted using cup to mouth/ wave in a cave SOVT exercises and cuing patient to vary intonation patterns.    Perceptual/behavioral assessment  -CAPE-V Overall Score: 25  -Quality: intermittently rough, breathy, and pitch breaks at upper register  -Volume: decreased projection  -Pitch: appropriate for age and gender  -Flexibility: diminished  -Habitual respiratory pattern: " chest/clavicular     Treatment  Mr. Santos was mildly stimulable stimulable for decreased rough vocal quality using straw phonation SOVT exercises and cup bubble with straw SOVT exercises. He was most stimulable for decreased rough vocal quality and strain using cup to mouth/ wave in a cave SOVT exercises. Good carryover to modal pitch and phrases. Improved carryover to structured reading passages was noted when cuing patient to vary intonation patterns. For home practice, clinician reviewed home exercises as practiced during the session with the patient. Recommended patient practice exercises 5-6 times per day for 1-2 minutes at a time to strengthen and balance intrinsic laryngeal musculature. Patient amenable to all suggestions.    Functional goals  Length Status Goal   Long term Ongoing Patient and clinician will facilitate changes in vocal function in order to restore functional use of voice for daily occupational, social, and emotional demands.    Long term Ongoing. Patient will re-establish phonation with adequate balance of airflow and resonance with decreased muscle tension.    Long term Ongoing. Patient will improve coordination of respiration and phonation for efficient vocal production at a conversational level.    Short term Ongoing. Patient will coordinate vocal subsystems in hierarchical speech tasks by producing sound in an efficient manner yielding improved or normal voice quality and vocal endurance in the presence of existing laryngeal disorder with 90% accuracy independently.   Short term Ongoing. Patient will improve the quality, efficiency, and ease of phonation through resonant and/or airflow exercises from the syllable to conversation level with 80% accuracy.   Short term Met. Patient will identify the sensations associated with muscle relaxation in the abdominal, thoracic, neck and facial areas during efficient phonation with minimal clinician cue.    Short term Met. Patient will demonstrate  the ability to increase awareness of voicing behavior through self-monitoring to facilitate generalization in functional speaking situations with 80% accuracy.      Assessment     Hoang Santos Jr. presents with mild dysphonia.  Diagnoses of Dysphonia, Bilateral partial vocal cord paralysis, Vocal fold atrophy, Vocal fold scar, and Laryngeal spasm were pertinent to this visit. Patient demonstrates progress toward goals. Prognosis for continued improvement is good.      Recommendations / POC    Patient would benefit from 2-3 voice therapy sessions for the next 4-6 weeks to review SOVT exercises and improve generalization of home program. He should continue the exercises as discussed in session and should contact me with any further questions.

## 2019-12-17 ENCOUNTER — OFFICE VISIT (OUTPATIENT)
Dept: OTOLARYNGOLOGY | Facility: CLINIC | Age: 69
End: 2019-12-17
Payer: MEDICARE

## 2019-12-17 ENCOUNTER — CLINICAL SUPPORT (OUTPATIENT)
Dept: AUDIOLOGY | Facility: CLINIC | Age: 69
End: 2019-12-17
Payer: MEDICARE

## 2019-12-17 VITALS
DIASTOLIC BLOOD PRESSURE: 76 MMHG | HEART RATE: 75 BPM | SYSTOLIC BLOOD PRESSURE: 111 MMHG | BODY MASS INDEX: 29 KG/M2 | WEIGHT: 185.19 LBS

## 2019-12-17 DIAGNOSIS — H90.41 SENSORINEURAL HEARING LOSS (SNHL) OF RIGHT EAR WITH UNRESTRICTED HEARING OF LEFT EAR: Primary | ICD-10-CM

## 2019-12-17 PROCEDURE — 99213 PR OFFICE/OUTPT VISIT, EST, LEVL III, 20-29 MIN: ICD-10-PCS | Mod: S$PBB,,, | Performed by: OTOLARYNGOLOGY

## 2019-12-17 PROCEDURE — 92557 COMPREHENSIVE HEARING TEST: CPT | Mod: PBBFAC | Performed by: AUDIOLOGIST

## 2019-12-17 PROCEDURE — 99999 PR PBB SHADOW E&M-EST. PATIENT-LVL I: CPT | Mod: PBBFAC,,, | Performed by: AUDIOLOGIST

## 2019-12-17 PROCEDURE — 99213 OFFICE O/P EST LOW 20 MIN: CPT | Mod: PBBFAC,27 | Performed by: OTOLARYNGOLOGY

## 2019-12-17 PROCEDURE — 99999 PR PBB SHADOW E&M-EST. PATIENT-LVL I: ICD-10-PCS | Mod: PBBFAC,,, | Performed by: AUDIOLOGIST

## 2019-12-17 PROCEDURE — 99213 OFFICE O/P EST LOW 20 MIN: CPT | Mod: S$PBB,,, | Performed by: OTOLARYNGOLOGY

## 2019-12-17 PROCEDURE — 99999 PR PBB SHADOW E&M-EST. PATIENT-LVL III: ICD-10-PCS | Mod: PBBFAC,,, | Performed by: OTOLARYNGOLOGY

## 2019-12-17 PROCEDURE — 99999 PR PBB SHADOW E&M-EST. PATIENT-LVL III: CPT | Mod: PBBFAC,,, | Performed by: OTOLARYNGOLOGY

## 2019-12-17 PROCEDURE — 99211 OFF/OP EST MAY X REQ PHY/QHP: CPT | Mod: PBBFAC,25 | Performed by: AUDIOLOGIST

## 2019-12-17 PROCEDURE — 1159F MED LIST DOCD IN RCRD: CPT | Mod: ,,, | Performed by: OTOLARYNGOLOGY

## 2019-12-17 PROCEDURE — 92567 TYMPANOMETRY: CPT | Mod: PBBFAC | Performed by: AUDIOLOGIST

## 2019-12-17 PROCEDURE — 1159F PR MEDICATION LIST DOCUMENTED IN MEDICAL RECORD: ICD-10-PCS | Mod: ,,, | Performed by: OTOLARYNGOLOGY

## 2019-12-17 NOTE — LETTER
December 17, 2019      Bharat Naik, NP  [address]  [address]  [place] [facility] [address]           [facility] Otorhinol[facility]ology  [address]  [facility] [address]  Phone: [phone]  Fax: [phone]          Patient: Hoang Santos Jr.   MR Number: [id number]   YOB: 1950   Date of Visit: 12/17/2019       Dear Bharat Naik:    Thank you for referring Hoang Santos to me for evaluation. Attached you will find relevant portions of my assessment and plan of care.    If you have questions, please do not hesitate to call me. I look forward to following Hoang Snatos along with you.    Sincerely,    Gamaliel Roberson MD    Enclosure  CC:  No Recipients    If you would like to receive this communication electronically, please contact [email or web address] or [phone] to request more information on [employer] Link access.    For providers and/or their staff who would like to refer a patient to [facility], please contact us through our one-stop-shop provider referral line, [facility], at [phone].    If you feel you have received this communication in error or would no longer like to receive these types of communications, please e-mail [email or web address]

## 2019-12-17 NOTE — PROGRESS NOTES
Subjective:       Patient ID: Hoang Santos Jr. is a 69 y.o. male.    Chief Complaint: Hearing Loss (right ear)    Hearing Loss:   Chronicity:  Chronic (Right ear)  Onset:  Over 10 years ago  Progression since onset:  Unchanged  Frequency:  Constantly  Severity:  Disabling  Hearing loss characteristics:  Severe, trouble hearing TV, difficult on telephone, muffled, worse background noise and impaired select discrimination   Associated symptoms: dizziness, headaches and rhinorrhea.  No ear pain and no fever.  Aggravated by:  Noise  Treatments tried:  Nothing   PMH includes: dizziness.      Review of Systems   Constitutional: Positive for appetite change. Negative for activity change and fever.   HENT: Positive for congestion, hearing loss, rhinorrhea, sinus pressure and trouble swallowing. Negative for ear discharge, ear pain, nosebleeds, postnasal drip and sneezing.    Eyes: Negative for redness and visual disturbance.   Respiratory: Negative for apnea, cough, shortness of breath and wheezing.    Cardiovascular: Negative for chest pain and palpitations.   Gastrointestinal: Positive for abdominal pain. Negative for diarrhea and vomiting.   Genitourinary: Negative for difficulty urinating and frequency.   Musculoskeletal: Negative for arthralgias, back pain, gait problem and neck pain.   Skin: Negative for color change and rash.   Neurological: Positive for dizziness and headaches. Negative for speech difficulty and weakness.   Hematological: Negative for adenopathy. Does not bruise/bleed easily.   Psychiatric/Behavioral: Negative for agitation and behavioral problems.       Objective:        Constitutional:   Vital signs are normal. He appears well-developed and well-nourished. He is active. Normal speech.      Head:  Normocephalic and atraumatic. Salivary glands normal.  Facial strength is normal.      Ears:  Hearing normal to normal and whispered voice; external ear normal without scars, lesions, or masses; ear  canal, tympanic membrane, and middle ear normal..     Nose:  Nose normal including turbinates, nasal mucosa, sinuses and nasal septum.     Mouth/Throat  Oropharynx clear and moist without lesions or asymmetry and normal uvula midline.     Neck:  Neck normal without thyromegaly masses, asymmetry, normal tracheal structure, crepitus, and tenderness, thyroid normal, trachea normal, phonation normal and full range of motion with neck supple.     Psychiatric:   He has a normal mood and affect. His speech is normal and behavior is normal.     Neurological:   He has neurological normal, alert and oriented.     Skin:   No abrasions, lacerations, lesions, or rashes.         As a result of this patients history and examination findings, a comprehensive audiogram was ordered to determine the level of hearing/hearing loss.          CT Scan of Head/Posterior fossa without contrast on 10/28/19 = Unremarkable scan with hemorrhage or mass effect.       Assessment:       1. Sensorineural hearing loss (SNHL) of right ear with unrestricted hearing of left ear        Plan:       1. Hearing conservation strongly recommended.  2. Trial of amplification with Cros or BAHA. Arrange hearing aid consult.  3. Re-check of hearing in 18-24 months or sooner if subjective change noted.  4. F/U with PCP as per schedule.

## 2019-12-17 NOTE — PROGRESS NOTES
Hoang Santos  was seen today for a hearing evaluation. Mr. Santos reported a long standing history of severe hearing loss, AD    Pure tone audiometry revealed a profound SNHL for the right ear; mild SNHL for the left ear  SRT and PTA are in good agreement for the left ear  Excellent speech discrimination for the left ear: CNT right ear due to severity of hearing loss.   Tympanometry revealed Type A, bilaterally      Recommendations:  1. Otologic Evaluation for asymmetry   2. Repeat audiogram as needed  3. Hearing Protection  4. BAHA or BI-CROS consult

## 2019-12-30 ENCOUNTER — CLINICAL SUPPORT (OUTPATIENT)
Dept: AUDIOLOGY | Facility: CLINIC | Age: 69
End: 2019-12-30
Payer: MEDICARE

## 2019-12-30 ENCOUNTER — OFFICE VISIT (OUTPATIENT)
Dept: SLEEP MEDICINE | Facility: CLINIC | Age: 69
End: 2019-12-30
Payer: MEDICARE

## 2019-12-30 VITALS
HEART RATE: 71 BPM | DIASTOLIC BLOOD PRESSURE: 63 MMHG | WEIGHT: 185 LBS | HEIGHT: 67 IN | SYSTOLIC BLOOD PRESSURE: 103 MMHG | BODY MASS INDEX: 29.03 KG/M2

## 2019-12-30 DIAGNOSIS — G47.33 OBSTRUCTIVE SLEEP APNEA: Primary | ICD-10-CM

## 2019-12-30 DIAGNOSIS — Z91.199 POOR COMPLIANCE WITH CPAP TREATMENT: ICD-10-CM

## 2019-12-30 PROCEDURE — 99999 PR PBB SHADOW E&M-EST. PATIENT-LVL III: CPT | Mod: PBBFAC,,, | Performed by: NURSE PRACTITIONER

## 2019-12-30 PROCEDURE — 99499 UNLISTED E&M SERVICE: CPT | Mod: S$PBB,,, | Performed by: AUDIOLOGIST

## 2019-12-30 PROCEDURE — 1126F AMNT PAIN NOTED NONE PRSNT: CPT | Mod: ,,, | Performed by: NURSE PRACTITIONER

## 2019-12-30 PROCEDURE — 1159F PR MEDICATION LIST DOCUMENTED IN MEDICAL RECORD: ICD-10-PCS | Mod: ,,, | Performed by: NURSE PRACTITIONER

## 2019-12-30 PROCEDURE — 99999 PR PBB SHADOW E&M-EST. PATIENT-LVL III: ICD-10-PCS | Mod: PBBFAC,,, | Performed by: NURSE PRACTITIONER

## 2019-12-30 PROCEDURE — 1159F MED LIST DOCD IN RCRD: CPT | Mod: ,,, | Performed by: NURSE PRACTITIONER

## 2019-12-30 PROCEDURE — 1126F PR PAIN SEVERITY QUANTIFIED, NO PAIN PRESENT: ICD-10-PCS | Mod: ,,, | Performed by: NURSE PRACTITIONER

## 2019-12-30 PROCEDURE — 99499 NO LOS: ICD-10-PCS | Mod: S$PBB,,, | Performed by: AUDIOLOGIST

## 2019-12-30 PROCEDURE — 99213 OFFICE O/P EST LOW 20 MIN: CPT | Mod: S$PBB,,, | Performed by: NURSE PRACTITIONER

## 2019-12-30 PROCEDURE — 99213 OFFICE O/P EST LOW 20 MIN: CPT | Mod: PBBFAC | Performed by: NURSE PRACTITIONER

## 2019-12-30 PROCEDURE — 99213 PR OFFICE/OUTPT VISIT, EST, LEVL III, 20-29 MIN: ICD-10-PCS | Mod: S$PBB,,, | Performed by: NURSE PRACTITIONER

## 2019-12-30 NOTE — PROGRESS NOTES
"Hoang Santos Jr. 69 y.o. year-old male returns for management of obstructive sleep apnea and PAP equipment check.    INTERVAL HISTORY:    12/30/2019 LIZZ Crooks NP: Have not been able to use BPAP any better than prior visit. Greatest barrier is lack of motivation. Denies claustrophobia, not really a problem feeling mask on face. However, as soon as pressure of air gets uncomfortable removes mask/PAP immediately. Since last visit only tried to use CPAP 7 times, two of which minimal attempt.     BPAP Interrogation: did not bring machine   Per [facility], after 03/19/2019 set up, Unable to find compliant 30 day period within date range    11/18/2019 LIZZ Crooks NP: Weight loss reported during last visit, repeat sleep testing recommended.   Discussed sleep study results, despite 56-lb weight loss from 2014 sleep study still has severe GEMMA   Dr. Brandt also called pt with results and ordered BPAP  BPAP set up 09/26/2019   Continues to have snoring and unrefreshing sleep because BPAP use limited due to nasal congestion and aerophagia with AirFit F30.   Aerophagia relieved by belching in the mornings. Denies n/v, abdominal pain, or cramping.   Pt has Flonase and Zyrtec meds, but only uses prn   ESS 2     BPAP Interrogation: Dreamstation BPAP 18/14, Therapy Hours: 25.8, Blower Hours: 67.9     08/06/2018 Split  lb. The overall AHI was 71.1 with an oxygen david of 85.0%. Adequate effective control of GEMMA achieved with BPAP 18/14.   12/11/2014 Split  lb. The overall AHI was 93.3 with an oxygen david of 85.0%. Effective control of sleep disordered breathing was not achieved. Recommendation: dedicated titration vs APAP 8 - 18 cm     Review of Systems: Sleep related symptoms as per HPI. Otherwise, a balance of 10 systems reviewed is negative.    Physical Exam:   /63   Pulse 71   Ht 5' 7" (1.702 m)   Wt 83.9 kg (185 lb)   BMI 28.98 kg/m²   GENERAL: Well groomed    Assessment:      Obstructive sleep apnea, severe " by AHI criteria. The patient symptomatically has snoring and unrefreshing sleep. Previously reported pressure intolerance, nasal congestion, and aerophagia as CPAP barriers, but continues to be intolerant of CPAP. Pt is not motivated to use machine.  Medical co-morbidities: overweight. This warrants continued treatment for GEMMA.     Plan:     Treatment:  -Explicit discussion importance of PAP treatment and ramifications of untreated GEMMA   -desensitization with mask during day time discussed. Contact [employer] for mask fitting.   -Reinforced Ramp feature to temporarily reduce pressure  -continue 14/10 from 18/14 with plans on increasing to titrated pressure as tolerated   -RTC 4 - 6 weeks, bring mask and machine to all appointments     Education: During our discussion today, we talked about the etiology of obstructive sleep apnea as well as the potential ramifications of untreated sleep apnea, which could include daytime sleepiness, hypertension, heart disease and/or stroke. We discussed potential treatment options, which could include weight loss, body positioning, continuous positive airway pressure (CPAP), or referral for surgical consideration.     Behavior modification which includes losing weight, exercising, changing the sleep position, abstaining from alcohol, and avoiding certain medications    Precautions: The patient was advised to abstain from driving should they feel sleepy or drowsy

## 2019-12-30 NOTE — PROGRESS NOTES
Mr. Santos was seen for a hearing aid consultation. Recommended a BiCROS device. Also briefly discussed potential surgical options. Mr. Santos reported he was not interested in surgery. Discussed pros and cons of various styles and technology levels. Patient was most interested in a hearing aid for his right ear. He was counseled extensively that this is not recommended due to the extent of the hearing loss in his right ear. Despite this not being the recommendation, the patient requested to demo a device in that ear. He noted limited benefit in the office. Discussed [employer] financing. Patient acknowledged understanding of the 30 day trial period, $250 restocking fee from the time of order, and the fact that we do not file insurance on behalf of the patient. Patient was advised to call or email with any questions.

## 2020-01-07 ENCOUNTER — PATIENT MESSAGE (OUTPATIENT)
Dept: SLEEP MEDICINE | Facility: CLINIC | Age: 70
End: 2020-01-07

## 2020-01-07 ENCOUNTER — PATIENT MESSAGE (OUTPATIENT)
Dept: AUDIOLOGY | Facility: CLINIC | Age: 70
End: 2020-01-07

## 2020-01-13 ENCOUNTER — TELEPHONE (OUTPATIENT)
Dept: SLEEP MEDICINE | Facility: CLINIC | Age: 70
End: 2020-01-13

## 2020-01-13 NOTE — TELEPHONE ENCOUNTER
----- Message from Khushi Hunter NP sent at 1/10/2020  4:40 PM CST -----  Regarding: FW: Pressure Setting  Contact: [phone]  Just dariana, I called [facility] and Deirdre had looked backl at his old study and set his cpap today to 8cm. Just seeing if that is okay or you can change if need be.     ----- Message -----  From: Deirdre English, ANIBAL  Sent: 1/10/2020   3:14 PM CST  To: Aruna Crooks NP  Subject: Pressure Setting                                 Good afternoon. I have Mr. Santos here in the office turning in the BiPAP due to non compliance. The PAP he brought in for us to set for him to use is a CPAP and not APAP. What pressure would you like for me to set the CPAP for him to use this other CPAP? Thanks in advance.

## 2020-01-13 NOTE — TELEPHONE ENCOUNTER
BRYNN Singh NP   Phone Number: [phone]             Just fyi, I called [facility] and Deirdre had looked backl at his old study and set his cpap today to 8cm. Just seeing if that is okay or you can change if need be.    Previous Messages      ----- Message -----   From: Deirdre English, CRT   Sent: 1/10/2020   3:14 PM CST   To: Aruna Crooks NP   Subject: Pressure Setting                                 Good afternoon. I have Mr. Santos here in the office turning in the BiPAP due to non compliance. The PAP he brought in for us to set for him to use is a CPAP and not APAP. What pressure would you like for me to set the CPAP for him to use this other CPAP? Thanks in advance.

## 2020-02-13 ENCOUNTER — CLINICAL SUPPORT (OUTPATIENT)
Dept: SPEECH THERAPY | Facility: HOSPITAL | Age: 70
End: 2020-02-13
Payer: MEDICARE

## 2020-02-13 DIAGNOSIS — J38.5 LARYNGEAL SPASM: ICD-10-CM

## 2020-02-13 DIAGNOSIS — R49.0 DYSPHONIA: Primary | ICD-10-CM

## 2020-02-13 DIAGNOSIS — J38.3 VOCAL FOLD SCAR: ICD-10-CM

## 2020-02-13 DIAGNOSIS — J38.3 VOCAL FOLD ATROPHY: ICD-10-CM

## 2020-02-13 PROCEDURE — 92507 TX SP LANG VOICE COMM INDIV: CPT | Mod: GN

## 2020-02-13 NOTE — PROGRESS NOTES
Referring provider: Dr. Juaquin Casas  Reason for visit:  Voice treatment (CPT 02859)  Session #5    History / Subjective   I had the pleasure of seeing Hoang Santos  for his fifth treatment session following complete voice evaluation on 8/14/2019. During last session on 12/16/2019, improvements were noted on straw phonation on /u/ SOVT exercises, cup to mouth phonation, and cuing pt to use varied intonation patterns. Since last session, he has practiced with the exercises and reports that his voice is doing better. He remains bothered by mucous, which he feels is his biggest problem.     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 90 tablet 3    HYDROcodone-acetaminophen (NORCO) 5-325 mg per tablet Take 1 tablet by mouth every 4 (four) hours as needed for Pain. 12 tablet 0    ondansetron (ZOFRAN-ODT) 4 MG TbDL Take 1 tablet (4 mg total) by mouth every 8 (eight) hours as needed (nausea). 30 tablet 0    ranitidine (ZANTAC) 150 MG tablet Take 1 tablet (150 mg total) by mouth 2 (two) times daily. 60 tablet 11     No current facility-administered medications on file prior to visit.      Objective   The primary goal of todays session was to review and modify treatment plan. This was targeted using SOVT exercises and cuing patient to vary intonation patterns.     Perceptual/behavioral assessment  -CAPE-V Overall Score: 25  -Quality: intermittently rough, breathy, and pitch breaks at upper register  -Volume: decreased projection  -Pitch: appropriate for age and gender  -Flexibility: diminished  -Habitual respiratory pattern: chest/clavicular     Treatment  Due to pt preference for straw phonation, other SOVT  exercises were discontinued. He was stimulable for decreased roughness with straw phonation SOVT exercises. Good carryover to modal pitch and phrases. Improved carryover to structured reading passages was noted when cuing patient to vary intonation patterns and slightly increase volume. For home practice, clinician reviewed home exercises as practiced during the session with the patient. Recommended patient practice exercises 5-6 times per day for 1-2 minutes at a time to strengthen and balance intrinsic laryngeal musculature. Patient amenable to all suggestions as part of home-program.     Functional goals  Length Status Goal   Long term Ongoing/ Discontinue. Still notices some difficulty with his vocal quality; however, feels this is mostly due to excess mucous. Patient and clinician will facilitate changes in vocal function in order to restore functional use of voice for daily occupational, social, and emotional demands.    Long term Met. Patient will re-establish phonation with adequate balance of airflow and resonance with decreased muscle tension.    Long term Met. Patient will improve coordination of respiration and phonation for efficient vocal production at a conversational level.    Short term Met. Patient will coordinate vocal subsystems in hierarchical speech tasks by producing sound in an efficient manner yielding improved or normal voice quality and vocal endurance in the presence of existing laryngeal disorder with 90% accuracy independently.   Short term Met. Patient will improve the quality, efficiency, and ease of phonation through resonant and/or airflow exercises from the syllable to conversation level with 80% accuracy.   Short term Met. Patient will identify the sensations associated with muscle relaxation in the abdominal, thoracic, neck and facial areas during efficient phonation with minimal clinician cue.    Short term Met. Patient will demonstrate the ability to increase awareness of  voicing behavior through self-monitoring to facilitate generalization in functional speaking situations with 80% accuracy.      Assessment     Hoang Santos Jr. presents with mild dysphonia.  Diagnoses of Dysphonia, Bilateral partial vocal cord paralysis, Vocal fold atrophy, Vocal fold scar, and Laryngeal spasm were pertinent to this visit. Patient made progress toward goals and demonstrates exercises correctly during session.    Recommendations / POC    Patient demonstrates exercises correctly and will complete exercises as part of home-program for continued improvement and generalization. D/c from voice therapy at this time.

## 2020-02-19 ENCOUNTER — PATIENT MESSAGE (OUTPATIENT)
Dept: SLEEP MEDICINE | Facility: CLINIC | Age: 70
End: 2020-02-19

## 2020-03-13 ENCOUNTER — TELEPHONE (OUTPATIENT)
Dept: INTERNAL MEDICINE | Facility: CLINIC | Age: 70
End: 2020-03-13

## 2020-03-13 ENCOUNTER — HOSPITAL ENCOUNTER (EMERGENCY)
Facility: HOSPITAL | Age: 70
Discharge: HOME | End: 2020-03-13
Attending: EMERGENCY MEDICINE
Payer: MEDICARE

## 2020-03-13 VITALS
WEIGHT: 178 LBS | SYSTOLIC BLOOD PRESSURE: 135 MMHG | BODY MASS INDEX: 28.61 KG/M2 | TEMPERATURE: 98 F | OXYGEN SATURATION: 97 % | RESPIRATION RATE: 18 BRPM | DIASTOLIC BLOOD PRESSURE: 62 MMHG | HEART RATE: 82 BPM | HEIGHT: 66 IN

## 2020-03-13 DIAGNOSIS — R07.89 CHEST WALL PAIN: ICD-10-CM

## 2020-03-13 DIAGNOSIS — R07.9 CHEST PAIN: ICD-10-CM

## 2020-03-13 DIAGNOSIS — R07.9 CHEST PAIN, UNSPECIFIED TYPE: Primary | ICD-10-CM

## 2020-03-13 LAB
ALBUMIN SERPL BCP-MCNC: 3.9 G/DL (ref 3.5–5.2)
ALP SERPL-CCNC: 82 U/L (ref 55–135)
ALT SERPL W/O P-5'-P-CCNC: 21 U/L (ref 10–44)
ANION GAP SERPL CALC-SCNC: 9 MMOL/L (ref 8–16)
AST SERPL-CCNC: 32 U/L (ref 10–40)
BASOPHILS # BLD AUTO: 0.02 K/UL (ref 0–0.2)
BASOPHILS NFR BLD: 0.5 % (ref 0–1.9)
BILIRUB SERPL-MCNC: 0.4 MG/DL (ref 0.1–1)
BNP SERPL-MCNC: 25 PG/ML (ref 0–99)
BUN SERPL-MCNC: 12 MG/DL (ref 8–23)
CALCIUM SERPL-MCNC: 9.5 MG/DL (ref 8.7–10.5)
CHLORIDE SERPL-SCNC: 106 MMOL/L (ref 95–110)
CO2 SERPL-SCNC: 28 MMOL/L (ref 23–29)
CREAT SERPL-MCNC: 1 MG/DL (ref 0.5–1.4)
DIFFERENTIAL METHOD: ABNORMAL
EOSINOPHIL # BLD AUTO: 0.1 K/UL (ref 0–0.5)
EOSINOPHIL NFR BLD: 1.3 % (ref 0–8)
ERYTHROCYTE [DISTWIDTH] IN BLOOD BY AUTOMATED COUNT: 13.2 % (ref 11.5–14.5)
EST. GFR  (AFRICAN AMERICAN): >60 ML/MIN/1.73 M^2
EST. GFR  (NON AFRICAN AMERICAN): >60 ML/MIN/1.73 M^2
GLUCOSE SERPL-MCNC: 88 MG/DL (ref 70–110)
HCT VFR BLD AUTO: 41.9 % (ref 40–54)
HGB BLD-MCNC: 13.2 G/DL (ref 14–18)
IMM GRANULOCYTES # BLD AUTO: 0.01 K/UL (ref 0–0.04)
IMM GRANULOCYTES NFR BLD AUTO: 0.3 % (ref 0–0.5)
LYMPHOCYTES # BLD AUTO: 0.9 K/UL (ref 1–4.8)
LYMPHOCYTES NFR BLD: 25.3 % (ref 18–48)
MCH RBC QN AUTO: 29.8 PG (ref 27–31)
MCHC RBC AUTO-ENTMCNC: 31.5 G/DL (ref 32–36)
MCV RBC AUTO: 95 FL (ref 82–98)
MONOCYTES # BLD AUTO: 0.4 K/UL (ref 0.3–1)
MONOCYTES NFR BLD: 10.2 % (ref 4–15)
NEUTROPHILS # BLD AUTO: 2.3 K/UL (ref 1.8–7.7)
NEUTROPHILS NFR BLD: 62.4 % (ref 38–73)
NRBC BLD-RTO: 0 /100 WBC
PLATELET # BLD AUTO: 185 K/UL (ref 150–350)
PMV BLD AUTO: 10.4 FL (ref 9.2–12.9)
POTASSIUM SERPL-SCNC: 4.3 MMOL/L (ref 3.5–5.1)
PROT SERPL-MCNC: 7.8 G/DL (ref 6–8.4)
RBC # BLD AUTO: 4.43 M/UL (ref 4.6–6.2)
SODIUM SERPL-SCNC: 143 MMOL/L (ref 136–145)
TROPONIN I SERPL DL<=0.01 NG/ML-MCNC: <0.006 NG/ML (ref 0–0.03)
WBC # BLD AUTO: 3.71 K/UL (ref 3.9–12.7)

## 2020-03-13 PROCEDURE — 80053 COMPREHEN METABOLIC PANEL: CPT

## 2020-03-13 PROCEDURE — 99285 PR EMERGENCY DEPT VISIT,LEVEL V: ICD-10-PCS | Mod: ,,, | Performed by: EMERGENCY MEDICINE

## 2020-03-13 PROCEDURE — 99285 EMERGENCY DEPT VISIT HI MDM: CPT | Mod: ,,, | Performed by: EMERGENCY MEDICINE

## 2020-03-13 PROCEDURE — 84484 ASSAY OF TROPONIN QUANT: CPT

## 2020-03-13 PROCEDURE — 25000003 PHARM REV CODE 250: Performed by: EMERGENCY MEDICINE

## 2020-03-13 PROCEDURE — 85025 COMPLETE CBC W/AUTO DIFF WBC: CPT

## 2020-03-13 PROCEDURE — 83880 ASSAY OF NATRIURETIC PEPTIDE: CPT

## 2020-03-13 PROCEDURE — 99284 EMERGENCY DEPT VISIT MOD MDM: CPT | Mod: 25

## 2020-03-13 RX ORDER — ACETAMINOPHEN 500 MG
1000 TABLET ORAL
Status: COMPLETED | OUTPATIENT
Start: 2020-03-13 | End: 2020-03-13

## 2020-03-13 RX ORDER — NAPROXEN SODIUM 220 MG/1
162 TABLET, FILM COATED ORAL
Status: COMPLETED | OUTPATIENT
Start: 2020-03-13 | End: 2020-03-13

## 2020-03-13 RX ADMIN — ACETAMINOPHEN 1000 MG: 500 TABLET ORAL at 06:03

## 2020-03-13 RX ADMIN — ASPIRIN 81 MG CHEWABLE TABLET 162 MG: 81 TABLET CHEWABLE at 05:03

## 2020-03-13 NOTE — TELEPHONE ENCOUNTER
----- Message from Simon Jimenez, Patient Care Assistant sent at 3/13/2020  3:47 PM CDT -----  Contact: SOTO HCA JR. [[id number]]  Name of Who is Calling: SOTO CHA JR. [[id number]]    What is the request in detail:Requesting a call back in regards of having chest pain. Please contact to further discuss and advise      Can the clinic reply by [facility]: No    [facility]   [phone]

## 2020-03-13 NOTE — ED PROVIDER NOTES
Encounter Date: 3/13/2020       History     Chief Complaint   Patient presents with    Chest Pain     denies cardiac hx     HPI   69-year-old male with a history of hyperlipidemia, GEMMA, and neuroendocrine tumor presenting with 1 week history chest pain and chest wall pain.  Patient states that his pain is located along the left and middle anterior chest, worse with arm movement and palpation.  He denies any associated shortness of breath, nausea, vomiting, diarrhea, diaphoresis, lightheadedness or dizziness.  Denies any associated arm pain or jaw pain.  He denies any pleuritic pain, falls or trauma.  Pain is described as 2/10, worse with palpation.  No other associated factors.     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    Prostatic hyperplasia     Sleep apnea      Past Surgical History:   Procedure Laterality Date    carcinoid tumor stomach      January 4, 2019    CHOLECYSTECTOMY  1/4/2019    Procedure: CHOLECYSTECTOMY;  Surgeon: Madeleine Alvarado MD;  Location: [facility] OR;  Service: General;;    CYSTOSCOPY WITH INSERTION OF MINIMALLY INVASIVE IMPLANT TO ENLARGE PROSTATIC URETHRA N/A 6/28/2018    Procedure: TRANSPROSTATIC TISSUE RETRACTION;  Surgeon: Kory Jack MD;  Location: [facility] OR;  Service: Urology;  Laterality: N/A;    DUODENECTOMY N/A 1/4/2019    Procedure: DUODENECTOMY;  Surgeon: Madeleine Alvarado MD;  Location: [facility] OR;  Service: General;  Laterality: N/A;    ENDOSCOPIC ULTRASOUND OF UPPER GASTROINTESTINAL TRACT N/A 11/5/2018    Procedure: ULTRASOUND-ENDOSCOPIC-UPPER;  Surgeon: Gorge Reyes MD;  Location: [facility] ENDO;  Service: Endoscopy;  Laterality: N/A;  Carcinoid diagnosis     ENDOSCOPIC ULTRASOUND OF UPPER GASTROINTESTINAL TRACT N/A 9/24/2019    Procedure: ULTRASOUND-ENDOSCOPIC-UPPER;  Surgeon: David Roberson MD;  Location: [facility];  Service: Endoscopy;  Laterality: N/A;  Carcinoid Diagnosis  Gastric Ph    ESOPHAGOGASTRODUODENOSCOPY N/A 9/24/2018    Procedure: EGD (ESOPHAGOGASTRODUODENOSCOPY);  Surgeon: Salo Nuñez MD;  [facility]  Service: Endoscopy;  Laterality: N/A;    ESOPHAGOGASTRODUODENOSCOPY N/A 1/21/2019    Procedure: ESOPHAGOGASTRODUODENOSCOPY (EGD);  Surgeon: Jose Kahn MD;  Location: [facility];  Service: Endoscopy;  Laterality: N/A;    ESOPHAGOGASTRODUODENOSCOPY N/A 1/24/2019    Procedure: EGD (ESOPHAGOGASTRODUODENOSCOPY);  Surgeon: Madeleine Alvarado MD;  Location: [facility];  Service: General;  Laterality: N/A;  spoke to belkis in [facility] for egd, told her it was add on case could go anywhere from 2pm KB    ESOPHAGOGASTRODUODENOSCOPY N/A 9/24/2019    Procedure: EGD (ESOPHAGOGASTRODUODENOSCOPY);  Surgeon: David Roberson MD;  Location: [facility];  Service: Endoscopy;  Laterality: N/A;    ESOPHAGOGASTRODUODENOSCOPY N/A 1/4/2019    Procedure: EGD (ESOPHAGOGASTRODUODENOSCOPY);  Surgeon: Madeleine Alvarado MD;  Location: [facility];  Service: General;  Laterality: N/A;  spoke to Anna in [facility] for egd set up for 1/4/19 on 1/3/19 0814 KB    GASTRIC FUNDOPLICATION N/A 1/4/2019    Procedure: FUNDOPLICATION;  Surgeon: Madeleine Alvarado MD;  Location: [facility] OR;  Service: General;  Laterality: N/A;    GASTROJEJUNOSTOMY Right 1/24/2019    Procedure: GASTROJEJUNOSTOMY;  Surgeon: Madeleine Alvarado MD;  Location: [facility];  Service: General;  Laterality: Right;    GASTROJEJUNOSTOMY N/A 1/4/2019    Procedure: GASTROJEJUNOSTOMY;  Surgeon: Madeleine Alvarado MD;  Location: [facility];  Service: General;  Laterality: N/A;    LAPAROTOMY, EXPLORATORY N/A 1/24/2019    Procedure: LAPAROTOMY, EXPLORATORY;  Surgeon: Madeleine Alvarado MD;   Location: [facility] OR;  Service: General;  Laterality: N/A;    LIVER BIOPSY  1/4/2019    Procedure: BIOPSY, LIVER;  Surgeon: Madeleine Alvarado MD;  Location: [facility] OR;  Service: General;;    LYSIS OF ADHESIONS N/A 1/24/2019    Procedure: LYSIS, ADHESIONS;  Surgeon: Madeleine Alvarado MD;  Location: [facility] OR;  Service: General;  Laterality: N/A;    PERCUTANEOUS TRANSHEPATIC CHOLANGIOGRAPHY Right 1/29/2019    Procedure: CHOLANGIOGRAM, PERCUTANEOUS, TRANSHEPATIC;  Surgeon: YOHANNES Shelley MD;  Location: [facility] OR;  Service: General;  Laterality: Right;    TONSILLECTOMY      UVULOPALATOPHARYNGOPLASTY       Family History   Problem Relation Age of Onset    Heart disease Mother     Diabetes Mother     Stroke Father     Cancer Sister         pancreatitic    COPD Sister     Hyperlipidemia Sister     No Known Problems Brother     No Known Problems Sister     No Known Problems Brother     No Known Problems Brother     No Known Problems Daughter     No Known Problems Son     Macular degeneration Daughter     Prostate cancer Neg Hx     Kidney disease Neg Hx     Thyroid disease Neg Hx     Retinal detachment Neg Hx     Hypertension Neg Hx     Glaucoma Neg Hx      Social History     Tobacco Use    Smoking status: Never Smoker    Smokeless tobacco: Never Used   Substance Use Topics    Alcohol use: No     Frequency: Monthly or less     Drinks per session: Patient refused     Binge frequency: Never    Drug use: No     Review of Systems   Constitutional: Negative for chills, fatigue and fever.   HENT: Negative for congestion, hearing loss, sinus pain, sore throat and trouble swallowing.    Eyes: Negative for photophobia and visual disturbance.   Respiratory: Negative for chest tightness and shortness of breath.    Cardiovascular: Positive for chest pain. Negative for palpitations.        Chest wall pain   Gastrointestinal: Negative for abdominal distention, abdominal pain, constipation, nausea and  vomiting.   Endocrine: Negative for polyphagia and polyuria.   Genitourinary: Negative for dysuria, flank pain and hematuria.   Musculoskeletal: Negative for arthralgias, neck pain and neck stiffness.   Skin: Negative for color change and wound.   Neurological: Negative for dizziness and syncope.   Psychiatric/Behavioral: Negative for hallucinations and self-injury.       Physical Exam     Initial Vitals [03/13/20 1655]   BP Pulse Resp Temp SpO2   135/62 82 18 97.9 °F (36.6 °C) 97 %      MAP       --         Physical Exam    Nursing note and vitals reviewed.    Gen/Constitutional: Interactive. No acute distress  Head: Normocephalic, Atraumatic  Neck: supple, no masses or LAD, no JVD  Eyes: PERRLA, conjunctiva clear  Ears, Nose and Throat: No rhinorrhea or stridor.  Cardiac:  Regular rate Reg Rhythm, No murmur, rubs or gallops; positive chest wall tenderness with arm movement.  Pulmonary: CTA Bilat, no wheezes, rhonchi, rales.  GI: Abdomen soft, non-tender, non-distended; no rebound or guarding  : No CVA tenderness.  Musculoskeletal: Extremities warm, well perfused, no erythema, no edema  Skin: No rashes, cyanosis or jaundice  Neuro: Alert and Oriented x 3; No focal motor or sensory deficits.    Psych: Normal affect      ED Course   Procedures  Labs Reviewed   CBC W/ AUTO DIFFERENTIAL - Abnormal; Notable for the following components:       Result Value    WBC 3.71 (*)     RBC 4.43 (*)     Hemoglobin 13.2 (*)     Mean Corpuscular Hemoglobin Conc 31.5 (*)     Lymph # 0.9 (*)     All other components within normal limits    Narrative:     shared lavender   COMPREHENSIVE METABOLIC PANEL    Narrative:     shared lavender   TROPONIN I    Narrative:     shared lavender   B-TYPE NATRIURETIC PEPTIDE    Narrative:     shared lavender     EKG Readings: (Independently Interpreted)   Initial Reading: No STEMI. Previous EKG: Compared with most recent EKG Rhythm: Normal Sinus Rhythm. Heart Rate: 78. Ectopy: No Ectopy. ST  Segments: Normal ST Segments. T Waves: Normal.       Imaging Results          X-Ray Chest PA And Lateral (Final result)  Result time 03/13/20 18:21:46    Final result by Ruiz Arvizu MD (03/13/20 18:21:46)                 Impression:      No acute radiographic abnormality.      Electronically signed by: Ruiz Arvizu  Date:    03/13/2020  Time:    18:21             Narrative:    EXAMINATION:  XR CHEST PA AND LATERAL    CLINICAL HISTORY:  Chest Pain;    TECHNIQUE:  PA and lateral views of the chest were performed.    COMPARISON:  02/03/2019    FINDINGS:  The lungs are clear, with normal appearance of pulmonary vasculature and no pleural effusion or pneumothorax.    The cardiac silhouette is normal in size. The hilar and mediastinal contours are unremarkable.    Bones are intact.                              X-Rays:   Independently Interpreted Readings:   Chest X-Ray: Normal heart size.  No infiltrates.  No acute abnormalities.     Medical Decision Making:   History:   Old Medical Records: I decided to obtain old medical records.  Old Records Summarized: records from clinic visits.  Initial Assessment:   69-year-old male with a history of hyperlipidemia, GEMMA, and neuroendocrine tumor presenting with 1 week history chest pain and chest wall pain.  Differential Diagnosis:   Differential diagnosis includes but was not limited to: ACS, musculoskeletal, costochondritis, PE, pneumothorax, pericardial effusion, pericarditis, cardiac tamponade  Independently Interpreted Test(s):   I have ordered and independently interpreted X-rays - see prior notes.  I have ordered and independently interpreted EKG Reading(s) - see prior notes  Clinical Tests:   Lab Tests: Ordered and Reviewed  Radiological Study: Ordered and Reviewed  Medical Tests: Ordered and Reviewed    Emergent evaluation of patient presenting with chest pain/chest wall pain.  He is afebrile, vital signs are stable.  Physical exam findings remarkable chest wall  tenderness along the left chest worsening with arm movement and palpation.  Risk factor for cardiac disease include hyperlipidemia, but low heart score.  No exertional component, and the pain is been ongoing for 1 week.  Given history, and symptoms a broad workup was conducted.  Patient was given aspirin, ECG obtained with no signs of ischemia or STEMI on my read.  Placed on cardiac and telemetry monitoring, including pulse oximetry.  No hypoxemia, lung sounds clear, remainder exam unremarkable.  IV line was placed, labs were drawn, undetectable troponin, and no cardiac telemetry changes.  Remainder of labs unremarkable, a chest x-ray without acute findings, doubt pneumonia, pneumothorax, pleural effusion or any other acute findings.  Pain improved with Tylenol and aspirin, likely musculoskeletal however will have patient follow up for repeat cardiac risk stratification evaluation in the next 24-48 hours with his primary care.  Strict ED precautions return instructions were discussed at length.  Do not suspect tamponade, pericarditis or any other acute emergent pathology at this time. Patient agreeable to discharge plan. Strict ED precautions and return instructions discussed at length and patient verbalized understanding. All questions were answered and ample time was given for questions.      Complexity:  High risk  Additional MDM:     Well's Criteria Score:  -Clinical symptoms of DVT (leg swelling, pain with palpation) = 0.0  -Other diagnosis less likely than pulmonary embolism =            0.0  -Heart Rate >100 =   0.0  -Immobilization (= or > than 3 days) or surgery in the previous 4 weeks = 0.0  -Previous DVT/PE = 0.0  -Hemoptysis =          0.0  -Malignancy =           0.0  Well's Probability Score =    0      Heart Score:    History:          Slightly suspicious.  ECG:             Normal  Age:               >65 years  Risk factors: 1-2 risk factors  Troponin:       Less than or equal to normal limit  Final  Score: 3                                  Clinical Impression:       ICD-10-CM ICD-9-CM   1. Chest pain, unspecified type R07.9 786.50   2. Chest pain R07.9 786.50   3. Chest wall pain R07.89 786.52         Disposition:   Disposition: Discharged  Condition: Stable     ED Disposition Condition    Discharge Stable        ED Prescriptions     None        Follow-up Information     Follow up With Specialties Details Why Contact Info    Lee Kay MD Internal Medicine Schedule an appointment as soon as possible for a visit in 3 days For follow-up and repeat evaluation [address]  [address]  [facility] [address]  [phone]      [facility] Emergency Medicine Go to  If symptoms worsen [facility] [id number] [phone]                    Kofi Jackson DO, FAAEM  Emergency Staff Physician   Dept of Emergency Medicine   [facility]  Spectralink: [phone]                   Kofi Jackson DO  03/13/20 1936       Kofi Jackson DO  03/13/20 1937

## 2020-03-13 NOTE — ED TRIAGE NOTES
Muscular? Chest pain on and off for a week which became concerning. Arrived to ED per self in POV.

## 2020-03-13 NOTE — TELEPHONE ENCOUNTER
Pt stated he was having chest pain on and off and that he was on his way to the hospital ( [facility]). Scheduled pt on 3/25/2020 with

## 2020-03-13 NOTE — PROVIDER PROGRESS NOTES - EMERGENCY DEPT.
Encounter Date: 3/13/2020    ED Physician Progress Notes         EKG - STEMI Decision  Initial Reading: No STEMI present.

## 2020-03-14 NOTE — DISCHARGE INSTRUCTIONS
Today your evaluation did not show any abnormalities in your EKG, labs or chest x-ray.  Your risk factors for cardiac disease are low however even though your workup today did not show any findings to suggest heart attack, we recommend you follow-up with your primary care in the next 2-3 days for repeat evaluation and further cardiac risk evaluation including stress test as needed.  If you have any worsening symptoms, chest pain, lightheadedness, dizziness, nausea, vomiting or any concerns follow-up sooner or return to the emergency department.    Our goal in the emergency department is to always give you outstanding care and exceptional service. You may receive a survey by mail or e-mail in the next week regarding your experience in our ED. We would greatly appreciate your completing and returning the survey. Your feedback provides us with a way to recognize our staff who give very good care and it helps us learn how to improve when your experience was below our aspiration of excellence.

## 2020-03-23 ENCOUNTER — PATIENT MESSAGE (OUTPATIENT)
Dept: INTERNAL MEDICINE | Facility: CLINIC | Age: 70
End: 2020-03-23

## 2020-03-24 ENCOUNTER — TELEPHONE (OUTPATIENT)
Dept: INTERNAL MEDICINE | Facility: CLINIC | Age: 70
End: 2020-03-24

## 2020-03-24 NOTE — TELEPHONE ENCOUNTER
Change pt appt to video visit 3/25/2020      Do you have a tablet or phone to do the visit on the [place] appt.    The visit will be charge as a normal visit, if insurance doest cover the visit will cost 59 dollar.    Please make sure you any where in the [place] at the time of your visit.    Pt agreed to questions approve   Sent my chart message.

## 2020-03-25 ENCOUNTER — OFFICE VISIT (OUTPATIENT)
Dept: INTERNAL MEDICINE | Facility: CLINIC | Age: 70
End: 2020-03-25
Attending: INTERNAL MEDICINE
Payer: MEDICARE

## 2020-03-25 DIAGNOSIS — N52.9 ERECTILE DYSFUNCTION, UNSPECIFIED ERECTILE DYSFUNCTION TYPE: ICD-10-CM

## 2020-03-25 DIAGNOSIS — G47.33 OSA (OBSTRUCTIVE SLEEP APNEA): ICD-10-CM

## 2020-03-25 DIAGNOSIS — E78.2 MIXED HYPERLIPIDEMIA: ICD-10-CM

## 2020-03-25 DIAGNOSIS — R07.89 MUSCULOSKELETAL CHEST PAIN: Primary | ICD-10-CM

## 2020-03-25 PROCEDURE — 99214 PR OFFICE/OUTPT VISIT, EST, LEVL IV, 30-39 MIN: ICD-10-PCS | Mod: 95,,, | Performed by: INTERNAL MEDICINE

## 2020-03-25 PROCEDURE — 99214 OFFICE O/P EST MOD 30 MIN: CPT | Mod: 95,,, | Performed by: INTERNAL MEDICINE

## 2020-03-25 RX ORDER — SILDENAFIL 100 MG/1
100 TABLET, FILM COATED ORAL DAILY PRN
Qty: 30 TABLET | Refills: 11 | Status: SHIPPED | OUTPATIENT
Start: 2020-03-25 | End: 2021-05-25 | Stop reason: SDUPTHER

## 2020-03-25 NOTE — PROGRESS NOTES
Subjective:      Patient ID: Hoang Santos Jr. is a 69 y.o. male.    Chief Complaint: No chief complaint on file.    visit performed on virtual visit due to current risk associated with COVID 19 pandemic    Seen 12 days prior in ED for atypical chest pain.  Pain is right-sided, radiating substernally, and occur consider only with movement of his right shoulder and bending of his torso.  He did denies any exertional component or chest pain at rest without movement of his shoulder.  He denies changes in his stamina.  He denies shortness of breath at rest or with exertion, PND, orthopnea, lower extremity edema, palpitations, fevers chills, cough.  He was seen in the ED had a normal EKG and normal troponin and discharge.  He has not had pain in 2 days.  Recent blood pressure normal.  He does not have a history of hypertension.  He has a history of GEMMA and admits that he is not adherent with CPAP.  History of carcinoid of the GI tract.  No new abdominal pains or change in bowel movement, changes in energy levels, no easy bruising/bleeding.    The patient location is: [place], [place]  The chief complaint leading to consultation is: chest pain  Visit type: Virtual visit with synchronous audio and video  Total time spent with patient: 5 minutes  Each patient to whom he or she provides medical services by telemedicine is:  (1) informed of the relationship between the physician and patient and the respective role of any other health care provider with respect to management of the patient; and (2) notified that he or she may decline to receive medical services by telemedicine and may withdraw from such care at any time.       Patient here today for      The patient location is:  Patient Home   The chief complaint leading to consultation is: chest pain  Visit type: Virtual visit with synchronous audio and video  Total time spent with patient: 7minutes  Each patient to whom he or she provides medical services by  telemedicine is:  (1) informed of the relationship between the physician and patient and the respective role of any other health care provider with respect to management of the patient; and (2) notified that he or she may decline to receive medical services by telemedicine and may withdraw from such care at any time.    Review of Systems   Constitutional: Negative for activity change and unexpected weight change.   HENT: Negative for hearing loss, rhinorrhea and trouble swallowing.    Eyes: Negative for discharge and visual disturbance.   Respiratory: Negative for chest tightness and wheezing.    Cardiovascular: Positive for chest pain. Negative for palpitations.   Gastrointestinal: Negative for blood in stool, constipation, diarrhea and vomiting.   Endocrine: Negative for polydipsia and polyuria.   Genitourinary: Negative for difficulty urinating, hematuria and urgency.   Musculoskeletal: Negative for arthralgias, joint swelling and neck pain.   Neurological: Negative for weakness and headaches.   Psychiatric/Behavioral: Negative for confusion and dysphoric mood.     I personally reviewed Past Medical History, Past Surgical history,  Past Social History and Family History    Physical Exam   Constitutional: He is oriented to person, place, and time. He appears well-developed and well-nourished. He does not have a sickly appearance. No distress.   HENT:   Head: Atraumatic.   Eyes: Conjunctivae and EOM are normal. Right eye exhibits no discharge. Left eye exhibits no discharge. No scleral icterus.   Neck: No thyromegaly present.   Pulmonary/Chest: Effort normal. No respiratory distress. He has no wheezes.   Abdominal: Normal appearance.   Neurological: He is alert and oriented to person, place, and time.   Skin: He is not diaphoretic. No pallor.   Psychiatric: He has a normal mood and affect. His speech is normal.     Diagnoses and all orders for this visit:    Musculoskeletal chest pain   History is very consistent  with a musculoskeletal chest wall pain.  Emergency department exam of tenderness to palpation of chest wall also corroborates this.  Pain-free for 2 days.  I do not feel that a stress test is warranted for this very atypical chest pain.  Office in ED problems discussed.  More importantly CPAP regularity use discussed with patient in the context of cardiovascular risk.    Mixed hyperlipidemia   Tolerating statin. Continue this.     GEMMA (obstructive sleep apnea)   Stressed adherence of and complications related to untreated GEMMA.    ED  -     sildenafiL (VIAGRA) 100 MG tablet; Take 1 tablet (100 mg total) by mouth daily as needed for Erectile Dysfunction.        1. Musculoskeletal chest pain    2. Mixed hyperlipidemia    3. GEMMA (obstructive sleep apnea)        No orders of the defined types were placed in this encounter.    Medications Ordered This Encounter   Medications    sildenafiL (VIAGRA) 100 MG tablet     Sig: Take 1 tablet (100 mg total) by mouth daily as needed for Erectile Dysfunction.     Dispense:  30 tablet     Refill:  11

## 2020-03-27 ENCOUNTER — TELEPHONE (OUTPATIENT)
Dept: NEUROLOGY | Facility: HOSPITAL | Age: 70
End: 2020-03-27

## 2020-04-01 NOTE — PROGRESS NOTES
NOLANETS:  [facility]  A collaboration between [facility] and [facility]      PATIENT: Hoang Santos Jr.  MRN: [id number]  DATE: 4/1/2020    Subjective:      Chief Complaint: No chief complaint on file.   Mr. Santos is doing well.  He recently came to the emergency room at [facility] for breathing problem was found to have allergic symptoms.  Next for he is eating well does not have any complaints of any reflux he continues to stay at 178 lb active with no GI issues.  His voice is almost back to normal    The patient location is:home  The chief complaint leading to consultation is: f/u duodenal carcinoid  Visit type: Virtual visit with synchronous audio and video  Total time spent with patient: 20 mts  Each patient to whom he or she provides medical services by telemedicine is:  (1) informed of the relationship between the physician and patient and the respective role of any other health care provider with respect to management of the patient; and (2) notified that he or she may decline to receive medical services by telemedicine and may withdraw from such care at any time.    Vitals: There were no vitals taken for this visit. -    ECOG Score: 0 - Asymptomatic    Diagnosis: No diagnosis found.     Interval History: 2/26/19  Patient is a 68 y.o. male status post ex lap duodenectomy high developed her significant issues with swallowing for which he required gastroenterostomy and G-tube placement.  He is getting physical therapy at home for speech as well as tube feeding with the IV nutrition.  He was seen by me today his right upper quadrant catheter was not draining much so the possibility would be to reposition the tube if necessary    Oncologic History:   Oncologic History 1/2019  Duodenum, resection:  -9 mm neuroendocrine tumor.  -Histologic grade: Grade 1, well differentiated neuroendocrine tumor.   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    Prostatic hyperplasia     Sleep apnea        Past Surgical History:  Past Surgical History:   Procedure Laterality Date    carcinoid tumor stomach      January 4, 2019    CHOLECYSTECTOMY  1/4/2019    Procedure: CHOLECYSTECTOMY;  Surgeon: Madeleine Alvarado MD;  Location: [facility];  Service: General;;    CYSTOSCOPY WITH INSERTION OF MINIMALLY INVASIVE IMPLANT TO ENLARGE PROSTATIC URETHRA N/A 6/28/2018    Procedure: TRANSPROSTATIC TISSUE RETRACTION;  Surgeon: Kory Jack MD;  Location: [facility];  Service: Urology;  Laterality: N/A;    DUODENECTOMY N/A 1/4/2019    Procedure: DUODENECTOMY;  Surgeon: Madeleine Alvarado MD;  Location: [facility];  Service: General;  Laterality: N/A;    ENDOSCOPIC ULTRASOUND OF UPPER GASTROINTESTINAL TRACT N/A 11/5/2018    Procedure: ULTRASOUND-ENDOSCOPIC-UPPER;  Surgeon: Gorge Reyes MD;  Location: [facility];  Service: Endoscopy;  Laterality: N/A;  Carcinoid diagnosis    ENDOSCOPIC ULTRASOUND OF UPPER GASTROINTESTINAL TRACT N/A 9/24/2019    Procedure: ULTRASOUND-ENDOSCOPIC-UPPER;  Surgeon: David Roberson MD;  Location: [facility];  Service: Endoscopy;  Laterality: N/A;  Carcinoid Diagnosis  Gastric Ph    ESOPHAGOGASTRODUODENOSCOPY N/A 9/24/2018    Procedure: EGD (ESOPHAGOGASTRODUODENOSCOPY);  Surgeon: Salo Nuñez MD;  [facility]  Service: Endoscopy;  Laterality: N/A;    ESOPHAGOGASTRODUODENOSCOPY N/A 1/21/2019    Procedure: ESOPHAGOGASTRODUODENOSCOPY (EGD);  Surgeon:  Jose Kahn MD;  Location: [facility] ENDO;  Service: Endoscopy;  Laterality: N/A;    ESOPHAGOGASTRODUODENOSCOPY N/A 1/24/2019    Procedure: EGD (ESOPHAGOGASTRODUODENOSCOPY);  Surgeon: Madeleine Alvarado MD;  Location: [facility] OR;  Service: General;  Laterality: N/A;  spoke to belkis in endo for egd, told her it was add on case could go anywhere from 2pm KB    ESOPHAGOGASTRODUODENOSCOPY N/A 9/24/2019    Procedure: EGD (ESOPHAGOGASTRODUODENOSCOPY);  Surgeon: David Roberson MD;  Location: [facility] ENDO;  Service: Endoscopy;  Laterality: N/A;    ESOPHAGOGASTRODUODENOSCOPY N/A 1/4/2019    Procedure: EGD (ESOPHAGOGASTRODUODENOSCOPY);  Surgeon: Madeleine Alvarado MD;  Location: [facility] OR;  Service: General;  Laterality: N/A;  spoke to Anna in endo for egd set up for 1/4/19 on 1/3/19 0814 KB    GASTRIC FUNDOPLICATION N/A 1/4/2019    Procedure: FUNDOPLICATION;  Surgeon: Madeleine Alvarado MD;  Location: [facility] OR;  Service: General;  Laterality: N/A;    GASTROJEJUNOSTOMY Right 1/24/2019    Procedure: GASTROJEJUNOSTOMY;  Surgeon: Madeleine Alvarado MD;  Location: [facility] OR;  Service: General;  Laterality: Right;    GASTROJEJUNOSTOMY N/A 1/4/2019    Procedure: GASTROJEJUNOSTOMY;  Surgeon: Madeleine Alvarado MD;  Location: [facility] OR;  Service: General;  Laterality: N/A;    LAPAROTOMY, EXPLORATORY N/A 1/24/2019    Procedure: LAPAROTOMY, EXPLORATORY;  Surgeon: Madeleine Alvarado MD;  Location: [facility] OR;  Service: General;  Laterality: N/A;    LIVER BIOPSY  1/4/2019    Procedure: BIOPSY, LIVER;  Surgeon: Madeleine Alvarado MD;  Location: [facility] OR;  Service: General;;    LYSIS OF ADHESIONS N/A 1/24/2019    Procedure: LYSIS, ADHESIONS;  Surgeon: Madeleine Alvarado MD;  Location: [facility] OR;  Service: General;  Laterality: N/A;    PERCUTANEOUS TRANSHEPATIC CHOLANGIOGRAPHY Right 1/29/2019    Procedure: CHOLANGIOGRAM, PERCUTANEOUS, TRANSHEPATIC;  Surgeon: YOHANNES Shelley MD;  Location: [facility];   Service: General;  Laterality: Right;    TONSILLECTOMY      UVULOPALATOPHARYNGOPLASTY         Family History:  Family History   Problem Relation Age of Onset    Heart disease Mother     Diabetes Mother     Stroke Father     Cancer Sister         pancreatitic    COPD Sister     Hyperlipidemia Sister     No Known Problems Brother     No Known Problems Sister     No Known Problems Brother     No Known Problems Brother     No Known Problems Daughter     No Known Problems Son     Macular degeneration Daughter     Prostate cancer Neg Hx     Kidney disease Neg Hx     Thyroid disease Neg Hx     Retinal detachment Neg Hx     Hypertension Neg Hx     Glaucoma Neg Hx        Allergies:  Epinephrine and Toradol [ketorolac]    Medications:   Current Outpatient Medications   Medication Sig    atorvastatin (LIPITOR) 40 MG tablet Take 1 tablet (40 mg total) by mouth once daily.    cetirizine (ZYRTEC) 10 MG tablet Take 1 tablet (10 mg total) by mouth once daily.    fluticasone propionate (FLONASE) 50 mcg/actuation nasal spray 1 spray (50 mcg total) by Each Nostril route 2 (two) times daily.    sildenafiL (VIAGRA) 100 MG tablet Take 1 tablet (100 mg total) by mouth daily as needed for Erectile Dysfunction.     No current facility-administered medications for this visit.         Review of Systems   Constitutional: Negative for activity change, appetite change, chills, fatigue, fever and unexpected weight change.   HENT: Positive for trouble swallowing and voice change. Negative for congestion, drooling, ear discharge, ear pain, hearing loss, postnasal drip, rhinorrhea, sinus pain, sneezing, sore throat and tinnitus.    Eyes: Negative for photophobia, pain, redness, itching and visual disturbance.   Respiratory: Negative for apnea, cough, choking, chest tightness, shortness of breath and wheezing.    Cardiovascular: Negative for chest pain, palpitations and leg swelling.   Gastrointestinal: Negative for abdominal  pain, anal bleeding, blood in stool, diarrhea, nausea and vomiting.   Endocrine: Negative.    Genitourinary: Negative.    Musculoskeletal: Negative for back pain, gait problem, joint swelling, myalgias and neck stiffness.   Skin: Negative.  Negative for pallor, rash and wound.   Allergic/Immunologic: Negative.    Neurological: Negative for tremors, seizures, facial asymmetry, light-headedness and numbness.   Hematological: Negative for adenopathy. Does not bruise/bleed easily.   Psychiatric/Behavioral: Negative for agitation, behavioral problems, confusion, decreased concentration and dysphoric mood.      Objective:      Physical Exam   Constitutional: He is oriented to person, place, and time. He appears well-developed and well-nourished.   HENT:   Head: Normocephalic and atraumatic.   Musculoskeletal: Normal range of motion.   Neurological: He is alert and oriented to person, place, and time.      Assessment:       No diagnosis found.    Laboratory Data:   3/13/2020  Neuroendocrine Labs Latest Ref Rng & Units 3/13/2020   VITAMIN B12 210 - 950 pg/mL    TSH 0.400 - 4.000 uIU/mL    WBC 3.90 - 12.70 K/uL 3.71 (L)   EXT WBC 3.8 - 10.8 1000/ul    HGB 14.0 - 18.0 g/dL 13.2 (L)   EXT HGB 13.2 - 17.1 g/dl    HCT 40.0 - 54.0 % 41.9   EXT HCT 38.5 - 50.0 %    PLATLETS 150 - 350 K/uL 185   EXT PLATLETS 140 - 400 1000/ul    PT 9.0 - 12.5 sec    PTT 21.0 - 32.0 sec    INR 0.8 - 1.2    GLUCOSE 70 - 110 mg/dL 88   EXT GLUCOSE 65 - 99 mg/dl    BUN 8 - 23 mg/dL 12   EXT BUN 7 - 25 mg/dl    CREATININE 0.5 - 1.4 mg/dL 1.0   EXT CREATININE 0.70 - 1.25 mg/dl     - 145 mmol/L 143   EXT  - 145 mmol/l    K 3.5 - 5.1 mmol/L 4.3   EXT K 3.5 - 5.3 mmol/l    CHLORIDE 95 - 110 mmol/L 106   EXT CHLORIDE 98 - 110 mmol/l    CO2 23 - 29 mmol/L 28   EXT CO2 20 - 32 mmol/l    CALCIUM 8.7 - 10.5 mg/dL 9.5   EXT CALCIUM 8.6 - 10.3 mg/dl    PROTEIN, TOTAL 6.0 - 8.4 g/dL 7.8   EXT PROTEIN, TOTAL 6.1 - 8.1 g/dl    PHOSPHORUS 2.7 - 4.5 mg/dL     ALBUMIN 3.5 - 5.2 g/dL 3.9   EXT ALBUMIN 3.6 - 5.1 g/dl    TOTAL BILIRUBIN 0.1 - 1.0 mg/dL 0.4   EXT TOTAL BILIRUBIN 0.2 - 1.2 mg/dl    ALK PHOSPHATASE 55 - 135 U/L 82   EXT ALK PHOSPHATASE 40 - 115 u/l    SGOT (AST) 10 - 40 U/L 32   EXT SGOT (AST) 10 - 35 u/l    SGPT (ALT) 10 - 44 U/L 21   EXT ALT 9 - 46 u/l    TRIGLYCERIDES 30 - 150 mg/dL    CHOLERSTEROL 120 - 199 mg/dL    HDL 40 - 75 mg/dL    LDL 63.0 - 159.0 mg/dL    MG 1.6 - 2.6 mg/dL    URINE AMYLASE U/L Not established U/L    HEMOGLOBIN A1C 4.0 - 5.6 %    Weight       Scans:   X-Ray Chest PA And Lateral  Narrative: EXAMINATION:  XR CHEST PA AND LATERAL    CLINICAL HISTORY:  Chest Pain;    TECHNIQUE:  PA and lateral views of the chest were performed.    COMPARISON:  02/03/2019    FINDINGS:  The lungs are clear, with normal appearance of pulmonary vasculature and no pleural effusion or pneumothorax.    The cardiac silhouette is normal in size. The hilar and mediastinal contours are unremarkable.    Bones are intact.  Impression: No acute radiographic abnormality.    Electronically signed by: Ruiz Huber  Date:    03/13/2020  Time:    18:21       Impression:   6 months follow-up for duodenal carcinoid.  He is doing well overall he has no symptoms of any reflux or any GI symptoms.  He has occasional issues of swallowing ours voice trouble.    His activities all back to normal and his back to preop status.  I strongly recommended that he follow-up with his primary care    He continues to follow the recommendation of eating slowly and chewing it well.    Will obtain a endoscopic ultrasound in 6 months, and gallium scan with neuroendocrine markers in 6 months and see him afterwards.    Plan:         Follow-up in 6 months time with neuroendocrine markers and endoscopic ultrasound and gallium scan        FALLON Salvador MD, FACS   Associate Professor of Surgery, [facility]   Neuroendocrine Surgery, Hepatic/Pancreatic & General Surgery   [address] [place] [address], [address]   [place] [address]   ph. [phone]; [phone]   fax. [phone]

## 2020-04-06 ENCOUNTER — OFFICE VISIT (OUTPATIENT)
Dept: NEUROLOGY | Facility: HOSPITAL | Age: 70
End: 2020-04-06
Attending: SURGERY
Payer: MEDICARE

## 2020-04-06 DIAGNOSIS — R13.19 ESOPHAGEAL DYSPHAGIA: ICD-10-CM

## 2020-04-06 DIAGNOSIS — E34.0 DUODENAL CARCINOID SYNDROME: Primary | ICD-10-CM

## 2020-04-06 NOTE — PATIENT INSTRUCTIONS
Your next appointment with Dr. Steiner is 9/10/20 at 1020 am and you will hear from the department the does the EUS sonograms for a day and time for this to be done in September

## 2020-05-19 ENCOUNTER — OFFICE VISIT (OUTPATIENT)
Dept: INTERNAL MEDICINE | Facility: CLINIC | Age: 70
End: 2020-05-19
Attending: INTERNAL MEDICINE
Payer: MEDICARE

## 2020-05-19 VITALS
DIASTOLIC BLOOD PRESSURE: 70 MMHG | BODY MASS INDEX: 28.77 KG/M2 | OXYGEN SATURATION: 100 % | HEIGHT: 66 IN | HEART RATE: 79 BPM | SYSTOLIC BLOOD PRESSURE: 124 MMHG | WEIGHT: 179 LBS

## 2020-05-19 DIAGNOSIS — G47.33 OSA (OBSTRUCTIVE SLEEP APNEA): ICD-10-CM

## 2020-05-19 DIAGNOSIS — I77.1 TORTUOUS AORTA: ICD-10-CM

## 2020-05-19 DIAGNOSIS — K86.1 OTHER CHRONIC PANCREATITIS: ICD-10-CM

## 2020-05-19 DIAGNOSIS — R07.89 MUSCULOSKELETAL CHEST PAIN: Primary | ICD-10-CM

## 2020-05-19 PROCEDURE — 99213 OFFICE O/P EST LOW 20 MIN: CPT | Mod: S$PBB,,, | Performed by: INTERNAL MEDICINE

## 2020-05-19 PROCEDURE — 99213 PR OFFICE/OUTPT VISIT, EST, LEVL III, 20-29 MIN: ICD-10-PCS | Mod: S$PBB,,, | Performed by: INTERNAL MEDICINE

## 2020-05-19 PROCEDURE — 99999 PR PBB SHADOW E&M-EST. PATIENT-LVL III: ICD-10-PCS | Mod: PBBFAC,,, | Performed by: INTERNAL MEDICINE

## 2020-05-19 PROCEDURE — 99999 PR PBB SHADOW E&M-EST. PATIENT-LVL III: CPT | Mod: PBBFAC,,, | Performed by: INTERNAL MEDICINE

## 2020-05-19 PROCEDURE — 99213 OFFICE O/P EST LOW 20 MIN: CPT | Mod: PBBFAC | Performed by: INTERNAL MEDICINE

## 2020-05-19 NOTE — PROGRESS NOTES
"Subjective:       Patient ID: Hoang Santos Jr. is a 69 y.o. male.    Chief Complaint: Follow-up    Patient presents today for routine evaluation, physical, and labs. Patient has no major concerns or complaints today.     Right sided chest pain we discussed the last visit has resolved and has not returned.  He admits he is not exercising denies any exertional chest exertional dizziness, chest pain rest, lower extremity    -GEMMA- he is honest about his non-adherence. Energy levels are fine. No HA. BP well controlled without meds.       Review of Systems   Constitutional: Negative for appetite change, chills, fever and unexpected weight change.   HENT: Negative for hearing loss, sore throat and trouble swallowing.    Eyes: Negative for visual disturbance.   Respiratory: Negative for cough, chest tightness and shortness of breath.    Cardiovascular: Negative for chest pain and leg swelling.   Gastrointestinal: Negative for abdominal pain, blood in stool, constipation, diarrhea, nausea and vomiting.   Endocrine: Negative for polydipsia and polyuria.   Genitourinary: Negative for decreased urine volume, difficulty urinating, dysuria, frequency and urgency.   Musculoskeletal: Negative for gait problem.   Skin: Negative for rash.   Neurological: Negative for dizziness and numbness.   Psychiatric/Behavioral: The patient is not nervous/anxious.        Objective:      Vitals:    05/19/20 0902   BP: 124/70   Pulse: 79   SpO2: 100%   Weight: 81.2 kg (179 lb 0.2 oz)   Height: 5' 6" (1.676 m)      Physical Exam   Constitutional: He is oriented to person, place, and time. He appears well-developed and well-nourished. He does not have a sickly appearance. No distress.   HENT:   Head: Normocephalic and atraumatic.   Eyes: Conjunctivae and EOM are normal. Right eye exhibits no discharge. Left eye exhibits no discharge. No scleral icterus.   Pulmonary/Chest: Effort normal. No respiratory distress.   Abdominal: Normal appearance. He " exhibits no distension.   Neurological: He is alert and oriented to person, place, and time.   Skin: Skin is warm and dry. He is not diaphoretic.   Psychiatric: He has a normal mood and affect. His speech is normal.       Assessment:       1. Musculoskeletal chest pain    2. Other chronic pancreatitis    3. Tortuous aorta    4. GEMMA (obstructive sleep apnea)        Plan:       Hoang was seen today for follow-up.    Diagnoses and all orders for this visit:    Musculoskeletal chest pain   Resolved    GEMMA (obstructive sleep apnea)  Stressed adherence of and complications related to untreated GEMMA.             Lee Lindsey MD  Internal Medicine-[facility]        Side effects of medication(s) were discussed in detail and patient voiced understanding.  Patient will call back for any issues or complications.

## 2020-07-17 DIAGNOSIS — Z71.89 COMPLEX CARE COORDINATION: ICD-10-CM

## 2020-08-04 ENCOUNTER — OFFICE VISIT (OUTPATIENT)
Dept: INTERNAL MEDICINE | Facility: CLINIC | Age: 70
End: 2020-08-04
Payer: MEDICARE

## 2020-08-04 VITALS
HEART RATE: 65 BPM | OXYGEN SATURATION: 98 % | WEIGHT: 180.31 LBS | DIASTOLIC BLOOD PRESSURE: 60 MMHG | SYSTOLIC BLOOD PRESSURE: 108 MMHG | BODY MASS INDEX: 28.3 KG/M2 | HEIGHT: 67 IN

## 2020-08-04 DIAGNOSIS — I77.1 TORTUOUS AORTA: ICD-10-CM

## 2020-08-04 DIAGNOSIS — C7A.010 MALIGNANT CARCINOID TUMOR OF THE DUODENUM: ICD-10-CM

## 2020-08-04 DIAGNOSIS — N40.1 BENIGN NON-NODULAR PROSTATIC HYPERPLASIA WITH LOWER URINARY TRACT SYMPTOMS: ICD-10-CM

## 2020-08-04 DIAGNOSIS — G47.33 OSA (OBSTRUCTIVE SLEEP APNEA): ICD-10-CM

## 2020-08-04 DIAGNOSIS — J38.3 VOCAL FOLD SCAR: ICD-10-CM

## 2020-08-04 DIAGNOSIS — E78.2 MIXED HYPERLIPIDEMIA: ICD-10-CM

## 2020-08-04 DIAGNOSIS — M25.78 CERVICAL OSTEOPHYTE: ICD-10-CM

## 2020-08-04 DIAGNOSIS — R00.0 TACHYCARDIA: ICD-10-CM

## 2020-08-04 DIAGNOSIS — N52.9 ERECTILE DYSFUNCTION, UNSPECIFIED ERECTILE DYSFUNCTION TYPE: ICD-10-CM

## 2020-08-04 DIAGNOSIS — R13.10 DYSPHAGIA, UNSPECIFIED TYPE: ICD-10-CM

## 2020-08-04 DIAGNOSIS — Z00.00 ENCOUNTER FOR PREVENTIVE HEALTH EXAMINATION: Primary | ICD-10-CM

## 2020-08-04 DIAGNOSIS — K86.1 OTHER CHRONIC PANCREATITIS: ICD-10-CM

## 2020-08-04 DIAGNOSIS — G47.33 OSA ON CPAP: ICD-10-CM

## 2020-08-04 DIAGNOSIS — I45.5 SINUS PAUSE: ICD-10-CM

## 2020-08-04 DIAGNOSIS — R49.9 VOICE DISTURBANCE: ICD-10-CM

## 2020-08-04 DIAGNOSIS — K31.1 GASTRIC OUTLET OBSTRUCTION: ICD-10-CM

## 2020-08-04 DIAGNOSIS — R05.3 CHRONIC COUGH: ICD-10-CM

## 2020-08-04 DIAGNOSIS — J38.3 VOCAL FOLD ATROPHY: ICD-10-CM

## 2020-08-04 DIAGNOSIS — R42 VERTIGO: ICD-10-CM

## 2020-08-04 DIAGNOSIS — J90 PLEURAL EFFUSION: ICD-10-CM

## 2020-08-04 DIAGNOSIS — E34.0 DUODENAL CARCINOID SYNDROME: ICD-10-CM

## 2020-08-04 DIAGNOSIS — K85.92: ICD-10-CM

## 2020-08-04 DIAGNOSIS — Z01.811 PREOPERATIVE RESPIRATORY EXAMINATION: ICD-10-CM

## 2020-08-04 PROCEDURE — 99999 PR PBB SHADOW E&M-EST. PATIENT-LVL IV: ICD-10-PCS | Mod: PBBFAC,,, | Performed by: NURSE PRACTITIONER

## 2020-08-04 PROCEDURE — G0439 PR MEDICARE ANNUAL WELLNESS SUBSEQUENT VISIT: ICD-10-PCS | Mod: S$GLB,,, | Performed by: NURSE PRACTITIONER

## 2020-08-04 PROCEDURE — G0439 PPPS, SUBSEQ VISIT: HCPCS | Mod: S$GLB,,, | Performed by: NURSE PRACTITIONER

## 2020-08-04 PROCEDURE — 99214 OFFICE O/P EST MOD 30 MIN: CPT | Mod: PBBFAC | Performed by: NURSE PRACTITIONER

## 2020-08-04 PROCEDURE — 99999 PR PBB SHADOW E&M-EST. PATIENT-LVL IV: CPT | Mod: PBBFAC,,, | Performed by: NURSE PRACTITIONER

## 2020-08-04 NOTE — PROGRESS NOTES
"Hoang Santos presented for a  Medicare AWV and comprehensive Health Risk Assessment today. The following components were reviewed and updated:    · Medical history  · Family History  · Social history  · Allergies and Current Medications  · Health Risk Assessment  · Health Maintenance  · Care Team     ** See Completed Assessments for Annual Wellness Visit within the encounter summary.**         The following assessments were completed:  · Living Situation  · CAGE  · Depression Screening  · Timed Get Up and Go  · Whisper Test  · Cognitive Function Screening  · Nutrition Screening  · ADL Screening  · PAQ Screening          Vitals:    08/04/20 1022   BP: 108/60   BP Location: Left arm   Patient Position: Sitting   Pulse: 65   SpO2: 98%   Weight: 81.8 kg (180 lb 5.4 oz)   Height: 5' 7" (1.702 m)     Body mass index is 28.24 kg/m².     Physical Exam  Vitals signs reviewed.   Constitutional:       Appearance: He is well-developed.   HENT:      Head: Normocephalic and atraumatic. Not macrocephalic and not microcephalic. No raccoon eyes, Saab's sign, abrasion, contusion, right periorbital erythema, left periorbital erythema or laceration. Hair is normal.      Right Ear: No decreased hearing noted. No laceration, drainage, swelling or tenderness. No middle ear effusion. No foreign body. No mastoid tenderness. No hemotympanum. Tympanic membrane is not injected, scarred, perforated, erythematous, retracted or bulging. Tympanic membrane has normal mobility.      Left Ear: No decreased hearing noted. No laceration, drainage, swelling or tenderness.  No middle ear effusion. No foreign body. No mastoid tenderness. No hemotympanum. Tympanic membrane is not injected, scarred, perforated, erythematous, retracted or bulging. Tympanic membrane has normal mobility.      Nose: Nose normal. No nasal deformity, laceration or mucosal edema.      Mouth/Throat:      Pharynx: Uvula midline.   Eyes:      General: Lids are normal. No scleral " icterus.     Conjunctiva/sclera: Conjunctivae normal.   Neck:      Musculoskeletal: Neck supple. Normal range of motion. No edema, erythema or spinous process tenderness.      Thyroid: No thyroid mass or thyromegaly.      Trachea: Trachea normal.   Cardiovascular:      Rate and Rhythm: Normal rate and regular rhythm.      Heart sounds: No murmur. No friction rub. No gallop.    Pulmonary:      Effort: Pulmonary effort is normal. No respiratory distress.      Breath sounds: Normal breath sounds. No stridor. No wheezing, rhonchi or rales.   Chest:      Chest wall: No tenderness.   Musculoskeletal: Normal range of motion.   Lymphadenopathy:      Head:      Right side of head: No submental, submandibular, tonsillar, preauricular or posterior auricular adenopathy.      Left side of head: No submental, submandibular, tonsillar, preauricular, posterior auricular or occipital adenopathy.   Skin:     General: Skin is warm and dry.   Neurological:      Mental Status: He is alert and oriented to person, place, and time.   Psychiatric:         Behavior: Behavior normal.         Thought Content: Thought content normal.         Judgment: Judgment normal.           Diagnoses and health risks identified today and associated recommendations/orders:    1. Encounter for preventive health examination  Annual Health Risk Assessment (HRA) visit today.  Counseling and referral of health maintenance and preventative health measures performed.  Patient given annual wellness paperwork to take home.  Encouraged to return in 1 year for subsequent HRA visit.     2. Tortuous aorta  Chronic. Stable. Continue current treatment plan as previously prescribed by PCP.    3. Benign non-nodular prostatic hyperplasia with lower urinary tract symptoms  Chronic. Stable. Continue current treatment plan as previously prescribed by PCP.    4. Malignant carcinoid tumor of the duodenum  Chronic. Stable. Continue current treatment plan as previously prescribed by  PCP.    5. Duodenal carcinoid syndrome  Chronic. Stable. Continue current treatment plan as previously prescribed by PCP.    6. Mixed hyperlipidemia  Chronic. Stable. Continue current treatment plan as previously prescribed by PCP.    7. Tachycardia  Chronic. Stable. Continue current treatment plan as previously prescribed by PCP.    8. Sinus pause  Chronic. Stable. Continue current treatment plan as previously prescribed by PCP.    9. Erectile dysfunction, unspecified erectile dysfunction type  Chronic. Stable. Continue current treatment plan as previously prescribed by PCP.    10. Dysphagia, unspecified type  Chronic. Stable. Continue current treatment plan as previously prescribed by PCP.    11. Gastric outlet obstruction  Chronic. Stable. Continue current treatment plan as previously prescribed by PCP.    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 Stable. Continue current treatment plan as previously prescribed by PCP.    13. Other chronic pancreatitis  Chronic. Stable. Continue current treatment plan as previously prescribed by PCP.    14. Cervical osteophyte  Chronic. Stable. Continue current treatment plan as previously prescribed by PCP.    15. GEMMA (obstructive sleep apnea)  Chronic. Stable. Continue current treatment plan as previously prescribed by PCP.    16. GEMMA on CPAP  Chronic. Stable. Continue current treatment plan as previously prescribed by PCP.    17. Pleural effusion  Chronic. Stable. Continue current treatment plan as previously prescribed by PCP.    18 . Chronic cough  Chronic. Stable. Continue current treatment plan as previously prescribed by PCP.    19. Voice disturbance  Chronic. Stable. Continue current treatment plan as previously prescribed by PCP.    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 Stable. Continue current treatment plan as previously prescribed by PCP.    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 Stable. Continue current treatment plan as previously prescribed by PCP.    22. Vertigo  Chronic.  Stable. Continue current treatment plan as previously prescribed by PCP.          Provided Hoang with a 5-10 year written screening schedule and personal prevention plan. Recommendations were developed using the USPSTF age appropriate recommendations. Education, counseling, and referrals were provided as needed. After Visit Summary printed and given to patient which includes a list of additional screenings\tests needed.    Follow up in about 1 year (around 8/4/2021).    Bharat Naik NP  I offered to discuss end of life issues, including information on how to make advance directives that the patient could use to name someone who would make medical decisions on their behalf if they became too ill to make themselves.    ___Patient declined  _X_Patient is interested, I provided paper work and offered to discuss.

## 2020-08-04 NOTE — PATIENT INSTRUCTIONS
Counseling and Referral of Other Preventative  (Italic type indicates deductible and co-insurance are waived)    Patient Name: Hoang Santos  Today's Date: 8/4/2020    Health Maintenance       Date Due Completion Date    PROSTATE-SPECIFIC ANTIGEN 06/19/2018 6/19/2017    Influenza Vaccine (1) 09/01/2020 11/19/2019    Colorectal Cancer Screening 06/17/2021 6/17/2011    Lipid Panel 11/19/2024 11/19/2019    TETANUS VACCINE 06/16/2027 6/16/2017        No orders of the defined types were placed in this encounter.    The following information is provided to all patients.  This information is to help you find resources for any of the problems found today that may be affecting your health:                Living healthy guide: www.[facility].[place].gov      Understanding Diabetes: www.diabetes.org      Eating healthy: www.cdc.gov/healthyweight      [facility] home safety checklist: www.cdc.gov/steadi/patient.html      Agency on Aging: www.goea.[place].[facility]      Alcoholics anonymous (AA): www.aa.org      Physical Activity: www.[place].nih.gov/an0fsia      Tobacco use: www.quitwithusla.org

## 2020-08-31 NOTE — PROGRESS NOTES
"NOLANETS:  [facility]  A collaboration between [facility] and [facility]      PATIENT: Hoang Santos Jr.  MRN: [id number]  DATE: 9/10/2020    Subjective:      Chief Complaint:   5 month f/u    No complaints doing really well eating well drink well  Continues to have some a issues with the upper esophageal sphincter went swallowing but does not affect his day-to-day lifestyle.    No complaints since last visit able to eat well and having normal bowel function    Vitals: Blood pressure 120/77, pulse 65, temperature 97.3 °F (36.3 °C), temperature source Oral, height 5' 7" (1.702 m), weight 83.2 kg (183 lb 5 oz). -    ECOG Score: 0 - Asymptomatic    Diagnosis: No diagnosis found.     Interval History:    2/26/19  Patient is a 68 y.o. male status post ex lap duodenectomy high developed her significant issues with swallowing for which he required gastroenterostomy and G-tube placement.  He is getting physical therapy at home for speech as well as tube feeding with the IV nutrition.  He was seen by me today his right upper quadrant catheter was not draining much so the possibility would be to reposition the tube if necessary    4/2020  Mr. Santos is doing well.  He recently came to the emergency room at [facility], [place] for breathing problem was found to have allergic symptoms.  Next for he is eating well does not have any complaints of any reflux he continues to stay at 178 lb active with no GI issues.  His voice is almost back to normal     TOncologic History:   Oncologic History 1/2019  Duodenum, resection:  -9 mm neuroendocrine tumor.  -Histologic grade: Grade 1, well differentiated neuroendocrine tumor.   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    Prostatic hyperplasia     Sleep apnea        Past Surgical History:  Past Surgical History:   Procedure Laterality Date    carcinoid tumor stomach      January 4, 2019    CHOLECYSTECTOMY  1/4/2019    Procedure: CHOLECYSTECTOMY;  Surgeon: Madeleine Alvarado MD;  Location: [facility];  Service: General;;    CYSTOSCOPY WITH INSERTION OF MINIMALLY INVASIVE IMPLANT TO ENLARGE PROSTATIC URETHRA N/A 6/28/2018    Procedure: TRANSPROSTATIC TISSUE RETRACTION;  Surgeon: Kory Jack MD;  Location: [facility];  Service: Urology;  Laterality: N/A;    DUODENECTOMY N/A 1/4/2019    Procedure: DUODENECTOMY;  Surgeon: Madeleine Alvarado MD;  Location: [facility];  Service: General;  Laterality: N/A;    ENDOSCOPIC ULTRASOUND OF UPPER GASTROINTESTINAL TRACT N/A 11/5/2018    Procedure: ULTRASOUND-ENDOSCOPIC-UPPER;  Surgeon: Gorge Reyes MD;  Location: [facility];  Service: Endoscopy;  Laterality: N/A;  Carcinoid diagnosis    ENDOSCOPIC ULTRASOUND OF UPPER GASTROINTESTINAL TRACT N/A 9/24/2019    Procedure: ULTRASOUND-ENDOSCOPIC-UPPER;  Surgeon: David Roberson MD;  Location: [facility];  Service: Endoscopy;  Laterality: N/A;  Carcinoid Diagnosis  Gastric Ph    ESOPHAGOGASTRODUODENOSCOPY N/A 9/24/2018    Procedure: EGD (ESOPHAGOGASTRODUODENOSCOPY);  Surgeon: Salo Nuñez MD;  Location: [address]);  Service: Endoscopy;  Laterality: N/A;    ESOPHAGOGASTRODUODENOSCOPY N/A 1/21/2019    Procedure: ESOPHAGOGASTRODUODENOSCOPY (EGD);  Surgeon: Jose Kahn MD;  Location: [facility];  Service: Endoscopy;  Laterality: N/A;    ESOPHAGOGASTRODUODENOSCOPY N/A 1/24/2019    Procedure: EGD (ESOPHAGOGASTRODUODENOSCOPY);   Surgeon: Madeleine Alvarado MD;  Location: [facility] OR;  Service: General;  Laterality: N/A;  spoke to belkis in endo for egd, told her it was add on case could go anywhere from 2pm KB    ESOPHAGOGASTRODUODENOSCOPY N/A 9/24/2019    Procedure: EGD (ESOPHAGOGASTRODUODENOSCOPY);  Surgeon: David Roberson MD;  Location: [facility] ENDO;  Service: Endoscopy;  Laterality: N/A;    ESOPHAGOGASTRODUODENOSCOPY N/A 1/4/2019    Procedure: EGD (ESOPHAGOGASTRODUODENOSCOPY);  Surgeon: Madeleine Alvarado MD;  Location: [facility] OR;  Service: General;  Laterality: N/A;  spoke to Anna in endo for egd set up for 1/4/19 on 1/3/19 0814 KB    GASTRIC FUNDOPLICATION N/A 1/4/2019    Procedure: FUNDOPLICATION;  Surgeon: Madeleine Alvarado MD;  Location: [facility] OR;  Service: General;  Laterality: N/A;    GASTROJEJUNOSTOMY Right 1/24/2019    Procedure: GASTROJEJUNOSTOMY;  Surgeon: Madeleine Alvarado MD;  Location: [facility] OR;  Service: General;  Laterality: Right;    GASTROJEJUNOSTOMY N/A 1/4/2019    Procedure: GASTROJEJUNOSTOMY;  Surgeon: Madeleine Alvarado MD;  Location: [facility] OR;  Service: General;  Laterality: N/A;    LAPAROTOMY, EXPLORATORY N/A 1/24/2019    Procedure: LAPAROTOMY, EXPLORATORY;  Surgeon: Madeleine Alvarado MD;  Location: [facility] OR;  Service: General;  Laterality: N/A;    LIVER BIOPSY  1/4/2019    Procedure: BIOPSY, LIVER;  Surgeon: Madeleine Alvarado MD;  Location: [facility] OR;  Service: General;;    LYSIS OF ADHESIONS N/A 1/24/2019    Procedure: LYSIS, ADHESIONS;  Surgeon: Madeleine Alvarado MD;  Location: [facility] OR;  Service: General;  Laterality: N/A;    PERCUTANEOUS TRANSHEPATIC CHOLANGIOGRAPHY Right 1/29/2019    Procedure: CHOLANGIOGRAM, PERCUTANEOUS, TRANSHEPATIC;  Surgeon: YOHANNES Shelley MD;  Location: [facility] OR;  Service: General;  Laterality: Right;    TONSILLECTOMY      UVULOPALATOPHARYNGOPLASTY         Family History:  Family History   Problem Relation Age of Onset    Heart disease Mother      Diabetes Mother     Stroke Father     Cancer Sister         pancreatitic    COPD Sister     Hyperlipidemia Sister     No Known Problems Brother     No Known Problems Sister     No Known Problems Brother     No Known Problems Brother     No Known Problems Daughter     No Known Problems Son     Macular degeneration Daughter     Prostate cancer Neg Hx     Kidney disease Neg Hx     Thyroid disease Neg Hx     Retinal detachment Neg Hx     Hypertension Neg Hx     Glaucoma Neg Hx        Allergies:  Epinephrine and Toradol [ketorolac]    Medications:   Current Outpatient Medications   Medication Sig    atorvastatin (LIPITOR) 40 MG tablet TAKE 1 TABLET(40 MG) BY MOUTH EVERY DAY    fluticasone propionate (FLONASE) 50 mcg/actuation nasal spray 1 spray (50 mcg total) by Each Nostril route 2 (two) times daily.    sildenafiL (VIAGRA) 100 MG tablet Take 1 tablet (100 mg total) by mouth daily as needed for Erectile Dysfunction.    cetirizine (ZYRTEC) 10 MG tablet Take 1 tablet (10 mg total) by mouth once daily. (Patient not taking: Reported on 8/4/2020)     No current facility-administered medications for this visit.         Review of Systems   Constitutional: Negative for activity change, appetite change, chills, fever and unexpected weight change.   HENT: Negative for dental problem, ear discharge, ear pain, facial swelling, nosebleeds, sinus pressure, sneezing, tinnitus and trouble swallowing.    Eyes: Negative for photophobia, pain, discharge, redness, itching and visual disturbance.   Respiratory: Negative for cough, choking, shortness of breath, wheezing and stridor.    Cardiovascular: Negative for palpitations and leg swelling.   Gastrointestinal: Negative for abdominal pain, anal bleeding, blood in stool, constipation and diarrhea.   Endocrine: Negative.    Genitourinary: Negative.    Musculoskeletal: Negative for arthralgias, back pain and gait problem.   Skin: Negative for pallor, rash and wound.    Allergic/Immunologic: Negative.    Neurological: Negative for seizures, syncope, light-headedness, numbness and headaches.   Hematological: Negative for adenopathy. Does not bruise/bleed easily.   Psychiatric/Behavioral: Negative for agitation, behavioral problems and confusion.      Objective:      Physical Exam  Constitutional:       Appearance: Normal appearance. He is normal weight.   HENT:      Head: Normocephalic.      Right Ear: External ear normal.      Left Ear: External ear normal.      Nose: Nose normal.   Eyes:      Pupils: Pupils are equal, round, and reactive to light.   Neck:      Musculoskeletal: Normal range of motion. No neck rigidity or muscular tenderness.      Vascular: No carotid bruit.   Cardiovascular:      Rate and Rhythm: Normal rate and regular rhythm.      Pulses: Normal pulses.      Heart sounds: Normal heart sounds.   Pulmonary:      Effort: Pulmonary effort is normal.      Breath sounds: Normal breath sounds.   Abdominal:      General: Abdomen is flat. Bowel sounds are normal.      Tenderness: There is no abdominal tenderness. There is no right CVA tenderness, left CVA tenderness, guarding or rebound.      Hernia: No hernia is present.      Comments: Incision looks good   Musculoskeletal: Normal range of motion.   Lymphadenopathy:      Cervical: No cervical adenopathy.   Skin:     General: Skin is warm and dry.   Neurological:      General: No focal deficit present.      Mental Status: He is alert.   Psychiatric:         Mood and Affect: Mood normal.        Assessment:       No diagnosis found.    Laboratory Data:       Scans:   X-Ray Chest PA And Lateral  Narrative: EXAMINATION:  XR CHEST PA AND LATERAL    CLINICAL HISTORY:  Chest Pain;    TECHNIQUE:  PA and lateral views of the chest were performed.    COMPARISON:  02/03/2019    FINDINGS:  The lungs are clear, with normal appearance of pulmonary vasculature and no pleural effusion or pneumothorax.    The cardiac silhouette is  normal in size. The hilar and mediastinal contours are unremarkable.    Bones are intact.  Impression: No acute radiographic abnormality.    Electronically signed by: Ruiz Huber  Date:    03/13/2020  Time:    18:21       Impression:  Duodenal carcinoid.  He underwent exploration he had a significant upper esophageal sphincter dysfunction because of the large cervical spur.  He required G tube and gastrojejunostomy for enteral nutrition.  He had a prolonged hospital recovery.  He also underwent toupet fundoplication for his severe gastroesophageal reflux  He has had surveillance following the surgery and has done well.  He does not have any complaints.    Plan:       Will flap plan for neuroendocrine markers.  He underwent endoscopic ultrasound 1 year back will repeat the a endoscopic ultrasound.  Does not require any other studies at this point.  He continues to have physical therapy with speech is with speech on for swallowing function.  Will follow up in 1 year time continue healthy lifestyle activities continue follow-up with the primary care      NET markers today    FALLON Salvador MD, FACS   Associate Professor of Surgery, [facility], [place]   Neuroendocrine Surgery, Hepatic/Pancreatic & General Surgery   [address]   [place] [address]   ph. [phone]; [phone]   fax. [phone]

## 2020-09-10 ENCOUNTER — TELEPHONE (OUTPATIENT)
Dept: GASTROENTEROLOGY | Facility: CLINIC | Age: 70
End: 2020-09-10

## 2020-09-10 ENCOUNTER — OFFICE VISIT (OUTPATIENT)
Dept: NEUROLOGY | Facility: HOSPITAL | Age: 70
End: 2020-09-10
Attending: SURGERY
Payer: MEDICARE

## 2020-09-10 ENCOUNTER — TELEPHONE (OUTPATIENT)
Dept: NEUROLOGY | Facility: HOSPITAL | Age: 70
End: 2020-09-10

## 2020-09-10 ENCOUNTER — LAB VISIT (OUTPATIENT)
Dept: LAB | Facility: HOSPITAL | Age: 70
End: 2020-09-10
Attending: SURGERY
Payer: MEDICARE

## 2020-09-10 VITALS
BODY MASS INDEX: 28.77 KG/M2 | HEART RATE: 65 BPM | DIASTOLIC BLOOD PRESSURE: 77 MMHG | HEIGHT: 67 IN | TEMPERATURE: 97 F | SYSTOLIC BLOOD PRESSURE: 120 MMHG | WEIGHT: 183.31 LBS

## 2020-09-10 DIAGNOSIS — R93.3 ABNORMAL FINDINGS ON DIAGNOSTIC IMAGING OF OTHER PARTS OF DIGESTIVE TRACT: ICD-10-CM

## 2020-09-10 DIAGNOSIS — C7A.010 MALIGNANT CARCINOID TUMOR OF THE DUODENUM: Primary | ICD-10-CM

## 2020-09-10 DIAGNOSIS — C7A.010 MALIGNANT CARCINOID TUMOR OF DUODENUM: ICD-10-CM

## 2020-09-10 DIAGNOSIS — Z01.812 PRE-PROCEDURE LAB EXAM: ICD-10-CM

## 2020-09-10 DIAGNOSIS — C7A.010 MALIGNANT CARCINOID TUMOR OF DUODENUM: Primary | ICD-10-CM

## 2020-09-10 PROCEDURE — 82941 ASSAY OF GASTRIN: CPT

## 2020-09-10 PROCEDURE — 36415 COLL VENOUS BLD VENIPUNCTURE: CPT

## 2020-09-10 PROCEDURE — 99213 OFFICE O/P EST LOW 20 MIN: CPT | Performed by: SURGERY

## 2020-09-10 NOTE — TELEPHONE ENCOUNTER
----- Message from Jose Kahn MD sent at 9/10/2020  1:14 PM CDT -----  Regarding: FW: EUS    ----- Message -----  From: Erika Ruano LPN  Sent: 9/10/2020  10:49 AM CDT  To: Jose Kahn MD  Subject: EUS                                              EUS per Dr. Steiner.  Thanks.

## 2020-09-10 NOTE — TELEPHONE ENCOUNTER
Called patient and scheduled UEUS procedure on 10/2/2020 Covid scheduled 9/29/2020 at [place]. Patient notified and verbalized understanding. Instructions sent via patient portal.

## 2020-09-10 NOTE — TELEPHONE ENCOUNTER
Called patient to schedule procedure. No answer. Left voice mail  Message to call me back. [phone]

## 2020-09-10 NOTE — TELEPHONE ENCOUNTER
----- Message from Sharon Dillon sent at 9/10/2020  2:35 PM CDT -----  Regarding: call back  Type:  Needs Medical Advice    Who Called: patient   Reason: patient missed call and would like a call back to set up ultrasound appt.  Would the patient rather a call back or a response via [employer]? Call back  Best Call Back Number: [phone]  Additional Information: n/a

## 2020-09-10 NOTE — PATIENT INSTRUCTIONS
Tumor markers today, lab, 1st floor [facility]    1 year follow up scheduled on Thursday, September 9, 2021 at 10am.

## 2020-09-14 ENCOUNTER — IMMUNIZATION (OUTPATIENT)
Dept: PHARMACY | Facility: CLINIC | Age: 70
End: 2020-09-14
Payer: MEDICARE

## 2020-09-14 LAB — GASTRIN SERPL-MCNC: 48 PG/ML

## 2020-09-17 LAB — SEROTONIN: 76 NG/ML

## 2020-09-29 ENCOUNTER — TELEPHONE (OUTPATIENT)
Dept: GASTROENTEROLOGY | Facility: CLINIC | Age: 70
End: 2020-09-29

## 2020-09-29 NOTE — TELEPHONE ENCOUNTER
Patient called to reschedule procedure till 2021. Patient informed that he will get a call at the end of December to reschedule. Patient verbalized understanding

## 2020-09-29 NOTE — TELEPHONE ENCOUNTER
----- Message from Karely Saha MA sent at 9/29/2020  1:50 PM CDT -----  Regarding: FW: Cancellation  Contact: [phone]    ----- Message -----  From: Laura Garcia  Sent: 9/29/2020   9:15 AM CDT  To: [facility] Scheduling Staff, #  Subject: Cancellation                                     Calling in regards to speaking with nurse to reschedule procedure until after the first of next year. Please call and advise

## 2020-11-10 ENCOUNTER — PATIENT MESSAGE (OUTPATIENT)
Dept: OTOLARYNGOLOGY | Facility: CLINIC | Age: 70
End: 2020-11-10

## 2020-11-10 DIAGNOSIS — Z03.818 ENCOUNTER FOR OBSERVATION FOR SUSPECTED EXPOSURE TO OTHER BIOLOGICAL AGENTS RULED OUT: ICD-10-CM

## 2021-01-15 ENCOUNTER — PATIENT MESSAGE (OUTPATIENT)
Dept: NEUROLOGY | Facility: HOSPITAL | Age: 71
End: 2021-01-15

## 2021-02-17 ENCOUNTER — TELEPHONE (OUTPATIENT)
Dept: GASTROENTEROLOGY | Facility: CLINIC | Age: 71
End: 2021-02-17

## 2021-02-17 DIAGNOSIS — Z01.812 PRE-PROCEDURE LAB EXAM: Primary | ICD-10-CM

## 2021-03-18 ENCOUNTER — TELEPHONE (OUTPATIENT)
Dept: ENDOSCOPY | Facility: HOSPITAL | Age: 71
End: 2021-03-18

## 2021-03-19 ENCOUNTER — LAB VISIT (OUTPATIENT)
Dept: INTERNAL MEDICINE | Facility: CLINIC | Age: 71
End: 2021-03-19
Payer: MEDICARE

## 2021-03-19 DIAGNOSIS — Z01.812 PRE-PROCEDURE LAB EXAM: ICD-10-CM

## 2021-03-19 PROCEDURE — U0005 INFEC AGEN DETEC AMPLI PROBE: HCPCS | Performed by: INTERNAL MEDICINE

## 2021-03-19 PROCEDURE — U0003 INFECTIOUS AGENT DETECTION BY NUCLEIC ACID (DNA OR RNA); SEVERE ACUTE RESPIRATORY SYNDROME CORONAVIRUS 2 (SARS-COV-2) (CORONAVIRUS DISEASE [COVID-19]), AMPLIFIED PROBE TECHNIQUE, MAKING USE OF HIGH THROUGHPUT TECHNOLOGIES AS DESCRIBED BY CMS-2020-01-R: HCPCS | Performed by: INTERNAL MEDICINE

## 2021-03-20 LAB — SARS-COV-2 RNA RESP QL NAA+PROBE: NOT DETECTED

## 2021-03-22 ENCOUNTER — ANESTHESIA EVENT (OUTPATIENT)
Dept: ENDOSCOPY | Facility: HOSPITAL | Age: 71
End: 2021-03-22
Payer: MEDICARE

## 2021-03-22 ENCOUNTER — HOSPITAL ENCOUNTER (OUTPATIENT)
Facility: HOSPITAL | Age: 71
Discharge: HOME | End: 2021-03-22
Attending: INTERNAL MEDICINE | Admitting: INTERNAL MEDICINE
Payer: MEDICARE

## 2021-03-22 ENCOUNTER — ANESTHESIA (OUTPATIENT)
Dept: ENDOSCOPY | Facility: HOSPITAL | Age: 71
End: 2021-03-22
Payer: MEDICARE

## 2021-03-22 VITALS
HEART RATE: 66 BPM | HEIGHT: 66 IN | TEMPERATURE: 98 F | DIASTOLIC BLOOD PRESSURE: 63 MMHG | SYSTOLIC BLOOD PRESSURE: 104 MMHG | WEIGHT: 178 LBS | RESPIRATION RATE: 14 BRPM | BODY MASS INDEX: 28.61 KG/M2 | OXYGEN SATURATION: 100 %

## 2021-03-22 DIAGNOSIS — E34.0 DUODENAL CARCINOID SYNDROME: ICD-10-CM

## 2021-03-22 PROCEDURE — 43259 EGD US EXAM DUODENUM/JEJUNUM: CPT | Performed by: INTERNAL MEDICINE

## 2021-03-22 PROCEDURE — 25000003 PHARM REV CODE 250: Performed by: NURSE ANESTHETIST, CERTIFIED REGISTERED

## 2021-03-22 PROCEDURE — 43237 PR ENDOSCOPIC US EXAM, ESOPH: ICD-10-PCS | Mod: ,,, | Performed by: INTERNAL MEDICINE

## 2021-03-22 PROCEDURE — 37000008 HC ANESTHESIA 1ST 15 MINUTES: Performed by: INTERNAL MEDICINE

## 2021-03-22 PROCEDURE — 25000003 PHARM REV CODE 250: Performed by: INTERNAL MEDICINE

## 2021-03-22 PROCEDURE — 43237 ENDOSCOPIC US EXAM ESOPH: CPT | Mod: ,,, | Performed by: INTERNAL MEDICINE

## 2021-03-22 PROCEDURE — 63600175 PHARM REV CODE 636 W HCPCS: Performed by: NURSE ANESTHETIST, CERTIFIED REGISTERED

## 2021-03-22 PROCEDURE — 37000009 HC ANESTHESIA EA ADD 15 MINS: Performed by: INTERNAL MEDICINE

## 2021-03-22 RX ORDER — LIDOCAINE HYDROCHLORIDE 20 MG/ML
INJECTION INTRAVENOUS
Status: DISCONTINUED | OUTPATIENT
Start: 2021-03-22 | End: 2021-03-22

## 2021-03-22 RX ORDER — ONDANSETRON HYDROCHLORIDE 2 MG/ML
INJECTION, SOLUTION INTRAMUSCULAR; INTRAVENOUS
Status: DISCONTINUED | OUTPATIENT
Start: 2021-03-22 | End: 2021-03-22

## 2021-03-22 RX ORDER — SODIUM CHLORIDE 0.9 % (FLUSH) 0.9 %
10 SYRINGE (ML) INJECTION
Status: DISCONTINUED | OUTPATIENT
Start: 2021-03-22 | End: 2021-03-22 | Stop reason: HOSPADM

## 2021-03-22 RX ORDER — ROCURONIUM BROMIDE 10 MG/ML
INJECTION, SOLUTION INTRAVENOUS
Status: DISCONTINUED | OUTPATIENT
Start: 2021-03-22 | End: 2021-03-22

## 2021-03-22 RX ORDER — DEXAMETHASONE SODIUM PHOSPHATE 4 MG/ML
INJECTION, SOLUTION INTRA-ARTICULAR; INTRALESIONAL; INTRAMUSCULAR; INTRAVENOUS; SOFT TISSUE
Status: DISCONTINUED | OUTPATIENT
Start: 2021-03-22 | End: 2021-03-22

## 2021-03-22 RX ORDER — SUCCINYLCHOLINE CHLORIDE 20 MG/ML
INJECTION INTRAMUSCULAR; INTRAVENOUS
Status: DISCONTINUED | OUTPATIENT
Start: 2021-03-22 | End: 2021-03-22

## 2021-03-22 RX ORDER — ONDANSETRON 2 MG/ML
4 INJECTION INTRAMUSCULAR; INTRAVENOUS DAILY PRN
Status: DISCONTINUED | OUTPATIENT
Start: 2021-03-22 | End: 2021-03-22 | Stop reason: HOSPADM

## 2021-03-22 RX ORDER — PROPOFOL 10 MG/ML
VIAL (ML) INTRAVENOUS
Status: DISCONTINUED | OUTPATIENT
Start: 2021-03-22 | End: 2021-03-22

## 2021-03-22 RX ORDER — SODIUM CHLORIDE 9 MG/ML
INJECTION, SOLUTION INTRAVENOUS CONTINUOUS
Status: DISCONTINUED | OUTPATIENT
Start: 2021-03-22 | End: 2021-03-22 | Stop reason: HOSPADM

## 2021-03-22 RX ORDER — DEXTROMETHORPHAN/PSEUDOEPHED 2.5-7.5/.8
DROPS ORAL
Status: COMPLETED | OUTPATIENT
Start: 2021-03-22 | End: 2021-03-22

## 2021-03-22 RX ADMIN — LIDOCAINE HYDROCHLORIDE 75 MG: 20 INJECTION, SOLUTION INTRAVENOUS at 10:03

## 2021-03-22 RX ADMIN — DEXAMETHASONE SODIUM PHOSPHATE 8 MG: 4 INJECTION, SOLUTION INTRAMUSCULAR; INTRAVENOUS at 11:03

## 2021-03-22 RX ADMIN — SODIUM CHLORIDE: 0.9 INJECTION, SOLUTION INTRAVENOUS at 10:03

## 2021-03-22 RX ADMIN — ONDANSETRON 8 MG: 2 INJECTION, SOLUTION INTRAMUSCULAR; INTRAVENOUS at 11:03

## 2021-03-22 RX ADMIN — PROPOFOL 150 MG: 10 INJECTION, EMULSION INTRAVENOUS at 10:03

## 2021-03-22 RX ADMIN — ROCURONIUM BROMIDE 5 MG: 10 INJECTION, SOLUTION INTRAVENOUS at 10:03

## 2021-03-22 RX ADMIN — GLYCOPYRROLATE 0.2 MG: 0.2 INJECTION, SOLUTION INTRAMUSCULAR; INTRAVITREAL at 10:03

## 2021-03-22 RX ADMIN — SIMETHICONE 40 MG: 20 SUSPENSION/ DROPS ORAL at 11:03

## 2021-03-22 RX ADMIN — SUCCINYLCHOLINE CHLORIDE 120 MG: 20 INJECTION, SOLUTION INTRAMUSCULAR; INTRAVENOUS at 10:03

## 2021-03-23 ENCOUNTER — TELEPHONE (OUTPATIENT)
Dept: ENDOSCOPY | Facility: HOSPITAL | Age: 71
End: 2021-03-23

## 2021-04-13 ENCOUNTER — PATIENT MESSAGE (OUTPATIENT)
Dept: NEUROLOGY | Facility: HOSPITAL | Age: 71
End: 2021-04-13

## 2021-05-03 ENCOUNTER — OFFICE VISIT (OUTPATIENT)
Dept: NEUROLOGY | Facility: HOSPITAL | Age: 71
End: 2021-05-03
Attending: SURGERY
Payer: MEDICARE

## 2021-05-03 DIAGNOSIS — C7A.010 MALIGNANT CARCINOID TUMOR OF DUODENUM: Primary | ICD-10-CM

## 2021-05-19 ENCOUNTER — TELEPHONE (OUTPATIENT)
Dept: NEUROLOGY | Facility: HOSPITAL | Age: 71
End: 2021-05-19

## 2021-05-19 DIAGNOSIS — C7A.010 MALIGNANT CARCINOID TUMOR OF THE DUODENUM: Primary | ICD-10-CM

## 2021-05-25 ENCOUNTER — LAB VISIT (OUTPATIENT)
Dept: LAB | Facility: HOSPITAL | Age: 71
End: 2021-05-25
Payer: MEDICARE

## 2021-05-25 DIAGNOSIS — C7A.010 MALIGNANT CARCINOID TUMOR OF THE DUODENUM: ICD-10-CM

## 2021-05-25 LAB
CREAT SERPL-MCNC: 0.9 MG/DL (ref 0.5–1.4)
EST. GFR  (AFRICAN AMERICAN): >60 ML/MIN/1.73 M^2
EST. GFR  (NON AFRICAN AMERICAN): >60 ML/MIN/1.73 M^2

## 2021-05-25 PROCEDURE — 86316 IMMUNOASSAY TUMOR OTHER: CPT | Performed by: SURGERY

## 2021-05-25 PROCEDURE — 83519 RIA NONANTIBODY: CPT | Performed by: SURGERY

## 2021-05-25 PROCEDURE — 82542 COL CHROMOTOGRAPHY QUAL/QUAN: CPT | Performed by: SURGERY

## 2021-05-25 PROCEDURE — 36415 COLL VENOUS BLD VENIPUNCTURE: CPT | Performed by: SURGERY

## 2021-05-25 PROCEDURE — 82565 ASSAY OF CREATININE: CPT | Performed by: SURGERY

## 2021-05-27 LAB
EXT 24 HR UR METANEPHRINE: NORMAL
EXT 24 HR UR NORMETANEPHRINE: NORMAL
EXT 24 HR UR NORMETANEPHRINE: NORMAL
EXT 25 HYDROXY VIT D2: NORMAL
EXT 25 HYDROXY VIT D3: NORMAL
EXT 5 HIAA 24 HR URINE: NORMAL
EXT 5 HIAA BLOOD: NORMAL
EXT ACTH: NORMAL
EXT AFP: NORMAL
EXT ALBUMIN: NORMAL
EXT ALKALINE PHOSPHATASE: NORMAL
EXT ALT: NORMAL
EXT AMYLASE: NORMAL
EXT ANTI ISLET CELL AB: NORMAL
EXT ANTI PARIETAL CELL AB: NORMAL
EXT ANTI THYROID AB: NORMAL
EXT AST: NORMAL
EXT BILIRUBIN DIRECT: NORMAL
EXT BILIRUBIN TOTAL: NORMAL
EXT BK VIRUS DNA QN PCR: NORMAL
EXT BUN: NORMAL
EXT C PEPTIDE: NORMAL
EXT CA 125: NORMAL
EXT CA 19-9: NORMAL
EXT CA 27-29: NORMAL
EXT CALCITONIN: NORMAL
EXT CALCIUM: NORMAL
EXT CEA: NORMAL
EXT CHLORIDE: NORMAL
EXT CHOLESTEROL: NORMAL
EXT CHROMOGRANIN A: NORMAL
EXT CO2: NORMAL
EXT CREATININE UA: NORMAL
EXT CREATININE: NORMAL
EXT CYCLOSPORONE LEVEL: NORMAL
EXT DOPAMINE: NORMAL
EXT EBV DNA BY PCR: NORMAL
EXT EPINEPHRINE: NORMAL
EXT FOLATE: NORMAL
EXT FREE T3: NORMAL
EXT FREE T4: NORMAL
EXT FSH: NORMAL
EXT GASTRIN RELEASING PEPTIDE: NORMAL
EXT GASTRIN RELEASING PEPTIDE: NORMAL
EXT GASTRIN: NORMAL
EXT GGT: NORMAL
EXT GHRELIN: NORMAL
EXT GLUCAGON: NORMAL
EXT GLUCOSE: NORMAL
EXT GROWTH HORMONE: NORMAL
EXT HCV RNA QUANT PCR: NORMAL
EXT HDL: NORMAL
EXT HEMATOCRIT: NORMAL
EXT HEMOGLOBIN A1C: NORMAL
EXT HEMOGLOBIN: NORMAL
EXT HISTAMINE 24 HR URINE: NORMAL
EXT HISTAMINE: NORMAL
EXT IGF-1: NORMAL
EXT IMMUNKNOW (NON-STIMULATED): NORMAL
EXT IMMUNKNOW (STIMULATED): NORMAL
EXT INR: NORMAL
EXT INSULIN: NORMAL
EXT LANREOTIDE LEVEL: NORMAL
EXT LDH, TOTAL: NORMAL
EXT LDL CHOLESTEROL: NORMAL
EXT LIPASE: NORMAL
EXT MAGNESIUM: NORMAL
EXT METANEPHRINE FREE PLASMA: NORMAL
EXT MOTILIN: NORMAL
EXT NEUROKININ A CAMB: NORMAL
EXT NEUROKININ A ISI: 17 PG/ML
EXT NEUROTENSIN: NORMAL
EXT NOREPINEPHRINE: NORMAL
EXT NORMETANEPHRINE: NORMAL
EXT NSE: NORMAL
EXT OCTREOTIDE LEVEL: NORMAL
EXT PANCREASTATIN CAMB: NORMAL
EXT PANCREASTATIN ISI: NORMAL
EXT PANCREATIC POLYPEPTIDE: NORMAL
EXT PHOSPHORUS: NORMAL
EXT PLATELETS: NORMAL
EXT POTASSIUM: NORMAL
EXT PROGRAF LEVEL: NORMAL
EXT PROLACTIN: NORMAL
EXT PROTEIN TOTAL: NORMAL
EXT PROTEIN UA: NORMAL
EXT PT: NORMAL
EXT PTH, INTACT: NORMAL
EXT PTT: NORMAL
EXT RAPAMUNE LEVEL: NORMAL
EXT SEROTONIN: NORMAL
EXT SODIUM: NORMAL
EXT SOMATOSTATIN: NORMAL
EXT SUBSTANCE P: NORMAL
EXT TRIGLYCERIDES: NORMAL
EXT TRYPTASE: NORMAL
EXT TSH: NORMAL
EXT URIC ACID: NORMAL
EXT URINE AMYLASE U/HR: NORMAL
EXT URINE AMYLASE U/L: NORMAL
EXT VASOACTIVE INTESTINAL POLYPEPTIDE: NORMAL
EXT VITAMIN B12: NORMAL
EXT VMA 24 HR URINE: NORMAL
EXT WBC: NORMAL
NEURON SPECIFIC ENOLASE: NORMAL

## 2021-05-28 ENCOUNTER — HOSPITAL ENCOUNTER (OUTPATIENT)
Dept: RADIOLOGY | Facility: HOSPITAL | Age: 71
Discharge: HOME | End: 2021-05-28
Attending: SURGERY
Payer: MEDICARE

## 2021-05-28 DIAGNOSIS — C7A.010 MALIGNANT CARCINOID TUMOR OF THE DUODENUM: ICD-10-CM

## 2021-05-28 LAB — SEROTONIN: 88 NG/ML

## 2021-05-28 PROCEDURE — 74177 CT ABDOMEN PELVIS WITH CONTRAST: ICD-10-PCS | Mod: 26,,, | Performed by: RADIOLOGY

## 2021-05-28 PROCEDURE — 25500020 PHARM REV CODE 255: Performed by: SURGERY

## 2021-05-28 PROCEDURE — 74177 CT ABD & PELVIS W/CONTRAST: CPT | Mod: TC

## 2021-05-28 PROCEDURE — 74177 CT ABD & PELVIS W/CONTRAST: CPT | Mod: 26,,, | Performed by: RADIOLOGY

## 2021-05-28 RX ADMIN — IOHEXOL 75 ML: 350 INJECTION, SOLUTION INTRAVENOUS at 08:05

## 2021-05-28 RX ADMIN — IOHEXOL 15 ML: 350 INJECTION, SOLUTION INTRAVENOUS at 07:05

## 2021-05-28 RX ADMIN — IOHEXOL 15 ML: 350 INJECTION, SOLUTION INTRAVENOUS at 08:05

## 2021-06-05 LAB — 5OH-INDOLEACETATE SERPL-MCNC: 8 NG/ML

## 2021-06-07 ENCOUNTER — PATIENT MESSAGE (OUTPATIENT)
Dept: NEUROLOGY | Facility: HOSPITAL | Age: 71
End: 2021-06-07

## 2021-06-10 ENCOUNTER — PES CALL (OUTPATIENT)
Dept: ADMINISTRATIVE | Facility: CLINIC | Age: 71
End: 2021-06-10

## 2021-06-16 LAB
NEUROKININ A SERPL-MCNC: 17 PG/ML (ref 0–40)
PANCREASTATIN SERPL-MCNC: 45 PG/ML (ref 10–135)

## 2021-06-21 ENCOUNTER — PATIENT MESSAGE (OUTPATIENT)
Dept: NEUROLOGY | Facility: HOSPITAL | Age: 71
End: 2021-06-21

## 2021-06-28 ENCOUNTER — OFFICE VISIT (OUTPATIENT)
Dept: NEUROLOGY | Facility: HOSPITAL | Age: 71
End: 2021-06-28
Attending: SURGERY
Payer: MEDICARE

## 2021-06-28 DIAGNOSIS — C7A.010 MALIGNANT CARCINOID TUMOR OF DUODENUM: Primary | ICD-10-CM

## 2021-07-16 ENCOUNTER — PES CALL (OUTPATIENT)
Dept: ADMINISTRATIVE | Facility: CLINIC | Age: 71
End: 2021-07-16

## 2021-08-02 ENCOUNTER — TELEPHONE (OUTPATIENT)
Dept: INTERNAL MEDICINE | Facility: CLINIC | Age: 71
End: 2021-08-02

## 2021-08-12 ENCOUNTER — PES CALL (OUTPATIENT)
Dept: ADMINISTRATIVE | Facility: CLINIC | Age: 71
End: 2021-08-12

## 2021-08-17 ENCOUNTER — PATIENT MESSAGE (OUTPATIENT)
Dept: ADMINISTRATIVE | Facility: HOSPITAL | Age: 71
End: 2021-08-17

## 2021-08-17 ENCOUNTER — PATIENT OUTREACH (OUTPATIENT)
Dept: ADMINISTRATIVE | Facility: HOSPITAL | Age: 71
End: 2021-08-17

## 2021-08-17 DIAGNOSIS — Z12.12 ENCOUNTER FOR COLORECTAL CANCER SCREENING: Primary | ICD-10-CM

## 2021-08-17 DIAGNOSIS — Z12.11 ENCOUNTER FOR COLORECTAL CANCER SCREENING: Primary | ICD-10-CM

## 2021-09-14 ENCOUNTER — OFFICE VISIT (OUTPATIENT)
Dept: INTERNAL MEDICINE | Facility: CLINIC | Age: 71
End: 2021-09-14
Attending: INTERNAL MEDICINE
Payer: MEDICARE

## 2021-09-14 ENCOUNTER — LAB VISIT (OUTPATIENT)
Dept: LAB | Facility: OTHER | Age: 71
End: 2021-09-14
Attending: INTERNAL MEDICINE
Payer: MEDICARE

## 2021-09-14 ENCOUNTER — TELEPHONE (OUTPATIENT)
Dept: INTERNAL MEDICINE | Facility: CLINIC | Age: 71
End: 2021-09-14

## 2021-09-14 VITALS
BODY MASS INDEX: 29.76 KG/M2 | HEART RATE: 81 BPM | WEIGHT: 185.19 LBS | HEIGHT: 66 IN | SYSTOLIC BLOOD PRESSURE: 108 MMHG | OXYGEN SATURATION: 97 % | DIASTOLIC BLOOD PRESSURE: 70 MMHG

## 2021-09-14 DIAGNOSIS — Z98.84 HISTORY OF GASTRIC BYPASS: ICD-10-CM

## 2021-09-14 DIAGNOSIS — R20.9 UNSPECIFIED DISTURBANCES OF SKIN SENSATION: ICD-10-CM

## 2021-09-14 DIAGNOSIS — R23.4 CHANGES IN SKIN TEXTURE: ICD-10-CM

## 2021-09-14 DIAGNOSIS — C7A.010 MALIGNANT CARCINOID TUMOR OF THE DUODENUM: ICD-10-CM

## 2021-09-14 DIAGNOSIS — E78.2 MIXED HYPERLIPIDEMIA: ICD-10-CM

## 2021-09-14 DIAGNOSIS — E78.2 MIXED HYPERLIPIDEMIA: Primary | ICD-10-CM

## 2021-09-14 DIAGNOSIS — H57.9 EYE EXAM ABNORMAL: ICD-10-CM

## 2021-09-14 DIAGNOSIS — R79.9 ABNORMAL FINDING OF BLOOD CHEMISTRY, UNSPECIFIED: ICD-10-CM

## 2021-09-14 DIAGNOSIS — G47.33 OSA (OBSTRUCTIVE SLEEP APNEA): ICD-10-CM

## 2021-09-14 LAB
ALBUMIN SERPL BCP-MCNC: 3.8 G/DL (ref 3.5–5.2)
ALP SERPL-CCNC: 57 U/L (ref 55–135)
ALT SERPL W/O P-5'-P-CCNC: 22 U/L (ref 10–44)
ANION GAP SERPL CALC-SCNC: 10 MMOL/L (ref 8–16)
AST SERPL-CCNC: 28 U/L (ref 10–40)
BASOPHILS # BLD AUTO: 0.03 K/UL (ref 0–0.2)
BASOPHILS NFR BLD: 0.9 % (ref 0–1.9)
BILIRUB SERPL-MCNC: 0.6 MG/DL (ref 0.1–1)
BUN SERPL-MCNC: 12 MG/DL (ref 8–23)
CALCIUM SERPL-MCNC: 9.2 MG/DL (ref 8.7–10.5)
CHLORIDE SERPL-SCNC: 105 MMOL/L (ref 95–110)
CHOLEST SERPL-MCNC: 207 MG/DL (ref 120–199)
CHOLEST/HDLC SERPL: 3.2 {RATIO} (ref 2–5)
CO2 SERPL-SCNC: 25 MMOL/L (ref 23–29)
CREAT SERPL-MCNC: 0.8 MG/DL (ref 0.5–1.4)
DIFFERENTIAL METHOD: ABNORMAL
EOSINOPHIL # BLD AUTO: 0.1 K/UL (ref 0–0.5)
EOSINOPHIL NFR BLD: 1.4 % (ref 0–8)
ERYTHROCYTE [DISTWIDTH] IN BLOOD BY AUTOMATED COUNT: 13.5 % (ref 11.5–14.5)
EST. GFR  (AFRICAN AMERICAN): >60 ML/MIN/1.73 M^2
EST. GFR  (NON AFRICAN AMERICAN): >60 ML/MIN/1.73 M^2
ESTIMATED AVG GLUCOSE: 105 MG/DL (ref 68–131)
FERRITIN SERPL-MCNC: 390 NG/ML (ref 20–300)
FOLATE SERPL-MCNC: 7.6 NG/ML (ref 4–24)
GLUCOSE SERPL-MCNC: 85 MG/DL (ref 70–110)
HBA1C MFR BLD: 5.3 % (ref 4–5.6)
HCT VFR BLD AUTO: 42.8 % (ref 40–54)
HDLC SERPL-MCNC: 64 MG/DL (ref 40–75)
HDLC SERPL: 30.9 % (ref 20–50)
HGB BLD-MCNC: 13.7 G/DL (ref 14–18)
IMM GRANULOCYTES # BLD AUTO: 0 K/UL (ref 0–0.04)
IMM GRANULOCYTES NFR BLD AUTO: 0 % (ref 0–0.5)
IRON SERPL-MCNC: 117 UG/DL (ref 45–160)
IRON SERPL-MCNC: 117 UG/DL (ref 45–160)
LDLC SERPL CALC-MCNC: 123 MG/DL (ref 63–159)
LYMPHOCYTES # BLD AUTO: 0.8 K/UL (ref 1–4.8)
LYMPHOCYTES NFR BLD: 24.2 % (ref 18–48)
MCH RBC QN AUTO: 29.3 PG (ref 27–31)
MCHC RBC AUTO-ENTMCNC: 32 G/DL (ref 32–36)
MCV RBC AUTO: 92 FL (ref 82–98)
MONOCYTES # BLD AUTO: 0.4 K/UL (ref 0.3–1)
MONOCYTES NFR BLD: 11.8 % (ref 4–15)
NEUTROPHILS # BLD AUTO: 2.1 K/UL (ref 1.8–7.7)
NEUTROPHILS NFR BLD: 61.7 % (ref 38–73)
NONHDLC SERPL-MCNC: 143 MG/DL
NRBC BLD-RTO: 0 /100 WBC
PLATELET # BLD AUTO: 190 K/UL (ref 150–450)
PMV BLD AUTO: 10.7 FL (ref 9.2–12.9)
POTASSIUM SERPL-SCNC: 4.2 MMOL/L (ref 3.5–5.1)
PROT SERPL-MCNC: 7.8 G/DL (ref 6–8.4)
RBC # BLD AUTO: 4.67 M/UL (ref 4.6–6.2)
SATURATED IRON: 33 % (ref 20–50)
SODIUM SERPL-SCNC: 140 MMOL/L (ref 136–145)
TOTAL IRON BINDING CAPACITY: 354 UG/DL (ref 250–450)
TRANSFERRIN SERPL-MCNC: 239 MG/DL (ref 200–375)
TRIGL SERPL-MCNC: 100 MG/DL (ref 30–150)
TSH SERPL DL<=0.005 MIU/L-ACNC: 1.1 UIU/ML (ref 0.4–4)
VIT B12 SERPL-MCNC: 327 PG/ML (ref 210–950)
WBC # BLD AUTO: 3.47 K/UL (ref 3.9–12.7)

## 2021-09-14 PROCEDURE — 36415 COLL VENOUS BLD VENIPUNCTURE: CPT | Performed by: INTERNAL MEDICINE

## 2021-09-14 PROCEDURE — 85025 COMPLETE CBC W/AUTO DIFF WBC: CPT | Performed by: INTERNAL MEDICINE

## 2021-09-14 PROCEDURE — 99214 OFFICE O/P EST MOD 30 MIN: CPT | Mod: S$PBB,,, | Performed by: INTERNAL MEDICINE

## 2021-09-14 PROCEDURE — 80053 COMPREHEN METABOLIC PANEL: CPT | Performed by: INTERNAL MEDICINE

## 2021-09-14 PROCEDURE — 82746 ASSAY OF FOLIC ACID SERUM: CPT | Performed by: INTERNAL MEDICINE

## 2021-09-14 PROCEDURE — 80061 LIPID PANEL: CPT | Performed by: INTERNAL MEDICINE

## 2021-09-14 PROCEDURE — 99999 PR PBB SHADOW E&M-EST. PATIENT-LVL III: ICD-10-PCS | Mod: PBBFAC,,, | Performed by: INTERNAL MEDICINE

## 2021-09-14 PROCEDURE — 99213 OFFICE O/P EST LOW 20 MIN: CPT | Mod: PBBFAC | Performed by: INTERNAL MEDICINE

## 2021-09-14 PROCEDURE — 83036 HEMOGLOBIN GLYCOSYLATED A1C: CPT | Performed by: INTERNAL MEDICINE

## 2021-09-14 PROCEDURE — 84443 ASSAY THYROID STIM HORMONE: CPT | Performed by: INTERNAL MEDICINE

## 2021-09-14 PROCEDURE — 99214 PR OFFICE/OUTPT VISIT, EST, LEVL IV, 30-39 MIN: ICD-10-PCS | Mod: S$PBB,,, | Performed by: INTERNAL MEDICINE

## 2021-09-14 PROCEDURE — 84466 ASSAY OF TRANSFERRIN: CPT | Performed by: INTERNAL MEDICINE

## 2021-09-14 PROCEDURE — 82728 ASSAY OF FERRITIN: CPT | Performed by: INTERNAL MEDICINE

## 2021-09-14 PROCEDURE — 82607 VITAMIN B-12: CPT | Performed by: INTERNAL MEDICINE

## 2021-09-14 PROCEDURE — 84425 ASSAY OF VITAMIN B-1: CPT | Performed by: INTERNAL MEDICINE

## 2021-09-14 PROCEDURE — 99999 PR PBB SHADOW E&M-EST. PATIENT-LVL III: CPT | Mod: PBBFAC,,, | Performed by: INTERNAL MEDICINE

## 2021-09-15 ENCOUNTER — PES CALL (OUTPATIENT)
Dept: ADMINISTRATIVE | Facility: CLINIC | Age: 71
End: 2021-09-15

## 2021-09-18 ENCOUNTER — PES CALL (OUTPATIENT)
Dept: ADMINISTRATIVE | Facility: CLINIC | Age: 71
End: 2021-09-18

## 2021-09-22 LAB — VIT B1 BLD-MCNC: 46 UG/L (ref 38–122)

## 2021-10-13 ENCOUNTER — OFFICE VISIT (OUTPATIENT)
Dept: INTERNAL MEDICINE | Facility: CLINIC | Age: 71
End: 2021-10-13
Payer: MEDICARE

## 2021-10-13 VITALS
WEIGHT: 182.31 LBS | BODY MASS INDEX: 29.43 KG/M2 | HEART RATE: 95 BPM | DIASTOLIC BLOOD PRESSURE: 60 MMHG | SYSTOLIC BLOOD PRESSURE: 100 MMHG | OXYGEN SATURATION: 98 %

## 2021-10-13 DIAGNOSIS — N52.9 ERECTILE DYSFUNCTION, UNSPECIFIED ERECTILE DYSFUNCTION TYPE: ICD-10-CM

## 2021-10-13 DIAGNOSIS — R42 VERTIGO: ICD-10-CM

## 2021-10-13 DIAGNOSIS — J38.3 VOCAL FOLD SCAR: ICD-10-CM

## 2021-10-13 DIAGNOSIS — I77.1 TORTUOUS AORTA: ICD-10-CM

## 2021-10-13 DIAGNOSIS — Z00.00 ENCOUNTER FOR PREVENTIVE HEALTH EXAMINATION: Primary | ICD-10-CM

## 2021-10-13 DIAGNOSIS — R13.10 DYSPHAGIA, UNSPECIFIED TYPE: ICD-10-CM

## 2021-10-13 DIAGNOSIS — G47.33 OSA (OBSTRUCTIVE SLEEP APNEA): ICD-10-CM

## 2021-10-13 DIAGNOSIS — C7A.010 MALIGNANT CARCINOID TUMOR OF THE DUODENUM: ICD-10-CM

## 2021-10-13 DIAGNOSIS — K85.92: ICD-10-CM

## 2021-10-13 DIAGNOSIS — J90 PLEURAL EFFUSION: ICD-10-CM

## 2021-10-13 DIAGNOSIS — R00.0 TACHYCARDIA: ICD-10-CM

## 2021-10-13 DIAGNOSIS — N40.1 BENIGN NON-NODULAR PROSTATIC HYPERPLASIA WITH LOWER URINARY TRACT SYMPTOMS: ICD-10-CM

## 2021-10-13 DIAGNOSIS — E78.2 MIXED HYPERLIPIDEMIA: ICD-10-CM

## 2021-10-13 DIAGNOSIS — J38.3 VOCAL FOLD ATROPHY: ICD-10-CM

## 2021-10-13 DIAGNOSIS — R49.9 VOICE DISTURBANCE: ICD-10-CM

## 2021-10-13 DIAGNOSIS — K31.1 GASTRIC OUTLET OBSTRUCTION: ICD-10-CM

## 2021-10-13 DIAGNOSIS — E34.0 DUODENAL CARCINOID SYNDROME: ICD-10-CM

## 2021-10-13 DIAGNOSIS — K86.1 OTHER CHRONIC PANCREATITIS: ICD-10-CM

## 2021-10-13 DIAGNOSIS — Z01.811 PREOPERATIVE RESPIRATORY EXAMINATION: ICD-10-CM

## 2021-10-13 DIAGNOSIS — G47.33 OSA ON CPAP: ICD-10-CM

## 2021-10-13 DIAGNOSIS — M25.78 CERVICAL OSTEOPHYTE: ICD-10-CM

## 2021-10-13 DIAGNOSIS — R05.3 CHRONIC COUGH: ICD-10-CM

## 2021-10-13 DIAGNOSIS — I70.0 ATHEROSCLEROSIS OF AORTA: ICD-10-CM

## 2021-10-13 DIAGNOSIS — I45.5 SINUS PAUSE: ICD-10-CM

## 2021-10-13 PROCEDURE — G0439 PPPS, SUBSEQ VISIT: HCPCS | Mod: ,,, | Performed by: NURSE PRACTITIONER

## 2021-10-13 PROCEDURE — G0439 PR MEDICARE ANNUAL WELLNESS SUBSEQUENT VISIT: ICD-10-PCS | Mod: ,,, | Performed by: NURSE PRACTITIONER

## 2021-10-13 PROCEDURE — 99999 PR PBB SHADOW E&M-EST. PATIENT-LVL III: CPT | Mod: PBBFAC,,, | Performed by: NURSE PRACTITIONER

## 2021-10-13 PROCEDURE — 99213 OFFICE O/P EST LOW 20 MIN: CPT | Mod: PBBFAC | Performed by: NURSE PRACTITIONER

## 2021-10-13 PROCEDURE — 99999 PR PBB SHADOW E&M-EST. PATIENT-LVL III: ICD-10-PCS | Mod: PBBFAC,,, | Performed by: NURSE PRACTITIONER

## 2021-10-15 ENCOUNTER — IMMUNIZATION (OUTPATIENT)
Dept: PHARMACY | Facility: CLINIC | Age: 71
End: 2021-10-15
Payer: MEDICARE

## 2021-10-20 ENCOUNTER — PATIENT MESSAGE (OUTPATIENT)
Dept: INTERNAL MEDICINE | Facility: CLINIC | Age: 71
End: 2021-10-20
Payer: MEDICARE

## 2021-10-20 DIAGNOSIS — Z12.5 PROSTATE CANCER SCREENING: Primary | ICD-10-CM

## 2021-10-25 ENCOUNTER — TELEPHONE (OUTPATIENT)
Dept: INTERNAL MEDICINE | Facility: CLINIC | Age: 71
End: 2021-10-25
Payer: MEDICARE

## 2021-11-10 ENCOUNTER — TELEPHONE (OUTPATIENT)
Dept: INTERNAL MEDICINE | Facility: CLINIC | Age: 71
End: 2021-11-10
Payer: MEDICARE

## 2021-11-10 ENCOUNTER — PATIENT OUTREACH (OUTPATIENT)
Dept: ADMINISTRATIVE | Facility: OTHER | Age: 71
End: 2021-11-10
Payer: MEDICARE

## 2021-11-17 ENCOUNTER — PATIENT OUTREACH (OUTPATIENT)
Dept: ADMINISTRATIVE | Facility: HOSPITAL | Age: 71
End: 2021-11-17
Payer: MEDICARE

## 2021-12-14 ENCOUNTER — TELEPHONE (OUTPATIENT)
Dept: INTERNAL MEDICINE | Facility: CLINIC | Age: 71
End: 2021-12-14
Payer: MEDICARE

## 2022-02-17 ENCOUNTER — PATIENT MESSAGE (OUTPATIENT)
Dept: INTERNAL MEDICINE | Facility: CLINIC | Age: 72
End: 2022-02-17
Payer: MEDICARE

## 2022-02-23 ENCOUNTER — PATIENT MESSAGE (OUTPATIENT)
Dept: INTERNAL MEDICINE | Facility: CLINIC | Age: 72
End: 2022-02-23
Payer: MEDICARE

## 2022-03-15 ENCOUNTER — OFFICE VISIT (OUTPATIENT)
Dept: INTERNAL MEDICINE | Facility: CLINIC | Age: 72
End: 2022-03-15
Attending: INTERNAL MEDICINE
Payer: MEDICARE

## 2022-03-15 VITALS
HEIGHT: 66 IN | OXYGEN SATURATION: 96 % | DIASTOLIC BLOOD PRESSURE: 82 MMHG | BODY MASS INDEX: 29.02 KG/M2 | WEIGHT: 180.56 LBS | HEART RATE: 83 BPM | SYSTOLIC BLOOD PRESSURE: 124 MMHG

## 2022-03-15 DIAGNOSIS — C7A.010 MALIGNANT CARCINOID TUMOR OF THE DUODENUM: Primary | ICD-10-CM

## 2022-03-15 DIAGNOSIS — K86.1 OTHER CHRONIC PANCREATITIS: ICD-10-CM

## 2022-03-15 DIAGNOSIS — G47.33 OSA ON CPAP: ICD-10-CM

## 2022-03-15 DIAGNOSIS — N52.9 ERECTILE DYSFUNCTION, UNSPECIFIED ERECTILE DYSFUNCTION TYPE: ICD-10-CM

## 2022-03-15 DIAGNOSIS — I70.0 ATHEROSCLEROSIS OF AORTA: ICD-10-CM

## 2022-03-15 DIAGNOSIS — E78.2 MIXED HYPERLIPIDEMIA: ICD-10-CM

## 2022-03-15 PROCEDURE — 99999 PR PBB SHADOW E&M-EST. PATIENT-LVL III: ICD-10-PCS | Mod: PBBFAC,,, | Performed by: INTERNAL MEDICINE

## 2022-03-15 PROCEDURE — 99213 OFFICE O/P EST LOW 20 MIN: CPT | Mod: PBBFAC | Performed by: INTERNAL MEDICINE

## 2022-03-15 PROCEDURE — 99214 PR OFFICE/OUTPT VISIT, EST, LEVL IV, 30-39 MIN: ICD-10-PCS | Mod: S$PBB,,, | Performed by: INTERNAL MEDICINE

## 2022-03-15 PROCEDURE — 99999 PR PBB SHADOW E&M-EST. PATIENT-LVL III: CPT | Mod: PBBFAC,,, | Performed by: INTERNAL MEDICINE

## 2022-03-15 PROCEDURE — 99214 OFFICE O/P EST MOD 30 MIN: CPT | Mod: S$PBB,,, | Performed by: INTERNAL MEDICINE

## 2022-03-15 RX ORDER — SILDENAFIL 100 MG/1
100 TABLET, FILM COATED ORAL DAILY PRN
Qty: 30 TABLET | Refills: 11 | Status: SHIPPED | OUTPATIENT
Start: 2022-03-15 | End: 2022-04-14

## 2022-03-15 RX ORDER — SILDENAFIL 100 MG/1
100 TABLET, FILM COATED ORAL DAILY PRN
Qty: 10 TABLET | Refills: 0 | Status: SHIPPED | OUTPATIENT
Start: 2022-03-15 | End: 2023-03-01 | Stop reason: SDUPTHER

## 2022-03-15 NOTE — PROGRESS NOTES
Subjective:       Patient ID: Hoang Santos Jr. is a 71 y.o. male.    Chief Complaint: Hyperlipidemia (F/u)    Here for annual exam    70 yo M with PMHx of carcinoid, HLD, GEMMA presents for routine f/u     Patient presents today for routine evaluation, physical, and labs. Patient has no major concerns or complaints today.      Forgetful at times. Forgettnig people's names of those he has known for a long time. He is aware of his   ### carcinoid duodenum ###  ex-lap, toupet fundoplication, hiatal hernia repair, cholecystectomy, sleeve duodenectomy with end-end anastomosis, liver biopsy, biliary stenting, and intralesional chemo     ### GEMMA ###   he is honest about his non-adherence. Energy levels are fine. No HA. BP well controlled without meds.      ### HLD ####  ex-lap, toupet fundoplication, hiatal hernia repair, cholecystectomy, sleeve duodenectomy with end-end anastomosis, liver biopsy, biliary stenting, and intralesional chemo   -GEMMA- he is honest about his non-adherence. Energy levels are fine. No HA. BP well controlled without meds.       Review of Systems   Constitutional: Negative for appetite change, chills, fever and unexpected weight change.   HENT: Negative for hearing loss, sore throat and trouble swallowing.    Eyes: Negative for visual disturbance.   Respiratory: Negative for cough, chest tightness and shortness of breath.    Cardiovascular: Negative for chest pain and leg swelling.   Gastrointestinal: Negative for abdominal pain, blood in stool, constipation, diarrhea, nausea and vomiting.   Endocrine: Negative for polydipsia and polyuria.   Genitourinary: Negative for decreased urine volume, difficulty urinating, dysuria, frequency and urgency.   Musculoskeletal: Negative for gait problem.   Skin: Negative for rash.   Neurological: Negative for dizziness and numbness.   Psychiatric/Behavioral: The patient is not nervous/anxious.        Objective:      Vitals:    03/15/22 0849   BP: 124/82   Pulse:  "83   SpO2: 96%   Weight: 81.9 kg (180 lb 8.9 oz)   Height: 5' 6" (1.676 m)      Physical Exam  Constitutional:       General: He is not in acute distress.     Appearance: He is well-developed.   HENT:      Head: Normocephalic and atraumatic.      Mouth/Throat:      Pharynx: No oropharyngeal exudate.   Eyes:      General: No scleral icterus.     Conjunctiva/sclera: Conjunctivae normal.      Pupils: Pupils are equal, round, and reactive to light.   Neck:      Thyroid: No thyromegaly.   Cardiovascular:      Rate and Rhythm: Normal rate and regular rhythm.      Heart sounds: Normal heart sounds. No murmur heard.  Pulmonary:      Effort: Pulmonary effort is normal.      Breath sounds: Normal breath sounds. No wheezing or rales.   Abdominal:      General: There is no distension.      Palpations: Abdomen is soft.      Tenderness: There is no abdominal tenderness.   Musculoskeletal:         General: No tenderness.   Lymphadenopathy:      Cervical: No cervical adenopathy.   Skin:     General: Skin is warm and dry.   Neurological:      Mental Status: He is alert and oriented to person, place, and time.   Psychiatric:         Behavior: Behavior normal.         Assessment:       1. Malignant carcinoid tumor of the duodenum    2. Other chronic pancreatitis    3. GEMMA on CPAP    4. Atherosclerosis of aorta    5. Mixed hyperlipidemia    6. Erectile dysfunction, unspecified erectile dysfunction type        Plan:       Hoang was seen today for hyperlipidemia.    Diagnoses and all orders for this visit:    Malignant carcinoid tumor of the duodenum   UTD with oncology f/u    Other chronic pancreatitis   No acute issues    GEMMA on CPAP   F/u Valeriy in Sleep.    Atherosclerosis of aorta   Tolerating statin. Continue this.     Mixed hyperlipidemia   Tolerating statin. Continue this.     Erectile dysfunction, unspecified erectile dysfunction type  -     sildenafiL (VIAGRA) 100 MG tablet; Take 1 tablet (100 mg total) by mouth daily as " needed for Erectile Dysfunction.  -     sildenafiL (VIAGRA) 100 MG tablet; Take 1 tablet (100 mg total) by mouth daily as needed for Erectile Dysfunction.       RTC in 6 months    Lee Lindsey MD  Internal Medicine-[facility]        Side effects of medication(s) were discussed in detail and patient voiced understanding.  Patient will call back for any issues or complications.

## 2022-04-11 ENCOUNTER — PATIENT MESSAGE (OUTPATIENT)
Dept: INTERNAL MEDICINE | Facility: CLINIC | Age: 72
End: 2022-04-11
Payer: MEDICARE

## 2022-04-11 RX ORDER — ATORVASTATIN CALCIUM 40 MG/1
40 TABLET, FILM COATED ORAL DAILY
Qty: 90 TABLET | Refills: 3 | Status: SHIPPED | OUTPATIENT
Start: 2022-04-11 | End: 2022-09-01

## 2022-04-11 NOTE — TELEPHONE ENCOUNTER
No new care gaps identified.  Powered by [employer]. Reference number: [id number].   4/11/2022 1:04:53 PM CDT

## 2022-04-12 ENCOUNTER — OFFICE VISIT (OUTPATIENT)
Dept: OPTOMETRY | Facility: CLINIC | Age: 72
End: 2022-04-12
Payer: MEDICARE

## 2022-04-12 DIAGNOSIS — H43.813 PVD (POSTERIOR VITREOUS DETACHMENT), BOTH EYES: ICD-10-CM

## 2022-04-12 DIAGNOSIS — Z04.9 DISEASE RULED OUT AFTER EXAMINATION: ICD-10-CM

## 2022-04-12 DIAGNOSIS — H25.13 NS (NUCLEAR SCLEROSIS), BILATERAL: Primary | ICD-10-CM

## 2022-04-12 PROCEDURE — 99999 PR PBB SHADOW E&M-EST. PATIENT-LVL II: CPT | Mod: PBBFAC,,, | Performed by: OPTOMETRIST

## 2022-04-12 PROCEDURE — 92004 PR EYE EXAM, NEW PATIENT,COMPREHESV: ICD-10-PCS | Mod: S$PBB,,, | Performed by: OPTOMETRIST

## 2022-04-12 PROCEDURE — 92015 PR REFRACTION: ICD-10-PCS | Mod: ,,, | Performed by: OPTOMETRIST

## 2022-04-12 PROCEDURE — 99212 OFFICE O/P EST SF 10 MIN: CPT | Mod: PBBFAC | Performed by: OPTOMETRIST

## 2022-04-12 PROCEDURE — 92004 COMPRE OPH EXAM NEW PT 1/>: CPT | Mod: S$PBB,,, | Performed by: OPTOMETRIST

## 2022-04-12 PROCEDURE — 99999 PR PBB SHADOW E&M-EST. PATIENT-LVL II: ICD-10-PCS | Mod: PBBFAC,,, | Performed by: OPTOMETRIST

## 2022-04-12 PROCEDURE — 92015 DETERMINE REFRACTIVE STATE: CPT | Mod: ,,, | Performed by: OPTOMETRIST

## 2022-04-12 NOTE — PROGRESS NOTES
HPI     DLS:  7/6/17    No eyedrops  No eye surgery     Pt here for check of ocular health. Pt without visual complaints today OU.    Pt denies flashes, floaters, headaches or eye pain OU. Pt denies itching,   tearing or burning OU.     Last edited by Marnie Valentino MA on 4/12/2022  9:02 AM.   (History)            Assessment /Plan     For exam results, see Encounter Report.      PVD (posterior vitreous detachment), right eye              No holes, tears or RD today                Cortical cataract of both eyes              Pt happy with current uncorrected distance vision       Chorioretinal scar OS   Monitor yearly            Presbyopia              Rx specs

## 2022-04-30 ENCOUNTER — PATIENT MESSAGE (OUTPATIENT)
Dept: INTERNAL MEDICINE | Facility: CLINIC | Age: 72
End: 2022-04-30
Payer: MEDICARE

## 2022-04-30 ENCOUNTER — PATIENT MESSAGE (OUTPATIENT)
Dept: NEUROLOGY | Facility: HOSPITAL | Age: 72
End: 2022-04-30
Payer: MEDICARE

## 2022-04-30 DIAGNOSIS — E78.2 MIXED HYPERLIPIDEMIA: Primary | ICD-10-CM

## 2022-05-01 RX ORDER — ATORVASTATIN CALCIUM 40 MG/1
40 TABLET, FILM COATED ORAL DAILY
Qty: 90 TABLET | Refills: 3 | Status: SHIPPED | OUTPATIENT
Start: 2022-05-01 | End: 2022-09-01

## 2022-05-03 ENCOUNTER — PATIENT OUTREACH (OUTPATIENT)
Dept: ADMINISTRATIVE | Facility: OTHER | Age: 72
End: 2022-05-03
Payer: MEDICARE

## 2022-05-04 ENCOUNTER — OFFICE VISIT (OUTPATIENT)
Dept: SLEEP MEDICINE | Facility: CLINIC | Age: 72
End: 2022-05-04
Payer: MEDICARE

## 2022-05-04 VITALS — BODY MASS INDEX: 28.56 KG/M2 | HEIGHT: 66 IN | WEIGHT: 177.69 LBS

## 2022-05-04 DIAGNOSIS — G47.33 OSA (OBSTRUCTIVE SLEEP APNEA): ICD-10-CM

## 2022-05-04 DIAGNOSIS — G47.33 OBSTRUCTIVE SLEEP APNEA: Primary | ICD-10-CM

## 2022-05-04 PROCEDURE — 99999 PR PBB SHADOW E&M-EST. PATIENT-LVL II: ICD-10-PCS | Mod: PBBFAC,,, | Performed by: NURSE PRACTITIONER

## 2022-05-04 PROCEDURE — 99212 OFFICE O/P EST SF 10 MIN: CPT | Mod: PBBFAC | Performed by: NURSE PRACTITIONER

## 2022-05-04 PROCEDURE — 99214 OFFICE O/P EST MOD 30 MIN: CPT | Mod: S$PBB,,, | Performed by: NURSE PRACTITIONER

## 2022-05-04 PROCEDURE — 99999 PR PBB SHADOW E&M-EST. PATIENT-LVL II: CPT | Mod: PBBFAC,,, | Performed by: NURSE PRACTITIONER

## 2022-05-04 PROCEDURE — 99214 PR OFFICE/OUTPT VISIT, EST, LEVL IV, 30-39 MIN: ICD-10-PCS | Mod: S$PBB,,, | Performed by: NURSE PRACTITIONER

## 2022-05-04 NOTE — PROGRESS NOTES
"Hoang Santos Jr. 71 y.o. year-old male returns for management of obstructive sleep apnea and PAP equipment check.    He underwent split PSg and began bipap 18/14c (hx aerophagia with cpap/mask issues) butused it inconsistently up until recall then stopped completely. Lost wgt and plans to lose ~ 15# more. +snores. Nocturia same frequency now as when he used machine    Physical Exam:   Ht 5' 6" (1.676 m)   Wt 80.6 kg (177 lb 11.1 oz)   BMI 28.68 kg/m²       Assessment:    GEMMA, severe. Inconsistent adherence prior to recall of machine. Requests requal PSG given more wgt loss. Previous intolerance to mask interface and some aerophagia/last split PSG used BIPAP      08/06/2018 Split  lb. The overall AHI was 71.1 with an oxygen david of 85.0%. Adequate effective control of GEMMA achieved with BPAP 18/14.   12/11/2014 Split  lb. The overall AHI was 93.3 with an oxygen david of 85.0%. Effective control of sleep disordered breathing was not achieved. Recommendation: dedicated titration vs APAP 8 - 18 cm         Plan:   REQUAL PSG, hx aerophagia last used bipap. Use nose mask initially if meets criteria for split  Discussed etiology of obstructive sleep apnea as well as the potential ramifications of untreated sleep apnea, which could include daytime sleepiness, hypertension, heart disease and/or stroke            "

## 2022-05-06 ENCOUNTER — TELEPHONE (OUTPATIENT)
Dept: SLEEP MEDICINE | Facility: OTHER | Age: 72
End: 2022-05-06
Payer: MEDICARE

## 2022-05-10 NOTE — DISCHARGE INSTRUCTIONS
Look for signs of infection and notify MD if:     Spreading red areas on the skin around the Ttube     Yellow skin     Yellow bile draining out around the outside of the Ttube     Bloody liquid draining from the tube     Cloudy or creamy liquid draining around the tube      You are shaking or have chills    You have an unexpected or unexplained temperature of 100 degrees or  more    You feel sick to your stomach     The whites of your eyes become yellow        Patent

## 2022-05-25 ENCOUNTER — HOSPITAL ENCOUNTER (EMERGENCY)
Facility: OTHER | Age: 72
Discharge: HOME | End: 2022-05-25
Attending: EMERGENCY MEDICINE
Payer: MEDICARE

## 2022-05-25 VITALS
RESPIRATION RATE: 16 BRPM | BODY MASS INDEX: 28.12 KG/M2 | WEIGHT: 175 LBS | DIASTOLIC BLOOD PRESSURE: 67 MMHG | SYSTOLIC BLOOD PRESSURE: 115 MMHG | HEART RATE: 65 BPM | HEIGHT: 66 IN | OXYGEN SATURATION: 100 % | TEMPERATURE: 98 F

## 2022-05-25 DIAGNOSIS — R07.9 CHEST PAIN: ICD-10-CM

## 2022-05-25 LAB
ALBUMIN SERPL BCP-MCNC: 3.8 G/DL (ref 3.5–5.2)
ALP SERPL-CCNC: 55 U/L (ref 55–135)
ALT SERPL W/O P-5'-P-CCNC: 19 U/L (ref 10–44)
ANION GAP SERPL CALC-SCNC: 11 MMOL/L (ref 8–16)
AST SERPL-CCNC: 27 U/L (ref 10–40)
BASOPHILS # BLD AUTO: 0.02 K/UL (ref 0–0.2)
BASOPHILS NFR BLD: 0.3 % (ref 0–1.9)
BILIRUB SERPL-MCNC: 0.8 MG/DL (ref 0.1–1)
BUN SERPL-MCNC: 13 MG/DL (ref 8–23)
CALCIUM SERPL-MCNC: 9 MG/DL (ref 8.7–10.5)
CHLORIDE SERPL-SCNC: 107 MMOL/L (ref 95–110)
CO2 SERPL-SCNC: 24 MMOL/L (ref 23–29)
CREAT SERPL-MCNC: 0.8 MG/DL (ref 0.5–1.4)
DIFFERENTIAL METHOD: ABNORMAL
EOSINOPHIL # BLD AUTO: 0 K/UL (ref 0–0.5)
EOSINOPHIL NFR BLD: 0.3 % (ref 0–8)
ERYTHROCYTE [DISTWIDTH] IN BLOOD BY AUTOMATED COUNT: 13.8 % (ref 11.5–14.5)
EST. GFR  (AFRICAN AMERICAN): >60 ML/MIN/1.73 M^2
EST. GFR  (NON AFRICAN AMERICAN): >60 ML/MIN/1.73 M^2
GLUCOSE SERPL-MCNC: 100 MG/DL (ref 70–110)
HCT VFR BLD AUTO: 41.8 % (ref 40–54)
HCV AB SERPL QL IA: NEGATIVE
HGB BLD-MCNC: 13.5 G/DL (ref 14–18)
IMM GRANULOCYTES # BLD AUTO: 0.01 K/UL (ref 0–0.04)
IMM GRANULOCYTES NFR BLD AUTO: 0.2 % (ref 0–0.5)
LYMPHOCYTES # BLD AUTO: 0.8 K/UL (ref 1–4.8)
LYMPHOCYTES NFR BLD: 11.7 % (ref 18–48)
MCH RBC QN AUTO: 29.7 PG (ref 27–31)
MCHC RBC AUTO-ENTMCNC: 32.3 G/DL (ref 32–36)
MCV RBC AUTO: 92 FL (ref 82–98)
MONOCYTES # BLD AUTO: 0.6 K/UL (ref 0.3–1)
MONOCYTES NFR BLD: 9.5 % (ref 4–15)
NEUTROPHILS # BLD AUTO: 5 K/UL (ref 1.8–7.7)
NEUTROPHILS NFR BLD: 78 % (ref 38–73)
NRBC BLD-RTO: 0 /100 WBC
PLATELET # BLD AUTO: 176 K/UL (ref 150–450)
PMV BLD AUTO: 10.4 FL (ref 9.2–12.9)
POTASSIUM SERPL-SCNC: 4 MMOL/L (ref 3.5–5.1)
PROT SERPL-MCNC: 7.5 G/DL (ref 6–8.4)
RBC # BLD AUTO: 4.54 M/UL (ref 4.6–6.2)
SODIUM SERPL-SCNC: 142 MMOL/L (ref 136–145)
TROPONIN I SERPL DL<=0.01 NG/ML-MCNC: <0.006 NG/ML (ref 0–0.03)
TROPONIN I SERPL DL<=0.01 NG/ML-MCNC: <0.006 NG/ML (ref 0–0.03)
WBC # BLD AUTO: 6.4 K/UL (ref 3.9–12.7)

## 2022-05-25 PROCEDURE — 85025 COMPLETE CBC W/AUTO DIFF WBC: CPT | Performed by: EMERGENCY MEDICINE

## 2022-05-25 PROCEDURE — 93010 EKG 12-LEAD: ICD-10-PCS | Mod: ,,, | Performed by: INTERNAL MEDICINE

## 2022-05-25 PROCEDURE — 36000 PLACE NEEDLE IN VEIN: CPT

## 2022-05-25 PROCEDURE — 86803 HEPATITIS C AB TEST: CPT | Performed by: EMERGENCY MEDICINE

## 2022-05-25 PROCEDURE — 84484 ASSAY OF TROPONIN QUANT: CPT | Mod: 91 | Performed by: EMERGENCY MEDICINE

## 2022-05-25 PROCEDURE — 80053 COMPREHEN METABOLIC PANEL: CPT | Performed by: EMERGENCY MEDICINE

## 2022-05-25 PROCEDURE — 93010 ELECTROCARDIOGRAM REPORT: CPT | Mod: ,,, | Performed by: INTERNAL MEDICINE

## 2022-05-25 PROCEDURE — 93005 ELECTROCARDIOGRAM TRACING: CPT

## 2022-05-25 PROCEDURE — 99285 EMERGENCY DEPT VISIT HI MDM: CPT | Mod: 25

## 2022-05-25 NOTE — ED PROVIDER NOTES
Source of History   Patient    Chief Complaint   Chest Pain (Pt c.o chest pain onset few days ago from left to right side.  Pt denies n/v or sweating. AAO x 3 nadn skin w/d)      History Of Present Illness   Hoang Santos Jr. is a 71 y.o. male presenting with intermittent episodes of chest pain that have been present for 4-5 days.  Patient states the episodes come on randomly and are not associated with any particular activity.  The last 30-60 minutes at a time.  No exacerbating or alleviating factors.  Patient denies any history of heart disease.  He notes high cholesterol.  Denies smoking and drug use.    Review Of Systems   As per HPI and below:  General: No fever.  No chills.  Eyes: No visual changes.  Head: No headache.    Integument: No rashes or lesions.  Chest: No shortness of breath.  Cardiovascular: Notes chest pain.  Abdomen: No abdominal pain.  No nausea or vomiting.  Urinary: No abnormal urination.  Neurologic: No focal weakness.  No numbness.  Hematologic: No easy bruising.  Endocrine: No excessive thirst or urination.      Review of patient's allergies indicates:   Allergen Reactions    Epinephrine      Neuroendocrine Tumor patient      Toradol [ketorolac] Other (See Comments)     Creatinine rises even with sub therapeutic dose       No current facility-administered medications on file prior to encounter.     Current Outpatient Medications on File Prior to Encounter   Medication Sig Dispense Refill    atorvastatin (LIPITOR) 40 MG tablet TAKE 1 TABLET(40 MG) BY MOUTH EVERY DAY 90 tablet 2    atorvastatin (LIPITOR) 40 MG tablet Take 1 tablet (40 mg total) by mouth once daily. 90 tablet 3    atorvastatin (LIPITOR) 40 MG tablet Take 1 tablet (40 mg total) by mouth once daily. 90 tablet 3    flu vac 2020 65up-[id number],PF, (FLUAD QUAD 2020-21,65Y UP,,PF,) 60 mcg (15 mcg x 4)/0.5 mL Syrg TO BE GIVEN BY [facility] 0.5 mL 0    fluticasone propionate (FLONASE) 50 mcg/actuation nasal spray 1 spray (50 mcg  total) by Each Nostril route 2 (two) times daily. 15 g 0    sildenafiL (VIAGRA) 100 MG tablet Take 1 tablet (100 mg total) by mouth daily as needed for Erectile Dysfunction. 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    Prostatic hyperplasia     Sleep apnea      Past Surgical History:   Procedure Laterality Date    carcinoid tumor stomach      January 4, 2019    CHOLECYSTECTOMY  1/4/2019    Procedure: CHOLECYSTECTOMY;  Surgeon: Madeleine Alvarado MD;  [facility];  Service: General;;    CYSTOSCOPY WITH INSERTION OF MINIMALLY INVASIVE IMPLANT TO ENLARGE PROSTATIC URETHRA N/A 6/28/2018    Procedure: TRANSPROSTATIC TISSUE RETRACTION;  Surgeon: Kory Jack MD;  Location: [facility];  Service: Urology;  Laterality: N/A;    DUODENECTOMY N/A 1/4/2019    Procedure: DUODENECTOMY;  Surgeon: Madeleine Alvarado MD;  Location: [facility] OR;  Service: General;  Laterality: N/A;    ENDOSCOPIC ULTRASOUND OF UPPER GASTROINTESTINAL TRACT N/A 11/5/2018    Procedure: ULTRASOUND-ENDOSCOPIC-UPPER;  Surgeon: Gorge Reyes MD;  Location: [facility];  Service: Endoscopy;  Laterality: N/A;  Carcinoid diagnosis    ENDOSCOPIC ULTRASOUND OF UPPER GASTROINTESTINAL TRACT N/A 9/24/2019    Procedure: ULTRASOUND-ENDOSCOPIC-UPPER;  Surgeon: David Roberson MD;  Location: [facility] ENDO;  Service: Endoscopy;  Laterality: N/A;  Carcinoid Diagnosis  Gastric Ph    ENDOSCOPIC ULTRASOUND OF UPPER GASTROINTESTINAL TRACT N/A 3/22/2021    Procedure: ULTRASOUND, UPPER GI TRACT, ENDOSCOPIC;  Surgeon: Jose Kahn MD;  Location: [facility];  Service: Endoscopy;  Laterality: N/A;  Carmelita-operative obstruction related to anesthesia likely related to GEMMA, vocal atrophy scar and cervical osteophyte.   The former has been evaluated by Dr. Juaquin Casas and there is no intervention to prevent this other  than ETT with anesthesia.   Patie    ESOPHAGOGASTRODUODENOSCOPY N/A 9/24/2018    Procedure: EGD (ESOPHAGOGASTRODUODENOSCOPY);  Surgeon: Salo Nuñez MD;  Location: [address]);  Service: Endoscopy;  Laterality: N/A;    ESOPHAGOGASTRODUODENOSCOPY N/A 1/21/2019    Procedure: ESOPHAGOGASTRODUODENOSCOPY (EGD);  Surgeon: Jose Kahn MD;  Location: [facility];  Service: Endoscopy;  Laterality: N/A;    ESOPHAGOGASTRODUODENOSCOPY N/A 1/24/2019    Procedure: EGD (ESOPHAGOGASTRODUODENOSCOPY);  Surgeon: Madeleine Alvarado MD;  Location: [facility];  Service: General;  Laterality: N/A;  spoke to belkis in [facility] for egd, told her it was add on case could go anywhere from 2pm KB    ESOPHAGOGASTRODUODENOSCOPY N/A 9/24/2019    Procedure: EGD (ESOPHAGOGASTRODUODENOSCOPY);  Surgeon: David Roberson MD;  Location: [facility];  Service: Endoscopy;  Laterality: N/A;    ESOPHAGOGASTRODUODENOSCOPY N/A 1/4/2019    Procedure: EGD (ESOPHAGOGASTRODUODENOSCOPY);  Surgeon: Madeleine Alvarado MD;  Location: [facility];  Service: General;  Laterality: N/A;  spoke to Anna in [facility] for egd set up for 1/4/19 on 1/3/19 0814 KB    GASTRIC FUNDOPLICATION N/A 1/4/2019    Procedure: FUNDOPLICATION;  Surgeon: Madeleine Alvarado MD;  Location: [facility] OR;  Service: General;  Laterality: N/A;    GASTROJEJUNOSTOMY Right 1/24/2019    Procedure: GASTROJEJUNOSTOMY;  Surgeon: Madeleine Alvarado MD;  Location: [facility];  Service: General;  Laterality: Right;    GASTROJEJUNOSTOMY N/A 1/4/2019    Procedure: GASTROJEJUNOSTOMY;  Surgeon: Madeleine Alvarado MD;  Location: [facility] OR;  Service: General;  Laterality: N/A;    LAPAROTOMY, EXPLORATORY N/A 1/24/2019    Procedure: LAPAROTOMY, EXPLORATORY;  Surgeon: Madeleine Alvarado MD;  Location: [facility] OR;  Service: General;  Laterality: N/A;    LIVER BIOPSY  1/4/2019    Procedure: BIOPSY, LIVER;  Surgeon: Madeleine Alvarado MD;  Location: [facility] OR;  Service: General;;    LYSIS OF  ADHESIONS N/A 1/24/2019    Procedure: LYSIS, ADHESIONS;  Surgeon: Madeleine Alvarado MD;  Location: [facility] OR;  Service: General;  Laterality: N/A;    PERCUTANEOUS TRANSHEPATIC CHOLANGIOGRAPHY Right 1/29/2019    Procedure: CHOLANGIOGRAM, PERCUTANEOUS, TRANSHEPATIC;  Surgeon: YOHANNES Shelley MD;  Location: [facility] OR;  Service: General;  Laterality: Right;    TONSILLECTOMY      UVULOPALATOPHARYNGOPLASTY         Social History     Socioeconomic History    Marital status:     Number of children: 3   Tobacco Use    Smoking status: Never Smoker    Smokeless tobacco: Never Used   Substance and Sexual Activity    Alcohol use: No    Drug use: No    Sexual activity: Yes     Partners: Female     Social Determinants of Health     Financial Resource Strain: Low Risk     Difficulty of Paying Living Expenses: Not hard at all   Food Insecurity: No Food Insecurity    Worried About Running Out of Food in the Last Year: Never true    Ran Out of Food in the Last Year: Never true   Transportation Needs: No Transportation Needs    Lack of Transportation (Medical): No    Lack of Transportation (Non-Medical): No   Physical Activity: Sufficiently Active    Days of Exercise per Week: 3 days    Minutes of Exercise per Session: 120 min   Stress: Stress Concern Present    Feeling of Stress : To some extent   Social Connections: Unknown    Frequency of Communication with Friends and Family: More than three times a week    Frequency of Social Gatherings with Friends and Family: Twice a week    Active Member of Clubs or Organizations: Yes    Attends Club or Organization Meetings: 1 to 4 times per year    Marital Status:    Housing Stability: Unknown    Unable to Pay for Housing in the Last Year: Patient refused    Unstable Housing in the Last Year: No       Family History   Problem Relation Age of Onset    Heart disease Mother     Diabetes Mother     Stroke Father     Cancer Sister          pancreatitic    COPD Sister     Hyperlipidemia Sister     No Known Problems Brother     No Known Problems Sister     No Known Problems Brother     No Known Problems Brother     No Known Problems Daughter     No Known Problems Son     Macular degeneration Daughter     Prostate cancer Neg Hx     Kidney disease Neg Hx     Thyroid disease Neg Hx     Retinal detachment Neg Hx     Hypertension Neg Hx     Glaucoma Neg Hx        Physical Exam     Vitals:    05/25/22 1133 05/25/22 1202 05/25/22 1247 05/25/22 1302   BP: 104/63 99/64 101/63 103/65   BP Location:       Patient Position:       Pulse: 78 74 66 62   Resp: 14 13 15 14   Temp:       TempSrc:       SpO2: 98% 98% 98% 99%   Weight:       Height:         Appearance: No acute distress.  Skin: No rashes seen.  Good turgor.  No abrasions.  No ecchymoses.  Eyes: No conjunctival injection.  ENT: Oropharynx clear.    Chest: Clear to auscultation bilaterally.  Good air movement.  No wheezes.  No rhonchi.  Cardiovascular: Regular rate and rhythm.  No murmurs. No gallops. No rubs.  Abdomen: Soft.  Not distended.  Nontender.  No guarding.  No rebound.  Musculoskeletal: Good range of motion all joints.  No deformities.  Neck supple.  No meningismus.  Neurologic: Motor intact.  Sensation intact.  Cerebellar intact.  Cranial nerves intact.  Mental Status:  Alert and oriented x 3.  Appropriate, conversant.      Initial MDM   Chest pain, intermittent, variable duration.  During the episodes, not exacerbated by exertion inspiration or exertion.  EKG does not show anything acute.  We will do a cardiac workup.  Doubt PE, pneumothorax, dissection.    I decided to obtain the patient's medical records.    Medications - No data to display    Results and Course     Labs Reviewed   CBC W/ AUTO DIFFERENTIAL - Abnormal; Notable for the following components:       Result Value    RBC 4.54 (*)     Hemoglobin 13.5 (*)     Lymph # 0.8 (*)     Gran % 78.0 (*)     Lymph % 11.7 (*)      All other components within normal limits   COMPREHENSIVE METABOLIC PANEL   TROPONIN I   TROPONIN I   HEPATITIS C ANTIBODY    Narrative:     Release to patient->Immediate       Imaging Results          X-Ray Chest AP Portable (Final result)  Result time 05/25/22 12:04:35    Final result by Kunal Oseguera MD (05/25/22 12:04:35)                 Impression:      1. Stable appearing chronic findings, no large focal consolidation in this hypoventilatory exam.      Electronically signed by: Kunal Oseguera MD  Date:    05/25/2022  Time:    12:04             Narrative:    EXAMINATION:  XR CHEST AP PORTABLE    CLINICAL HISTORY:  Chest Pain;    TECHNIQUE:  Single frontal view of the chest was performed.    COMPARISON:  03/13/2020    FINDINGS:  There is elevation of the right hemidiaphragm.  The cardiomediastinal silhouette is not enlarged.  There is no pleural effusion.  The trachea is midline.  The lungs are symmetrically expanded bilaterally with mild bilateral basilar subsegmental atelectasis..  No large focal consolidation seen.  There is no pneumothorax.  The osseous structures are remarkable for degenerative change..                                ED Course as of 05/25/22 1450   Wed May 25, 2022   1123 EKG 12-lead  EKG shows normal sinus rhythm and no acute ischemia per my independent interpretation.     [DC]   1207 Creatinine: 0.8 [DC]   1207 WBC: 6.40 [DC]   1207 Hemoglobin(!): 13.5 [DC]   1207 Platelets: 176 [DC]   1207 X-Ray Chest AP Portable  CXR shows no acute disease per my independent interpretation.     [DC]   1441 Troponin I: <0.006 [DC]      ED Course User Index  [DC] Brad Michelle MD       Additional MDM:   Heart Score:    History:          Slightly suspicious.  ECG:             Normal  Age:               >65 years  Risk factors: 1-2 risk factors  Troponin:       Less than or equal to normal limit  Final Score: 3            MDM, Impression and Plan   71 y.o. male with intermittent, os random chest  pain for the last few days.  Durations of 30-60 minutes at a time, no exacerbating or alleviating factors.  No evidence of pneumothorax, pneumonia.  Highly doubt PE.  Cardiac workup negative including 2 troponins.  The patient will need close follow-up with his PCP for provocative testing; pain is currently resolved.  Safe for discharge.         Final diagnoses:  [R07.9] Chest pain          ED Disposition Condition    Discharge Stable        ED Prescriptions     None        Follow-up Information     Follow up With Specialties Details Why Contact Info    Lee Kay MD Internal Medicine In 2 days  [address]  [address]  [facility] [address]  [phone]      [facility] Emergency Dept Emergency Medicine  If symptoms worsen [address] [id number] [phone]           Brad Michlele MD  05/25/22 6711

## 2022-05-30 NOTE — PT/OT/SLP PROGRESS
Occupational Therapy   Treatment    Name: Hoang Santos Jr.  MRN: [id number]  Admitting Diagnosis:  Aspiration pneumonia of right lower lobe  1 Day Post-Op    Recommendations:     Discharge Recommendations: (ongoing assessment )  Discharge Equipment Recommendations:  (TBD)  Barriers to discharge:  None    Assessment:     Hoang Santos Jr. is a 68 y.o. male with a medical diagnosis of Aspiration pneumonia of right lower lobe. Performance deficits affecting function are weakness, impaired endurance, gait instability, impaired self care skills, decreased safety awareness, impaired cardiopulmonary response to activity, decreased lower extremity function, decreased upper extremity function.     Rehab Prognosis:  Good; patient would benefit from acute skilled OT services to address these deficits and reach maximum level of function.       Plan:     Patient to be seen 5 x/week to address the above listed problems via self-care/home management, therapeutic activities, therapeutic exercises  · Plan of Care Expires: 02/27/19  · Plan of Care Reviewed with: patient, spouse    Subjective     Pain/Comfort:  · Pain Rating 1: 0/10  · Pain Rating Post-Intervention 1: 0/10    Objective:     Communicated with: Nurse prior to session.  Patient found HOB elevated with telemetry, peripheral IV, SCD, blood pressure cuff, JAY drain, oxygen upon OT entry to room.    General Precautions: Standard, aspiration, fall, respiratory   Orthopedic Precautions:N/A   Braces: N/A     Occupational Performance:     Bed Mobility:    · Patient completed Rolling/Turning to Right with minimum assistance and moderate assistance  · Patient completed Supine to Sit with contact guard assistance and minimum assistance     Functional Mobility/Transfers:  · Patient completed Sit <> Stand Transfer with contact guard assistance, minimum assistance and of 2 persons  with  hand-held assist   · Patient completed Bed <> Chair Transfer using Step Transfer technique with  contact guard assistance, minimum assistance and of 2 persons with hand-held assist  · Functional Mobility: Pt ambulated ~5' from EOB to chair with CGA/min A x2    AMPAC 6 Click ADL: 14    Treatment & Education:  Pt with increased alertness on this date. Pt declined to produce any speech; when questioned, pt indicated that he was trying to save his voice. Pt performed 4 sets of 15 reps UE therex seated in chair with good endurance and participation.     Patient left up in chair with all lines intact, call button in reach, nurse notified and wife presentEducation:      GOALS:   Multidisciplinary Problems     Occupational Therapy Goals        Problem: Occupational Therapy Goal    Goal Priority Disciplines Outcome Interventions   Occupational Therapy Goal     OT, PT/OT Ongoing (interventions implemented as appropriate)    Description:  Goals to be met by: 02/19/2019    Patient will increase functional independence with ADLs by performing:    UE Dressing with Modified [place].  LE Dressing with Modified [place].  Grooming while standing with Modified [place].  Toileting from toilet with Modified [place] for hygiene and clothing management.   Toilet transfer to toilet with Modified [place].  Increased functional strength to WFL for self care.  Upper extremity exercise program x10 reps per handout, with independence.                      Time Tracking:     OT Date of Treatment: 01/30/19  OT Start Time: 1053  OT Stop Time: 1113  OT Total Time (min): 20 min cotx with PT    Billable Minutes:Therapeutic Exercise 20    ADDIE Aaron  1/30/2019    I certify that I was present in the room directing the student in service delivery and guiding them using my skilled judgment. As the co-signing therapist I have reviewed the students documentation and am responsible for the treatment, assessment, and plan.      Performed Resulted

## 2022-06-14 NOTE — PHYSICIAN QUERY
PT Name: Hoang Santos Jr.  MR #: [id number]     Physician Query Form - Documentation Clarification      CDS/: Marlene Smith RN                 Contact information:[email or web address]    This form is a permanent document in the medical record.     Query Date: January 17, 2019    By submitting this query, we are merely seeking further clarification of documentation. Please utilize your independent clinical judgment when addressing the question(s) below.    The Medical record reflects the following:    Supporting Clinical Findings Location in Medical Record   Pre Op Diagnosis RIGHTretoperitoneal collection  Specimen Removed YES 25 cc dark bloody fluid  CT was used for localization of abnormal fluid collection. A needle was inserted into the fluid collection and dark bloody fluid was aspirated.  A wire was inserted into the collection and the tract was dilated.  A 8.0 Vietnamese all-purpose drainage catheter was inserted and a pigtail loop of the distal end was formed.  The catheter was sutured into place and approximately  25 mL fluid was removed.       S/P IR drain old hematoma yesterday  67 yo M s/p ex-lap, toupet fundoplication, hiatal hernia repair, cholecystectomy, sleeve duodenectomy with end-end anastomosis, liver biopsy, biliary stenting, and intralesional chemo for malignant carcinoid of the duodenum now with UTI and possible PNA   Also intraabdominal hematoma, leukocytosis IR Proc note 1/11                      Neuroendocrine PN 1/12                                                                                      Doctor, Please specify diagnosis or diagnoses associated with above clinical findings.    Please clarify the conflicting documentation.    Provider Use Only    ____right retroperitoneal fluid collection, inherent to procedure, expected outcome    ____right retroperitoneal fluid collection, complication of procedure    __x__intraabdominal hematoma, inherent to procedure, expected  outcome    ____intraadominal hematoma, complication of procedure    ____other________________________________                                                                                                               [  ] Clinically Undetermined                no back pain/no joint pain/no neck pain/no limited range of motion

## 2022-07-07 ENCOUNTER — TELEPHONE (OUTPATIENT)
Dept: SLEEP MEDICINE | Facility: OTHER | Age: 72
End: 2022-07-07
Payer: MEDICARE

## 2022-08-02 ENCOUNTER — TELEPHONE (OUTPATIENT)
Dept: SLEEP MEDICINE | Facility: OTHER | Age: 72
End: 2022-08-02
Payer: MEDICARE

## 2022-08-03 ENCOUNTER — HOSPITAL ENCOUNTER (OUTPATIENT)
Dept: SLEEP MEDICINE | Facility: OTHER | Age: 72
Discharge: HOME | End: 2022-08-03
Attending: NURSE PRACTITIONER
Payer: MEDICARE

## 2022-08-03 DIAGNOSIS — G47.33 OBSTRUCTIVE SLEEP APNEA: ICD-10-CM

## 2022-08-03 PROCEDURE — 95810 PR POLYSOMNOGRAPHY, 4 OR MORE: ICD-10-PCS | Mod: 26,,, | Performed by: PSYCHIATRY & NEUROLOGY

## 2022-08-03 PROCEDURE — 95810 POLYSOM 6/> YRS 4/> PARAM: CPT

## 2022-08-03 PROCEDURE — 95810 POLYSOM 6/> YRS 4/> PARAM: CPT | Mod: 26,,, | Performed by: PSYCHIATRY & NEUROLOGY

## 2022-08-04 NOTE — PROGRESS NOTES
Education was done via explanation of sleep study process and procedure. All questions were answered.    Pt. did not meet criteria for CPAP. Respiratory events were observed mainly during supine sleep. REM sleep was obtained in side position. Supine sleep was encouraged.    Low sat of 89% was observed in study. EKG revealed rare PVC and sinus pause. Soft to moderate snoring was heard. Thank you letter was given in a.m.

## 2022-08-17 ENCOUNTER — PATIENT MESSAGE (OUTPATIENT)
Dept: SLEEP MEDICINE | Facility: CLINIC | Age: 72
End: 2022-08-17
Payer: MEDICARE

## 2022-08-17 NOTE — PROCEDURES
Patient Name: [facility] #: [id number]   Sex: Male Study Date: 8/3/2022   : 1950 Clinic #: [id number]   Age: 71 Referring Physician: JACKIE CABRERA N.P   Height: 66.0 in Referring Physician #    Weight: 175.0 lbs Sleep Specialist: ZEUS Guerrero MD   B.M.I.: 28.2 Sleep Specialist # [id number]   Hypopnea rule: AASM1B Scoring Tech:    Total AHI: 7.6 Recording Tech: Yordan, CRT, RPSGT   Lowest O2 sat: 89.0% Recording Location: [facility]     Sleep architecture: This is a baseline polysomnogram. At lights out, the patient fell asleep in 0.4 minutes and slept for 88.0% of the time. Total sleep time (TST) was 394.0 minutes. 12.6% of TST was in Stage N1 sleep, 3.8% TST in slow wave sleep, and 15.6% TST in REM sleep. The REM latency was 145.5 minutes. Sleep architecture was significantly disrupted due to underlying sleep apnea.    Respiratory: Mild to moderate snoring was present. There was mild, yet significant GEMMA (obstructive sleep apnea) based on AHI (apnea hypopnea index) criteria. The overall AHI was 7.6 with an oxygen david of 89.0%.  The supine AHI was 56.3 and the REM AHI was 1.0. The patient did not qualify for a split night study due to an insufficient number of events in the first half of the study. RDI (Respiratory Disturbance Index)  was 12.2.    Motor movement / Parasomnia: There were occasional limb movements of sleep noted. The total limb movement index was 8.2 (0.5with arousal).     Cardiac: Cardiac rhythm monitoring revealed a normal sinus rhythm  with PVCs and sinus pauses.    Patient perception: On a post-sleep study questionnaire, the patient indicated that sleep was the same in the lab than compared to home.    IMPRESSION:  1. Mild, yet significant  GEMMA (327.23) based on AHI criteria      RECOMMENDATION:    1. CPAP titration study, if the patient is interested in this treatment modality.  2. Alternative treatment options (oral appliance, EPAP, ENT surgery) may be discussed with  the patient.

## 2022-09-01 ENCOUNTER — OFFICE VISIT (OUTPATIENT)
Dept: INTERNAL MEDICINE | Facility: CLINIC | Age: 72
End: 2022-09-01
Attending: INTERNAL MEDICINE
Payer: MEDICARE

## 2022-09-01 ENCOUNTER — TELEPHONE (OUTPATIENT)
Dept: INTERNAL MEDICINE | Facility: CLINIC | Age: 72
End: 2022-09-01
Payer: MEDICARE

## 2022-09-01 VITALS
BODY MASS INDEX: 28.88 KG/M2 | OXYGEN SATURATION: 98 % | HEIGHT: 66 IN | HEART RATE: 87 BPM | SYSTOLIC BLOOD PRESSURE: 106 MMHG | DIASTOLIC BLOOD PRESSURE: 72 MMHG | WEIGHT: 179.69 LBS

## 2022-09-01 DIAGNOSIS — I70.0 ATHEROSCLEROSIS OF AORTA: ICD-10-CM

## 2022-09-01 DIAGNOSIS — E78.2 MIXED HYPERLIPIDEMIA: ICD-10-CM

## 2022-09-01 DIAGNOSIS — Z12.5 PROSTATE CANCER SCREENING: Primary | ICD-10-CM

## 2022-09-01 DIAGNOSIS — R07.2 PRECORDIAL PAIN: ICD-10-CM

## 2022-09-01 PROCEDURE — 99213 OFFICE O/P EST LOW 20 MIN: CPT | Mod: PBBFAC | Performed by: INTERNAL MEDICINE

## 2022-09-01 PROCEDURE — 99214 OFFICE O/P EST MOD 30 MIN: CPT | Mod: S$PBB,,, | Performed by: INTERNAL MEDICINE

## 2022-09-01 PROCEDURE — 99999 PR PBB SHADOW E&M-EST. PATIENT-LVL III: ICD-10-PCS | Mod: PBBFAC,,, | Performed by: INTERNAL MEDICINE

## 2022-09-01 PROCEDURE — 99214 PR OFFICE/OUTPT VISIT, EST, LEVL IV, 30-39 MIN: ICD-10-PCS | Mod: S$PBB,,, | Performed by: INTERNAL MEDICINE

## 2022-09-01 PROCEDURE — 99999 PR PBB SHADOW E&M-EST. PATIENT-LVL III: CPT | Mod: PBBFAC,,, | Performed by: INTERNAL MEDICINE

## 2022-09-01 RX ORDER — ROSUVASTATIN CALCIUM 40 MG/1
40 TABLET, COATED ORAL NIGHTLY
Qty: 90 TABLET | Refills: 3 | Status: SHIPPED | OUTPATIENT
Start: 2022-09-01 | End: 2023-10-23

## 2022-09-01 NOTE — PROGRESS NOTES
"Subjective:       Patient ID: Hoang Santos Jr. is a 71 y.o. male.    Chief Complaint: Chest Pain    Here for urgent visit    Intermittent CP since 004/2022. Pain is left sided, pressure like sensation, non radiating, no associated symptoms, last 30-60 minutes. This pain only occurs while lying down and mist consistently at night. Unable to identify any dietary pattern that precedes nights of pain. No associated palpitaitons or racing heart. Getting up and walking aroud if he were to go to the bathroom may help, otherwise he just deal with it then falls asleep. No exertional CP or SOB.       Review of Systems   Constitutional:  Negative for appetite change, chills, fever and unexpected weight change.   HENT:  Negative for hearing loss, sore throat and trouble swallowing.    Eyes:  Negative for visual disturbance.   Respiratory:  Positive for chest tightness. Negative for cough and shortness of breath.    Cardiovascular:  Negative for chest pain and leg swelling.   Gastrointestinal:  Negative for abdominal pain, blood in stool, constipation, diarrhea, nausea and vomiting.   Endocrine: Negative for polydipsia and polyuria.   Genitourinary:  Negative for decreased urine volume, difficulty urinating, dysuria, frequency and urgency.   Musculoskeletal:  Negative for gait problem.   Skin:  Negative for rash.   Neurological:  Negative for dizziness and numbness.   Psychiatric/Behavioral:  The patient is not nervous/anxious.      Objective:      Vitals:    09/01/22 0942   BP: 106/72   Pulse: 87   SpO2: 98%   Weight: 81.5 kg (179 lb 10.8 oz)   Height: 5' 6" (1.676 m)      Physical Exam  Vitals and nursing note reviewed.   Constitutional:       General: He is not in acute distress.     Appearance: Normal appearance. He is well-developed.   HENT:      Head: Normocephalic and atraumatic.      Mouth/Throat:      Pharynx: No oropharyngeal exudate.   Eyes:      General: No scleral icterus.     Conjunctiva/sclera: Conjunctivae " normal.      Pupils: Pupils are equal, round, and reactive to light.   Neck:      Thyroid: No thyromegaly.   Cardiovascular:      Rate and Rhythm: Normal rate and regular rhythm.      Heart sounds: Normal heart sounds. No murmur heard.  Pulmonary:      Effort: Pulmonary effort is normal.      Breath sounds: Normal breath sounds. No decreased breath sounds, wheezing or rales.   Chest:      Chest wall: No mass, deformity, swelling or tenderness.   Breasts:     Right: No swelling.      Left: No swelling.   Abdominal:      General: There is no distension.   Musculoskeletal:         General: No tenderness.   Lymphadenopathy:      Cervical: No cervical adenopathy.   Skin:     General: Skin is warm and dry.   Neurological:      Mental Status: He is alert and oriented to person, place, and time.   Psychiatric:         Behavior: Behavior normal.       Assessment:       1. Prostate cancer screening    2. Precordial pain    3. Atherosclerosis of aorta    4. Mixed hyperlipidemia          Plan:       Hoang was seen today for chest pain.    Diagnoses and all orders for this visit:    Prostate cancer screening  -     PSA, Screening; Future    Precordial pain  Suspect GI vs paraspinal MSK. Office and Emergency Department prompts discussed.  -     Stress Echo Which stress agent will be used? Treadmill Exercise; Color Flow Doppler? No; Future    Atherosclerosis of aorta  -     Lipid Panel; Future    Mixed hyperlipidemia  -     Lipid Panel; Future    Other orders  -     rosuvastatin (CRESTOR) 40 MG Tab; Take 1 tablet (40 mg total) by mouth every evening.         Lee Lindsey MD  Internal Medicine-[facility]        Side effects of medication(s) were discussed in detail and patient voiced understanding.  Patient will call back for any issues or complications.

## 2022-09-01 NOTE — TELEPHONE ENCOUNTER
Med cancellation atorvastatin (LIPITOR) 40 MG tablet (Discontinued) has been faxed to pharmacy. Fax: [phone]

## 2022-09-18 NOTE — PROGRESS NOTES
Pt at risk for impaired skin integrity, no breakdown noted at this time, preventative interventions are in place.   [Restricted in physically strenuous activity but ambulatory and able to carry out work of a light or sedentary nature] : Status 1- Restricted in physically strenuous activity but ambulatory and able to carry out work of a light or sedentary nature, e.g., light house work, office work [Normal] : affect appropriate

## 2022-09-22 ENCOUNTER — HOSPITAL ENCOUNTER (OUTPATIENT)
Dept: CARDIOLOGY | Facility: OTHER | Age: 72
Discharge: HOME | End: 2022-09-22
Attending: INTERNAL MEDICINE
Payer: MEDICARE

## 2022-09-22 VITALS
BODY MASS INDEX: 28.77 KG/M2 | DIASTOLIC BLOOD PRESSURE: 64 MMHG | HEART RATE: 62 BPM | HEIGHT: 66 IN | SYSTOLIC BLOOD PRESSURE: 106 MMHG | WEIGHT: 179 LBS

## 2022-09-22 DIAGNOSIS — R07.2 PRECORDIAL PAIN: ICD-10-CM

## 2022-09-22 LAB
BSA FOR ECHO PROCEDURE: 1.94 M2
CV ECHO LV RWT: 0.38 CM
CV STRESS BASE HR: 62 BPM
DIASTOLIC BLOOD PRESSURE: 64 MMHG
ECHO LV POSTERIOR WALL: 0.76 CM (ref 0.6–1.1)
EJECTION FRACTION: 55 %
FRACTIONAL SHORTENING: 25 % (ref 28–44)
INTERVENTRICULAR SEPTUM: 0.81 CM (ref 0.6–1.1)
LEFT INTERNAL DIMENSION IN SYSTOLE: 3 CM (ref 2.1–4)
LEFT VENTRICLE DIASTOLIC VOLUME INDEX: 36.97 ML/M2
LEFT VENTRICLE DIASTOLIC VOLUME: 70.62 ML
LEFT VENTRICLE MASS INDEX: 48 G/M2
LEFT VENTRICLE SYSTOLIC VOLUME INDEX: 18.3 ML/M2
LEFT VENTRICLE SYSTOLIC VOLUME: 35 ML
LEFT VENTRICULAR INTERNAL DIMENSION IN DIASTOLE: 4.01 CM (ref 3.5–6)
LEFT VENTRICULAR MASS: 91.5 G
OHS CV CPX 1 MINUTE RECOVERY HEART RATE: 98 BPM
OHS CV CPX 85 PERCENT MAX PREDICTED HEART RATE MALE: 127
OHS CV CPX ESTIMATED METS: 10
OHS CV CPX MAX PREDICTED HEART RATE: 149
OHS CV CPX PATIENT IS FEMALE: 0
OHS CV CPX PATIENT IS MALE: 1
OHS CV CPX PEAK DIASTOLIC BLOOD PRESSURE: 96 MMHG
OHS CV CPX PEAK HEAR RATE: 136 BPM
OHS CV CPX PEAK RATE PRESSURE PRODUCT: NORMAL
OHS CV CPX PEAK SYSTOLIC BLOOD PRESSURE: 187 MMHG
OHS CV CPX PERCENT MAX PREDICTED HEART RATE ACHIEVED: 91
OHS CV CPX RATE PRESSURE PRODUCT PRESENTING: 6572
STRESS ANGINA INDEX: 0
STRESS ECHO POST EXERCISE DUR MIN: 9 MINUTES
STRESS ECHO POST EXERCISE DUR SEC: 0 SECONDS
SYSTOLIC BLOOD PRESSURE: 106 MMHG

## 2022-09-22 PROCEDURE — 93351 STRESS TTE COMPLETE: CPT | Mod: 26,,, | Performed by: INTERNAL MEDICINE

## 2022-09-22 PROCEDURE — 93351 STRESS TTE COMPLETE: CPT

## 2022-09-22 PROCEDURE — 93351 STRESS ECHO (CUPID ONLY): ICD-10-PCS | Mod: 26,,, | Performed by: INTERNAL MEDICINE

## 2022-09-23 ENCOUNTER — PATIENT MESSAGE (OUTPATIENT)
Dept: INTERNAL MEDICINE | Facility: CLINIC | Age: 72
End: 2022-09-23
Payer: MEDICARE

## 2022-09-27 ENCOUNTER — TELEPHONE (OUTPATIENT)
Dept: INTERNAL MEDICINE | Facility: CLINIC | Age: 72
End: 2022-09-27
Payer: MEDICARE

## 2022-09-27 NOTE — TELEPHONE ENCOUNTER
----- Message from Lee Kay MD sent at 9/26/2022  4:50 PM CDT -----  Cholesterol much improved.  Your prostate cancer screening test is normal.

## 2022-09-28 ENCOUNTER — TELEPHONE (OUTPATIENT)
Dept: INTERNAL MEDICINE | Facility: CLINIC | Age: 72
End: 2022-09-28
Payer: MEDICARE

## 2022-09-28 NOTE — TELEPHONE ENCOUNTER
09/28 Left a message for Mr. Santos to call . Need to give him the listed message from Dr. Kay      From Dr. Kay  Cholesterol much improved.  Your prostate cancer screening test is normal.

## 2022-09-28 NOTE — TELEPHONE ENCOUNTER
09/28 Called and spoke to Mr. Santos. The listed message from Dr. Kay  was given to him. States he saw the message, but he wants to know if he has to continue his cholesterol medication. Informed will ask the MD  09/28     Left a message for Mr. Santos to call . Need to give him the listed message from Dr. Kay        From Dr. Kay  Cholesterol much improved.  Your prostate cancer screening test is normal.

## 2022-09-29 NOTE — TELEPHONE ENCOUNTER
09/29 Called and spoke to Scott Santos. He was told to continue his cholesterol medication        From Dr. Kay  continue

## 2022-10-21 ENCOUNTER — PES CALL (OUTPATIENT)
Dept: ADMINISTRATIVE | Facility: CLINIC | Age: 72
End: 2022-10-21
Payer: MEDICARE

## 2022-11-05 NOTE — PROGRESS NOTES
"Subjective:       Patient ID: Hoang Snatos Jr. is a 68 y.o. male.    Chief Complaint: Follow-up; Dehydration; and Vitamin D Deficiency    Here for ED f/u    Seen in ED for abd pain. This is a 68 y.o. male with hx of carcinoid tumor of duodenum and HLD who presents with complaint of lower abdominal pain for about six hours. He was Tx in ED and symptoms nearly resolved by time he left ED and symptoms completely resolved 24 hrs after. BM at baseline.        Review of Systems   Constitutional: Negative for activity change, appetite change, chills, fever and unexpected weight change.   HENT: Positive for rhinorrhea. Negative for hearing loss, sore throat and trouble swallowing.    Eyes: Negative for discharge and visual disturbance.   Respiratory: Negative for cough, chest tightness, shortness of breath and wheezing.    Cardiovascular: Negative for chest pain, palpitations and leg swelling.   Gastrointestinal: Positive for diarrhea and vomiting. Negative for abdominal pain, blood in stool, constipation and nausea.   Endocrine: Negative for polydipsia and polyuria.   Genitourinary: Negative for decreased urine volume, difficulty urinating, dysuria, frequency, hematuria and urgency.   Musculoskeletal: Negative for arthralgias, gait problem, joint swelling and neck pain.   Skin: Negative for rash.   Neurological: Negative for dizziness, weakness, numbness and headaches.   Psychiatric/Behavioral: Negative for confusion and dysphoric mood. The patient is not nervous/anxious.        Objective:      Vitals:    08/12/19 0956   BP: 102/60   Pulse: 86   SpO2: 96%   Weight: 84.8 kg (186 lb 15.2 oz)   Height: 5' 6" (1.676 m)      Physical Exam   Constitutional: He is oriented to person, place, and time. He appears well-developed and well-nourished. No distress.   HENT:   Head: Normocephalic and atraumatic.   Mouth/Throat: Oropharynx is clear and moist. No oropharyngeal exudate.   Eyes: Pupils are equal, round, and reactive to " light. Conjunctivae and EOM are normal. No scleral icterus.   Neck: No thyromegaly present.   Cardiovascular: Normal rate, regular rhythm and normal heart sounds.   No murmur heard.  Pulmonary/Chest: Effort normal and breath sounds normal. He has no wheezes. He has no rales.   Abdominal: Soft. He exhibits no distension. There is no tenderness.   Musculoskeletal: He exhibits no edema or tenderness.   Lymphadenopathy:     He has no cervical adenopathy.   Neurological: He is alert and oriented to person, place, and time.   Skin: Skin is warm and dry.   Psychiatric: He has a normal mood and affect. His behavior is normal.       Assessment:       1. Gastroenteritis    2. Malignant carcinoid tumor of duodenum        Plan:       Hoang was seen today for follow-up, dehydration and vitamin d deficiency.    Diagnoses and all orders for this visit:    Gastroenteritis   Resolved    Malignant carcinoid tumor of duodenum    RTC in 6 months or sooner sravanthi Lindsey MD  Internal Medicine-[facility]        Side effects of medication(s) were discussed in detail and patient voiced understanding.  Patient will call back for any issues or complications.    Attending MD German:   I personally have seen and examined this patient.  Physician assistant note reviewed and agree on plan of care and except where noted.  See below for details.     Seen in CDU [facility] 50    50F with PMH/PSH including gastric sleeve (2/2022, Dr. Ding), hypothyroidism sent to the CDU after presenting to the ED with syncope.  Patient had a syncopal episode about a month ago but at that time had preceding symptoms.  Yesterday was siting, without recent change in position, and had sudden LOC, witnessed.  In Emergency Department, labs noted.  Denies chest pain, shortness of breath, abdominal pain, nausea, vomiting, diarrhea, urinary complaints.  Reports is on prophylactic Macrobid for frequent recurrent UTIs.    Exam:   General: NAD  HENT: head NCAT, airway patent  Eyes: PERRL, EOMI  Lungs: lungs CTAB with good inspiratory effort, no wheezing, no rhonchi, no rales  Cardiac: +S1S2, no obvious m/r/g  GI: abdomen soft with +BS, NT, ND  MSK: ranging neck and extremities freely   Neuro: moving all extremities spontaneously, sensory grossly intact, no gross neuro deficits  Psych: normal mood and affect     A/P: 50F with syncope, had echo, seen by neuro who recommended CTH/CTAHN, and cardiology recommended admission for further syncope work up, will admit

## 2022-11-30 ENCOUNTER — IMMUNIZATION (OUTPATIENT)
Dept: PHARMACY | Facility: CLINIC | Age: 72
End: 2022-11-30
Payer: MEDICARE

## 2023-02-07 ENCOUNTER — PES CALL (OUTPATIENT)
Dept: ADMINISTRATIVE | Facility: CLINIC | Age: 73
End: 2023-02-07
Payer: MEDICARE

## 2023-03-01 ENCOUNTER — OFFICE VISIT (OUTPATIENT)
Dept: INTERNAL MEDICINE | Facility: CLINIC | Age: 73
End: 2023-03-01
Attending: INTERNAL MEDICINE
Payer: MEDICARE

## 2023-03-01 ENCOUNTER — LAB VISIT (OUTPATIENT)
Dept: LAB | Facility: OTHER | Age: 73
End: 2023-03-01
Attending: INTERNAL MEDICINE
Payer: MEDICARE

## 2023-03-01 VITALS
DIASTOLIC BLOOD PRESSURE: 64 MMHG | SYSTOLIC BLOOD PRESSURE: 90 MMHG | WEIGHT: 175.25 LBS | BODY MASS INDEX: 28.16 KG/M2 | HEIGHT: 66 IN | OXYGEN SATURATION: 98 % | HEART RATE: 93 BPM

## 2023-03-01 DIAGNOSIS — R23.4 CHANGES IN SKIN TEXTURE: ICD-10-CM

## 2023-03-01 DIAGNOSIS — R79.9 ABNORMAL FINDING OF BLOOD CHEMISTRY, UNSPECIFIED: ICD-10-CM

## 2023-03-01 DIAGNOSIS — I70.0 ATHEROSCLEROSIS OF AORTA: ICD-10-CM

## 2023-03-01 DIAGNOSIS — C7A.010 MALIGNANT CARCINOID TUMOR OF THE DUODENUM: ICD-10-CM

## 2023-03-01 DIAGNOSIS — G47.33 OSA ON CPAP: ICD-10-CM

## 2023-03-01 DIAGNOSIS — G47.33 OSA (OBSTRUCTIVE SLEEP APNEA): ICD-10-CM

## 2023-03-01 DIAGNOSIS — N40.1 BENIGN NON-NODULAR PROSTATIC HYPERPLASIA WITH LOWER URINARY TRACT SYMPTOMS: ICD-10-CM

## 2023-03-01 DIAGNOSIS — I70.0 ATHEROSCLEROSIS OF AORTA: Primary | ICD-10-CM

## 2023-03-01 DIAGNOSIS — E34.0 DUODENAL CARCINOID SYNDROME: ICD-10-CM

## 2023-03-01 PROBLEM — K86.1 OTHER CHRONIC PANCREATITIS: Status: RESOLVED | Noted: 2020-05-19 | Resolved: 2023-03-01

## 2023-03-01 LAB
ALBUMIN SERPL BCP-MCNC: 3.8 G/DL (ref 3.5–5.2)
ALP SERPL-CCNC: 53 U/L (ref 55–135)
ALT SERPL W/O P-5'-P-CCNC: 30 U/L (ref 10–44)
ANION GAP SERPL CALC-SCNC: 8 MMOL/L (ref 8–16)
AST SERPL-CCNC: 30 U/L (ref 10–40)
BASOPHILS # BLD AUTO: 0.02 K/UL (ref 0–0.2)
BASOPHILS NFR BLD: 0.5 % (ref 0–1.9)
BILIRUB SERPL-MCNC: 0.9 MG/DL (ref 0.1–1)
BUN SERPL-MCNC: 14 MG/DL (ref 8–23)
CALCIUM SERPL-MCNC: 9.4 MG/DL (ref 8.7–10.5)
CHLORIDE SERPL-SCNC: 107 MMOL/L (ref 95–110)
CO2 SERPL-SCNC: 26 MMOL/L (ref 23–29)
CREAT SERPL-MCNC: 0.9 MG/DL (ref 0.5–1.4)
DIFFERENTIAL METHOD: ABNORMAL
EOSINOPHIL # BLD AUTO: 0 K/UL (ref 0–0.5)
EOSINOPHIL NFR BLD: 0.5 % (ref 0–8)
ERYTHROCYTE [DISTWIDTH] IN BLOOD BY AUTOMATED COUNT: 13.6 % (ref 11.5–14.5)
EST. GFR  (NO RACE VARIABLE): >60 ML/MIN/1.73 M^2
ESTIMATED AVG GLUCOSE: 105 MG/DL (ref 68–131)
GLUCOSE SERPL-MCNC: 78 MG/DL (ref 70–110)
HBA1C MFR BLD: 5.3 % (ref 4–5.6)
HCT VFR BLD AUTO: 45.3 % (ref 40–54)
HGB BLD-MCNC: 14.4 G/DL (ref 14–18)
IMM GRANULOCYTES # BLD AUTO: 0.01 K/UL (ref 0–0.04)
IMM GRANULOCYTES NFR BLD AUTO: 0.3 % (ref 0–0.5)
LYMPHOCYTES # BLD AUTO: 0.8 K/UL (ref 1–4.8)
LYMPHOCYTES NFR BLD: 22 % (ref 18–48)
MCH RBC QN AUTO: 29 PG (ref 27–31)
MCHC RBC AUTO-ENTMCNC: 31.8 G/DL (ref 32–36)
MCV RBC AUTO: 91 FL (ref 82–98)
MONOCYTES # BLD AUTO: 0.5 K/UL (ref 0.3–1)
MONOCYTES NFR BLD: 12.1 % (ref 4–15)
NEUTROPHILS # BLD AUTO: 2.4 K/UL (ref 1.8–7.7)
NEUTROPHILS NFR BLD: 64.6 % (ref 38–73)
NRBC BLD-RTO: 0 /100 WBC
PLATELET # BLD AUTO: 194 K/UL (ref 150–450)
PMV BLD AUTO: 10.1 FL (ref 9.2–12.9)
POTASSIUM SERPL-SCNC: 4.1 MMOL/L (ref 3.5–5.1)
PROT SERPL-MCNC: 7.5 G/DL (ref 6–8.4)
RBC # BLD AUTO: 4.97 M/UL (ref 4.6–6.2)
SODIUM SERPL-SCNC: 141 MMOL/L (ref 136–145)
TSH SERPL DL<=0.005 MIU/L-ACNC: 1.37 UIU/ML (ref 0.4–4)
WBC # BLD AUTO: 3.72 K/UL (ref 3.9–12.7)

## 2023-03-01 PROCEDURE — 83519 RIA NONANTIBODY: CPT | Performed by: INTERNAL MEDICINE

## 2023-03-01 PROCEDURE — 99214 OFFICE O/P EST MOD 30 MIN: CPT | Mod: S$PBB,,, | Performed by: INTERNAL MEDICINE

## 2023-03-01 PROCEDURE — 99999 PR PBB SHADOW E&M-EST. PATIENT-LVL III: ICD-10-PCS | Mod: PBBFAC,,, | Performed by: INTERNAL MEDICINE

## 2023-03-01 PROCEDURE — 82941 ASSAY OF GASTRIN: CPT | Performed by: INTERNAL MEDICINE

## 2023-03-01 PROCEDURE — 99999 PR PBB SHADOW E&M-EST. PATIENT-LVL III: CPT | Mod: PBBFAC,,, | Performed by: INTERNAL MEDICINE

## 2023-03-01 PROCEDURE — 99214 PR OFFICE/OUTPT VISIT, EST, LEVL IV, 30-39 MIN: ICD-10-PCS | Mod: S$PBB,,, | Performed by: INTERNAL MEDICINE

## 2023-03-01 PROCEDURE — 99213 OFFICE O/P EST LOW 20 MIN: CPT | Mod: PBBFAC | Performed by: INTERNAL MEDICINE

## 2023-03-01 PROCEDURE — 83519 RIA NONANTIBODY: CPT | Mod: 59 | Performed by: INTERNAL MEDICINE

## 2023-03-01 PROCEDURE — 85025 COMPLETE CBC W/AUTO DIFF WBC: CPT | Performed by: INTERNAL MEDICINE

## 2023-03-01 PROCEDURE — 84443 ASSAY THYROID STIM HORMONE: CPT | Performed by: INTERNAL MEDICINE

## 2023-03-01 PROCEDURE — 86316 IMMUNOASSAY TUMOR OTHER: CPT | Performed by: INTERNAL MEDICINE

## 2023-03-01 PROCEDURE — 83036 HEMOGLOBIN GLYCOSYLATED A1C: CPT | Performed by: INTERNAL MEDICINE

## 2023-03-01 PROCEDURE — 82542 COL CHROMOTOGRAPHY QUAL/QUAN: CPT | Performed by: INTERNAL MEDICINE

## 2023-03-01 PROCEDURE — 80053 COMPREHEN METABOLIC PANEL: CPT | Performed by: INTERNAL MEDICINE

## 2023-03-01 RX ORDER — SILDENAFIL 100 MG/1
100 TABLET, FILM COATED ORAL DAILY PRN
Qty: 30 TABLET | Refills: 11 | Status: SHIPPED | OUTPATIENT
Start: 2023-03-01 | End: 2023-09-11

## 2023-03-01 RX ORDER — SILDENAFIL 100 MG/1
100 TABLET, FILM COATED ORAL DAILY PRN
Qty: 3 TABLET | Refills: 0 | Status: SHIPPED | OUTPATIENT
Start: 2023-03-01 | End: 2023-03-01 | Stop reason: SDUPTHER

## 2023-03-01 NOTE — PROGRESS NOTES
"Subjective:       Patient ID: Hoang Santos Jr. is a 72 y.o. male.    Chief Complaint: Hyperlipidemia    Here for routine 6 month f/u        70 yo M with PMHx of carcinoid, HLD, GEMMA presents for routine f/u     Patient presents today for routine evaluation, physical, and labs. Patient has no major concerns or complaints today.     Continues to get nocturnal left sided CP. Often positional. Normal Stress test when first reported.     Would like to try brand Viagra     ### carcinoid duodenum ###  ex-lap, toupet fundoplication, hiatal hernia repair, cholecystectomy, sleeve duodenectomy with end-end anastomosis, liver biopsy, biliary stenting, and intralesional chemo   -Last EUS (3/22/2021) by Dr. Jose Dumont and Dr. Marshall Kahn gastrojejunostomy was found, characterized by friable mucosa "  -LCV with Dr Madeleine Alvarado 03/2021 and labs updated.  -3/2023 no symptoms. Due for scope and labs and f/u visit. Dr Salvador no longer with [facility] and pt may schedule appt with him and will let me know otherwise    ### GEMMA ###   he is honest about his non-adherence. Energy levels are fine. No HA. BP well controlled without meds.      ### HLD ####  ex-lap, toupet fundoplication, hiatal hernia repair, cholecystectomy, sleeve duodenectomy with end-end anastomosis, liver biopsy, biliary stenting, and intralesional chemo         Review of Systems   Constitutional:  Negative for appetite change, chills, fever and unexpected weight change.   HENT:  Negative for hearing loss, sore throat and trouble swallowing.    Eyes:  Negative for visual disturbance.   Respiratory:  Negative for cough, chest tightness and shortness of breath.    Cardiovascular:  Negative for chest pain and leg swelling.   Gastrointestinal:  Negative for abdominal pain, blood in stool, constipation, diarrhea, nausea and vomiting.   Endocrine: Negative for polydipsia and polyuria.   Genitourinary:  Negative for decreased urine volume, difficulty urinating, " "dysuria, frequency and urgency.   Musculoskeletal:  Negative for gait problem.   Skin:  Negative for rash.   Neurological:  Negative for dizziness and numbness.   Psychiatric/Behavioral:  The patient is not nervous/anxious.      Objective:      Vitals:    03/01/23 1008   BP: 90/64   Pulse: 93   SpO2: 98%   Weight: 79.5 kg (175 lb 4.3 oz)   Height: 5' 6" (1.676 m)      Physical Exam  Vitals and nursing note reviewed.   Constitutional:       General: He is not in acute distress.     Appearance: Normal appearance. He is well-developed.   HENT:      Head: Normocephalic and atraumatic.      Mouth/Throat:      Pharynx: No oropharyngeal exudate.   Eyes:      General: No scleral icterus.     Conjunctiva/sclera: Conjunctivae normal.      Pupils: Pupils are equal, round, and reactive to light.   Neck:      Thyroid: No thyromegaly.   Cardiovascular:      Rate and Rhythm: Normal rate and regular rhythm.      Heart sounds: Normal heart sounds. No murmur heard.  Pulmonary:      Effort: Pulmonary effort is normal.      Breath sounds: Normal breath sounds. No wheezing or rales.   Abdominal:      General: There is no distension.   Musculoskeletal:         General: No tenderness.   Lymphadenopathy:      Cervical: No cervical adenopathy.   Skin:     General: Skin is warm and dry.   Neurological:      Mental Status: He is alert and oriented to person, place, and time.   Psychiatric:         Behavior: Behavior normal.       Assessment:       1. Atherosclerosis of aorta    2. Malignant carcinoid tumor of the duodenum    3. GEMMA on CPAP    4. Benign non-nodular prostatic hyperplasia with lower urinary tract symptoms    5. Duodenal carcinoid syndrome    6. GEMMA (obstructive sleep apnea)    7. Changes in skin texture    8. Abnormal finding of blood chemistry, unspecified    9. Other chronic pancreatitis        Plan:       Hoang was seen today for hyperlipidemia.    Diagnoses and all orders for this visit:    Atherosclerosis of aorta  -     " Comprehensive Metabolic Panel; Future  -     TSH; Future  -     CBC Auto Differential; Future  -     Hemoglobin A1C; Future    Malignant carcinoid tumor of the duodenum    GEMMA on CPAP   Stressed adherence of and complications related to untreated GEMMA.    Benign non-nodular prostatic hyperplasia with lower urinary tract symptoms   Last prostate cancer screening test is normal.    Duodenal carcinoid syndrome  -     NEUROKININ A; Future  -     SEROTONIN SERUM; Future  -     GASTRIN; Future  -     PANCREASTATIN; Future  -     CHROMOGRANIN A; Future  -     5-HIAA Plasma (Neuroendocrine); Future    GEMMA (obstructive sleep apnea)     Changes in skin texture  -     TSH; Future    Abnormal finding of blood chemistry, unspecified  -     CBC Auto Differential; Future  -     Hemoglobin A1C; Future      Other orders  -     Discontinue: sildenafiL (VIAGRA) 100 MG tablet; Take 1 tablet (100 mg total) by mouth daily as needed for Erectile Dysfunction.  -     sildenafiL (VIAGRA) 100 MG tablet; Take 1 tablet (100 mg total) by mouth daily as needed for Erectile Dysfunction.       RTC in 6 months or sooner sravanthi Lindsey MD  Internal Medicine-[facility]        Side effects of medication(s) were discussed in detail and patient voiced understanding.  Patient will call back for any issues or complications.

## 2023-03-01 NOTE — Clinical Note
I believe pt is coming due for surveillance EUS at end of month. Please assist with scheduling appt or procedure. Thanks for the help.

## 2023-03-02 LAB
CGA SERPL-MCNC: 66 NG/ML
GASTRIN SERPL-MCNC: 45 PG/ML

## 2023-03-06 LAB — SEROTONIN: 52 NG/ML

## 2023-03-10 LAB — PANCREASTATIN SERPL-MCNC: 49 PG/ML (ref 10–135)

## 2023-03-13 ENCOUNTER — TELEPHONE (OUTPATIENT)
Dept: ENDOSCOPY | Facility: HOSPITAL | Age: 73
End: 2023-03-13

## 2023-03-13 ENCOUNTER — TELEPHONE (OUTPATIENT)
Dept: HEMATOLOGY/ONCOLOGY | Facility: CLINIC | Age: 73
End: 2023-03-13
Payer: MEDICARE

## 2023-03-13 DIAGNOSIS — D3A.8 NEUROENDOCRINE TUMOR: Primary | ICD-10-CM

## 2023-03-13 DIAGNOSIS — D3A.00 CARCINOID TUMOR, UNSPECIFIED SITE, UNSPECIFIED WHETHER MALIGNANT: Primary | ICD-10-CM

## 2023-03-13 LAB — NEUROKININ A SERPL-MCNC: 6 PG/ML (ref 0–40)

## 2023-03-13 NOTE — TELEPHONE ENCOUNTER
Called patient regarding setting up follow up with the NET clinic. Dr. Kay ordered appropriate labs on patietn already, discussed need for consult and CT. Patient is in agreement with 4/28 (requests tues/thurs). Reviewed location of both appts, provided my direct number and encouraged him to call with any questions/concerns. Verbalized understanding of all information

## 2023-03-13 NOTE — TELEPHONE ENCOUNTER
----- Message from Layla Hardwick MD sent at 3/13/2023 12:55 PM CDT -----  Regina,     Can you schedule the patient to see me with CBC, CMP, serotonin, pancreastatin, 5-HIAA, CT C/A/P.     Neshereensatnam, he does need an EGD. Thanks!  ----- Message -----  From: Jolene Birch MD  Sent: 3/2/2023  12:17 PM CDT  To: Karely Saha MA, Lee Kay MD, #    Hi Courtney,    It looks like he had EGD/EUS about 2 years ago, and the one before that was 2 years prior.    I don't know what his surveillance plan is meant to be and I see his prior NET physician left [facility], so I am copying Dr. Hardwick on here to see if she can possibly follow up with the patient and direct if he's due for an EGD and EUS.    I am inclined to say yes it looks like he's due for EGD and EUS for surveillance post resection of duodenal NET - but let's have him establish with NET clinic and move forward according to their recs.    Thanks  ----- Message -----  From: Karely Saha MA  Sent: 3/1/2023   6:53 PM CST  To: Jolene Birch MD    Please review and advise if patient needs EUS  ----- Message -----  From: Lee Kay MD  Sent: 3/1/2023  10:46 AM CST  To: [facility] LOWERY Staff    I believe pt is coming due for surveillance EUS at end of month. Please assist with scheduling appt or procedure. Thanks for the help.

## 2023-03-14 ENCOUNTER — PATIENT MESSAGE (OUTPATIENT)
Dept: INTERNAL MEDICINE | Facility: CLINIC | Age: 73
End: 2023-03-14
Payer: MEDICARE

## 2023-03-14 NOTE — TELEPHONE ENCOUNTER
Jolene Birch MD     Courtney, please set up for EGD and EUS for surveillance of previously resected duodenal NET; within next 4-6 weeks - preferably after CT imaging completed in case that picks up anything the endoscopist needs to target. Any [facility] provider, [place] or [facility] is fine.

## 2023-03-17 LAB — 5OH-INDOLEACETATE SERPL-MCNC: 21 NG/ML

## 2023-03-23 ENCOUNTER — PATIENT MESSAGE (OUTPATIENT)
Dept: INTERNAL MEDICINE | Facility: CLINIC | Age: 73
End: 2023-03-23
Payer: MEDICARE

## 2023-03-24 NOTE — PROGRESS NOTES
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 He underwent EGD on 2018 and was found to have a duodenal bulb mass. Biopsy showed low grade well diff NET, 1.2 mm. Ki 67 less than 1%. He underwent duodenum resection on 2019. Path showed a 9 mm NET, grade 1, well diff. T2N0. He presents today for follow up. Feeling well. Last EUS in .    ECO    Past Medical History:   Diagnosis Date    Hyperlipidemia     Primary malignant neuroendocrine neoplasm of duodenum 2018    Prostatic hyperplasia     Sleep apnea         Past Surgical History:   Procedure Laterality Date    carcinoid tumor stomach      2019    CHOLECYSTECTOMY  2019    Procedure: CHOLECYSTECTOMY;  Surgeon: Madeleine Alvarado MD;  Location: [facility] OR;  Service: General;;    CYSTOSCOPY WITH INSERTION OF MINIMALLY INVASIVE IMPLANT TO ENLARGE PROSTATIC URETHRA N/A 2018    Procedure: TRANSPROSTATIC TISSUE RETRACTION;  Surgeon: Kory Jack MD;  Location: [facility] OR;  Service: Urology;  Laterality: N/A;    DUODENECTOMY N/A 2019    Procedure: DUODENECTOMY;  Surgeon: Madeleine Alvarado MD;  Location: [facility] OR;  Service: General;  Laterality: N/A;    ENDOSCOPIC ULTRASOUND OF UPPER GASTROINTESTINAL TRACT N/A 2018    Procedure: ULTRASOUND-ENDOSCOPIC-UPPER;  Surgeon: Gorge Reyes MD;  Location: [facility] ENDO;  Service: Endoscopy;  Laterality: N/A;  Carcinoid diagnosis    ENDOSCOPIC ULTRASOUND OF UPPER GASTROINTESTINAL TRACT N/A 2019    Procedure: ULTRASOUND-ENDOSCOPIC-UPPER;  Surgeon: David Roberson MD;  Location: [facility] ENDO;  Service: Endoscopy;  Laterality: N/A;  Carcinoid Diagnosis  Gastric Ph    ENDOSCOPIC ULTRASOUND OF UPPER GASTROINTESTINAL TRACT N/A 3/22/2021    Procedure: ULTRASOUND, UPPER GI TRACT, ENDOSCOPIC;  Surgeon: Jose Kahn MD;  Location: [facility]  ENDO;  Service: Endoscopy;  Laterality: N/A;  Carmelita-operative obstruction related to anesthesia likely related to GEMMA, vocal atrophy scar and cervical osteophyte.   The former has been evaluated by Dr. Juaquin Casas and there is no intervention to prevent this other than ETT with anesthesia.   Patie    ESOPHAGOGASTRODUODENOSCOPY N/A 9/24/2018    Procedure: EGD (ESOPHAGOGASTRODUODENOSCOPY);  Surgeon: Salo Nuñez MD;  Location: [address]);  Service: Endoscopy;  Laterality: N/A;    ESOPHAGOGASTRODUODENOSCOPY N/A 1/21/2019    Procedure: ESOPHAGOGASTRODUODENOSCOPY (EGD);  Surgeon: Jose Kahn MD;  Location: [facility];  Service: Endoscopy;  Laterality: N/A;    ESOPHAGOGASTRODUODENOSCOPY N/A 1/24/2019    Procedure: EGD (ESOPHAGOGASTRODUODENOSCOPY);  Surgeon: Madeleine Alvarado MD;  Location: [facility];  Service: General;  Laterality: N/A;  spoke to belkis in [facility] for egd, told her it was add on case could go anywhere from 2pm KB    ESOPHAGOGASTRODUODENOSCOPY N/A 9/24/2019    Procedure: EGD (ESOPHAGOGASTRODUODENOSCOPY);  Surgeon: David Roberson MD;  Location: [facility];  Service: Endoscopy;  Laterality: N/A;    ESOPHAGOGASTRODUODENOSCOPY N/A 1/4/2019    Procedure: EGD (ESOPHAGOGASTRODUODENOSCOPY);  Surgeon: Madeleine Alvarado MD;  Location: [facility];  Service: General;  Laterality: N/A;  spoke to Anna in [facility] for egd set up for 1/4/19 on 1/3/19 0814 KB    GASTRIC FUNDOPLICATION N/A 1/4/2019    Procedure: FUNDOPLICATION;  Surgeon: Madeleine Alvarado MD;  Location: [facility] OR;  Service: General;  Laterality: N/A;    GASTROJEJUNOSTOMY Right 1/24/2019    Procedure: GASTROJEJUNOSTOMY;  Surgeon: Madeleine Alvarado MD;  Location: [facility] OR;  Service: General;  Laterality: Right;    GASTROJEJUNOSTOMY N/A 1/4/2019    Procedure: GASTROJEJUNOSTOMY;  Surgeon: Madeleine Alvarado MD;  Location: [facility];  Service: General;  Laterality: N/A;    LAPAROTOMY, EXPLORATORY N/A 1/24/2019     Procedure: LAPAROTOMY, EXPLORATORY;  Surgeon: Madeleine Alvarado MD;  Location: [facility] OR;  Service: General;  Laterality: N/A;    LIVER BIOPSY  1/4/2019    Procedure: BIOPSY, LIVER;  Surgeon: Madeleine Alvarado MD;  Location: [facility] OR;  Service: General;;    LYSIS OF ADHESIONS N/A 1/24/2019    Procedure: LYSIS, ADHESIONS;  Surgeon: Madeleine Alvarado MD;  Location: [facility] OR;  Service: General;  Laterality: N/A;    PERCUTANEOUS TRANSHEPATIC CHOLANGIOGRAPHY Right 1/29/2019    Procedure: CHOLANGIOGRAM, PERCUTANEOUS, TRANSHEPATIC;  Surgeon: YOHANNES Shelley MD;  Location: [facility] OR;  Service: General;  Laterality: Right;    TONSILLECTOMY      UVULOPALATOPHARYNGOPLASTY         Family History   Problem Relation Age of Onset    Heart disease Mother     Diabetes Mother     Stroke Father     Cancer Sister         pancreatitic    COPD Sister     Hyperlipidemia Sister     No Known Problems Brother     No Known Problems Sister     No Known Problems Brother     No Known Problems Brother     No Known Problems Daughter     No Known Problems Son     Macular degeneration Daughter     Prostate cancer Neg Hx     Kidney disease Neg Hx     Thyroid disease Neg Hx     Retinal detachment Neg Hx     Hypertension Neg Hx     Glaucoma Neg Hx        Social History     Socioeconomic History    Marital status:     Number of children: 3   Tobacco Use    Smoking status: Never    Smokeless tobacco: Never   Substance and Sexual Activity    Alcohol use: No    Drug use: No    Sexual activity: Yes     Partners: Female     Social Determinants of Health     Financial Resource Strain: Low Risk     Difficulty of Paying Living Expenses: Not hard at all   Food Insecurity: No Food Insecurity    Worried About Running Out of Food in the Last Year: Never true    Ran Out of Food in the Last Year: Never true   Transportation Needs: No Transportation Needs    Lack of Transportation (Medical): No    Lack of  Transportation (Non-Medical): No   Physical Activity: Insufficiently Active    Days of Exercise per Week: 2 days    Minutes of Exercise per Session: 30 min   Stress: No Stress Concern Present    Feeling of Stress : Only a little   Social Connections: Unknown    Frequency of Communication with Friends and Family: More than three times a week    Frequency of Social Gatherings with Friends and Family: Twice a week    Active Member of Clubs or Organizations: Yes    Attends Club or Organization Meetings: More than 4 times per year    Marital Status:    Housing Stability: Low Risk     Unable to Pay for Housing in the Last Year: No    Number of Places Lived in the Last Year: 1    Unstable Housing in the Last Year: No       Review of Systems   Constitutional:  Negative for appetite change, chills, fatigue, fever and unexpected weight change.   HENT:  Negative for mouth sores, nosebleeds, tinnitus, trouble swallowing and voice change.    Eyes:  Negative for pain, redness and visual disturbance.   Respiratory:  Negative for cough, shortness of breath and wheezing.    Cardiovascular:  Negative for chest pain, palpitations and leg swelling.   Gastrointestinal:  Negative for abdominal distention, abdominal pain, blood in stool, constipation, diarrhea, nausea and vomiting.   Endocrine: Negative for polydipsia, polyphagia and polyuria.   Genitourinary:  Negative for flank pain, frequency and hematuria.   Musculoskeletal:  Negative for arthralgias, back pain, gait problem, joint swelling, myalgias, neck pain and neck stiffness.   Skin:  Negative for color change, pallor, rash and wound.   Neurological:  Negative for tremors, seizures, syncope, speech difficulty, weakness, light-headedness, numbness and headaches.   Hematological:  Negative for adenopathy. Does not bruise/bleed easily.   Psychiatric/Behavioral:  Negative for confusion, dysphoric mood and self-injury. The patient is not nervous/anxious.    All other  systems reviewed and are negative.    Objective:  Physical Exam  Vitals reviewed.   Constitutional:       General: He is not in acute distress.     Appearance: He is well-developed.   HENT:      Head: Normocephalic and atraumatic.      Mouth/Throat:      Pharynx: No oropharyngeal exudate.   Eyes:      General: No scleral icterus.     Conjunctiva/sclera: Conjunctivae normal.      Pupils: Pupils are equal, round, and reactive to light.   Neck:      Thyroid: No thyromegaly.      Vascular: No JVD.   Cardiovascular:      Rate and Rhythm: Normal rate and regular rhythm.      Heart sounds: Normal heart sounds. No murmur heard.    No friction rub. No gallop.   Pulmonary:      Effort: Pulmonary effort is normal. No respiratory distress.      Breath sounds: Normal breath sounds. No wheezing or rales.   Abdominal:      General: Bowel sounds are normal. There is no distension.      Palpations: Abdomen is soft. There is no mass.      Tenderness: There is no abdominal tenderness. There is no rebound.      Hernia: No hernia is present.   Musculoskeletal:         General: No tenderness or deformity. Normal range of motion.      Cervical back: Normal range of motion and neck supple.   Lymphadenopathy:      Cervical: No cervical adenopathy.      Upper Body:      Right upper body: No supraclavicular adenopathy.      Left upper body: No supraclavicular adenopathy.   Skin:     General: Skin is warm and dry.      Coloration: Skin is not pale.      Findings: No erythema or rash.   Neurological:      Mental Status: He is alert and oriented to person, place, and time.      Cranial Nerves: No cranial nerve deficit.      Motor: No abnormal muscle tone.   Psychiatric:         Behavior: Behavior normal.         Thought Content: Thought content normal.         Judgment: Judgment normal.       Labs:  Biomarkers checked on 3/1/2023 were all normal    Imaging Data:  CT C/A/P pending    Assessment and plan:    1. Malignant carcinoid tumor of the  duodenum    2. Neuroendocrine tumor      1.2  - Mr Santos is a 71 yo man with low grade well diff T2N0 duodenal NET s/p duodenal resection on 1/4/2019.   - doing well. Biomarkers normal  - CT results pending. Will call patient with results  - due for EGD. Messaged Dr Nguyen' team  - return in 6 months with surveillance labs and scan. After that can see me yearly    Route Chart for Scheduling    Med Onc Chart Routing      Follow up with physician 6 months. see me in 6 months with CBC, CMP, serotonin, pancreastatin, 5-HIAA 3 weeks prior, CT C/A/P done 1 day prior. see me   Follow up with CECIL    Infusion scheduling note    Injection scheduling note    Labs CBC and CMP   Scheduling:  Preferred lab:  Lab interval:  serotonin, pancreastatin, 5-HIAA   Imaging CT chest abdomen pelvis   in 6 months   Pharmacy appointment    Other referrals

## 2023-03-28 ENCOUNTER — OFFICE VISIT (OUTPATIENT)
Dept: HEMATOLOGY/ONCOLOGY | Facility: CLINIC | Age: 73
End: 2023-03-28
Payer: MEDICARE

## 2023-03-28 ENCOUNTER — HOSPITAL ENCOUNTER (OUTPATIENT)
Dept: RADIOLOGY | Facility: HOSPITAL | Age: 73
Discharge: HOME | End: 2023-03-28
Attending: INTERNAL MEDICINE
Payer: MEDICARE

## 2023-03-28 VITALS
HEIGHT: 66 IN | TEMPERATURE: 98 F | HEART RATE: 60 BPM | SYSTOLIC BLOOD PRESSURE: 118 MMHG | RESPIRATION RATE: 18 BRPM | WEIGHT: 177.69 LBS | BODY MASS INDEX: 28.56 KG/M2 | OXYGEN SATURATION: 100 % | DIASTOLIC BLOOD PRESSURE: 56 MMHG

## 2023-03-28 DIAGNOSIS — C7A.010 MALIGNANT CARCINOID TUMOR OF THE DUODENUM: Primary | ICD-10-CM

## 2023-03-28 DIAGNOSIS — D3A.8 NEUROENDOCRINE TUMOR: ICD-10-CM

## 2023-03-28 PROCEDURE — A9698 NON-RAD CONTRAST MATERIALNOC: HCPCS | Performed by: INTERNAL MEDICINE

## 2023-03-28 PROCEDURE — 99213 OFFICE O/P EST LOW 20 MIN: CPT | Mod: PBBFAC,25 | Performed by: INTERNAL MEDICINE

## 2023-03-28 PROCEDURE — 99999 PR PBB SHADOW E&M-EST. PATIENT-LVL III: ICD-10-PCS | Mod: PBBFAC,,, | Performed by: INTERNAL MEDICINE

## 2023-03-28 PROCEDURE — 99999 PR PBB SHADOW E&M-EST. PATIENT-LVL III: CPT | Mod: PBBFAC,,, | Performed by: INTERNAL MEDICINE

## 2023-03-28 PROCEDURE — 74177 CT ABD & PELVIS W/CONTRAST: CPT | Mod: TC

## 2023-03-28 PROCEDURE — 74177 CT CHEST ABDOMEN PELVIS WITH CONTRAST (XPD): ICD-10-PCS | Mod: 26,,, | Performed by: RADIOLOGY

## 2023-03-28 PROCEDURE — 99204 PR OFFICE/OUTPT VISIT, NEW, LEVL IV, 45-59 MIN: ICD-10-PCS | Mod: S$PBB,,, | Performed by: INTERNAL MEDICINE

## 2023-03-28 PROCEDURE — 71260 CT THORAX DX C+: CPT | Mod: TC

## 2023-03-28 PROCEDURE — 25500020 PHARM REV CODE 255: Performed by: INTERNAL MEDICINE

## 2023-03-28 PROCEDURE — 74177 CT ABD & PELVIS W/CONTRAST: CPT | Mod: 26,,, | Performed by: RADIOLOGY

## 2023-03-28 PROCEDURE — 71260 CT THORAX DX C+: CPT | Mod: 26,,, | Performed by: RADIOLOGY

## 2023-03-28 PROCEDURE — 99204 OFFICE O/P NEW MOD 45 MIN: CPT | Mod: S$PBB,,, | Performed by: INTERNAL MEDICINE

## 2023-03-28 PROCEDURE — 71260 CT CHEST ABDOMEN PELVIS WITH CONTRAST (XPD): ICD-10-PCS | Mod: 26,,, | Performed by: RADIOLOGY

## 2023-03-28 RX ADMIN — Medication 450 ML: at 10:03

## 2023-03-28 RX ADMIN — IOHEXOL 75 ML: 350 INJECTION, SOLUTION INTRAVENOUS at 10:03

## 2023-04-12 ENCOUNTER — PES CALL (OUTPATIENT)
Dept: ADMINISTRATIVE | Facility: CLINIC | Age: 73
End: 2023-04-12
Payer: MEDICARE

## 2023-04-17 ENCOUNTER — PATIENT MESSAGE (OUTPATIENT)
Dept: ENDOSCOPY | Facility: HOSPITAL | Age: 73
End: 2023-04-17
Payer: MEDICARE

## 2023-04-17 ENCOUNTER — TELEPHONE (OUTPATIENT)
Dept: ENDOSCOPY | Facility: HOSPITAL | Age: 73
End: 2023-04-17
Payer: MEDICARE

## 2023-04-17 NOTE — TELEPHONE ENCOUNTER
Pt is scheduled for EGD/EUS on 5/2/23.  Reviewed medical history and instructions with pt. Pt verbalized understanding.  Instructions to portal.

## 2023-04-27 ENCOUNTER — TELEPHONE (OUTPATIENT)
Dept: ENDOSCOPY | Facility: HOSPITAL | Age: 73
End: 2023-04-27
Payer: MEDICARE

## 2023-05-03 ENCOUNTER — TELEPHONE (OUTPATIENT)
Dept: ENDOSCOPY | Facility: HOSPITAL | Age: 73
End: 2023-05-03
Payer: MEDICARE

## 2023-05-03 NOTE — TELEPHONE ENCOUNTER
During precall patient requested a new date for his procedure. Case moved to Thursday 5/11 - pt aware of new day and time. Reviewed instructions.

## 2023-05-05 ENCOUNTER — PES CALL (OUTPATIENT)
Dept: ADMINISTRATIVE | Facility: CLINIC | Age: 73
End: 2023-05-05
Payer: MEDICARE

## 2023-05-11 ENCOUNTER — ANESTHESIA EVENT (OUTPATIENT)
Dept: ENDOSCOPY | Facility: HOSPITAL | Age: 73
End: 2023-05-11
Payer: MEDICARE

## 2023-05-11 ENCOUNTER — ANESTHESIA (OUTPATIENT)
Dept: ENDOSCOPY | Facility: HOSPITAL | Age: 73
End: 2023-05-11
Payer: MEDICARE

## 2023-05-11 ENCOUNTER — HOSPITAL ENCOUNTER (OUTPATIENT)
Facility: HOSPITAL | Age: 73
Discharge: HOME | End: 2023-05-11
Attending: INTERNAL MEDICINE | Admitting: INTERNAL MEDICINE
Payer: MEDICARE

## 2023-05-11 VITALS
RESPIRATION RATE: 15 BRPM | HEIGHT: 66 IN | WEIGHT: 175 LBS | BODY MASS INDEX: 28.12 KG/M2 | DIASTOLIC BLOOD PRESSURE: 65 MMHG | HEART RATE: 56 BPM | OXYGEN SATURATION: 100 % | TEMPERATURE: 98 F | SYSTOLIC BLOOD PRESSURE: 125 MMHG

## 2023-05-11 DIAGNOSIS — D3A.00 CARCINOID (EXCEPT OF APPENDIX): ICD-10-CM

## 2023-05-11 PROCEDURE — 25000003 PHARM REV CODE 250: Performed by: NURSE ANESTHETIST, CERTIFIED REGISTERED

## 2023-05-11 PROCEDURE — 25000003 PHARM REV CODE 250: Performed by: INTERNAL MEDICINE

## 2023-05-11 PROCEDURE — D9220A PRA ANESTHESIA: ICD-10-PCS | Mod: ANES,,, | Performed by: ANESTHESIOLOGY

## 2023-05-11 PROCEDURE — 63600175 PHARM REV CODE 636 W HCPCS: Performed by: NURSE ANESTHETIST, CERTIFIED REGISTERED

## 2023-05-11 PROCEDURE — 43259 PR ENDOSCOPIC ULTRASOUND EXAM: ICD-10-PCS | Mod: ,,, | Performed by: INTERNAL MEDICINE

## 2023-05-11 PROCEDURE — 37000008 HC ANESTHESIA 1ST 15 MINUTES: Performed by: INTERNAL MEDICINE

## 2023-05-11 PROCEDURE — 43259 EGD US EXAM DUODENUM/JEJUNUM: CPT | Performed by: INTERNAL MEDICINE

## 2023-05-11 PROCEDURE — D9220A PRA ANESTHESIA: Mod: ANES,,, | Performed by: ANESTHESIOLOGY

## 2023-05-11 PROCEDURE — 43259 EGD US EXAM DUODENUM/JEJUNUM: CPT | Mod: ,,, | Performed by: INTERNAL MEDICINE

## 2023-05-11 PROCEDURE — D9220A PRA ANESTHESIA: ICD-10-PCS | Mod: CRNA,,, | Performed by: NURSE ANESTHETIST, CERTIFIED REGISTERED

## 2023-05-11 PROCEDURE — D9220A PRA ANESTHESIA: Mod: CRNA,,, | Performed by: NURSE ANESTHETIST, CERTIFIED REGISTERED

## 2023-05-11 PROCEDURE — 37000009 HC ANESTHESIA EA ADD 15 MINS: Performed by: INTERNAL MEDICINE

## 2023-05-11 RX ORDER — SODIUM CHLORIDE 9 MG/ML
INJECTION, SOLUTION INTRAVENOUS CONTINUOUS
Status: DISCONTINUED | OUTPATIENT
Start: 2023-05-11 | End: 2023-05-11 | Stop reason: HOSPADM

## 2023-05-11 RX ORDER — PROPOFOL 10 MG/ML
INJECTION, EMULSION INTRAVENOUS
Status: DISCONTINUED | OUTPATIENT
Start: 2023-05-11 | End: 2023-05-11

## 2023-05-11 RX ORDER — SODIUM CHLORIDE 0.9 % (FLUSH) 0.9 %
10 SYRINGE (ML) INJECTION
Status: DISCONTINUED | OUTPATIENT
Start: 2023-05-11 | End: 2023-05-11 | Stop reason: HOSPADM

## 2023-05-11 RX ORDER — LIDOCAINE HYDROCHLORIDE 20 MG/ML
INJECTION INTRAVENOUS
Status: DISCONTINUED | OUTPATIENT
Start: 2023-05-11 | End: 2023-05-11

## 2023-05-11 RX ORDER — PROPOFOL 10 MG/ML
INJECTION, EMULSION INTRAVENOUS CONTINUOUS PRN
Status: DISCONTINUED | OUTPATIENT
Start: 2023-05-11 | End: 2023-05-11

## 2023-05-11 RX ADMIN — PROPOFOL 70 MG: 10 INJECTION, EMULSION INTRAVENOUS at 11:05

## 2023-05-11 RX ADMIN — LIDOCAINE HYDROCHLORIDE 100 MG: 20 INJECTION INTRAVENOUS at 11:05

## 2023-05-11 RX ADMIN — SODIUM CHLORIDE: 9 INJECTION, SOLUTION INTRAVENOUS at 10:05

## 2023-05-11 RX ADMIN — PROPOFOL 30 MG: 10 INJECTION, EMULSION INTRAVENOUS at 11:05

## 2023-05-11 RX ADMIN — PROPOFOL 200 MCG/KG/MIN: 10 INJECTION, EMULSION INTRAVENOUS at 11:05

## 2023-05-11 NOTE — TRANSFER OF CARE
"Anesthesia Transfer of Care Note    Patient: Hoang Santos Jr.    Procedure(s) Performed: Procedure(s) (LRB):  ULTRASOUND, UPPER GI TRACT, ENDOSCOPIC (N/A)    Patient location: [facility]    Anesthesia Type: general    Transport from OR: Transported from OR on 2-3 L/min O2 by NC with adequate spontaneous ventilation    Post pain: adequate analgesia    Post assessment: no apparent anesthetic complications    Post vital signs: stable    Level of consciousness: awake    Nausea/Vomiting: no nausea/vomiting    Complications: none    Transfer of care protocol was followed      Last vitals:   Visit Vitals  /61 (BP Location: Left arm, Patient Position: Lying)   Pulse 67   Temp 36.5 °C (97.7 °F) (Temporal)   Resp 18   Ht 5' 6" (1.676 m)   Wt 79.4 kg (175 lb)   SpO2 100%   BMI 28.25 kg/m²     "

## 2023-05-11 NOTE — PROVATION PATIENT INSTRUCTIONS
Discharge Summary/Instructions after an Endoscopic Procedure  Patient Name: Hoang Santos  Patient MRN: [id number]  Patient YOB: 1950  Thursday, May 11, 2023  Rigoberto Nguyen MD  Dear patient,  As a result of recent federal legislation (The Federal Cures Act), you may   receive lab or pathology results from your procedure in your [facility]   account before your physician is able to contact you. Your physician or   their representative will relay the results to you with their   recommendations at their soonest availability.  Thank you,  RESTRICTIONS:  During your procedure today, you received medications for sedation.  These   medications may affect your judgment, balance and coordination.  Therefore,   for 24 hours, you have the following restrictions:   - DO NOT drive a car, operate machinery, make legal/financial decisions,   sign important papers or drink alcohol.    ACTIVITY:  Today: no heavy lifting, straining or running due to procedural   sedation/anesthesia.  The following day: return to full activity including work.  DIET:  Eat and drink normally unless instructed otherwise.     TREATMENT FOR COMMON SIDE EFFECTS:  - Mild abdominal pain, nausea, belching, bloating or excessive gas:  rest,   eat lightly and use a heating pad.  - Sore Throat: treat with throat lozenges and/or gargle with warm salt   water.  - Because air was used during the procedure, expelling large amounts of air   from your rectum or belching is normal.  - If a bowel prep was taken, you may not have a bowel movement for 1-3 days.    This is normal.  SYMPTOMS TO WATCH FOR AND REPORT TO YOUR PHYSICIAN:  1. Abdominal pain or bloating, other than gas cramps.  2. Chest pain.  3. Back pain.  4. Signs of infection such as: chills or fever occurring within 24 hours   after the procedure.  5. Rectal bleeding, which would show as bright red, maroon, or black stools.   (A tablespoon of blood from the rectum is not serious, especially if    hemorrhoids are present.)  6. Vomiting.  7. Weakness or dizziness.  GO DIRECTLY TO THE NEAREST EMERGENCY ROOM IF YOU HAVE ANY OF THE FOLLOWING:      Difficulty breathing              Chills and/or fever over 101 F   Persistent vomiting and/or vomiting blood   Severe abdominal pain   Severe chest pain   Black, tarry stools   Bleeding- more than one tablespoon   Any other symptom or condition that you feel may need urgent attention  Your doctor recommends these additional instructions:  If any biopsies were taken, your doctors clinic will contact you in 1 to 2   weeks with any results.  - Discharge patient to home.   - Resume previous diet.   - Continue present medications.   - Return to referring physician as previously scheduled.  For questions, problems or results please call your physician - Rigoberto Nguyen MD at Work:  [phone].  [facility], EMERGENCY ROOM PHONE NUMBER: [phone]  IF A COMPLICATION OR EMERGENCY SITUATION ARISES AND YOU ARE UNABLE TO REACH   YOUR PHYSICIAN - GO DIRECTLY TO THE EMERGENCY ROOM.  Rigoberto Nguyen MD  5/11/2023 11:48:23 AM  This report has been verified and signed electronically.  Dear patient,  As a result of recent federal legislation (The Federal Cures Act), you may   receive lab or pathology results from your procedure in your [facility]   account before your physician is able to contact you. Your physician or   their representative will relay the results to you with their   recommendations at their soonest availability.  Thank you,  PROVATION

## 2023-05-11 NOTE — ANESTHESIA PREPROCEDURE EVALUATION
05/11/2023  Hoang Santos Jr. is a 72 y.o., male.      Pre-op Assessment          Review of Systems  Anesthesia Hx:  Slow to wake up     Social:  Non-Smoker, No Alcohol Use    Cardiovascular:   PVD hyperlipidemia    Pulmonary:   Sleep Apnea    Renal/:   BPH    Hepatic/GI:   Gastric outlet obstruction  Dysphagia       Physical Exam  General: Cooperative, Alert and Oriented    Airway:  Mouth Opening: < 3 cm        Anesthesia Plan  Type of Anesthesia, risks & benefits discussed:    Anesthesia Type: Gen Natural Airway, Gen ETT  Intra-op Monitoring Plan: Standard ASA Monitors  Post Op Pain Control Plan: multimodal analgesia  Induction:  IV  Informed Consent: Informed consent signed with the Patient and all parties understand the risks and agree with anesthesia plan.  All questions answered.   ASA Score: 3  Day of Surgery Review of History & Physical: H&P Update referred to the surgeon/provider.    Ready For Surgery From Anesthesia Perspective.     .

## 2023-05-11 NOTE — H&P
Short Stay Endoscopy History and Physical    PCP - Lee Kay MD  Referring Physician - Layla Hardwick MD  [address] [place] [address]    Procedure - eus  ASA - per anesthesia  Mallampati - per anesthesia  History of Anesthesia problems - no  Family history Anesthesia problems -  no   Plan of anesthesia - General    HPI:  This is a 72 y.o. male here for evaluation of: carcinoid    Reflux - no  Dysphagia - no  Abdominal pain - no  Diarrhea - no    ROS:  Constitutional: No fevers, chills, No weight loss  CV: No chest pain  Pulm: No cough, No shortness of breath  Ophtho: No vision changes  GI: see HPI  Derm: No rash    Medical History:  has a past medical history of Hyperlipidemia, Primary malignant neuroendocrine neoplasm of duodenum (09/2018), Prostatic hyperplasia, and Sleep apnea.    Surgical History:  has a past surgical history that includes Tonsillectomy; Uvulopalatopharyngoplasty; Cystoscopy with insertion of minimally invasive implant to enlarge prostatic urethra (N/A, 6/28/2018); Esophagogastroduodenoscopy (N/A, 9/24/2018); Endoscopic ultrasound of upper gastrointestinal tract (N/A, 11/5/2018); Esophagogastroduodenoscopy (N/A, 1/21/2019); Esophagogastroduodenoscopy (N/A, 1/24/2019); Gastrojejunostomy (Right, 1/24/2019); Lysis of adhesions (N/A, 1/24/2019); LAPAROTOMY, EXPLORATORY (N/A, 1/24/2019); Percutaneous transhepatic cholangiography (Right, 1/29/2019); Endoscopic ultrasound of upper gastrointestinal tract (N/A, 9/24/2019); Esophagogastroduodenoscopy (N/A, 9/24/2019); Duodenectomy (N/A, 1/4/2019); Gastrojejunostomy (N/A, 1/4/2019); Gastric fundoplication (N/A, 1/4/2019); Esophagogastroduodenoscopy (N/A, 1/4/2019); Cholecystectomy (1/4/2019); Liver biopsy (1/4/2019); carcinoid tumor stomach; and Endoscopic ultrasound of upper gastrointestinal tract (N/A, 3/22/2021).    Family History: family history includes COPD in his sister; Cancer in his sister; Diabetes in his mother; Heart  disease in his mother; Hyperlipidemia in his sister; Macular degeneration in his daughter; No Known Problems in his brother, brother, brother, daughter, sister, and son; Stroke in his father..    Social History:  reports that he has never smoked. He has never used smokeless tobacco. He reports that he does not drink alcohol and does not use drugs.    Review of patient's allergies indicates:   Allergen Reactions    Epinephrine      Neuroendocrine Tumor patient      Toradol [ketorolac] Other (See Comments)     Creatinine rises even with sub therapeutic dose       Medications:   Medications Prior to Admission   Medication Sig Dispense Refill Last Dose    rosuvastatin (CRESTOR) 40 MG Tab Take 1 tablet (40 mg total) by mouth every evening. 90 tablet 3 5/10/2023    fluticasone propionate (FLONASE) 50 mcg/actuation nasal spray 1 spray (50 mcg total) by Each Nostril route 2 (two) times daily. 15 g 0     sildenafiL (VIAGRA) 100 MG tablet Take 1 tablet (100 mg total) by mouth daily as needed for Erectile Dysfunction. 30 tablet 11        Physical Exam:    Vital Signs:   Vitals:    05/11/23 1023   BP: (!) 115/58   Pulse: 63   Resp: 16   Temp: 97.9 °F (36.6 °C)       General Appearance: Well appearing in no acute distress    Labs:  Lab Results   Component Value Date    WBC 3.72 (L) 03/01/2023    HGB 14.4 03/01/2023    HCT 45.3 03/01/2023     03/01/2023    CHOL 155 09/22/2022    TRIG 75 09/22/2022    HDL 70 09/22/2022    ALT 30 03/01/2023    AST 30 03/01/2023     03/01/2023    K 4.1 03/01/2023     03/01/2023    CREATININE 0.9 03/01/2023    BUN 14 03/01/2023    CO2 26 03/01/2023    TSH 1.370 03/01/2023    PSA 1.6 09/22/2022    INR 1.1 02/08/2019    HGBA1C 5.3 03/01/2023       I have explained the risks and benefits of this endoscopic procedure to the patient including but not limited to bleeding, inflammation, infection, perforation, and death.      Rigoberto Nguyen MD

## 2023-05-12 NOTE — ANESTHESIA RELEASE NOTE
"Anesthesia Release from PACU Note    Patient: Hoang Santos Jr.    Procedure(s) Performed: Procedure(s) (LRB):  ULTRASOUND, UPPER GI TRACT, ENDOSCOPIC (N/A)    Anesthesia type: general    Post pain: Adequate analgesia    Post assessment: no apparent anesthetic complications and tolerated procedure well    Last Vitals:   Visit Vitals  /65   Pulse (!) 56   Temp 36.5 °C (97.7 °F) (Temporal)   Resp 15   Ht 5' 6" (1.676 m)   Wt 79.4 kg (175 lb)   SpO2 100%   BMI 28.25 kg/m²       Post vital signs: stable    Level of consciousness: awake and alert     Nausea/Vomiting: no nausea/no vomiting    Complications: none    Airway Patency: patent    Respiratory: unassisted, spontaneous ventilation, room air    Cardiovascular: stable and blood pressure at baseline    Hydration: euvolemic  "

## 2023-05-12 NOTE — ANESTHESIA POSTPROCEDURE EVALUATION
Anesthesia Post Evaluation    Patient: Hoang Santos Jr.    Procedure(s) Performed: Procedure(s) (LRB):  ULTRASOUND, UPPER GI TRACT, ENDOSCOPIC (N/A)    Final Anesthesia Type: general      Patient location during evaluation: PACU  Patient participation: Yes- Able to Participate  Level of consciousness: awake and alert  Post-procedure vital signs: reviewed and stable  Pain management: adequate  Airway patency: patent    PONV status at discharge: No PONV  Anesthetic complications: no      Cardiovascular status: hemodynamically stable  Respiratory status: unassisted, spontaneous ventilation and room air  Hydration status: euvolemic  Follow-up not needed.          Vitals Value Taken Time   /65 05/11/23 1230   Temp 36.5 °C (97.7 °F) 05/11/23 1153   Pulse 56 05/11/23 1230   Resp 15 05/11/23 1230   SpO2 100 % 05/11/23 1230         No case tracking events are documented in the log.      Pain/Perla Score: Perla Score: 10 (5/11/2023 12:31 PM)

## 2023-06-23 ENCOUNTER — TELEPHONE (OUTPATIENT)
Dept: ADMINISTRATIVE | Facility: CLINIC | Age: 73
End: 2023-06-23
Payer: MEDICARE

## 2023-06-23 NOTE — TELEPHONE ENCOUNTER
Called pt, informed pt I was calling to remind pt of his in office EAWV on 6/26/23; clinic location provided to patient; pt confirmed appointment

## 2023-06-26 ENCOUNTER — OFFICE VISIT (OUTPATIENT)
Dept: INTERNAL MEDICINE | Facility: CLINIC | Age: 73
End: 2023-06-26
Payer: MEDICARE

## 2023-06-26 VITALS
OXYGEN SATURATION: 97 % | DIASTOLIC BLOOD PRESSURE: 68 MMHG | BODY MASS INDEX: 27.92 KG/M2 | HEIGHT: 66 IN | HEART RATE: 66 BPM | WEIGHT: 173.75 LBS | SYSTOLIC BLOOD PRESSURE: 110 MMHG

## 2023-06-26 DIAGNOSIS — I77.1 TORTUOUS AORTA: ICD-10-CM

## 2023-06-26 DIAGNOSIS — G47.33 OBSTRUCTIVE SLEEP APNEA: ICD-10-CM

## 2023-06-26 DIAGNOSIS — G47.33 OSA (OBSTRUCTIVE SLEEP APNEA): ICD-10-CM

## 2023-06-26 DIAGNOSIS — Z00.00 ENCOUNTER FOR PREVENTIVE HEALTH EXAMINATION: Primary | ICD-10-CM

## 2023-06-26 DIAGNOSIS — J38.3 VOCAL FOLD SCAR: ICD-10-CM

## 2023-06-26 DIAGNOSIS — E34.0 DUODENAL CARCINOID SYNDROME: ICD-10-CM

## 2023-06-26 DIAGNOSIS — G47.33 OSA ON CPAP: ICD-10-CM

## 2023-06-26 DIAGNOSIS — R00.0 TACHYCARDIA: ICD-10-CM

## 2023-06-26 DIAGNOSIS — C7A.010 MALIGNANT CARCINOID TUMOR OF THE DUODENUM: ICD-10-CM

## 2023-06-26 DIAGNOSIS — R05.3 CHRONIC COUGH: ICD-10-CM

## 2023-06-26 DIAGNOSIS — I45.5 SINUS PAUSE: ICD-10-CM

## 2023-06-26 DIAGNOSIS — I70.0 ATHEROSCLEROSIS OF AORTA: ICD-10-CM

## 2023-06-26 DIAGNOSIS — K31.1 GASTRIC OUTLET OBSTRUCTION: ICD-10-CM

## 2023-06-26 DIAGNOSIS — N52.9 ERECTILE DYSFUNCTION, UNSPECIFIED ERECTILE DYSFUNCTION TYPE: ICD-10-CM

## 2023-06-26 DIAGNOSIS — R49.9 VOICE DISTURBANCE: ICD-10-CM

## 2023-06-26 DIAGNOSIS — J90 PLEURAL EFFUSION: ICD-10-CM

## 2023-06-26 DIAGNOSIS — Z01.811 PREOPERATIVE RESPIRATORY EXAMINATION: ICD-10-CM

## 2023-06-26 DIAGNOSIS — N40.1 BENIGN NON-NODULAR PROSTATIC HYPERPLASIA WITH LOWER URINARY TRACT SYMPTOMS: ICD-10-CM

## 2023-06-26 DIAGNOSIS — E78.2 MIXED HYPERLIPIDEMIA: ICD-10-CM

## 2023-06-26 DIAGNOSIS — M25.78 CERVICAL OSTEOPHYTE: ICD-10-CM

## 2023-06-26 DIAGNOSIS — R13.10 DYSPHAGIA, UNSPECIFIED TYPE: ICD-10-CM

## 2023-06-26 DIAGNOSIS — R42 VERTIGO: ICD-10-CM

## 2023-06-26 DIAGNOSIS — J38.3 VOCAL FOLD ATROPHY: ICD-10-CM

## 2023-06-26 PROCEDURE — 99999 PR PBB SHADOW E&M-EST. PATIENT-LVL III: ICD-10-PCS | Mod: PBBFAC,,, | Performed by: NURSE PRACTITIONER

## 2023-06-26 PROCEDURE — G0439 PPPS, SUBSEQ VISIT: HCPCS | Mod: ,,, | Performed by: NURSE PRACTITIONER

## 2023-06-26 PROCEDURE — G0439 PR MEDICARE ANNUAL WELLNESS SUBSEQUENT VISIT: ICD-10-PCS | Mod: ,,, | Performed by: NURSE PRACTITIONER

## 2023-06-26 PROCEDURE — 99999 PR PBB SHADOW E&M-EST. PATIENT-LVL III: CPT | Mod: PBBFAC,,, | Performed by: NURSE PRACTITIONER

## 2023-06-26 PROCEDURE — 99213 OFFICE O/P EST LOW 20 MIN: CPT | Mod: PBBFAC,PN | Performed by: NURSE PRACTITIONER

## 2023-06-26 NOTE — PATIENT INSTRUCTIONS
Counseling and Referral of Other Preventative  (Italic type indicates deductible and co-insurance are waived)    Patient Name: Hoang Santos  Today's Date: 6/26/2023    Health Maintenance       Date Due Completion Date    COVID-19 Vaccine (6 - Pfizer series) 01/28/2023 9/28/2022    PROSTATE-SPECIFIC ANTIGEN 09/22/2023 9/22/2022    High Dose Statin 05/11/2024 5/11/2023    TETANUS VACCINE 06/16/2027 6/16/2017    Lipid Panel 09/22/2027 9/22/2022    Colorectal Cancer Screening 11/03/2031 11/3/2021        No orders of the defined types were placed in this encounter.      The following information is provided to all patients.  This information is to help you find resources for any of the problems found today that may be affecting your health:                Living healthy guide: www.[facility].[place].gov      Understanding Diabetes: www.diabetes.org      Eating healthy: www.cdc.gov/healthyweight      CDC home safety checklist: www.cdc.gov/steadi/patient.html      Agency on Aging: www.goea.[place].[facility]      Alcoholics anonymous (AA): www.aa.org      Physical Activity: www.[place].nih.gov/ao3botr      Tobacco use: www.quitwithusla.org

## 2023-06-26 NOTE — PROGRESS NOTES
"  Hoang Santos presented for a  Medicare AWV and comprehensive Health Risk Assessment today. The following components were reviewed and updated:    Medical history  Family History  Social history  Allergies and Current Medications  Health Risk Assessment  Health Maintenance  Care Team         ** See Completed Assessments for Annual Wellness Visit within the encounter summary.**         The following assessments were completed:  Living Situation  CAGE  Depression Screening  Timed Get Up and Go  Whisper Test  Cognitive Function Screening  Nutrition Screening  ADL Screening  PAQ Screening          Vitals:    06/26/23 0909   BP: 110/68   BP Location: Left arm   Patient Position: Sitting   Pulse: 66   SpO2: 97%   Weight: 78.8 kg (173 lb 11.6 oz)   Height: 5' 6" (1.676 m)     Body mass index is 28.04 kg/m².    Physical Exam  Vitals reviewed.   Constitutional:       Appearance: He is well-developed.   HENT:      Head: Normocephalic and atraumatic. Not macrocephalic and not microcephalic. No raccoon eyes, Saab's sign, abrasion, contusion, right periorbital erythema, left periorbital erythema or laceration. Hair is normal.      Right Ear: No decreased hearing noted. No laceration, drainage, swelling or tenderness. No middle ear effusion. No foreign body. No mastoid tenderness. No hemotympanum. Tympanic membrane is not injected, scarred, perforated, erythematous, retracted or bulging. Tympanic membrane has normal mobility.      Left Ear: No decreased hearing noted. No laceration, drainage, swelling or tenderness.  No middle ear effusion. No foreign body. No mastoid tenderness. No hemotympanum. Tympanic membrane is not injected, scarred, perforated, erythematous, retracted or bulging. Tympanic membrane has normal mobility.      Nose: Nose normal. No nasal deformity, laceration or mucosal edema.      Mouth/Throat:      Pharynx: Uvula midline.   Eyes:      General: Lids are normal. No scleral icterus.     Conjunctiva/sclera: " Conjunctivae normal.   Neck:      Thyroid: No thyroid mass or thyromegaly.      Trachea: Trachea normal.   Cardiovascular:      Rate and Rhythm: Normal rate and regular rhythm.   Pulmonary:      Effort: Pulmonary effort is normal. No respiratory distress.      Breath sounds: Normal breath sounds.   Abdominal:      Palpations: Abdomen is soft.   Musculoskeletal:         General: Normal range of motion.      Cervical back: Neck supple. No edema or erythema. No spinous process tenderness or muscular tenderness. Normal range of motion.   Lymphadenopathy:      Head:      Right side of head: No submental, submandibular, tonsillar, preauricular or posterior auricular adenopathy.      Left side of head: No submental, submandibular, tonsillar, preauricular, posterior auricular or occipital adenopathy.   Skin:     General: Skin is warm and dry.   Neurological:      Mental Status: He is alert and oriented to person, place, and time.      Cranial Nerves: No cranial nerve deficit.      Sensory: No sensory deficit.   Psychiatric:         Behavior: Behavior normal.         Thought Content: Thought content normal.         Judgment: Judgment normal.           Diagnoses and health risks identified today and associated recommendations/orders:    1. Encounter for preventive health examination  Annual Health Risk Assessment (HRA) visit today.  Counseling and referral of health maintenance and preventative health measures performed.  Patient given annual wellness paperwork to take home.  Encouraged to return in 1 year for subsequent HRA visit.     2. Atherosclerosis of aorta  Chronic. Stable. Continue current treatment plan as previously prescribed by PCP.    3. Chronic cough  Chronic. Stable. Continue current treatment plan as previously prescribed by PCP.    4. Pleural effusion  Chronic. Stable. Continue current treatment plan as previously prescribed by PCP.    5. Preoperative respiratory examination  Chronic. Stable. Continue current  treatment plan as previously prescribed by PCP.    6. Voice disturbance  Chronic. Stable. Continue current treatment plan as previously prescribed by PCP.    7. Vocal fold scar  Chronic. Stable. Continue current treatment plan as previously prescribed by PCP.    8. Vocal fold atrophy  Chronic. Stable. Continue current treatment plan as previously prescribed by PCP.    9. Vertigo  Chronic. Stable. Continue current treatment plan as previously prescribed by PCP.    10. Mixed hyperlipidemia  Chronic. Stable. Continue current treatment plan as previously prescribed by PCP.    11. Sinus pause  Chronic. Stable. Continue current treatment plan as previously prescribed by PCP.    12. Tachycardia  Chronic. Stable. Continue current treatment plan as previously prescribed by PCP.    13. Tortuous aorta  Chronic. Stable. Continue current treatment plan as previously prescribed by PCP.    14. Benign non-nodular prostatic hyperplasia with lower urinary tract symptoms  Chronic. Stable. Continue current treatment plan as previously prescribed by PCP.    15. Erectile dysfunction, unspecified erectile dysfunction type  Chronic. Stable. Continue current treatment plan as previously prescribed by PCP.    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 Stable. Continue current treatment plan as previously prescribed by PCP.    17. Duodenal carcinoid syndrome  Chronic. Stable. Continue current treatment plan as previously prescribed by PCP.    18. Dysphagia, unspecified type  Chronic. Stable. Continue current treatment plan as previously prescribed by PCP.    19. Gastric outlet obstruction  Chronic. Stable. Continue current treatment plan as previously prescribed by PCP.    20. Cervical osteophyte  Chronic. Stable. Continue current treatment plan as previously prescribed by PCP.    21. Obstructive sleep apnea  Chronic. Stable. Continue current treatment plan as previously prescribed by PCP.    22. GEMMA (obstructive sleep  apnea)  Chronic. Stable. Continue current treatment plan as previously prescribed by PCP.    23. GEMMA on CPAP  Chronic. Stable. Continue current treatment plan as previously prescribed by PCP.          Provided Hoang with a 5-10 year written screening schedule and personal prevention plan. Recommendations were developed using the USPSTF age appropriate recommendations. Education, counseling, and referrals were provided as needed. After Visit Summary printed and given to patient which includes a list of additional screenings\tests needed.      I offered to discuss end of life issues, including information on how to make advance directives that the patient could use to name someone who would make medical decisions on their behalf if they became too ill to make themselves.    ___Patient declined  _X_Patient is interested, I provided paper work and offered to discuss.    Follow up in about 1 year (around 6/26/2024).    PAUL Terry offered to discuss advanced care planning, including how to pick a person who would make decisions for you if you were unable to make them for yourself, called a health care power of , and what kind of decisions you might make such as use of life sustaining treatments such as ventilators and tube feeding when faced with a life limiting illness recorded on a living will that they will need to know. (How you want to be cared for as you near the end of your natural life)     X Patient is interested in learning more about how to make advanced directives.  I provided them paperwork and offered to discuss this with them.

## 2023-08-15 NOTE — PT/OT/SLP PROGRESS
Occupational Therapy   Treatment    Name: Hoang Santos Jr.  MRN: [id number]  Admitting Diagnosis:  Malignant carcinoid tumor of duodenum  5 Days Post-Op    Recommendations:     Discharge Recommendations: other (see comments)(HH PT/OT)  Discharge Equipment Recommendations:  (TBD)  Barriers to discharge:  None    Subjective     Pain/Comfort:  · Pain Rating 1: 1/10  · Location - Side 1: Bilateral  · Location - Orientation 1: generalized  · Location 1: abdomen  · Pain Addressed 1: Cessation of Activity, Distraction    Objective:     Communicated with: nurse prior to session.  Patient found with all lines intact, call button in reach and bed alarm on and blood pressure cuff, beach catheter, NG tube, oxygen, peripheral IV, pulse ox (continuous), telemetry, SCD upon OT entry to room.    General Precautions: Standard, fall, NPO   Orthopedic Precautions:N/A   Braces:       Occupational Performance:    Bed Mobility:    · Patient completed Rolling/Turning to Right with stand by assistance  · Patient completed Scooting/Bridging with stand by assistance and contact guard assistance  · Patient completed Supine to Sit with contact guard assistance     Functional Mobility/Transfers:  · Patient completed Sit <> Stand Transfer with contact guard assistance and minimum assistance  with  no assistive device and rolling walker   · Functional Mobility: Pt ambulated with RW with CGA --defer to PT for further info    Activities of Daily Living:  ·       [facility] 6 Click ADL: 13    Treatment & Education:  After pt sat upright at EOB, bed linen found to be soiled with serosanguinous drainage do to drain leakage. Nurse notified; pt's bedding and gown were changed and nurse performed skin cleansing. Pt performed UE therex seated in chair with rest breaks PRN. Pt ambulated in hallway with RW and CGA per PT recs; pt's heart rate peaked at 133 during ambulation but held steady around ~130 BPM. Pt continues to display drowsy affect with eyes often  closed, but his mood appears somewhat improved.     Patient left up in chair with all lines intact, call button in reach, nurse notified and wife present  Education:    Assessment:     Hoang Santos Jr. is a 68 y.o. male with a medical diagnosis of Malignant carcinoid tumor of duodenum.  He presents with the following performance deficits affecting function are weakness, impaired endurance, impaired self care skills, impaired balance, impaired skin, gait instability.     Rehab Prognosis:  Good; patient would benefit from acute skilled OT services to address these deficits and reach maximum level of function.       Plan:     Patient to be seen 5 x/week to address the above listed problems via self-care/home management, therapeutic activities, therapeutic exercises  · Plan of Care Expires: 02/05/19  · Plan of Care Reviewed with: patient    This Plan of care has been discussed with the patient who was involved in its development and understands and is in agreement with the identified goals and treatment plan    GOALS:   Multidisciplinary Problems     Occupational Therapy Goals        Problem: Occupational Therapy Goal    Goal Priority Disciplines Outcome Interventions   Occupational Therapy Goal     OT, PT/OT Ongoing (interventions implemented as appropriate)    Description:  Goals to be met by: 1/25/2019    Patient will increase functional independence with ADLs by performing:    UE Dressing with Modified [place].  LE Dressing with Modified [place].  Grooming while standing with Modified [place].  Toileting from toilet with Modified [place] for hygiene and clothing management.   Rolling to Bilateral with Modified [place].   Supine to sit with Modified [place].  Step transfer with Modified [place]  Toilet transfer to toilet with Modified [place].  Upper extremity exercise program x10 reps per handout, with independence.                       Time Tracking:     OT Date of  Treatment: 01/09/19  OT Start Time: 0158  OT Stop Time: 0237  OT Total Time (min): 39 min  With PT  Billable Minutes:Therapeutic Activity 13  Therapeutic Exercise 10     STEVE Aaron    I certify that I was present in the room directing the student in service delivery and guiding them using my skilled judgment. As the co-signing therapist I have reviewed the students documentation and am responsible for the treatment, assessment, and plan.       Antonio Echeverria OT  1/9/2019     Alert and oriented to person, place and time

## 2023-09-11 ENCOUNTER — OFFICE VISIT (OUTPATIENT)
Dept: UROLOGY | Facility: CLINIC | Age: 73
End: 2023-09-11
Payer: MEDICARE

## 2023-09-11 VITALS
BODY MASS INDEX: 27.99 KG/M2 | DIASTOLIC BLOOD PRESSURE: 72 MMHG | HEART RATE: 74 BPM | WEIGHT: 174.19 LBS | SYSTOLIC BLOOD PRESSURE: 105 MMHG | HEIGHT: 66 IN

## 2023-09-11 DIAGNOSIS — E78.49 OTHER HYPERLIPIDEMIA: ICD-10-CM

## 2023-09-11 DIAGNOSIS — N13.8 BPH WITH URINARY OBSTRUCTION: ICD-10-CM

## 2023-09-11 DIAGNOSIS — N52.01 ERECTILE DYSFUNCTION DUE TO ARTERIAL INSUFFICIENCY: Primary | ICD-10-CM

## 2023-09-11 DIAGNOSIS — N40.1 BPH WITH URINARY OBSTRUCTION: ICD-10-CM

## 2023-09-11 PROBLEM — G47.33 OSA (OBSTRUCTIVE SLEEP APNEA): Status: RESOLVED | Noted: 2017-02-14 | Resolved: 2023-09-11

## 2023-09-11 PROCEDURE — 99999 PR PBB SHADOW E&M-EST. PATIENT-LVL III: CPT | Mod: PBBFAC,,, | Performed by: UROLOGY

## 2023-09-11 PROCEDURE — 99204 PR OFFICE/OUTPT VISIT, NEW, LEVL IV, 45-59 MIN: ICD-10-PCS | Mod: S$PBB,,, | Performed by: UROLOGY

## 2023-09-11 PROCEDURE — 99213 OFFICE O/P EST LOW 20 MIN: CPT | Mod: PBBFAC | Performed by: UROLOGY

## 2023-09-11 PROCEDURE — 99204 OFFICE O/P NEW MOD 45 MIN: CPT | Mod: S$PBB,,, | Performed by: UROLOGY

## 2023-09-11 PROCEDURE — 99999 PR PBB SHADOW E&M-EST. PATIENT-LVL III: ICD-10-PCS | Mod: PBBFAC,,, | Performed by: UROLOGY

## 2023-09-11 RX ORDER — SILDENAFIL 100 MG/1
100 TABLET, FILM COATED ORAL DAILY PRN
Qty: 4 TABLET | Refills: 11 | Status: SHIPPED | OUTPATIENT
Start: 2023-09-11

## 2023-09-11 RX ORDER — TADALAFIL 20 MG/1
20 TABLET ORAL DAILY PRN
Qty: 20 TABLET | Refills: 11 | Status: SHIPPED | OUTPATIENT
Start: 2023-09-11

## 2023-09-11 NOTE — PROGRESS NOTES
CHIEF COMPLAINT:    Mr. Santos is a 72 y.o. male presenting with ED.    PRESENTING ILLNESS:    Hoang Santos Jr. is a 72 y.o. male who  c/o ED. His T is normal.   He's tried Viagra with mixed results.    He has LUTS. Nocturia x 1-2.  Is pleased with how he voids.    REVIEW OF SYSTEMS:    Hoang Santos Jr. denies headache, blurred vision, fever, nausea, vomiting, chills, abdominal pain, chest pain, sore throat, bleeding per rectum, cough, SOB, recent loss of consciousness, recent mental status changes, seizures, dizziness, or upper or lower extremity weakness.    NURA  1. 1  2. 3  3. 4  4. 4  5. 3      PATIENT HISTORY:    Past Medical History:   Diagnosis Date    Hyperlipidemia     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    Prostatic hyperplasia     Sleep apnea        Past Surgical History:   Procedure Laterality Date    carcinoid tumor stomach      January 4, 2019    CHOLECYSTECTOMY  01/04/2019    Procedure: CHOLECYSTECTOMY;  Surgeon: Madeleine Alvarado MD;  Location: [facility] OR;  Service: General;;    CYSTOSCOPY WITH INSERTION OF MINIMALLY INVASIVE IMPLANT TO ENLARGE PROSTATIC URETHRA N/A 06/28/2018    Procedure: TRANSPROSTATIC TISSUE RETRACTION;  Surgeon: Kory Jack MD;  Location: [facility] [place] OR;  Service: Urology;  Laterality: N/A;    DUODENECTOMY N/A 01/04/2019    Procedure: DUODENECTOMY;  Surgeon: Madeleine Alvarado MD;  Location: [facility] OR;  Service: General;  Laterality: N/A;    ENDOSCOPIC ULTRASOUND OF UPPER GASTROINTESTINAL TRACT N/A 11/05/2018    Procedure: ULTRASOUND-ENDOSCOPIC-UPPER;  Surgeon: Gorge Reyes MD;  Location: [facility] ENDO;  Service: Endoscopy;  Laterality: N/A;  Carcinoid diagnosis    ENDOSCOPIC ULTRASOUND OF UPPER GASTROINTESTINAL TRACT N/A 09/24/2019    Procedure: ULTRASOUND-ENDOSCOPIC-UPPER;  Surgeon: David Roberson MD;  Location: [facility] ENDO;  Service: Endoscopy;  Laterality: N/A;  Carcinoid Diagnosis  Gastric Ph    ENDOSCOPIC ULTRASOUND OF UPPER  GASTROINTESTINAL TRACT N/A 03/22/2021    Procedure: ULTRASOUND, UPPER GI TRACT, ENDOSCOPIC;  Surgeon: Jose Kahn MD;  Location: [facility] ENDO;  Service: Endoscopy;  Laterality: N/A;  Carmelita-operative obstruction related to anesthesia likely related to GEMMA, vocal atrophy scar and cervical osteophyte.   The former has been evaluated by Dr. Juaquin Casas and there is no intervention to prevent this other than ETT with anesthesia.   Patie    ENDOSCOPIC ULTRASOUND OF UPPER GASTROINTESTINAL TRACT N/A 05/11/2023    Procedure: ULTRASOUND, UPPER GI TRACT, ENDOSCOPIC;  Surgeon: Rigoberto Nguyen MD;  Location: [facility] (2ND FLR);  Service: Endoscopy;  Laterality: N/A;  inst to portal  pt requested time-MS    ESOPHAGOGASTRODUODENOSCOPY N/A 09/24/2018    Procedure: EGD (ESOPHAGOGASTRODUODENOSCOPY);  Surgeon: Salo Nuñez MD;  Location: [facility] (4TH FLR);  Service: Endoscopy;  Laterality: N/A;    ESOPHAGOGASTRODUODENOSCOPY N/A 01/21/2019    Procedure: ESOPHAGOGASTRODUODENOSCOPY (EGD);  Surgeon: Jose Kahn MD;  Location: [facility];  Service: Endoscopy;  Laterality: N/A;    ESOPHAGOGASTRODUODENOSCOPY N/A 01/24/2019    Procedure: EGD (ESOPHAGOGASTRODUODENOSCOPY);  Surgeon: Madeleine Alvraado MD;  Location: [facility] OR;  Service: General;  Laterality: N/A;  spoke to belkis in [facility] for egd, told her it was add on case could go anywhere from 2pm KB    ESOPHAGOGASTRODUODENOSCOPY N/A 09/24/2019    Procedure: EGD (ESOPHAGOGASTRODUODENOSCOPY);  Surgeon: David Roberson MD;  Location: [facility] ENDO;  Service: Endoscopy;  Laterality: N/A;    ESOPHAGOGASTRODUODENOSCOPY N/A 01/04/2019    Procedure: EGD (ESOPHAGOGASTRODUODENOSCOPY);  Surgeon: Madeleine Alvarado MD;  Location: [facility] OR;  Service: General;  Laterality: N/A;  spoke to Anna pablo [facility] for egd set up for 1/4/19 on 1/3/19 0814 KB    GASTRIC FUNDOPLICATION N/A 01/04/2019    Procedure: FUNDOPLICATION;  Surgeon: Madeleine Alvarado MD;  Location: [facility];  Service:  General;  Laterality: N/A;    GASTROJEJUNOSTOMY Right 01/24/2019    Procedure: GASTROJEJUNOSTOMY;  Surgeon: Madeleine Alvarado MD;  Location: [facility] OR;  Service: General;  Laterality: Right;    GASTROJEJUNOSTOMY N/A 01/04/2019    Procedure: GASTROJEJUNOSTOMY;  Surgeon: Madeleine Alvarado MD;  Location: [facility] OR;  Service: General;  Laterality: N/A;    LAPAROTOMY, EXPLORATORY N/A 01/24/2019    Procedure: LAPAROTOMY, EXPLORATORY;  Surgeon: Madeleine Alvarado MD;  Location: [facility] OR;  Service: General;  Laterality: N/A;    LIVER BIOPSY  01/04/2019    Procedure: BIOPSY, LIVER;  Surgeon: Madeleine Alvarado MD;  Location: [facility] OR;  Service: General;;    LYSIS OF ADHESIONS N/A 01/24/2019    Procedure: LYSIS, ADHESIONS;  Surgeon: Madeleine Alvarado MD;  Location: [facility] OR;  Service: General;  Laterality: N/A;    PERCUTANEOUS TRANSHEPATIC CHOLANGIOGRAPHY Right 01/29/2019    Procedure: CHOLANGIOGRAM, PERCUTANEOUS, TRANSHEPATIC;  Surgeon: YOHANNES Shelley MD;  Location: [facility] OR;  Service: General;  Laterality: Right;    TONSILLECTOMY      UVULOPALATOPHARYNGOPLASTY         Family History   Problem Relation Age of Onset    Heart disease Mother     Diabetes Mother     Stroke Father     Cancer Sister         pancreatitic    COPD Sister     Hyperlipidemia Sister     No Known Problems Sister     No Known Problems Brother     No Known Problems Brother     No Known Problems Brother     No Known Problems Daughter     Macular degeneration Daughter     No Known Problems Son     Prostate cancer Neg Hx     Kidney disease Neg Hx     Thyroid disease Neg Hx     Retinal detachment Neg Hx     Hypertension Neg Hx     Glaucoma Neg Hx        Social History     Socioeconomic History    Marital status:     Number of children: 3   Tobacco Use    Smoking status: Never    Smokeless tobacco: Never   Substance and Sexual Activity    Alcohol use: Yes     Comment: rarely    Drug use: No    Sexual activity: Yes     Partners:  Female     Social Determinants of Health     Financial Resource Strain: Low Risk  (9/4/2023)    Overall Financial Resource Strain (CARDIA)     Difficulty of Paying Living Expenses: Not hard at all   Food Insecurity: No Food Insecurity (9/4/2023)    Hunger Vital Sign     Worried About Running Out of Food in the Last Year: Never true     Ran Out of Food in the Last Year: Never true   Transportation Needs: No Transportation Needs (9/4/2023)    PRAPARE - Transportation     Lack of Transportation (Medical): No     Lack of Transportation (Non-Medical): No   Physical Activity: Insufficiently Active (9/4/2023)    Exercise Vital Sign     Days of Exercise per Week: 1 day     Minutes of Exercise per Session: 20 min   Stress: No Stress Concern Present (9/4/2023)    [employer] [place] [employer] - Occupational Stress Questionnaire     Feeling of Stress : Only a little   Social Connections: Unknown (9/4/2023)    Social Connection and Isolation Panel [NHANES]     Frequency of Communication with Friends and Family: More than three times a week     Frequency of Social Gatherings with Friends and Family: Twice a week     Active Member of Clubs or Organizations: Yes     Attends Club or Organization Meetings: More than 4 times per year     Marital Status:    Housing Stability: Low Risk  (9/4/2023)    Housing Stability Vital Sign     Unable to Pay for Housing in the Last Year: No     Number of Places Lived in the Last Year: 1     Unstable Housing in the Last Year: No       Allergies:  Epinephrine and Toradol [ketorolac]    Medications:    Current Outpatient Medications:     rosuvastatin (CRESTOR) 40 MG Tab, Take 1 tablet (40 mg total) by mouth every evening., Disp: 90 tablet, Rfl: 3    sildenafiL (VIAGRA) 100 MG tablet, Take 1 tablet (100 mg total) by mouth daily as needed for Erectile Dysfunction., Disp: 30 tablet, Rfl: 11    fluticasone propionate (FLONASE) 50 mcg/actuation nasal spray, 1 spray (50 mcg total) by Each  Nostril route 2 (two) times daily., Disp: 15 g, Rfl: 0    PHYSICAL EXAMINATION:    The patient generally appears in good health, is appropriately interactive, and is in no apparent distress.     Eyes: anicteric sclerae, moist conjunctivae; no lid-lag; PERRLA     HENT: Atraumatic; oropharynx clear with moist mucous membranes and no mucosal ulcerations;normal hard and soft palate.  No evidence of lymphadenopathy.    Neck: Trachea midline.  No thyromegaly.    Skin: No lesions.    Mental: Cooperative with normal affect.  Is oriented to time, place, and person.    Neuro: Grossly intact.    Chest: Normal inspiratory effort.   No accessory muscles.  No audible wheezes.  Respirations symmetric on inspiration and expiration.    Heart: Regular rhythm.      Abdomen:  Soft, non-tender. No masses or organomegaly. Bladder is not palpable. No evidence of flank discomfort. No evidence of inguinal hernia.    Genitourinary: The penis is not circumcised with no evidence of plaques or induration. The urethral meatus is normal. The testes, epididymides, and cord structures are normal in size and contour bilaterally. The scrotum is normal in size and contour.    Normal anal sphincter tone. No rectal mass.    The prostate is smooth. Normal landmarks. Lateral sulci. Median furrow intact.  No nodularity or induration. Seminal vesicles are normal.    Extremities: No clubbing, cyanosis, or edema      LABS:      Lab Results   Component Value Date    PSA 1.6 09/22/2022    PSA 1.2 06/19/2017    PSA 1.4 12/07/2015       IMPRESSION:    Encounter Diagnoses   Name Primary?    Erectile dysfunction due to arterial insufficiency Yes    BPH with urinary obstruction     Other hyperlipidemia    Hyperlipidemia, stable      PLAN:    1. Discussed options for his ED (affected by above comorbidities).  He'd like Cialis. Side effects discussed.  A new Rx was given.  Would also like a refill of Viagra. Understands not to take both on the same day.  2. Will  observe his LUTS as they don't bother him.  3. RTC 3 months.    Copy to:

## 2023-09-12 ENCOUNTER — OFFICE VISIT (OUTPATIENT)
Dept: HEMATOLOGY/ONCOLOGY | Facility: CLINIC | Age: 73
End: 2023-09-12
Payer: MEDICARE

## 2023-09-12 ENCOUNTER — LAB VISIT (OUTPATIENT)
Dept: LAB | Facility: HOSPITAL | Age: 73
End: 2023-09-12
Attending: INTERNAL MEDICINE
Payer: MEDICARE

## 2023-09-12 VITALS
OXYGEN SATURATION: 98 % | RESPIRATION RATE: 18 BRPM | SYSTOLIC BLOOD PRESSURE: 97 MMHG | HEIGHT: 66 IN | WEIGHT: 175.69 LBS | BODY MASS INDEX: 28.23 KG/M2 | HEART RATE: 81 BPM | DIASTOLIC BLOOD PRESSURE: 57 MMHG | TEMPERATURE: 98 F

## 2023-09-12 DIAGNOSIS — D3A.8 NEUROENDOCRINE TUMOR: ICD-10-CM

## 2023-09-12 DIAGNOSIS — C7A.010 MALIGNANT CARCINOID TUMOR OF THE DUODENUM: ICD-10-CM

## 2023-09-12 DIAGNOSIS — C7A.010 MALIGNANT CARCINOID TUMOR OF THE DUODENUM: Primary | ICD-10-CM

## 2023-09-12 LAB
ALBUMIN SERPL BCP-MCNC: 4.1 G/DL (ref 3.5–5.2)
ALP SERPL-CCNC: 65 U/L (ref 55–135)
ALT SERPL W/O P-5'-P-CCNC: 30 U/L (ref 10–44)
ANION GAP SERPL CALC-SCNC: 9 MMOL/L (ref 8–16)
AST SERPL-CCNC: 33 U/L (ref 10–40)
BASOPHILS # BLD AUTO: 0.03 K/UL (ref 0–0.2)
BASOPHILS NFR BLD: 0.7 % (ref 0–1.9)
BILIRUB SERPL-MCNC: 0.5 MG/DL (ref 0.1–1)
BUN SERPL-MCNC: 13 MG/DL (ref 8–23)
CALCIUM SERPL-MCNC: 9.6 MG/DL (ref 8.7–10.5)
CHLORIDE SERPL-SCNC: 109 MMOL/L (ref 95–110)
CO2 SERPL-SCNC: 26 MMOL/L (ref 23–29)
CREAT SERPL-MCNC: 1 MG/DL (ref 0.5–1.4)
DIFFERENTIAL METHOD: ABNORMAL
EOSINOPHIL # BLD AUTO: 0.1 K/UL (ref 0–0.5)
EOSINOPHIL NFR BLD: 1.2 % (ref 0–8)
ERYTHROCYTE [DISTWIDTH] IN BLOOD BY AUTOMATED COUNT: 13.5 % (ref 11.5–14.5)
EST. GFR  (NO RACE VARIABLE): >60 ML/MIN/1.73 M^2
GLUCOSE SERPL-MCNC: 71 MG/DL (ref 70–110)
HCT VFR BLD AUTO: 46.9 % (ref 40–54)
HGB BLD-MCNC: 14.4 G/DL (ref 14–18)
IMM GRANULOCYTES # BLD AUTO: 0.03 K/UL (ref 0–0.04)
IMM GRANULOCYTES NFR BLD AUTO: 0.7 % (ref 0–0.5)
LYMPHOCYTES # BLD AUTO: 0.8 K/UL (ref 1–4.8)
LYMPHOCYTES NFR BLD: 20 % (ref 18–48)
MCH RBC QN AUTO: 28.7 PG (ref 27–31)
MCHC RBC AUTO-ENTMCNC: 30.7 G/DL (ref 32–36)
MCV RBC AUTO: 93 FL (ref 82–98)
MONOCYTES # BLD AUTO: 0.3 K/UL (ref 0.3–1)
MONOCYTES NFR BLD: 7.9 % (ref 4–15)
NEUTROPHILS # BLD AUTO: 2.9 K/UL (ref 1.8–7.7)
NEUTROPHILS NFR BLD: 69.5 % (ref 38–73)
NRBC BLD-RTO: 0 /100 WBC
PLATELET # BLD AUTO: 205 K/UL (ref 150–450)
PMV BLD AUTO: 10.3 FL (ref 9.2–12.9)
POTASSIUM SERPL-SCNC: 4.3 MMOL/L (ref 3.5–5.1)
PROT SERPL-MCNC: 8.1 G/DL (ref 6–8.4)
RBC # BLD AUTO: 5.02 M/UL (ref 4.6–6.2)
SODIUM SERPL-SCNC: 144 MMOL/L (ref 136–145)
WBC # BLD AUTO: 4.2 K/UL (ref 3.9–12.7)

## 2023-09-12 PROCEDURE — 99214 OFFICE O/P EST MOD 30 MIN: CPT | Mod: S$PBB,,, | Performed by: INTERNAL MEDICINE

## 2023-09-12 PROCEDURE — 85025 COMPLETE CBC W/AUTO DIFF WBC: CPT | Performed by: INTERNAL MEDICINE

## 2023-09-12 PROCEDURE — 99999 PR PBB SHADOW E&M-EST. PATIENT-LVL IV: CPT | Mod: PBBFAC,,, | Performed by: INTERNAL MEDICINE

## 2023-09-12 PROCEDURE — 99214 PR OFFICE/OUTPT VISIT, EST, LEVL IV, 30-39 MIN: ICD-10-PCS | Mod: S$PBB,,, | Performed by: INTERNAL MEDICINE

## 2023-09-12 PROCEDURE — 99999 PR PBB SHADOW E&M-EST. PATIENT-LVL IV: ICD-10-PCS | Mod: PBBFAC,,, | Performed by: INTERNAL MEDICINE

## 2023-09-12 PROCEDURE — 99214 OFFICE O/P EST MOD 30 MIN: CPT | Mod: PBBFAC | Performed by: INTERNAL MEDICINE

## 2023-09-12 PROCEDURE — 83497 ASSAY OF 5-HIAA: CPT | Performed by: INTERNAL MEDICINE

## 2023-09-12 PROCEDURE — 80053 COMPREHEN METABOLIC PANEL: CPT | Performed by: INTERNAL MEDICINE

## 2023-09-12 PROCEDURE — 83519 RIA NONANTIBODY: CPT | Performed by: INTERNAL MEDICINE

## 2023-09-12 NOTE — PROGRESS NOTES
PROGRESS NOTE    Subjective:       Patient ID: Hoang Santos Jr. is a 72 y.o. male.    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 Mr Santos is a 73 yo man with HLD, BPH, GEMMA who first saw me on 3/28/23 for further management of neuroendocrine tumor. He underwent EGD on 2018 and was found to have a duodenal bulb mass. Biopsy showed low grade well diff NET, 1.2 mm. Ki 67 less than 1%. He underwent duodenum resection on 2019. Path showed a 9 mm NET, grade 1, well diff. T2N0. He presents today for follow up. Feeling well. Last EUS in .  2. Upper EUS in May 2023 normal.     Interval History:   Ms Santos returns for follow up. Feeling well.     ECO    ROS:   A ten-point system review is obtained and negative except for what was stated in the Interval History.     Physical Examination:   Vital signs reviewed.   General: well hydrated, well developed, in no acute distress  HEENT: normocephalic, PERRLA, EOMI, anicteric sclerae  Neck: supple, no JVD, thyromegaly, cervical or supraclavicular lymphadenopathy  Lungs: clear breath sounds bilaterally, no wheezing, rales, or rhonchi  Heart: RRR, no M/R/G  Abdomen: soft, no tenderness, non-distended, no hepatosplenomegaly, mass, or hernia. BS present  Extremities: no clubbing, cyanosis, or edema  Skin: no rash, ulcer, or open wounds  Neuro: alert and oriented x 4, no focal neuro deficit  Psych: pleasant and appropriate mood and affect    Objective:     Laboratory Data:  Labs reviewed. Today's lab pending    Imaging Data:  CT C/A/P 3/28/23:    Impression:     In this patient with history of carcinoid tumor of the duodenum, there is no evidence of disease recurrence or metastatic disease.     Postoperative change of partial duodenectomy.  Persistent nonspecific thickening at the  gastroduodenal anastomosis could reflect gastritis or other inflammation.     Stable periportal edema.     Additional stable findings.    Assessment and Plan:     1. Malignant carcinoid tumor of the duodenum    2. Neuroendocrine tumor      1.2  - Mr Santos is a 71 yo man with pathologic stage II (T2N0) well diff low grade NET of the duodenum, s/p surgery on 1/4/2019  - recent upper EUS in May 2023 negative  - doing well.   - c/w surveillance. Due for CT A/P and biomarkers  - check biomarkers today  - schedule for CT A/P. If GENOVEVA will communicate through portal  - RTC in 6 months with labs and CT. At that time if he remains GNEOVEVA, will do yearly surveillance    Follow-up:     RTC in 6 months  Knows to call in the interval if any problems arise.    Electronically signed by Layla Couch for Scheduling    Med Onc Chart Routing  Urgent    Follow up with physician 6 months. please schedule first available CT A/P. see me in 6 months with CBC, CMP, serotonin, pancreastatin, 5-HIAA 3 weeks prior to return, CT A/P 2 days prior to return   Follow up with CECIL    Infusion scheduling note    Injection scheduling note    Labs CBC and CMP   Scheduling:  Preferred lab:  Lab interval:  serotonin, pancreastatin, 5-HIAA   Imaging Other   CT A/P every 6 months   Pharmacy appointment    Other referrals

## 2023-09-15 LAB — 5OH-INDOLEACETATE SERPL-MCNC: 12 NG/ML

## 2023-09-18 LAB — SEROTONIN: 162 NG/ML

## 2023-09-21 ENCOUNTER — TELEPHONE (OUTPATIENT)
Dept: INTERNAL MEDICINE | Facility: CLINIC | Age: 73
End: 2023-09-21
Payer: MEDICARE

## 2023-09-21 NOTE — TELEPHONE ENCOUNTER
States recently had COVID , has other health problems  but  has completed isolation. Would like to know what to do now. Suggested wear mask in crowds even though government has not yet mandated.

## 2023-09-21 NOTE — TELEPHONE ENCOUNTER
----- Message from Sharifmitch Simmons sent at 9/21/2023 10:39 AM CDT -----  Regarding: Hoang  Type: Patient Callback     Who called: Hoang     What is the request in detail: Pt stated that he would like for the nurse to give him a call. Please call the patient as soon as possible.     Can the clinic reply by [facility]? Yes     Would the patient rather a call back or a response via [facility]?Callback     Best call back number: [phone]      Additional Information:

## 2023-09-22 LAB — PANCREASTATIN SERPL-MCNC: 54 PG/ML (ref 10–135)

## 2023-11-07 NOTE — PROGRESS NOTES
"[facility]  Adult Nutrition  Progress Note    SUMMARY       Recommendations    1. If pt to remain on TPN without enteral support rec increase PN to 90 ml/hr with lipids 3 x week to better meet estimated needs.   -To provide: 1748 kcal, 108g pro, 2160 mL fluid (GIR: 2.07)  2. When able to utilize enteral access rec TF Isosource 1.5 initiated @ 10 ml/hr and advanced 10 ml q 4 hrs to goal rate of 60 ml/hr + fluid flushes of 135 mL q 3 hrs and advance flushes as tolerated to provide 1L/day of extra fluid.    -TF to provide: 2160 kcal, 98g pro, 110 ml fluid  -Hold TF for n/v/abd discomfort  3. RD to monitor    Goals: Meet >85% EEN Daily  Nutrition Goal Status: goal not met  Communication of RD Recs: (plan of care)    Reason for Assessment    Reason For Assessment: RD follow-up  Diagnosis: (aspiration)  Relevant Medical History: duodenal rsxn; HLD, NET duodenum, obesity  Interdisciplinary Rounds: did not attend    General Information Comments: s/p GJ tube; TF d/c due to emesis, suspected ileus. Noted s/p ASHLEIGH with retroperitoneal abcess. SLP continues rec of NPO. PN support continues with lipids 3 x week. Prev NFPE done on 1/21 w/o physical sx/o malnutrition.  .  Nutrition Discharge Planning: Home on TF via pump    Nutrition Risk Screen    Nutrition Risk Screen: dysphagia or difficulty swallowing    Nutrition/Diet History    Patient Reported Diet/Restrictions/Preferences: (liquids)  Food Preferences: n/a  Spiritual, Cultural Beliefs, Church Practices, Values that Affect Care: no  Food Allergies: NKFA  Factors Affecting Nutritional Intake: NPO, altered gastrointestinal function    Anthropometrics    Temp: 98.2 °F (36.8 °C)  Height Method: Stated  Height: 5' 10" (177.8 cm)  Height (inches): 70 in  Weight Method: Bed Scale  Weight: 108.5 kg (239 lb 3.2 oz)  Weight (lb): 239.2 lb  Ideal Body Weight (IBW), Male: 166 lb  % Ideal Body Weight, Male (lb): 135.99 lb  BMI (Calculated): 32.5  BMI Grade: 30 - " Please schedule your test  Please keep taking your protonix     34.9- obesity - grade I       Lab/Procedures/Meds    Pertinent Labs Reviewed: reviewed  Pertinent Labs Comments: Phos 1.8; WBC elevated  Pertinent Medications Reviewed: reviewed  Pertinent Medications Comments: IV albumin, butmetanide, abx, Kphos    Physical Findings/Assessment    Overall: Nourished  Tubes: PEGJ  Skin: intact    Estimated/Assessed Needs    Weight Used For Calorie Calculations: 106.8 kg (235 lb 7.2 oz)  Energy Calorie Requirements (kcal): 1850 kcal  Energy Need Method: [place]-St Jeor  Protein Requirements: 107g (1g/kg)  Weight Used For Protein Calculations: 106.8 kg (235 lb 7.2 oz)     Estimated Fluid Requirement Method: RDA Method(or per MD)  RDA Method (mL): 1850      Nutrition Prescription Ordered    Current Diet Order: NPO  Current Nutrition Support Formula Ordered: Clinimix E 5/15  Current Nutrition Support Rate Ordered: 50 (ml)  Current Nutrition Support Frequency Ordered: ml/hr  Lipids 3 x week    Evaluation of Received Nutrient/Fluid Intake    Parenteral Calories (kcal): 852  Parenteral Protein (gm): 60  Parenteral Fluid (mL): 1200  Lipid Calories (kcals): 212 kcals  GIR (Glucose Infusion Rate) (mg/kg/min): 0.8 mg/kg/min  Total Calories (kcal): 1064  Total Calories (kcal/kg): 0  % Kcal Needs: 57%  Total Protein (gm): 60  % Protein Needs: 58  I/O: reviewed  Energy Calories Required: not meeting needs  Protein Required: not meeting needs  Fluid Required: (per MD)  Comments: LBM: 1/24  Tolerance: not tolerating(TF)    % Meal Intake: NPO    Nutrition Risk    Level of Risk/Frequency of Follow-up: (2 x week)     Assessment and Plan    Nutrition Problem  Altered GI Function     Related to (etiology):   Duodenal NET     Signs and Symptoms (as evidenced by):   NPO     Interventions:  Collaboration with providers     Nutrition Diagnosis Status:   Continues     Monitor and Evaluation    Food and Nutrient Intake: parenteral nutrition intake, energy intake  Food and Nutrient Adminstration: diet order,  enteral and parenteral nutrition administration  Physical Activity and Function: nutrition-related ADLs and IADLs  Anthropometric Measurements: weight change, weight  Biochemical Data, Medical Tests and Procedures: electrolyte and renal panel, inflammatory profile  Nutrition-Focused Physical Findings: overall appearance     Malnutrition Assessment          Orbital Region (Subcutaneous Fat Loss): well nourished  Upper Arm Region (Subcutaneous Fat Loss): well nourished  Thoracic and Lumbar Region: well nourished   Anglican Region (Muscle Loss): well nourished  Clavicle Bone Region (Muscle Loss): well nourished  Clavicle and Acromion Bone Region (Muscle Loss): well nourished  Scapular Bone Region (Muscle Loss): well nourished  Dorsal Hand (Muscle Loss): well nourished  Patellar Region (Muscle Loss): well nourished  Anterior Thigh Region (Muscle Loss): well nourished  Posterior Calf Region (Muscle Loss): well nourished            Nutrition Follow-Up    RD Follow-up?: Yes

## 2023-11-09 ENCOUNTER — TELEPHONE (OUTPATIENT)
Dept: OPHTHALMOLOGY | Facility: CLINIC | Age: 73
End: 2023-11-09
Payer: MEDICARE

## 2023-11-09 NOTE — TELEPHONE ENCOUNTER
Spoke to pt and informed him the his appt was r/s to Dec    ----- Message from Petty Hicks, OD sent at 11/9/2023  1:12 PM CST -----  Please schedule dec 21 at 9:40

## 2023-12-02 ENCOUNTER — HOSPITAL ENCOUNTER (EMERGENCY)
Facility: HOSPITAL | Age: 73
Discharge: HOME | End: 2023-12-02
Attending: EMERGENCY MEDICINE
Payer: MEDICARE

## 2023-12-02 VITALS
WEIGHT: 175 LBS | OXYGEN SATURATION: 98 % | SYSTOLIC BLOOD PRESSURE: 110 MMHG | RESPIRATION RATE: 16 BRPM | BODY MASS INDEX: 28.12 KG/M2 | TEMPERATURE: 98 F | DIASTOLIC BLOOD PRESSURE: 62 MMHG | HEART RATE: 68 BPM | HEIGHT: 66 IN

## 2023-12-02 DIAGNOSIS — M54.50 ACUTE MIDLINE LOW BACK PAIN WITHOUT SCIATICA: Primary | ICD-10-CM

## 2023-12-02 PROCEDURE — 25000003 PHARM REV CODE 250: Performed by: PHYSICIAN ASSISTANT

## 2023-12-02 PROCEDURE — 99283 EMERGENCY DEPT VISIT LOW MDM: CPT

## 2023-12-02 RX ORDER — DEXTROMETHORPHAN HYDROBROMIDE, GUAIFENESIN 5; 100 MG/5ML; MG/5ML
650 LIQUID ORAL EVERY 8 HOURS PRN
Qty: 30 TABLET | Refills: 0 | Status: SHIPPED | OUTPATIENT
Start: 2023-12-02

## 2023-12-02 RX ORDER — ACETAMINOPHEN 500 MG
1000 TABLET ORAL
Status: COMPLETED | OUTPATIENT
Start: 2023-12-02 | End: 2023-12-02

## 2023-12-02 RX ORDER — LIDOCAINE 50 MG/G
2 PATCH TOPICAL DAILY
Qty: 14 PATCH | Refills: 0 | Status: SHIPPED | OUTPATIENT
Start: 2023-12-02

## 2023-12-02 RX ORDER — LIDOCAINE 50 MG/G
2 PATCH TOPICAL
Status: DISCONTINUED | OUTPATIENT
Start: 2023-12-02 | End: 2023-12-02 | Stop reason: HOSPADM

## 2023-12-02 RX ADMIN — ACETAMINOPHEN 1000 MG: 500 TABLET ORAL at 01:12

## 2023-12-02 RX ADMIN — LIDOCAINE 2 PATCH: 700 PATCH TOPICAL at 01:12

## 2023-12-02 NOTE — ED TRIAGE NOTES
C/o lower back pain for last few days - unrelieved by motrin and icey hot.  Worse when getting up and bending over.  Denies numbess / tingling in extremities. Denies trauma.

## 2023-12-02 NOTE — ED NOTES
PA at bedside- explains test results, POC  and dc instrx to pt.    
Ice provided to pt per MD nursing order.  
LOC: The patient is awake and alert; oriented x 3 and speaking appropriately.  APPEARANCE: Patient resting comfortably, patient is clean and well groomed  SKIN: warm and dry, normal skin turgor & moist mucus membranes, skin intact, no breakdown noted.  MUSCULOSKELETAL: Patient moving all extremities well, no obvious swelling or deformities noted, c/o lower back pain.   RESPIRATORY: Airway is open and patent, respirations are spontaneous, normal effort and rate   CARDIAC: Patient has a normal rate, no peripheral edema noted, capillary refill < 3 seconds; No complaints of chest pain   ABDOMEN: Soft and non tender to palpation, no distention noted.   
Number Of Unique Sources Reviewed: 1
Detail Level: Zone

## 2023-12-02 NOTE — ED PROVIDER NOTES
Encounter Date: 12/2/2023       History     Chief Complaint   Patient presents with    Back Pain     Denies bowel/bladder incontinence      This is a 73 y.o. year old male with a PMH of malignant carcinoid tumor of duodenum in remission who presents to the ED with a chief complaint of back pain x 3 days.  The pain is middle of low back with no radiation.  Patient rates the pain 9 initially now 3/10. Aggravating factors include position.  Pt has tried heat and NSAIDs.  Pt denies trauma, fever, chills, chest pain, shortness of breath, nausea, abdominal pain, bowel or bladder incontinence, joint swelling, rashes, focal weakness or numbness.       Review of patient's allergies indicates:   Allergen Reactions    Epinephrine      Neuroendocrine Tumor patient      Toradol [ketorolac] Other (See Comments)     Creatinine rises even with sub therapeutic dose     Past Medical History:   Diagnosis Date    Hyperlipidemia     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    Prostatic hyperplasia     Sleep apnea      Past Surgical History:   Procedure Laterality Date    carcinoid tumor stomach      January 4, 2019    CHOLECYSTECTOMY  01/04/2019    Procedure: CHOLECYSTECTOMY;  Surgeon: Madeleine Alvarado MD;  Location: [facility] OR;  Service: General;;    CYSTOSCOPY WITH INSERTION OF MINIMALLY INVASIVE IMPLANT TO ENLARGE PROSTATIC URETHRA N/A 06/28/2018    Procedure: TRANSPROSTATIC TISSUE RETRACTION;  Surgeon: Kory Jack MD;  Location: [facility] OR;  Service: Urology;  Laterality: N/A;    DUODENECTOMY N/A 01/04/2019    Procedure: DUODENECTOMY;  Surgeon: Madeleine Alvarado MD;  Location: [facility] OR;  Service: General;  Laterality: N/A;    ENDOSCOPIC ULTRASOUND OF UPPER GASTROINTESTINAL TRACT N/A 11/05/2018    Procedure: ULTRASOUND-ENDOSCOPIC-UPPER;  Surgeon: Gorge Reyes MD;  Location: [facility] ENDO;  Service: Endoscopy;  Laterality: N/A;  Carcinoid diagnosis    ENDOSCOPIC ULTRASOUND OF UPPER GASTROINTESTINAL  TRACT N/A 09/24/2019    Procedure: ULTRASOUND-ENDOSCOPIC-UPPER;  Surgeon: David Roberson MD;  Location: [facility] ENDO;  Service: Endoscopy;  Laterality: N/A;  Carcinoid Diagnosis  Gastric Ph    ENDOSCOPIC ULTRASOUND OF UPPER GASTROINTESTINAL TRACT N/A 03/22/2021    Procedure: ULTRASOUND, UPPER GI TRACT, ENDOSCOPIC;  Surgeon: Jose Kahn MD;  Location: [facility] ENDO;  Service: Endoscopy;  Laterality: N/A;  Carmelita-operative obstruction related to anesthesia likely related to GEMMA, vocal atrophy scar and cervical osteophyte.   The former has been evaluated by Dr. Juaquin Casas and there is no intervention to prevent this other than ETT with anesthesia.   Patie    ENDOSCOPIC ULTRASOUND OF UPPER GASTROINTESTINAL TRACT N/A 05/11/2023    Procedure: ULTRASOUND, UPPER GI TRACT, ENDOSCOPIC;  Surgeon: Rigoberto Nguyen MD;  Location: [facility] (2ND FLR);  Service: Endoscopy;  Laterality: N/A;  inst to portal  pt requested time-MS    ESOPHAGOGASTRODUODENOSCOPY N/A 09/24/2018    Procedure: EGD (ESOPHAGOGASTRODUODENOSCOPY);  Surgeon: Salo Nuñez MD;  Location: [facility] (4TH FLR);  Service: Endoscopy;  Laterality: N/A;    ESOPHAGOGASTRODUODENOSCOPY N/A 01/21/2019    Procedure: ESOPHAGOGASTRODUODENOSCOPY (EGD);  Surgeon: Jose Kahn MD;  Location: [facility];  Service: Endoscopy;  Laterality: N/A;    ESOPHAGOGASTRODUODENOSCOPY N/A 01/24/2019    Procedure: EGD (ESOPHAGOGASTRODUODENOSCOPY);  Surgeon: Madeleine Alvarado MD;  Location: [facility] OR;  Service: General;  Laterality: N/A;  spoke to belkis in endo for egd, told her it was add on case could go anywhere from 2pm KB    ESOPHAGOGASTRODUODENOSCOPY N/A 09/24/2019    Procedure: EGD (ESOPHAGOGASTRODUODENOSCOPY);  Surgeon: David Roberson MD;  Location: [facility] ENDO;  Service: Endoscopy;  Laterality: N/A;    ESOPHAGOGASTRODUODENOSCOPY N/A 01/04/2019    Procedure: EGD (ESOPHAGOGASTRODUODENOSCOPY);  Surgeon: Madeleine Alvarado MD;  Location: [facility];  Service: General;   Laterality: N/A;  spoke to Anna in endo for egd set up for 1/4/19 on 1/3/19 0814 KB    GASTRIC FUNDOPLICATION N/A 01/04/2019    Procedure: FUNDOPLICATION;  Surgeon: Madeleine Alvarado MD;  Location: [facility] OR;  Service: General;  Laterality: N/A;    GASTROJEJUNOSTOMY Right 01/24/2019    Procedure: GASTROJEJUNOSTOMY;  Surgeon: Madeleine Alvarado MD;  Location: [facility] OR;  Service: General;  Laterality: Right;    GASTROJEJUNOSTOMY N/A 01/04/2019    Procedure: GASTROJEJUNOSTOMY;  Surgeon: Madeleine Alvarado MD;  Location: [facility] OR;  Service: General;  Laterality: N/A;    LAPAROTOMY, EXPLORATORY N/A 01/24/2019    Procedure: LAPAROTOMY, EXPLORATORY;  Surgeon: Madeleine Alvarado MD;  Location: [facility] OR;  Service: General;  Laterality: N/A;    LIVER BIOPSY  01/04/2019    Procedure: BIOPSY, LIVER;  Surgeon: Madeleine Alvarado MD;  Location: [facility] OR;  Service: General;;    LYSIS OF ADHESIONS N/A 01/24/2019    Procedure: LYSIS, ADHESIONS;  Surgeon: Madeleine Alvarado MD;  Location: [facility] OR;  Service: General;  Laterality: N/A;    PERCUTANEOUS TRANSHEPATIC CHOLANGIOGRAPHY Right 01/29/2019    Procedure: CHOLANGIOGRAM, PERCUTANEOUS, TRANSHEPATIC;  Surgeon: YOHANNES Shelley MD;  Location: [facility] OR;  Service: General;  Laterality: Right;    TONSILLECTOMY      UVULOPALATOPHARYNGOPLASTY       Family History   Problem Relation Age of Onset    Heart disease Mother     Diabetes Mother     Stroke Father     Cancer Sister         pancreatitic    COPD Sister     Hyperlipidemia Sister     No Known Problems Sister     No Known Problems Brother     No Known Problems Brother     No Known Problems Brother     No Known Problems Daughter     Macular degeneration Daughter     No Known Problems Son     Prostate cancer Neg Hx     Kidney disease Neg Hx     Thyroid disease Neg Hx     Retinal detachment Neg Hx     Hypertension Neg Hx     Glaucoma Neg Hx      Social History     Tobacco Use    Smoking status: Never    Smokeless  tobacco: Never   Substance Use Topics    Alcohol use: Yes     Comment: rarely    Drug use: No     Review of Systems   Constitutional:  Negative for chills, diaphoresis and fever.   Respiratory:  Negative for shortness of breath.    Cardiovascular:  Negative for chest pain.   Gastrointestinal:  Negative for abdominal pain and nausea.   Genitourinary:  Negative for dysuria and flank pain.   Musculoskeletal:  Positive for back pain. Negative for gait problem, myalgias and neck pain.   Skin:  Negative for rash and wound.   Neurological:  Negative for syncope, weakness and numbness.   Hematological:  Does not bruise/bleed easily.       Physical Exam     Initial Vitals [12/02/23 1109]   BP Pulse Resp Temp SpO2   (!) 102/59 79 16 98.1 °F (36.7 °C) 97 %      MAP       --         Physical Exam    Nursing note and vitals reviewed.  Constitutional: He appears well-developed and well-nourished.   HENT:   Head: Normocephalic and atraumatic.   Right Ear: External ear normal.   Left Ear: External ear normal.   Eyes: Conjunctivae and EOM are normal. Pupils are equal, round, and reactive to light.   Neck: Neck supple.   Normal range of motion.  Cardiovascular:  Normal rate and regular rhythm.           Pulmonary/Chest: Breath sounds normal.   Abdominal: Abdomen is soft.   Musculoskeletal:         General: Normal range of motion.      Cervical back: Normal, normal range of motion and neck supple.      Thoracic back: Normal.      Lumbar back: Tenderness present. No swelling or bony tenderness. Negative right straight leg raise test and negative left straight leg raise test.     Neurological: He is alert and oriented to person, place, and time. He has normal strength.   Skin: Skin is warm and dry.   Psychiatric: He has a normal mood and affect.         ED Course   Procedures  Labs Reviewed - No data to display       Imaging Results    None          Medications   LIDOcaine 5 % patch 2 patch (2 patches Transdermal Patch Applied 12/2/23  1306)   acetaminophen tablet 1,000 mg (1,000 mg Oral Given 12/2/23 1305)     Medical Decision Making  73 y.o. year old male presenting with low back pain x 3 days.  On exam patient is afebrile and nontoxic. Heart rate and rhythm are regular. Lungs with clear breath sounds throughout. Abdomen is soft, nontender. No edema.  Straight leg raise negative.  No neurological deficits.  Gait stable.    DDx includes but is not limited to muscle strain/sprain.  Low suspicion for fracture.    Pt given tylenol 1000 mg, ICE, lidocaine patch.    Upon reassessment pt improved.    Plan d/c with tylenol and lidocaine patches.  R.I.C.E.  Discussed following up with Dr. Hardwick to repeat imaging (CT A/P).    Discussed findings and plan with patient who verbalized understanding and agrees with the plan and course of treatment. Return to ED precautions discussed. Patient is stable for discharge. I discussed the care of this patient with my supervising physician.     Risk  OTC drugs.  Prescription drug management.                                      Clinical Impression:  Final diagnoses:  [M54.50] Acute midline low back pain without sciatica (Primary)          ED Disposition Condition    Discharge Stable          ED Prescriptions       Medication Sig Dispense Start Date End Date Auth. Provider    acetaminophen (TYLENOL) 650 MG TbSR Take 1 tablet (650 mg total) by mouth every 8 (eight) hours as needed (back pain). Take with 200 mg ibuprofen. 30 tablet 12/2/2023 -- Tiffanie Callejas PA-C    LIDOcaine (LIDODERM) 5 % Place 2 patches onto the skin once daily. Remove & Discard patch within 12 hours or as directed by MD 14 patch 12/2/2023 -- Tiffanie Callejas PA-C          Follow-up Information    None          Tiffanie Callejas PA-C  12/02/23 4809

## 2023-12-06 ENCOUNTER — PATIENT MESSAGE (OUTPATIENT)
Dept: HEMATOLOGY/ONCOLOGY | Facility: CLINIC | Age: 73
End: 2023-12-06
Payer: MEDICARE

## 2023-12-11 ENCOUNTER — OFFICE VISIT (OUTPATIENT)
Dept: UROLOGY | Facility: CLINIC | Age: 73
End: 2023-12-11
Payer: MEDICARE

## 2023-12-11 VITALS
HEART RATE: 70 BPM | DIASTOLIC BLOOD PRESSURE: 71 MMHG | SYSTOLIC BLOOD PRESSURE: 112 MMHG | HEIGHT: 66 IN | BODY MASS INDEX: 28.56 KG/M2 | WEIGHT: 177.69 LBS

## 2023-12-11 DIAGNOSIS — N13.8 BPH WITH URINARY OBSTRUCTION: ICD-10-CM

## 2023-12-11 DIAGNOSIS — E78.49 OTHER HYPERLIPIDEMIA: ICD-10-CM

## 2023-12-11 DIAGNOSIS — N40.1 BPH WITH URINARY OBSTRUCTION: ICD-10-CM

## 2023-12-11 DIAGNOSIS — N52.01 ERECTILE DYSFUNCTION DUE TO ARTERIAL INSUFFICIENCY: Primary | ICD-10-CM

## 2023-12-11 PROBLEM — Z01.811 PREOPERATIVE RESPIRATORY EXAMINATION: Status: RESOLVED | Noted: 2018-12-19 | Resolved: 2023-12-11

## 2023-12-11 PROCEDURE — 99214 OFFICE O/P EST MOD 30 MIN: CPT | Mod: S$PBB,,, | Performed by: UROLOGY

## 2023-12-11 PROCEDURE — 99213 OFFICE O/P EST LOW 20 MIN: CPT | Mod: PBBFAC | Performed by: UROLOGY

## 2023-12-11 PROCEDURE — 99999 PR PBB SHADOW E&M-EST. PATIENT-LVL III: CPT | Mod: PBBFAC,,, | Performed by: UROLOGY

## 2023-12-11 PROCEDURE — 99214 PR OFFICE/OUTPT VISIT, EST, LEVL IV, 30-39 MIN: ICD-10-PCS | Mod: S$PBB,,, | Performed by: UROLOGY

## 2023-12-11 PROCEDURE — 99999 PR PBB SHADOW E&M-EST. PATIENT-LVL III: ICD-10-PCS | Mod: PBBFAC,,, | Performed by: UROLOGY

## 2023-12-11 RX ORDER — PAPAVERINE HYDROCHLORIDE 30 MG/ML
INJECTION INTRAMUSCULAR; INTRAVENOUS
Qty: 5 ML | Refills: 0 | Status: SHIPPED | OUTPATIENT
Start: 2023-12-11

## 2023-12-11 NOTE — PROGRESS NOTES
CHIEF COMPLAINT:    Mr. Santos is a 73 y.o. male presenting with ED.    PRESENTING ILLNESS:    Hoang Santos Jr. is a 73 y.o. male who  c/o ED. His T is normal.   He's tried Viagra with mixed results.  He's used Cialis with mixed results.      He c/o vaginal dryness during intercoruse.    He has LUTS. Nocturia x 1-2.  Is pleased with how he voids.    REVIEW OF SYSTEMS:    Hoang Santos Jr. denies headache, blurred vision, fever, nausea, vomiting, chills, abdominal pain, chest pain, sore throat, bleeding per rectum, cough, SOB, recent loss of consciousness, recent mental status changes, seizures, dizziness, or upper or lower extremity weakness.    NURA  1. 1  2. 4  3. 4  4. 4  5. 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    Prostatic hyperplasia     Sleep apnea        Past Surgical History:   Procedure Laterality Date    carcinoid tumor stomach      January 4, 2019    CHOLECYSTECTOMY  01/04/2019    Procedure: CHOLECYSTECTOMY;  Surgeon: Madeleine Alvarado MD;  Location: [facility] OR;  Service: General;;    CYSTOSCOPY WITH INSERTION OF MINIMALLY INVASIVE IMPLANT TO ENLARGE PROSTATIC URETHRA N/A 06/28/2018    Procedure: TRANSPROSTATIC TISSUE RETRACTION;  Surgeon: Kory Jack MD;  Location: [facility] [place] OR;  Service: Urology;  Laterality: N/A;    DUODENECTOMY N/A 01/04/2019    Procedure: DUODENECTOMY;  Surgeon: Madeleine Alvarado MD;  Location: [facility] OR;  Service: General;  Laterality: N/A;    ENDOSCOPIC ULTRASOUND OF UPPER GASTROINTESTINAL TRACT N/A 11/05/2018    Procedure: ULTRASOUND-ENDOSCOPIC-UPPER;  Surgeon: Gorge Reyes MD;  Location: [facility] ENDO;  Service: Endoscopy;  Laterality: N/A;  Carcinoid diagnosis    ENDOSCOPIC ULTRASOUND OF UPPER GASTROINTESTINAL TRACT N/A 09/24/2019    Procedure: ULTRASOUND-ENDOSCOPIC-UPPER;  Surgeon: David Roberson MD;  Location: [facility] ENDO;  Service: Endoscopy;   Laterality: N/A;  Carcinoid Diagnosis  Gastric Ph    ENDOSCOPIC ULTRASOUND OF UPPER GASTROINTESTINAL TRACT N/A 03/22/2021    Procedure: ULTRASOUND, UPPER GI TRACT, ENDOSCOPIC;  Surgeon: Jose Kahn MD;  Location: [facility];  Service: Endoscopy;  Laterality: N/A;  Carmelita-operative obstruction related to anesthesia likely related to GEMMA, vocal atrophy scar and cervical osteophyte.   The former has been evaluated by Dr. Juaquin Casas and there is no intervention to prevent this other than ETT with anesthesia.   Patie    ENDOSCOPIC ULTRASOUND OF UPPER GASTROINTESTINAL TRACT N/A 05/11/2023    Procedure: ULTRASOUND, UPPER GI TRACT, ENDOSCOPIC;  Surgeon: Rigoberto Nguyen MD;  Location: [facility] (2ND FLR);  Service: Endoscopy;  Laterality: N/A;  inst to portal  pt requested time-MS    ESOPHAGOGASTRODUODENOSCOPY N/A 09/24/2018    Procedure: EGD (ESOPHAGOGASTRODUODENOSCOPY);  Surgeon: Salo Nuñez MD;  Location: [facility] (4TH FLR);  Service: Endoscopy;  Laterality: N/A;    ESOPHAGOGASTRODUODENOSCOPY N/A 01/21/2019    Procedure: ESOPHAGOGASTRODUODENOSCOPY (EGD);  Surgeon: Jose Kahn MD;  Location: [facility];  Service: Endoscopy;  Laterality: N/A;    ESOPHAGOGASTRODUODENOSCOPY N/A 01/24/2019    Procedure: EGD (ESOPHAGOGASTRODUODENOSCOPY);  Surgeon: Madeleine Alvarado MD;  Location: [facility] OR;  Service: General;  Laterality: N/A;  spoke to belkis in [employer] for egd, told her it was add on case could go anywhere from 2pm KB    ESOPHAGOGASTRODUODENOSCOPY N/A 09/24/2019    Procedure: EGD (ESOPHAGOGASTRODUODENOSCOPY);  Surgeon: David Roberson MD;  Location: [facility] ENDO;  Service: Endoscopy;  Laterality: N/A;    ESOPHAGOGASTRODUODENOSCOPY N/A 01/04/2019    Procedure: EGD (ESOPHAGOGASTRODUODENOSCOPY);  Surgeon: Madeleine Alvarado MD;  Location: [facility] OR;  Service: General;  Laterality: N/A;  yue to Anna in [employer] for egd set up for 1/4/19 on 1/3/19 0814 KB    GASTRIC FUNDOPLICATION N/A 01/04/2019    Procedure:  FUNDOPLICATION;  Surgeon: Madeleine Alvarado MD;  Location: [facility] OR;  Service: General;  Laterality: N/A;    GASTROJEJUNOSTOMY Right 01/24/2019    Procedure: GASTROJEJUNOSTOMY;  Surgeon: Madeleine Alvarado MD;  Location: [facility] OR;  Service: General;  Laterality: Right;    GASTROJEJUNOSTOMY N/A 01/04/2019    Procedure: GASTROJEJUNOSTOMY;  Surgeon: Madeleine Alvarado MD;  Location: [facility] OR;  Service: General;  Laterality: N/A;    LAPAROTOMY, EXPLORATORY N/A 01/24/2019    Procedure: LAPAROTOMY, EXPLORATORY;  Surgeon: Madeleine Alvarado MD;  Location: [facility] OR;  Service: General;  Laterality: N/A;    LIVER BIOPSY  01/04/2019    Procedure: BIOPSY, LIVER;  Surgeon: Madeleine Alvarado MD;  Location: [facility] OR;  Service: General;;    LYSIS OF ADHESIONS N/A 01/24/2019    Procedure: LYSIS, ADHESIONS;  Surgeon: Madeleine Alvarado MD;  Location: [facility] OR;  Service: General;  Laterality: N/A;    PERCUTANEOUS TRANSHEPATIC CHOLANGIOGRAPHY Right 01/29/2019    Procedure: CHOLANGIOGRAM, PERCUTANEOUS, TRANSHEPATIC;  Surgeon: YOHANNES Shelley MD;  Location: [facility] OR;  Service: General;  Laterality: Right;    TONSILLECTOMY      UVULOPALATOPHARYNGOPLASTY         Family History   Problem Relation Age of Onset    Heart disease Mother     Diabetes Mother     Stroke Father     Cancer Sister         pancreatitic    COPD Sister     Hyperlipidemia Sister     No Known Problems Sister     No Known Problems Brother     No Known Problems Brother     No Known Problems Brother     No Known Problems Daughter     Macular degeneration Daughter     No Known Problems Son     Prostate cancer Neg Hx     Kidney disease Neg Hx     Thyroid disease Neg Hx     Retinal detachment Neg Hx     Hypertension Neg Hx     Glaucoma Neg Hx        Social History     Socioeconomic History    Marital status:     Number of children: 3   Tobacco Use    Smoking status: Never    Smokeless tobacco: Never   Substance and Sexual Activity    Alcohol use:  Yes     Comment: rarely    Drug use: No    Sexual activity: Yes     Partners: Female     Social Determinants of Health     Financial Resource Strain: Low Risk  (12/4/2023)    Overall Financial Resource Strain (CARDIA)     Difficulty of Paying Living Expenses: Not hard at all   Food Insecurity: No Food Insecurity (12/4/2023)    Hunger Vital Sign     Worried About Running Out of Food in the Last Year: Never true     Ran Out of Food in the Last Year: Never true   Transportation Needs: No Transportation Needs (12/4/2023)    PRAPARE - Transportation     Lack of Transportation (Medical): No     Lack of Transportation (Non-Medical): No   Physical Activity: Insufficiently Active (12/4/2023)    Exercise Vital Sign     Days of Exercise per Week: 1 day     Minutes of Exercise per Session: 20 min   Stress: No Stress Concern Present (12/4/2023)    [employer] [place] [employer] - Occupational Stress Questionnaire     Feeling of Stress : Only a little   Social Connections: Unknown (12/4/2023)    Social Connection and Isolation Panel [NHANES]     Frequency of Communication with Friends and Family: More than three times a week     Frequency of Social Gatherings with Friends and Family: Once a week     Active Member of Clubs or Organizations: Yes     Attends Club or Organization Meetings: More than 4 times per year     Marital Status:    Housing Stability: Low Risk  (12/4/2023)    Housing Stability Vital Sign     Unable to Pay for Housing in the Last Year: No     Number of Places Lived in the Last Year: 1     Unstable Housing in the Last Year: No       Allergies:  Epinephrine and Toradol [ketorolac]    Medications:    Current Outpatient Medications:     acetaminophen (TYLENOL) 650 MG TbSR, Take 1 tablet (650 mg total) by mouth every 8 (eight) hours as needed (back pain). Take with 200 mg ibuprofen., Disp: 30 tablet, Rfl: 0    LIDOcaine (LIDODERM) 5 %, Place 2 patches onto the skin once daily. Remove & Discard patch  within 12 hours or as directed by MD, Disp: 14 patch, Rfl: 0    rosuvastatin (CRESTOR) 40 MG Tab, TAKE 1 TABLET(40 MG) BY MOUTH EVERY EVENING, Disp: 90 tablet, Rfl: 3    sildenafiL (VIAGRA) 100 MG tablet, Take 1 tablet (100 mg total) by mouth daily as needed for Erectile Dysfunction. Take 1 hour before intercourse., Disp: 4 tablet, Rfl: 11    tadalafiL (CIALIS) 20 MG Tab, Take 1 tablet (20 mg total) by mouth daily as needed. Take 1 hour before intercourse, Disp: 20 tablet, Rfl: 11    PHYSICAL EXAMINATION:    The patient generally appears in good health, is appropriately interactive, and is in no apparent distress.     Eyes: anicteric sclerae, moist conjunctivae; no lid-lag; PERRLA     HENT: Atraumatic; oropharynx clear with moist mucous membranes and no mucosal ulcerations;normal hard and soft palate.  No evidence of lymphadenopathy.    Neck: Trachea midline.  No thyromegaly.    Skin: No lesions.    Mental: Cooperative with normal affect.  Is oriented to time, place, and person.    Neuro: Grossly intact.    Chest: Normal inspiratory effort.   No accessory muscles.  No audible wheezes.  Respirations symmetric on inspiration and expiration.    Heart: Regular rhythm.      Abdomen:  Soft, non-tender. No masses or organomegaly. Bladder is not palpable. No evidence of flank discomfort. No evidence of inguinal hernia.    Genitourinary: The penis is not circumcised with no evidence of plaques or induration. The urethral meatus is normal. The testes, epididymides, and cord structures are normal in size and contour bilaterally. The scrotum is normal in size and contour.    Normal anal sphincter tone. No rectal mass.    The prostate is smooth. Normal landmarks. Lateral sulci. Median furrow intact.  No nodularity or induration. Seminal vesicles are normal.    Extremities: No clubbing, cyanosis, or edema      LABS:    Lab Results   Component Value Date    PSA 1.6 09/22/2022    PSA 1.2 06/19/2017    PSA 1.4 12/07/2015        IMPRESSION:    Encounter Diagnoses   Name Primary?    Erectile dysfunction due to arterial insufficiency Yes    BPH with urinary obstruction     Other hyperlipidemia    Hyperlipidemia, stable      PLAN:    1. Discussed options for his ED (affected by above comorbidities).  He'd like ICI. Side effects discussed.  A new Rx was given.  Will set up for teaching.  2.  Recommend OTC lubrication for the vaginal dryness.  3. Will observe his LUTS as they don't bother him.  4. RTC 6 months.    Copy to:

## 2023-12-21 ENCOUNTER — OFFICE VISIT (OUTPATIENT)
Dept: OPTOMETRY | Facility: CLINIC | Age: 73
End: 2023-12-21
Payer: MEDICARE

## 2023-12-21 DIAGNOSIS — H35.363 RETINAL DRUSEN OF BOTH EYES: ICD-10-CM

## 2023-12-21 DIAGNOSIS — G47.33 OSA ON CPAP: ICD-10-CM

## 2023-12-21 DIAGNOSIS — H25.13 NS (NUCLEAR SCLEROSIS), BILATERAL: ICD-10-CM

## 2023-12-21 DIAGNOSIS — H26.9 CORTICAL CATARACT OF BOTH EYES: Primary | ICD-10-CM

## 2023-12-21 DIAGNOSIS — H52.4 PRESBYOPIA: ICD-10-CM

## 2023-12-21 DIAGNOSIS — H43.813 PVD (POSTERIOR VITREOUS DETACHMENT), BOTH EYES: ICD-10-CM

## 2023-12-21 PROCEDURE — 92015 PR REFRACTION: ICD-10-PCS | Mod: ,,, | Performed by: OPTOMETRIST

## 2023-12-21 PROCEDURE — 92015 DETERMINE REFRACTIVE STATE: CPT | Mod: ,,, | Performed by: OPTOMETRIST

## 2023-12-21 PROCEDURE — 99999 PR PBB SHADOW E&M-EST. PATIENT-LVL II: ICD-10-PCS | Mod: PBBFAC,,, | Performed by: OPTOMETRIST

## 2023-12-21 PROCEDURE — 99214 PR OFFICE/OUTPT VISIT, EST, LEVL IV, 30-39 MIN: ICD-10-PCS | Mod: S$PBB,,, | Performed by: OPTOMETRIST

## 2023-12-21 PROCEDURE — 99214 OFFICE O/P EST MOD 30 MIN: CPT | Mod: S$PBB,,, | Performed by: OPTOMETRIST

## 2023-12-21 PROCEDURE — 99999 PR PBB SHADOW E&M-EST. PATIENT-LVL II: CPT | Mod: PBBFAC,,, | Performed by: OPTOMETRIST

## 2023-12-21 PROCEDURE — 99212 OFFICE O/P EST SF 10 MIN: CPT | Mod: PBBFAC,PO | Performed by: OPTOMETRIST

## 2023-12-21 NOTE — PROGRESS NOTES
HPI    GUICHO: 4/12/2022 - Dr. Hicks     CC: Pt is here today for a routine eye exam. 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    (-)Dryness  (-)Burning  (-)Itchiness  (-)Tearing  (-)Flashes  (-)Floaters   (-)Photophobia  (-)Eye Pain      Diabetic: no    Contact Lens: no    Eye Meds: no  Last edited by Cari Franklin MA on 12/21/2023  9:49 AM.            Assessment /Plan     For exam results, see Encounter Report.    Cortical cataract of both eyes  NS (nuclear sclerosis), bilateral   Borderline VS   Pt happy with vision at this time   Consider cat eval next visit    PVD (posterior vitreous detachment), both eyes  GEMMA on CPAP   Stable    Presbyopia   Rx specs    Retinal drusen of both eyes   Monitor yearly, fundus photos next visit      RTC 1 year

## 2024-01-02 ENCOUNTER — OFFICE VISIT (OUTPATIENT)
Dept: INTERNAL MEDICINE | Facility: CLINIC | Age: 74
End: 2024-01-02
Attending: INTERNAL MEDICINE
Payer: MEDICARE

## 2024-01-02 VITALS — HEIGHT: 66 IN | WEIGHT: 177.69 LBS | BODY MASS INDEX: 28.56 KG/M2

## 2024-01-02 DIAGNOSIS — C7A.010 MALIGNANT CARCINOID TUMOR OF THE DUODENUM: ICD-10-CM

## 2024-01-02 DIAGNOSIS — N52.9 ERECTILE DYSFUNCTION, UNSPECIFIED ERECTILE DYSFUNCTION TYPE: Primary | ICD-10-CM

## 2024-01-02 DIAGNOSIS — E34.0 DUODENAL CARCINOID SYNDROME: ICD-10-CM

## 2024-01-02 DIAGNOSIS — I70.0 ATHEROSCLEROSIS OF AORTA: ICD-10-CM

## 2024-01-02 PROCEDURE — 99213 OFFICE O/P EST LOW 20 MIN: CPT | Mod: 95,,, | Performed by: INTERNAL MEDICINE

## 2024-01-02 NOTE — PROGRESS NOTES
"Subjective:       Patient ID: Hoang Santos Jr. is a 73 y.o. male.    Chief Complaint: Medication Problem    Patient Active Problem List:     Hyperlipidemia     Obstructive sleep apnea     Vertigo     Erectile dysfunction due to arterial insufficiency     Chronic cough     Voice disturbance     Vocal fold atrophy     Vocal fold scar     BPH with urinary obstruction     Dysphagia     Cervical osteophyte     Malignant carcinoid tumor of the duodenum     Pleural effusion     Tachycardia     Gastric outlet obstruction     Sinus pause     GEMMA on CPAP     Duodenal carcinoid syndrome     Tortuous aorta     Atherosclerosis of aorta        Saw Dr Mcintyre and Rx ICI with papaverine and he just wanted my 2 cents as his PCP. Potential SE discussed. Understandably apprehensive about injecting himself at the base of his penis.        Review of Systems   Constitutional:  Negative for activity change and unexpected weight change.   HENT:  Negative for hearing loss, rhinorrhea and trouble swallowing.    Eyes:  Negative for discharge and visual disturbance.   Respiratory:  Negative for chest tightness and wheezing.    Cardiovascular:  Negative for chest pain and palpitations.   Gastrointestinal:  Negative for blood in stool, constipation, diarrhea and vomiting.   Endocrine: Negative for polydipsia and polyuria.   Genitourinary:  Negative for difficulty urinating, hematuria and urgency.   Musculoskeletal:  Negative for arthralgias, joint swelling and neck pain.   Neurological:  Negative for weakness and headaches.   Psychiatric/Behavioral:  Negative for confusion and dysphoric mood.        Objective:      Vitals:    01/02/24 1359   Weight: 80.6 kg (177 lb 11.1 oz)   Height: 5' 6" (1.676 m)      Physical Exam  Constitutional:       General: He is not in acute distress.     Appearance: Normal appearance. He is well-developed. He is not diaphoretic.   HENT:      Head: Atraumatic.   Eyes:      General: No scleral icterus.        Right " eye: No discharge.         Left eye: No discharge.      Conjunctiva/sclera: Conjunctivae normal.   Neck:      Thyroid: No thyromegaly.   Pulmonary:      Effort: Pulmonary effort is normal. No respiratory distress.      Breath sounds: No wheezing.   Skin:     Coloration: Skin is not pale.   Neurological:      Mental Status: He is alert and oriented to person, place, and time.   Psychiatric:         Speech: Speech normal.         Assessment:       1. Erectile dysfunction, unspecified erectile dysfunction type        Plan:       Hoang was seen today for medication problem.    Diagnoses and all orders for this visit:    Erectile dysfunction, unspecified erectile dysfunction type           Lee Lindsey MD  Internal Medicine-[facility]        Side effects of medication(s) were discussed in detail and patient voiced understanding.  Patient will call back for any issues or complications.

## 2024-01-12 ENCOUNTER — PATIENT MESSAGE (OUTPATIENT)
Dept: UROLOGY | Facility: CLINIC | Age: 74
End: 2024-01-12
Payer: MEDICARE

## 2024-01-12 ENCOUNTER — TELEPHONE (OUTPATIENT)
Dept: HEMATOLOGY/ONCOLOGY | Facility: CLINIC | Age: 74
End: 2024-01-12
Payer: MEDICARE

## 2024-01-23 ENCOUNTER — TELEPHONE (OUTPATIENT)
Dept: HEMATOLOGY/ONCOLOGY | Facility: CLINIC | Age: 74
End: 2024-01-23
Payer: MEDICARE

## 2024-02-12 ENCOUNTER — HOSPITAL ENCOUNTER (OUTPATIENT)
Dept: RADIOLOGY | Facility: HOSPITAL | Age: 74
Discharge: HOME | End: 2024-02-12
Attending: INTERNAL MEDICINE
Payer: MEDICARE

## 2024-02-12 DIAGNOSIS — D3A.8 NEUROENDOCRINE TUMOR: ICD-10-CM

## 2024-02-12 DIAGNOSIS — C7A.010 MALIGNANT CARCINOID TUMOR OF THE DUODENUM: ICD-10-CM

## 2024-02-12 LAB
CREAT SERPL-MCNC: 0.9 MG/DL (ref 0.5–1.4)
SAMPLE: NORMAL

## 2024-02-12 PROCEDURE — 74177 CT ABD & PELVIS W/CONTRAST: CPT | Mod: TC

## 2024-02-12 PROCEDURE — 74177 CT ABD & PELVIS W/CONTRAST: CPT | Mod: 26,,, | Performed by: RADIOLOGY

## 2024-02-12 PROCEDURE — 25500020 PHARM REV CODE 255: Performed by: INTERNAL MEDICINE

## 2024-02-12 RX ADMIN — IOHEXOL 75 ML: 350 INJECTION, SOLUTION INTRAVENOUS at 01:02

## 2024-02-21 ENCOUNTER — OFFICE VISIT (OUTPATIENT)
Dept: HEMATOLOGY/ONCOLOGY | Facility: CLINIC | Age: 74
End: 2024-02-21
Payer: MEDICARE

## 2024-02-21 VITALS
HEIGHT: 66 IN | RESPIRATION RATE: 18 BRPM | DIASTOLIC BLOOD PRESSURE: 57 MMHG | OXYGEN SATURATION: 96 % | HEART RATE: 76 BPM | SYSTOLIC BLOOD PRESSURE: 102 MMHG | WEIGHT: 177.38 LBS | BODY MASS INDEX: 28.51 KG/M2

## 2024-02-21 DIAGNOSIS — D3A.8 NEUROENDOCRINE TUMOR: ICD-10-CM

## 2024-02-21 DIAGNOSIS — C7A.010 MALIGNANT CARCINOID TUMOR OF THE DUODENUM: Primary | ICD-10-CM

## 2024-02-21 PROCEDURE — 99213 OFFICE O/P EST LOW 20 MIN: CPT | Mod: PBBFAC | Performed by: INTERNAL MEDICINE

## 2024-02-21 PROCEDURE — G2211 COMPLEX E/M VISIT ADD ON: HCPCS | Mod: S$PBB,,, | Performed by: INTERNAL MEDICINE

## 2024-02-21 PROCEDURE — 99214 OFFICE O/P EST MOD 30 MIN: CPT | Mod: S$PBB,,, | Performed by: INTERNAL MEDICINE

## 2024-02-21 PROCEDURE — 99999 PR PBB SHADOW E&M-EST. PATIENT-LVL III: CPT | Mod: PBBFAC,,, | Performed by: INTERNAL MEDICINE

## 2024-02-21 NOTE — PROGRESS NOTES
PROGRESS NOTE    Subjective:       Patient ID: Hoang Santos Jr. is a 73 y.o. male.    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 Mr Santos is a 73 yo man with HLD, BPH, GEMMA who first saw me on 3/28/23 for further management of neuroendocrine tumor. He underwent EGD on 2018 and was found to have a duodenal bulb mass. Biopsy showed low grade well diff NET, 1.2 mm. Ki 67 less than 1%. He underwent duodenum resection on 2019. Path showed a 9 mm NET, grade 1, well diff. T2N0. He presents today for follow up. Feeling well. Last EUS in .  2. Upper EUS in May 2023 normal.     Interval History:   Ms Santos returns for follow up. Feeling well.     ECO    ROS:   A ten-point system review is obtained and negative except for what was stated in the Interval History.     Physical Examination:   Vital signs reviewed.   General: well hydrated, well developed, in no acute distress  HEENT: normocephalic, PERRLA, EOMI, anicteric sclerae  Neck: supple, no JVD, thyromegaly, cervical or supraclavicular lymphadenopathy  Lungs: clear breath sounds bilaterally, no wheezing, rales, or rhonchi  Heart: RRR, no M/R/G  Abdomen: soft, no tenderness, non-distended, no hepatosplenomegaly, mass, or hernia. BS present  Extremities: no clubbing, cyanosis, or edema  Skin: no rash, ulcer, or open wounds  Neuro: alert and oriented x 4, no focal neuro deficit  Psych: pleasant and appropriate mood and affect    Objective:     Laboratory Data:  Labs reviewed. Serotonin, pancreastatin, 5-HIAA normal    Imaging Data:  CT A/P 24:  Impression:     No acute abnormalities in the abdomen or pelvis.  No evidence of recurrent or metastatic disease       Assessment and Plan:     1. Malignant carcinoid tumor of the duodenum    2. Neuroendocrine tumor         1.2  - Mr Santos is a 74 yo man with pathologic stage II (T2N0) well diff low grade NET of the duodenum, s/p surgery on 1/4/2019  - last upper EUS in May 2023 negative  - doing well.   - reviewed test results biomarkers normal. CT A/P negative for recurrence.   - He is 5 years out. Will see him in one year with labs and CT A/P. Will do upper EUS next year    Follow-up:     RTC in 12 months  Knows to call in the interval if any problems arise.    Electronically signed by Layla Couch for Scheduling    Med Onc Chart Routing      Follow up with physician 1 year. see me in one year with CBC, CMP, serotonin, pancreastatin, 5-HIAA checked 3 weeks prior to return, CT A/P 1 week prior to return   Follow up with CECIL    Infusion scheduling note    Injection scheduling note    Labs CBC and CMP   Scheduling:  Preferred lab:  Lab interval:  serotonin, pancreastatin, 5-HIAA   Imaging   CT a/p   Pharmacy appointment    Other referrals

## 2024-05-24 ENCOUNTER — OFFICE VISIT (OUTPATIENT)
Dept: URGENT CARE | Facility: CLINIC | Age: 74
End: 2024-05-24
Payer: MEDICARE

## 2024-05-24 VITALS
SYSTOLIC BLOOD PRESSURE: 98 MMHG | RESPIRATION RATE: 18 BRPM | DIASTOLIC BLOOD PRESSURE: 67 MMHG | BODY MASS INDEX: 28.45 KG/M2 | HEIGHT: 66 IN | HEART RATE: 82 BPM | WEIGHT: 177 LBS | OXYGEN SATURATION: 95 % | TEMPERATURE: 98 F

## 2024-05-24 DIAGNOSIS — S86.911A STRAIN OF RIGHT KNEE, INITIAL ENCOUNTER: Primary | ICD-10-CM

## 2024-05-24 DIAGNOSIS — M25.561 ACUTE PAIN OF RIGHT KNEE: ICD-10-CM

## 2024-05-24 PROCEDURE — 73562 X-RAY EXAM OF KNEE 3: CPT | Mod: FY,RT,S$GLB, | Performed by: RADIOLOGY

## 2024-05-24 PROCEDURE — 99203 OFFICE O/P NEW LOW 30 MIN: CPT | Mod: S$GLB,,,

## 2024-05-24 NOTE — PATIENT INSTRUCTIONS
Rest  Ice the affected area  Elevate the affected extremity.    400 mg Ibuprofen as needed for pain, or you may alternate with Tylenol.    Please follow up with your primary care doctor or specialist as needed.    If you experience any significant increase in pain, numbness, tingling, discoloration or loss of motor function follow up immediately in the Emergency Department.

## 2024-05-24 NOTE — PROGRESS NOTES
"Subjective:      Patient ID: Hoang Santos Jr. is a 73 y.o. male.    Vitals:  height is 5' 6" (1.676 m) and weight is 80.3 kg (177 lb). His oral temperature is 98.2 °F (36.8 °C). His blood pressure is 98/67 and his pulse is 82. His respiration is 18 and oxygen saturation is 95%.     Chief Complaint: Other Misc (Knee Pain - Entered by patient) and Knee Pain (/)    This is a 73 y.o. male who presents today with a chief complaint of knee pain.  Patient states he has pain behind his knee for about a week.  He has not taken any medication for the pain.  He states he has been doing a lot of gardening and thinks he may have pulled something in the back of his knee.  He is ambulatory with steady gait, no redness, swelling, or deformity.      Knee Pain   The incident occurred more than 1 week ago. The incident occurred at home. There was no injury mechanism. The pain is present in the right knee. The pain is at a severity of 6/10. The pain is moderate. The pain has been Constant since onset. He reports no foreign bodies present. Nothing aggravates the symptoms. He has tried elevation and acetaminophen for the symptoms. The treatment provided no relief.       Constitution: Negative for fever and generalized weakness.   HENT:  Negative for ear pain, sinus pain and sore throat.    Neck: Negative for neck pain.   Cardiovascular:  Negative for chest pain.   Respiratory:  Negative for cough and shortness of breath.    Gastrointestinal:  Negative for abdominal pain.   Musculoskeletal:  Positive for joint pain (right knee).   Neurological:  Negative for headaches.      Objective:     Physical Exam   Constitutional: He is oriented to person, place, and time. He appears well-developed. He is cooperative.  Non-toxic appearance. He does not appear ill. No distress.   HENT:   Head: Normocephalic and atraumatic.   Ears:   Right Ear: Hearing, tympanic membrane, external ear and ear canal normal.   Left Ear: Hearing, tympanic membrane, " external ear and ear canal normal.   Nose: Nose normal. No mucosal edema, rhinorrhea or nasal deformity. No epistaxis. Right sinus exhibits no maxillary sinus tenderness and no frontal sinus tenderness. Left sinus exhibits no maxillary sinus tenderness and no frontal sinus tenderness.   Mouth/Throat: Uvula is midline, oropharynx is clear and moist and mucous membranes are normal. No trismus in the jaw. Normal dentition. No uvula swelling. No oropharyngeal exudate, posterior oropharyngeal edema or posterior oropharyngeal erythema.   Eyes: Conjunctivae and lids are normal. No scleral icterus.   Neck: Trachea normal and phonation normal. Neck supple. No edema present. No erythema present. No neck rigidity present.   Cardiovascular: Normal rate, regular rhythm, normal heart sounds and normal pulses.   Pulmonary/Chest: Effort normal and breath sounds normal. No respiratory distress. He has no decreased breath sounds. He has no rhonchi.   Abdominal: Normal appearance and bowel sounds are normal. Soft.   Musculoskeletal: Normal range of motion.         General: No deformity. Normal range of motion.      Right knee: He exhibits normal range of motion, no swelling, no effusion, no deformity, no erythema and no bony tenderness. Tenderness (mild, behind knee) found.      Left knee: Normal.   Neurological: He is alert and oriented to person, place, and time. He exhibits normal muscle tone. Coordination normal.   Skin: Skin is warm, dry, intact, not diaphoretic and not pale.   Psychiatric: His speech is normal and behavior is normal. Judgment and thought content normal.   Nursing note and vitals reviewed.      Assessment:     1. Strain of right knee, initial encounter    2. Acute pain of right knee        Plan:   Physical exam as documented above.  X-ray negative for any fractures or dislocations.  Suspect slight strain from yd work, discussed ice and NSAIDs for pain and inflammation.  Patient has full range of motion lower leg  and feet are neurovascularly intact, no redness, swelling or deformity.  Very low suspicion of DVT.  Patient agrees with plan of care and we will follow up if needed.    XR KNEE 3 VIEW RIGHT    Result Date: 5/24/2024  EXAMINATION: XR KNEE 3 VIEW RIGHT CLINICAL HISTORY: Pain in right knee TECHNIQUE: AP, lateral, and Merchant views of the right knee were performed. COMPARISON: None FINDINGS: Skeletal structures are intact.  No fracture, dislocation, or joint effusion is seen.  Enthesophytes are noted at the patella and tibial tuberosity.  Degenerative joint disease is evident with mild joint space narrowing at the medial tibiofemoral joint space and at the patellofemoral joint space.  No focal soft tissue swelling is seen.     No acute abnormality.  DJD right knee. Electronically signed by: Adolfo Moreno MD Date:    05/24/2024 Time:    15:55       Strain of right knee, initial encounter    Acute pain of right knee  -     XR KNEE 3 VIEW RIGHT; Future; Expected date: 05/24/2024      Patient Instructions   Rest  Ice the affected area  Elevate the affected extremity.    400 mg Ibuprofen as needed for pain, or you may alternate with Tylenol.    Please follow up with your primary care doctor or specialist as needed.    If you experience any significant increase in pain, numbness, tingling, discoloration or loss of motor function follow up immediately in the Emergency Department.

## 2024-06-05 ENCOUNTER — OFFICE VISIT (OUTPATIENT)
Dept: PODIATRY | Facility: CLINIC | Age: 74
End: 2024-06-05
Payer: MEDICARE

## 2024-06-05 VITALS
WEIGHT: 176.56 LBS | HEIGHT: 66 IN | BODY MASS INDEX: 28.38 KG/M2 | DIASTOLIC BLOOD PRESSURE: 70 MMHG | SYSTOLIC BLOOD PRESSURE: 118 MMHG | HEART RATE: 75 BPM

## 2024-06-05 DIAGNOSIS — B35.1 ONYCHOMYCOSIS DUE TO DERMATOPHYTE: Primary | ICD-10-CM

## 2024-06-05 PROCEDURE — 99999 PR PBB SHADOW E&M-EST. PATIENT-LVL III: CPT | Mod: PBBFAC,,, | Performed by: PODIATRIST

## 2024-06-05 PROCEDURE — 99213 OFFICE O/P EST LOW 20 MIN: CPT | Mod: PBBFAC | Performed by: PODIATRIST

## 2024-06-05 PROCEDURE — 99203 OFFICE O/P NEW LOW 30 MIN: CPT | Mod: S$PBB,,, | Performed by: PODIATRIST

## 2024-06-05 NOTE — PROGRESS NOTES
Subjective:      Patient ID: Hoang Santos Jr. is a 73 y.o. male.    Chief Complaint: Nail Problem (B/L fungus)    Hoang is a 73 y.o. male who presents to the clinic complaining of thick and discolored toenails on both feet. Hoang is inquiring about treatment options.    Review of Systems   Constitutional: Negative for chills, fever and malaise/fatigue.   HENT:  Negative for hearing loss.    Cardiovascular:  Negative for claudication.   Respiratory:  Negative for shortness of breath.    Skin:  Positive for color change and nail changes. Negative for flushing and rash.   Musculoskeletal:  Negative for joint pain and myalgias.   Neurological:  Negative for loss of balance, numbness, paresthesias and sensory change.   Psychiatric/Behavioral:  Negative for altered mental status.            Objective:      Physical Exam  Vitals reviewed.   Cardiovascular:      Pulses:           Dorsalis pedis pulses are 2+ on the right side and 2+ on the left side.        Posterior tibial pulses are 2+ on the right side and 2+ on the left side.      Comments: No edema noted b/L  Musculoskeletal:      Comments:        Feet:      Right foot:      Protective Sensation: 5 sites tested.  5 sites sensed.      Toenail Condition: Right toenails are abnormally thick.      Left foot:      Protective Sensation: 5 sites tested.  5 sites sensed.      Toenail Condition: Left toenails are abnormally thick.   Skin:     Capillary Refill: Capillary refill takes 2 to 3 seconds.      Comments: Normal skin tugor noted.   No open lesion noted b/L  Skin temp is warm to warm from proximal to distal b/L.  Webspaces clean, dry, and intact     Neurological:      Mental Status: He is alert and oriented to person, place, and time.      Comments: Gross sensation intact b/L               Assessment:       Encounter Diagnosis   Name Primary?    Onychomycosis due to dermatophyte Yes         Plan:       Hoang was seen today for nail problem.    Diagnoses and all  orders for this visit:    Onychomycosis due to dermatophyte    Other orders  -     efinaconazole (JUBLIA) 10 % Clarisa; Apply to affected nails once daily      I counseled the patient on his conditions, their implications and medical management.    Pt was seen today for changes in toenail. Pt was advised that nail changes could be due to toenail fungus or damage/trauma to toenail or toenail matrix.   Pt advised that there are currently no prescription options for nail damage/trauma.   Fungal culture taken of toenail? no    Rx Jublia    Call or return to clinic prn if these symptoms worsen or fail to improve as anticipated.      .

## 2024-07-30 DIAGNOSIS — Z00.00 ENCOUNTER FOR MEDICARE ANNUAL WELLNESS EXAM: ICD-10-CM

## 2024-09-18 ENCOUNTER — OFFICE VISIT (OUTPATIENT)
Dept: INTERNAL MEDICINE | Facility: CLINIC | Age: 74
End: 2024-09-18
Payer: MEDICARE

## 2024-09-18 VITALS
SYSTOLIC BLOOD PRESSURE: 120 MMHG | WEIGHT: 182.13 LBS | DIASTOLIC BLOOD PRESSURE: 70 MMHG | HEART RATE: 67 BPM | BODY MASS INDEX: 29.27 KG/M2 | HEIGHT: 66 IN | OXYGEN SATURATION: 99 % | TEMPERATURE: 98 F

## 2024-09-18 DIAGNOSIS — N40.1 BPH WITH URINARY OBSTRUCTION: ICD-10-CM

## 2024-09-18 DIAGNOSIS — J38.3 VOCAL FOLD SCAR: ICD-10-CM

## 2024-09-18 DIAGNOSIS — I45.5 SINUS PAUSE: ICD-10-CM

## 2024-09-18 DIAGNOSIS — R13.10 DYSPHAGIA, UNSPECIFIED TYPE: ICD-10-CM

## 2024-09-18 DIAGNOSIS — Z00.00 ENCOUNTER FOR PREVENTIVE HEALTH EXAMINATION: ICD-10-CM

## 2024-09-18 DIAGNOSIS — J90 PLEURAL EFFUSION: ICD-10-CM

## 2024-09-18 DIAGNOSIS — M25.78 CERVICAL OSTEOPHYTE: ICD-10-CM

## 2024-09-18 DIAGNOSIS — E34.0 DUODENAL CARCINOID SYNDROME: ICD-10-CM

## 2024-09-18 DIAGNOSIS — G47.33 OSA ON CPAP: ICD-10-CM

## 2024-09-18 DIAGNOSIS — R00.0 TACHYCARDIA: ICD-10-CM

## 2024-09-18 DIAGNOSIS — Z00.00 ENCOUNTER FOR MEDICARE ANNUAL WELLNESS EXAM: Primary | ICD-10-CM

## 2024-09-18 DIAGNOSIS — J38.3 VOCAL FOLD ATROPHY: ICD-10-CM

## 2024-09-18 DIAGNOSIS — N52.01 ERECTILE DYSFUNCTION DUE TO ARTERIAL INSUFFICIENCY: ICD-10-CM

## 2024-09-18 DIAGNOSIS — E78.49 OTHER HYPERLIPIDEMIA: ICD-10-CM

## 2024-09-18 DIAGNOSIS — I77.1 TORTUOUS AORTA: ICD-10-CM

## 2024-09-18 DIAGNOSIS — R42 VERTIGO: ICD-10-CM

## 2024-09-18 DIAGNOSIS — G47.33 OBSTRUCTIVE SLEEP APNEA: ICD-10-CM

## 2024-09-18 DIAGNOSIS — R05.3 CHRONIC COUGH: ICD-10-CM

## 2024-09-18 DIAGNOSIS — N13.8 BPH WITH URINARY OBSTRUCTION: ICD-10-CM

## 2024-09-18 DIAGNOSIS — R49.9 VOICE DISTURBANCE: ICD-10-CM

## 2024-09-18 DIAGNOSIS — Z74.09 OTHER REDUCED MOBILITY: ICD-10-CM

## 2024-09-18 DIAGNOSIS — K31.1 GASTRIC OUTLET OBSTRUCTION: ICD-10-CM

## 2024-09-18 DIAGNOSIS — I70.0 ATHEROSCLEROSIS OF AORTA: ICD-10-CM

## 2024-09-18 PROCEDURE — 99999 PR PBB SHADOW E&M-EST. PATIENT-LVL V: CPT | Mod: PBBFAC,,, | Performed by: NURSE PRACTITIONER

## 2024-09-18 PROCEDURE — 99215 OFFICE O/P EST HI 40 MIN: CPT | Mod: PBBFAC | Performed by: NURSE PRACTITIONER

## 2024-09-18 PROCEDURE — G0439 PPPS, SUBSEQ VISIT: HCPCS | Mod: ,,, | Performed by: NURSE PRACTITIONER

## 2024-09-18 NOTE — PATIENT INSTRUCTIONS
Counseling and Referral of Other Preventative  (Italic type indicates deductible and co-insurance are waived)    Patient Name: Hoang Santos  Today's Date: 9/18/2024    Health Maintenance       Date Due Completion Date    PROSTATE-SPECIFIC ANTIGEN 09/22/2023 9/22/2022    Influenza Vaccine (1) 09/01/2024 12/12/2023    COVID-19 Vaccine (8 - 2023-24 season) 09/01/2024 12/18/2023    High Dose Statin 06/05/2025 6/5/2024    TETANUS VACCINE 06/16/2027 6/16/2017    Lipid Panel 09/22/2027 9/22/2022    Colorectal Cancer Screening 11/03/2031 11/3/2021        No orders of the defined types were placed in this encounter.      The following information is provided to all patients.  This information is to help you find resources for any of the problems found today that may be affecting your health:                  Living healthy guide: www.[facility].[place].[facility]      Understanding Diabetes: www.diabetes.org      Eating healthy: www.cdc.gov/healthyweight      CDC home safety checklist: www.cdc.gov/steadi/patient.html      Agency on Aging: www.goea.[place].[facility]      Alcoholics anonymous (AA): www.aa.org      Physical Activity: www.[place].nih.gov/io4zcwx      Tobacco use: www.quitwithusla.org

## 2024-09-18 NOTE — PROGRESS NOTES
"Hoang Santos presented for an initial Medicare AWV today. The following components were reviewed and updated:    Medical history  Family History  Social history  Allergies and Current Medications  Health Risk Assessment  Health Maintenance  Care Team    **See Completed Assessments for Annual Wellness visit with in the encounter summary    The following assessments were completed:  Depression Screening  Cognitive function Screening  Timed Get Up Test  Whisper Test      Opioid documentation:      Patient does not have a current opioid prescription.            Vitals:    09/18/24 0918   BP: 120/70   BP Location: Right arm   Patient Position: Sitting   BP Method: Medium (Manual)   Pulse: 67   Temp: 97.7 °F (36.5 °C)   TempSrc: Oral   SpO2: 99%   Weight: 82.6 kg (182 lb 1.6 oz)   Height: 5' 6" (1.676 m)     Body mass index is 29.39 kg/m².       Physical Exam  Constitutional:       Appearance: Normal appearance.   Pulmonary:      Effort: Pulmonary effort is normal.      Breath sounds: Normal breath sounds.   Neurological:      Mental Status: He is alert.           Diagnoses and health risks identified today and associated recommendations/orders:  1. Encounter for Medicare annual wellness exam  Annual Health Risk Assessment (HRA) visit today.  Counseling and referral of health maintenance and preventative health measures performed.  Patient given annual wellness paperwork to take home.  Encouraged to return in 1 year for subsequent HRA visit.   - Ambulatory Referral/Consult to Enhanced Annual Wellness Visit (eAWV)    2. Atherosclerosis of aorta  Chronic. Stable. Continue current treatment plan as previously prescribed by PCP.    3. Other hyperlipidemia  Chronic. Stable. Patient is not on a statin. Continue current treatment plan as previously prescribed by PCP.    4. Sinus pause  Chronic. Stable. Continue current treatment plan as previously prescribed by PCP.    5. Tachycardia  Chronic. Stable. Continue current " treatment plan as previously prescribed by PCP.    6. Tortuous aorta  Chronic. Stable. Continue current treatment plan as previously prescribed by PCP.    7. BPH with urinary obstruction  Chronic. Stable. Continue current treatment plan as previously prescribed by PCP.    8. Erectile dysfunction due to arterial insufficiency  Chronic. Stable. Continue current treatment plan as previously prescribed by PCP.    9. Duodenal carcinoid syndrome  Chronic. Stable. Continue current treatment plan as previously prescribed by PCP.    10. Dysphagia, unspecified type  Chronic. Stable. Continue current treatment plan as previously prescribed by PCP.    11. Gastric outlet obstruction  Chronic. Stable. Continue current treatment plan as previously prescribed by PCP.    12. Cervical osteophyte  Chronic. Stable. Continue current treatment plan as previously prescribed by PCP.    13. Obstructive sleep apnea  Chronic. Stable. Continue current treatment plan as previously prescribed by PCP.    14. GEMMA on CPAP  Chronic. Stable. Continue current treatment plan as previously prescribed by PCP.    15. Pleural effusion  Chronic. Stable. Continue current treatment plan as previously prescribed by PCP.    16. Voice disturbance  Chronic. Stable. Continue current treatment plan as previously prescribed by PCP.    17. Chronic cough  Chronic. Stable. Continue current treatment plan as previously prescribed by PCP.    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 Stable. Continue current treatment plan as previously prescribed by PCP.    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 Stable. Continue current treatment plan as previously prescribed by PCP.    20. Vertigo  Chronic. Stable. Continue current treatment plan as previously prescribed by PCP.        Provided Hoang with a 5-10 year written screening schedule and personal prevention plan. Recommendations were developed using the USPSTF age appropriate recommendations. Education, counseling, and referrals were provided  as needed.  After Visit Summary printed and given to patient which includes a list of additional screenings\tests needed.    No follow-ups on file.      Bharat Naik NP

## 2024-10-04 ENCOUNTER — OFFICE VISIT (OUTPATIENT)
Dept: URGENT CARE | Facility: CLINIC | Age: 74
End: 2024-10-04
Payer: MEDICARE

## 2024-10-04 VITALS
HEIGHT: 66 IN | OXYGEN SATURATION: 97 % | BODY MASS INDEX: 28.93 KG/M2 | TEMPERATURE: 98 F | RESPIRATION RATE: 14 BRPM | HEART RATE: 73 BPM | DIASTOLIC BLOOD PRESSURE: 74 MMHG | WEIGHT: 180 LBS | SYSTOLIC BLOOD PRESSURE: 133 MMHG

## 2024-10-04 DIAGNOSIS — R35.0 URINARY FREQUENCY: Primary | ICD-10-CM

## 2024-10-04 DIAGNOSIS — N30.00 ACUTE CYSTITIS WITHOUT HEMATURIA: ICD-10-CM

## 2024-10-04 LAB
BILIRUBIN, UA POC OHS: NEGATIVE
BLOOD, UA POC OHS: NEGATIVE
CLARITY, UA POC OHS: CLEAR
COLOR, UA POC OHS: YELLOW
GLUCOSE, UA POC OHS: 100
KETONES, UA POC OHS: NEGATIVE
LEUKOCYTES, UA POC OHS: NEGATIVE
NITRITE, UA POC OHS: POSITIVE
PH, UA POC OHS: 6.5
PROTEIN, UA POC OHS: NEGATIVE
SPECIFIC GRAVITY, UA POC OHS: 1.01
UROBILINOGEN, UA POC OHS: 0.2

## 2024-10-04 PROCEDURE — 87086 URINE CULTURE/COLONY COUNT: CPT | Performed by: FAMILY MEDICINE

## 2024-10-04 RX ORDER — CIPROFLOXACIN 500 MG/1
500 TABLET ORAL 2 TIMES DAILY
Qty: 14 TABLET | Refills: 0 | Status: SHIPPED | OUTPATIENT
Start: 2024-10-04 | End: 2024-10-11

## 2024-10-04 NOTE — PROGRESS NOTES
"Subjective:      Patient ID: Hoang Santos Jr. is a 74 y.o. male.    Vitals:  height is 5' 6" (1.676 m) and weight is 81.6 kg (180 lb). His tympanic temperature is 97.5 °F (36.4 °C). His blood pressure is 133/74 and his pulse is 73. His respiration is 14 and oxygen saturation is 97%.     Chief Complaint: Urinary Tract Infection    75 y/o c/o urinary issue. Denies STD exposure.    Urinary Tract Infection   This is a new problem. The current episode started in the past 7 days. The problem occurs every urination. The problem has been gradually worsening. He is Sexually active. There is No history of pyelonephritis. Associated symptoms include frequency. Pertinent negatives include no behavior changes, chills, discharge, flank pain, hematuria, hesitancy, nausea, possible pregnancy, sweats, urgency, vomiting, weight loss, bubble bath use, constipation, rash or withholding. He has tried home medications for the symptoms. There is no history of catheterization, diabetes insipidus, diabetes mellitus, genitourinary reflux, hypertension, kidney stones, recurrent UTIs, a single kidney, STD, urinary stasis or a urological procedure.       Constitution: Negative for chills.   Gastrointestinal:  Negative for nausea, vomiting and constipation.   Genitourinary:  Positive for frequency. Negative for urgency, flank pain and hematuria.   Skin:  Negative for rash.      Objective:     Vitals:    10/04/24 1146   BP: 133/74   BP Location: Left arm   Patient Position: Sitting   Pulse: 73   Resp: 14   Temp: 97.5 °F (36.4 °C)   TempSrc: Tympanic   SpO2: 97%   Weight: 81.6 kg (180 lb)   Height: 5' 6" (1.676 m)      Physical Exam   Constitutional: He is oriented to person, place, and time.   Cardiovascular: Normal rate and regular rhythm.   Pulmonary/Chest: Effort normal and breath sounds normal.   Abdominal: Bowel sounds are normal. He exhibits no distension. Soft. There is no abdominal tenderness. There is no rebound, no left CVA " tenderness and no right CVA tenderness.   Neurological: He is alert and oriented to person, place, and time.   Psychiatric: His behavior is normal. Thought content normal.     Results for orders placed or performed in visit on 10/04/24   POCT Urinalysis(Instrument)    Collection Time: 10/04/24 11:59 AM   Result Value Ref Range    Color, POC UA Yellow Yellow, Straw, Colorless    Clarity, POC UA Clear Clear    Glucose, POC  (A) Negative    Bilirubin, POC UA Negative Negative    Ketones, POC UA Negative Negative    Spec Grav POC UA 1.015 1.005 - 1.030    Blood, POC UA Negative Negative    pH, POC UA 6.5 5.0 - 8.0    Protein, POC UA Negative Negative    Urobilinogen, POC UA 0.2 <=1.0    Nitrite, POC UA Positive (A) Negative    WBC, POC UA Negative Negative      Assessment:     1. Urinary frequency    2. Acute cystitis without hematuria        Plan:       Urinary frequency  -     POCT Urinalysis(Instrument)    2. Acute cystitis without hematuria  -     Urine culture  -     ciprofloxacin HCl (CIPRO) 500 MG tablet; Take 1 tablet (500 mg total) by mouth 2 (two) times daily. for 7 days  Dispense: 14 tablet; Refill: 0  If no improvement in 48 hrs of starting the antibiotic, patient instructed that he will need to be reevaluated.    He has tolerated ciprofloxacin in the past without adverse event. Normal kidney function within last 12 months.

## 2024-10-05 LAB — BACTERIA UR CULT: NORMAL

## 2024-10-06 ENCOUNTER — TELEPHONE (OUTPATIENT)
Dept: URGENT CARE | Facility: CLINIC | Age: 74
End: 2024-10-06
Payer: MEDICARE

## 2024-10-06 NOTE — TELEPHONE ENCOUNTER
Called patient with urine culture results.Patient voiced understanding.        ----- Message from Jacquelyn Gipson NP sent at 10/6/2024  5:09 PM CDT -----  Please call and notify patient about negative urine culture results.

## 2024-10-28 ENCOUNTER — TELEPHONE (OUTPATIENT)
Dept: INTERNAL MEDICINE | Facility: CLINIC | Age: 74
End: 2024-10-28
Payer: MEDICARE

## 2024-10-28 ENCOUNTER — OFFICE VISIT (OUTPATIENT)
Dept: INTERNAL MEDICINE | Facility: CLINIC | Age: 74
End: 2024-10-28
Payer: MEDICARE

## 2024-10-28 ENCOUNTER — HOSPITAL ENCOUNTER (OUTPATIENT)
Dept: RADIOLOGY | Facility: OTHER | Age: 74
Discharge: HOME | End: 2024-10-28
Attending: STUDENT IN AN ORGANIZED HEALTH CARE EDUCATION/TRAINING PROGRAM
Payer: MEDICARE

## 2024-10-28 VITALS
DIASTOLIC BLOOD PRESSURE: 56 MMHG | WEIGHT: 181.19 LBS | SYSTOLIC BLOOD PRESSURE: 103 MMHG | BODY MASS INDEX: 29.12 KG/M2 | HEART RATE: 65 BPM | HEIGHT: 66 IN | OXYGEN SATURATION: 100 %

## 2024-10-28 DIAGNOSIS — R07.9 CHEST PAIN, UNSPECIFIED TYPE: ICD-10-CM

## 2024-10-28 DIAGNOSIS — R07.9 CHEST PAIN, UNSPECIFIED TYPE: Primary | ICD-10-CM

## 2024-10-28 DIAGNOSIS — Z12.5 SCREENING FOR PROSTATE CANCER: ICD-10-CM

## 2024-10-28 LAB
OHS QRS DURATION: 72 MS
OHS QTC CALCULATION: 423 MS

## 2024-10-28 PROCEDURE — 99999 PR PBB SHADOW E&M-EST. PATIENT-LVL III: CPT | Mod: PBBFAC,,, | Performed by: STUDENT IN AN ORGANIZED HEALTH CARE EDUCATION/TRAINING PROGRAM

## 2024-10-28 PROCEDURE — 93005 ELECTROCARDIOGRAM TRACING: CPT | Mod: PBBFAC | Performed by: INTERNAL MEDICINE

## 2024-10-28 PROCEDURE — 71046 X-RAY EXAM CHEST 2 VIEWS: CPT | Mod: 26,,, | Performed by: RADIOLOGY

## 2024-10-28 PROCEDURE — 99213 OFFICE O/P EST LOW 20 MIN: CPT | Mod: PBBFAC,25 | Performed by: STUDENT IN AN ORGANIZED HEALTH CARE EDUCATION/TRAINING PROGRAM

## 2024-10-28 PROCEDURE — 71046 X-RAY EXAM CHEST 2 VIEWS: CPT | Mod: TC,FY

## 2024-10-28 PROCEDURE — 99214 OFFICE O/P EST MOD 30 MIN: CPT | Mod: S$PBB,,, | Performed by: STUDENT IN AN ORGANIZED HEALTH CARE EDUCATION/TRAINING PROGRAM

## 2024-10-28 PROCEDURE — 93010 ELECTROCARDIOGRAM REPORT: CPT | Mod: S$PBB,,, | Performed by: INTERNAL MEDICINE
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
## 2025-03-28 DIAGNOSIS — C7A.010 MALIGNANT CARCINOID TUMOR OF THE DUODENUM: Primary | ICD-10-CM

## 2025-04-08 DIAGNOSIS — C7A.010 MALIGNANT CARCINOID TUMOR OF THE DUODENUM: Primary | ICD-10-CM

## 2025-04-10 ENCOUNTER — OFFICE VISIT (OUTPATIENT)
Dept: UROLOGY | Facility: CLINIC | Age: 75
End: 2025-04-10
Payer: MEDICARE

## 2025-04-10 ENCOUNTER — PATIENT MESSAGE (OUTPATIENT)
Dept: UROLOGY | Facility: CLINIC | Age: 75
End: 2025-04-10

## 2025-04-10 ENCOUNTER — TELEPHONE (OUTPATIENT)
Dept: HEMATOLOGY/ONCOLOGY | Facility: CLINIC | Age: 75
End: 2025-04-10
Payer: MEDICARE

## 2025-04-10 VITALS — HEIGHT: 66 IN | BODY MASS INDEX: 28.7 KG/M2 | WEIGHT: 178.56 LBS

## 2025-04-10 DIAGNOSIS — N40.1 BPH WITH URINARY OBSTRUCTION: ICD-10-CM

## 2025-04-10 DIAGNOSIS — E78.49 OTHER HYPERLIPIDEMIA: ICD-10-CM

## 2025-04-10 DIAGNOSIS — N52.01 ERECTILE DYSFUNCTION DUE TO ARTERIAL INSUFFICIENCY: Primary | ICD-10-CM

## 2025-04-10 DIAGNOSIS — N13.8 BPH WITH URINARY OBSTRUCTION: ICD-10-CM

## 2025-04-10 PROCEDURE — G2211 COMPLEX E/M VISIT ADD ON: HCPCS | Mod: S$PBB,,, | Performed by: UROLOGY

## 2025-04-10 PROCEDURE — 99999 PR PBB SHADOW E&M-EST. PATIENT-LVL III: CPT | Mod: PBBFAC,,, | Performed by: UROLOGY

## 2025-04-10 PROCEDURE — 99213 OFFICE O/P EST LOW 20 MIN: CPT | Mod: PBBFAC | Performed by: UROLOGY

## 2025-04-10 PROCEDURE — 99214 OFFICE O/P EST MOD 30 MIN: CPT | Mod: S$PBB,,, | Performed by: UROLOGY

## 2025-04-10 RX ORDER — TADALAFIL 20 MG/1
20 TABLET ORAL DAILY PRN
Qty: 20 TABLET | Refills: 11 | Status: SHIPPED | OUTPATIENT
Start: 2025-04-10

## 2025-04-10 NOTE — PROGRESS NOTES
CHIEF COMPLAINT:    Mr. Santos is a 74 y.o. male presenting with ED.    PRESENTING ILLNESS:    Hoang Santos Jr. is a 74 y.o. male who  c/o sever ED. His T is normal.   He's tried Viagra with mixed results.  He's used Cialis with mixed results.  He was given an Rx for ICI, but he didn't fill it.    He c/o vaginal dryness during intercourse.    He has LUTS. Nocturia x 1-2.  Is pleased with how he voids.    REVIEW OF SYSTEMS:    Hoang Santos Jr. denies headache, blurred vision, fever, nausea, vomiting, chills, abdominal pain, chest pain, sore throat, bleeding per rectum, cough, SOB, recent loss of consciousness, recent mental status changes, seizures, dizziness, or upper or lower extremity weakness.    NURA  1. 1  2. 4  3. 4  4. 4  5. 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    Prostatic hyperplasia     Sleep apnea        Past Surgical History:   Procedure Laterality Date    carcinoid tumor stomach      January 4, 2019    CHOLECYSTECTOMY  01/04/2019    Procedure: CHOLECYSTECTOMY;  Surgeon: Madeleine Alvarado MD;  Location: [facility] OR;  Service: General;;    CYSTOSCOPY WITH INSERTION OF MINIMALLY INVASIVE IMPLANT TO ENLARGE PROSTATIC URETHRA N/A 06/28/2018    Procedure: TRANSPROSTATIC TISSUE RETRACTION;  Surgeon: Kory Jack MD;  Location: [facility] OR;  Service: Urology;  Laterality: N/A;    DUODENECTOMY N/A 01/04/2019    Procedure: DUODENECTOMY;  Surgeon: Madeleine Alvarado MD;  Location: [facility] OR;  Service: General;  Laterality: N/A;    ENDOSCOPIC ULTRASOUND OF UPPER GASTROINTESTINAL TRACT N/A 11/05/2018    Procedure: ULTRASOUND-ENDOSCOPIC-UPPER;  Surgeon: Gorge Reyes MD;  Location: [facility] ENDO;  Service: Endoscopy;  Laterality: N/A;  Carcinoid diagnosis    ENDOSCOPIC ULTRASOUND OF UPPER GASTROINTESTINAL TRACT N/A 09/24/2019    Procedure: ULTRASOUND-ENDOSCOPIC-UPPER;  Surgeon: David Roberson,  MD;  Location: [facility] ENDO;  Service: Endoscopy;  Laterality: N/A;  Carcinoid Diagnosis  Gastric Ph    ENDOSCOPIC ULTRASOUND OF UPPER GASTROINTESTINAL TRACT N/A 03/22/2021    Procedure: ULTRASOUND, UPPER GI TRACT, ENDOSCOPIC;  Surgeon: Jose Kahn MD;  Location: [facility];  Service: Endoscopy;  Laterality: N/A;  Carmelita-operative obstruction related to anesthesia likely related to GEMMA, vocal atrophy scar and cervical osteophyte.   The former has been evaluated by Dr. Juaquin Casas and there is no intervention to prevent this other than ETT with anesthesia.   Patie    ENDOSCOPIC ULTRASOUND OF UPPER GASTROINTESTINAL TRACT N/A 05/11/2023    Procedure: ULTRASOUND, UPPER GI TRACT, ENDOSCOPIC;  Surgeon: Rigoberto Nguyen MD;  Location: [facility] (2ND FLR);  Service: Endoscopy;  Laterality: N/A;  inst to portal  pt requested time-MS    ESOPHAGOGASTRODUODENOSCOPY N/A 09/24/2018    Procedure: EGD (ESOPHAGOGASTRODUODENOSCOPY);  Surgeon: Salo Nuñez MD;  Location: [facility] (4TH FLR);  Service: Endoscopy;  Laterality: N/A;    ESOPHAGOGASTRODUODENOSCOPY N/A 01/21/2019    Procedure: ESOPHAGOGASTRODUODENOSCOPY (EGD);  Surgeon: Jose Kahn MD;  Location: [facility];  Service: Endoscopy;  Laterality: N/A;    ESOPHAGOGASTRODUODENOSCOPY N/A 01/24/2019    Procedure: EGD (ESOPHAGOGASTRODUODENOSCOPY);  Surgeon: Madeleine Alvarado MD;  Location: [facility];  Service: General;  Laterality: N/A;  spoke to belkis in [facility] for egd, told her it was add on case could go anywhere from 2pm KB    ESOPHAGOGASTRODUODENOSCOPY N/A 09/24/2019    Procedure: EGD (ESOPHAGOGASTRODUODENOSCOPY);  Surgeon: David Roberson MD;  Location: [facility] ENDO;  Service: Endoscopy;  Laterality: N/A;    ESOPHAGOGASTRODUODENOSCOPY N/A 01/04/2019    Procedure: EGD (ESOPHAGOGASTRODUODENOSCOPY);  Surgeon: Madeleine Alvarado MD;  Location: [facility] OR;  Service: General;  Laterality: N/A;  spoke to Anna conley for egd set up for 1/4/19 on 1/3/19 9603 KB     GASTRIC FUNDOPLICATION N/A 01/04/2019    Procedure: FUNDOPLICATION;  Surgeon: Madeleine Alvarado MD;  Location: [facility] OR;  Service: General;  Laterality: N/A;    GASTROJEJUNOSTOMY Right 01/24/2019    Procedure: GASTROJEJUNOSTOMY;  Surgeon: Madeleine Alvarado MD;  Location: [facility] OR;  Service: General;  Laterality: Right;    GASTROJEJUNOSTOMY N/A 01/04/2019    Procedure: GASTROJEJUNOSTOMY;  Surgeon: Madeleine Alvarado MD;  Location: [facility] OR;  Service: General;  Laterality: N/A;    LAPAROTOMY, EXPLORATORY N/A 01/24/2019    Procedure: LAPAROTOMY, EXPLORATORY;  Surgeon: Madeleine Alvarado MD;  Location: [facility] OR;  Service: General;  Laterality: N/A;    LIVER BIOPSY  01/04/2019    Procedure: BIOPSY, LIVER;  Surgeon: Madeleine Alvarado MD;  Location: [facility] OR;  Service: General;;    LYSIS OF ADHESIONS N/A 01/24/2019    Procedure: LYSIS, ADHESIONS;  Surgeon: Madeleine Alvarado MD;  Location: [facility] OR;  Service: General;  Laterality: N/A;    PERCUTANEOUS TRANSHEPATIC CHOLANGIOGRAPHY Right 01/29/2019    Procedure: CHOLANGIOGRAM, PERCUTANEOUS, TRANSHEPATIC;  Surgeon: YOHANNES Shelley MD;  Location: [facility] OR;  Service: General;  Laterality: Right;    TONSILLECTOMY      UVULOPALATOPHARYNGOPLASTY         Family History   Problem Relation Name Age of Onset    Heart disease Mother      Diabetes Mother      Stroke Father      Cancer Sister          pancreatitic    COPD Sister Sammy     Hyperlipidemia Sister Sammy     No Known Problems Sister Page     No Known Problems Brother      No Known Problems Brother Maverick     No Known Problems Brother Calixto     No Known Problems Daughter Adalgisa     Macular degeneration Daughter Felicia     No Known Problems Son Yosef     Prostate cancer Neg Hx      Kidney disease Neg Hx      Thyroid disease Neg Hx      Retinal detachment Neg Hx      Hypertension Neg Hx      Glaucoma Neg Hx         Social History     Socioeconomic History    Marital status:     Number of  children: 3   Tobacco Use    Smoking status: Never    Smokeless tobacco: Never   Substance and Sexual Activity    Alcohol use: Not Currently     Comment: rarely    Drug use: No    Sexual activity: Yes     Partners: Female     Social Drivers of Health     Financial Resource Strain: Low Risk  (9/18/2024)    Overall Financial Resource Strain (CARDIA)     Difficulty of Paying Living Expenses: Not hard at all   Food Insecurity: No Food Insecurity (9/18/2024)    Hunger Vital Sign     Worried About Running Out of Food in the Last Year: Never true     Ran Out of Food in the Last Year: Never true   Transportation Needs: No Transportation Needs (9/18/2024)    PRAPARE - Transportation     Lack of Transportation (Medical): No     Lack of Transportation (Non-Medical): No   Physical Activity: Insufficiently Active (9/18/2024)    Exercise Vital Sign     Days of Exercise per Week: 2 days     Minutes of Exercise per Session: 10 min   Stress: No Stress Concern Present (9/18/2024)    [employer] - Occupational Stress Questionnaire     Feeling of Stress : Not at all   Housing Stability: Unknown (9/18/2024)    Housing Stability Vital Sign     Unable to Pay for Housing in the Last Year: No     Homeless in the Last Year: No       Allergies:  Epinephrine and Toradol [ketorolac]    Medications:    Current Outpatient Medications:     efinaconazole (JUBLIA) 10 % Clarisa, Apply to affected nails once daily, Disp: 8 mL, Rfl: 6    rosuvastatin (CRESTOR) 40 MG Tab, TAKE 1 TABLET(40 MG) BY MOUTH EVERY EVENING, Disp: 90 tablet, Rfl: 3    sildenafiL (VIAGRA) 100 MG tablet, Take 1 tablet (100 mg total) by mouth daily as needed for Erectile Dysfunction. Take 1 hour before intercourse., Disp: 4 tablet, Rfl: 11    tadalafiL (CIALIS) 20 MG Tab, Take 1 tablet (20 mg total) by mouth daily as needed. Take 1 hour before intercourse, Disp: 20 tablet, Rfl: 11    PHYSICAL EXAMINATION:    The patient generally appears in good health, is  appropriately interactive, and is in no apparent distress.     Eyes: anicteric sclerae, moist conjunctivae; no lid-lag; PERRLA     HENT: Atraumatic; oropharynx clear with moist mucous membranes and no mucosal ulcerations;normal hard and soft palate.  No evidence of lymphadenopathy.    Neck: Trachea midline.  No thyromegaly.    Skin: No lesions.    Mental: Cooperative with normal affect.  Is oriented to time, place, and person.    Neuro: Grossly intact.    Chest: Normal inspiratory effort.   No accessory muscles.  No audible wheezes.  Respirations symmetric on inspiration and expiration.    Heart: Regular rhythm.      Abdomen:  Soft, non-tender. No masses or organomegaly. Bladder is not palpable. No evidence of flank discomfort. No evidence of inguinal hernia.    Genitourinary: The penis is not circumcised with no evidence of plaques or induration. The urethral meatus is normal. The testes, epididymides, and cord structures are normal in size and contour bilaterally. The scrotum is normal in size and contour.    Normal anal sphincter tone. No rectal mass.    The prostate is smooth. Normal landmarks. Lateral sulci. Median furrow intact.  No nodularity or induration. Seminal vesicles are normal.    Extremities: No clubbing, cyanosis, or edema      LABS:    Lab Results   Component Value Date    PSA 3.9 10/28/2024    PSA 1.6 09/22/2022    PSA 1.2 06/19/2017       IMPRESSION:    Encounter Diagnoses   Name Primary?    Erectile dysfunction due to arterial insufficiency Yes    BPH with urinary obstruction     Other hyperlipidemia      Hyperlipidemia, stable      PLAN:    1. Discussed options for his severe ED (affected by above comorbidities). He'd like to try Cialis again. Side effects discussed.  A new Rx was given.  He was also given info on the IPP website as I do not think Cialis will be satisfactory.  2.  Recommend OTC lubrication for the vaginal dryness.  3. Will observe his LUTS as they don't bother him.  4. RTC 3  months.  5. Visit today included increased complexity associated with the care of the episodic problem of severe ED (affected by above comorbidities) addressed and managing the longitudinal care of the patient due to the serious and/or complex managed problem(s) requiring specialty care.     Copy to:

## 2025-04-10 NOTE — TELEPHONE ENCOUNTER
I attempt to reach pt  to schedule future apt,but unfortunately I was  unable to reach patient   so i left both a voice mail and call back number.

## 2025-04-14 ENCOUNTER — HOSPITAL ENCOUNTER (OUTPATIENT)
Dept: RADIOLOGY | Facility: OTHER | Age: 75
Discharge: HOME | End: 2025-04-14
Attending: INTERNAL MEDICINE
Payer: MEDICARE

## 2025-04-14 DIAGNOSIS — C7A.010 MALIGNANT CARCINOID TUMOR OF THE DUODENUM: ICD-10-CM

## 2025-04-14 PROCEDURE — A9698 NON-RAD CONTRAST MATERIALNOC: HCPCS | Performed by: INTERNAL MEDICINE

## 2025-04-14 PROCEDURE — 25500020 PHARM REV CODE 255: Performed by: INTERNAL MEDICINE

## 2025-04-14 PROCEDURE — 74177 CT ABD & PELVIS W/CONTRAST: CPT | Mod: 26,,, | Performed by: RADIOLOGY

## 2025-04-14 PROCEDURE — 74177 CT ABD & PELVIS W/CONTRAST: CPT | Mod: TC

## 2025-04-14 RX ADMIN — IOHEXOL 1000 ML: 9 SOLUTION ORAL at 12:04

## 2025-04-14 RX ADMIN — IOHEXOL 75 ML: 350 INJECTION, SOLUTION INTRAVENOUS at 12:04

## 2025-05-05 ENCOUNTER — TELEPHONE (OUTPATIENT)
Dept: HEMATOLOGY/ONCOLOGY | Facility: CLINIC | Age: 75
End: 2025-05-05
Payer: MEDICARE

## 2025-05-08 ENCOUNTER — PATIENT MESSAGE (OUTPATIENT)
Dept: HEMATOLOGY/ONCOLOGY | Facility: CLINIC | Age: 75
End: 2025-05-08
Payer: MEDICARE

## 2025-05-08 ENCOUNTER — TELEPHONE (OUTPATIENT)
Dept: HEMATOLOGY/ONCOLOGY | Facility: CLINIC | Age: 75
End: 2025-05-08
Payer: MEDICARE

## 2025-05-08 NOTE — TELEPHONE ENCOUNTER
----- Message from Nikki sent at 5/8/2025  9:56 AM CDT -----  Regarding: Pt called to have his fasting lab appt on 5/9/25 changed to 8 am and pt would like a response via [facility] to know that the Rx has been changed  Type:  Appointment RequestName of Caller: Pt called to have his fasting lab appt on 5/9/25 changed to 8 am and pt would like a response via [facility] to know that the lab has been changedBe Call Back Number: [phone]

## 2025-05-09 ENCOUNTER — LAB VISIT (OUTPATIENT)
Dept: LAB | Facility: HOSPITAL | Age: 75
End: 2025-05-09
Attending: INTERNAL MEDICINE
Payer: MEDICARE

## 2025-05-09 DIAGNOSIS — C7A.010 MALIGNANT CARCINOID TUMOR OF THE DUODENUM: ICD-10-CM

## 2025-05-09 LAB
ABSOLUTE EOSINOPHIL (OHS): 0.08 K/UL
ABSOLUTE MONOCYTE (OHS): 0.47 K/UL (ref 0.3–1)
ABSOLUTE NEUTROPHIL COUNT (OHS): 2.84 K/UL (ref 1.8–7.7)
ALBUMIN SERPL BCP-MCNC: 3.6 G/DL (ref 3.5–5.2)
ALP SERPL-CCNC: 57 UNIT/L (ref 40–150)
ALT SERPL W/O P-5'-P-CCNC: 22 UNIT/L (ref 10–44)
ANION GAP (OHS): 8 MMOL/L (ref 8–16)
AST SERPL-CCNC: 31 UNIT/L (ref 11–45)
BASOPHILS # BLD AUTO: 0.03 K/UL
BASOPHILS NFR BLD AUTO: 0.7 %
BILIRUB SERPL-MCNC: 0.8 MG/DL (ref 0.1–1)
BUN SERPL-MCNC: 12 MG/DL (ref 8–23)
CALCIUM SERPL-MCNC: 9.3 MG/DL (ref 8.7–10.5)
CHLORIDE SERPL-SCNC: 107 MMOL/L (ref 95–110)
CO2 SERPL-SCNC: 29 MMOL/L (ref 23–29)
CREAT SERPL-MCNC: 0.8 MG/DL (ref 0.5–1.4)
ERYTHROCYTE [DISTWIDTH] IN BLOOD BY AUTOMATED COUNT: 13.6 % (ref 11.5–14.5)
GFR SERPLBLD CREATININE-BSD FMLA CKD-EPI: >60 ML/MIN/1.73/M2
GLUCOSE SERPL-MCNC: 95 MG/DL (ref 70–110)
HCT VFR BLD AUTO: 43.9 % (ref 40–54)
HGB BLD-MCNC: 13.4 GM/DL (ref 14–18)
IMM GRANULOCYTES # BLD AUTO: 0.01 K/UL (ref 0–0.04)
IMM GRANULOCYTES NFR BLD AUTO: 0.2 % (ref 0–0.5)
LYMPHOCYTES # BLD AUTO: 0.96 K/UL (ref 1–4.8)
MCH RBC QN AUTO: 27.9 PG (ref 27–31)
MCHC RBC AUTO-ENTMCNC: 30.5 G/DL (ref 32–36)
MCV RBC AUTO: 92 FL (ref 82–98)
NUCLEATED RBC (/100WBC) (OHS): 0 /100 WBC
PLATELET # BLD AUTO: 169 K/UL (ref 150–450)
PMV BLD AUTO: 10.1 FL (ref 9.2–12.9)
POTASSIUM SERPL-SCNC: 4.4 MMOL/L (ref 3.5–5.1)
PROT SERPL-MCNC: 7.4 GM/DL (ref 6–8.4)
RBC # BLD AUTO: 4.8 M/UL (ref 4.6–6.2)
RELATIVE EOSINOPHIL (OHS): 1.8 %
RELATIVE LYMPHOCYTE (OHS): 21.9 % (ref 18–48)
RELATIVE MONOCYTE (OHS): 10.7 % (ref 4–15)
RELATIVE NEUTROPHIL (OHS): 64.7 % (ref 38–73)
SODIUM SERPL-SCNC: 144 MMOL/L (ref 136–145)
WBC # BLD AUTO: 4.39 K/UL (ref 3.9–12.7)

## 2025-05-09 PROCEDURE — 83497 ASSAY OF 5-HIAA: CPT

## 2025-05-09 PROCEDURE — 85025 COMPLETE CBC W/AUTO DIFF WBC: CPT

## 2025-05-09 PROCEDURE — 83519 RIA NONANTIBODY: CPT

## 2025-05-09 PROCEDURE — 36415 COLL VENOUS BLD VENIPUNCTURE: CPT

## 2025-05-09 PROCEDURE — 84155 ASSAY OF PROTEIN SERUM: CPT

## 2025-05-13 LAB
5OH-INDOLEACETATE SERPL-MCNC: 13 NG/ML
SEROTONIN SER-MCNC: 92 NG/ML

## 2025-05-17 LAB — PANCREASTATIN SERPL-MCNC: NORMAL PG/ML

## 2025-06-10 ENCOUNTER — OFFICE VISIT (OUTPATIENT)
Dept: HEMATOLOGY/ONCOLOGY | Facility: CLINIC | Age: 75
End: 2025-06-10
Payer: MEDICARE

## 2025-06-10 VITALS
BODY MASS INDEX: 29 KG/M2 | HEIGHT: 66 IN | DIASTOLIC BLOOD PRESSURE: 60 MMHG | HEART RATE: 85 BPM | TEMPERATURE: 97 F | OXYGEN SATURATION: 99 % | RESPIRATION RATE: 15 BRPM | SYSTOLIC BLOOD PRESSURE: 94 MMHG | WEIGHT: 180.44 LBS

## 2025-06-10 DIAGNOSIS — I70.0 ATHEROSCLEROSIS OF AORTA: ICD-10-CM

## 2025-06-10 DIAGNOSIS — C7A.010 MALIGNANT CARCINOID TUMOR OF THE DUODENUM: Primary | ICD-10-CM

## 2025-06-10 PROBLEM — E34.09 DUODENAL CARCINOID SYNDROME: Status: RESOLVED | Noted: 2019-09-24 | Resolved: 2025-06-10

## 2025-06-10 PROCEDURE — 99214 OFFICE O/P EST MOD 30 MIN: CPT | Mod: S$PBB,,, | Performed by: INTERNAL MEDICINE

## 2025-06-10 PROCEDURE — 99999 PR PBB SHADOW E&M-EST. PATIENT-LVL IV: CPT | Mod: PBBFAC,,, | Performed by: INTERNAL MEDICINE

## 2025-06-10 PROCEDURE — G2211 COMPLEX E/M VISIT ADD ON: HCPCS | Mod: ,,, | Performed by: INTERNAL MEDICINE

## 2025-06-10 PROCEDURE — 99214 OFFICE O/P EST MOD 30 MIN: CPT | Mod: PBBFAC | Performed by: INTERNAL MEDICINE

## 2025-06-10 NOTE — PROGRESS NOTES
PROGRESS NOTE    Subjective:       Patient ID: Hoang Santos Jr. is a 74 y.o. male.    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 Mr Santos is a 73 yo man with HLD, BPH, GEMMA who first saw me on 3/28/23 for further management of neuroendocrine tumor. He underwent EGD on 2018 and was found to have a duodenal bulb mass. Biopsy showed low grade well diff NET, 1.2 mm. Ki 67 less than 1%. He underwent duodenum resection on 2019. Path showed a 9 mm NET, grade 1, well diff. T2N0. He presents today for follow up. Feeling well. Last EUS in .  2. Upper EUS in May 2023 normal.     Interval History:   Ms Santos returns for follow up. Feeling well. No diarrhea, flushing, wheezing.     ECO    ROS:   A ten-point system review is obtained and negative except for what was stated in the Interval History.     Physical Examination:   Vital signs reviewed.   General: well hydrated, well developed, in no acute distress  HEENT: normocephalic, PERRLA, EOMI, anicteric sclerae  Neck: supple, no JVD, thyromegaly, cervical or supraclavicular lymphadenopathy  Lungs: clear breath sounds bilaterally, no wheezing, rales, or rhonchi  Heart: RRR, no M/R/G  Abdomen: soft, no tenderness, non-distended, no hepatosplenomegaly, mass, or hernia. BS present  Extremities: no clubbing, cyanosis, or edema  Skin: no rash, ulcer, or open wounds  Neuro: alert and oriented x 4, no focal neuro deficit  Psych: pleasant and appropriate mood and affect    Objective:     Laboratory Data:  Labs reviewed. Serotonin, pancreastatin, 5-HIAA normal    Imaging Data:  CT A/P 24:  Impression:     No acute abnormalities in the abdomen or pelvis.  No evidence of recurrent or metastatic disease     CT A/P 2025:  Impression:     Stable exam compared to prior with no  acute process seen.  No CT evidence for metastatic disease.       Assessment and Plan:     1. Malignant carcinoid tumor of the duodenum    2. Atherosclerosis of aorta        1.  - Mr Santos is a 72 yo man with pathologic stage II (T2N0) well diff low grade NET of the duodenum, s/p surgery on 1/4/2019  - last upper EUS in May 2023 negative  - doing well.   - reviewed test results. biomarkers normal. CT A/P negative for recurrence.   - He is 6 years out. Will see him in one year with labs and CT A/P.     2.   - on crestor. continue    Follow-up:     RTC in 12 months  Knows to call in the interval if any problems arise.    Electronically signed by Layla Couch for Scheduling    Med Onc Chart Routing      Follow up with physician 1 year. see me in one year with fasting CBC, CMP, serotonin, pancreastatin, 5-HIAA, CT A/P done 3 weeks prior to return   Follow up with [facility]    Infusion scheduling note    Injection scheduling note    Labs CBC and CMP   Scheduling:  Preferred lab:  Lab interval:  serotonin, pancreastatiN, 5-HIAA   Imaging Other   CT A/P   Pharmacy appointment    Other referrals

## 2025-06-13 ENCOUNTER — OFFICE VISIT (OUTPATIENT)
Dept: INTERNAL MEDICINE | Facility: CLINIC | Age: 75
End: 2025-06-13
Attending: INTERNAL MEDICINE
Payer: MEDICARE

## 2025-06-13 VITALS
SYSTOLIC BLOOD PRESSURE: 92 MMHG | HEART RATE: 84 BPM | WEIGHT: 182.13 LBS | HEIGHT: 66 IN | OXYGEN SATURATION: 97 % | BODY MASS INDEX: 29.27 KG/M2 | DIASTOLIC BLOOD PRESSURE: 60 MMHG

## 2025-06-13 DIAGNOSIS — I70.0 ATHEROSCLEROSIS OF AORTA: ICD-10-CM

## 2025-06-13 DIAGNOSIS — N40.1 BPH WITH URINARY OBSTRUCTION: ICD-10-CM

## 2025-06-13 DIAGNOSIS — Z85.9 HISTORY OF MALIGNANT CARCINOID TUMOR: ICD-10-CM

## 2025-06-13 DIAGNOSIS — G47.33 OBSTRUCTIVE SLEEP APNEA: Primary | ICD-10-CM

## 2025-06-13 DIAGNOSIS — N13.8 BPH WITH URINARY OBSTRUCTION: ICD-10-CM

## 2025-06-13 PROCEDURE — 99999 PR PBB SHADOW E&M-EST. PATIENT-LVL III: CPT | Mod: PBBFAC,,, | Performed by: INTERNAL MEDICINE

## 2025-06-13 PROCEDURE — 99213 OFFICE O/P EST LOW 20 MIN: CPT | Mod: PBBFAC | Performed by: INTERNAL MEDICINE

## 2025-06-13 NOTE — PROGRESS NOTES
"Subjective:       Patient ID: Hoang Santos Jr. is a 74 y.o. male.    Chief Complaint: Annual Exam    Here for routine f/u      Forgetting new information and some of the old. Remains  of his Tenriism. No other concerns of problems in relation to memory.    ED persist.    ### carcinoid duodenum ###  ex-lap, toupet fundoplication, hiatal hernia repair, cholecystectomy, sleeve duodenectomy with end-end anastomosis, liver biopsy, biliary stenting, and intralesional chemo   -EUS (3/22/2021) by Dr. Jose Dumont and Dr. Marshall Kahn gastrojejunostomy was found, characterized by friable mucosa "  EUS( 5/2023) negative  CV onc Layla Bear MD on 6/10/2025 writes "Will see him in one year with labs and CT A/P."         ### GEMMA ###   he is honest about his non-adherence. Energy levels are fine. No HA. BP well controlled without meds.      ### HLD ####  ex-lap, toupet fundoplication, hiatal hernia repair, cholecystectomy, sleeve duodenectomy with end-end anastomosis, liver biopsy, biliary stenting, and intralesional chemo         History of Present Illness    CHIEF COMPLAINT:  Hoang presents today for follow up.      ROS:  General: -fever, -chills, -fatigue, -weight gain, -weight loss  Eyes: -vision changes, -redness, -discharge  ENT: -ear pain, -nasal congestion, -sore throat  Cardiovascular: -chest pain, -palpitations, -lower extremity edema  Respiratory: -cough, -shortness of breath  Gastrointestinal: -abdominal pain, -nausea, -vomiting, -diarrhea, -constipation, -blood in stool  Genitourinary: -dysuria, -hematuria, -frequency  Musculoskeletal: -joint pain, -muscle pain  Skin: -rash, -lesion  Neurological: -headache, -dizziness, -numbness, -tingling  Psychiatric: -anxiety, -depression, -sleep difficulty         Review of Systems   Constitutional:  Negative for appetite change, chills, fever and unexpected weight change.   HENT:  Negative for hearing loss, sore throat and trouble swallowing.    Eyes:  Negative for " "visual disturbance.   Respiratory:  Negative for cough, chest tightness and shortness of breath.    Cardiovascular:  Negative for chest pain and leg swelling.   Gastrointestinal:  Negative for abdominal pain, blood in stool, constipation, diarrhea, nausea and vomiting.   Endocrine: Negative for polydipsia and polyuria.   Genitourinary:  Negative for decreased urine volume, difficulty urinating, dysuria, frequency and urgency.   Musculoskeletal:  Negative for gait problem.   Skin:  Negative for rash.   Neurological:  Negative for dizziness and numbness.   Psychiatric/Behavioral:  The patient is not nervous/anxious.        Objective:      Vitals:    06/13/25 1043   BP: 92/60   BP Location: Left arm   Patient Position: Sitting   Pulse: 84   SpO2: 97%   Weight: 82.6 kg (182 lb 1.6 oz)   Height: 5' 6" (1.676 m)      Physical Exam  Vitals and nursing note reviewed.   Constitutional:       General: He is not in acute distress.     Appearance: Normal appearance. He is well-developed.   HENT:      Head: Normocephalic and atraumatic.      Mouth/Throat:      Pharynx: No oropharyngeal exudate.   Eyes:      General: No scleral icterus.     Conjunctiva/sclera: Conjunctivae normal.      Pupils: Pupils are equal, round, and reactive to light.   Neck:      Thyroid: No thyromegaly.   Cardiovascular:      Rate and Rhythm: Normal rate and regular rhythm.      Heart sounds: Normal heart sounds. No murmur heard.  Pulmonary:      Effort: Pulmonary effort is normal.      Breath sounds: Normal breath sounds. No wheezing or rales.   Abdominal:      General: There is no distension.   Musculoskeletal:         General: No tenderness.   Lymphadenopathy:      Cervical: No cervical adenopathy.   Skin:     General: Skin is warm and dry.   Neurological:      Mental Status: He is alert and oriented to person, place, and time.   Psychiatric:         Behavior: Behavior normal.         Assessment:       1. Obstructive sleep apnea    2. Atherosclerosis " of aorta    3. BPH with urinary obstruction    4. History of malignant carcinoid tumor-duodenum        Plan:       Hoang was seen today for annual exam.    Diagnoses and all orders for this visit:    Obstructive sleep apnea   Stressed adherence of and complications related to untreated GEMMA.    Atherosclerosis of aorta   Controlled and asymptomatic.  Continue current Rx regimen.    BPH with urinary obstruction     History of malignant carcinoid tumor-duodenum   UTD with oncology    RTC in 1 year or sooner prn       Visit today is associated with current or anticipated ongoing medical care related to this patient's single serious condition/complex condition of hx carcinoid tumor. The patient will return to see me as these issues will be followed longitudinally.           Assessment & Plan              This note was generated with the assistance of ambient listening technology. Verbal consent was obtained by the patient and accompanying visitor(s) for the recording of patient appointment to facilitate this note. I attest to having reviewed and edited the generated note for accuracy, though some syntax or spelling errors may persist. Please contact the author of this note for any clarification.      Lee Lindsey MD  Internal Medicine-[facility]        Side effects of medication(s) were discussed in detail and patient voiced understanding.  Patient will call back for any issues or complications.

## 2025-08-25 DIAGNOSIS — Z00.00 ENCOUNTER FOR MEDICARE ANNUAL WELLNESS EXAM: ICD-10-CM

## (undated) DEVICE — NDL INSUF ULTRA VERESS 120MM

## (undated) DEVICE — SUT PROLENE 3-0 36 MHMH

## (undated) DEVICE — SUT MONOCRYL 4-0 PS-1 UND

## (undated) DEVICE — Device

## (undated) DEVICE — SUT SILK 3-0 SH DETACH 30IN

## (undated) DEVICE — SPONGE LAP 18X18 PREWASHED

## (undated) DEVICE — GUIDE WIRE MOTION .035 X 150CM

## (undated) DEVICE — CONTAINER PATHOLOGY W/LID 32OZ

## (undated) DEVICE — SUT 4/0 27IN PDS II VIO MON

## (undated) DEVICE — APPLIER CLIP ENDO MED/LG 10MM

## (undated) DEVICE — DRAPE INCISE IOBAN 2 23X23IN

## (undated) DEVICE — SOL IRR WATER STRL 3000 ML

## (undated) DEVICE — ADHESIVE DERMABOND ADVANCED

## (undated) DEVICE — APPLICATOR CHLORAPREP ORN 26ML

## (undated) DEVICE — BRUSH SCRUB SURGICALW/BETADINE

## (undated) DEVICE — SYS CLSR DERMABOND PRINEO 22CM

## (undated) DEVICE — SEE MEDLINE ITEM 156955

## (undated) DEVICE — ELECTRODE REM PLYHSV RETURN 9

## (undated) DEVICE — SUT PROLENE 2-0 36IN MH BLU

## (undated) DEVICE — SUT MCRYL PLUS 4-0 PS2 27IN

## (undated) DEVICE — SUT PROLENE 1 CTX 30IN BLUE

## (undated) DEVICE — GLOVE PROTEXIS HYDROGEL SZ7

## (undated) DEVICE — SUT 2/0 36IN ETHIBOND EXCE

## (undated) DEVICE — HEMOSTAT SURGICEL 4X8IN

## (undated) DEVICE — SUT 4/0 27IN PDS II VIO MO

## (undated) DEVICE — SOL 9P NACL IRR PIC IL

## (undated) DEVICE — TROCAR ENDOPATH XCEL 5X75MM

## (undated) DEVICE — MANIFOLD 4 PORT

## (undated) DEVICE — TROCAR ENDOPATH XCEL 5MM 7.5CM

## (undated) DEVICE — SOL IRR NACL .9% 3000ML

## (undated) DEVICE — NDL HYPO REG 25G X 1 1/2

## (undated) DEVICE — GAUZE SPONGE PEANUT STRL

## (undated) DEVICE — BOOT SUTURE AID

## (undated) DEVICE — SEALER LIGASURE OPEN 5MM 23CM

## (undated) DEVICE — TUBING INSUFFLATION 10

## (undated) DEVICE — SYR 10CC LUER LOCK

## (undated) DEVICE — SOL PVP-I SCRUB 7.5% 4OZ

## (undated) DEVICE — PACK BASIC

## (undated) DEVICE — DRESSING AQUACEL SACRAL 9 X 9

## (undated) DEVICE — SEE MEDLINE ITEM 152622

## (undated) DEVICE — SUT PROLENE 3-0 SH DA 36 BL

## (undated) DEVICE — SUT 2-0 ETHILON 18 FS

## (undated) DEVICE — KIT ANTIFOG

## (undated) DEVICE — KIT SEAL HEMSTAT EVICEL 35CM

## (undated) DEVICE — COVER OVERHEAD SURG LT BLUE

## (undated) DEVICE — BAG DRAIN URINARY CONT IRRG

## (undated) DEVICE — SEE MEDLINE ITEM 157125

## (undated) DEVICE — SUT PROLENE 4-0 SH BLU 36IN

## (undated) DEVICE — KIT SURGIFLO HEMOSTATIC MATRIX

## (undated) DEVICE — SUT PROLENE 2-0 SH 36IN BLU

## (undated) DEVICE — SUT ETHILON 2-0 FS 18IN BLK

## (undated) DEVICE — SYR 50CC LL

## (undated) DEVICE — SHELL POWER SIGNIA

## (undated) DEVICE — GLOVE SURG BIOGEL LATEX SZ 7.5

## (undated) DEVICE — SEE MEDLINE ITEM 157117

## (undated) DEVICE — SET INTRO CHOLANGIO 4FR 60CM

## (undated) DEVICE — BLADE SURG CARBON STEEL SZ11

## (undated) DEVICE — SUPPORT ULNA NERVE PROTECTOR

## (undated) DEVICE — CONTAINER SPECIMEN STR 3 0Z

## (undated) DEVICE — CATH ALL PUR URTHL 20FR

## (undated) DEVICE — DRAPE FLUID WARMER ORS 44X44IN

## (undated) DEVICE — SET CYSTO IRRIGATION UNIV SPIK

## (undated) DEVICE — TROCAR ENDOPATH EXCEL

## (undated) DEVICE — DRESSING GAUZE 6PLY 4X4

## (undated) DEVICE — GAUZE SPONGE 4X4 12PLY

## (undated) DEVICE — TAPE SURGICAL MICROPORE 3IN

## (undated) DEVICE — TRAY FOLEY 16FR INFECTION CONT

## (undated) DEVICE — HEMOSTAT SURGICEL 2X3IN

## (undated) DEVICE — EVACUATOR WOUND BULB 100CC

## (undated) DEVICE — SEE MEDLINE ITEM 157116

## (undated) DEVICE — SUT 0 VICRYL / UR6 (J603)

## (undated) DEVICE — KIT POWDER ABSORBABLE GELATIN

## (undated) DEVICE — GLOVE PROTEXIS LTX MICRO  7.5

## (undated) DEVICE — CATH MALECOT 4WING BARDEX 24FR

## (undated) DEVICE — GLOVE BIOGEL SKINSENSE PI 7.5

## (undated) DEVICE — STENT URETERAL UNIV 6FR 24CM
Type: IMPLANTABLE DEVICE | Site: BILE DUCT | Status: NON-FUNCTIONAL
Removed: 2019-01-04

## (undated) DEVICE — DRAPE ABDOMINAL TIBURON 14X11

## (undated) DEVICE — BAG URINE DRAIN LATEX FREE

## (undated) DEVICE — STAPLER ENDO GIA 60MM MED THCK

## (undated) DEVICE — IRRIGATOR ENDOSCOPY DISP.

## (undated) DEVICE — UNDERGLOVES BIOGEL PI SIZE 8

## (undated) DEVICE — SUT PROLENE 5-0 36IN C-1